# Patient Record
Sex: FEMALE | Race: WHITE | NOT HISPANIC OR LATINO | Employment: OTHER | URBAN - METROPOLITAN AREA
[De-identification: names, ages, dates, MRNs, and addresses within clinical notes are randomized per-mention and may not be internally consistent; named-entity substitution may affect disease eponyms.]

---

## 2019-06-05 NOTE — PROGRESS NOTES
Subjective:       Patient ID: Olamide Felipe is a 86 y.o. female.    Chief Complaint: Annual Exam and Establish Care    Patient is a 86 y.o.female who presents today for annual      Labs: due now  Vaccines: up to date  Mammogram: due now  Colonoscopy: negative; fitkit  Dexa: due now  Exercise: walks one mile per day      Review of Systems   Constitutional: Negative for appetite change, chills, diaphoresis, fatigue and fever.   HENT: Negative for congestion, dental problem, ear discharge, ear pain, hearing loss, postnasal drip, sinus pressure and sore throat.    Eyes: Negative for discharge, redness and itching.   Respiratory: Negative for cough, chest tightness, shortness of breath and wheezing.    Cardiovascular: Negative for chest pain, palpitations and leg swelling.   Gastrointestinal: Negative for abdominal pain, constipation, diarrhea, nausea and vomiting.   Endocrine: Negative for cold intolerance and heat intolerance.   Genitourinary: Negative for difficulty urinating, frequency, hematuria and urgency.   Musculoskeletal: Negative for arthralgias, back pain, gait problem, myalgias and neck pain.   Skin: Negative for color change and rash.   Neurological: Negative for dizziness, syncope and headaches.   Hematological: Negative for adenopathy.   Psychiatric/Behavioral: Negative for behavioral problems and sleep disturbance. The patient is not nervous/anxious.        Objective:      Physical Exam   Constitutional: She is oriented to person, place, and time. She appears well-developed and well-nourished. No distress.   HENT:   Head: Normocephalic and atraumatic.   Right Ear: External ear normal.   Left Ear: External ear normal.   Nose: Nose normal.   Mouth/Throat: Oropharynx is clear and moist. No oropharyngeal exudate.   Eyes: Pupils are equal, round, and reactive to light. Conjunctivae and EOM are normal. Right eye exhibits no discharge. Left eye exhibits no discharge. No scleral icterus.   Neck: Normal range of  motion. Neck supple. No JVD present. No thyromegaly present.   Cardiovascular: Normal rate, regular rhythm, normal heart sounds and intact distal pulses. Exam reveals no gallop and no friction rub.   No murmur heard.  Pulmonary/Chest: Effort normal and breath sounds normal. No stridor. No respiratory distress. She has no wheezes. She has no rales. She exhibits no tenderness.   Abdominal: Soft. Bowel sounds are normal. She exhibits no distension. There is no tenderness. There is no rebound.   Musculoskeletal: Normal range of motion. She exhibits no edema or tenderness.   Lymphadenopathy:     She has no cervical adenopathy.   Neurological: She is alert and oriented to person, place, and time. No cranial nerve deficit.   Skin: Skin is warm and dry. No rash noted. She is not diaphoretic. No erythema.   Psychiatric: She has a normal mood and affect. Her behavior is normal.   Nursing note and vitals reviewed.      Assessment and Plan:       1. Annual physical exam    - CBC auto differential; Future  - Comprehensive metabolic panel; Future  - Lipid panel; Future  - Vitamin D; Future  - Urinalysis; Future  - TSH; Future    2. Hypothyroidism, unspecified type    - CBC auto differential; Future  - Comprehensive metabolic panel; Future  - Lipid panel; Future  - Vitamin D; Future  - Urinalysis; Future  - TSH; Future    3. Hyperlipidemia, unspecified hyperlipidemia type    - CBC auto differential; Future  - Comprehensive metabolic panel; Future  - Lipid panel; Future  - Vitamin D; Future  - Urinalysis; Future  - TSH; Future    4. Vitamin D deficiency    - Vitamin D; Future    5. Visit for screening mammogram    - Mammo Digital Screening Bilat without CA; Future    6. Postmenopausal    - DXA Bone Density Spine And Hip; Future    7. Screen for colon cancer    - Fecal Immunochemical Test (iFOBT); Future          No follow-ups on file.

## 2019-06-11 ENCOUNTER — OFFICE VISIT (OUTPATIENT)
Dept: INTERNAL MEDICINE | Facility: CLINIC | Age: 84
End: 2019-06-11
Payer: MEDICARE

## 2019-06-11 VITALS
HEART RATE: 84 BPM | RESPIRATION RATE: 20 BRPM | WEIGHT: 127.19 LBS | SYSTOLIC BLOOD PRESSURE: 136 MMHG | TEMPERATURE: 98 F | DIASTOLIC BLOOD PRESSURE: 67 MMHG | HEIGHT: 65 IN | BODY MASS INDEX: 21.19 KG/M2

## 2019-06-11 DIAGNOSIS — E55.9 VITAMIN D DEFICIENCY: ICD-10-CM

## 2019-06-11 DIAGNOSIS — Z12.11 SCREEN FOR COLON CANCER: ICD-10-CM

## 2019-06-11 DIAGNOSIS — Z00.00 ANNUAL PHYSICAL EXAM: Primary | ICD-10-CM

## 2019-06-11 DIAGNOSIS — E78.5 HYPERLIPIDEMIA, UNSPECIFIED HYPERLIPIDEMIA TYPE: ICD-10-CM

## 2019-06-11 DIAGNOSIS — Z12.31 VISIT FOR SCREENING MAMMOGRAM: ICD-10-CM

## 2019-06-11 DIAGNOSIS — E03.9 HYPOTHYROIDISM, UNSPECIFIED TYPE: ICD-10-CM

## 2019-06-11 DIAGNOSIS — Z78.0 POSTMENOPAUSAL: ICD-10-CM

## 2019-06-11 PROCEDURE — 99387 INIT PM E/M NEW PAT 65+ YRS: CPT | Mod: S$GLB,,, | Performed by: INTERNAL MEDICINE

## 2019-06-11 PROCEDURE — 99999 PR PBB SHADOW E&M-NEW PATIENT-LVL III: CPT | Mod: PBBFAC,,, | Performed by: INTERNAL MEDICINE

## 2019-06-11 PROCEDURE — 99999 PR PBB SHADOW E&M-NEW PATIENT-LVL III: ICD-10-PCS | Mod: PBBFAC,,, | Performed by: INTERNAL MEDICINE

## 2019-06-11 PROCEDURE — 99387 PR PREVENTIVE VISIT,NEW,65 & OVER: ICD-10-PCS | Mod: S$GLB,,, | Performed by: INTERNAL MEDICINE

## 2019-06-11 RX ORDER — FLUOXETINE HYDROCHLORIDE 20 MG/1
20 CAPSULE ORAL DAILY
COMMUNITY
End: 2019-07-10 | Stop reason: SDUPTHER

## 2019-06-11 RX ORDER — GLUCOSAMINE/CHONDR SU A SOD 167-133 MG
500 CAPSULE ORAL 2 TIMES DAILY WITH MEALS
COMMUNITY
End: 2022-10-18

## 2019-06-11 RX ORDER — MULTIVIT WITH MINERALS/HERBS
1 TABLET ORAL DAILY
Status: ON HOLD | COMMUNITY
End: 2023-11-20 | Stop reason: SDUPTHER

## 2019-06-11 RX ORDER — OMEPRAZOLE 20 MG/1
20 CAPSULE, DELAYED RELEASE ORAL DAILY
COMMUNITY
End: 2019-07-10 | Stop reason: SDUPTHER

## 2019-06-11 RX ORDER — LEVOTHYROXINE SODIUM 25 UG/1
25 TABLET ORAL DAILY
COMMUNITY
End: 2019-07-10 | Stop reason: SDUPTHER

## 2019-06-11 RX ORDER — SIMVASTATIN 20 MG/1
20 TABLET, FILM COATED ORAL NIGHTLY
COMMUNITY
End: 2019-09-24 | Stop reason: SDUPTHER

## 2019-06-14 ENCOUNTER — LAB VISIT (OUTPATIENT)
Dept: LAB | Facility: HOSPITAL | Age: 84
End: 2019-06-14
Attending: INTERNAL MEDICINE
Payer: MEDICARE

## 2019-06-14 DIAGNOSIS — Z12.11 SCREEN FOR COLON CANCER: ICD-10-CM

## 2019-06-14 PROCEDURE — 82274 ASSAY TEST FOR BLOOD FECAL: CPT

## 2019-06-18 LAB — HEMOCCULT STL QL IA: NEGATIVE

## 2019-06-22 ENCOUNTER — OFFICE VISIT (OUTPATIENT)
Dept: URGENT CARE | Facility: CLINIC | Age: 84
End: 2019-06-22
Payer: MEDICARE

## 2019-06-22 VITALS
SYSTOLIC BLOOD PRESSURE: 155 MMHG | DIASTOLIC BLOOD PRESSURE: 72 MMHG | TEMPERATURE: 98 F | WEIGHT: 127 LBS | RESPIRATION RATE: 18 BRPM | BODY MASS INDEX: 21.16 KG/M2 | OXYGEN SATURATION: 97 % | HEIGHT: 65 IN | HEART RATE: 79 BPM

## 2019-06-22 DIAGNOSIS — R52 GENERALIZED BODY ACHES: Primary | ICD-10-CM

## 2019-06-22 DIAGNOSIS — R35.0 URINARY FREQUENCY: ICD-10-CM

## 2019-06-22 LAB
BILIRUB UR QL STRIP: NEGATIVE
GLUCOSE UR QL STRIP: NEGATIVE
KETONES UR QL STRIP: NEGATIVE
LEUKOCYTE ESTERASE UR QL STRIP: NEGATIVE
PH, POC UA: 7.5 (ref 5–8)
POC BLOOD, URINE: NEGATIVE
POC NITRATES, URINE: NEGATIVE
PROT UR QL STRIP: NEGATIVE
SP GR UR STRIP: 1.01 (ref 1–1.03)
UROBILINOGEN UR STRIP-ACNC: NORMAL (ref 0.1–1.1)

## 2019-06-22 PROCEDURE — 99214 PR OFFICE/OUTPT VISIT, EST, LEVL IV, 30-39 MIN: ICD-10-PCS | Mod: S$GLB,,, | Performed by: PHYSICIAN ASSISTANT

## 2019-06-22 PROCEDURE — 99214 OFFICE O/P EST MOD 30 MIN: CPT | Mod: S$GLB,,, | Performed by: PHYSICIAN ASSISTANT

## 2019-06-22 PROCEDURE — 3288F PR FALLS RISK ASSESSMENT DOCUMENTED: ICD-10-PCS | Mod: CPTII,S$GLB,, | Performed by: PHYSICIAN ASSISTANT

## 2019-06-22 PROCEDURE — 1100F PTFALLS ASSESS-DOCD GE2>/YR: CPT | Mod: CPTII,S$GLB,, | Performed by: PHYSICIAN ASSISTANT

## 2019-06-22 PROCEDURE — 1100F PR PT FALLS ASSESS DOC 2+ FALLS/FALL W/INJURY/YR: ICD-10-PCS | Mod: CPTII,S$GLB,, | Performed by: PHYSICIAN ASSISTANT

## 2019-06-22 PROCEDURE — 81003 POCT URINALYSIS, DIPSTICK, AUTOMATED, W/O SCOPE: ICD-10-PCS | Mod: QW,S$GLB,, | Performed by: PHYSICIAN ASSISTANT

## 2019-06-22 PROCEDURE — 81003 URINALYSIS AUTO W/O SCOPE: CPT | Mod: QW,S$GLB,, | Performed by: PHYSICIAN ASSISTANT

## 2019-06-22 PROCEDURE — 87086 URINE CULTURE/COLONY COUNT: CPT

## 2019-06-22 PROCEDURE — 3288F FALL RISK ASSESSMENT DOCD: CPT | Mod: CPTII,S$GLB,, | Performed by: PHYSICIAN ASSISTANT

## 2019-06-22 NOTE — PROGRESS NOTES
"Subjective:       Patient ID: Olamide Felipe is a 86 y.o. female.    Vitals:  height is 5' 5" (1.651 m) and weight is 57.6 kg (127 lb). Her temperature is 97.9 °F (36.6 °C). Her blood pressure is 155/72 (abnormal) and her pulse is 79. Her respiration is 18 and oxygen saturation is 97%.     Chief Complaint: Generalized Body Aches    Pt had stomach pain last night, pt now has general body aches. Stomach pain began in lower abdomen. She denies fever/chills, diarrhea/constipation, n/v, or blood in stool. The reports urinating 4-5 times last night which is unusual for her. Regular BM this morning. She denies headache, dizziness, cp/sob, congestion, sore throat. She does report back discomfort, especially when her back touches anything. She has not tried anything for these symptoms. Pt notes she has follow up with PCP on Monday to get blood work and mammogram.       Constitution: Negative for chills, fatigue, fever and generalized weakness.   HENT: Negative for congestion and sore throat.    Neck: Negative for painful lymph nodes.   Cardiovascular: Negative for chest pain and leg swelling.   Eyes: Negative for double vision and blurred vision.   Respiratory: Negative for cough and shortness of breath.    Gastrointestinal: Negative for nausea, vomiting, constipation and diarrhea.   Genitourinary: Positive for frequency. Negative for dysuria, urgency, hematuria and history of kidney stones.   Musculoskeletal: Positive for joint pain and back pain. Negative for joint swelling, muscle cramps and muscle ache.   Skin: Negative for color change, pale, rash and bruising.   Allergic/Immunologic: Negative for seasonal allergies.   Neurological: Negative for dizziness, history of vertigo, light-headedness, passing out and headaches.   Hematologic/Lymphatic: Negative for swollen lymph nodes.   Psychiatric/Behavioral: Negative for nervous/anxious, sleep disturbance and depression. The patient is not nervous/anxious.        Objective: "      Physical Exam   Constitutional: She is oriented to person, place, and time. She appears well-developed and well-nourished. She is active. She does not have a sickly appearance. She does not appear ill. No distress.   HENT:   Head: Normocephalic and atraumatic.   Right Ear: External ear normal.   Left Ear: External ear normal.   Nose: Nose normal.   Mouth/Throat: Mucous membranes are normal.   Eyes: Pupils are equal, round, and reactive to light. Conjunctivae, EOM and lids are normal.   Neck: Trachea normal and full passive range of motion without pain. Neck supple.   Cardiovascular: Normal rate, regular rhythm and normal heart sounds.   Pulmonary/Chest: Effort normal and breath sounds normal. No respiratory distress.   Abdominal: Soft. Normal appearance and bowel sounds are normal. She exhibits no distension, no abdominal bruit, no pulsatile midline mass and no mass. There is no tenderness. There is CVA tenderness. There is no rigidity, no guarding and no tenderness at McBurney's point.   Musculoskeletal: Normal range of motion. She exhibits no edema.        Thoracic back: She exhibits no bony tenderness and no swelling.   TTP of left paraspinal muscles.    Neurological: She is alert and oriented to person, place, and time. She has normal strength. No cranial nerve deficit or sensory deficit. Gait normal. GCS eye subscore is 4. GCS verbal subscore is 5. GCS motor subscore is 6.   Skin: Skin is warm, dry and intact. She is not diaphoretic. No pallor.   Psychiatric: She has a normal mood and affect. Her speech is normal and behavior is normal. Judgment and thought content normal. Cognition and memory are normal.   Nursing note and vitals reviewed.      Results for orders placed or performed in visit on 06/22/19   POCT Urinalysis, Dipstick, Automated, W/O Scope   Result Value Ref Range    POC Blood, Urine Negative Negative    POC Bilirubin, Urine Negative Negative    POC Urobilinogen, Urine Normal 0.1 - 1.1    POC  Ketones, Urine Negative Negative    POC Protein, Urine Negative Negative    POC Nitrates, Urine Negative Negative    POC Glucose, Urine Negative Negative    pH, UA 7.5 5 - 8    POC Specific Gravity, Urine 1.010 1.003 - 1.029    POC Leukocytes, Urine Negative Negative       Assessment:       1. Generalized body aches    2. Urinary frequency        Plan:         Generalized body aches  -     POCT Urinalysis, Dipstick, Automated, W/O Scope  -     Urine culture    Urinary frequency  -     POCT Urinalysis, Dipstick, Automated, W/O Scope  -     Urine culture    Pt with some left sided back pain, otherwise exam unremarkable. She does not seem distressed or ill-appearing. Given age and unknown renal fxn, do not recommend Naproxen for back pain. Pt instructed to take Tylenol for discomfort and to monitor for fever.     Pt instructed to contact Primary care provider Monday if signs and symptoms have not resolved or worsen. If symptoms worsen before Monday recommend pt follow up in ER for further workup.     RED FLAGS/WARNING SYMPTOMS DISCUSSED WITH PATIENT THAT WOULD WARRANT EMERGENT MEDICAL ATTENTION. PATIENT VERBALIZED UNDERSTANDING.

## 2019-06-24 ENCOUNTER — HOSPITAL ENCOUNTER (OUTPATIENT)
Dept: RADIOLOGY | Facility: HOSPITAL | Age: 84
Discharge: HOME | End: 2019-06-24
Attending: INTERNAL MEDICINE
Payer: MEDICARE

## 2019-06-24 DIAGNOSIS — Z12.31 VISIT FOR SCREENING MAMMOGRAM: ICD-10-CM

## 2019-06-24 LAB — BACTERIA UR CULT: NO GROWTH

## 2019-06-24 PROCEDURE — 77063 MAMMO DIGITAL SCREENING BILAT WITH TOMOSYNTHESIS_CAD: ICD-10-PCS | Mod: 26,,, | Performed by: RADIOLOGY

## 2019-06-24 PROCEDURE — 77063 BREAST TOMOSYNTHESIS BI: CPT | Mod: 26,,, | Performed by: RADIOLOGY

## 2019-06-24 PROCEDURE — 77067 SCR MAMMO BI INCL CAD: CPT | Mod: 26,,, | Performed by: RADIOLOGY

## 2019-06-24 PROCEDURE — 77067 MAMMO DIGITAL SCREENING BILAT WITH TOMOSYNTHESIS_CAD: ICD-10-PCS | Mod: 26,,, | Performed by: RADIOLOGY

## 2019-06-24 PROCEDURE — 77067 SCR MAMMO BI INCL CAD: CPT | Mod: TC,PO

## 2019-06-25 ENCOUNTER — TELEPHONE (OUTPATIENT)
Dept: INTERNAL MEDICINE | Facility: CLINIC | Age: 84
End: 2019-06-25

## 2019-06-25 DIAGNOSIS — E83.52 HYPERCALCEMIA: Primary | ICD-10-CM

## 2019-06-25 NOTE — TELEPHONE ENCOUNTER
Please inform patient of the following:    -Blood counts for some reason were not drawn; need to do CBC  -Electrolytes, kidney and liver function are normal  - calcium is high; recommend repeat calcium testing  -Thyroid function is normal  -Cholesterol panel is normal  - vit D is normal  - urine is clear

## 2019-07-01 ENCOUNTER — LAB VISIT (OUTPATIENT)
Dept: LAB | Facility: HOSPITAL | Age: 84
End: 2019-07-01
Attending: INTERNAL MEDICINE
Payer: MEDICARE

## 2019-07-01 ENCOUNTER — TELEPHONE (OUTPATIENT)
Dept: INTERNAL MEDICINE | Facility: CLINIC | Age: 84
End: 2019-07-01

## 2019-07-01 DIAGNOSIS — E83.52 HYPERCALCEMIA: ICD-10-CM

## 2019-07-01 DIAGNOSIS — E21.3 HYPERPARATHYROIDISM: Primary | ICD-10-CM

## 2019-07-01 LAB
ALBUMIN SERPL BCP-MCNC: 3.7 G/DL (ref 3.5–5.2)
ALP SERPL-CCNC: 61 U/L (ref 55–135)
ALT SERPL W/O P-5'-P-CCNC: 19 U/L (ref 10–44)
ANION GAP SERPL CALC-SCNC: 9 MMOL/L (ref 8–16)
AST SERPL-CCNC: 23 U/L (ref 10–40)
BASOPHILS # BLD AUTO: 0.04 K/UL (ref 0–0.2)
BASOPHILS NFR BLD: 0.8 % (ref 0–1.9)
BILIRUB SERPL-MCNC: 0.5 MG/DL (ref 0.1–1)
BUN SERPL-MCNC: 23 MG/DL (ref 8–23)
CA-I BLDV-SCNC: 1.27 MMOL/L (ref 1.06–1.42)
CALCIUM SERPL-MCNC: 9.5 MG/DL (ref 8.7–10.5)
CHLORIDE SERPL-SCNC: 107 MMOL/L (ref 95–110)
CO2 SERPL-SCNC: 28 MMOL/L (ref 23–29)
CREAT SERPL-MCNC: 0.7 MG/DL (ref 0.5–1.4)
DIFFERENTIAL METHOD: ABNORMAL
EOSINOPHIL # BLD AUTO: 0.2 K/UL (ref 0–0.5)
EOSINOPHIL NFR BLD: 3.9 % (ref 0–8)
ERYTHROCYTE [DISTWIDTH] IN BLOOD BY AUTOMATED COUNT: 12.1 % (ref 11.5–14.5)
EST. GFR  (AFRICAN AMERICAN): >60 ML/MIN/1.73 M^2
EST. GFR  (NON AFRICAN AMERICAN): >60 ML/MIN/1.73 M^2
GLUCOSE SERPL-MCNC: 88 MG/DL (ref 70–110)
HCT VFR BLD AUTO: 40 % (ref 37–48.5)
HGB BLD-MCNC: 13.2 G/DL (ref 12–16)
IMM GRANULOCYTES # BLD AUTO: 0.01 K/UL (ref 0–0.04)
IMM GRANULOCYTES NFR BLD AUTO: 0.2 % (ref 0–0.5)
LYMPHOCYTES # BLD AUTO: 1.6 K/UL (ref 1–4.8)
LYMPHOCYTES NFR BLD: 31.8 % (ref 18–48)
MCH RBC QN AUTO: 31.7 PG (ref 27–31)
MCHC RBC AUTO-ENTMCNC: 33 G/DL (ref 32–36)
MCV RBC AUTO: 96 FL (ref 82–98)
MONOCYTES # BLD AUTO: 0.5 K/UL (ref 0.3–1)
MONOCYTES NFR BLD: 9.6 % (ref 4–15)
NEUTROPHILS # BLD AUTO: 2.7 K/UL (ref 1.8–7.7)
NEUTROPHILS NFR BLD: 53.7 % (ref 38–73)
NRBC BLD-RTO: 0 /100 WBC
PLATELET # BLD AUTO: 246 K/UL (ref 150–350)
PMV BLD AUTO: 10.7 FL (ref 9.2–12.9)
POTASSIUM SERPL-SCNC: 4.8 MMOL/L (ref 3.5–5.1)
PROT SERPL-MCNC: 6.3 G/DL (ref 6–8.4)
PTH-INTACT SERPL-MCNC: 97 PG/ML (ref 9–77)
RBC # BLD AUTO: 4.16 M/UL (ref 4–5.4)
SODIUM SERPL-SCNC: 144 MMOL/L (ref 136–145)
WBC # BLD AUTO: 5.09 K/UL (ref 3.9–12.7)

## 2019-07-01 PROCEDURE — 85025 COMPLETE CBC W/AUTO DIFF WBC: CPT

## 2019-07-01 PROCEDURE — 36415 COLL VENOUS BLD VENIPUNCTURE: CPT | Mod: PO

## 2019-07-01 PROCEDURE — 83970 ASSAY OF PARATHORMONE: CPT

## 2019-07-01 PROCEDURE — 82330 ASSAY OF CALCIUM: CPT

## 2019-07-01 PROCEDURE — 80053 COMPREHEN METABOLIC PANEL: CPT

## 2019-07-01 NOTE — TELEPHONE ENCOUNTER
Notify pt that her parathyroid hormone is excreting too much calcium at times; recommend endocrine eval given this finding

## 2019-07-02 NOTE — TELEPHONE ENCOUNTER
Spoke to pt and informed of results and endo referral. Pt verbalized and is agreeable. Referral sent to referral team to schedule pt.

## 2019-07-10 ENCOUNTER — TELEPHONE (OUTPATIENT)
Dept: INTERNAL MEDICINE | Facility: CLINIC | Age: 84
End: 2019-07-10

## 2019-07-10 RX ORDER — OMEPRAZOLE 20 MG/1
20 CAPSULE, DELAYED RELEASE ORAL DAILY
Qty: 90 CAPSULE | Refills: 3 | Status: SHIPPED | OUTPATIENT
Start: 2019-07-10 | End: 2020-08-31 | Stop reason: SDUPTHER

## 2019-07-10 RX ORDER — LEVOTHYROXINE SODIUM 25 UG/1
25 TABLET ORAL DAILY
Qty: 90 TABLET | Refills: 3 | Status: SHIPPED | OUTPATIENT
Start: 2019-07-10 | End: 2020-10-02 | Stop reason: SDUPTHER

## 2019-07-10 RX ORDER — FLUOXETINE HYDROCHLORIDE 20 MG/1
20 CAPSULE ORAL DAILY
Qty: 90 CAPSULE | Refills: 3 | Status: SHIPPED | OUTPATIENT
Start: 2019-07-10 | End: 2020-10-02 | Stop reason: SDUPTHER

## 2019-07-10 NOTE — TELEPHONE ENCOUNTER
----- Message from Karime Wright sent at 7/10/2019 11:18 AM CDT -----  Contact: pt [phone]  Patient is calling for an RX refill or new RX.  Is this a refill or new RX: new    RX name and strength: omeprazole (PRILOSEC) 20 MG capsule  Directions (copy/paste from chart):  Take 20 mg by mouth once daily  Is this a 30 day or 90 day RX:  90  Local pharmacy or mail order pharmacy:  Local   Pharmacy name and phone # (copy/paste from chart):   [facility]   Comments:      Patient is calling for an RX refill or new RX.  Is this a refill or new RX:  New   RX name and strength: FLUoxetine 20 MG capsule  Directions (copy/paste from chart):  Take 20 mg by mouth once daily  Is this a 30 day or 90 day RX:  90  Local pharmacy or mail order pharmacy:  Local   Pharmacy name and phone # (copy/paste from chart):   [facility]   Comments:      Patient is calling for an RX refill or new RX.  Is this a refill or new RX:  New   RX name and strength: levothyroxine (SYNTHROID) 25 MCG tablet  Directions (copy/paste from chart):  Take 25 mcg by mouth once daily  Is this a 30 day or 90 day RX:  90  Local pharmacy or mail order pharmacy:  Local   Pharmacy name and phone # (copy/paste from chart):  [facility]    Comments:

## 2019-08-26 ENCOUNTER — OFFICE VISIT (OUTPATIENT)
Dept: INTERNAL MEDICINE | Facility: CLINIC | Age: 84
End: 2019-08-26
Payer: MEDICARE

## 2019-08-26 VITALS
DIASTOLIC BLOOD PRESSURE: 60 MMHG | HEIGHT: 65 IN | TEMPERATURE: 98 F | BODY MASS INDEX: 20.64 KG/M2 | SYSTOLIC BLOOD PRESSURE: 136 MMHG | WEIGHT: 123.88 LBS | RESPIRATION RATE: 16 BRPM | HEART RATE: 98 BPM

## 2019-08-26 DIAGNOSIS — H91.90 HEARING LOSS, UNSPECIFIED HEARING LOSS TYPE, UNSPECIFIED LATERALITY: ICD-10-CM

## 2019-08-26 DIAGNOSIS — S09.90XA TRAUMATIC INJURY OF HEAD, INITIAL ENCOUNTER: Primary | ICD-10-CM

## 2019-08-26 PROCEDURE — 3288F FALL RISK ASSESSMENT DOCD: CPT | Mod: HCNC,CPTII,S$GLB, | Performed by: INTERNAL MEDICINE

## 2019-08-26 PROCEDURE — 3288F PR FALLS RISK ASSESSMENT DOCUMENTED: ICD-10-PCS | Mod: HCNC,CPTII,S$GLB, | Performed by: INTERNAL MEDICINE

## 2019-08-26 PROCEDURE — 99999 PR PBB SHADOW E&M-EST. PATIENT-LVL IV: CPT | Mod: PBBFAC,HCNC,, | Performed by: INTERNAL MEDICINE

## 2019-08-26 PROCEDURE — 99214 PR OFFICE/OUTPT VISIT, EST, LEVL IV, 30-39 MIN: ICD-10-PCS | Mod: HCNC,S$GLB,, | Performed by: INTERNAL MEDICINE

## 2019-08-26 PROCEDURE — 99214 OFFICE O/P EST MOD 30 MIN: CPT | Mod: HCNC,S$GLB,, | Performed by: INTERNAL MEDICINE

## 2019-08-26 PROCEDURE — 1100F PTFALLS ASSESS-DOCD GE2>/YR: CPT | Mod: HCNC,CPTII,S$GLB, | Performed by: INTERNAL MEDICINE

## 2019-08-26 PROCEDURE — 1100F PR PT FALLS ASSESS DOC 2+ FALLS/FALL W/INJURY/YR: ICD-10-PCS | Mod: HCNC,CPTII,S$GLB, | Performed by: INTERNAL MEDICINE

## 2019-08-26 PROCEDURE — 99999 PR PBB SHADOW E&M-EST. PATIENT-LVL IV: ICD-10-PCS | Mod: PBBFAC,HCNC,, | Performed by: INTERNAL MEDICINE

## 2019-08-26 NOTE — PROGRESS NOTES
Subjective:       Patient ID: Olamide Felipe is a 86 y.o. female.    Chief Complaint: both ears stopped up    Patient is a 86 y.o.female who presents today for ear discomfort.      She fell one week ago. Her head hit the floor. She did not go to the ER. She put ice on her head. There is a bruise on the left side of her scalp.      She went to [employer] yesterday and she cannot hear what was going on. It feels like her ears are full of water.         Review of Systems   Constitutional: Negative for appetite change, chills, diaphoresis and fever.   HENT: Positive for hearing loss. Negative for congestion, ear discharge, ear pain, postnasal drip, tinnitus, trouble swallowing and voice change.    Eyes: Negative for discharge, redness and itching.   Respiratory: Negative for cough, chest tightness, shortness of breath and wheezing.    Cardiovascular: Negative for chest pain, palpitations and leg swelling.   Gastrointestinal: Negative for abdominal pain, constipation, diarrhea, nausea and vomiting.   Endocrine: Negative for cold intolerance and heat intolerance.   Genitourinary: Negative for difficulty urinating, flank pain, hematuria and urgency.   Musculoskeletal: Negative for arthralgias, gait problem, myalgias and neck stiffness.   Skin: Negative for color change and rash.   Neurological: Negative for dizziness, seizures, syncope and headaches.   Hematological: Negative for adenopathy.   Psychiatric/Behavioral: Negative for agitation, behavioral problems, confusion and sleep disturbance.       Objective:      Physical Exam   Constitutional: She is oriented to person, place, and time. She appears well-developed and well-nourished. No distress.   HENT:   Head: Normocephalic and atraumatic.       Right Ear: Tympanic membrane and external ear normal.   Left Ear: Tympanic membrane and external ear normal.   Nose: Nose normal.   Mouth/Throat: Oropharynx is clear and moist. No oropharyngeal exudate.   Eyes: Pupils are equal,  round, and reactive to light. Conjunctivae and EOM are normal. Right eye exhibits no discharge. Left eye exhibits no discharge. No scleral icterus.   Neck: Neck supple. No JVD present. No tracheal deviation present. No thyromegaly present.   Cardiovascular: Normal rate, normal heart sounds and intact distal pulses. Exam reveals no gallop and no friction rub.   No murmur heard.  Pulmonary/Chest: Effort normal and breath sounds normal. No stridor. No respiratory distress. She has no wheezes. She has no rales. She exhibits no tenderness.   Abdominal: Soft. Bowel sounds are normal. She exhibits no distension. There is no tenderness. There is no rebound.   Musculoskeletal: She exhibits no edema or tenderness.   Lymphadenopathy:     She has no cervical adenopathy.   Neurological: She is alert and oriented to person, place, and time.   Skin: Skin is warm and dry. No rash noted. She is not diaphoretic. No erythema.   Psychiatric: She has a normal mood and affect. Her behavior is normal.   Nursing note and vitals reviewed.      Assessment and Plan:       1. Traumatic injury of head, initial encounter  - CT Head Without Contrast; Future    2. Hearing loss, unspecified hearing loss type, unspecified laterality    - Ambulatory Referral to ENT    Notify clinic if symptoms change, worsen or do not improve          No follow-ups on file.

## 2019-08-27 ENCOUNTER — OFFICE VISIT (OUTPATIENT)
Dept: OTOLARYNGOLOGY | Facility: CLINIC | Age: 84
End: 2019-08-27
Payer: MEDICARE

## 2019-08-27 ENCOUNTER — CLINICAL SUPPORT (OUTPATIENT)
Dept: AUDIOLOGY | Facility: CLINIC | Age: 84
End: 2019-08-27
Payer: MEDICARE

## 2019-08-27 VITALS
HEART RATE: 79 BPM | WEIGHT: 123 LBS | DIASTOLIC BLOOD PRESSURE: 64 MMHG | SYSTOLIC BLOOD PRESSURE: 151 MMHG | BODY MASS INDEX: 20.47 KG/M2

## 2019-08-27 DIAGNOSIS — H93.8X3 PRESSURE SENSATION IN BOTH EARS: ICD-10-CM

## 2019-08-27 DIAGNOSIS — H90.3 ASYMMETRICAL SENSORINEURAL HEARING LOSS: Primary | ICD-10-CM

## 2019-08-27 DIAGNOSIS — H93.12 TINNITUS, LEFT: ICD-10-CM

## 2019-08-27 DIAGNOSIS — H90.3 SENSORINEURAL HEARING LOSS (SNHL) OF BOTH EARS: ICD-10-CM

## 2019-08-27 PROCEDURE — 1100F PR PT FALLS ASSESS DOC 2+ FALLS/FALL W/INJURY/YR: ICD-10-PCS | Mod: HCNC,CPTII,S$GLB, | Performed by: NURSE PRACTITIONER

## 2019-08-27 PROCEDURE — 3288F PR FALLS RISK ASSESSMENT DOCUMENTED: ICD-10-PCS | Mod: HCNC,CPTII,S$GLB, | Performed by: NURSE PRACTITIONER

## 2019-08-27 PROCEDURE — 99203 PR OFFICE/OUTPT VISIT, NEW, LEVL III, 30-44 MIN: ICD-10-PCS | Mod: HCNC,S$GLB,, | Performed by: NURSE PRACTITIONER

## 2019-08-27 PROCEDURE — 92557 PR COMPREHENSIVE HEARING TEST: ICD-10-PCS | Mod: HCNC,S$GLB,, | Performed by: AUDIOLOGIST

## 2019-08-27 PROCEDURE — 92557 COMPREHENSIVE HEARING TEST: CPT | Mod: HCNC,S$GLB,, | Performed by: AUDIOLOGIST

## 2019-08-27 PROCEDURE — 92567 PR TYMPA2METRY: ICD-10-PCS | Mod: HCNC,S$GLB,, | Performed by: AUDIOLOGIST

## 2019-08-27 PROCEDURE — 99999 PR PBB SHADOW E&M-EST. PATIENT-LVL III: ICD-10-PCS | Mod: PBBFAC,HCNC,, | Performed by: NURSE PRACTITIONER

## 2019-08-27 PROCEDURE — 92567 TYMPANOMETRY: CPT | Mod: HCNC,S$GLB,, | Performed by: AUDIOLOGIST

## 2019-08-27 PROCEDURE — 99999 PR PBB SHADOW E&M-EST. PATIENT-LVL III: CPT | Mod: PBBFAC,HCNC,, | Performed by: NURSE PRACTITIONER

## 2019-08-27 PROCEDURE — 1100F PTFALLS ASSESS-DOCD GE2>/YR: CPT | Mod: HCNC,CPTII,S$GLB, | Performed by: NURSE PRACTITIONER

## 2019-08-27 PROCEDURE — 99203 OFFICE O/P NEW LOW 30 MIN: CPT | Mod: HCNC,S$GLB,, | Performed by: NURSE PRACTITIONER

## 2019-08-27 PROCEDURE — 3288F FALL RISK ASSESSMENT DOCD: CPT | Mod: HCNC,CPTII,S$GLB, | Performed by: NURSE PRACTITIONER

## 2019-08-27 NOTE — PROGRESS NOTES
8/27/2019    AUDIOLOGICAL EVALUATION:    Olamide Felipe was seen for an audiological evaluation on 8/27/2019 for decreased hearing sensitivity bilaterally.  Mrs. Felipe noticed the hearing loss 3 days ago at [employer].  She reported a blocked sensation and feels like she is under water.  Mrs. Felipe also reported tinnitus in her left ear.    Pure tone threshold testing revealed an asymmetrical sensorineural hearing loss.  Speech reception thresholds were obtained at 30dBHL for the right ear and 45dBHL for the left ear.  Speech discrimination scores were obtained at 84% for the right ear and 72% for the left ear.    Tympanometry was within normal limits bilaterally indicating normal middle ear function.    Recommend:  1.  Otologic evaluation.  2.  Follow up audio to monitor hearing levels.  3.  Hearing aid evaluation.  4.  Annual evaluation.

## 2019-08-27 NOTE — PROGRESS NOTES
Subjective:      Olamide Felipe is a 86 y.o. female who was referred to me by Dr. Nicolasa Rojas in consultation for hearing loss.    Ms. Felipe reports decrease hearing with associated ear pressure in both ears for the past year. She denies tinnitus, dizziness, ear pain or ear drainage. She states she has to turn her television on loud to hear it.  There is not a family history of hearing loss at a young age.  There is not a prior history of ear surgery.  There is not a prior history of ear infections .  She denies a history of significant noise exposure.  She does not wear hearing aids currently.  She has not had a hearing test recently.       Past Medical History  She has a past medical history of Hyperlipidemia, Hypothyroidism, and Osteoarthritis, knee.    Past Surgical History  She has a past surgical history that includes dentures; left wrist surgery; and Hysterectomy.    Family History  Her family history is not on file.    Social History  She reports that she quit smoking about 30 years ago. She has never used smokeless tobacco.    Allergies  She has No Known Allergies.    Medications  She has a current medication list which includes the following prescription(s): b complex vitamins, calcium-vitamin d, fluoxetine, levothyroxine, mv-min/iron/folic/calcium/vitk, niacin, omeprazole, and simvastatin.    Review of Systems   Constitutional: Negative for chills, fever and unexpected weight change.   HENT: Positive for hearing loss. Negative for congestion, ear discharge, ear pain, facial swelling, postnasal drip, sinus pressure, sore throat, tinnitus and trouble swallowing.    Eyes: Negative for pain and visual disturbance.   Respiratory: Negative for apnea and shortness of breath.    Cardiovascular: Negative for chest pain and palpitations.   Gastrointestinal: Negative for abdominal pain and nausea.   Endocrine: Negative for cold intolerance and heat intolerance.   Musculoskeletal: Negative for joint  swelling and neck stiffness.   Skin: Negative for color change and rash.   Neurological: Negative for dizziness, facial asymmetry and headaches.   Hematological: Negative for adenopathy. Does not bruise/bleed easily.   Psychiatric/Behavioral: Negative for agitation. The patient is not nervous/anxious.           Objective:     BP (!) 151/64   Pulse 79   Wt 55.8 kg (123 lb)   BMI 20.47 kg/m²      Constitutional:   Vital signs are normal. She appears well-developed and well-nourished.     Head:  Normocephalic and atraumatic.     Ears:    Right Ear: No lacerations. No drainage, swelling or tenderness. No foreign bodies. No mastoid tenderness. Tympanic membrane is not injected, not scarred, not perforated, not erythematous, not retracted and not bulging. Tympanic membrane mobility is normal. No middle ear effusion. No hemotympanum. Decreased hearing is noted.   Left Ear: No lacerations. No drainage, swelling or tenderness. No foreign bodies. No mastoid tenderness. Tympanic membrane is not injected, not scarred, not perforated, not erythematous, not retracted and not bulging. Tympanic membrane mobility is normal.  No middle ear effusion. No hemotympanum. Decreased hearing is noted.     Nose:  Nose normal including turbinates, nasal mucosa, sinuses and nasal septum.     Neck:  Neck normal without thyromegaly masses, asymmetry, normal tracheal structure, crepitus, and tenderness and no adenopathy.     Cardiovascular:   Normal heart sounds and normal pulses.      Pulmonary/Chest:   Effort normal and breath sounds normal.     Psychiatric:   She has a normal mood and affect.       Procedure    None      Data Reviewed    WBC (K/uL)   Date Value   07/01/2019 5.09     Platelets (K/uL)   Date Value   07/01/2019 246      Creatinine (mg/dL)   Date Value   07/01/2019 0.7     TSH (uIU/mL)   Date Value   06/24/2019 1.992     Glucose (mg/dL)   Date Value   07/01/2019 88     No results found for: HGBA1C    I independently reviewed the  tracings of the complete audiometric evaluation performed today.  I reviewed the audiogram with the patient as well.  Pertinent findings include Bilateral moderate to severe SNHL (L>R), normal tympanogram in both ears, right SRT 30 with 84% discrimination at 70 db, Left SRT 45 with 72% discrimination at 75 db..       Assessment:     1. Asymmetrical hearing loss of left ear    2. Sensorineural hearing loss (SNHL) of both ears    3. Pressure sensation in both ears         Plan:     I had a long discussion with the patient regarding her condition and the further workup and management options.    Olamide was seen today for hearing loss and cerumen impaction.    Diagnoses and all orders for this visit:    Asymmetrical hearing loss of left ear  -     MRI IAC/Temporal Bones W W/O Contrast; Future        -     I will call her with the MRI results once received.   Sensorineural hearing loss (SNHL) of both ears    Pressure sensation in both ears    I recommended hearing amplification, but she declines at this time.     Follow up in about 1 month (around 9/27/2019).

## 2019-08-27 NOTE — LETTER
August 27, 2019      Nicolasa Rojas, DO  [address] [facility] [place] [address]           [facility] - Otorhinolaryngology  [address]  [place] [address]  Phone: [phone]  Fax: [phone]          Patient: Olamide Felipe   MR Number: [id number]   YOB: 1933   Date of Visit: 8/27/2019       Dear Dr. Nicolasa Rojas:    Thank you for referring Olamide Felipe to me for evaluation. Attached you will find relevant portions of my assessment and plan of care.    If you have questions, please do not hesitate to call me. I look forward to following Olamide Felipe along with you.    Sincerely,    Frederic Fox, NP    Enclosure  CC:  No Recipients    If you would like to receive this communication electronically, please contact [email or web address] or [phone] to request more information on [facility] access.    For providers and/or their staff who would like to refer a patient to [facility], please contact us through our one-stop-shop provider referral line, [facility], at [phone].    If you feel you have received this communication in error or would no longer like to receive these types of communications, please e-mail [email or web address]

## 2019-08-29 ENCOUNTER — HOSPITAL ENCOUNTER (OUTPATIENT)
Dept: RADIOLOGY | Facility: HOSPITAL | Age: 84
Discharge: HOME | End: 2019-08-29
Attending: INTERNAL MEDICINE
Payer: MEDICARE

## 2019-08-29 ENCOUNTER — TELEPHONE (OUTPATIENT)
Dept: INTERNAL MEDICINE | Facility: CLINIC | Age: 84
End: 2019-08-29

## 2019-08-29 DIAGNOSIS — S09.90XA TRAUMATIC INJURY OF HEAD, INITIAL ENCOUNTER: ICD-10-CM

## 2019-08-29 PROCEDURE — 70450 CT HEAD/BRAIN W/O DYE: CPT | Mod: TC,HCNC

## 2019-08-29 PROCEDURE — 70450 CT HEAD WITHOUT CONTRAST: ICD-10-PCS | Mod: 26,HCNC,, | Performed by: RADIOLOGY

## 2019-08-29 PROCEDURE — 70450 CT HEAD/BRAIN W/O DYE: CPT | Mod: 26,HCNC,, | Performed by: RADIOLOGY

## 2019-09-03 ENCOUNTER — HOSPITAL ENCOUNTER (OUTPATIENT)
Dept: RADIOLOGY | Facility: HOSPITAL | Age: 84
Discharge: HOME | End: 2019-09-03
Attending: NURSE PRACTITIONER
Payer: MEDICARE

## 2019-09-03 LAB
CREAT SERPL-MCNC: 0.8 MG/DL (ref 0.5–1.4)
SAMPLE: NORMAL

## 2019-09-03 PROCEDURE — 70553 MRI IAC/TEMPORAL BONES W W/O CONTRAST: ICD-10-PCS | Mod: 26,HCNC,, | Performed by: RADIOLOGY

## 2019-09-03 PROCEDURE — 25500020 PHARM REV CODE 255: Mod: HCNC | Performed by: NURSE PRACTITIONER

## 2019-09-03 PROCEDURE — 70553 MRI BRAIN STEM W/O & W/DYE: CPT | Mod: TC,HCNC

## 2019-09-03 PROCEDURE — 70553 MRI BRAIN STEM W/O & W/DYE: CPT | Mod: 26,HCNC,, | Performed by: RADIOLOGY

## 2019-09-03 PROCEDURE — A9585 GADOBUTROL INJECTION: HCPCS | Mod: HCNC | Performed by: NURSE PRACTITIONER

## 2019-09-03 RX ORDER — GADOBUTROL 604.72 MG/ML
6 INJECTION INTRAVENOUS
Status: COMPLETED | OUTPATIENT
Start: 2019-09-03 | End: 2019-09-03

## 2019-09-03 RX ADMIN — GADOBUTROL 6 ML: 604.72 INJECTION INTRAVENOUS at 03:09

## 2019-09-04 ENCOUNTER — TELEPHONE (OUTPATIENT)
Dept: OTOLARYNGOLOGY | Facility: CLINIC | Age: 84
End: 2019-09-04

## 2019-09-09 ENCOUNTER — HOSPITAL ENCOUNTER (EMERGENCY)
Facility: HOSPITAL | Age: 84
Discharge: HOME | End: 2019-09-09
Attending: EMERGENCY MEDICINE
Payer: MEDICARE

## 2019-09-09 ENCOUNTER — TELEPHONE (OUTPATIENT)
Dept: INTERNAL MEDICINE | Facility: CLINIC | Age: 84
End: 2019-09-09

## 2019-09-09 VITALS
OXYGEN SATURATION: 97 % | DIASTOLIC BLOOD PRESSURE: 65 MMHG | BODY MASS INDEX: 20.33 KG/M2 | HEART RATE: 64 BPM | HEIGHT: 65 IN | WEIGHT: 122 LBS | SYSTOLIC BLOOD PRESSURE: 145 MMHG | TEMPERATURE: 98 F | RESPIRATION RATE: 15 BRPM

## 2019-09-09 DIAGNOSIS — R07.9 CHEST PAIN: ICD-10-CM

## 2019-09-09 DIAGNOSIS — R53.83 FATIGUE, UNSPECIFIED TYPE: Primary | ICD-10-CM

## 2019-09-09 DIAGNOSIS — N30.00 ACUTE CYSTITIS WITHOUT HEMATURIA: ICD-10-CM

## 2019-09-09 LAB
ALBUMIN SERPL BCP-MCNC: 4 G/DL (ref 3.5–5.2)
ALP SERPL-CCNC: 69 U/L (ref 55–135)
ALT SERPL W/O P-5'-P-CCNC: 19 U/L (ref 10–44)
ANION GAP SERPL CALC-SCNC: 8 MMOL/L (ref 8–16)
AST SERPL-CCNC: 24 U/L (ref 10–40)
BASOPHILS # BLD AUTO: 0.03 K/UL (ref 0–0.2)
BASOPHILS NFR BLD: 0.6 % (ref 0–1.9)
BILIRUB SERPL-MCNC: 0.6 MG/DL (ref 0.1–1)
BILIRUB UR QL STRIP: NEGATIVE
BUN SERPL-MCNC: 17 MG/DL (ref 8–23)
CALCIUM SERPL-MCNC: 9.5 MG/DL (ref 8.7–10.5)
CHLORIDE SERPL-SCNC: 109 MMOL/L (ref 95–110)
CLARITY UR REFRACT.AUTO: ABNORMAL
CO2 SERPL-SCNC: 27 MMOL/L (ref 23–29)
COLOR UR AUTO: ABNORMAL
CREAT SERPL-MCNC: 0.8 MG/DL (ref 0.5–1.4)
DIFFERENTIAL METHOD: ABNORMAL
EOSINOPHIL # BLD AUTO: 0.2 K/UL (ref 0–0.5)
EOSINOPHIL NFR BLD: 4.8 % (ref 0–8)
ERYTHROCYTE [DISTWIDTH] IN BLOOD BY AUTOMATED COUNT: 12.5 % (ref 11.5–14.5)
EST. GFR  (AFRICAN AMERICAN): >60 ML/MIN/1.73 M^2
EST. GFR  (NON AFRICAN AMERICAN): >60 ML/MIN/1.73 M^2
GLUCOSE SERPL-MCNC: 83 MG/DL (ref 70–110)
GLUCOSE UR QL STRIP: NEGATIVE
HCT VFR BLD AUTO: 41.8 % (ref 37–48.5)
HGB BLD-MCNC: 13.6 G/DL (ref 12–16)
HGB UR QL STRIP: NEGATIVE
HYALINE CASTS UR QL AUTO: 1 /LPF
IMM GRANULOCYTES # BLD AUTO: 0.04 K/UL (ref 0–0.04)
IMM GRANULOCYTES NFR BLD AUTO: 0.8 % (ref 0–0.5)
KETONES UR QL STRIP: NEGATIVE
LACTATE SERPL-SCNC: 1.1 MMOL/L (ref 0.5–2.2)
LEUKOCYTE ESTERASE UR QL STRIP: ABNORMAL
LYMPHOCYTES # BLD AUTO: 1.3 K/UL (ref 1–4.8)
LYMPHOCYTES NFR BLD: 27.4 % (ref 18–48)
MCH RBC QN AUTO: 31.3 PG (ref 27–31)
MCHC RBC AUTO-ENTMCNC: 32.5 G/DL (ref 32–36)
MCV RBC AUTO: 96 FL (ref 82–98)
MICROSCOPIC COMMENT: ABNORMAL
MONOCYTES # BLD AUTO: 0.5 K/UL (ref 0.3–1)
MONOCYTES NFR BLD: 9.5 % (ref 4–15)
NEUTROPHILS # BLD AUTO: 2.7 K/UL (ref 1.8–7.7)
NEUTROPHILS NFR BLD: 56.9 % (ref 38–73)
NITRITE UR QL STRIP: NEGATIVE
NRBC BLD-RTO: 0 /100 WBC
PH UR STRIP: 7 [PH] (ref 5–8)
PLATELET # BLD AUTO: 233 K/UL (ref 150–350)
PMV BLD AUTO: 10.3 FL (ref 9.2–12.9)
POTASSIUM SERPL-SCNC: 4.3 MMOL/L (ref 3.5–5.1)
PROT SERPL-MCNC: 6.5 G/DL (ref 6–8.4)
PROT UR QL STRIP: NEGATIVE
RBC # BLD AUTO: 4.35 M/UL (ref 4–5.4)
RBC #/AREA URNS AUTO: 3 /HPF (ref 0–4)
SODIUM SERPL-SCNC: 144 MMOL/L (ref 136–145)
SP GR UR STRIP: 1.02 (ref 1–1.03)
SQUAMOUS #/AREA URNS AUTO: 0 /HPF
TROPONIN I SERPL DL<=0.01 NG/ML-MCNC: <0.006 NG/ML (ref 0–0.03)
TSH SERPL DL<=0.005 MIU/L-ACNC: 1.61 UIU/ML (ref 0.4–4)
URN SPEC COLLECT METH UR: ABNORMAL
WBC # BLD AUTO: 4.75 K/UL (ref 3.9–12.7)
WBC #/AREA URNS AUTO: 11 /HPF (ref 0–5)

## 2019-09-09 PROCEDURE — 93010 ELECTROCARDIOGRAM REPORT: CPT | Mod: HCNC,,, | Performed by: INTERNAL MEDICINE

## 2019-09-09 PROCEDURE — 93005 ELECTROCARDIOGRAM TRACING: CPT | Mod: HCNC

## 2019-09-09 PROCEDURE — 80053 COMPREHEN METABOLIC PANEL: CPT | Mod: HCNC

## 2019-09-09 PROCEDURE — 99284 EMERGENCY DEPT VISIT MOD MDM: CPT | Mod: HCNC,,, | Performed by: EMERGENCY MEDICINE

## 2019-09-09 PROCEDURE — 84484 ASSAY OF TROPONIN QUANT: CPT | Mod: HCNC

## 2019-09-09 PROCEDURE — 85025 COMPLETE CBC W/AUTO DIFF WBC: CPT | Mod: HCNC

## 2019-09-09 PROCEDURE — 84443 ASSAY THYROID STIM HORMONE: CPT | Mod: HCNC

## 2019-09-09 PROCEDURE — 36000 PLACE NEEDLE IN VEIN: CPT | Mod: HCNC

## 2019-09-09 PROCEDURE — 99284 PR EMERGENCY DEPT VISIT,LEVEL IV: ICD-10-PCS | Mod: HCNC,,, | Performed by: EMERGENCY MEDICINE

## 2019-09-09 PROCEDURE — 87086 URINE CULTURE/COLONY COUNT: CPT | Mod: HCNC

## 2019-09-09 PROCEDURE — 93010 EKG 12-LEAD: ICD-10-PCS | Mod: HCNC,,, | Performed by: INTERNAL MEDICINE

## 2019-09-09 PROCEDURE — 83605 ASSAY OF LACTIC ACID: CPT | Mod: HCNC

## 2019-09-09 PROCEDURE — 99285 EMERGENCY DEPT VISIT HI MDM: CPT | Mod: 25,HCNC

## 2019-09-09 PROCEDURE — 81001 URINALYSIS AUTO W/SCOPE: CPT | Mod: HCNC

## 2019-09-09 RX ORDER — NAPROXEN SODIUM 220 MG/1
81 TABLET, FILM COATED ORAL DAILY
Status: ON HOLD | COMMUNITY
End: 2023-11-18 | Stop reason: HOSPADM

## 2019-09-09 RX ORDER — CEPHALEXIN 500 MG/1
500 CAPSULE ORAL EVERY 12 HOURS
Qty: 10 CAPSULE | Refills: 0 | Status: SHIPPED | OUTPATIENT
Start: 2019-09-09 | End: 2019-09-14

## 2019-09-09 NOTE — ED PROVIDER NOTES
Encounter Date: 2019       History     Chief Complaint   Patient presents with    lethargic     had ct, mri, back hurts in morning, had fall about 1 felicity, still not feeling well,    Multiple complaints     Mrs. Olamide Felipe is a 86 F hx of hyperlipidemia, hypothyroidism here today for generalized fatigue and weakness for the past 4 days.  Patient states she had a full day of work while in tearing on Thursday, went home and felt very tired.  On Friday, she left work early secondary to feeling fatigued.  Daughter at bedside states that she stayed in her room resting all weekend and took several naps which is unlike her.  Of note, patient fell approximately 3 weeks ago hitting her head.  Was seen by her primary care doctor, Dr. Rojas who ordered a head CT which was negative.  During that visit she was complaining bilateral hearing loss, feels like there is fluid in her ears.  She denies dizziness, headaches, nausea or vomiting.  She has been ambulatory without assistance.  For the past 2 days she reports bilateral lower back pain that is present when she wakes up a resolves throughout the day.  She did also experience right-sided chest pain that began yesterday, lasted several seconds, sharp in nature with no associated shortness of breath.  Chest pain returned again today with no associated symptoms. She was referred by Dr. Lafluer for further evaluation.        Review of patient's allergies indicates:  No Known Allergies  Past Medical History:   Diagnosis Date    Hyperlipidemia     Hypothyroidism     Osteoarthritis, knee      Past Surgical History:   Procedure Laterality Date    dentures      HYSTERECTOMY      left wrist surgery       No family history on file.  Social History     Tobacco Use    Smoking status: Former Smoker     Last attempt to quit: 1989     Years since quittin.2    Smokeless tobacco: Never Used   Substance Use Topics    Alcohol use: Not on file    Drug use: Not on  file     Review of Systems   Constitutional: Positive for appetite change (Decreased p.o. intake). Negative for chills and fever.   HENT: Positive for hearing loss (Bilateral). Negative for congestion, ear discharge, ear pain, facial swelling, rhinorrhea, sinus pressure, sore throat and tinnitus.    Eyes: Negative for visual disturbance.   Respiratory: Positive for cough (For 2 months). Negative for chest tightness and shortness of breath.    Cardiovascular: Positive for chest pain. Negative for palpitations and leg swelling.   Gastrointestinal: Positive for abdominal pain (Mild right mid abdominal) and nausea. Negative for constipation, diarrhea and vomiting.   Endocrine: Negative for polydipsia and polyphagia.   Genitourinary: Negative for difficulty urinating, dysuria and flank pain.        Mild malodorous urine   Musculoskeletal: Positive for back pain (Lower back pain). Negative for neck pain and neck stiffness.   Skin: Negative for pallor.   Neurological: Negative for dizziness, facial asymmetry and speech difficulty.   Psychiatric/Behavioral: Negative for confusion.       Physical Exam     Initial Vitals [09/09/19 1059]   BP Pulse Resp Temp SpO2   (!) 148/88 83 18 97.9 °F (36.6 °C) 96 %      MAP       --         Physical Exam    Nursing note and vitals reviewed.  Constitutional: She appears well-developed and well-nourished. No distress.   HENT:   Head: Normocephalic and atraumatic.   Right Ear: External ear normal. Tympanic membrane is not injected. No middle ear effusion. Decreased hearing is noted.   Left Ear: External ear normal. No tenderness. Tympanic membrane is injected.  No middle ear effusion. Decreased hearing is noted.   Mouth/Throat: Oropharynx is clear and moist.   Eyes: Conjunctivae and EOM are normal. Pupils are equal, round, and reactive to light.   Neck: Neck supple. No JVD present.   Cardiovascular: Normal rate, regular rhythm, normal heart sounds and intact distal pulses.    Pulmonary/Chest: Breath sounds normal. She has no wheezes. She has no rhonchi. She has no rales.   Abdominal: Soft. Bowel sounds are normal. She exhibits no distension. There is tenderness (mild mid right abdominal tenderness). There is no rebound and no guarding.   Musculoskeletal: Normal range of motion. She exhibits no edema or tenderness.   No midline lower back tenderness or step offs   Neurological: She is alert and oriented to person, place, and time. She has normal strength. No cranial nerve deficit or sensory deficit.   Skin: Skin is warm. No rash noted.   (+) faint bruising to left forehead         ED Course   Procedures  Labs Reviewed   CBC W/ AUTO DIFFERENTIAL - Abnormal; Notable for the following components:       Result Value    Mean Corpuscular Hemoglobin 31.3 (*)     Immature Granulocytes 0.8 (*)     All other components within normal limits   URINALYSIS, REFLEX TO URINE CULTURE - Abnormal; Notable for the following components:    Appearance, UA Cloudy (*)     Leukocytes, UA Trace (*)     All other components within normal limits    Narrative:     Preferred Collection Type->Urine, Clean Catch   URINALYSIS MICROSCOPIC - Abnormal; Notable for the following components:    WBC, UA 11 (*)     All other components within normal limits    Narrative:     Preferred Collection Type->Urine, Clean Catch   CULTURE, URINE   COMPREHENSIVE METABOLIC PANEL   TROPONIN I   LACTIC ACID, PLASMA   TSH     EKG Readings: (Independently Interpreted)   EKG:  Sinus rhythm at 76 with a sinus arrhythmia, no ST elevations or depression       Imaging Results          X-Ray Chest PA And Lateral (Final result)  Result time 09/09/19 12:03:12    Final result by Vic Victoria MD (09/09/19 12:03:12)                 Impression:      1. No acute radiographic findings in the chest.      Electronically signed by: Vic Victoria MD  Date:    09/09/2019  Time:    12:03             Narrative:    EXAMINATION:  XR CHEST PA AND  LATERAL    CLINICAL HISTORY:  chest pain;    TECHNIQUE:  PA and lateral views of the chest were performed.    COMPARISON:  None.    FINDINGS:  Cardiac monitoring leads project over the bilateral hemithoraces.  Mediastinal structures are midline.  Hilar contours are unremarkable.  Cardiac silhouette is normal in size.  Lung volumes are normal and symmetric.  No consolidation.  No pneumothorax or pleural effusions.  No free air beneath the diaphragm.  No acute osseous abnormalities.  Degenerative changes of the spine noted.  Vascular calcifications noted within the upper abdomen.                                 Medical Decision Making:   History:   I obtained history from: someone other than patient.       <> Summary of History: Daughter at bedside  Old Medical Records: I decided to obtain old medical records.  Old Records Summarized: records from clinic visits.       <> Summary of Records: CT head and MRI IAC/temporal bone negative  Initial Assessment:   Emergent evaluation 86-year-old female presenting with fatigue and weakness. Symptoms have been persistent since Thursday, today with mild right-sided chest pain.  Differential Diagnosis:   ACS, electrolyte derangement, dehydration, acute kidney injury, thyroid disorder, pneumonia, UTI, age-related hearing loss  Independently Interpreted Test(s):   I have ordered and independently interpreted X-rays - see prior notes.  I have ordered and independently interpreted EKG Reading(s) - see prior notes  Clinical Tests:   Lab Tests: Ordered and Reviewed  Radiological Study: Ordered and Reviewed  Medical Tests: Reviewed and Ordered  ED Management:  - labs  - EKG  - chest x-ray  - cardiac monitor    Labs reviewed with no acute abnormality.  UA reviewed with trace leukocytes, 11 WBCs, no squamous cells- given history malodorous urine, frequent fatigue will treat for symptomatic UTI.  Chest x-ray with no acute process.  EKG with no ischemic changes.  Troponin negative, given  patient's symptoms low suspicion for ACS at this time.    I recommended follow-up with ENT for continued evaluation of hearing loss.  Discussed results with patient and daughter at bedside, all questions answered.  Patient stable for discharge to follow up with Dr. Avitia as needed                        Clinical Impression:       ICD-10-CM ICD-9-CM   1. Fatigue, unspecified type R53.83 780.79   2. Chest pain R07.9 786.50   3. Acute cystitis without hematuria N30.00 595.0         Disposition:   Disposition: Discharged  Condition: Stable                        Mary Beth Newman MD  09/09/19 3463

## 2019-09-09 NOTE — DISCHARGE INSTRUCTIONS
- start Keflex today.  You will take it 2 times a day with food.  Can add probiotics or yogurt to meals  - return to emergency room if symptoms get worse.  - follow-up with Dr. Bennett for further evaluation regarding hearing changes.  - it was very nice to me to meet you!    Our goal in the emergency department is to always give you outstanding care and exceptional service. You may receive a survey by mail or e-mail in the next week regarding your experience in our ED. We would greatly appreciate your completing and returning the survey. Your feedback provides us with a way to recognize our staff who give very good care and it helps us learn how to improve when your experience was below our aspiration of excellence.

## 2019-09-09 NOTE — TELEPHONE ENCOUNTER
Patient stated that she was having chest pain on yesterday and now this morning, she is feeling fatigue and having back pain. Patient stated that she has had an chest pain this morning, but very fatigue with back pain. I informed Dr. Rojas which was advise that the patient needs to go to the ER for evaluation. I informed the patient which she agreed.

## 2019-09-09 NOTE — ED TRIAGE NOTES
Patient reports to the ED today with multiple complaints. Patient states that back in August she had a fall and received MRI and CT. 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 Patient endorses intermittent lethargy, back pain, and reports loss of hearing. Patient also endorses intermittent chest pain onset yesterday.

## 2019-09-10 ENCOUNTER — CLINICAL SUPPORT (OUTPATIENT)
Dept: AUDIOLOGY | Facility: CLINIC | Age: 84
End: 2019-09-10
Payer: MEDICARE

## 2019-09-10 ENCOUNTER — OFFICE VISIT (OUTPATIENT)
Dept: OTOLARYNGOLOGY | Facility: CLINIC | Age: 84
End: 2019-09-10
Payer: MEDICARE

## 2019-09-10 ENCOUNTER — TELEPHONE (OUTPATIENT)
Dept: OTOLARYNGOLOGY | Facility: CLINIC | Age: 84
End: 2019-09-10

## 2019-09-10 VITALS
BODY MASS INDEX: 20.47 KG/M2 | SYSTOLIC BLOOD PRESSURE: 127 MMHG | DIASTOLIC BLOOD PRESSURE: 61 MMHG | HEART RATE: 60 BPM | WEIGHT: 123 LBS

## 2019-09-10 DIAGNOSIS — H90.3 ASYMMETRICAL SENSORINEURAL HEARING LOSS: Primary | ICD-10-CM

## 2019-09-10 DIAGNOSIS — H93.12 TINNITUS, LEFT: ICD-10-CM

## 2019-09-10 DIAGNOSIS — H90.3 SENSORINEURAL HEARING LOSS (SNHL) OF BOTH EARS: Primary | ICD-10-CM

## 2019-09-10 DIAGNOSIS — H91.90 HEARING LOSS, UNSPECIFIED HEARING LOSS TYPE, UNSPECIFIED LATERALITY: Primary | ICD-10-CM

## 2019-09-10 LAB
BACTERIA UR CULT: NORMAL
BACTERIA UR CULT: NORMAL

## 2019-09-10 PROCEDURE — 1100F PTFALLS ASSESS-DOCD GE2>/YR: CPT | Mod: HCNC,CPTII,S$GLB, | Performed by: OTOLARYNGOLOGY

## 2019-09-10 PROCEDURE — 92557 COMPREHENSIVE HEARING TEST: CPT | Mod: HCNC,S$GLB,, | Performed by: AUDIOLOGIST

## 2019-09-10 PROCEDURE — 1100F PR PT FALLS ASSESS DOC 2+ FALLS/FALL W/INJURY/YR: ICD-10-PCS | Mod: HCNC,CPTII,S$GLB, | Performed by: OTOLARYNGOLOGY

## 2019-09-10 PROCEDURE — 92557 PR COMPREHENSIVE HEARING TEST: ICD-10-PCS | Mod: HCNC,S$GLB,, | Performed by: AUDIOLOGIST

## 2019-09-10 PROCEDURE — 99213 OFFICE O/P EST LOW 20 MIN: CPT | Mod: HCNC,S$GLB,, | Performed by: OTOLARYNGOLOGY

## 2019-09-10 PROCEDURE — 92567 TYMPANOMETRY: CPT | Mod: HCNC,S$GLB,, | Performed by: AUDIOLOGIST

## 2019-09-10 PROCEDURE — 99999 PR PBB SHADOW E&M-EST. PATIENT-LVL III: CPT | Mod: PBBFAC,HCNC,, | Performed by: OTOLARYNGOLOGY

## 2019-09-10 PROCEDURE — 3288F FALL RISK ASSESSMENT DOCD: CPT | Mod: HCNC,CPTII,S$GLB, | Performed by: OTOLARYNGOLOGY

## 2019-09-10 PROCEDURE — 92567 PR TYMPA2METRY: ICD-10-PCS | Mod: HCNC,S$GLB,, | Performed by: AUDIOLOGIST

## 2019-09-10 PROCEDURE — 99999 PR PBB SHADOW E&M-EST. PATIENT-LVL III: ICD-10-PCS | Mod: PBBFAC,HCNC,, | Performed by: OTOLARYNGOLOGY

## 2019-09-10 PROCEDURE — 3288F PR FALLS RISK ASSESSMENT DOCUMENTED: ICD-10-PCS | Mod: HCNC,CPTII,S$GLB, | Performed by: OTOLARYNGOLOGY

## 2019-09-10 PROCEDURE — 99213 PR OFFICE/OUTPT VISIT, EST, LEVL III, 20-29 MIN: ICD-10-PCS | Mod: HCNC,S$GLB,, | Performed by: OTOLARYNGOLOGY

## 2019-09-10 NOTE — PROGRESS NOTES
9/10/2019    AUDIOLOGICAL EVALUATION:    Olamide Felipe was seen for an audiological evaluation on 9/10/2019 for decreased hearing bilaterally.  Mrs. Felipe reported tinnitus in her left ear.    Pure tone threshold testing revealed an asymmetrical left sensorineural hearing loss with restricted hearing of the right ear.  Speech reception thresholds were obtained at 30dBHL for the right ear and 50dBHL for the left ear.  Speech discrimination scores were obtained at 92% for the right ear and 80% for the left ear.    Tympanometry was within normal limits bilaterally indicating normal middle ear function.    Recommend:  1.  Otologic evaluation.  2.  Hearing aid evaluation.  3.  Annual evaluation.

## 2019-09-10 NOTE — PROGRESS NOTES
Subjective:       Patient ID: Olamide Felipe is a 86 y.o. female.    Chief Complaint: Hearing Loss and Ear Fullness    Hearing Loss:   Chronicity:  Chronic (Seen in ENT by NP recently and found to have hearing loss with asymmetry left in PTA.)  Onset:  1 to 4 weeks ago  Progression since onset:  Unchanged  Frequency:  Constantly  Severity:  Moderate  Hearing loss characteristics:  Moderate, muffled, trouble hearing TV, difficult on telephone, worse background noise and ear fullness/pressureNo dizziness, no ear pain, no fever, no headaches and no rhinorrhea.  Aggravated by:  Noise  Risk factors: Presbycusis.  Treatments tried:  NothingNo dizziness.    Review of Systems   Constitutional: Negative for activity change, appetite change and fever.   HENT: Positive for hearing loss. Negative for congestion, ear discharge, ear pain, nosebleeds, postnasal drip, rhinorrhea and sneezing.    Eyes: Negative for redness and visual disturbance.   Respiratory: Negative for apnea, cough, shortness of breath and wheezing.    Cardiovascular: Negative for chest pain and palpitations.   Gastrointestinal: Negative for diarrhea and vomiting.   Genitourinary: Positive for difficulty urinating. Negative for frequency.   Musculoskeletal: Negative for arthralgias, back pain, gait problem and neck pain.   Skin: Negative for color change and rash.   Neurological: Negative for dizziness, speech difficulty, weakness and headaches.   Hematological: Negative for adenopathy. Does not bruise/bleed easily.   Psychiatric/Behavioral: Negative for agitation and behavioral problems.       Objective:        Constitutional:   Vital signs are normal. She appears well-developed and well-nourished. She is active. Normal speech.      Head:  Normocephalic and atraumatic. Salivary glands normal.  Facial strength is normal.      Ears:  Hearing normal to normal and whispered voice; external ear normal without scars, lesions, or masses; ear canal, tympanic membrane,  and middle ear normal..     Nose:  Nose normal including turbinates, nasal mucosa, sinuses and nasal septum.     Mouth/Throat  Oropharynx clear and moist without lesions or asymmetry and normal uvula midline.     Neck:  Neck normal without thyromegaly masses, asymmetry, normal tracheal structure, crepitus, and tenderness, thyroid normal, trachea normal, phonation normal and full range of motion with neck supple.     Psychiatric:   She has a normal mood and affect. Her speech is normal and behavior is normal.     Neurological:   She has neurological normal, alert and oriented.     Skin:   No abrasions, lacerations, lesions, or rashes.       As a result of this patients history and examination findings, a comprehensive audiogram was ordered to determine the level of hearing/hearing loss.          No changes from recent audio performed in this clinic.   Normal CT of brain and MRI of brain (W/WO) both WNL, no evidence of CPA tumor, infection, or hemorrhage.    Assessment:       1. Sensorineural hearing loss (SNHL) of both ears        Plan:       1. Hearing conservation strongly recommended.  2. Trial of amplification bilaterally also recommended.  3. Re-check of hearing in 18-24 months or sooner if subjective change noted.  4. F/U with PCP as per schedule.   5. Complete treatment as ordered in ED yesterday for her UTI.

## 2019-09-24 ENCOUNTER — CLINICAL SUPPORT (OUTPATIENT)
Dept: AUDIOLOGY | Facility: CLINIC | Age: 84
End: 2019-09-24
Payer: MEDICARE

## 2019-09-24 DIAGNOSIS — H90.3 ASYMMETRICAL SENSORINEURAL HEARING LOSS: Primary | ICD-10-CM

## 2019-09-24 PROCEDURE — 99499 UNLISTED E&M SERVICE: CPT | Mod: HCNC,S$GLB,, | Performed by: AUDIOLOGIST

## 2019-09-24 PROCEDURE — 99499 NO LOS: ICD-10-PCS | Mod: HCNC,S$GLB,, | Performed by: AUDIOLOGIST

## 2019-09-24 RX ORDER — SIMVASTATIN 20 MG/1
20 TABLET, FILM COATED ORAL NIGHTLY
Qty: 90 TABLET | Refills: 3 | Status: SHIPPED | OUTPATIENT
Start: 2019-09-24 | End: 2020-11-24 | Stop reason: SDUPTHER

## 2019-09-24 NOTE — TELEPHONE ENCOUNTER
----- Message from Genoveva Christensen sent at 9/24/2019  9:13 AM CDT -----  Contact: self   Patient is calling for an RX refill or new RX.  Is this a refill or new RX:  refill  RX name and strength: simvastatin (ZOCOR) 20 MG tablet  Directions (copy/paste from chart):   Take 20 mg by mouth every evening  Is this a 30 day or 90 day RX:    Local pharmacy or mail order pharmacy:  local  Pharmacy name and phone # (copy/paste from chart):   [facility] [phone] (Phone)  [phone] (Fax)  Comments:

## 2019-09-24 NOTE — PROGRESS NOTES
Mrs. Felipe was seen for a hearing aid consultation. Recommended binaural amplification. Discussed pros and cons of various styles and technology levels. Patient was most interested in binaural rechargeable technology. Patient elected to proceed with two OrphazymeeSalesconx M50s in [facility]. Patient acknowledged understanding of the 30 day trial period, $250 restocking fee from the time of order, and the fact that we do not file insurance on behalf of the patient. Patient was advised to call or email with any questions. She requested we call her when the hearing aids come in to set up HAE. 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

## 2019-10-10 ENCOUNTER — CLINICAL SUPPORT (OUTPATIENT)
Dept: AUDIOLOGY | Facility: CLINIC | Age: 84
End: 2019-10-10
Payer: MEDICARE

## 2019-10-10 DIAGNOSIS — H90.3 ASYMMETRICAL SENSORINEURAL HEARING LOSS: Primary | ICD-10-CM

## 2019-10-10 NOTE — PROGRESS NOTES
Mrs. Felipe was seen today for a hearing aid delivery. Feedback was not an issue at today's appointment. The patient was counseled on acclimatization. Insertion/removal of hearing aids in to ears, charging, turning on/off hearing aid and cleaning (changing wax traps, changing domes, cleaning microphones), and water precautions were reviewed at today's appointment. Manual VC and program button were not activated at today's appointment. Mrs. Felipe will return on 10/17/2019 for a HAFU. She was encouraged to call or e-mail if she has any issues prior to this appointment.      Hearing Aid Information:  Right ear  : Phonak   Model:  Audeo M-50  Type:  EMILY  Color: Champagne  Battery: Rechargeable  Serial number: [id number]  Dome: Small Open  : 2S  Warranty expiration:  12/23/22  L and D expiration:  12/23/22     Left ear  : Phonak   Model:  Audeo M-50  Type:  EMILY  Color: Champagne  Battery: Rechargeable  Serial number: [id number]   Dome: Small Open  : 2S  Warranty expiration:  12/23/22  L and D expiration:  12/23/22

## 2019-10-17 ENCOUNTER — CLINICAL SUPPORT (OUTPATIENT)
Dept: AUDIOLOGY | Facility: CLINIC | Age: 84
End: 2019-10-17
Payer: MEDICARE

## 2019-10-17 DIAGNOSIS — H90.3 ASYMMETRICAL SENSORINEURAL HEARING LOSS: Primary | ICD-10-CM

## 2019-10-17 PROCEDURE — 99499 NO LOS: ICD-10-PCS | Mod: HCNC,S$GLB,, | Performed by: AUDIOLOGIST

## 2019-10-17 PROCEDURE — 99499 UNLISTED E&M SERVICE: CPT | Mod: HCNC,S$GLB,, | Performed by: AUDIOLOGIST

## 2019-10-17 NOTE — PROGRESS NOTES
Mrs. Felipe was seen for a HAFU on today's date. She is happy with the fit and sound quality of her hearing aids. She did not want any programming adjustments at today's appointment. We practiced changing filters and domes. We discussed acclimatization. Mrs. Felipe will return in 2 weeks for HAFU. She was encouraged to call/message if she has any issues/needs adjustments prior to her HAFU appointment.

## 2019-10-29 NOTE — PROGRESS NOTES
Subjective:       Patient ID: Olamide Felipe is a 86 y.o. female.    Chief Complaint: Follow-up    Patient is a 86 y.o.female who presents today for follow up  She was recently diangosed with a URI; treated with nasal spray, antihistamine and cough med.     1. She is having a bad itching sensation recently. If she takes a bath, it resolves.    2. Hand arthritic pain    3. Low back pain in AM      Labs: due now  Vaccines: up to date  Mammogram: June 2019  Colonoscopy: negative; fitkit  Dexa: 2019  Exercise: walks one mile per day     Review of Systems   Constitutional: Negative for appetite change, chills, diaphoresis and fever.   HENT: Negative for congestion, ear discharge, ear pain, postnasal drip, tinnitus, trouble swallowing and voice change.    Eyes: Negative for discharge, redness and itching.   Respiratory: Negative for cough, chest tightness, shortness of breath and wheezing.    Cardiovascular: Negative for chest pain, palpitations and leg swelling.   Gastrointestinal: Negative for abdominal pain, constipation, diarrhea, nausea and vomiting.   Endocrine: Negative for cold intolerance and heat intolerance.   Genitourinary: Negative for difficulty urinating, flank pain, hematuria and urgency.   Musculoskeletal: Negative for arthralgias, gait problem, myalgias and neck stiffness.   Skin: Negative for color change and rash.   Neurological: Negative for dizziness, seizures, syncope and headaches.   Hematological: Negative for adenopathy.   Psychiatric/Behavioral: Negative for agitation, behavioral problems, confusion and sleep disturbance.       Objective:      Physical Exam   Constitutional: She is oriented to person, place, and time. She appears well-developed and well-nourished. No distress.   HENT:   Head: Normocephalic and atraumatic.   Right Ear: External ear normal.   Left Ear: External ear normal.   Nose: Nose normal.   Mouth/Throat: Oropharynx is clear and moist. No oropharyngeal exudate.   Eyes: Pupils  are equal, round, and reactive to light. Conjunctivae and EOM are normal. Right eye exhibits no discharge. Left eye exhibits no discharge. No scleral icterus.   Neck: Neck supple. No JVD present. No tracheal deviation present. No thyromegaly present.   Cardiovascular: Normal rate, normal heart sounds and intact distal pulses. Exam reveals no gallop and no friction rub.   No murmur heard.  Pulmonary/Chest: Effort normal and breath sounds normal. No stridor. No respiratory distress. She has no wheezes. She has no rales. She exhibits no tenderness.   Abdominal: Soft. Bowel sounds are normal. She exhibits no distension. There is no tenderness. There is no rebound.   Musculoskeletal: She exhibits no edema or tenderness.   Lymphadenopathy:     She has no cervical adenopathy.   Neurological: She is alert and oriented to person, place, and time.   Skin: Skin is warm and dry. No rash noted. She is not diaphoretic. No erythema.   Psychiatric: She has a normal mood and affect. Her behavior is normal.   Nursing note and vitals reviewed.      Assessment and Plan:       1. Itching    - Comprehensive metabolic panel; Future  - Lipid panel; Future  - CBC auto differential; Future  - Calcium, ionized; Future  - PTH, intact; Future  - TSH; Future  - diclofenac sodium (VOLTAREN) 1 % Gel; Apply 2 g topically 4 (four) times daily.  Dispense: 100 g; Refill: 1  - (In Office Administered) Pneumococcal Conjugate Vaccine (13 Valent) (IM)    2. Hypercalcemia    - Comprehensive metabolic panel; Future  - Lipid panel; Future  - CBC auto differential; Future  - Calcium, ionized; Future  - PTH, intact; Future  - TSH; Future  - diclofenac sodium (VOLTAREN) 1 % Gel; Apply 2 g topically 4 (four) times daily.  Dispense: 100 g; Refill: 1  - (In Office Administered) Pneumococcal Conjugate Vaccine (13 Valent) (IM)    3. Hyperparathyroidism    - Comprehensive metabolic panel; Future  - Lipid panel; Future  - CBC auto differential; Future  - Calcium,  ionized; Future  - PTH, intact; Future  - TSH; Future  - diclofenac sodium (VOLTAREN) 1 % Gel; Apply 2 g topically 4 (four) times daily.  Dispense: 100 g; Refill: 1  - (In Office Administered) Pneumococcal Conjugate Vaccine (13 Valent) (IM)    4. Hypothyroidism, unspecified type    - Comprehensive metabolic panel; Future  - Lipid panel; Future  - CBC auto differential; Future  - Calcium, ionized; Future  - PTH, intact; Future  - TSH; Future  - diclofenac sodium (VOLTAREN) 1 % Gel; Apply 2 g topically 4 (four) times daily.  Dispense: 100 g; Refill: 1  - (In Office Administered) Pneumococcal Conjugate Vaccine (13 Valent) (IM)    5. Hyperlipidemia, unspecified hyperlipidemia type    - Comprehensive metabolic panel; Future  - Lipid panel; Future  - CBC auto differential; Future  - Calcium, ionized; Future  - PTH, intact; Future  - TSH; Future  - diclofenac sodium (VOLTAREN) 1 % Gel; Apply 2 g topically 4 (four) times daily.  Dispense: 100 g; Refill: 1  - (In Office Administered) Pneumococcal Conjugate Vaccine (13 Valent) (IM)    6. Vitamin D deficiency  - Comprehensive metabolic panel; Future  - Lipid panel; Future  - CBC auto differential; Future  - Calcium, ionized; Future  - PTH, intact; Future  - TSH; Future  - diclofenac sodium (VOLTAREN) 1 % Gel; Apply 2 g topically 4 (four) times daily.  Dispense: 100 g; Refill: 1  - (In Office Administered) Pneumococcal Conjugate Vaccine (13 Valent) (IM)    7. Arthritis  - diclofenac sodium (VOLTAREN) 1 % Gel; Apply 2 g topically 4 (four) times daily.  Dispense: 100 g; Refill: 1          No follow-ups on file.

## 2019-10-31 ENCOUNTER — CLINICAL SUPPORT (OUTPATIENT)
Dept: AUDIOLOGY | Facility: CLINIC | Age: 84
End: 2019-10-31
Payer: MEDICARE

## 2019-10-31 DIAGNOSIS — H90.3 ASYMMETRICAL SENSORINEURAL HEARING LOSS: Primary | ICD-10-CM

## 2019-10-31 PROCEDURE — 99499 UNLISTED E&M SERVICE: CPT | Mod: HCNC,S$GLB,, | Performed by: AUDIOLOGIST

## 2019-10-31 PROCEDURE — 99499 NO LOS: ICD-10-PCS | Mod: HCNC,S$GLB,, | Performed by: AUDIOLOGIST

## 2019-10-31 NOTE — PROGRESS NOTES
Patient seen for HAFU. Increased mid and high frequency gain for soft and moderate sounds. Decreased mid and high frequency MPOs slightly. Patient will contact us to set up HAFU. 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 She reported she may stop by if she has an issue but she understands that she may not be seen if there are no appointments available.

## 2019-11-07 ENCOUNTER — TELEPHONE (OUTPATIENT)
Dept: AUDIOLOGY | Facility: CLINIC | Age: 84
End: 2019-11-07

## 2019-11-07 NOTE — TELEPHONE ENCOUNTER
----- Message from Marci Dickerson sent at 11/7/2019  4:30 PM CST -----  Regarding: Hearing aid not working  Contact: [phone]  Patient called stating her hearing aids are not working. I told patient that she can bring them to the hearing aid center so I can look at them but she refused. She requested that you call her back. I told her your gone for the day and will call her tomorrow.

## 2019-11-08 ENCOUNTER — OFFICE VISIT (OUTPATIENT)
Dept: URGENT CARE | Facility: CLINIC | Age: 84
End: 2019-11-08
Payer: MEDICARE

## 2019-11-08 VITALS
RESPIRATION RATE: 10 BRPM | HEART RATE: 93 BPM | BODY MASS INDEX: 20.49 KG/M2 | TEMPERATURE: 98 F | WEIGHT: 123 LBS | HEIGHT: 65 IN | SYSTOLIC BLOOD PRESSURE: 166 MMHG | DIASTOLIC BLOOD PRESSURE: 78 MMHG | OXYGEN SATURATION: 99 %

## 2019-11-08 DIAGNOSIS — J06.9 UPPER RESPIRATORY TRACT INFECTION, UNSPECIFIED TYPE: Primary | ICD-10-CM

## 2019-11-08 PROCEDURE — 3288F FALL RISK ASSESSMENT DOCD: CPT | Mod: CPTII,S$GLB,, | Performed by: NURSE PRACTITIONER

## 2019-11-08 PROCEDURE — 3288F PR FALLS RISK ASSESSMENT DOCUMENTED: ICD-10-PCS | Mod: CPTII,S$GLB,, | Performed by: NURSE PRACTITIONER

## 2019-11-08 PROCEDURE — 1100F PTFALLS ASSESS-DOCD GE2>/YR: CPT | Mod: CPTII,S$GLB,, | Performed by: NURSE PRACTITIONER

## 2019-11-08 PROCEDURE — 1100F PR PT FALLS ASSESS DOC 2+ FALLS/FALL W/INJURY/YR: ICD-10-PCS | Mod: CPTII,S$GLB,, | Performed by: NURSE PRACTITIONER

## 2019-11-08 PROCEDURE — 99214 OFFICE O/P EST MOD 30 MIN: CPT | Mod: S$GLB,,, | Performed by: NURSE PRACTITIONER

## 2019-11-08 PROCEDURE — 99214 PR OFFICE/OUTPT VISIT, EST, LEVL IV, 30-39 MIN: ICD-10-PCS | Mod: S$GLB,,, | Performed by: NURSE PRACTITIONER

## 2019-11-08 RX ORDER — CETIRIZINE HYDROCHLORIDE 10 MG/1
10 TABLET ORAL DAILY
Refills: 0 | COMMUNITY
Start: 2019-11-08 | End: 2020-05-06

## 2019-11-08 RX ORDER — BENZONATATE 100 MG/1
200 CAPSULE ORAL 3 TIMES DAILY PRN
Qty: 40 CAPSULE | Refills: 0 | Status: SHIPPED | OUTPATIENT
Start: 2019-11-08 | End: 2020-05-06

## 2019-11-08 RX ORDER — AZELASTINE 1 MG/ML
1 SPRAY, METERED NASAL 2 TIMES DAILY
Qty: 30 ML | Refills: 0 | Status: SHIPPED | OUTPATIENT
Start: 2019-11-08 | End: 2021-01-25

## 2019-11-08 NOTE — PROGRESS NOTES
"Subjective:       Patient ID: Olamide Felipe is a 86 y.o. female.    Vitals:  height is 5' 5" (1.651 m) and weight is 55.8 kg (123 lb). Her oral temperature is 97.7 °F (36.5 °C). Her blood pressure is 166/78 (abnormal) and her pulse is 93. Her respiration is 10 and oxygen saturation is 99%.     Chief Complaint: URI and Sinus Problem    Pt c/o sinus pressure, cough, and head congestion, x 4 days   Has appt with PCP next week.   No fever or chills or body aches.     URI    This is a new problem. The current episode started in the past 7 days. The problem has been gradually worsening. There has been no fever. Associated symptoms include congestion (head congestion ), coughing and rhinorrhea. Pertinent negatives include no ear pain, nausea, rash, sinus pain, sore throat, vomiting or wheezing. She has tried decongestant for the symptoms. The treatment provided mild relief.   Sinus Problem   This is a new problem. The current episode started in the past 7 days. The problem has been gradually worsening since onset. There has been no fever. Her pain is at a severity of 2/10. The pain is mild. Associated symptoms include congestion (head congestion ), coughing and sinus pressure. Pertinent negatives include no chills, diaphoresis, ear pain, shortness of breath or sore throat. Past treatments include oral decongestants. The treatment provided mild relief.       Constitution: Negative for chills, sweating, fatigue and fever.   HENT: Positive for congestion (head congestion ) and sinus pressure. Negative for ear pain, sinus pain, sore throat and voice change.    Neck: Negative for painful lymph nodes.   Eyes: Negative for eye redness.   Respiratory: Positive for cough. Negative for chest tightness, sputum production, bloody sputum, COPD, shortness of breath, stridor, wheezing and asthma.    Gastrointestinal: Negative for nausea and vomiting.   Musculoskeletal: Negative for muscle ache.   Skin: Negative for rash. "   Allergic/Immunologic: Negative for seasonal allergies and asthma.   Hematologic/Lymphatic: Negative for swollen lymph nodes.       Objective:      Physical Exam   Constitutional: She is oriented to person, place, and time. She appears well-developed and well-nourished. She is cooperative.  Non-toxic appearance. She does not have a sickly appearance. She does not appear ill. No distress.   HENT:   Head: Normocephalic and atraumatic.   Right Ear: Hearing, tympanic membrane, external ear and ear canal normal.   Left Ear: Hearing, tympanic membrane, external ear and ear canal normal.   Nose: Mucosal edema and rhinorrhea present. No nasal deformity. No epistaxis. Right sinus exhibits no maxillary sinus tenderness and no frontal sinus tenderness. Left sinus exhibits no maxillary sinus tenderness and no frontal sinus tenderness.   Mouth/Throat: Uvula is midline, oropharynx is clear and moist and mucous membranes are normal. No trismus in the jaw. Normal dentition. No uvula swelling. No oropharyngeal exudate, posterior oropharyngeal edema or posterior oropharyngeal erythema.   Eyes: Conjunctivae and lids are normal. No scleral icterus.   Neck: Trachea normal, full passive range of motion without pain and phonation normal. Neck supple. No neck rigidity. No edema and no erythema present.   Cardiovascular: Normal rate, regular rhythm, normal heart sounds, intact distal pulses and normal pulses.   Pulmonary/Chest: Effort normal and breath sounds normal. No respiratory distress. She has no decreased breath sounds. She has no rhonchi.   Lungs clear  No coughing throughout exam   Abdominal: Normal appearance.   Musculoskeletal: Normal range of motion. She exhibits no edema or deformity.   Neurological: She is alert and oriented to person, place, and time. She exhibits normal muscle tone. Coordination normal.   Skin: Skin is warm, dry, intact, not diaphoretic and not pale.   Psychiatric: She has a normal mood and affect. Her  speech is normal and behavior is normal. Judgment and thought content normal. Cognition and memory are normal.   Nursing note and vitals reviewed.        Assessment:       1. Upper respiratory tract infection, unspecified type        Plan:         Upper respiratory tract infection, unspecified type  -     benzonatate (TESSALON PERLES) 100 MG capsule; Take 2 capsules (200 mg total) by mouth 3 (three) times daily as needed for Cough.  Dispense: 40 capsule; Refill: 0  -     azelastine (ASTELIN) 137 mcg (0.1 %) nasal spray; Instill 1 spray (137 mcg total) in each nostril 2 (two) times daily.  Dispense: 30 mL; Refill: 0  -     cetirizine (ZYRTEC) 10 MG tablet; Take 1 tablet (10 mg total) by mouth once daily.; Refill: 0            Patient Instructions   LUNGS CLEAR AT THIS TIME  NO SIGNS OF BACTERIAL INFECTION AT THIS TIME  FOLLOW UP WITH PCP AS SCHEDULED  START NASAL SPRAY AND ANTIHISTAMINE TO HELP WITH CONGESTION AND RUNNY NOSE    You must understand that you've received an Urgent Care treatment only and that you may be released before all your medical problems are known or treated. You, the patient, will arrange for follow up care as instructed.  If your condition worsens we recommend that you receive another evaluation at the emergency room immediately or contact your primary medical clinics after hours call service to discuss your concerns.  Please return here or go to the Emergency Department for any concerns or worsening of condition.        Viral Upper Respiratory Illness (Adult)  You have a viral upper respiratory illness (URI), which is another term for the common cold. This illness is contagious during the first few days. It is spread through the air by coughing and sneezing. It may also be spread by direct contact (touching the sick person and then touching your own eyes, nose, or mouth). Frequent handwashing will decrease risk of spread. Most viral illnesses go away within 7 to 10 days with rest and simple  home remedies. Sometimes the illness may last for several weeks. Antibiotics will not kill a virus, and they are generally not prescribed for this condition.    Home care  · If symptoms are severe, rest at home for the first 2 to 3 days. When you resume activity, don't let yourself get too tired.  · Avoid being exposed to cigarette smoke (yours or others).  · You may use acetaminophen or ibuprofen to control pain and fever, unless another medicine was prescribed. (Note: If you have chronic liver or kidney disease, have ever had a stomach ulcer or gastrointestinal bleeding, or are taking blood-thinning medicines, talk with your healthcare provider before using these medicines.) Aspirin should never be given to anyone under 18 years of age who is ill with a viral infection or fever. It may cause severe liver or brain damage.  · Your appetite may be poor, so a light diet is fine. Avoid dehydration by drinking 6 to 8 glasses of fluids per day (water, soft drinks, juices, tea, or soup). Extra fluids will help loosen secretions in the nose and lungs.  · Over-the-counter cold medicines will not shorten the length of time youre sick, but they may be helpful for the following symptoms: cough, sore throat, and nasal and sinus congestion. (Note: Do not use decongestants if you have high blood pressure.)  Follow-up care  Follow up with your healthcare provider, or as advised.  When to seek medical advice  Call your healthcare provider right away if any of these occur:  · Cough with lots of colored sputum (mucus)  · Severe headache; face, neck, or ear pain  · Difficulty swallowing due to throat pain  · Fever of 100.4°F (38°C)  Call 911, or get immediate medical care  Call emergency services right away if any of these occur:  · Chest pain, shortness of breath, wheezing, or difficulty breathing  · Coughing up blood  · Inability to swallow due to throat pain  Date Last Reviewed: 9/13/2015  © 4371-3073 [employer]  [address] [place] [address]. All rights reserved. This information is not intended as a substitute for professional medical care. Always follow your healthcare professional's instructions.

## 2019-11-08 NOTE — PATIENT INSTRUCTIONS
LUNGS CLEAR AT THIS TIME  NO SIGNS OF BACTERIAL INFECTION AT THIS TIME  FOLLOW UP WITH PCP AS SCHEDULED  START NASAL SPRAY AND ANTIHISTAMINE TO HELP WITH CONGESTION AND RUNNY NOSE    You must understand that you've received an Urgent Care treatment only and that you may be released before all your medical problems are known or treated. You, the patient, will arrange for follow up care as instructed.  If your condition worsens we recommend that you receive another evaluation at the emergency room immediately or contact your primary medical clinics after hours call service to discuss your concerns.  Please return here or go to the Emergency Department for any concerns or worsening of condition.        Viral Upper Respiratory Illness (Adult)  You have a viral upper respiratory illness (URI), which is another term for the common cold. This illness is contagious during the first few days. It is spread through the air by coughing and sneezing. It may also be spread by direct contact (touching the sick person and then touching your own eyes, nose, or mouth). Frequent handwashing will decrease risk of spread. Most viral illnesses go away within 7 to 10 days with rest and simple home remedies. Sometimes the illness may last for several weeks. Antibiotics will not kill a virus, and they are generally not prescribed for this condition.    Home care  · If symptoms are severe, rest at home for the first 2 to 3 days. When you resume activity, don't let yourself get too tired.  · Avoid being exposed to cigarette smoke (yours or others).  · You may use acetaminophen or ibuprofen to control pain and fever, unless another medicine was prescribed. (Note: If you have chronic liver or kidney disease, have ever had a stomach ulcer or gastrointestinal bleeding, or are taking blood-thinning medicines, talk with your healthcare provider before using these medicines.) Aspirin should never be given to anyone under 18 years of age who is ill  with a viral infection or fever. It may cause severe liver or brain damage.  · Your appetite may be poor, so a light diet is fine. Avoid dehydration by drinking 6 to 8 glasses of fluids per day (water, soft drinks, juices, tea, or soup). Extra fluids will help loosen secretions in the nose and lungs.  · Over-the-counter cold medicines will not shorten the length of time youre sick, but they may be helpful for the following symptoms: cough, sore throat, and nasal and sinus congestion. (Note: Do not use decongestants if you have high blood pressure.)  Follow-up care  Follow up with your healthcare provider, or as advised.  When to seek medical advice  Call your healthcare provider right away if any of these occur:  · Cough with lots of colored sputum (mucus)  · Severe headache; face, neck, or ear pain  · Difficulty swallowing due to throat pain  · Fever of 100.4°F (38°C)  Call 911, or get immediate medical care  Call emergency services right away if any of these occur:  · Chest pain, shortness of breath, wheezing, or difficulty breathing  · Coughing up blood  · Inability to swallow due to throat pain  Date Last Reviewed: 9/13/2015  © 8245-0705 [employer]. [address] [place] [address], [place] [address]. All rights reserved. This information is not intended as a substitute for professional medical care. Always follow your healthcare professional's instructions.

## 2019-11-12 ENCOUNTER — LAB VISIT (OUTPATIENT)
Dept: LAB | Facility: HOSPITAL | Age: 84
End: 2019-11-12
Attending: INTERNAL MEDICINE
Payer: MEDICARE

## 2019-11-12 ENCOUNTER — OFFICE VISIT (OUTPATIENT)
Dept: INTERNAL MEDICINE | Facility: CLINIC | Age: 84
End: 2019-11-12
Payer: MEDICARE

## 2019-11-12 VITALS
TEMPERATURE: 98 F | RESPIRATION RATE: 16 BRPM | SYSTOLIC BLOOD PRESSURE: 138 MMHG | HEIGHT: 65 IN | BODY MASS INDEX: 20.75 KG/M2 | WEIGHT: 124.56 LBS | HEART RATE: 72 BPM | DIASTOLIC BLOOD PRESSURE: 74 MMHG

## 2019-11-12 DIAGNOSIS — M19.90 ARTHRITIS: ICD-10-CM

## 2019-11-12 DIAGNOSIS — E21.3 HYPERPARATHYROIDISM: ICD-10-CM

## 2019-11-12 DIAGNOSIS — E03.9 HYPOTHYROIDISM, UNSPECIFIED TYPE: ICD-10-CM

## 2019-11-12 DIAGNOSIS — E55.9 VITAMIN D DEFICIENCY: ICD-10-CM

## 2019-11-12 DIAGNOSIS — E83.52 HYPERCALCEMIA: ICD-10-CM

## 2019-11-12 DIAGNOSIS — L29.9 ITCHING: ICD-10-CM

## 2019-11-12 DIAGNOSIS — L29.9 ITCHING: Primary | ICD-10-CM

## 2019-11-12 DIAGNOSIS — E78.5 HYPERLIPIDEMIA, UNSPECIFIED HYPERLIPIDEMIA TYPE: ICD-10-CM

## 2019-11-12 LAB
ALBUMIN SERPL BCP-MCNC: 4 G/DL (ref 3.5–5.2)
ALP SERPL-CCNC: 68 U/L (ref 55–135)
ALT SERPL W/O P-5'-P-CCNC: 20 U/L (ref 10–44)
ANION GAP SERPL CALC-SCNC: 5 MMOL/L (ref 8–16)
AST SERPL-CCNC: 26 U/L (ref 10–40)
BASOPHILS # BLD AUTO: 0.04 K/UL (ref 0–0.2)
BASOPHILS NFR BLD: 0.6 % (ref 0–1.9)
BILIRUB SERPL-MCNC: 0.6 MG/DL (ref 0.1–1)
BUN SERPL-MCNC: 18 MG/DL (ref 8–23)
CA-I BLDV-SCNC: 1.31 MMOL/L (ref 1.06–1.42)
CALCIUM SERPL-MCNC: 10.2 MG/DL (ref 8.7–10.5)
CHLORIDE SERPL-SCNC: 106 MMOL/L (ref 95–110)
CHOLEST SERPL-MCNC: 217 MG/DL (ref 120–199)
CHOLEST/HDLC SERPL: 2.7 {RATIO} (ref 2–5)
CO2 SERPL-SCNC: 28 MMOL/L (ref 23–29)
CREAT SERPL-MCNC: 0.9 MG/DL (ref 0.5–1.4)
DIFFERENTIAL METHOD: ABNORMAL
EOSINOPHIL # BLD AUTO: 0.3 K/UL (ref 0–0.5)
EOSINOPHIL NFR BLD: 4 % (ref 0–8)
ERYTHROCYTE [DISTWIDTH] IN BLOOD BY AUTOMATED COUNT: 12.6 % (ref 11.5–14.5)
EST. GFR  (AFRICAN AMERICAN): >60 ML/MIN/1.73 M^2
EST. GFR  (NON AFRICAN AMERICAN): 58.1 ML/MIN/1.73 M^2
GLUCOSE SERPL-MCNC: 77 MG/DL (ref 70–110)
HCT VFR BLD AUTO: 43.2 % (ref 37–48.5)
HDLC SERPL-MCNC: 81 MG/DL (ref 40–75)
HDLC SERPL: 37.3 % (ref 20–50)
HGB BLD-MCNC: 13.8 G/DL (ref 12–16)
IMM GRANULOCYTES # BLD AUTO: 0.01 K/UL (ref 0–0.04)
IMM GRANULOCYTES NFR BLD AUTO: 0.1 % (ref 0–0.5)
LDLC SERPL CALC-MCNC: 118.6 MG/DL (ref 63–159)
LYMPHOCYTES # BLD AUTO: 2 K/UL (ref 1–4.8)
LYMPHOCYTES NFR BLD: 29.2 % (ref 18–48)
MCH RBC QN AUTO: 31.4 PG (ref 27–31)
MCHC RBC AUTO-ENTMCNC: 31.9 G/DL (ref 32–36)
MCV RBC AUTO: 98 FL (ref 82–98)
MONOCYTES # BLD AUTO: 0.6 K/UL (ref 0.3–1)
MONOCYTES NFR BLD: 9.1 % (ref 4–15)
NEUTROPHILS # BLD AUTO: 3.9 K/UL (ref 1.8–7.7)
NEUTROPHILS NFR BLD: 57 % (ref 38–73)
NONHDLC SERPL-MCNC: 136 MG/DL
NRBC BLD-RTO: 0 /100 WBC
PLATELET # BLD AUTO: 272 K/UL (ref 150–350)
PMV BLD AUTO: 10.5 FL (ref 9.2–12.9)
POTASSIUM SERPL-SCNC: 4.9 MMOL/L (ref 3.5–5.1)
PROT SERPL-MCNC: 7 G/DL (ref 6–8.4)
PTH-INTACT SERPL-MCNC: 81 PG/ML (ref 9–77)
RBC # BLD AUTO: 4.39 M/UL (ref 4–5.4)
SODIUM SERPL-SCNC: 139 MMOL/L (ref 136–145)
TRIGL SERPL-MCNC: 87 MG/DL (ref 30–150)
TSH SERPL DL<=0.005 MIU/L-ACNC: 1.69 UIU/ML (ref 0.4–4)
WBC # BLD AUTO: 6.92 K/UL (ref 3.9–12.7)

## 2019-11-12 PROCEDURE — 80061 LIPID PANEL: CPT | Mod: HCNC

## 2019-11-12 PROCEDURE — G0009 ADMIN PNEUMOCOCCAL VACCINE: HCPCS | Mod: HCNC,S$GLB,, | Performed by: INTERNAL MEDICINE

## 2019-11-12 PROCEDURE — 90670 PNEUMOCOCCAL CONJUGATE VACCINE 13-VALENT LESS THAN 5YO & GREATER THAN: ICD-10-PCS | Mod: HCNC,S$GLB,, | Performed by: INTERNAL MEDICINE

## 2019-11-12 PROCEDURE — 99214 OFFICE O/P EST MOD 30 MIN: CPT | Mod: HCNC,25,S$GLB, | Performed by: INTERNAL MEDICINE

## 2019-11-12 PROCEDURE — 99499 UNLISTED E&M SERVICE: CPT | Mod: HCNC,S$GLB,, | Performed by: INTERNAL MEDICINE

## 2019-11-12 PROCEDURE — 82330 ASSAY OF CALCIUM: CPT | Mod: HCNC

## 2019-11-12 PROCEDURE — 84443 ASSAY THYROID STIM HORMONE: CPT | Mod: HCNC

## 2019-11-12 PROCEDURE — 85025 COMPLETE CBC W/AUTO DIFF WBC: CPT | Mod: HCNC

## 2019-11-12 PROCEDURE — 99499 RISK ADDL DX/OHS AUDIT: ICD-10-PCS | Mod: HCNC,S$GLB,, | Performed by: INTERNAL MEDICINE

## 2019-11-12 PROCEDURE — 83970 ASSAY OF PARATHORMONE: CPT | Mod: HCNC

## 2019-11-12 PROCEDURE — 1101F PT FALLS ASSESS-DOCD LE1/YR: CPT | Mod: HCNC,CPTII,S$GLB, | Performed by: INTERNAL MEDICINE

## 2019-11-12 PROCEDURE — 1101F PR PT FALLS ASSESS DOC 0-1 FALLS W/OUT INJ PAST YR: ICD-10-PCS | Mod: HCNC,CPTII,S$GLB, | Performed by: INTERNAL MEDICINE

## 2019-11-12 PROCEDURE — 99214 PR OFFICE/OUTPT VISIT, EST, LEVL IV, 30-39 MIN: ICD-10-PCS | Mod: HCNC,25,S$GLB, | Performed by: INTERNAL MEDICINE

## 2019-11-12 PROCEDURE — 99999 PR PBB SHADOW E&M-EST. PATIENT-LVL III: CPT | Mod: PBBFAC,HCNC,, | Performed by: INTERNAL MEDICINE

## 2019-11-12 PROCEDURE — 90670 PCV13 VACCINE IM: CPT | Mod: HCNC,S$GLB,, | Performed by: INTERNAL MEDICINE

## 2019-11-12 PROCEDURE — G0009 PNEUMOCOCCAL CONJUGATE VACCINE 13-VALENT LESS THAN 5YO & GREATER THAN: ICD-10-PCS | Mod: HCNC,S$GLB,, | Performed by: INTERNAL MEDICINE

## 2019-11-12 PROCEDURE — 36415 COLL VENOUS BLD VENIPUNCTURE: CPT | Mod: HCNC,PO

## 2019-11-12 PROCEDURE — 99999 PR PBB SHADOW E&M-EST. PATIENT-LVL III: ICD-10-PCS | Mod: PBBFAC,HCNC,, | Performed by: INTERNAL MEDICINE

## 2019-11-12 PROCEDURE — 80053 COMPREHEN METABOLIC PANEL: CPT | Mod: HCNC

## 2019-11-12 RX ORDER — DICLOFENAC SODIUM 10 MG/G
2 GEL TOPICAL 4 TIMES DAILY
Qty: 100 G | Refills: 1 | Status: SHIPPED | OUTPATIENT
Start: 2019-11-12 | End: 2020-05-06

## 2019-11-13 ENCOUNTER — TELEPHONE (OUTPATIENT)
Dept: INTERNAL MEDICINE | Facility: CLINIC | Age: 84
End: 2019-11-13

## 2019-11-13 NOTE — TELEPHONE ENCOUNTER
----- Message from Nicolasa Rojas DO sent at 11/12/2019  8:21 PM CST -----  Notify pt that labs are all stable. She can continue all current meds

## 2019-11-14 ENCOUNTER — DOCUMENTATION ONLY (OUTPATIENT)
Dept: AUDIOLOGY | Facility: CLINIC | Age: 84
End: 2019-11-14

## 2019-11-14 NOTE — PROGRESS NOTES
Patient in to review cleaning-reviewed changing filters. Patient had accidentally turned hearing aid off last week and that is why it wasn't working. She feels like she will need adjustments but she did not want to set up an appointment today.

## 2019-11-18 NOTE — TELEPHONE ENCOUNTER
"----- Message from Leticia Bradley sent at 11/18/2019 12:58 PM CST -----  Contact: DYLAN JOHANSEN [[id number]]  Name of Who is Calling: DYLAN JOHANSEN [[id number]]      What is the request in detail:  Patient called stating, "she returning your call and would like you to please call again.       THANKS!    Reply by [facility]: no      Call Back: DYLAN JOHANSEN / # [phone]                                      "

## 2019-12-19 ENCOUNTER — CLINICAL SUPPORT (OUTPATIENT)
Dept: AUDIOLOGY | Facility: CLINIC | Age: 84
End: 2019-12-19
Payer: MEDICARE

## 2019-12-19 DIAGNOSIS — H90.3 ASYMMETRICAL SENSORINEURAL HEARING LOSS: Primary | ICD-10-CM

## 2019-12-19 PROCEDURE — 99499 UNLISTED E&M SERVICE: CPT | Mod: HCNC,S$GLB,, | Performed by: AUDIOLOGIST

## 2019-12-19 PROCEDURE — 99499 NO LOS: ICD-10-PCS | Mod: HCNC,S$GLB,, | Performed by: AUDIOLOGIST

## 2019-12-19 NOTE — PROGRESS NOTES
Patient c/o feedback in the left hearing aid. Ran feedback AU and increased whistle block feature in automatic programs. Increased gain in right ear 2 dB for balance. Patient was encouraged to contact us if further adjustments are needed or if she has any issues.

## 2019-12-23 ENCOUNTER — HOSPITAL ENCOUNTER (EMERGENCY)
Facility: HOSPITAL | Age: 84
Discharge: HOME | End: 2019-12-23
Attending: EMERGENCY MEDICINE
Payer: MEDICARE

## 2019-12-23 VITALS
RESPIRATION RATE: 16 BRPM | OXYGEN SATURATION: 96 % | BODY MASS INDEX: 20.49 KG/M2 | SYSTOLIC BLOOD PRESSURE: 179 MMHG | DIASTOLIC BLOOD PRESSURE: 75 MMHG | HEART RATE: 84 BPM | HEIGHT: 65 IN | WEIGHT: 123 LBS | TEMPERATURE: 98 F

## 2019-12-23 DIAGNOSIS — M54.9 UPPER BACK PAIN ON LEFT SIDE: ICD-10-CM

## 2019-12-23 DIAGNOSIS — R42 DIZZINESS: Primary | ICD-10-CM

## 2019-12-23 LAB
ALBUMIN SERPL BCP-MCNC: 4 G/DL (ref 3.5–5.2)
ALP SERPL-CCNC: 72 U/L (ref 55–135)
ALT SERPL W/O P-5'-P-CCNC: 20 U/L (ref 10–44)
ANION GAP SERPL CALC-SCNC: 8 MMOL/L (ref 8–16)
AST SERPL-CCNC: 26 U/L (ref 10–40)
BASOPHILS # BLD AUTO: 0.04 K/UL (ref 0–0.2)
BASOPHILS NFR BLD: 0.6 % (ref 0–1.9)
BILIRUB SERPL-MCNC: 0.4 MG/DL (ref 0.1–1)
BILIRUB UR QL STRIP: NEGATIVE
BUN SERPL-MCNC: 19 MG/DL (ref 8–23)
CALCIUM SERPL-MCNC: 9.5 MG/DL (ref 8.7–10.5)
CHLORIDE SERPL-SCNC: 107 MMOL/L (ref 95–110)
CLARITY UR REFRACT.AUTO: CLEAR
CO2 SERPL-SCNC: 27 MMOL/L (ref 23–29)
COLOR UR AUTO: YELLOW
CREAT SERPL-MCNC: 0.7 MG/DL (ref 0.5–1.4)
DIFFERENTIAL METHOD: ABNORMAL
EOSINOPHIL # BLD AUTO: 0.1 K/UL (ref 0–0.5)
EOSINOPHIL NFR BLD: 1.9 % (ref 0–8)
ERYTHROCYTE [DISTWIDTH] IN BLOOD BY AUTOMATED COUNT: 12.3 % (ref 11.5–14.5)
EST. GFR  (AFRICAN AMERICAN): >60 ML/MIN/1.73 M^2
EST. GFR  (NON AFRICAN AMERICAN): >60 ML/MIN/1.73 M^2
GLUCOSE SERPL-MCNC: 102 MG/DL (ref 70–110)
GLUCOSE UR QL STRIP: NEGATIVE
HCT VFR BLD AUTO: 39.8 % (ref 37–48.5)
HGB BLD-MCNC: 12.8 G/DL (ref 12–16)
HGB UR QL STRIP: NEGATIVE
IMM GRANULOCYTES # BLD AUTO: 0.03 K/UL (ref 0–0.04)
IMM GRANULOCYTES NFR BLD AUTO: 0.5 % (ref 0–0.5)
KETONES UR QL STRIP: ABNORMAL
LEUKOCYTE ESTERASE UR QL STRIP: NEGATIVE
LYMPHOCYTES # BLD AUTO: 1.4 K/UL (ref 1–4.8)
LYMPHOCYTES NFR BLD: 21.6 % (ref 18–48)
MCH RBC QN AUTO: 31.5 PG (ref 27–31)
MCHC RBC AUTO-ENTMCNC: 32.2 G/DL (ref 32–36)
MCV RBC AUTO: 98 FL (ref 82–98)
MICROSCOPIC COMMENT: NORMAL
MONOCYTES # BLD AUTO: 0.5 K/UL (ref 0.3–1)
MONOCYTES NFR BLD: 7.6 % (ref 4–15)
NEUTROPHILS # BLD AUTO: 4.4 K/UL (ref 1.8–7.7)
NEUTROPHILS NFR BLD: 67.8 % (ref 38–73)
NITRITE UR QL STRIP: NEGATIVE
NRBC BLD-RTO: 0 /100 WBC
PH UR STRIP: 6 [PH] (ref 5–8)
PLATELET # BLD AUTO: 275 K/UL (ref 150–350)
PMV BLD AUTO: 9.7 FL (ref 9.2–12.9)
POTASSIUM SERPL-SCNC: 4 MMOL/L (ref 3.5–5.1)
PROT SERPL-MCNC: 6.8 G/DL (ref 6–8.4)
PROT UR QL STRIP: NEGATIVE
RBC # BLD AUTO: 4.06 M/UL (ref 4–5.4)
RBC #/AREA URNS AUTO: 3 /HPF (ref 0–4)
SODIUM SERPL-SCNC: 142 MMOL/L (ref 136–145)
SP GR UR STRIP: 1.02 (ref 1–1.03)
SQUAMOUS #/AREA URNS AUTO: 0 /HPF
URN SPEC COLLECT METH UR: ABNORMAL
WBC # BLD AUTO: 6.48 K/UL (ref 3.9–12.7)
WBC #/AREA URNS AUTO: 2 /HPF (ref 0–5)

## 2019-12-23 PROCEDURE — 93010 ELECTROCARDIOGRAM REPORT: CPT | Mod: HCNC,,, | Performed by: INTERNAL MEDICINE

## 2019-12-23 PROCEDURE — 99284 EMERGENCY DEPT VISIT MOD MDM: CPT | Mod: HCNC,,, | Performed by: EMERGENCY MEDICINE

## 2019-12-23 PROCEDURE — 81001 URINALYSIS AUTO W/SCOPE: CPT | Mod: HCNC

## 2019-12-23 PROCEDURE — 93005 ELECTROCARDIOGRAM TRACING: CPT | Mod: HCNC

## 2019-12-23 PROCEDURE — 93010 EKG 12-LEAD: ICD-10-PCS | Mod: HCNC,,, | Performed by: INTERNAL MEDICINE

## 2019-12-23 PROCEDURE — 99284 PR EMERGENCY DEPT VISIT,LEVEL IV: ICD-10-PCS | Mod: HCNC,,, | Performed by: EMERGENCY MEDICINE

## 2019-12-23 PROCEDURE — 80053 COMPREHEN METABOLIC PANEL: CPT | Mod: HCNC

## 2019-12-23 PROCEDURE — 99284 EMERGENCY DEPT VISIT MOD MDM: CPT | Mod: 25,HCNC

## 2019-12-23 PROCEDURE — 63600175 PHARM REV CODE 636 W HCPCS: Mod: HCNC | Performed by: EMERGENCY MEDICINE

## 2019-12-23 PROCEDURE — 25000003 PHARM REV CODE 250: Mod: HCNC | Performed by: EMERGENCY MEDICINE

## 2019-12-23 PROCEDURE — 85025 COMPLETE CBC W/AUTO DIFF WBC: CPT | Mod: HCNC

## 2019-12-23 RX ORDER — MECLIZINE HCL 12.5 MG 12.5 MG/1
12.5 TABLET ORAL
Status: COMPLETED | OUTPATIENT
Start: 2019-12-23 | End: 2019-12-23

## 2019-12-23 RX ORDER — MECLIZINE HYDROCHLORIDE 25 MG/1
12.5 TABLET ORAL 3 TIMES DAILY PRN
Qty: 6 TABLET | Refills: 0 | Status: SHIPPED | OUTPATIENT
Start: 2019-12-23 | End: 2020-05-06

## 2019-12-23 RX ORDER — MECLIZINE HCL 12.5 MG 12.5 MG/1
25 TABLET ORAL
Status: DISCONTINUED | OUTPATIENT
Start: 2019-12-23 | End: 2019-12-23

## 2019-12-23 RX ADMIN — SODIUM CHLORIDE 500 ML: 0.9 INJECTION, SOLUTION INTRAVENOUS at 10:12

## 2019-12-23 RX ADMIN — MECLIZINE 12.5 MG: 12.5 TABLET ORAL at 10:12

## 2019-12-23 NOTE — ED NOTES
Pt reports sudden dizziness, N/V and low back pain while sitting playing solitaire at home. She reports dizziness worse while sitting up.  Pt denies recent fever, chills, dysuria.

## 2019-12-23 NOTE — ED NOTES
Patient identifiers verified and correct for Olamide Felipe.    LOC: The patient is awake, alert and aware of environment with an appropriate affect, the patient is oriented x 3 and speaking appropriately.  APPEARANCE: Patient resting comfortably and in no acute distress, patient is clean and well groomed, patient's clothing is properly fastened.  SKIN: The skin is warm and dry, color consistent with ethnicity, patient has normal skin turgor and moist mucus membranes, skin intact, no breakdown or bruising noted.  MUSCULOSKELETAL: Patient moving all extremities spontaneously, no obvious swelling or deformities noted.  RESPIRATORY: Airway is open and patent, respirations are spontaneous, patient has a normal effort and rate, no accessory muscle use noted, bilateral breath sounds present.  CARDIAC: Patient has a normal rate and regular rhythm, no periphreal edema noted, capillary refill < 3 seconds.  ABDOMEN: Soft and non tender to palpation, no distention noted, normoactive bowel sounds present in all four quadrants.  NEUROLOGIC: PERRL, 3 mm bilaterally, eyes open spontaneously, behavior appropriate to situation, follows commands, facial expression symmetrical, bilateral hand grasp equal and even, purposeful motor response noted, normal sensation in all extremities when touched with a finger.

## 2019-12-23 NOTE — PROVIDER PROGRESS NOTES - EMERGENCY DEPT.
"Encounter Date: 12/23/2019    ED Physician Progress Notes        Physician Note:   Medical screening exam completed.  I have conducted a focused provider triage encounter, findings are as follows:    Brief history of present illness:  86-year-old female presenting with sudden onset of dizziness and unsteadiness after standing from using the computer, accompanied by nausea and vomiting. Denies headache, recent illness, chest pain, shortness of breath, numbness/tingling or weakness in extremities.    ---------------------------               12/23/19                      1608         ---------------------------   BP:        (!) 163/84       Patient Position:     Sitting        Pulse:         89           Resp:          16           Temp:   97.6 °F (36.4 °C)   TempSrc:      Oral          SpO2:          97%          Weight: 55.8 kg (123 lb)    Height:  5' 5" (1.651 m)   ---------------------------    Pertinent physical exam:  A&O x4, NCAT, no focal neuro deficits, bilateral finger-nose and SUZETTE intact, RRR, CTAB    Brief workup plan:  CBC, CMP, urinalysis.  Will defer imaging to attending physician.    Preliminary workup initiated; this workup will be continued and followed by the physician or advanced practice provider that is assigned to the patient when roomed.       "

## 2019-12-24 NOTE — DISCHARGE INSTRUCTIONS
You were seen in the emergency department for dizziness which may be due to an ear problem.  Please follow-up with your ENT doctor for further evaluation.  Take meclizine as prescribed if needed for dizziness.  For severe dizziness, confusion, nausea, or other concerning symptoms return to the emergency department.

## 2019-12-24 NOTE — ED PROVIDER NOTES
Encounter Date: 2019       History     Chief Complaint   Patient presents with    Dizziness     Relieved while lying flat, +N/V, back pain.     86-year-old female with past medical history of bilateral H OH, hyperlipidemia, presenting with sudden onset of dizziness and unsteady gait this afternoon, causing nausea and several episodes of vomiting. Patient denies back pain at this time, attributes it to musculoskeletal from the stretcher.        Review of patient's allergies indicates:  No Known Allergies  Past Medical History:   Diagnosis Date    Hyperlipidemia     Hypothyroidism     Osteoarthritis, knee      Past Surgical History:   Procedure Laterality Date    dentures      HYSTERECTOMY      left wrist surgery       No family history on file.  Social History     Tobacco Use    Smoking status: Former Smoker     Last attempt to quit: 1989     Years since quittin.5    Smokeless tobacco: Never Used   Substance Use Topics    Alcohol use: Yes     Frequency: 4 or more times a week     Drinks per session: 1 or 2     Binge frequency: Never    Drug use: Not on file     Review of Systems   Constitutional: Negative for chills, diaphoresis, fatigue and fever.   HENT: Negative for congestion, hearing loss, rhinorrhea, sinus pressure, sinus pain and sore throat.    Eyes: Negative for photophobia and visual disturbance.        Neg vision changes   Respiratory: Negative for cough and shortness of breath.    Cardiovascular: Negative for chest pain and leg swelling.   Gastrointestinal: Positive for nausea and vomiting. Negative for abdominal pain.        Neg changes in stool   Genitourinary:        Neg changes in urination   Musculoskeletal: Negative for arthralgias, back pain, joint swelling, neck pain and neck stiffness.   Skin: Negative for rash.   Allergic/Immunologic: Negative for immunocompromised state.   Neurological: Positive for dizziness. Negative for syncope, speech difficulty, weakness,  light-headedness and headaches.   Hematological: Does not bruise/bleed easily.       Physical Exam     Initial Vitals [12/23/19 1608]   BP Pulse Resp Temp SpO2   (!) 163/84 89 16 97.6 °F (36.4 °C) 97 %      MAP       --         Physical Exam    Nursing note and vitals reviewed.  Constitutional: She appears well-developed and well-nourished. She is not diaphoretic. No distress.   HENT:   Head: Normocephalic and atraumatic.   Nose: Nose normal.   Eyes: EOM are normal. Pupils are equal, round, and reactive to light. No scleral icterus.   Neck: Normal range of motion. Neck supple.   Cardiovascular: Normal rate and regular rhythm.   Pulmonary/Chest: Breath sounds normal. No respiratory distress. She has no wheezes. She has no rhonchi. She has no rales. She exhibits no tenderness.   Abdominal: Soft. Bowel sounds are normal. She exhibits no distension. There is no tenderness.   Musculoskeletal: Normal range of motion. She exhibits no edema or tenderness.   Neurological: She is alert and oriented to person, place, and time. She has normal strength. No cranial nerve deficit or sensory deficit.   Finger-nose intact, no nystagmus, rapid alternating movements intact, no truncal ataxia   Skin: Skin is warm and dry. Capillary refill takes less than 2 seconds. No rash noted.   Psychiatric: She has a normal mood and affect. Her behavior is normal. Thought content normal.         ED Course   Procedures  Labs Reviewed   CBC W/ AUTO DIFFERENTIAL - Abnormal; Notable for the following components:       Result Value    Mean Corpuscular Hemoglobin 31.5 (*)     All other components within normal limits   URINALYSIS, REFLEX TO URINE CULTURE - Abnormal; Notable for the following components:    Ketones, UA 1+ (*)     All other components within normal limits    Narrative:     Preferred Collection Type->Urine, Clean Catch   COMPREHENSIVE METABOLIC PANEL   URINALYSIS MICROSCOPIC    Narrative:     Preferred Collection Type->Urine, Clean Catch         ECG Results          EKG 12-lead (In process)  Result time 12/23/19 20:57:44    In process by Interface, Lab In [facility] (12/23/19 20:57:44)                 Narrative:    Test Reason : R42,    Vent. Rate : 086 BPM     Atrial Rate : 086 BPM     P-R Int : 158 ms          QRS Dur : 090 ms      QT Int : 374 ms       P-R-T Axes : 063 062 048 degrees     QTc Int : 447 ms    Normal sinus rhythm  Minimal voltage criteria for LVH, may be normal variant  Borderline Abnormal ECG  When compared with ECG of 09-SEP-2019 11:21,  No significant change was found    Referred By: [email or web address]   SELF           Confirmed By:                             Imaging Results          MRI Brain Without Contrast (Final result)  Result time 12/23/19 21:51:57    Final result by Bharath Montalvo MD (12/23/19 21:51:57)                 Impression:      No major vascular distribution infarct or acute hemorrhage.    Some mild gyriform susceptibility along the sulci of the left frontal convexity near the vertex suggesting sequela of remote hemorrhage.    Electronically signed by resident: Lizeth Byers  Date:    12/23/2019  Time:    21:28    Electronically signed by: Bharath Montalvo MD  Date:    12/23/2019  Time:    21:51             Narrative:    EXAMINATION:  MRI BRAIN WITHOUT CONTRAST    CLINICAL HISTORY:  Ataxia, stroke suspected as etiology;    TECHNIQUE:  Multiplanar multisequence MR imaging of the brain was performed without contrast.    COMPARISON:  CT head without contrast 12/23/2019    FINDINGS:  Intracranial compartment:    Generalized cerebral volume loss with compensatory enlargement of ventricles and sulci without evidence hydrocephalus.  No extra-axial blood or fluid collections    There is minimal scattered T2 FLAIR hyperintensity in a periventricular distribution, most compatible with microvascular ischemic changes.  No abnormal diffusion restriction to suggest acute major vascular distribution infarct.  Some mild gyriform  susceptibility along the sulci of the left frontal convexity near the vertex suggesting sequela of remote hemorrhage.    Normal vascular flow voids are preserved.    Skull/extracranial contents (limited evaluation): Bone marrow signal intensity is normal.                               CT Head Without Contrast (Final result)  Result time 12/23/19 18:45:57    Final result by Wilson Romo MD (12/23/19 18:45:57)                 Impression:      No acute intracranial abnormality identified.      Electronically signed by: Wilson Romo MD  Date:    12/23/2019  Time:    18:45             Narrative:    EXAMINATION:  CT HEAD WITHOUT CONTRAST    CLINICAL HISTORY:  Dizziness;    TECHNIQUE:  Low dose axial CT images obtained throughout the head without intravenous contrast. Sagittal and coronal reconstructions were performed.    COMPARISON:  MRI brain 09/03/2019 and head CT 08/29/2019    FINDINGS:  Intracranial compartment:    Brain is normally formed.  Mild generalized cerebral volume loss.  The ventricles are midline without distortion by mass effect or acute hydrocephalus.  No extra-axial blood or fluid collections.    Grossly stable mild degree of supratentorial white matter chronic small vessel ischemic change.  No definite new focal areas of abnormal parenchymal attenuation.  Skull base atherosclerotic vascular calcifications noted.  No parenchymal mass, hemorrhage, edema or major vascular distribution infarct.    Skull/extracranial contents (limited evaluation): No fracture. Mastoid air cells and paranasal sinuses are essentially clear.                                 Medical Decision Making:   History:   I obtained history from: someone other than patient.       <> Summary of History: Daughter states pt was vomiting  Old Medical Records: I decided to obtain old medical records.  Initial Assessment:   NAD, no FNDs but appears fatigued, no nystagmus, cannot elicit symptoms with range of head. Pt has no upper back pain  at this time, likely MSK from stretcher rather than ACS but will obtain EKG.   Differential Diagnosis:   Vertigo, CVA, orthostatic hypotension less likely, electrolyte abnormality, arrhythmia less likely  Clinical Tests:   Lab Tests: Ordered and Reviewed  Radiological Study: Ordered and Reviewed  ED Management:  Given pt's age, and no nystagmus/inability to elicit symptoms with Kaveh-Hallpike, will obtain MRI to r/o cerebellar infarct. Will provide anti-emetic.    MRI shows no cerebellar infarct.  Upon further discussion of results with daughter, she notes that patient had briefly complained of ringing in left ear despite removing hearing aid.  Symptoms may represent vertigo of otologic etiology, will give small dose of meclizine and advised patient to follow up with ENT.                                 Clinical Impression:       ICD-10-CM ICD-9-CM   1. Dizziness R42 780.4   2. Upper back pain on left side M54.9 724.5                             Trish Breen MD  12/27/19 2773

## 2020-01-17 ENCOUNTER — LAB VISIT (OUTPATIENT)
Dept: LAB | Facility: HOSPITAL | Age: 85
End: 2020-01-17
Attending: FAMILY MEDICINE
Payer: MEDICARE

## 2020-01-17 ENCOUNTER — OFFICE VISIT (OUTPATIENT)
Dept: PRIMARY CARE CLINIC | Facility: CLINIC | Age: 85
End: 2020-01-17
Payer: MEDICARE

## 2020-01-17 ENCOUNTER — TELEPHONE (OUTPATIENT)
Dept: INTERNAL MEDICINE | Facility: CLINIC | Age: 85
End: 2020-01-17

## 2020-01-17 VITALS
WEIGHT: 120.56 LBS | TEMPERATURE: 98 F | DIASTOLIC BLOOD PRESSURE: 60 MMHG | HEIGHT: 65 IN | BODY MASS INDEX: 20.09 KG/M2 | SYSTOLIC BLOOD PRESSURE: 150 MMHG | HEART RATE: 81 BPM | OXYGEN SATURATION: 97 %

## 2020-01-17 DIAGNOSIS — R53.83 FATIGUE, UNSPECIFIED TYPE: Primary | ICD-10-CM

## 2020-01-17 DIAGNOSIS — R63.4 RECENT WEIGHT LOSS: ICD-10-CM

## 2020-01-17 DIAGNOSIS — R53.83 FATIGUE, UNSPECIFIED TYPE: ICD-10-CM

## 2020-01-17 DIAGNOSIS — Z86.69 H/O SLEEP DISTURBANCE: ICD-10-CM

## 2020-01-17 LAB
BACTERIA #/AREA URNS AUTO: NORMAL /HPF
BASOPHILS # BLD AUTO: 0.05 K/UL (ref 0–0.2)
BASOPHILS NFR BLD: 0.8 % (ref 0–1.9)
BILIRUB UR QL STRIP: NEGATIVE
CLARITY UR REFRACT.AUTO: ABNORMAL
COLOR UR AUTO: YELLOW
DIFFERENTIAL METHOD: ABNORMAL
EOSINOPHIL # BLD AUTO: 0.3 K/UL (ref 0–0.5)
EOSINOPHIL NFR BLD: 4.1 % (ref 0–8)
ERYTHROCYTE [DISTWIDTH] IN BLOOD BY AUTOMATED COUNT: 12.5 % (ref 11.5–14.5)
GLUCOSE UR QL STRIP: NEGATIVE
HCT VFR BLD AUTO: 40.4 % (ref 37–48.5)
HGB BLD-MCNC: 12.9 G/DL (ref 12–16)
HGB UR QL STRIP: NEGATIVE
IMM GRANULOCYTES # BLD AUTO: 0.01 K/UL (ref 0–0.04)
IMM GRANULOCYTES NFR BLD AUTO: 0.2 % (ref 0–0.5)
KETONES UR QL STRIP: ABNORMAL
LEUKOCYTE ESTERASE UR QL STRIP: ABNORMAL
LYMPHOCYTES # BLD AUTO: 1.5 K/UL (ref 1–4.8)
LYMPHOCYTES NFR BLD: 25.4 % (ref 18–48)
MCH RBC QN AUTO: 31.2 PG (ref 27–31)
MCHC RBC AUTO-ENTMCNC: 31.9 G/DL (ref 32–36)
MCV RBC AUTO: 98 FL (ref 82–98)
MICROSCOPIC COMMENT: NORMAL
MONOCYTES # BLD AUTO: 0.5 K/UL (ref 0.3–1)
MONOCYTES NFR BLD: 8 % (ref 4–15)
NEUTROPHILS # BLD AUTO: 3.7 K/UL (ref 1.8–7.7)
NEUTROPHILS NFR BLD: 61.5 % (ref 38–73)
NITRITE UR QL STRIP: NEGATIVE
NRBC BLD-RTO: 0 /100 WBC
PH UR STRIP: 5 [PH] (ref 5–8)
PLATELET # BLD AUTO: 258 K/UL (ref 150–350)
PMV BLD AUTO: 10.9 FL (ref 9.2–12.9)
PROT UR QL STRIP: NEGATIVE
RBC # BLD AUTO: 4.13 M/UL (ref 4–5.4)
RBC #/AREA URNS AUTO: 1 /HPF (ref 0–4)
SP GR UR STRIP: 1.01 (ref 1–1.03)
SQUAMOUS #/AREA URNS AUTO: 0 /HPF
URN SPEC COLLECT METH UR: ABNORMAL
WBC # BLD AUTO: 6.03 K/UL (ref 3.9–12.7)
WBC #/AREA URNS AUTO: 2 /HPF (ref 0–5)

## 2020-01-17 PROCEDURE — 1126F PR PAIN SEVERITY QUANTIFIED, NO PAIN PRESENT: ICD-10-PCS | Mod: HCNC,S$GLB,, | Performed by: FAMILY MEDICINE

## 2020-01-17 PROCEDURE — 99214 PR OFFICE/OUTPT VISIT, EST, LEVL IV, 30-39 MIN: ICD-10-PCS | Mod: HCNC,S$GLB,, | Performed by: FAMILY MEDICINE

## 2020-01-17 PROCEDURE — 1159F MED LIST DOCD IN RCRD: CPT | Mod: HCNC,S$GLB,, | Performed by: FAMILY MEDICINE

## 2020-01-17 PROCEDURE — 1126F AMNT PAIN NOTED NONE PRSNT: CPT | Mod: HCNC,S$GLB,, | Performed by: FAMILY MEDICINE

## 2020-01-17 PROCEDURE — 1159F PR MEDICATION LIST DOCUMENTED IN MEDICAL RECORD: ICD-10-PCS | Mod: HCNC,S$GLB,, | Performed by: FAMILY MEDICINE

## 2020-01-17 PROCEDURE — 99214 OFFICE O/P EST MOD 30 MIN: CPT | Mod: HCNC,S$GLB,, | Performed by: FAMILY MEDICINE

## 2020-01-17 PROCEDURE — 81001 URINALYSIS AUTO W/SCOPE: CPT | Mod: HCNC

## 2020-01-17 PROCEDURE — 1101F PT FALLS ASSESS-DOCD LE1/YR: CPT | Mod: HCNC,CPTII,S$GLB, | Performed by: FAMILY MEDICINE

## 2020-01-17 PROCEDURE — 36415 COLL VENOUS BLD VENIPUNCTURE: CPT | Mod: HCNC,PN

## 2020-01-17 PROCEDURE — 84443 ASSAY THYROID STIM HORMONE: CPT | Mod: HCNC

## 2020-01-17 PROCEDURE — 99999 PR PBB SHADOW E&M-EST. PATIENT-LVL III: ICD-10-PCS | Mod: PBBFAC,HCNC,, | Performed by: FAMILY MEDICINE

## 2020-01-17 PROCEDURE — 85025 COMPLETE CBC W/AUTO DIFF WBC: CPT | Mod: HCNC

## 2020-01-17 PROCEDURE — 80053 COMPREHEN METABOLIC PANEL: CPT | Mod: HCNC

## 2020-01-17 PROCEDURE — 99999 PR PBB SHADOW E&M-EST. PATIENT-LVL III: CPT | Mod: PBBFAC,HCNC,, | Performed by: FAMILY MEDICINE

## 2020-01-17 PROCEDURE — 1101F PR PT FALLS ASSESS DOC 0-1 FALLS W/OUT INJ PAST YR: ICD-10-PCS | Mod: HCNC,CPTII,S$GLB, | Performed by: FAMILY MEDICINE

## 2020-01-17 NOTE — TELEPHONE ENCOUNTER
Received message that pt needed an apt for today.     Called pt/ pt's daughter spoke to both. Pt had a gi bug on Monday and has been fatigued and unable to sleep since then. Booked apt for today.
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
## 2020-01-18 LAB
ALBUMIN SERPL BCP-MCNC: 3.8 G/DL (ref 3.5–5.2)
ALP SERPL-CCNC: 68 U/L (ref 55–135)
ALT SERPL W/O P-5'-P-CCNC: 16 U/L (ref 10–44)
ANION GAP SERPL CALC-SCNC: 13 MMOL/L (ref 8–16)
AST SERPL-CCNC: 23 U/L (ref 10–40)
BILIRUB SERPL-MCNC: 0.4 MG/DL (ref 0.1–1)
BUN SERPL-MCNC: 18 MG/DL (ref 8–23)
CALCIUM SERPL-MCNC: 10 MG/DL (ref 8.7–10.5)
CHLORIDE SERPL-SCNC: 107 MMOL/L (ref 95–110)
CO2 SERPL-SCNC: 21 MMOL/L (ref 23–29)
CREAT SERPL-MCNC: 0.8 MG/DL (ref 0.5–1.4)
EST. GFR  (AFRICAN AMERICAN): >60 ML/MIN/1.73 M^2
EST. GFR  (NON AFRICAN AMERICAN): >60 ML/MIN/1.73 M^2
GLUCOSE SERPL-MCNC: 110 MG/DL (ref 70–110)
POTASSIUM SERPL-SCNC: 4.3 MMOL/L (ref 3.5–5.1)
PROT SERPL-MCNC: 6.6 G/DL (ref 6–8.4)
SODIUM SERPL-SCNC: 141 MMOL/L (ref 136–145)
TSH SERPL DL<=0.005 MIU/L-ACNC: 1.5 UIU/ML (ref 0.4–4)

## 2020-01-20 ENCOUNTER — TELEPHONE (OUTPATIENT)
Dept: PRIMARY CARE CLINIC | Facility: CLINIC | Age: 85
End: 2020-01-20

## 2020-01-20 NOTE — TELEPHONE ENCOUNTER
----- Message from Marina Vivas MD sent at 1/20/2020  7:43 AM CST -----  Please let patient know results were normal. If her weight continues to decline despite proper intake of food, she will have to decide on if she would like more invasive work up with CT scan and/or colonoscopy/endoscopy.     She may follow up with myself or her PCP.    Hello,    1.  No abnormalities of electrolytes.  Normal kidney and liver function testing.    2.  Thyroid function is normal    3.  No anemia.  Cell count overall unremarkable.    I recommend increasing your caloric intake with foods that you like and if your weight continues to decline, then further work up with imaging +/- scoping would be indicated.

## 2020-01-24 ENCOUNTER — TELEPHONE (OUTPATIENT)
Dept: OTOLARYNGOLOGY | Facility: CLINIC | Age: 85
End: 2020-01-24

## 2020-01-24 NOTE — TELEPHONE ENCOUNTER
----- Message from Preston Nolasco sent at 1/24/2020  7:01 AM CST -----  Contact: Pt my chart   Pt would like to be called back regarding scheduling an appt and questions about Vertigo and dizziness. Pt went to ER 3 weeks prior.     Pt can be reached at [phone].

## 2020-01-31 ENCOUNTER — OFFICE VISIT (OUTPATIENT)
Dept: OTOLARYNGOLOGY | Facility: CLINIC | Age: 85
End: 2020-01-31
Payer: MEDICARE

## 2020-01-31 VITALS — WEIGHT: 120.56 LBS | BODY MASS INDEX: 20.58 KG/M2 | HEIGHT: 64 IN

## 2020-01-31 DIAGNOSIS — R42 VERTIGO: Primary | ICD-10-CM

## 2020-01-31 DIAGNOSIS — H90.3 SENSORINEURAL HEARING LOSS, BILATERAL: ICD-10-CM

## 2020-01-31 PROCEDURE — 1159F PR MEDICATION LIST DOCUMENTED IN MEDICAL RECORD: ICD-10-PCS | Mod: HCNC,S$GLB,, | Performed by: OTOLARYNGOLOGY

## 2020-01-31 PROCEDURE — 1126F PR PAIN SEVERITY QUANTIFIED, NO PAIN PRESENT: ICD-10-PCS | Mod: HCNC,S$GLB,, | Performed by: OTOLARYNGOLOGY

## 2020-01-31 PROCEDURE — 99214 OFFICE O/P EST MOD 30 MIN: CPT | Mod: HCNC,S$GLB,, | Performed by: OTOLARYNGOLOGY

## 2020-01-31 PROCEDURE — 99999 PR PBB SHADOW E&M-EST. PATIENT-LVL II: ICD-10-PCS | Mod: PBBFAC,HCNC,, | Performed by: OTOLARYNGOLOGY

## 2020-01-31 PROCEDURE — 1101F PR PT FALLS ASSESS DOC 0-1 FALLS W/OUT INJ PAST YR: ICD-10-PCS | Mod: HCNC,CPTII,S$GLB, | Performed by: OTOLARYNGOLOGY

## 2020-01-31 PROCEDURE — 99214 PR OFFICE/OUTPT VISIT, EST, LEVL IV, 30-39 MIN: ICD-10-PCS | Mod: HCNC,S$GLB,, | Performed by: OTOLARYNGOLOGY

## 2020-01-31 PROCEDURE — 1101F PT FALLS ASSESS-DOCD LE1/YR: CPT | Mod: HCNC,CPTII,S$GLB, | Performed by: OTOLARYNGOLOGY

## 2020-01-31 PROCEDURE — 1126F AMNT PAIN NOTED NONE PRSNT: CPT | Mod: HCNC,S$GLB,, | Performed by: OTOLARYNGOLOGY

## 2020-01-31 PROCEDURE — 99999 PR PBB SHADOW E&M-EST. PATIENT-LVL II: CPT | Mod: PBBFAC,HCNC,, | Performed by: OTOLARYNGOLOGY

## 2020-01-31 PROCEDURE — 1159F MED LIST DOCD IN RCRD: CPT | Mod: HCNC,S$GLB,, | Performed by: OTOLARYNGOLOGY

## 2020-01-31 NOTE — PROGRESS NOTES
Subjective:       Patient ID: Olamide Felipe is a 86 y.o. female.    Chief Complaint: Dizziness    HPI     Olamide Felipe is a 86 y.o. female presents for evaluation of dizziness.  Dizziness occurred 1 month ago.  Described as severe room spinning vertigo.  It lasted less than an hour.  Patient is unsure if she was induced by positional changes.  She did not have any further episodes.  She did not vomit with the episode.  She denied any lasting balance difficulties. She denies any sensation some ear fullness, tinnitus, or change in hearing.  She does have a history of bilateral asymmetric sensorineural hearing loss.  Since that episode she reports she has been doing well with no other issues of dizziness.    Review of Systems   Constitutional: Negative for chills and fever.   HENT: Negative for sore throat and trouble swallowing.    Respiratory: Negative for apnea and chest tightness.    Cardiovascular: Negative for chest pain.       Objective:      Physical Exam   Constitutional: She appears well-developed and well-nourished.   HENT:   Head: Normocephalic and atraumatic.   Right Ear: Tympanic membrane, external ear and ear canal normal.   Left Ear: Tympanic membrane, external ear and ear canal normal.   Nose: Nose normal.   Mouth/Throat: Uvula is midline, oropharynx is clear and moist and mucous membranes are normal.   Neck: Normal range of motion. No thyroid mass present.     Romberg: neg  DH: neg    Data:    Audiogram tracings independently reviewed and discussed with patient shows bilateral SNHL AS > AD    MRI brain images  independently reviewed by me.  No evidence of IAC or inner ear anomaly, no evidence of acoustic neuroma.      Assessment:       1. Vertigo    2. Sensorineural hearing loss, bilateral    3. Asymmetrical left sensorineural hearing loss        Plan:         with history of vertigo.  She had 1 episode lasting approximately 30 min to an hour.    BPPV vs VN     Recommend observation at this time as  it has resolved

## 2020-02-07 ENCOUNTER — CLINICAL SUPPORT (OUTPATIENT)
Dept: AUDIOLOGY | Facility: CLINIC | Age: 85
End: 2020-02-07
Payer: MEDICARE

## 2020-02-07 DIAGNOSIS — H90.3 SENSORINEURAL HEARING LOSS, BILATERAL: Primary | ICD-10-CM

## 2020-02-07 PROCEDURE — 99499 NO LOS: ICD-10-PCS | Mod: HCNC,S$GLB,, | Performed by: AUDIOLOGIST-HEARING AID FITTER

## 2020-02-07 PROCEDURE — 99499 UNLISTED E&M SERVICE: CPT | Mod: HCNC,S$GLB,, | Performed by: AUDIOLOGIST-HEARING AID FITTER

## 2020-02-07 NOTE — PROGRESS NOTES
Olamide Felipe was seen in the clinic today for a hearing aid follow-up.    Ms. Felipe reported she wasn't hearing well with the hearing aids and was experiencing some feedback. The left dome was changed to a small vented. The fitting formula was changed to NAL-NL 2. MPO was increased. Overall gain on the right hearing aid was increased 3 dB for balance. She was pleased with how the hearing aids sounded.     Ms. Felipe will call the clinic for a follow-up appointment as needed.

## 2020-03-18 ENCOUNTER — PATIENT MESSAGE (OUTPATIENT)
Dept: INTERNAL MEDICINE | Facility: CLINIC | Age: 85
End: 2020-03-18

## 2020-03-19 NOTE — TELEPHONE ENCOUNTER
Would recommend miralax daily and increase water intake. If no improvement with that, we can evaluate her in clinic.

## 2020-05-06 ENCOUNTER — NURSE TRIAGE (OUTPATIENT)
Dept: ADMINISTRATIVE | Facility: CLINIC | Age: 85
End: 2020-05-06

## 2020-05-06 ENCOUNTER — HOSPITAL ENCOUNTER (EMERGENCY)
Facility: HOSPITAL | Age: 85
Discharge: HOME | End: 2020-05-06
Attending: EMERGENCY MEDICINE
Payer: MEDICARE

## 2020-05-06 VITALS
HEART RATE: 84 BPM | SYSTOLIC BLOOD PRESSURE: 152 MMHG | BODY MASS INDEX: 19.66 KG/M2 | RESPIRATION RATE: 14 BRPM | DIASTOLIC BLOOD PRESSURE: 68 MMHG | TEMPERATURE: 98 F | WEIGHT: 118 LBS | HEIGHT: 65 IN | OXYGEN SATURATION: 99 %

## 2020-05-06 DIAGNOSIS — R11.2 NAUSEA AND VOMITING, INTRACTABILITY OF VOMITING NOT SPECIFIED, UNSPECIFIED VOMITING TYPE: Primary | ICD-10-CM

## 2020-05-06 LAB
ALBUMIN SERPL BCP-MCNC: 3.9 G/DL (ref 3.5–5.2)
ALP SERPL-CCNC: 59 U/L (ref 55–135)
ALT SERPL W/O P-5'-P-CCNC: 18 U/L (ref 10–44)
ANION GAP SERPL CALC-SCNC: 10 MMOL/L (ref 8–16)
AST SERPL-CCNC: 25 U/L (ref 10–40)
BASOPHILS # BLD AUTO: 0.04 K/UL (ref 0–0.2)
BASOPHILS NFR BLD: 0.7 % (ref 0–1.9)
BILIRUB SERPL-MCNC: 0.5 MG/DL (ref 0.1–1)
BILIRUB UR QL STRIP: NEGATIVE
BUN SERPL-MCNC: 18 MG/DL (ref 8–23)
CALCIUM SERPL-MCNC: 9.6 MG/DL (ref 8.7–10.5)
CHLORIDE SERPL-SCNC: 106 MMOL/L (ref 95–110)
CLARITY UR: CLEAR
CO2 SERPL-SCNC: 28 MMOL/L (ref 23–29)
COLOR UR: YELLOW
CREAT SERPL-MCNC: 0.8 MG/DL (ref 0.5–1.4)
DIFFERENTIAL METHOD: ABNORMAL
EOSINOPHIL # BLD AUTO: 0.2 K/UL (ref 0–0.5)
EOSINOPHIL NFR BLD: 2.7 % (ref 0–8)
ERYTHROCYTE [DISTWIDTH] IN BLOOD BY AUTOMATED COUNT: 12.3 % (ref 11.5–14.5)
EST. GFR  (AFRICAN AMERICAN): >60 ML/MIN/1.73 M^2
EST. GFR  (NON AFRICAN AMERICAN): >60 ML/MIN/1.73 M^2
GLUCOSE SERPL-MCNC: 106 MG/DL (ref 70–110)
GLUCOSE UR QL STRIP: NEGATIVE
HCT VFR BLD AUTO: 38.9 % (ref 37–48.5)
HGB BLD-MCNC: 12.9 G/DL (ref 12–16)
HGB UR QL STRIP: NEGATIVE
IMM GRANULOCYTES # BLD AUTO: 0.03 K/UL (ref 0–0.04)
IMM GRANULOCYTES NFR BLD AUTO: 0.5 % (ref 0–0.5)
KETONES UR QL STRIP: ABNORMAL
LEUKOCYTE ESTERASE UR QL STRIP: NEGATIVE
LIPASE SERPL-CCNC: 74 U/L (ref 4–60)
LYMPHOCYTES # BLD AUTO: 1.1 K/UL (ref 1–4.8)
LYMPHOCYTES NFR BLD: 19 % (ref 18–48)
MCH RBC QN AUTO: 31.3 PG (ref 27–31)
MCHC RBC AUTO-ENTMCNC: 33.2 G/DL (ref 32–36)
MCV RBC AUTO: 94 FL (ref 82–98)
MONOCYTES # BLD AUTO: 0.4 K/UL (ref 0.3–1)
MONOCYTES NFR BLD: 7.2 % (ref 4–15)
NEUTROPHILS # BLD AUTO: 4.2 K/UL (ref 1.8–7.7)
NEUTROPHILS NFR BLD: 69.9 % (ref 38–73)
NITRITE UR QL STRIP: NEGATIVE
NRBC BLD-RTO: 0 /100 WBC
PH UR STRIP: 8 [PH] (ref 5–8)
PLATELET # BLD AUTO: 206 K/UL (ref 150–350)
PMV BLD AUTO: 9.8 FL (ref 9.2–12.9)
POTASSIUM SERPL-SCNC: 4.3 MMOL/L (ref 3.5–5.1)
PROT SERPL-MCNC: 6.4 G/DL (ref 6–8.4)
PROT UR QL STRIP: NEGATIVE
RBC # BLD AUTO: 4.12 M/UL (ref 4–5.4)
SODIUM SERPL-SCNC: 144 MMOL/L (ref 136–145)
SP GR UR STRIP: 1.02 (ref 1–1.03)
URN SPEC COLLECT METH UR: ABNORMAL
UROBILINOGEN UR STRIP-ACNC: NEGATIVE EU/DL
WBC # BLD AUTO: 5.99 K/UL (ref 3.9–12.7)

## 2020-05-06 PROCEDURE — 80053 COMPREHEN METABOLIC PANEL: CPT | Mod: HCNC

## 2020-05-06 PROCEDURE — 99284 EMERGENCY DEPT VISIT MOD MDM: CPT | Mod: 25,HCNC

## 2020-05-06 PROCEDURE — 85025 COMPLETE CBC W/AUTO DIFF WBC: CPT | Mod: HCNC

## 2020-05-06 PROCEDURE — 81003 URINALYSIS AUTO W/O SCOPE: CPT | Mod: HCNC

## 2020-05-06 PROCEDURE — 83690 ASSAY OF LIPASE: CPT | Mod: HCNC

## 2020-05-06 RX ORDER — ONDANSETRON 4 MG/1
4 TABLET, FILM COATED ORAL EVERY 6 HOURS
Qty: 12 TABLET | Refills: 0 | Status: SHIPPED | OUTPATIENT
Start: 2020-05-06 | End: 2021-01-25

## 2020-05-06 NOTE — DISCHARGE INSTRUCTIONS
Please follow-up with your primary care return to the emergency room as needed.  Avoid sugar free products and salty foods times 24 hr eat a bland diet such as crackers toast.

## 2020-05-06 NOTE — ED PROVIDER NOTES
Encounter Date: 2020       History     Chief Complaint   Patient presents with    Emesis     Dizziness with N/V since breakfast this morning. Presents awake, alert, oriented. Skin w/d. No distress.     87-year-old female presents to the emergency room, with vomiting.  Patient states this morning after eating cereal and milk approximately 1 hr started with vomiting and diarrhea patient denies abdominal, cp.  she denies nausea vomiting after being given Zofran by EMS.  Patient denies history of fever or come in contact with anyone with COVID.  Past medical history: Hyperlipidemia, Hypothyroidism, Osteoarthritis, knee, [place]             Review of patient's allergies indicates:  No Known Allergies  Past Medical History:   Diagnosis Date    Hyperlipidemia     Hypothyroidism     Osteoarthritis, knee      Past Surgical History:   Procedure Laterality Date    dentures      HYSTERECTOMY      left wrist surgery       No family history on file.  Social History     Tobacco Use    Smoking status: Former Smoker     Last attempt to quit: 1989     Years since quittin.9    Smokeless tobacco: Never Used   Substance Use Topics    Alcohol use: Yes     Frequency: 4 or more times a week     Drinks per session: 1 or 2     Binge frequency: Never    Drug use: Not on file     Review of Systems   Constitutional: Negative for fever.   HENT: Negative for sore throat.    Respiratory: Negative for shortness of breath.    Cardiovascular: Negative for chest pain.   Gastrointestinal: Negative for abdominal distention, abdominal pain, constipation, nausea and vomiting.   Genitourinary: Negative for dysuria.   Musculoskeletal: Negative for back pain.   Skin: Negative for rash.   Neurological: Negative for weakness.   Hematological: Does not bruise/bleed easily.       Physical Exam     Initial Vitals   BP Pulse Resp Temp SpO2   20 1330 20 1340 20 1340 20 1520 20 1340   (!) 178/72 78 (!) 22 99 °F  (37.2 °C) 99 %      MAP       --                Physical Exam    Nursing note and vitals reviewed.  Constitutional: She appears well-developed and well-nourished.   HENT:   Head: Normocephalic.   Neck: Normal range of motion.   Cardiovascular: Normal rate, regular rhythm and normal heart sounds.   Pulmonary/Chest: Breath sounds normal.   Abdominal: Soft. Bowel sounds are normal.   Neurological: She is alert and oriented to person, place, and time.   Skin: Skin is warm and dry. Capillary refill takes less than 2 seconds.   Psychiatric: She has a normal mood and affect. Thought content normal.         ED Course   Procedures  Labs Reviewed   CBC W/ AUTO DIFFERENTIAL - Abnormal; Notable for the following components:       Result Value    Mean Corpuscular Hemoglobin 31.3 (*)     All other components within normal limits   LIPASE - Abnormal; Notable for the following components:    Lipase 74 (*)     All other components within normal limits   URINALYSIS, REFLEX TO URINE CULTURE - Abnormal; Notable for the following components:    Ketones, UA 1+ (*)     All other components within normal limits    Narrative:     Preferred Collection Type->Urine, Clean Catch   COMPREHENSIVE METABOLIC PANEL          Imaging Results          X-Ray Abdomen AP 1 View (KUB) (Final result)  Result time 05/06/20 15:06:20    Final result by Juan Mcrae MD (05/06/20 15:06:20)                 Impression:      No acute radiographic abnormality.      Electronically signed by: Juan Mcrae  Date:    05/06/2020  Time:    15:06             Narrative:    EXAMINATION:  XR ABDOMEN AP 1 VIEW    CLINICAL HISTORY:  Encounter for full-term uncomplicated delivery??  87-year-old female??    TECHNIQUE:  AP View(s) of the abdomen was performed.    COMPARISON:  None    FINDINGS:  No evidence of bowel obstruction.    No radiographic mass, organomegaly or pathologic calcification.    Mild degenerative disc and endplate changes in the lumbar spine and lower  thoracic spine.  Mild marginal osteophytic spurring, most prominent at L4-5 on the left.                                 Medical Decision Making:   Initial Assessment:   Initial assessment patient is without complaint denies nausea or diarrhea since being given anti emetic in the EMS.    Differential Diagnosis:   Gastroenteritis, nausea vomiting diarrhea,   Clinical Tests:   Lab Tests: Ordered and Reviewed  Radiological Study: Ordered and Reviewed  Other:   I have discussed this case with another health care provider.                               Clinical Impression:       ICD-10-CM ICD-9-CM   1. Nausea and vomiting, intractability of vomiting not specified, unspecified vomiting type R11.2 787.01   2. NVD (normal vaginal delivery) O80 650             ED Disposition Condition    Discharge Stable        ED Prescriptions     Medication Sig Dispense Start Date End Date Auth. Provider    ondansetron (ZOFRAN) 4 MG tablet Take 1 tablet (4 mg total) by mouth every 6 (six) hours. 12 tablet 5/6/2020  MARII Evans        Follow-up Information     Follow up With Specialties Details Why Contact Info    Nicolasa Rojas,  Internal Medicine   [address] [facility] [address]  [phone]                                       MARII Evans  05/06/20 9058

## 2020-05-06 NOTE — ED NOTES
86y/o F to ED for N/V/D about 1 hr after eating milk, cheerios, and a banana. Occurring within the last hr. EMS report they did not see any coffee ground when patient vomited at home. Pt received nausea meds by EMS and is reporting nausea is significantly better. No abd pain noted at this time.

## 2020-05-06 NOTE — TELEPHONE ENCOUNTER
Spoke with patient's daughter Christy she states that her mother has been vomiting all morning.  States that she is feeling dizzy and she is now laying on the floor in the bathroom.  Reports that patient did not fall but she chose to lay on floor due to dizziness.  Christy states that patient is shivering and she is stating her mother is reporting she feels scared.  States that her heart feels like it is racing.  Advised Christy to hangup and call 911 for immediate medial attention.  Christy verbalized understanding.    Reason for Disposition   Heart beating < 50 beats per minute OR > 140 beats per minute   Sounds like a life-threatening emergency to the triager    Additional Information   Negative: Shock suspected (e.g., cold/pale/clammy skin, too weak to stand, low BP, rapid pulse)   Negative: Difficult to awaken or acting confused (e.g., disoriented, slurred speech)   Negative: Fainted, and still feels dizzy afterwards   Negative: Severe difficulty breathing (e.g., struggling for each breath, speaks in single words)   Negative: Overdose (accidental or intentional) of medications   Negative: New neurologic deficit that is present now: * Weakness of the face, arm, or leg on one side of the body * Numbness of the face, arm, or leg on one side of the body * Loss of speech or garbled speech    Protocols used: DIZZINESS-A-OH

## 2020-05-13 ENCOUNTER — HOSPITAL ENCOUNTER (EMERGENCY)
Facility: HOSPITAL | Age: 85
Discharge: HOME | End: 2020-05-13
Attending: EMERGENCY MEDICINE
Payer: MEDICARE

## 2020-05-13 VITALS
HEART RATE: 78 BPM | OXYGEN SATURATION: 100 % | SYSTOLIC BLOOD PRESSURE: 150 MMHG | RESPIRATION RATE: 20 BRPM | DIASTOLIC BLOOD PRESSURE: 68 MMHG | TEMPERATURE: 98 F

## 2020-05-13 DIAGNOSIS — H81.10 BENIGN PAROXYSMAL POSITIONAL VERTIGO, UNSPECIFIED LATERALITY: Primary | ICD-10-CM

## 2020-05-13 DIAGNOSIS — R42 DIZZINESS: ICD-10-CM

## 2020-05-13 LAB
ALBUMIN SERPL BCP-MCNC: 4.1 G/DL (ref 3.5–5.2)
ALP SERPL-CCNC: 65 U/L (ref 55–135)
ALT SERPL W/O P-5'-P-CCNC: 20 U/L (ref 10–44)
ANION GAP SERPL CALC-SCNC: 9 MMOL/L (ref 8–16)
AST SERPL-CCNC: 25 U/L (ref 10–40)
BILIRUB SERPL-MCNC: 0.7 MG/DL (ref 0.1–1)
BUN SERPL-MCNC: 16 MG/DL (ref 8–23)
CALCIUM SERPL-MCNC: 9.8 MG/DL (ref 8.7–10.5)
CHLORIDE SERPL-SCNC: 105 MMOL/L (ref 95–110)
CO2 SERPL-SCNC: 29 MMOL/L (ref 23–29)
CREAT SERPL-MCNC: 0.8 MG/DL (ref 0.5–1.4)
ERYTHROCYTE [DISTWIDTH] IN BLOOD BY AUTOMATED COUNT: 12.4 % (ref 11.5–14.5)
EST. GFR  (AFRICAN AMERICAN): >60 ML/MIN/1.73 M^2
EST. GFR  (NON AFRICAN AMERICAN): >60 ML/MIN/1.73 M^2
GLUCOSE SERPL-MCNC: 101 MG/DL (ref 70–110)
HCT VFR BLD AUTO: 42.5 % (ref 37–48.5)
HGB BLD-MCNC: 14.1 G/DL (ref 12–16)
MCH RBC QN AUTO: 31.3 PG (ref 27–31)
MCHC RBC AUTO-ENTMCNC: 33.2 G/DL (ref 32–36)
MCV RBC AUTO: 94 FL (ref 82–98)
PLATELET # BLD AUTO: 235 K/UL (ref 150–350)
PMV BLD AUTO: 9.8 FL (ref 9.2–12.9)
POTASSIUM SERPL-SCNC: 3.9 MMOL/L (ref 3.5–5.1)
PROT SERPL-MCNC: 6.9 G/DL (ref 6–8.4)
RBC # BLD AUTO: 4.5 M/UL (ref 4–5.4)
SODIUM SERPL-SCNC: 143 MMOL/L (ref 136–145)
WBC # BLD AUTO: 5.45 K/UL (ref 3.9–12.7)

## 2020-05-13 PROCEDURE — 85027 COMPLETE CBC AUTOMATED: CPT | Mod: HCNC

## 2020-05-13 PROCEDURE — 99284 EMERGENCY DEPT VISIT MOD MDM: CPT | Mod: 25,HCNC

## 2020-05-13 PROCEDURE — 93005 ELECTROCARDIOGRAM TRACING: CPT | Mod: HCNC

## 2020-05-13 PROCEDURE — 80053 COMPREHEN METABOLIC PANEL: CPT | Mod: HCNC

## 2020-05-13 PROCEDURE — 96360 HYDRATION IV INFUSION INIT: CPT | Mod: HCNC

## 2020-05-13 PROCEDURE — 25000003 PHARM REV CODE 250: Mod: HCNC | Performed by: EMERGENCY MEDICINE

## 2020-05-13 RX ORDER — MECLIZINE HYDROCHLORIDE 25 MG/1
25 TABLET ORAL
Status: COMPLETED | OUTPATIENT
Start: 2020-05-13 | End: 2020-05-13

## 2020-05-13 RX ORDER — SODIUM CHLORIDE 9 MG/ML
500 INJECTION, SOLUTION INTRAVENOUS
Status: COMPLETED | OUTPATIENT
Start: 2020-05-13 | End: 2020-05-13

## 2020-05-13 RX ORDER — MECLIZINE HCL 12.5 MG 12.5 MG/1
12.5 TABLET ORAL 3 TIMES DAILY PRN
Qty: 20 TABLET | Refills: 0 | Status: ON HOLD | OUTPATIENT
Start: 2020-05-13 | End: 2022-01-07

## 2020-05-13 RX ADMIN — MECLIZINE HYDROCHLORIDE 25 MG: 25 TABLET ORAL at 01:05

## 2020-05-13 RX ADMIN — SODIUM CHLORIDE 500 ML: 0.9 INJECTION, SOLUTION INTRAVENOUS at 12:05

## 2020-05-13 NOTE — ED PROVIDER NOTES
Encounter Date: 2020    SCRIBE #1 NOTE: I, Inga Mosquera, am scribing for, and in the presence of,  Dr. Pickering. I have scribed the entire note.       History     Chief Complaint   Patient presents with    Dizziness     pt began c/o nausea and dizziness when standing x1 week. was seen here for same complaint last week, but denies any improvement since discharge home.      Olamide Felipe is a 87 y.o. female who  has a past medical history of Hyperlipidemia, Hypothyroidism, and Osteoarthritis, knee.    The patient presents to the ED via EMS due to dizziness, associated with nausea and 1 episode of vomiting, that occurred while eating breakfast this morning.  She reports the symptoms worsened when she stood up and tried to walk, described as a spinning sensation.  She states sitting down and laying still significantly improves the dizziness.  She reports similar episodes in the past, once in December of last year, and another about 1 week ago.  She reports her symptoms resolved by the time she got to the emergency room. She was given medication to take at home for her symptoms.  She has also followed up with an ENT earlier this year.  She denies any associated headache, neck/back pain, visual disturbance, or associated facial/arm/leg weakness.  She was not given medication by EMS.  She states her dizziness is currently resolved, and she denies any other complaints at this time.    The history is provided by the patient.     Review of patient's allergies indicates:  No Known Allergies  Past Medical History:   Diagnosis Date    Hyperlipidemia     Hypothyroidism     Osteoarthritis, knee      Past Surgical History:   Procedure Laterality Date    dentures      HYSTERECTOMY      left wrist surgery       No family history on file.  Social History     Tobacco Use    Smoking status: Former Smoker     Last attempt to quit: 1989     Years since quittin.9    Smokeless tobacco: Never Used   Substance Use Topics     Alcohol use: Yes     Frequency: 4 or more times a week     Drinks per session: 1 or 2     Binge frequency: Never    Drug use: Not on file     Review of Systems   Constitutional: Negative for fever.   HENT: Negative for sore throat.    Eyes: Negative for visual disturbance.   Respiratory: Negative for shortness of breath.    Cardiovascular: Negative for chest pain.   Gastrointestinal: Positive for nausea and vomiting.   Genitourinary: Negative for dysuria.   Musculoskeletal: Negative for back pain and neck pain.   Skin: Negative for rash.   Neurological: Positive for dizziness. Negative for weakness and headaches.   Hematological: Does not bruise/bleed easily.       Physical Exam     Initial Vitals [05/13/20 1131]   BP Pulse Resp Temp SpO2   (!) 155/75 83 20 98.1 °F (36.7 °C) 97 %      MAP       --         Physical Exam    Nursing note and vitals reviewed.  Constitutional: She appears well-developed and well-nourished. She is not diaphoretic. No distress.   Well-appearing, pleasant, no acute distress.   HENT:   Head: Normocephalic and atraumatic.   Mouth/Throat: Oropharynx is clear and moist.   Eyes: EOM are normal. Pupils are equal, round, and reactive to light.   Neck: Normal range of motion. No tracheal deviation present.   Cardiovascular: Normal rate, regular rhythm, normal heart sounds and intact distal pulses.   Pulmonary/Chest: Breath sounds normal. No stridor. No respiratory distress. She has no wheezes.   Abdominal: Soft. Bowel sounds are normal. She exhibits no distension and no mass. There is no tenderness.   Musculoskeletal: Normal range of motion. She exhibits no edema.   Neurological: She is alert and oriented to person, place, and time. She has normal strength. No cranial nerve deficit or sensory deficit. She exhibits normal muscle tone. Gait normal. GCS eye subscore is 4. GCS verbal subscore is 5. GCS motor subscore is 6.   No focal weakness or neuro deficits.   Skin: Skin is warm and dry.  Capillary refill takes less than 2 seconds. No pallor.   Psychiatric: She has a normal mood and affect. Her behavior is normal. Thought content normal.         ED Course   Procedures  Labs Reviewed   CBC WITHOUT DIFFERENTIAL - Abnormal; Notable for the following components:       Result Value    Mean Corpuscular Hemoglobin 31.3 (*)     All other components within normal limits   COMPREHENSIVE METABOLIC PANEL     EKG Readings: (Independently Interpreted)   Normal sinus rhythm with sinus arrhythmia at a rate of 70 bpm. Non-specific ST changes in inferior and lateral leads. No ST elevation. No sign of ischemia. Normal intervals. Stable from prior 12/2019.     ECG Results          EKG 12-lead (Final result)  Result time 05/13/20 17:03:06    Final result by Interface, Lab In [facility] (05/13/20 17:03:06)                 Narrative:    Test Reason : R42,    Vent. Rate : 070 BPM     Atrial Rate : 070 BPM     P-R Int : 162 ms          QRS Dur : 084 ms      QT Int : 384 ms       P-R-T Axes : 061 062 042 degrees     QTc Int : 414 ms    Normal sinus rhythm with sinus arrhythmia  Nonspecific ST abnormality  Abnormal ECG  When compared with ECG of 23-DEC-2019 16:14,  No significant change was found  Confirmed by Kleber DAVIS, Portillo ([id number]) on 5/13/2020 5:03:03 PM    Referred By: [email or web address]   SELF           Confirmed By:Portillo Shahid MD                            Imaging Results    None          Medical Decision Making:   History:   Old Medical Records: I decided to obtain old medical records.  Old Records Summarized: other records.       <> Summary of Records: 12/23/2019: seen in ED due to dizziness. CT head/MRI negative.   1/31/2019: seen by ENT, asymptomatic. No treatment given at that time.  5/6/20: seen in ED due to dizziness, treated with Zofran.  Differential Diagnosis:   Differential Diagnosis includes, but is not limited to:  Peripheral vertigo (labyrinthitis, vestibular neuritis, BPPV, Meniere's disease),  cerebellar stroke/CVA, TIA, SAH, carotid artery dissection, intracranial mass, medication reaction/noncompliance, substance abuse, depression, electrolyte abnormality, anemia, hemorrhage, renal failure, hepatic failure, sepsis/infection, UTI, viral illness, arrhythmia, CHF, thyroid disease, dehydration, depression, chronic disease.    Independently Interpreted Test(s):   I have ordered and independently interpreted EKG Reading(s) - see prior notes  Clinical Tests:   Lab Tests: Reviewed and Ordered  Radiological Study: Reviewed  Medical Tests: Reviewed  ED Management:  Labs unremarkable.  On reassessment, patient ambulatory to the restroom without significant dizziness.  She denies any current complaints at this time and feels much better.    Patient's presentation most consistent with BPPV.  Will prescribe meclizine for symptomatic treatment.  Recommend follow-up with ENT if symptoms/episodes persist.  Return to ER for worsening symptoms, persistent dizziness, associated neurologic deficits, or any other concerns.  Patient stable for discharge.    After complete evaluation, including thorough history and physical exam, the patient's symptoms are most likely due to peripheral cause of vertigo (including, but not limited to BPPV, post-viral labyrinthitis, or Meniere's disease). There are no accompanying signs of focal weakness, sensory deficits, or cerebellar symptoms consistent with acute CVA, and no further neuroimaging is indicated or likely to be of benefit at this time. There is no associated headache to suggest SAH. There is no meningismus, fever, or evidence of infection to suggest meningitis/encephalitis. The patient was treated with supportive care and improved.      On re-evaluation, the patient's status has improved.  After complete ED evaluation, clinical impression is most consistent with vertigo.  PCP/ENT follow-up within 3-5 days was recommended.    After taking into careful account the patient's  history, physical exam findings, as well as empirical and objective data obtained throughout ED workup, I feel no emergent medical condition has been identified. No further evaluation or admission was felt to be required, and the patient is stable for discharge from the ED. The patient and any additional family present were updated with test results, overall clinical impression, and recommended further plan of care, including discharge instructions as provided and outpatient follow-up for continued evaluation and management as needed. All questions were answered. The patient expressed understanding and agreed with current plan for discharge and follow-up plan of care. Strict ED return precautions were provided, including return/worsening of current symptoms, new symptoms, or any other concerns.                     ED Course as of May 14 0635   Wed May 13, 2020   1406 87-year-old female presents to ER due to sudden onset of dizziness described as a spinning sensation that started this morning while she was eating breakfast.  She reports the symptoms resolve while laying flat or sitting, but return with standing or sitting up.  She reports similar episodes in the past, requiring ER visits.  She was evaluated by ENT in January of this year, but was asymptomatic at the time.  She was seen in the ER 1 week ago for similar symptoms that resolved after a dose of Zofran.  She denies any associated headache, vision changes, slurred speech, facial droop, focal weakness/numbness, chest pain, palpitations, shortness of breath, or any other symptoms.  She is asymptomatic while lying flat on the stretcher.  Vitals unremarkable, exam benign, no focal neuro deficits.  Will obtain labs, treat symptomatically, and reassess.    [SS]      ED Course User Index  [SS] Elroy Pickering MD                Clinical Impression:       ICD-10-CM ICD-9-CM   1. Benign paroxysmal positional vertigo, unspecified laterality H81.10 386.11   2.  Dizziness R42 780.4             ED Disposition Condition    Discharge Stable        ED Prescriptions     Medication Sig Dispense Start Date End Date Auth. Provider    meclizine (ANTIVERT) 12.5 mg tablet Take 1 tablet (12.5 mg total) by mouth 3 (three) times daily as needed for Dizziness. 20 tablet 5/13/2020 5/20/2020 Elroy Pickering MD        Follow-up Information     Follow up With Specialties Details Why Contact Info Additional Information    [facility] Otorhinol[facility]ology Otolaryngology Schedule an appointment as soon as possible for a visit   [address], [facility] [address]  [facility] [address] [phone] At this time [facility] will only use these entries Main Hospital, [facility], and Emergency Department due to COVID-19 precautions.     Nicolasa Rojas, DO Internal Medicine   [address] [facility] [address]  [phone]                         I, Dr. Elroy Pickering, personally performed the services described in this documentation. All medical record entries made by the scribe were at my direction and in my presence.  I have reviewed the chart and agree that the record reflects my personal performance and is accurate and complete.     Elroy Pickering MD.           Elroy Pickering MD  05/14/20 0651

## 2020-05-13 NOTE — ED NOTES
"Pt ambulatory to restroom. States "I dont feel dizzy right now." pt ambulatory with steady gait.   "

## 2020-05-13 NOTE — ED NOTES
Pt presents to the ED w/ c/ of dizziness on movement. Pt reports nausea and dizziness on movement. Pt reports that she has been having intermittent dizziness for 1 week. Denies vomiting but reports nausea with dizziness. Denies chest pain or SOB. Pt reports she was seen here recently for the same symptoms. Pt is awake, alert, oriented. No drifts noted in bilateral upper and lower extremities. Pt is connected to cardiac monitor, BP cuff, and pulse ox. Will continue to monitor.

## 2020-06-12 ENCOUNTER — OFFICE VISIT (OUTPATIENT)
Dept: INTERNAL MEDICINE | Facility: CLINIC | Age: 85
End: 2020-06-12
Payer: MEDICARE

## 2020-06-12 VITALS
WEIGHT: 120.38 LBS | OXYGEN SATURATION: 97 % | BODY MASS INDEX: 19.35 KG/M2 | SYSTOLIC BLOOD PRESSURE: 130 MMHG | HEIGHT: 66 IN | RESPIRATION RATE: 16 BRPM | DIASTOLIC BLOOD PRESSURE: 56 MMHG | TEMPERATURE: 97 F | HEART RATE: 72 BPM

## 2020-06-12 DIAGNOSIS — Z00.00 ENCOUNTER FOR PREVENTIVE HEALTH EXAMINATION: Primary | ICD-10-CM

## 2020-06-12 DIAGNOSIS — E21.3 HYPERPARATHYROIDISM: ICD-10-CM

## 2020-06-12 DIAGNOSIS — Z12.12 SCREENING FOR COLORECTAL CANCER: ICD-10-CM

## 2020-06-12 DIAGNOSIS — E03.9 HYPOTHYROIDISM, UNSPECIFIED TYPE: ICD-10-CM

## 2020-06-12 DIAGNOSIS — Z12.11 SCREENING FOR COLORECTAL CANCER: ICD-10-CM

## 2020-06-12 DIAGNOSIS — R42 VERTIGO: ICD-10-CM

## 2020-06-12 DIAGNOSIS — E78.5 HYPERLIPIDEMIA, UNSPECIFIED HYPERLIPIDEMIA TYPE: ICD-10-CM

## 2020-06-12 DIAGNOSIS — Z01.00 ROUTINE EYE EXAM: ICD-10-CM

## 2020-06-12 DIAGNOSIS — E55.9 VITAMIN D DEFICIENCY: ICD-10-CM

## 2020-06-12 PROCEDURE — 99499 UNLISTED E&M SERVICE: CPT | Mod: HCNC,S$GLB,, | Performed by: HOSPITALIST

## 2020-06-12 PROCEDURE — 99397 PER PM REEVAL EST PAT 65+ YR: CPT | Mod: HCNC,S$GLB,, | Performed by: HOSPITALIST

## 2020-06-12 PROCEDURE — 99397 PR PREVENTIVE VISIT,EST,65 & OVER: ICD-10-PCS | Mod: HCNC,S$GLB,, | Performed by: HOSPITALIST

## 2020-06-12 PROCEDURE — 99999 PR PBB SHADOW E&M-EST. PATIENT-LVL IV: ICD-10-PCS | Mod: PBBFAC,HCNC,, | Performed by: HOSPITALIST

## 2020-06-12 PROCEDURE — 99499 RISK ADDL DX/OHS AUDIT: ICD-10-PCS | Mod: HCNC,S$GLB,, | Performed by: HOSPITALIST

## 2020-06-12 PROCEDURE — 99999 PR PBB SHADOW E&M-EST. PATIENT-LVL IV: CPT | Mod: PBBFAC,HCNC,, | Performed by: HOSPITALIST

## 2020-06-12 NOTE — PROGRESS NOTES
Subjective:     @Patient ID: Olamide Felipe is a 87 y.o. female.    Chief Complaint: Annual Exam    HPI    87 y.o. female here for annual exam. Pt reports she has had episodes of vertigo. Had ER visit last month. Reports meclizine helps periodically.     Lipid disorders/ASCVD risk (ages >/= 45 or >/= 20 if increased risk ): ordered  Eye exam: DUE  Breast Cancer (40-50y discretion of pt, 50-74y every 1-2 years): Mammogram: pt declines   Colorectal Cancer (normal risk 50-75yr): Colonoscopy; FIT kit ordered   DEXA (F>64 yo, M >69yo, M&F 50-68 yo with risk factors (smoking, previous fx, wt <70kg; etoh abuse, chronic steroids, RA)): done 6/2019     Vaccines:   Influenza (yearly) n/a   Tetanus (every 10 yrs - 1st tdap) she reports done about 2 years    PPSV23(>64yo or <65 w/ lung dz, smoking, DM)    PCV13 (> 65 or <65 w/ immunocompromised) done 11/2019  Zoster (>59yo) 2nd dose DUE    Exercise:  Occasional walking  Diet: regular        Review of Systems   Constitutional: Negative for activity change and unexpected weight change.   HENT: Negative for hearing loss, rhinorrhea and trouble swallowing.    Eyes: Negative for discharge and visual disturbance.   Respiratory: Negative for chest tightness and wheezing.    Cardiovascular: Negative for chest pain and palpitations.   Gastrointestinal: Negative for blood in stool, constipation, diarrhea and vomiting.   Endocrine: Negative for polydipsia and polyuria.   Genitourinary: Negative for difficulty urinating, dysuria, hematuria and menstrual problem.   Musculoskeletal: Positive for arthralgias. Negative for joint swelling and neck pain.   Neurological: Negative for weakness and headaches.   Psychiatric/Behavioral: Negative for confusion and dysphoric mood.     Past medical history, surgical history, and family medical history reviewed and updated as appropriate.    Medications and allergies reviewed.     Objective:     Vitals:    06/12/20 0828 06/12/20 0910   BP: (!) 144/60 (!)  "130/56   BP Location: Right arm    Patient Position: Sitting    BP Method: Medium (Manual)    Pulse: 72    Resp: 16    Temp: 97.3 °F (36.3 °C)    TempSrc: Skin    SpO2: 97%    Weight: 54.6 kg (120 lb 5.9 oz)    Height: 5' 5.5" (1.664 m)      Body mass index is 19.73 kg/m².  Physical Exam   Constitutional: She is oriented to person, place, and time. She appears well-developed and well-nourished. No distress.   HENT:   Head: Normocephalic and atraumatic.   Right Ear: External ear normal.   Left Ear: External ear normal.   Mouth/Throat: Oropharynx is clear and moist. No oropharyngeal exudate.   B/l hearing aids   Eyes: Conjunctivae are normal. Right eye exhibits no discharge. Left eye exhibits no discharge.   Neck: Normal range of motion. Neck supple.   Cardiovascular: Normal rate and regular rhythm. Exam reveals no friction rub.   No murmur heard.  Pulmonary/Chest: Effort normal and breath sounds normal.   Abdominal: Soft. Bowel sounds are normal. She exhibits no distension. There is no tenderness. There is no guarding.   Musculoskeletal: Normal range of motion. She exhibits no edema.   Neurological: She is alert and oriented to person, place, and time.   Skin: Skin is warm and dry.   Psychiatric: She has a normal mood and affect. Her behavior is normal.   Vitals reviewed.      Lab Results   Component Value Date    WBC 5.45 05/13/2020    HGB 14.1 05/13/2020    HCT 42.5 05/13/2020     05/13/2020    CHOL 217 (H) 11/12/2019    TRIG 87 11/12/2019    HDL 81 (H) 11/12/2019    ALT 20 05/13/2020    AST 25 05/13/2020     05/13/2020    K 3.9 05/13/2020     05/13/2020    CREATININE 0.8 05/13/2020    BUN 16 05/13/2020    CO2 29 05/13/2020    TSH 1.505 01/17/2020       Assessment:     1. Encounter for preventive health examination    2. Hypothyroidism, unspecified type    3. Hyperlipidemia, unspecified hyperlipidemia type    4. Vitamin D deficiency    5. Hyperparathyroidism    6. Screening for colorectal cancer  "   7. Routine eye exam    8. Vertigo      Plan:   Olamide was seen today for annual exam.    Diagnoses and all orders for this visit:    Encounter for preventive health examination        - Labs ordered.     Hypothyroidism, unspecified type  -     Comprehensive metabolic panel; Future  -     CBC auto differential; Future  -     TSH; Future  -     Urinalysis; Future    Hyperlipidemia, unspecified hyperlipidemia type  -     Lipid Panel; Future    Vitamin D deficiency  -     Vitamin D; Future    Hyperparathyroidism  -     PTH, intact; Future    Screening for colorectal cancer  -     Fecal Immunochemical Test (iFOBT); Future    Routine eye exam  -     Ambulatory referral/consult to Optometry; Future    Vertigo         - Meclizine prn    Other orders  -     varicella-zoster [id number], PF, (SHINGRIX, PF,) 50 mcg/0.5 mL injection; Inject 0.5 mLs into the muscle once. for 1 dose      RTC  1 year and prn    Abiola Campbell MD  Internal Medicine    6/12/2020

## 2020-06-15 ENCOUNTER — LAB VISIT (OUTPATIENT)
Dept: LAB | Facility: HOSPITAL | Age: 85
End: 2020-06-15
Attending: HOSPITALIST
Payer: MEDICARE

## 2020-06-15 DIAGNOSIS — Z12.12 SCREENING FOR COLORECTAL CANCER: ICD-10-CM

## 2020-06-15 DIAGNOSIS — Z12.11 SCREENING FOR COLORECTAL CANCER: ICD-10-CM

## 2020-06-15 PROCEDURE — 82274 ASSAY TEST FOR BLOOD FECAL: CPT | Mod: HCNC

## 2020-06-25 ENCOUNTER — TELEPHONE (OUTPATIENT)
Dept: INTERNAL MEDICINE | Facility: CLINIC | Age: 85
End: 2020-06-25

## 2020-06-25 LAB — HEMOCCULT STL QL IA: NEGATIVE

## 2020-06-25 NOTE — TELEPHONE ENCOUNTER
----- Message from Abiola Campbell MD sent at 6/25/2020  4:24 PM CDT -----  Please notify pt that FIT kit is negative

## 2020-07-10 ENCOUNTER — OFFICE VISIT (OUTPATIENT)
Dept: OTOLARYNGOLOGY | Facility: CLINIC | Age: 85
End: 2020-07-10
Payer: MEDICARE

## 2020-07-10 ENCOUNTER — OFFICE VISIT (OUTPATIENT)
Dept: OPTOMETRY | Facility: CLINIC | Age: 85
End: 2020-07-10
Payer: MEDICARE

## 2020-07-10 ENCOUNTER — LAB VISIT (OUTPATIENT)
Dept: LAB | Facility: HOSPITAL | Age: 85
End: 2020-07-10
Attending: HOSPITALIST
Payer: MEDICARE

## 2020-07-10 VITALS
DIASTOLIC BLOOD PRESSURE: 74 MMHG | SYSTOLIC BLOOD PRESSURE: 175 MMHG | WEIGHT: 120 LBS | BODY MASS INDEX: 19.67 KG/M2 | HEART RATE: 89 BPM

## 2020-07-10 DIAGNOSIS — R42 VERTIGO: Primary | ICD-10-CM

## 2020-07-10 DIAGNOSIS — Z01.00 ROUTINE EYE EXAM: ICD-10-CM

## 2020-07-10 DIAGNOSIS — E03.9 HYPOTHYROIDISM, UNSPECIFIED TYPE: ICD-10-CM

## 2020-07-10 DIAGNOSIS — H52.203 MYOPIA WITH ASTIGMATISM AND PRESBYOPIA, BILATERAL: ICD-10-CM

## 2020-07-10 DIAGNOSIS — E55.9 VITAMIN D DEFICIENCY: ICD-10-CM

## 2020-07-10 DIAGNOSIS — Z96.1 PSEUDOPHAKIA: ICD-10-CM

## 2020-07-10 DIAGNOSIS — H52.4 MYOPIA WITH ASTIGMATISM AND PRESBYOPIA, BILATERAL: ICD-10-CM

## 2020-07-10 DIAGNOSIS — E78.5 HYPERLIPIDEMIA, UNSPECIFIED HYPERLIPIDEMIA TYPE: ICD-10-CM

## 2020-07-10 DIAGNOSIS — H10.523 ANGULAR BLEPHAROCONJUNCTIVITIS OF BOTH EYES: Primary | ICD-10-CM

## 2020-07-10 DIAGNOSIS — H52.13 MYOPIA WITH ASTIGMATISM AND PRESBYOPIA, BILATERAL: ICD-10-CM

## 2020-07-10 DIAGNOSIS — H90.3 SENSORINEURAL HEARING LOSS, BILATERAL: ICD-10-CM

## 2020-07-10 DIAGNOSIS — E21.3 HYPERPARATHYROIDISM: ICD-10-CM

## 2020-07-10 LAB
25(OH)D3+25(OH)D2 SERPL-MCNC: 31 NG/ML (ref 30–96)
ALBUMIN SERPL BCP-MCNC: 4.1 G/DL (ref 3.5–5.2)
ALP SERPL-CCNC: 59 U/L (ref 55–135)
ALT SERPL W/O P-5'-P-CCNC: 17 U/L (ref 10–44)
ANION GAP SERPL CALC-SCNC: 8 MMOL/L (ref 8–16)
AST SERPL-CCNC: 28 U/L (ref 10–40)
BASOPHILS # BLD AUTO: 0.03 K/UL (ref 0–0.2)
BASOPHILS NFR BLD: 0.5 % (ref 0–1.9)
BILIRUB SERPL-MCNC: 0.7 MG/DL (ref 0.1–1)
BUN SERPL-MCNC: 15 MG/DL (ref 8–23)
CALCIUM SERPL-MCNC: 9.7 MG/DL (ref 8.7–10.5)
CHLORIDE SERPL-SCNC: 108 MMOL/L (ref 95–110)
CHOLEST SERPL-MCNC: 195 MG/DL (ref 120–199)
CHOLEST/HDLC SERPL: 2.2 {RATIO} (ref 2–5)
CO2 SERPL-SCNC: 25 MMOL/L (ref 23–29)
CREAT SERPL-MCNC: 0.8 MG/DL (ref 0.5–1.4)
DIFFERENTIAL METHOD: ABNORMAL
EOSINOPHIL # BLD AUTO: 0.2 K/UL (ref 0–0.5)
EOSINOPHIL NFR BLD: 2.9 % (ref 0–8)
ERYTHROCYTE [DISTWIDTH] IN BLOOD BY AUTOMATED COUNT: 12.9 % (ref 11.5–14.5)
EST. GFR  (AFRICAN AMERICAN): >60 ML/MIN/1.73 M^2
EST. GFR  (NON AFRICAN AMERICAN): >60 ML/MIN/1.73 M^2
GLUCOSE SERPL-MCNC: 91 MG/DL (ref 70–110)
HCT VFR BLD AUTO: 40.5 % (ref 37–48.5)
HDLC SERPL-MCNC: 87 MG/DL (ref 40–75)
HDLC SERPL: 44.6 % (ref 20–50)
HGB BLD-MCNC: 13.1 G/DL (ref 12–16)
IMM GRANULOCYTES # BLD AUTO: 0.02 K/UL (ref 0–0.04)
IMM GRANULOCYTES NFR BLD AUTO: 0.3 % (ref 0–0.5)
LDLC SERPL CALC-MCNC: 92.2 MG/DL (ref 63–159)
LYMPHOCYTES # BLD AUTO: 1.3 K/UL (ref 1–4.8)
LYMPHOCYTES NFR BLD: 21.3 % (ref 18–48)
MCH RBC QN AUTO: 32.1 PG (ref 27–31)
MCHC RBC AUTO-ENTMCNC: 32.3 G/DL (ref 32–36)
MCV RBC AUTO: 99 FL (ref 82–98)
MONOCYTES # BLD AUTO: 0.5 K/UL (ref 0.3–1)
MONOCYTES NFR BLD: 8 % (ref 4–15)
NEUTROPHILS # BLD AUTO: 4.2 K/UL (ref 1.8–7.7)
NEUTROPHILS NFR BLD: 67 % (ref 38–73)
NONHDLC SERPL-MCNC: 108 MG/DL
NRBC BLD-RTO: 0 /100 WBC
PLATELET # BLD AUTO: 226 K/UL (ref 150–350)
PMV BLD AUTO: 11.2 FL (ref 9.2–12.9)
POTASSIUM SERPL-SCNC: 5 MMOL/L (ref 3.5–5.1)
PROT SERPL-MCNC: 6.7 G/DL (ref 6–8.4)
PTH-INTACT SERPL-MCNC: 90 PG/ML (ref 9–77)
RBC # BLD AUTO: 4.08 M/UL (ref 4–5.4)
SODIUM SERPL-SCNC: 141 MMOL/L (ref 136–145)
TRIGL SERPL-MCNC: 79 MG/DL (ref 30–150)
TSH SERPL DL<=0.005 MIU/L-ACNC: 1.61 UIU/ML (ref 0.4–4)
WBC # BLD AUTO: 6.25 K/UL (ref 3.9–12.7)

## 2020-07-10 PROCEDURE — 99999 PR PBB SHADOW E&M-EST. PATIENT-LVL IV: CPT | Mod: PBBFAC,HCNC,, | Performed by: OPTOMETRIST

## 2020-07-10 PROCEDURE — 80053 COMPREHEN METABOLIC PANEL: CPT | Mod: HCNC

## 2020-07-10 PROCEDURE — 99999 PR PBB SHADOW E&M-EST. PATIENT-LVL III: CPT | Mod: PBBFAC,HCNC,, | Performed by: OTOLARYNGOLOGY

## 2020-07-10 PROCEDURE — 92004 COMPRE OPH EXAM NEW PT 1/>: CPT | Mod: HCNC,S$GLB,, | Performed by: OPTOMETRIST

## 2020-07-10 PROCEDURE — 99213 OFFICE O/P EST LOW 20 MIN: CPT | Mod: HCNC,S$GLB,, | Performed by: OTOLARYNGOLOGY

## 2020-07-10 PROCEDURE — 1159F MED LIST DOCD IN RCRD: CPT | Mod: HCNC,S$GLB,, | Performed by: OTOLARYNGOLOGY

## 2020-07-10 PROCEDURE — 92015 DETERMINE REFRACTIVE STATE: CPT | Mod: HCNC,S$GLB,, | Performed by: OPTOMETRIST

## 2020-07-10 PROCEDURE — 84443 ASSAY THYROID STIM HORMONE: CPT | Mod: HCNC

## 2020-07-10 PROCEDURE — 99999 PR PBB SHADOW E&M-EST. PATIENT-LVL III: ICD-10-PCS | Mod: PBBFAC,HCNC,, | Performed by: OTOLARYNGOLOGY

## 2020-07-10 PROCEDURE — 99999 PR PBB SHADOW E&M-EST. PATIENT-LVL IV: ICD-10-PCS | Mod: PBBFAC,HCNC,, | Performed by: OPTOMETRIST

## 2020-07-10 PROCEDURE — 82306 VITAMIN D 25 HYDROXY: CPT | Mod: HCNC

## 2020-07-10 PROCEDURE — 83970 ASSAY OF PARATHORMONE: CPT | Mod: HCNC

## 2020-07-10 PROCEDURE — 92004 PR EYE EXAM, NEW PATIENT,COMPREHESV: ICD-10-PCS | Mod: HCNC,S$GLB,, | Performed by: OPTOMETRIST

## 2020-07-10 PROCEDURE — 1159F PR MEDICATION LIST DOCUMENTED IN MEDICAL RECORD: ICD-10-PCS | Mod: HCNC,S$GLB,, | Performed by: OTOLARYNGOLOGY

## 2020-07-10 PROCEDURE — 85025 COMPLETE CBC W/AUTO DIFF WBC: CPT | Mod: HCNC

## 2020-07-10 PROCEDURE — 1101F PT FALLS ASSESS-DOCD LE1/YR: CPT | Mod: HCNC,CPTII,S$GLB, | Performed by: OTOLARYNGOLOGY

## 2020-07-10 PROCEDURE — 92015 PR REFRACTION: ICD-10-PCS | Mod: HCNC,S$GLB,, | Performed by: OPTOMETRIST

## 2020-07-10 PROCEDURE — 1101F PR PT FALLS ASSESS DOC 0-1 FALLS W/OUT INJ PAST YR: ICD-10-PCS | Mod: HCNC,CPTII,S$GLB, | Performed by: OTOLARYNGOLOGY

## 2020-07-10 PROCEDURE — 99213 PR OFFICE/OUTPT VISIT, EST, LEVL III, 20-29 MIN: ICD-10-PCS | Mod: HCNC,S$GLB,, | Performed by: OTOLARYNGOLOGY

## 2020-07-10 PROCEDURE — 36415 COLL VENOUS BLD VENIPUNCTURE: CPT | Mod: HCNC,PO

## 2020-07-10 PROCEDURE — 80061 LIPID PANEL: CPT | Mod: HCNC

## 2020-07-10 RX ORDER — TOBRAMYCIN 3 MG/ML
1 SOLUTION/ DROPS OPHTHALMIC 4 TIMES DAILY
Qty: 5 ML | Refills: 0 | Status: SHIPPED | OUTPATIENT
Start: 2020-07-10 | End: 2020-07-23

## 2020-07-10 NOTE — PROGRESS NOTES
HPI     IDA:2019  Glasses? Yes   Contacts? no  H/o eye surgery, injections or laser: pt states that she doesn't remember   having any procedures done   H/o eye injury: no  Known eye conditions? no  Family h/o eye conditions? no  Eye gtts?no    (-) Flashes (-) Floaters (+) Mucous hard crust in the am   (-) Tearing (-) Itching (-) Burning   (-) Headaches (-) Eye Pain/discomfort (-) Irritation   (-) Redness (-) Double vision (-) Blurry vision    Diabetic? (-)  A1c?  (No results found for: HGBA1C)        Last edited by Britta Jones on 7/10/2020  8:10 AM. (History)            Assessment /Plan     For exam results, see Encounter Report.    Angular blepharoconjunctivitis of both eyes  -     tobramycin sulfate 0.3% (TOBREX) 0.3 % ophthalmic solution; Place 1 drop into both eyes 4 (four) times daily. for 7 days  Dispense: 5 mL; Refill: 0    Routine eye exam  -     Ambulatory referral/consult to Optometry    Pseudophakia    Myopia with astigmatism and presbyopia, bilateral    1. Rx Tobrex QId OU x 7 days. Rec lid scrubs BId OU.   3. Good result.   4. SRx released to patient. Patient educated on lens options. Normal ocular health. RTC 1 year for routine exam.     Pt is from [place]. Moved here to be closer to her daughter.

## 2020-07-10 NOTE — PATIENT INSTRUCTIONS
Self-Care    To treat the problem, keep your eyelids clean.      To use an eyelid scrub:  1. Wash your hands with soap and warm water.  2. Use a ready-made eyelid scrub. Or mix 3 drops of baby shampoo in 1/4 cup of warm water.  3. Dip a lint-free pad, cotton swab, or clean washcloth in the scrub.  4. Close one eye and gently scrub the base of the eyelid.  5. Rinse the lid in cool water and dry with a clean towel.  6. Repeat on your other eye.  Date Last Reviewed: 6/6/2015  © 6455-5104 [employer]. [address] [place] [address], [place] [address]. All rights reserved. This information is not intended as a substitute for professional medical care. Always follow your healthcare professional's instructions.

## 2020-07-10 NOTE — LETTER
July 10, 2020      Abiola Campbell MD  [address] [facility]  [place] [address]           [place] [facility]  [address] [facility].  [place] [address]  Phone: [phone]  Fax: [phone]          Patient: Olamide Felipe   MR Number: [id number]   YOB: 1933   Date of Visit: 7/10/2020       Dear Dr. Abiola Campbell:    Thank you for referring Olamide Felipe to me for evaluation. Attached you will find relevant portions of my assessment and plan of care.    If you have questions, please do not hesitate to call me. I look forward to following Olamide Felipe along with you.    Sincerely,    Aiden Felipe, OD    Enclosure  CC:  No Recipients    If you would like to receive this communication electronically, please contact [email or web address] or [phone] to request more information on [email or web address] Link access.    For providers and/or their staff who would like to refer a patient to [facility], please contact us through our one-stop-shop provider referral line, [facility], at [phone].    If you feel you have received this communication in error or would no longer like to receive these types of communications, please e-mail [email or web address]

## 2020-07-11 NOTE — PROGRESS NOTES
Subjective:       Patient ID: Olamide Felipe is a 87 y.o. female.    Chief Complaint: Hearing Loss and Dizziness    HPI    Olamide Felipe is a 87 y.o. female  With history of SNHL presents for vertigo.  She reports 5 episodes of room spinning vertigo lasting sometimes 2 hrs in the last month.  The episodes are unprovoked by body position. She denies aural fullness, fluctuating hearing loss or tinnitus.  She does have a history of asymmetric hearing loss however.  She reports no balance problems or symptoms between attacks    Review of Systems   Constitutional: Negative for chills and fever.   HENT: Negative for sore throat and trouble swallowing.    Respiratory: Negative for apnea and chest tightness.    Cardiovascular: Negative for chest pain.   Neurological: Positive for dizziness.         Objective:      Physical Exam  Constitutional:       Appearance: She is well-developed.   HENT:      Head: Normocephalic and atraumatic.      Right Ear: Tympanic membrane, ear canal and external ear normal.      Left Ear: Tympanic membrane, ear canal and external ear normal.      Nose: Nose normal.      Mouth/Throat:      Pharynx: Uvula midline.   Neck:      Musculoskeletal: Normal range of motion.      Thyroid: No thyroid mass.         Data:      Assessment:       1. Vertigo    2. Sensorineural hearing loss, bilateral        Plan:     possible meniere's disease   recommend low salt diet and caffeine cessation   may consider dyazide if unimproved at next visit  Continue hearing aid use  F/u in 3 months

## 2020-07-15 ENCOUNTER — PATIENT MESSAGE (OUTPATIENT)
Dept: INTERNAL MEDICINE | Facility: CLINIC | Age: 85
End: 2020-07-15

## 2020-07-17 ENCOUNTER — CLINICAL SUPPORT (OUTPATIENT)
Dept: AUDIOLOGY | Facility: CLINIC | Age: 85
End: 2020-07-17
Payer: MEDICARE

## 2020-07-17 DIAGNOSIS — H90.3 SENSORINEURAL HEARING LOSS, BILATERAL: Primary | ICD-10-CM

## 2020-07-17 PROCEDURE — 99499 UNLISTED E&M SERVICE: CPT | Mod: HCNC,S$GLB,, | Performed by: AUDIOLOGIST

## 2020-07-17 PROCEDURE — 99499 NO LOS: ICD-10-PCS | Mod: HCNC,S$GLB,, | Performed by: AUDIOLOGIST

## 2020-07-17 NOTE — PROGRESS NOTES
Mrs. Felipe was seen for a HAFU. We increased overall gain 3 dB, AU. Ran feedback test. Patient was satisfied with the volume and sound quality of the device at today's appointment. She was given a new carrying case. She was encouraged to call or message if she has any issues.

## 2020-08-31 RX ORDER — OMEPRAZOLE 20 MG/1
20 CAPSULE, DELAYED RELEASE ORAL DAILY
Qty: 90 CAPSULE | Refills: 3 | Status: SHIPPED | OUTPATIENT
Start: 2020-08-31 | End: 2021-11-15 | Stop reason: SDUPTHER

## 2020-09-30 ENCOUNTER — PATIENT MESSAGE (OUTPATIENT)
Dept: OTOLARYNGOLOGY | Facility: CLINIC | Age: 85
End: 2020-09-30

## 2020-10-02 RX ORDER — LEVOTHYROXINE SODIUM 25 UG/1
25 TABLET ORAL DAILY
Qty: 90 TABLET | Refills: 3 | Status: SHIPPED | OUTPATIENT
Start: 2020-10-02 | End: 2021-11-30

## 2020-10-02 RX ORDER — FLUOXETINE HYDROCHLORIDE 20 MG/1
20 CAPSULE ORAL DAILY
Qty: 90 CAPSULE | Refills: 3 | Status: SHIPPED | OUTPATIENT
Start: 2020-10-02 | End: 2021-11-15 | Stop reason: SDUPTHER

## 2020-11-24 RX ORDER — SIMVASTATIN 20 MG/1
20 TABLET, FILM COATED ORAL NIGHTLY
Qty: 90 TABLET | Refills: 3 | Status: SHIPPED | OUTPATIENT
Start: 2020-11-24 | End: 2021-06-21

## 2020-12-08 ENCOUNTER — PES CALL (OUTPATIENT)
Dept: ADMINISTRATIVE | Facility: CLINIC | Age: 85
End: 2020-12-08

## 2021-01-25 ENCOUNTER — OFFICE VISIT (OUTPATIENT)
Dept: INTERNAL MEDICINE | Facility: CLINIC | Age: 86
End: 2021-01-25
Payer: MEDICARE

## 2021-01-25 VITALS
HEART RATE: 79 BPM | SYSTOLIC BLOOD PRESSURE: 132 MMHG | HEIGHT: 65 IN | WEIGHT: 125.25 LBS | DIASTOLIC BLOOD PRESSURE: 62 MMHG | BODY MASS INDEX: 20.87 KG/M2 | OXYGEN SATURATION: 99 %

## 2021-01-25 DIAGNOSIS — Z00.00 ENCOUNTER FOR PREVENTIVE HEALTH EXAMINATION: Primary | ICD-10-CM

## 2021-01-25 DIAGNOSIS — E21.3 HYPERPARATHYROIDISM: ICD-10-CM

## 2021-01-25 DIAGNOSIS — I70.0 AORTIC ATHEROSCLEROSIS: ICD-10-CM

## 2021-01-25 DIAGNOSIS — F39 MOOD DISORDER: ICD-10-CM

## 2021-01-25 DIAGNOSIS — E03.9 HYPOTHYROIDISM, UNSPECIFIED TYPE: ICD-10-CM

## 2021-01-25 DIAGNOSIS — E78.5 HYPERLIPIDEMIA, UNSPECIFIED HYPERLIPIDEMIA TYPE: ICD-10-CM

## 2021-01-25 PROCEDURE — 99999 PR PBB SHADOW E&M-EST. PATIENT-LVL V: CPT | Mod: PBBFAC,HCNC,, | Performed by: NURSE PRACTITIONER

## 2021-01-25 PROCEDURE — G0439 PR MEDICARE ANNUAL WELLNESS SUBSEQUENT VISIT: ICD-10-PCS | Mod: HCNC,S$GLB,, | Performed by: NURSE PRACTITIONER

## 2021-01-25 PROCEDURE — 1100F PR PT FALLS ASSESS DOC 2+ FALLS/FALL W/INJURY/YR: ICD-10-PCS | Mod: HCNC,CPTII,S$GLB, | Performed by: NURSE PRACTITIONER

## 2021-01-25 PROCEDURE — 1100F PTFALLS ASSESS-DOCD GE2>/YR: CPT | Mod: HCNC,CPTII,S$GLB, | Performed by: NURSE PRACTITIONER

## 2021-01-25 PROCEDURE — 3288F PR FALLS RISK ASSESSMENT DOCUMENTED: ICD-10-PCS | Mod: HCNC,CPTII,S$GLB, | Performed by: NURSE PRACTITIONER

## 2021-01-25 PROCEDURE — 99499 RISK ADDL DX/OHS AUDIT: ICD-10-PCS | Mod: HCNC,S$GLB,, | Performed by: NURSE PRACTITIONER

## 2021-01-25 PROCEDURE — 99999 PR PBB SHADOW E&M-EST. PATIENT-LVL V: ICD-10-PCS | Mod: PBBFAC,HCNC,, | Performed by: NURSE PRACTITIONER

## 2021-01-25 PROCEDURE — 99499 UNLISTED E&M SERVICE: CPT | Mod: HCNC,S$GLB,, | Performed by: NURSE PRACTITIONER

## 2021-01-25 PROCEDURE — 1126F AMNT PAIN NOTED NONE PRSNT: CPT | Mod: HCNC,S$GLB,, | Performed by: NURSE PRACTITIONER

## 2021-01-25 PROCEDURE — G0439 PPPS, SUBSEQ VISIT: HCPCS | Mod: HCNC,S$GLB,, | Performed by: NURSE PRACTITIONER

## 2021-01-25 PROCEDURE — 1126F PR PAIN SEVERITY QUANTIFIED, NO PAIN PRESENT: ICD-10-PCS | Mod: HCNC,S$GLB,, | Performed by: NURSE PRACTITIONER

## 2021-01-25 PROCEDURE — 3288F FALL RISK ASSESSMENT DOCD: CPT | Mod: HCNC,CPTII,S$GLB, | Performed by: NURSE PRACTITIONER

## 2021-04-07 ENCOUNTER — PATIENT MESSAGE (OUTPATIENT)
Dept: INTERNAL MEDICINE | Facility: CLINIC | Age: 86
End: 2021-04-07

## 2021-04-08 ENCOUNTER — HOSPITAL ENCOUNTER (OUTPATIENT)
Dept: RADIOLOGY | Facility: HOSPITAL | Age: 86
Discharge: HOME | End: 2021-04-08
Attending: INTERNAL MEDICINE
Payer: MEDICARE

## 2021-04-08 ENCOUNTER — OFFICE VISIT (OUTPATIENT)
Dept: INTERNAL MEDICINE | Facility: CLINIC | Age: 86
End: 2021-04-08
Payer: MEDICARE

## 2021-04-08 VITALS
WEIGHT: 123.88 LBS | RESPIRATION RATE: 16 BRPM | DIASTOLIC BLOOD PRESSURE: 72 MMHG | BODY MASS INDEX: 21.15 KG/M2 | HEIGHT: 64 IN | OXYGEN SATURATION: 95 % | SYSTOLIC BLOOD PRESSURE: 124 MMHG | HEART RATE: 84 BPM | TEMPERATURE: 97 F

## 2021-04-08 DIAGNOSIS — R51.9 NONINTRACTABLE HEADACHE, UNSPECIFIED CHRONICITY PATTERN, UNSPECIFIED HEADACHE TYPE: ICD-10-CM

## 2021-04-08 DIAGNOSIS — M54.2 CERVICALGIA: ICD-10-CM

## 2021-04-08 PROCEDURE — 72050 X-RAY EXAM NECK SPINE 4/5VWS: CPT | Mod: TC,PO

## 2021-04-08 PROCEDURE — 1125F AMNT PAIN NOTED PAIN PRSNT: CPT | Mod: S$GLB,,, | Performed by: INTERNAL MEDICINE

## 2021-04-08 PROCEDURE — 70450 CT HEAD/BRAIN W/O DYE: CPT | Mod: 26,,, | Performed by: RADIOLOGY

## 2021-04-08 PROCEDURE — 72125 CT CERVICAL SPINE WITHOUT CONTRAST: ICD-10-PCS | Mod: 26,,, | Performed by: RADIOLOGY

## 2021-04-08 PROCEDURE — 72125 CT NECK SPINE W/O DYE: CPT | Mod: 26,,, | Performed by: RADIOLOGY

## 2021-04-08 PROCEDURE — 99999 PR PBB SHADOW E&M-EST. PATIENT-LVL IV: CPT | Mod: PBBFAC,,, | Performed by: INTERNAL MEDICINE

## 2021-04-08 PROCEDURE — 72125 CT NECK SPINE W/O DYE: CPT | Mod: TC

## 2021-04-08 PROCEDURE — 1125F PR PAIN SEVERITY QUANTIFIED, PAIN PRESENT: ICD-10-PCS | Mod: S$GLB,,, | Performed by: INTERNAL MEDICINE

## 2021-04-08 PROCEDURE — 1101F PT FALLS ASSESS-DOCD LE1/YR: CPT | Mod: CPTII,S$GLB,, | Performed by: INTERNAL MEDICINE

## 2021-04-08 PROCEDURE — 1101F PR PT FALLS ASSESS DOC 0-1 FALLS W/OUT INJ PAST YR: ICD-10-PCS | Mod: CPTII,S$GLB,, | Performed by: INTERNAL MEDICINE

## 2021-04-08 PROCEDURE — 70450 CT HEAD WITHOUT CONTRAST: ICD-10-PCS | Mod: 26,,, | Performed by: RADIOLOGY

## 2021-04-08 PROCEDURE — 70450 CT HEAD/BRAIN W/O DYE: CPT | Mod: TC

## 2021-04-08 PROCEDURE — 3288F FALL RISK ASSESSMENT DOCD: CPT | Mod: CPTII,S$GLB,, | Performed by: INTERNAL MEDICINE

## 2021-04-08 PROCEDURE — 99214 PR OFFICE/OUTPT VISIT, EST, LEVL IV, 30-39 MIN: ICD-10-PCS | Mod: S$GLB,,, | Performed by: INTERNAL MEDICINE

## 2021-04-08 PROCEDURE — 72050 X-RAY EXAM NECK SPINE 4/5VWS: CPT | Mod: 26,,, | Performed by: RADIOLOGY

## 2021-04-08 PROCEDURE — 1159F PR MEDICATION LIST DOCUMENTED IN MEDICAL RECORD: ICD-10-PCS | Mod: S$GLB,,, | Performed by: INTERNAL MEDICINE

## 2021-04-08 PROCEDURE — 72050 XR CERVICAL SPINE COMPLETE 5 VIEW: ICD-10-PCS | Mod: 26,,, | Performed by: RADIOLOGY

## 2021-04-08 PROCEDURE — 99214 OFFICE O/P EST MOD 30 MIN: CPT | Mod: S$GLB,,, | Performed by: INTERNAL MEDICINE

## 2021-04-08 PROCEDURE — 1159F MED LIST DOCD IN RCRD: CPT | Mod: S$GLB,,, | Performed by: INTERNAL MEDICINE

## 2021-04-08 PROCEDURE — 99999 PR PBB SHADOW E&M-EST. PATIENT-LVL IV: ICD-10-PCS | Mod: PBBFAC,,, | Performed by: INTERNAL MEDICINE

## 2021-04-08 PROCEDURE — 3288F PR FALLS RISK ASSESSMENT DOCUMENTED: ICD-10-PCS | Mod: CPTII,S$GLB,, | Performed by: INTERNAL MEDICINE

## 2021-04-12 ENCOUNTER — PATIENT MESSAGE (OUTPATIENT)
Dept: INTERNAL MEDICINE | Facility: CLINIC | Age: 86
End: 2021-04-12

## 2021-04-14 ENCOUNTER — PATIENT MESSAGE (OUTPATIENT)
Dept: INTERNAL MEDICINE | Facility: CLINIC | Age: 86
End: 2021-04-14

## 2021-04-14 DIAGNOSIS — M54.2 NECK PAIN: Primary | ICD-10-CM

## 2021-04-14 DIAGNOSIS — K21.9 GASTROESOPHAGEAL REFLUX DISEASE, UNSPECIFIED WHETHER ESOPHAGITIS PRESENT: ICD-10-CM

## 2021-04-15 ENCOUNTER — PATIENT MESSAGE (OUTPATIENT)
Dept: INTERNAL MEDICINE | Facility: CLINIC | Age: 86
End: 2021-04-15

## 2021-04-27 ENCOUNTER — PATIENT OUTREACH (OUTPATIENT)
Dept: ADMINISTRATIVE | Facility: OTHER | Age: 86
End: 2021-04-27

## 2021-04-28 ENCOUNTER — PATIENT MESSAGE (OUTPATIENT)
Dept: ORTHOPEDICS | Facility: CLINIC | Age: 86
End: 2021-04-28

## 2021-04-28 ENCOUNTER — OFFICE VISIT (OUTPATIENT)
Dept: ORTHOPEDICS | Facility: CLINIC | Age: 86
End: 2021-04-28
Payer: MEDICARE

## 2021-04-28 ENCOUNTER — HOSPITAL ENCOUNTER (OUTPATIENT)
Dept: RADIOLOGY | Facility: HOSPITAL | Age: 86
Discharge: HOME | End: 2021-04-28
Attending: ORTHOPAEDIC SURGERY
Payer: MEDICARE

## 2021-04-28 ENCOUNTER — CLINICAL SUPPORT (OUTPATIENT)
Dept: AUDIOLOGY | Facility: CLINIC | Age: 86
End: 2021-04-28
Payer: MEDICARE

## 2021-04-28 VITALS — BODY MASS INDEX: 20.92 KG/M2 | WEIGHT: 122.56 LBS | HEIGHT: 64 IN

## 2021-04-28 DIAGNOSIS — M54.2 NECK PAIN: ICD-10-CM

## 2021-04-28 DIAGNOSIS — H90.3 SENSORINEURAL HEARING LOSS, BILATERAL: Primary | ICD-10-CM

## 2021-04-28 PROCEDURE — 3288F PR FALLS RISK ASSESSMENT DOCUMENTED: ICD-10-PCS | Mod: CPTII,S$GLB,, | Performed by: ORTHOPAEDIC SURGERY

## 2021-04-28 PROCEDURE — 72040 X-RAY EXAM NECK SPINE 2-3 VW: CPT | Mod: TC

## 2021-04-28 PROCEDURE — 3288F FALL RISK ASSESSMENT DOCD: CPT | Mod: CPTII,S$GLB,, | Performed by: ORTHOPAEDIC SURGERY

## 2021-04-28 PROCEDURE — 72040 XR CERVICAL SPINE FLEXION  AND EXTENSION ONLY: ICD-10-PCS | Mod: 26,,, | Performed by: RADIOLOGY

## 2021-04-28 PROCEDURE — 99499 UNLISTED E&M SERVICE: CPT | Mod: S$GLB,,, | Performed by: AUDIOLOGIST

## 2021-04-28 PROCEDURE — 99999 PR PBB SHADOW E&M-EST. PATIENT-LVL III: CPT | Mod: PBBFAC,,, | Performed by: ORTHOPAEDIC SURGERY

## 2021-04-28 PROCEDURE — 1101F PR PT FALLS ASSESS DOC 0-1 FALLS W/OUT INJ PAST YR: ICD-10-PCS | Mod: CPTII,S$GLB,, | Performed by: ORTHOPAEDIC SURGERY

## 2021-04-28 PROCEDURE — 99204 OFFICE O/P NEW MOD 45 MIN: CPT | Mod: S$GLB,,, | Performed by: ORTHOPAEDIC SURGERY

## 2021-04-28 PROCEDURE — 1125F PR PAIN SEVERITY QUANTIFIED, PAIN PRESENT: ICD-10-PCS | Mod: S$GLB,,, | Performed by: ORTHOPAEDIC SURGERY

## 2021-04-28 PROCEDURE — 1159F MED LIST DOCD IN RCRD: CPT | Mod: S$GLB,,, | Performed by: ORTHOPAEDIC SURGERY

## 2021-04-28 PROCEDURE — 1125F AMNT PAIN NOTED PAIN PRSNT: CPT | Mod: S$GLB,,, | Performed by: ORTHOPAEDIC SURGERY

## 2021-04-28 PROCEDURE — 1159F PR MEDICATION LIST DOCUMENTED IN MEDICAL RECORD: ICD-10-PCS | Mod: S$GLB,,, | Performed by: ORTHOPAEDIC SURGERY

## 2021-04-28 PROCEDURE — 99204 PR OFFICE/OUTPT VISIT, NEW, LEVL IV, 45-59 MIN: ICD-10-PCS | Mod: S$GLB,,, | Performed by: ORTHOPAEDIC SURGERY

## 2021-04-28 PROCEDURE — 99999 PR PBB SHADOW E&M-EST. PATIENT-LVL III: ICD-10-PCS | Mod: PBBFAC,,, | Performed by: ORTHOPAEDIC SURGERY

## 2021-04-28 PROCEDURE — 99499 NO LOS: ICD-10-PCS | Mod: S$GLB,,, | Performed by: AUDIOLOGIST

## 2021-04-28 PROCEDURE — 1101F PT FALLS ASSESS-DOCD LE1/YR: CPT | Mod: CPTII,S$GLB,, | Performed by: ORTHOPAEDIC SURGERY

## 2021-04-28 PROCEDURE — 72040 X-RAY EXAM NECK SPINE 2-3 VW: CPT | Mod: 26,,, | Performed by: RADIOLOGY

## 2021-04-30 ENCOUNTER — PATIENT MESSAGE (OUTPATIENT)
Dept: ORTHOPEDICS | Facility: CLINIC | Age: 86
End: 2021-04-30

## 2021-04-30 ENCOUNTER — PATIENT MESSAGE (OUTPATIENT)
Dept: GASTROENTEROLOGY | Facility: CLINIC | Age: 86
End: 2021-04-30

## 2021-04-30 ENCOUNTER — OFFICE VISIT (OUTPATIENT)
Dept: GASTROENTEROLOGY | Facility: CLINIC | Age: 86
End: 2021-04-30
Payer: MEDICARE

## 2021-04-30 VITALS — HEIGHT: 64 IN | BODY MASS INDEX: 20.85 KG/M2 | WEIGHT: 122.13 LBS

## 2021-04-30 DIAGNOSIS — K21.9 GASTROESOPHAGEAL REFLUX DISEASE, UNSPECIFIED WHETHER ESOPHAGITIS PRESENT: ICD-10-CM

## 2021-04-30 DIAGNOSIS — R93.3 ABNORMAL CT SCAN, ESOPHAGUS: Primary | ICD-10-CM

## 2021-04-30 PROCEDURE — 1100F PTFALLS ASSESS-DOCD GE2>/YR: CPT | Mod: CPTII,S$GLB,, | Performed by: INTERNAL MEDICINE

## 2021-04-30 PROCEDURE — 1126F AMNT PAIN NOTED NONE PRSNT: CPT | Mod: S$GLB,,, | Performed by: INTERNAL MEDICINE

## 2021-04-30 PROCEDURE — 3288F PR FALLS RISK ASSESSMENT DOCUMENTED: ICD-10-PCS | Mod: CPTII,S$GLB,, | Performed by: INTERNAL MEDICINE

## 2021-04-30 PROCEDURE — 1100F PR PT FALLS ASSESS DOC 2+ FALLS/FALL W/INJURY/YR: ICD-10-PCS | Mod: CPTII,S$GLB,, | Performed by: INTERNAL MEDICINE

## 2021-04-30 PROCEDURE — 3288F FALL RISK ASSESSMENT DOCD: CPT | Mod: CPTII,S$GLB,, | Performed by: INTERNAL MEDICINE

## 2021-04-30 PROCEDURE — 99999 PR PBB SHADOW E&M-EST. PATIENT-LVL III: CPT | Mod: PBBFAC,,, | Performed by: INTERNAL MEDICINE

## 2021-04-30 PROCEDURE — 99999 PR PBB SHADOW E&M-EST. PATIENT-LVL III: ICD-10-PCS | Mod: PBBFAC,,, | Performed by: INTERNAL MEDICINE

## 2021-04-30 PROCEDURE — 99204 OFFICE O/P NEW MOD 45 MIN: CPT | Mod: S$GLB,,, | Performed by: INTERNAL MEDICINE

## 2021-04-30 PROCEDURE — 99204 PR OFFICE/OUTPT VISIT, NEW, LEVL IV, 45-59 MIN: ICD-10-PCS | Mod: S$GLB,,, | Performed by: INTERNAL MEDICINE

## 2021-04-30 PROCEDURE — 1126F PR PAIN SEVERITY QUANTIFIED, NO PAIN PRESENT: ICD-10-PCS | Mod: S$GLB,,, | Performed by: INTERNAL MEDICINE

## 2021-05-24 ENCOUNTER — CLINICAL SUPPORT (OUTPATIENT)
Dept: REHABILITATION | Facility: HOSPITAL | Age: 86
End: 2021-05-24
Payer: MEDICARE

## 2021-05-24 DIAGNOSIS — M54.2 NECK PAIN: ICD-10-CM

## 2021-05-24 DIAGNOSIS — M40.04 POSTURAL KYPHOSIS OF THORACIC REGION: ICD-10-CM

## 2021-05-24 PROCEDURE — 97161 PT EVAL LOW COMPLEX 20 MIN: CPT | Mod: PN

## 2021-06-03 ENCOUNTER — CLINICAL SUPPORT (OUTPATIENT)
Dept: REHABILITATION | Facility: HOSPITAL | Age: 86
End: 2021-06-03
Payer: MEDICARE

## 2021-06-03 DIAGNOSIS — M54.2 NECK PAIN: ICD-10-CM

## 2021-06-03 DIAGNOSIS — M40.04 POSTURAL KYPHOSIS OF THORACIC REGION: ICD-10-CM

## 2021-06-03 PROCEDURE — 97140 MANUAL THERAPY 1/> REGIONS: CPT | Mod: PN,CQ

## 2021-06-03 PROCEDURE — 97110 THERAPEUTIC EXERCISES: CPT | Mod: PN,CQ

## 2021-06-16 ENCOUNTER — CLINICAL SUPPORT (OUTPATIENT)
Dept: REHABILITATION | Facility: HOSPITAL | Age: 86
End: 2021-06-16
Payer: MEDICARE

## 2021-06-16 DIAGNOSIS — M54.2 NECK PAIN: ICD-10-CM

## 2021-06-16 DIAGNOSIS — M40.04 POSTURAL KYPHOSIS OF THORACIC REGION: ICD-10-CM

## 2021-06-16 PROCEDURE — 97140 MANUAL THERAPY 1/> REGIONS: CPT | Mod: PN,CQ

## 2021-06-16 PROCEDURE — 97110 THERAPEUTIC EXERCISES: CPT | Mod: PN,CQ

## 2021-07-07 ENCOUNTER — OFFICE VISIT (OUTPATIENT)
Dept: INTERNAL MEDICINE | Facility: CLINIC | Age: 86
End: 2021-07-07
Payer: MEDICARE

## 2021-07-07 ENCOUNTER — LAB VISIT (OUTPATIENT)
Dept: LAB | Facility: HOSPITAL | Age: 86
End: 2021-07-07
Attending: INTERNAL MEDICINE
Payer: MEDICARE

## 2021-07-07 VITALS
HEIGHT: 63 IN | WEIGHT: 124.31 LBS | TEMPERATURE: 97 F | HEART RATE: 67 BPM | SYSTOLIC BLOOD PRESSURE: 100 MMHG | BODY MASS INDEX: 22.03 KG/M2 | OXYGEN SATURATION: 98 % | DIASTOLIC BLOOD PRESSURE: 60 MMHG

## 2021-07-07 DIAGNOSIS — Z12.83 SCREENING EXAM FOR SKIN CANCER: ICD-10-CM

## 2021-07-07 DIAGNOSIS — E03.9 HYPOTHYROIDISM, UNSPECIFIED TYPE: ICD-10-CM

## 2021-07-07 DIAGNOSIS — Z12.11 SCREEN FOR COLON CANCER: ICD-10-CM

## 2021-07-07 DIAGNOSIS — Z00.00 ANNUAL PHYSICAL EXAM: Primary | ICD-10-CM

## 2021-07-07 DIAGNOSIS — I70.0 AORTIC ATHEROSCLEROSIS: ICD-10-CM

## 2021-07-07 DIAGNOSIS — Z00.00 ANNUAL PHYSICAL EXAM: ICD-10-CM

## 2021-07-07 DIAGNOSIS — Z01.00 ROUTINE EYE EXAM: ICD-10-CM

## 2021-07-07 DIAGNOSIS — E78.5 HYPERLIPIDEMIA, UNSPECIFIED HYPERLIPIDEMIA TYPE: ICD-10-CM

## 2021-07-07 DIAGNOSIS — Z12.31 VISIT FOR SCREENING MAMMOGRAM: ICD-10-CM

## 2021-07-07 DIAGNOSIS — E21.3 HYPERPARATHYROIDISM: ICD-10-CM

## 2021-07-07 LAB
25(OH)D3+25(OH)D2 SERPL-MCNC: 31 NG/ML (ref 30–96)
ALBUMIN SERPL BCP-MCNC: 4.1 G/DL (ref 3.5–5.2)
ALP SERPL-CCNC: 66 U/L (ref 55–135)
ALT SERPL W/O P-5'-P-CCNC: 19 U/L (ref 10–44)
ANION GAP SERPL CALC-SCNC: 9 MMOL/L (ref 8–16)
AST SERPL-CCNC: 26 U/L (ref 10–40)
BASOPHILS # BLD AUTO: 0.04 K/UL (ref 0–0.2)
BASOPHILS NFR BLD: 0.7 % (ref 0–1.9)
BILIRUB SERPL-MCNC: 0.9 MG/DL (ref 0.1–1)
BUN SERPL-MCNC: 18 MG/DL (ref 8–23)
CALCIUM SERPL-MCNC: 10 MG/DL (ref 8.7–10.5)
CHLORIDE SERPL-SCNC: 107 MMOL/L (ref 95–110)
CHOLEST SERPL-MCNC: 214 MG/DL (ref 120–199)
CHOLEST/HDLC SERPL: 2.6 {RATIO} (ref 2–5)
CO2 SERPL-SCNC: 25 MMOL/L (ref 23–29)
CREAT SERPL-MCNC: 0.8 MG/DL (ref 0.5–1.4)
DIFFERENTIAL METHOD: ABNORMAL
EOSINOPHIL # BLD AUTO: 0.1 K/UL (ref 0–0.5)
EOSINOPHIL NFR BLD: 2.3 % (ref 0–8)
ERYTHROCYTE [DISTWIDTH] IN BLOOD BY AUTOMATED COUNT: 12 % (ref 11.5–14.5)
EST. GFR  (AFRICAN AMERICAN): >60 ML/MIN/1.73 M^2
EST. GFR  (NON AFRICAN AMERICAN): >60 ML/MIN/1.73 M^2
GLUCOSE SERPL-MCNC: 93 MG/DL (ref 70–110)
HCT VFR BLD AUTO: 41.2 % (ref 37–48.5)
HDLC SERPL-MCNC: 81 MG/DL (ref 40–75)
HDLC SERPL: 37.9 % (ref 20–50)
HGB BLD-MCNC: 13.4 G/DL (ref 12–16)
IMM GRANULOCYTES # BLD AUTO: 0.01 K/UL (ref 0–0.04)
IMM GRANULOCYTES NFR BLD AUTO: 0.2 % (ref 0–0.5)
LDLC SERPL CALC-MCNC: 113.6 MG/DL (ref 63–159)
LYMPHOCYTES # BLD AUTO: 1.6 K/UL (ref 1–4.8)
LYMPHOCYTES NFR BLD: 26.6 % (ref 18–48)
MCH RBC QN AUTO: 32.3 PG (ref 27–31)
MCHC RBC AUTO-ENTMCNC: 32.5 G/DL (ref 32–36)
MCV RBC AUTO: 99 FL (ref 82–98)
MONOCYTES # BLD AUTO: 0.5 K/UL (ref 0.3–1)
MONOCYTES NFR BLD: 8.3 % (ref 4–15)
NEUTROPHILS # BLD AUTO: 3.7 K/UL (ref 1.8–7.7)
NEUTROPHILS NFR BLD: 61.9 % (ref 38–73)
NONHDLC SERPL-MCNC: 133 MG/DL
NRBC BLD-RTO: 0 /100 WBC
PLATELET # BLD AUTO: 281 K/UL (ref 150–450)
PMV BLD AUTO: 10.9 FL (ref 9.2–12.9)
POTASSIUM SERPL-SCNC: 5.1 MMOL/L (ref 3.5–5.1)
PROT SERPL-MCNC: 7 G/DL (ref 6–8.4)
PTH-INTACT SERPL-MCNC: 99 PG/ML (ref 9–77)
RBC # BLD AUTO: 4.15 M/UL (ref 4–5.4)
SODIUM SERPL-SCNC: 141 MMOL/L (ref 136–145)
TRIGL SERPL-MCNC: 97 MG/DL (ref 30–150)
TSH SERPL DL<=0.005 MIU/L-ACNC: 1.8 UIU/ML (ref 0.4–4)
WBC # BLD AUTO: 6.02 K/UL (ref 3.9–12.7)

## 2021-07-07 PROCEDURE — 83970 ASSAY OF PARATHORMONE: CPT | Performed by: INTERNAL MEDICINE

## 2021-07-07 PROCEDURE — 85025 COMPLETE CBC W/AUTO DIFF WBC: CPT | Performed by: INTERNAL MEDICINE

## 2021-07-07 PROCEDURE — 1101F PT FALLS ASSESS-DOCD LE1/YR: CPT | Mod: CPTII,S$GLB,, | Performed by: INTERNAL MEDICINE

## 2021-07-07 PROCEDURE — 3288F FALL RISK ASSESSMENT DOCD: CPT | Mod: CPTII,S$GLB,, | Performed by: INTERNAL MEDICINE

## 2021-07-07 PROCEDURE — 90732 PNEUMOCOCCAL POLYSACCHARIDE VACCINE 23-VALENT =>2YO SQ IM: ICD-10-PCS | Mod: S$GLB,,, | Performed by: INTERNAL MEDICINE

## 2021-07-07 PROCEDURE — 36415 COLL VENOUS BLD VENIPUNCTURE: CPT | Mod: PO | Performed by: INTERNAL MEDICINE

## 2021-07-07 PROCEDURE — 99999 PR PBB SHADOW E&M-EST. PATIENT-LVL V: CPT | Mod: PBBFAC,,, | Performed by: INTERNAL MEDICINE

## 2021-07-07 PROCEDURE — 99499 UNLISTED E&M SERVICE: CPT | Mod: HCNC,S$GLB,, | Performed by: INTERNAL MEDICINE

## 2021-07-07 PROCEDURE — G0009 ADMIN PNEUMOCOCCAL VACCINE: HCPCS | Mod: S$GLB,,, | Performed by: INTERNAL MEDICINE

## 2021-07-07 PROCEDURE — 99499 RISK ADDL DX/OHS AUDIT: ICD-10-PCS | Mod: HCNC,S$GLB,, | Performed by: INTERNAL MEDICINE

## 2021-07-07 PROCEDURE — 99397 PR PREVENTIVE VISIT,EST,65 & OVER: ICD-10-PCS | Mod: 25,S$GLB,, | Performed by: INTERNAL MEDICINE

## 2021-07-07 PROCEDURE — 99999 PR PBB SHADOW E&M-EST. PATIENT-LVL V: ICD-10-PCS | Mod: PBBFAC,,, | Performed by: INTERNAL MEDICINE

## 2021-07-07 PROCEDURE — 84443 ASSAY THYROID STIM HORMONE: CPT | Performed by: INTERNAL MEDICINE

## 2021-07-07 PROCEDURE — 1101F PR PT FALLS ASSESS DOC 0-1 FALLS W/OUT INJ PAST YR: ICD-10-PCS | Mod: CPTII,S$GLB,, | Performed by: INTERNAL MEDICINE

## 2021-07-07 PROCEDURE — 80061 LIPID PANEL: CPT | Performed by: INTERNAL MEDICINE

## 2021-07-07 PROCEDURE — 1126F AMNT PAIN NOTED NONE PRSNT: CPT | Mod: S$GLB,,, | Performed by: INTERNAL MEDICINE

## 2021-07-07 PROCEDURE — 99397 PER PM REEVAL EST PAT 65+ YR: CPT | Mod: 25,S$GLB,, | Performed by: INTERNAL MEDICINE

## 2021-07-07 PROCEDURE — 80053 COMPREHEN METABOLIC PANEL: CPT | Performed by: INTERNAL MEDICINE

## 2021-07-07 PROCEDURE — 90732 PPSV23 VACC 2 YRS+ SUBQ/IM: CPT | Mod: S$GLB,,, | Performed by: INTERNAL MEDICINE

## 2021-07-07 PROCEDURE — 3288F PR FALLS RISK ASSESSMENT DOCUMENTED: ICD-10-PCS | Mod: CPTII,S$GLB,, | Performed by: INTERNAL MEDICINE

## 2021-07-07 PROCEDURE — 82306 VITAMIN D 25 HYDROXY: CPT | Performed by: INTERNAL MEDICINE

## 2021-07-07 PROCEDURE — G0009 PNEUMOCOCCAL POLYSACCHARIDE VACCINE 23-VALENT =>2YO SQ IM: ICD-10-PCS | Mod: S$GLB,,, | Performed by: INTERNAL MEDICINE

## 2021-07-07 PROCEDURE — 1126F PR PAIN SEVERITY QUANTIFIED, NO PAIN PRESENT: ICD-10-PCS | Mod: S$GLB,,, | Performed by: INTERNAL MEDICINE

## 2021-07-13 ENCOUNTER — CLINICAL SUPPORT (OUTPATIENT)
Dept: REHABILITATION | Facility: HOSPITAL | Age: 86
End: 2021-07-13
Payer: MEDICARE

## 2021-07-13 DIAGNOSIS — M54.2 NECK PAIN: ICD-10-CM

## 2021-07-13 DIAGNOSIS — M40.04 POSTURAL KYPHOSIS OF THORACIC REGION: ICD-10-CM

## 2021-07-13 PROCEDURE — 97140 MANUAL THERAPY 1/> REGIONS: CPT | Mod: PN

## 2021-07-13 PROCEDURE — 97110 THERAPEUTIC EXERCISES: CPT | Mod: PN

## 2021-07-20 ENCOUNTER — CLINICAL SUPPORT (OUTPATIENT)
Dept: REHABILITATION | Facility: HOSPITAL | Age: 86
End: 2021-07-20
Payer: MEDICARE

## 2021-07-20 DIAGNOSIS — M54.2 NECK PAIN: ICD-10-CM

## 2021-07-20 DIAGNOSIS — M40.04 POSTURAL KYPHOSIS OF THORACIC REGION: ICD-10-CM

## 2021-07-20 PROCEDURE — 97110 THERAPEUTIC EXERCISES: CPT | Mod: PN,CQ

## 2021-07-22 LAB — NONINV COLON CA DNA+OCC BLD SCRN STL QL: NEGATIVE

## 2021-07-27 ENCOUNTER — CLINICAL SUPPORT (OUTPATIENT)
Dept: REHABILITATION | Facility: HOSPITAL | Age: 86
End: 2021-07-27
Payer: MEDICARE

## 2021-07-27 DIAGNOSIS — M40.04 POSTURAL KYPHOSIS OF THORACIC REGION: ICD-10-CM

## 2021-07-27 DIAGNOSIS — M54.2 NECK PAIN: ICD-10-CM

## 2021-07-27 PROCEDURE — 97110 THERAPEUTIC EXERCISES: CPT | Mod: PN

## 2021-08-24 PROBLEM — M54.2 NECK PAIN: Status: RESOLVED | Noted: 2021-05-24 | Resolved: 2021-08-24

## 2021-08-24 PROBLEM — M40.04 POSTURAL KYPHOSIS OF THORACIC REGION: Status: RESOLVED | Noted: 2021-05-24 | Resolved: 2021-08-24

## 2021-09-01 ENCOUNTER — PATIENT OUTREACH (OUTPATIENT)
Dept: ADMINISTRATIVE | Facility: OTHER | Age: 86
End: 2021-09-01

## 2021-11-15 RX ORDER — FLUOXETINE HYDROCHLORIDE 20 MG/1
20 CAPSULE ORAL DAILY
Qty: 90 CAPSULE | Refills: 3 | Status: CANCELLED | OUTPATIENT
Start: 2021-11-15

## 2021-11-15 RX ORDER — OMEPRAZOLE 20 MG/1
20 CAPSULE, DELAYED RELEASE ORAL DAILY
Qty: 90 CAPSULE | Refills: 3 | Status: CANCELLED | OUTPATIENT
Start: 2021-11-15

## 2021-11-16 ENCOUNTER — PATIENT OUTREACH (OUTPATIENT)
Dept: ADMINISTRATIVE | Facility: OTHER | Age: 86
End: 2021-11-16
Payer: MEDICARE

## 2021-11-16 RX ORDER — FLUOXETINE HYDROCHLORIDE 20 MG/1
20 CAPSULE ORAL DAILY
Qty: 90 CAPSULE | Refills: 0 | Status: SHIPPED | OUTPATIENT
Start: 2021-11-16 | End: 2021-11-30

## 2021-11-16 RX ORDER — OMEPRAZOLE 20 MG/1
20 CAPSULE, DELAYED RELEASE ORAL DAILY
Qty: 90 CAPSULE | Refills: 0 | Status: SHIPPED | OUTPATIENT
Start: 2021-11-16 | End: 2021-11-30

## 2021-11-23 ENCOUNTER — HOSPITAL ENCOUNTER (OUTPATIENT)
Dept: RADIOLOGY | Facility: HOSPITAL | Age: 86
Discharge: HOME | End: 2021-11-23
Attending: INTERNAL MEDICINE
Payer: MEDICARE

## 2021-11-23 DIAGNOSIS — Z12.31 VISIT FOR SCREENING MAMMOGRAM: ICD-10-CM

## 2021-11-23 PROCEDURE — 77063 MAMMO DIGITAL SCREENING BILAT WITH TOMO: ICD-10-PCS | Mod: 26,HCNC,, | Performed by: RADIOLOGY

## 2021-11-23 PROCEDURE — 77067 MAMMO DIGITAL SCREENING BILAT WITH TOMO: ICD-10-PCS | Mod: 26,HCNC,, | Performed by: RADIOLOGY

## 2021-11-23 PROCEDURE — 77067 SCR MAMMO BI INCL CAD: CPT | Mod: 26,HCNC,, | Performed by: RADIOLOGY

## 2021-11-23 PROCEDURE — 77067 SCR MAMMO BI INCL CAD: CPT | Mod: TC,HCNC,PO

## 2021-11-23 PROCEDURE — 77063 BREAST TOMOSYNTHESIS BI: CPT | Mod: 26,HCNC,, | Performed by: RADIOLOGY

## 2021-11-24 ENCOUNTER — PATIENT MESSAGE (OUTPATIENT)
Dept: RADIOLOGY | Facility: HOSPITAL | Age: 86
End: 2021-11-24
Payer: MEDICARE

## 2021-11-24 ENCOUNTER — TELEPHONE (OUTPATIENT)
Dept: RADIOLOGY | Facility: HOSPITAL | Age: 86
End: 2021-11-24
Payer: MEDICARE

## 2021-12-06 ENCOUNTER — OFFICE VISIT (OUTPATIENT)
Dept: INTERNAL MEDICINE | Facility: CLINIC | Age: 86
End: 2021-12-06
Payer: MEDICARE

## 2021-12-06 VITALS
HEIGHT: 65 IN | SYSTOLIC BLOOD PRESSURE: 139 MMHG | DIASTOLIC BLOOD PRESSURE: 60 MMHG | HEART RATE: 76 BPM | WEIGHT: 121.5 LBS | RESPIRATION RATE: 16 BRPM | TEMPERATURE: 99 F | BODY MASS INDEX: 20.24 KG/M2

## 2021-12-06 DIAGNOSIS — Z76.89 ENCOUNTER TO ESTABLISH CARE WITH NEW DOCTOR: Primary | ICD-10-CM

## 2021-12-06 DIAGNOSIS — Z23 NEED FOR SHINGLES VACCINE: ICD-10-CM

## 2021-12-06 PROCEDURE — 99213 OFFICE O/P EST LOW 20 MIN: CPT | Mod: HCNC,S$GLB,, | Performed by: HOSPITALIST

## 2021-12-06 PROCEDURE — 99999 PR PBB SHADOW E&M-EST. PATIENT-LVL IV: ICD-10-PCS | Mod: PBBFAC,HCNC,, | Performed by: HOSPITALIST

## 2021-12-06 PROCEDURE — 99999 PR PBB SHADOW E&M-EST. PATIENT-LVL IV: CPT | Mod: PBBFAC,HCNC,, | Performed by: HOSPITALIST

## 2021-12-06 PROCEDURE — 99213 PR OFFICE/OUTPT VISIT, EST, LEVL III, 20-29 MIN: ICD-10-PCS | Mod: HCNC,S$GLB,, | Performed by: HOSPITALIST

## 2021-12-06 RX ORDER — ZOSTER VACCINE RECOMBINANT, ADJUVANTED 50 MCG/0.5
0.5 KIT INTRAMUSCULAR ONCE
Qty: 1 EACH | Refills: 0 | Status: SHIPPED | OUTPATIENT
Start: 2021-12-06 | End: 2021-12-06

## 2021-12-08 ENCOUNTER — OFFICE VISIT (OUTPATIENT)
Dept: DERMATOLOGY | Facility: CLINIC | Age: 86
End: 2021-12-08
Payer: MEDICARE

## 2021-12-08 ENCOUNTER — HOSPITAL ENCOUNTER (OUTPATIENT)
Dept: RADIOLOGY | Facility: HOSPITAL | Age: 86
Discharge: HOME | End: 2021-12-08
Attending: INTERNAL MEDICINE
Payer: MEDICARE

## 2021-12-08 VITALS — BODY MASS INDEX: 20.14 KG/M2 | WEIGHT: 121 LBS

## 2021-12-08 DIAGNOSIS — L72.0 EPIDERMAL INCLUSION CYST: ICD-10-CM

## 2021-12-08 DIAGNOSIS — D18.01 CHERRY ANGIOMA: ICD-10-CM

## 2021-12-08 DIAGNOSIS — L82.1 SEBORRHEIC KERATOSES: ICD-10-CM

## 2021-12-08 DIAGNOSIS — R92.8 ABNORMAL FINDING ON BREAST IMAGING: ICD-10-CM

## 2021-12-08 DIAGNOSIS — Z12.83 SKIN EXAM, SCREENING FOR CANCER: ICD-10-CM

## 2021-12-08 DIAGNOSIS — L81.4 LENTIGINES: ICD-10-CM

## 2021-12-08 DIAGNOSIS — L57.0 ACTINIC KERATOSIS: Primary | ICD-10-CM

## 2021-12-08 PROCEDURE — 77065 MAMMO DIGITAL DIAGNOSTIC RIGHT WITH TOMO: ICD-10-PCS | Mod: 26,HCNC,RT, | Performed by: RADIOLOGY

## 2021-12-08 PROCEDURE — 99999 PR PBB SHADOW E&M-EST. PATIENT-LVL III: CPT | Mod: PBBFAC,HCNC,, | Performed by: DERMATOLOGY

## 2021-12-08 PROCEDURE — 76642 ULTRASOUND BREAST LIMITED: CPT | Mod: 26,HCNC,RT, | Performed by: RADIOLOGY

## 2021-12-08 PROCEDURE — 76642 US BREAST RIGHT LIMITED: ICD-10-PCS | Mod: 26,HCNC,RT, | Performed by: RADIOLOGY

## 2021-12-08 PROCEDURE — 76642 ULTRASOUND BREAST LIMITED: CPT | Mod: TC,HCNC,RT

## 2021-12-08 PROCEDURE — 77061 BREAST TOMOSYNTHESIS UNI: CPT | Mod: 26,HCNC,RT, | Performed by: RADIOLOGY

## 2021-12-08 PROCEDURE — 99202 OFFICE O/P NEW SF 15 MIN: CPT | Mod: 25,HCNC,S$GLB, | Performed by: DERMATOLOGY

## 2021-12-08 PROCEDURE — 99999 PR PBB SHADOW E&M-EST. PATIENT-LVL III: ICD-10-PCS | Mod: PBBFAC,HCNC,, | Performed by: DERMATOLOGY

## 2021-12-08 PROCEDURE — 77065 DX MAMMO INCL CAD UNI: CPT | Mod: 26,HCNC,RT, | Performed by: RADIOLOGY

## 2021-12-08 PROCEDURE — 17000 PR DESTRUCTION(LASER SURGERY,CRYOSURGERY,CHEMOSURGERY),PREMALIGNANT LESIONS,FIRST LESION: ICD-10-PCS | Mod: HCNC,S$GLB,, | Performed by: DERMATOLOGY

## 2021-12-08 PROCEDURE — 77061 BREAST TOMOSYNTHESIS UNI: CPT | Mod: TC,HCNC,RT

## 2021-12-08 PROCEDURE — 99202 PR OFFICE/OUTPT VISIT, NEW, LEVL II, 15-29 MIN: ICD-10-PCS | Mod: 25,HCNC,S$GLB, | Performed by: DERMATOLOGY

## 2021-12-08 PROCEDURE — 17000 DESTRUCT PREMALG LESION: CPT | Mod: HCNC,S$GLB,, | Performed by: DERMATOLOGY

## 2021-12-08 PROCEDURE — 77061 MAMMO DIGITAL DIAGNOSTIC RIGHT WITH TOMO: ICD-10-PCS | Mod: 26,HCNC,RT, | Performed by: RADIOLOGY

## 2021-12-09 ENCOUNTER — TELEPHONE (OUTPATIENT)
Dept: RADIOLOGY | Facility: HOSPITAL | Age: 86
End: 2021-12-09
Payer: MEDICARE

## 2021-12-14 ENCOUNTER — HOSPITAL ENCOUNTER (OUTPATIENT)
Dept: RADIOLOGY | Facility: HOSPITAL | Age: 86
Discharge: HOME | End: 2021-12-14
Attending: INTERNAL MEDICINE
Payer: MEDICARE

## 2021-12-14 DIAGNOSIS — R92.8 ABNORMAL FINDING ON BREAST IMAGING: Primary | ICD-10-CM

## 2021-12-14 PROCEDURE — 88360 PR  TUMOR IMMUNOHISTOCHEM/MANUAL: ICD-10-PCS | Mod: 26,HCNC,, | Performed by: PATHOLOGY

## 2021-12-14 PROCEDURE — 88360 TUMOR IMMUNOHISTOCHEM/MANUAL: CPT | Mod: 26,HCNC,, | Performed by: PATHOLOGY

## 2021-12-14 PROCEDURE — 19083 BX BREAST 1ST LESION US IMAG: CPT | Mod: HCNC,RT,, | Performed by: RADIOLOGY

## 2021-12-14 PROCEDURE — 27200937 US BREAST BIOPSY WITH IMAGING 1ST SITE RIGHT: Mod: HCNC

## 2021-12-14 PROCEDURE — 25000003 PHARM REV CODE 250: Mod: HCNC | Performed by: INTERNAL MEDICINE

## 2021-12-14 PROCEDURE — 19083 US BREAST BIOPSY WITH IMAGING 1ST SITE RIGHT: ICD-10-PCS | Mod: HCNC,RT,, | Performed by: RADIOLOGY

## 2021-12-14 PROCEDURE — 88305 TISSUE EXAM BY PATHOLOGIST: ICD-10-PCS | Mod: 26,HCNC,, | Performed by: PATHOLOGY

## 2021-12-14 PROCEDURE — 77065 DX MAMMO INCL CAD UNI: CPT | Mod: 26,HCNC,RT, | Performed by: RADIOLOGY

## 2021-12-14 PROCEDURE — 77065 MAMMO DIGITAL DIAGNOSTIC RIGHT: ICD-10-PCS | Mod: 26,HCNC,RT, | Performed by: RADIOLOGY

## 2021-12-14 PROCEDURE — 88305 TISSUE EXAM BY PATHOLOGIST: CPT | Mod: 26,HCNC,, | Performed by: PATHOLOGY

## 2021-12-14 PROCEDURE — 88360 TUMOR IMMUNOHISTOCHEM/MANUAL: CPT | Mod: HCNC | Performed by: PATHOLOGY

## 2021-12-14 PROCEDURE — 88305 TISSUE EXAM BY PATHOLOGIST: CPT | Mod: HCNC | Performed by: PATHOLOGY

## 2021-12-14 PROCEDURE — 77065 DX MAMMO INCL CAD UNI: CPT | Mod: TC,HCNC,RT

## 2021-12-14 PROCEDURE — 19083 BX BREAST 1ST LESION US IMAG: CPT | Mod: HCNC,RT

## 2021-12-14 RX ORDER — LIDOCAINE HYDROCHLORIDE AND EPINEPHRINE 20; 10 MG/ML; UG/ML
10 INJECTION, SOLUTION INFILTRATION; PERINEURAL ONCE
Status: COMPLETED | OUTPATIENT
Start: 2021-12-14 | End: 2021-12-14

## 2021-12-14 RX ORDER — LIDOCAINE HYDROCHLORIDE 10 MG/ML
1 INJECTION INFILTRATION; PERINEURAL ONCE
Status: COMPLETED | OUTPATIENT
Start: 2021-12-14 | End: 2021-12-14

## 2021-12-14 RX ADMIN — LIDOCAINE HYDROCHLORIDE 1 ML: 10 INJECTION, SOLUTION EPIDURAL; INFILTRATION; INTRACAUDAL; PERINEURAL at 09:12

## 2021-12-14 RX ADMIN — LIDOCAINE HYDROCHLORIDE AND EPINEPHRINE 10 ML: 20; 10 INJECTION, SOLUTION INFILTRATION; PERINEURAL at 09:12

## 2021-12-16 ENCOUNTER — DOCUMENTATION ONLY (OUTPATIENT)
Dept: SURGERY | Facility: CLINIC | Age: 86
End: 2021-12-16
Payer: MEDICARE

## 2021-12-16 LAB
COMMENT: NORMAL
FINAL PATHOLOGIC DIAGNOSIS: NORMAL
GROSS: NORMAL
Lab: NORMAL
SUPPLEMENTAL DIAGNOSIS: NORMAL

## 2021-12-17 ENCOUNTER — PATIENT MESSAGE (OUTPATIENT)
Dept: HEMATOLOGY/ONCOLOGY | Facility: CLINIC | Age: 86
End: 2021-12-17

## 2021-12-17 ENCOUNTER — OFFICE VISIT (OUTPATIENT)
Dept: HEMATOLOGY/ONCOLOGY | Facility: CLINIC | Age: 86
End: 2021-12-17
Payer: MEDICARE

## 2021-12-17 VITALS
RESPIRATION RATE: 18 BRPM | HEIGHT: 65 IN | HEART RATE: 77 BPM | OXYGEN SATURATION: 95 % | BODY MASS INDEX: 20.39 KG/M2 | WEIGHT: 122.38 LBS | DIASTOLIC BLOOD PRESSURE: 77 MMHG | SYSTOLIC BLOOD PRESSURE: 190 MMHG | TEMPERATURE: 99 F

## 2021-12-17 DIAGNOSIS — I15.9 SECONDARY HYPERTENSION: ICD-10-CM

## 2021-12-17 DIAGNOSIS — Z17.1 MALIGNANT NEOPLASM OF UPPER-OUTER QUADRANT OF RIGHT BREAST IN FEMALE, ESTROGEN RECEPTOR NEGATIVE: Primary | ICD-10-CM

## 2021-12-17 DIAGNOSIS — F39 MOOD DISORDER: ICD-10-CM

## 2021-12-17 DIAGNOSIS — C50.411 MALIGNANT NEOPLASM OF UPPER-OUTER QUADRANT OF RIGHT BREAST IN FEMALE, ESTROGEN RECEPTOR NEGATIVE: Primary | ICD-10-CM

## 2021-12-17 PROCEDURE — 99205 PR OFFICE/OUTPT VISIT, NEW, LEVL V, 60-74 MIN: ICD-10-PCS | Mod: HCNC,S$GLB,, | Performed by: INTERNAL MEDICINE

## 2021-12-17 PROCEDURE — 99999 PR PBB SHADOW E&M-EST. PATIENT-LVL III: CPT | Mod: PBBFAC,HCNC,, | Performed by: INTERNAL MEDICINE

## 2021-12-17 PROCEDURE — 99999 PR PBB SHADOW E&M-EST. PATIENT-LVL III: ICD-10-PCS | Mod: PBBFAC,HCNC,, | Performed by: INTERNAL MEDICINE

## 2021-12-17 PROCEDURE — 99499 UNLISTED E&M SERVICE: CPT | Mod: HCNC,S$GLB,, | Performed by: INTERNAL MEDICINE

## 2021-12-17 PROCEDURE — 99205 OFFICE O/P NEW HI 60 MIN: CPT | Mod: HCNC,S$GLB,, | Performed by: INTERNAL MEDICINE

## 2021-12-17 PROCEDURE — 99499 RISK ADDL DX/OHS AUDIT: ICD-10-PCS | Mod: HCNC,S$GLB,, | Performed by: INTERNAL MEDICINE

## 2021-12-19 DIAGNOSIS — I10 HYPERTENSION, UNSPECIFIED TYPE: Primary | ICD-10-CM

## 2021-12-20 DIAGNOSIS — C50.911 INVASIVE DUCTAL CARCINOMA OF BREAST, RIGHT: Primary | ICD-10-CM

## 2021-12-21 ENCOUNTER — LAB VISIT (OUTPATIENT)
Dept: LAB | Facility: HOSPITAL | Age: 86
End: 2021-12-21
Attending: SURGERY
Payer: MEDICARE

## 2021-12-21 ENCOUNTER — DOCUMENTATION ONLY (OUTPATIENT)
Dept: SURGERY | Facility: CLINIC | Age: 86
End: 2021-12-21
Payer: MEDICARE

## 2021-12-21 ENCOUNTER — OFFICE VISIT (OUTPATIENT)
Dept: SURGERY | Facility: CLINIC | Age: 86
End: 2021-12-21
Payer: MEDICARE

## 2021-12-21 VITALS
WEIGHT: 123 LBS | HEART RATE: 73 BPM | SYSTOLIC BLOOD PRESSURE: 154 MMHG | BODY MASS INDEX: 20.49 KG/M2 | HEIGHT: 65 IN | DIASTOLIC BLOOD PRESSURE: 67 MMHG

## 2021-12-21 DIAGNOSIS — C50.911 INVASIVE DUCTAL CARCINOMA OF BREAST, RIGHT: ICD-10-CM

## 2021-12-21 DIAGNOSIS — Z17.1 MALIGNANT NEOPLASM OF UPPER-OUTER QUADRANT OF RIGHT BREAST IN FEMALE, ESTROGEN RECEPTOR NEGATIVE: Primary | ICD-10-CM

## 2021-12-21 DIAGNOSIS — C50.411 MALIGNANT NEOPLASM OF UPPER-OUTER QUADRANT OF RIGHT BREAST IN FEMALE, ESTROGEN RECEPTOR NEGATIVE: Primary | ICD-10-CM

## 2021-12-21 LAB
ALBUMIN SERPL BCP-MCNC: 3.9 G/DL (ref 3.5–5.2)
ALP SERPL-CCNC: 69 U/L (ref 55–135)
ALT SERPL W/O P-5'-P-CCNC: 15 U/L (ref 10–44)
ANION GAP SERPL CALC-SCNC: 8 MMOL/L (ref 8–16)
AST SERPL-CCNC: 22 U/L (ref 10–40)
BASOPHILS # BLD AUTO: 0.06 K/UL (ref 0–0.2)
BASOPHILS NFR BLD: 0.8 % (ref 0–1.9)
BILIRUB SERPL-MCNC: 0.6 MG/DL (ref 0.1–1)
BUN SERPL-MCNC: 21 MG/DL (ref 8–23)
CALCIUM SERPL-MCNC: 10 MG/DL (ref 8.7–10.5)
CHLORIDE SERPL-SCNC: 108 MMOL/L (ref 95–110)
CO2 SERPL-SCNC: 28 MMOL/L (ref 23–29)
CREAT SERPL-MCNC: 0.7 MG/DL (ref 0.5–1.4)
DIFFERENTIAL METHOD: ABNORMAL
EOSINOPHIL # BLD AUTO: 0.3 K/UL (ref 0–0.5)
EOSINOPHIL NFR BLD: 3.3 % (ref 0–8)
ERYTHROCYTE [DISTWIDTH] IN BLOOD BY AUTOMATED COUNT: 12.1 % (ref 11.5–14.5)
EST. GFR  (AFRICAN AMERICAN): >60 ML/MIN/1.73 M^2
EST. GFR  (NON AFRICAN AMERICAN): >60 ML/MIN/1.73 M^2
GLUCOSE SERPL-MCNC: 78 MG/DL (ref 70–110)
HCT VFR BLD AUTO: 42.3 % (ref 37–48.5)
HGB BLD-MCNC: 13.6 G/DL (ref 12–16)
IMM GRANULOCYTES # BLD AUTO: 0.02 K/UL (ref 0–0.04)
IMM GRANULOCYTES NFR BLD AUTO: 0.3 % (ref 0–0.5)
LYMPHOCYTES # BLD AUTO: 1.6 K/UL (ref 1–4.8)
LYMPHOCYTES NFR BLD: 20 % (ref 18–48)
MCH RBC QN AUTO: 31.5 PG (ref 27–31)
MCHC RBC AUTO-ENTMCNC: 32.2 G/DL (ref 32–36)
MCV RBC AUTO: 98 FL (ref 82–98)
MONOCYTES # BLD AUTO: 0.7 K/UL (ref 0.3–1)
MONOCYTES NFR BLD: 9 % (ref 4–15)
NEUTROPHILS # BLD AUTO: 5.3 K/UL (ref 1.8–7.7)
NEUTROPHILS NFR BLD: 66.6 % (ref 38–73)
NRBC BLD-RTO: 0 /100 WBC
PLATELET # BLD AUTO: 286 K/UL (ref 150–450)
PMV BLD AUTO: 10.5 FL (ref 9.2–12.9)
POTASSIUM SERPL-SCNC: 4.4 MMOL/L (ref 3.5–5.1)
PROT SERPL-MCNC: 6.7 G/DL (ref 6–8.4)
RBC # BLD AUTO: 4.32 M/UL (ref 4–5.4)
SODIUM SERPL-SCNC: 144 MMOL/L (ref 136–145)
WBC # BLD AUTO: 7.89 K/UL (ref 3.9–12.7)

## 2021-12-21 PROCEDURE — 1101F PR PT FALLS ASSESS DOC 0-1 FALLS W/OUT INJ PAST YR: ICD-10-PCS | Mod: HCNC,CPTII,S$GLB, | Performed by: SURGERY

## 2021-12-21 PROCEDURE — 1126F AMNT PAIN NOTED NONE PRSNT: CPT | Mod: HCNC,CPTII,S$GLB, | Performed by: SURGERY

## 2021-12-21 PROCEDURE — 99999 PR PBB SHADOW E&M-EST. PATIENT-LVL V: ICD-10-PCS | Mod: PBBFAC,HCNC,, | Performed by: SURGERY

## 2021-12-21 PROCEDURE — 1126F PR PAIN SEVERITY QUANTIFIED, NO PAIN PRESENT: ICD-10-PCS | Mod: HCNC,CPTII,S$GLB, | Performed by: SURGERY

## 2021-12-21 PROCEDURE — 1160F PR REVIEW ALL MEDS BY PRESCRIBER/CLIN PHARMACIST DOCUMENTED: ICD-10-PCS | Mod: HCNC,CPTII,S$GLB, | Performed by: SURGERY

## 2021-12-21 PROCEDURE — 1159F MED LIST DOCD IN RCRD: CPT | Mod: HCNC,CPTII,S$GLB, | Performed by: SURGERY

## 2021-12-21 PROCEDURE — 99999 PR PBB SHADOW E&M-EST. PATIENT-LVL V: CPT | Mod: PBBFAC,HCNC,, | Performed by: SURGERY

## 2021-12-21 PROCEDURE — 3288F FALL RISK ASSESSMENT DOCD: CPT | Mod: HCNC,CPTII,S$GLB, | Performed by: SURGERY

## 2021-12-21 PROCEDURE — 85025 COMPLETE CBC W/AUTO DIFF WBC: CPT | Mod: HCNC | Performed by: SURGERY

## 2021-12-21 PROCEDURE — 99205 OFFICE O/P NEW HI 60 MIN: CPT | Mod: HCNC,S$GLB,, | Performed by: SURGERY

## 2021-12-21 PROCEDURE — 36415 COLL VENOUS BLD VENIPUNCTURE: CPT | Mod: HCNC | Performed by: SURGERY

## 2021-12-21 PROCEDURE — 80053 COMPREHEN METABOLIC PANEL: CPT | Mod: HCNC | Performed by: SURGERY

## 2021-12-21 PROCEDURE — 1159F PR MEDICATION LIST DOCUMENTED IN MEDICAL RECORD: ICD-10-PCS | Mod: HCNC,CPTII,S$GLB, | Performed by: SURGERY

## 2021-12-21 PROCEDURE — 3288F PR FALLS RISK ASSESSMENT DOCUMENTED: ICD-10-PCS | Mod: HCNC,CPTII,S$GLB, | Performed by: SURGERY

## 2021-12-21 PROCEDURE — 1160F RVW MEDS BY RX/DR IN RCRD: CPT | Mod: HCNC,CPTII,S$GLB, | Performed by: SURGERY

## 2021-12-21 PROCEDURE — 1101F PT FALLS ASSESS-DOCD LE1/YR: CPT | Mod: HCNC,CPTII,S$GLB, | Performed by: SURGERY

## 2021-12-21 PROCEDURE — 99205 PR OFFICE/OUTPT VISIT, NEW, LEVL V, 60-74 MIN: ICD-10-PCS | Mod: HCNC,S$GLB,, | Performed by: SURGERY

## 2021-12-21 NOTE — PROGRESS NOTES
Breast Surgery  [facility]  Department of Surgery      REFERRING PROVIDER: Nicolasa Rojas DO   [facility]  [place]  [place] [id number]    Chief Complaint: Breast Cancer (New Patient Right Breast Invasive Ductal Carcinoma 21. )      Subjective:      Patient ID: Olamide Felipe is a 88 y.o. female who presents with IDC right breast    Follow-up mammogram (21) showed focal asymmetry seen in the upper outer quadrant of the right breast in the posterior depth and ultrasound showed 3 mm x 3 mm x 2 mm irregularly shaped, non-parallel, hypoechoic mass with indistinct margins with shadowing seen in the right breast at 9 o'clock in the posterior depth, 6 cm from the nipple.. An ultrasound guided biopsy was performed on 21 with pathology revealing infiltrating ductal carcinoma of the right breast.     Patient does not routinely do self breast exams.  Patient has not noted a change on breast exam.  Patient denies nipple discharge. Patient denies to previous breast biopsy. Patient denies a personal history of breast cancer.    Findings at that time were the following:   Tumor size: 0.4 cm   Tumor rdgrdrrdarddrderd:rd rd3rd Estrogen Receptor: -   Progesterone Receptor: -   Her-2 lizz: -   Lymph node status: clinically negative       GYN History:  Age of menarche was 13. Age of menopause was 38. Patient admits to hormonal therapy. Patient is . Age of first live birth was 24. Patient did breast feed.    Past Medical History:   Diagnosis Date    Hyperlipidemia     Hypothyroidism     Malignant neoplasm of upper-outer quadrant of right breast in female, estrogen receptor negative 2022    Osteoarthritis, knee     Vertigo      Past Surgical History:   Procedure Laterality Date    dentures      FRACTURE SURGERY Left     fracture left wrist    HYSTERECTOMY      left wrist surgery       No current facility-administered medications on file prior to visit.     Current Outpatient Medications on File  Prior to Visit   Medication Sig Dispense Refill    aspirin 81 MG Chew Take 81 mg by mouth once daily.      b complex vitamins tablet Take 1 tablet by mouth once daily.      calcium-vitamin D 250-100 mg-unit per tablet Take 1 tablet by mouth 2 (two) times daily.      FLUoxetine 20 MG capsule Take 1 capsule (20 mg total) by mouth once daily. 90 capsule 0    levothyroxine (SYNTHROID) 25 MCG tablet Take 1 tablet (25 mcg total) by mouth before breakfast. 90 tablet 3    mv-min/iron/folic/calcium/vitK (WOMEN'S MULTIVITAMIN ORAL) Take by mouth once daily.      niacin 500 MG Tab Take 500 mg by mouth 2 (two) times daily with meals.      omeprazole (PRILOSEC) 20 MG capsule Take 1 capsule (20 mg total) by mouth once daily. 90 capsule 0    simvastatin (ZOCOR) 20 MG tablet Take 1 tablet (20 mg total) by mouth every evening. 90 tablet 0    [DISCONTINUED] meclizine (ANTIVERT) 12.5 mg tablet Take 1 tablet (12.5 mg total) by mouth 3 (three) times daily as needed for Dizziness. 20 tablet 0     Social History     Socioeconomic History    Marital status:    Tobacco Use    Smoking status: Former Smoker     Quit date: 1989     Years since quittin.5    Smokeless tobacco: Never Used   Substance and Sexual Activity    Alcohol use: Yes     Alcohol/week: 1.0 standard drink     Types: 1 Glasses of wine per week     Comment: 1\3 glass ofd wine with dinner nightly    Drug use: Never    Sexual activity: Not Currently     Family History   Problem Relation Age of Onset    No Known Problems Daughter     No Known Problems Son     No Known Problems Sister         Review of Systems   Constitutional: Negative.    HENT: Negative.    Eyes: Negative.    Respiratory: Negative.    Cardiovascular: Negative.    Gastrointestinal: Negative.    Endocrine: Negative.    Genitourinary: Negative.    Musculoskeletal: Negative.    All other systems reviewed and are negative.    Objective:   BP (!) 154/67 (BP Location: Left arm,  "Patient Position: Sitting, BP Method: Medium (Automatic))   Pulse 73   Ht 5' 5" (1.651 m)   Wt 55.8 kg (123 lb)   BMI 20.47 kg/m²     Physical Exam   Constitutional: She appears well-developed and well-nourished.   HENT:   Head: Normocephalic and atraumatic.   Eyes: Pupils are equal, round, and reactive to light. No scleral icterus.   Neck: No tracheal deviation present.   Cardiovascular: Normal rate and regular rhythm.    Pulmonary/Chest: Effort normal and breath sounds normal. No respiratory distress. Right breast exhibits no inverted nipple, no mass, no nipple discharge and no skin change. Left breast exhibits no inverted nipple, no mass, no nipple discharge and no skin change.       Abdominal: Soft. She exhibits no mass. There is no abdominal tenderness.   Musculoskeletal: Normal range of motion. No edema.   Lymphadenopathy:     She has no cervical adenopathy.   Neurological: She is alert.   Skin: Skin is warm. No rash noted. No erythema.     Psychiatric: She has a normal mood and affect.       Radiology review: Images personally reviewed by me in the clinic.   Mammogram & Ultrasound: 12/8/21  Result:   Mammo Digital Diagnostic Right with Shay  US Breast Right Limited     History:  Patient is 88 y.o. and is seen for inconclusive screening mammogram.     Films Compared:  Compared to: 11/23/2021 Mammo Digital Screening Bilat w/ Shay and 06/24/2019 Mammo Digital Screening Bilat w/ Shay     Findings:  This procedure was performed using tomosynthesis. Computer-aided detection was utilized in the interpretation of this examination.  The right breast is extremely dense, which lowers the sensitivity of mammography.      Mammo Digital Diagnostic Right with Shay  Right  Focal Asymmetry: There is a focal asymmetry seen in the upper outer quadrant of the right breast in the posterior depth.      US Breast Right Limited  Right  Mass: There is a 3 mm x 3 mm x 2 mm irregularly shaped, non-parallel, hypoechoic mass with " indistinct margins with shadowing seen in the right breast at 9 o'clock in the posterior depth, 6 cm from the nipple. The mass correlates with the asymmetry seen on the mammogram. Associated features include internal vascularity. There is no axillary adenopathy.      Impression:  Right  Mass: Right breast 3 mm x 3 mm x 2 mm mass at the posterior 9 o'clock position. Assessment: 4 - Suspicious finding. Biopsy is recommended.      BI-RADS Category:   Overall: 4 - Suspicious  Assessment:       1. Malignant neoplasm of upper-outer quadrant of right breast in female, estrogen receptor negative    2. Invasive ductal carcinoma of breast, right        Plan:     Options for management were discussed with the patient and her family. We reviewed the existing data noting the equivalency of breast conserving surgery with radiation therapy and mastectomy. We also reviewed the guidelines of the National Comprehensive Cancer Network for Stage 1 breast carcinoma. We discussed the need for lumpectomy margins to be negative for carcinoma, the necessity for postoperative radiation therapy after breast conservation in most cases, the possibility of a failed or false negative sentinel lymph node biopsy and the potential need for complete lymphadenectomy for a failed or positive sentinel lymph node biopsy were fully discussed. In the setting of mastectomy, delayed or immediate reconstruction options are available and were discussed.     In the setting of lumpectomy, radiation therapy would be recommended majority of the time.  The duration and treatment side effects were discussed with the patient.  This will coordinated with the radiation oncologist pending final pathology.    We also discussed the role of systemic therapy in the treatment of early stage breast cancer.  We discussed that this is based on tumor biology and michael status and will be determined based on final pathology.  We discussed that if the cancer is hormone positive,  endocrine therapy would be recommended in most cases and its use can reduce the risk of recurrence as well as improve survival. Side effects of treatment were briefly discussed. We also discussed the potential role for chemotherapy based on a number of factors such as tumor phenotype (ER+ vs. triple negative vs. Dic1fdi+) and this would be determined in coordination with the medical oncologist.    The patient, in consultation with her family, has elected to proceed with right partial mastectomy with reflector and SLNB. The operative risks of bleeding, infection, recurrence, scarring, and anesthetic complications and the possibility of requiring further surgery were all noted and informed consent obtained.    Genetics to be obtained    Surgery scheduled. Follow-up in clinic roughly 14 days after surgery.     Patient was educated on breast cancer, receptors, wire localization lumpectomy, mastectomy, sentinel lymph node mapping and biopsy, axillary lymph node dissection, reconstruction, breast prosthesis with post-mastectomy bra and radiation therapy. Patient was given patient information binder including [facility] breast cancer treatment brochure.  All her questions were answered.    Total time spent with the patient: 65 minutes.  45 minutes of face to face consultation and 20 minutes of chart review and coordination of care.

## 2021-12-22 ENCOUNTER — DOCUMENTATION ONLY (OUTPATIENT)
Dept: HEMATOLOGY/ONCOLOGY | Facility: CLINIC | Age: 86
End: 2021-12-22
Payer: MEDICARE

## 2021-12-22 ENCOUNTER — DOCUMENTATION ONLY (OUTPATIENT)
Dept: SURGERY | Facility: CLINIC | Age: 86
End: 2021-12-22
Payer: MEDICARE

## 2021-12-23 ENCOUNTER — OFFICE VISIT (OUTPATIENT)
Dept: OPTOMETRY | Facility: CLINIC | Age: 86
End: 2021-12-23
Payer: MEDICARE

## 2021-12-23 DIAGNOSIS — H10.523 ANGULAR BLEPHAROCONJUNCTIVITIS OF BOTH EYES: Primary | ICD-10-CM

## 2021-12-23 DIAGNOSIS — Z96.1 PSEUDOPHAKIA OF BOTH EYES: ICD-10-CM

## 2021-12-23 DIAGNOSIS — Z01.00 ROUTINE EYE EXAM: ICD-10-CM

## 2021-12-23 DIAGNOSIS — H52.203 MYOPIA WITH ASTIGMATISM AND PRESBYOPIA, BILATERAL: ICD-10-CM

## 2021-12-23 DIAGNOSIS — H52.13 MYOPIA WITH ASTIGMATISM AND PRESBYOPIA, BILATERAL: ICD-10-CM

## 2021-12-23 DIAGNOSIS — H52.4 MYOPIA WITH ASTIGMATISM AND PRESBYOPIA, BILATERAL: ICD-10-CM

## 2021-12-23 DIAGNOSIS — Z13.5 GLAUCOMA SCREENING: ICD-10-CM

## 2021-12-23 PROCEDURE — 99999 PR PBB SHADOW E&M-EST. PATIENT-LVL III: ICD-10-PCS | Mod: PBBFAC,HCNC,, | Performed by: OPTOMETRIST

## 2021-12-23 PROCEDURE — 1159F PR MEDICATION LIST DOCUMENTED IN MEDICAL RECORD: ICD-10-PCS | Mod: HCNC,CPTII,S$GLB, | Performed by: OPTOMETRIST

## 2021-12-23 PROCEDURE — 1126F PR PAIN SEVERITY QUANTIFIED, NO PAIN PRESENT: ICD-10-PCS | Mod: HCNC,CPTII,S$GLB, | Performed by: OPTOMETRIST

## 2021-12-23 PROCEDURE — 92015 DETERMINE REFRACTIVE STATE: CPT | Mod: HCNC,S$GLB,, | Performed by: OPTOMETRIST

## 2021-12-23 PROCEDURE — 1101F PT FALLS ASSESS-DOCD LE1/YR: CPT | Mod: HCNC,CPTII,S$GLB, | Performed by: OPTOMETRIST

## 2021-12-23 PROCEDURE — 1126F AMNT PAIN NOTED NONE PRSNT: CPT | Mod: HCNC,CPTII,S$GLB, | Performed by: OPTOMETRIST

## 2021-12-23 PROCEDURE — 92014 COMPRE OPH EXAM EST PT 1/>: CPT | Mod: HCNC,S$GLB,, | Performed by: OPTOMETRIST

## 2021-12-23 PROCEDURE — 92015 PR REFRACTION: ICD-10-PCS | Mod: HCNC,S$GLB,, | Performed by: OPTOMETRIST

## 2021-12-23 PROCEDURE — 1159F MED LIST DOCD IN RCRD: CPT | Mod: HCNC,CPTII,S$GLB, | Performed by: OPTOMETRIST

## 2021-12-23 PROCEDURE — 3288F FALL RISK ASSESSMENT DOCD: CPT | Mod: HCNC,CPTII,S$GLB, | Performed by: OPTOMETRIST

## 2021-12-23 PROCEDURE — 99999 PR PBB SHADOW E&M-EST. PATIENT-LVL III: CPT | Mod: PBBFAC,HCNC,, | Performed by: OPTOMETRIST

## 2021-12-23 PROCEDURE — 92014 PR EYE EXAM, EST PATIENT,COMPREHESV: ICD-10-PCS | Mod: HCNC,S$GLB,, | Performed by: OPTOMETRIST

## 2021-12-23 PROCEDURE — 1101F PR PT FALLS ASSESS DOC 0-1 FALLS W/OUT INJ PAST YR: ICD-10-PCS | Mod: HCNC,CPTII,S$GLB, | Performed by: OPTOMETRIST

## 2021-12-23 PROCEDURE — 3288F PR FALLS RISK ASSESSMENT DOCUMENTED: ICD-10-PCS | Mod: HCNC,CPTII,S$GLB, | Performed by: OPTOMETRIST

## 2021-12-27 ENCOUNTER — HOSPITAL ENCOUNTER (OUTPATIENT)
Dept: RADIOLOGY | Facility: OTHER | Age: 86
Discharge: HOME | End: 2021-12-27
Attending: HOSPITALIST
Payer: MEDICARE

## 2021-12-27 ENCOUNTER — ANESTHESIA EVENT (OUTPATIENT)
Dept: SURGERY | Facility: OTHER | Age: 86
End: 2021-12-27
Payer: MEDICARE

## 2021-12-27 ENCOUNTER — HOSPITAL ENCOUNTER (OUTPATIENT)
Dept: PREADMISSION TESTING | Facility: OTHER | Age: 86
Discharge: HOME | End: 2021-12-27
Attending: HOSPITALIST
Payer: MEDICARE

## 2021-12-27 VITALS
OXYGEN SATURATION: 95 % | TEMPERATURE: 98 F | RESPIRATION RATE: 18 BRPM | WEIGHT: 120 LBS | SYSTOLIC BLOOD PRESSURE: 137 MMHG | BODY MASS INDEX: 19.99 KG/M2 | DIASTOLIC BLOOD PRESSURE: 60 MMHG | HEIGHT: 65 IN

## 2021-12-27 DIAGNOSIS — Z01.818 PREOP EXAMINATION: Primary | ICD-10-CM

## 2021-12-27 DIAGNOSIS — C50.911 INVASIVE DUCTAL CARCINOMA OF BREAST, RIGHT: ICD-10-CM

## 2021-12-27 PROCEDURE — 93010 EKG 12-LEAD: ICD-10-PCS | Mod: ,,, | Performed by: INTERNAL MEDICINE

## 2021-12-27 PROCEDURE — 71046 XR CHEST PA AND LATERAL PRE-OP: ICD-10-PCS | Mod: 26,,, | Performed by: RADIOLOGY

## 2021-12-27 PROCEDURE — 71046 X-RAY EXAM CHEST 2 VIEWS: CPT | Mod: TC

## 2021-12-27 PROCEDURE — 93010 ELECTROCARDIOGRAM REPORT: CPT | Mod: ,,, | Performed by: INTERNAL MEDICINE

## 2021-12-27 PROCEDURE — 93005 ELECTROCARDIOGRAM TRACING: CPT

## 2021-12-27 PROCEDURE — 71046 X-RAY EXAM CHEST 2 VIEWS: CPT | Mod: 26,,, | Performed by: RADIOLOGY

## 2021-12-27 RX ORDER — SODIUM CHLORIDE, SODIUM LACTATE, POTASSIUM CHLORIDE, CALCIUM CHLORIDE 600; 310; 30; 20 MG/100ML; MG/100ML; MG/100ML; MG/100ML
INJECTION, SOLUTION INTRAVENOUS CONTINUOUS
Status: CANCELLED | OUTPATIENT
Start: 2021-12-27

## 2021-12-27 RX ORDER — LIDOCAINE HYDROCHLORIDE 10 MG/ML
1 INJECTION, SOLUTION EPIDURAL; INFILTRATION; INTRACAUDAL; PERINEURAL ONCE AS NEEDED
Status: CANCELLED | OUTPATIENT
Start: 2021-12-27 | End: 2033-05-25

## 2021-12-27 NOTE — ANESTHESIA PREPROCEDURE EVALUATION
12/27/2021  Olamide Felipe is a 88 y.o., female.    Anesthesia Evaluation    I have reviewed the Patient Summary Reports.    I have reviewed the Nursing Notes.    I have reviewed the Medications.     Review of Systems  Anesthesia Hx:  No problems with previous Anesthesia  Denies Family Hx of Anesthesia complications.   Denies Personal Hx of Anesthesia complications.   Social:  Non-Smoker    Hematology/Oncology:  Hematology Normal   Oncology Normal     EENT/Dental:EENT/Dental Normal   Cardiovascular:   Exercise tolerance: good Denies Hypertension.  Very active. Walks every day   Pulmonary:  Pulmonary Normal    Renal/:  Renal/ Normal     Musculoskeletal:  Musculoskeletal Normal    Neurological:  Neurology Normal vertigo   Endocrine:   Hypothyroidism    Dermatological:  Skin Normal    Psych:  Psychiatric Normal           Physical Exam  General:  Well nourished    Airway/Jaw/Neck:  Airway Findings: Mouth Opening: Normal Tongue: Normal  General Airway Assessment: Adult  Mallampati: I  TM Distance: Normal, at least 6 cm  Jaw/Neck Findings:  Neck ROM: Normal ROM      Dental:  Dental Findings: In tact, Edentulous             Anesthesia Plan  Type of Anesthesia, risks & benefits discussed:  Anesthesia Type:  general    Patient's Preference:   Plan Factors:          Intra-op Monitoring Plan: standard ASA monitors  Intra-op Monitoring Plan Comments:   Post Op Pain Control Plan: per primary service following discharge from PACU and multimodal analgesia  Post Op Pain Control Plan Comments:     Induction:   IV  Beta Blocker:         Informed Consent: Patient understands risks and agrees with Anesthesia plan.  Questions answered. Anesthesia consent signed with patient.  ASA Score: 2     Day of Surgery Review of History & Physical:    H&P update referred to the surgeon.         Ready For Surgery From Anesthesia  Perspective.

## 2021-12-29 ENCOUNTER — HOSPITAL ENCOUNTER (OUTPATIENT)
Dept: RADIOLOGY | Facility: HOSPITAL | Age: 86
Discharge: HOME | End: 2021-12-29
Attending: SURGERY
Payer: MEDICARE

## 2021-12-29 DIAGNOSIS — R92.8 ABNORMAL FINDING ON BREAST IMAGING: ICD-10-CM

## 2021-12-29 DIAGNOSIS — C50.911 INVASIVE DUCTAL CARCINOMA OF BREAST, RIGHT: ICD-10-CM

## 2021-12-29 DIAGNOSIS — Z01.818 PRE-OP TESTING: Primary | ICD-10-CM

## 2021-12-29 PROCEDURE — A4648 IMPLANTABLE TISSUE MARKER: HCPCS | Mod: HCNC

## 2021-12-29 PROCEDURE — 19285 US BREAST RADAR REFLECTOR LOCALIZATION W/GUIDANCE, 1ST LESION, RIGHT: ICD-10-PCS | Mod: HCNC,RT,, | Performed by: RADIOLOGY

## 2021-12-29 PROCEDURE — 77065 DX MAMMO INCL CAD UNI: CPT | Mod: TC,HCNC,RT

## 2021-12-29 PROCEDURE — 19281 PERQ DEVICE BREAST 1ST IMAG: CPT | Mod: HCNC,RT

## 2021-12-29 PROCEDURE — 77065 DX MAMMO INCL CAD UNI: CPT | Mod: 26,HCNC,RT, | Performed by: RADIOLOGY

## 2021-12-29 PROCEDURE — 25000003 PHARM REV CODE 250: Mod: HCNC | Performed by: SURGERY

## 2021-12-29 PROCEDURE — 77065 MAMMO DIGITAL DIAGNOSTIC RIGHT: ICD-10-PCS | Mod: 26,HCNC,RT, | Performed by: RADIOLOGY

## 2021-12-29 PROCEDURE — 19285 PERQ DEV BREAST 1ST US IMAG: CPT | Mod: HCNC,RT,, | Performed by: RADIOLOGY

## 2021-12-29 RX ORDER — LIDOCAINE HYDROCHLORIDE 20 MG/ML
10 INJECTION, SOLUTION INFILTRATION; PERINEURAL ONCE
Status: COMPLETED | OUTPATIENT
Start: 2021-12-29 | End: 2021-12-29

## 2021-12-29 RX ADMIN — LIDOCAINE HYDROCHLORIDE 10 ML: 20 INJECTION, SOLUTION INFILTRATION; PERINEURAL at 09:12

## 2022-01-03 ENCOUNTER — TELEPHONE (OUTPATIENT)
Dept: SURGERY | Facility: CLINIC | Age: 87
End: 2022-01-03
Payer: MEDICARE

## 2022-01-03 NOTE — TELEPHONE ENCOUNTER
Genetic Lay Navigation Note:    Called patient with the results of her genetic testing. Informed patient that results were negative for any notable mutation. Instructed patient that we would ensure she received a copy of her results via [facility] or mail, and to call us with any questions or concerns regarding the full report. Patient verbalized understanding to all information, no questions at this time.     Patient's ordering physician made aware of results and that patient was informed of them.

## 2022-01-04 ENCOUNTER — LAB VISIT (OUTPATIENT)
Dept: PRIMARY CARE CLINIC | Facility: CLINIC | Age: 87
End: 2022-01-04
Payer: MEDICARE

## 2022-01-04 DIAGNOSIS — Z01.818 PRE-OP TESTING: ICD-10-CM

## 2022-01-04 PROCEDURE — U0003 INFECTIOUS AGENT DETECTION BY NUCLEIC ACID (DNA OR RNA); SEVERE ACUTE RESPIRATORY SYNDROME CORONAVIRUS 2 (SARS-COV-2) (CORONAVIRUS DISEASE [COVID-19]), AMPLIFIED PROBE TECHNIQUE, MAKING USE OF HIGH THROUGHPUT TECHNOLOGIES AS DESCRIBED BY CMS-2020-01-R: HCPCS | Mod: HCNC | Performed by: SURGERY

## 2022-01-04 PROCEDURE — U0005 INFEC AGEN DETEC AMPLI PROBE: HCPCS | Performed by: SURGERY

## 2022-01-05 LAB
SARS-COV-2 RNA RESP QL NAA+PROBE: NOT DETECTED
SARS-COV-2- CYCLE NUMBER: NORMAL

## 2022-01-06 ENCOUNTER — TELEPHONE (OUTPATIENT)
Dept: SURGERY | Facility: CLINIC | Age: 87
End: 2022-01-06
Payer: MEDICARE

## 2022-01-06 NOTE — TELEPHONE ENCOUNTER
I spoke with the patient to inform her of her surgery arrival time and instructions. Patient voiced understanding.

## 2022-01-07 ENCOUNTER — ANESTHESIA (OUTPATIENT)
Dept: SURGERY | Facility: OTHER | Age: 87
End: 2022-01-07
Payer: MEDICARE

## 2022-01-07 ENCOUNTER — HOSPITAL ENCOUNTER (OUTPATIENT)
Facility: OTHER | Age: 87
Discharge: HOME | End: 2022-01-07
Attending: SURGERY | Admitting: SURGERY
Payer: MEDICARE

## 2022-01-07 ENCOUNTER — HOSPITAL ENCOUNTER (OUTPATIENT)
Dept: RADIOLOGY | Facility: OTHER | Age: 87
Discharge: HOME | End: 2022-01-07
Attending: SURGERY
Payer: MEDICARE

## 2022-01-07 VITALS
WEIGHT: 120 LBS | HEART RATE: 87 BPM | DIASTOLIC BLOOD PRESSURE: 53 MMHG | RESPIRATION RATE: 16 BRPM | HEIGHT: 65 IN | SYSTOLIC BLOOD PRESSURE: 133 MMHG | TEMPERATURE: 98 F | OXYGEN SATURATION: 95 % | BODY MASS INDEX: 19.99 KG/M2

## 2022-01-07 DIAGNOSIS — C50.911 INVASIVE DUCTAL CARCINOMA OF BREAST, RIGHT: ICD-10-CM

## 2022-01-07 DIAGNOSIS — C50.411 MALIGNANT NEOPLASM OF UPPER-OUTER QUADRANT OF RIGHT BREAST IN FEMALE, ESTROGEN RECEPTOR NEGATIVE: Primary | ICD-10-CM

## 2022-01-07 DIAGNOSIS — N63.0 BREAST MASS: ICD-10-CM

## 2022-01-07 DIAGNOSIS — Z17.1 MALIGNANT NEOPLASM OF UPPER-OUTER QUADRANT OF RIGHT BREAST IN FEMALE, ESTROGEN RECEPTOR NEGATIVE: ICD-10-CM

## 2022-01-07 DIAGNOSIS — C50.411 MALIGNANT NEOPLASM OF UPPER-OUTER QUADRANT OF RIGHT BREAST IN FEMALE, ESTROGEN RECEPTOR NEGATIVE: ICD-10-CM

## 2022-01-07 DIAGNOSIS — Z17.1 MALIGNANT NEOPLASM OF UPPER-OUTER QUADRANT OF RIGHT BREAST IN FEMALE, ESTROGEN RECEPTOR NEGATIVE: Primary | ICD-10-CM

## 2022-01-07 PROCEDURE — 63600175 PHARM REV CODE 636 W HCPCS: Mod: HCNC | Performed by: STUDENT IN AN ORGANIZED HEALTH CARE EDUCATION/TRAINING PROGRAM

## 2022-01-07 PROCEDURE — 88341 IMHCHEM/IMCYTCHM EA ADD ANTB: CPT | Mod: 26,HCNC,, | Performed by: PATHOLOGY

## 2022-01-07 PROCEDURE — 76098 MAMMO BREAST SPECIMEN: ICD-10-PCS | Mod: 26,HCNC,, | Performed by: RADIOLOGY

## 2022-01-07 PROCEDURE — 63600175 PHARM REV CODE 636 W HCPCS: Mod: HCNC | Performed by: SURGERY

## 2022-01-07 PROCEDURE — 19301 PR MASTECTOMY, PARTIAL: ICD-10-PCS | Mod: HCNC,RT,, | Performed by: SURGERY

## 2022-01-07 PROCEDURE — 88307 TISSUE EXAM BY PATHOLOGIST: CPT | Mod: 26,HCNC,, | Performed by: PATHOLOGY

## 2022-01-07 PROCEDURE — 71000033 HC RECOVERY, INTIAL HOUR: Mod: HCNC | Performed by: SURGERY

## 2022-01-07 PROCEDURE — 38792 RA TRACER ID OF SENTINL NODE: CPT | Mod: HCNC,RT,, | Performed by: SURGERY

## 2022-01-07 PROCEDURE — 71000016 HC POSTOP RECOV ADDL HR: Mod: HCNC | Performed by: SURGERY

## 2022-01-07 PROCEDURE — 71000015 HC POSTOP RECOV 1ST HR: Mod: HCNC | Performed by: SURGERY

## 2022-01-07 PROCEDURE — 19301 PARTIAL MASTECTOMY: CPT | Mod: HCNC,RT,, | Performed by: SURGERY

## 2022-01-07 PROCEDURE — 36000706: Mod: HCNC | Performed by: SURGERY

## 2022-01-07 PROCEDURE — 36000707: Mod: HCNC | Performed by: SURGERY

## 2022-01-07 PROCEDURE — 38525 BIOPSY/REMOVAL LYMPH NODES: CPT | Mod: 51,HCNC,RT, | Performed by: SURGERY

## 2022-01-07 PROCEDURE — 88307 PR  SURG PATH,LEVEL V: ICD-10-PCS | Mod: 26,HCNC,, | Performed by: PATHOLOGY

## 2022-01-07 PROCEDURE — 25000003 PHARM REV CODE 250: Mod: HCNC | Performed by: SURGERY

## 2022-01-07 PROCEDURE — 88307 TISSUE EXAM BY PATHOLOGIST: CPT | Mod: HCNC | Performed by: PATHOLOGY

## 2022-01-07 PROCEDURE — 88342 IMHCHEM/IMCYTCHM 1ST ANTB: CPT | Mod: 26,HCNC,, | Performed by: PATHOLOGY

## 2022-01-07 PROCEDURE — 88342 IMHCHEM/IMCYTCHM 1ST ANTB: CPT | Mod: HCNC | Performed by: PATHOLOGY

## 2022-01-07 PROCEDURE — 76098 X-RAY EXAM SURGICAL SPECIMEN: CPT | Mod: TC,HCNC

## 2022-01-07 PROCEDURE — 27201423 OPTIME MED/SURG SUP & DEVICES STERILE SUPPLY: Mod: HCNC | Performed by: SURGERY

## 2022-01-07 PROCEDURE — 76098 X-RAY EXAM SURGICAL SPECIMEN: CPT | Mod: 26,HCNC,, | Performed by: RADIOLOGY

## 2022-01-07 PROCEDURE — A9520 TC99 TILMANOCEPT DIAG 0.5MCI: HCPCS | Mod: HCNC

## 2022-01-07 PROCEDURE — 25000003 PHARM REV CODE 250: Mod: HCNC | Performed by: STUDENT IN AN ORGANIZED HEALTH CARE EDUCATION/TRAINING PROGRAM

## 2022-01-07 PROCEDURE — 88341 IMHCHEM/IMCYTCHM EA ADD ANTB: CPT | Mod: HCNC | Performed by: PATHOLOGY

## 2022-01-07 PROCEDURE — 37000009 HC ANESTHESIA EA ADD 15 MINS: Mod: HCNC | Performed by: SURGERY

## 2022-01-07 PROCEDURE — 88342 CHG IMMUNOCYTOCHEMISTRY: ICD-10-PCS | Mod: 26,HCNC,, | Performed by: PATHOLOGY

## 2022-01-07 PROCEDURE — 37000008 HC ANESTHESIA 1ST 15 MINUTES: Mod: HCNC | Performed by: SURGERY

## 2022-01-07 PROCEDURE — 38525 PR BIOPSY/REM LYMPH NODES, AXILLARY: ICD-10-PCS | Mod: 51,HCNC,RT, | Performed by: SURGERY

## 2022-01-07 PROCEDURE — 88341 PR IHC OR ICC EACH ADD'L SINGLE ANTIBODY  STAINPR: ICD-10-PCS | Mod: 26,HCNC,, | Performed by: PATHOLOGY

## 2022-01-07 PROCEDURE — 38792 PR IDENTIFY SENTINEL 2DE: ICD-10-PCS | Mod: HCNC,RT,, | Performed by: SURGERY

## 2022-01-07 RX ORDER — MEPERIDINE HYDROCHLORIDE 25 MG/ML
12.5 INJECTION INTRAMUSCULAR; INTRAVENOUS; SUBCUTANEOUS ONCE AS NEEDED
Status: DISCONTINUED | OUTPATIENT
Start: 2022-01-07 | End: 2022-01-07 | Stop reason: HOSPADM

## 2022-01-07 RX ORDER — CEFAZOLIN SODIUM 1 G/3ML
2 INJECTION, POWDER, FOR SOLUTION INTRAMUSCULAR; INTRAVENOUS
Status: COMPLETED | OUTPATIENT
Start: 2022-01-07 | End: 2022-01-07

## 2022-01-07 RX ORDER — FENTANYL CITRATE 50 UG/ML
INJECTION, SOLUTION INTRAMUSCULAR; INTRAVENOUS
Status: DISCONTINUED | OUTPATIENT
Start: 2022-01-07 | End: 2022-01-07

## 2022-01-07 RX ORDER — SODIUM CHLORIDE 9 MG/ML
INJECTION, SOLUTION INTRAVENOUS CONTINUOUS
Status: DISCONTINUED | OUTPATIENT
Start: 2022-01-07 | End: 2022-01-07 | Stop reason: HOSPADM

## 2022-01-07 RX ORDER — KETOROLAC TROMETHAMINE 30 MG/ML
INJECTION, SOLUTION INTRAMUSCULAR; INTRAVENOUS
Status: DISCONTINUED | OUTPATIENT
Start: 2022-01-07 | End: 2022-01-07

## 2022-01-07 RX ORDER — ONDANSETRON 2 MG/ML
INJECTION INTRAMUSCULAR; INTRAVENOUS
Status: DISCONTINUED | OUTPATIENT
Start: 2022-01-07 | End: 2022-01-07

## 2022-01-07 RX ORDER — LIDOCAINE HYDROCHLORIDE 20 MG/ML
INJECTION INTRAVENOUS
Status: DISCONTINUED | OUTPATIENT
Start: 2022-01-07 | End: 2022-01-07

## 2022-01-07 RX ORDER — ROCURONIUM BROMIDE 10 MG/ML
INJECTION, SOLUTION INTRAVENOUS
Status: DISCONTINUED | OUTPATIENT
Start: 2022-01-07 | End: 2022-01-07

## 2022-01-07 RX ORDER — SUCCINYLCHOLINE CHLORIDE 20 MG/ML
INJECTION INTRAMUSCULAR; INTRAVENOUS
Status: DISCONTINUED | OUTPATIENT
Start: 2022-01-07 | End: 2022-01-07

## 2022-01-07 RX ORDER — LIDOCAINE HYDROCHLORIDE 10 MG/ML
1 INJECTION, SOLUTION EPIDURAL; INFILTRATION; INTRACAUDAL; PERINEURAL ONCE AS NEEDED
Status: DISCONTINUED | OUTPATIENT
Start: 2022-01-07 | End: 2022-01-07 | Stop reason: HOSPADM

## 2022-01-07 RX ORDER — BUPIVACAINE HYDROCHLORIDE 2.5 MG/ML
INJECTION, SOLUTION EPIDURAL; INFILTRATION; INTRACAUDAL
Status: DISCONTINUED | OUTPATIENT
Start: 2022-01-07 | End: 2022-01-07 | Stop reason: HOSPADM

## 2022-01-07 RX ORDER — HYDROCODONE BITARTRATE AND ACETAMINOPHEN 5; 325 MG/1; MG/1
1 TABLET ORAL EVERY 6 HOURS PRN
Qty: 10 TABLET | Refills: 0 | Status: SHIPPED | OUTPATIENT
Start: 2022-01-07 | End: 2022-01-25

## 2022-01-07 RX ORDER — DEXAMETHASONE SODIUM PHOSPHATE 4 MG/ML
INJECTION, SOLUTION INTRA-ARTICULAR; INTRALESIONAL; INTRAMUSCULAR; INTRAVENOUS; SOFT TISSUE
Status: DISCONTINUED | OUTPATIENT
Start: 2022-01-07 | End: 2022-01-07

## 2022-01-07 RX ORDER — EPHEDRINE SULFATE 50 MG/ML
INJECTION, SOLUTION INTRAVENOUS
Status: DISCONTINUED | OUTPATIENT
Start: 2022-01-07 | End: 2022-01-07

## 2022-01-07 RX ORDER — PROPOFOL 10 MG/ML
VIAL (ML) INTRAVENOUS
Status: DISCONTINUED | OUTPATIENT
Start: 2022-01-07 | End: 2022-01-07

## 2022-01-07 RX ORDER — SODIUM CHLORIDE 0.9 % (FLUSH) 0.9 %
3 SYRINGE (ML) INJECTION
Status: DISCONTINUED | OUTPATIENT
Start: 2022-01-07 | End: 2022-01-07 | Stop reason: HOSPADM

## 2022-01-07 RX ORDER — SODIUM CHLORIDE, SODIUM LACTATE, POTASSIUM CHLORIDE, CALCIUM CHLORIDE 600; 310; 30; 20 MG/100ML; MG/100ML; MG/100ML; MG/100ML
INJECTION, SOLUTION INTRAVENOUS CONTINUOUS
Status: DISCONTINUED | OUTPATIENT
Start: 2022-01-07 | End: 2022-01-07 | Stop reason: HOSPADM

## 2022-01-07 RX ORDER — HYDROCODONE BITARTRATE AND ACETAMINOPHEN 5; 325 MG/1; MG/1
1 TABLET ORAL EVERY 6 HOURS PRN
Qty: 10 TABLET | Refills: 0 | Status: SHIPPED | OUTPATIENT
Start: 2022-01-07 | End: 2022-01-07 | Stop reason: SDUPTHER

## 2022-01-07 RX ORDER — HYDROMORPHONE HYDROCHLORIDE 2 MG/ML
0.4 INJECTION, SOLUTION INTRAMUSCULAR; INTRAVENOUS; SUBCUTANEOUS EVERY 5 MIN PRN
Status: DISCONTINUED | OUTPATIENT
Start: 2022-01-07 | End: 2022-01-07 | Stop reason: HOSPADM

## 2022-01-07 RX ORDER — DIPHENHYDRAMINE HYDROCHLORIDE 50 MG/ML
25 INJECTION INTRAMUSCULAR; INTRAVENOUS EVERY 6 HOURS PRN
Status: DISCONTINUED | OUTPATIENT
Start: 2022-01-07 | End: 2022-01-07 | Stop reason: HOSPADM

## 2022-01-07 RX ORDER — PROCHLORPERAZINE EDISYLATE 5 MG/ML
5 INJECTION INTRAMUSCULAR; INTRAVENOUS EVERY 30 MIN PRN
Status: DISCONTINUED | OUTPATIENT
Start: 2022-01-07 | End: 2022-01-07 | Stop reason: HOSPADM

## 2022-01-07 RX ORDER — OXYCODONE HYDROCHLORIDE 5 MG/1
5 TABLET ORAL
Status: DISCONTINUED | OUTPATIENT
Start: 2022-01-07 | End: 2022-01-07 | Stop reason: HOSPADM

## 2022-01-07 RX ADMIN — SUCCINYLCHOLINE CHLORIDE 100 MG: 20 INJECTION, SOLUTION INTRAMUSCULAR; INTRAVENOUS at 07:01

## 2022-01-07 RX ADMIN — GLYCOPYRROLATE 0.2 MG: 0.2 INJECTION, SOLUTION INTRAMUSCULAR; INTRAVITREAL at 07:01

## 2022-01-07 RX ADMIN — FENTANYL CITRATE 50 MCG: 50 INJECTION, SOLUTION INTRAMUSCULAR; INTRAVENOUS at 07:01

## 2022-01-07 RX ADMIN — PHENYLEPHRINE HYDROCHLORIDE 0.5 MCG/KG/MIN: 10 INJECTION INTRAVENOUS at 07:01

## 2022-01-07 RX ADMIN — KETOROLAC TROMETHAMINE 30 MG: 30 INJECTION, SOLUTION INTRAMUSCULAR; INTRAVENOUS at 08:01

## 2022-01-07 RX ADMIN — LIDOCAINE HYDROCHLORIDE 60 MG: 20 INJECTION, SOLUTION INTRAVENOUS at 07:01

## 2022-01-07 RX ADMIN — EPHEDRINE SULFATE 10 MG: 50 INJECTION INTRAVENOUS at 07:01

## 2022-01-07 RX ADMIN — ONDANSETRON HYDROCHLORIDE 4 MG: 2 INJECTION INTRAMUSCULAR; INTRAVENOUS at 07:01

## 2022-01-07 RX ADMIN — CEFAZOLIN 2 G: 330 INJECTION, POWDER, FOR SOLUTION INTRAMUSCULAR; INTRAVENOUS at 07:01

## 2022-01-07 RX ADMIN — PROPOFOL 50 MG: 10 INJECTION, EMULSION INTRAVENOUS at 07:01

## 2022-01-07 RX ADMIN — PROPOFOL 150 MG: 10 INJECTION, EMULSION INTRAVENOUS at 07:01

## 2022-01-07 RX ADMIN — ROCURONIUM BROMIDE 10 MG: 10 INJECTION, SOLUTION INTRAVENOUS at 07:01

## 2022-01-07 RX ADMIN — DEXAMETHASONE SODIUM PHOSPHATE 8 MG: 4 INJECTION, SOLUTION INTRAMUSCULAR; INTRAVENOUS at 07:01

## 2022-01-07 NOTE — H&P
The patient has been examined and the H&P has been reviewed:    I concur with the findings and no changes have occurred since H&P was written.    Surgery risks, benefits and alternative options discussed and understood by patient/family.        Subjective:      Patient ID: Olamide Felipe is a 88 y.o. female who presents with IDC right breast     Follow-up mammogram (21) showed focal asymmetry seen in the upper outer quadrant of the right breast in the posterior depth and ultrasound showed 3 mm x 3 mm x 2 mm irregularly shaped, non-parallel, hypoechoic mass with indistinct margins with shadowing seen in the right breast at 9 o'clock in the posterior depth, 6 cm from the nipple.. A ultrasound guided biopsy was performed on 21 with pathology revealing infiltrating ductal carcinoma of the right breast. Who presents to hospital today for elective right sided partial mastectomy.      Patient does not routinely do self breast exams.  Patient has not noted a change on breast exam.  Patient denies nipple discharge. Patient denies to previous breast biopsy. Patient denies a personal history of breast cancer.     Findings at that time were the following:   Tumor size: 0.4 cm   Tumor stgstrstastdstest:st st1st Estrogen Receptor: -   Progesterone Receptor: -   Her-2 lizz: -   Lymph node status: clinically negative         GYN History:  Age of menarche was 13. Age of menopause was 38. Patient admits to hormonal therapy. Patient is . Age of first live birth was 24. Patient did breast feed.          Past Medical History:   Diagnosis Date    Hyperlipidemia      Hypothyroidism      Osteoarthritis, knee      Vertigo              Past Surgical History:   Procedure Laterality Date    dentures        FRACTURE SURGERY Left       fracture left wrist    HYSTERECTOMY        left wrist surgery                 Current Outpatient Medications on File Prior to Visit   Medication Sig Dispense Refill    aspirin 81 MG Chew Take 81 mg by mouth  once daily.        b complex vitamins tablet Take 1 tablet by mouth once daily.        calcium-vitamin D 250-100 mg-unit per tablet Take 1 tablet by mouth 2 (two) times daily.        FLUoxetine 20 MG capsule Take 1 capsule (20 mg total) by mouth once daily. 90 capsule 0    levothyroxine (SYNTHROID) 25 MCG tablet Take 1 tablet (25 mcg total) by mouth before breakfast. 90 tablet 3    meclizine (ANTIVERT) 12.5 mg tablet Take 1 tablet (12.5 mg total) by mouth 3 (three) times daily as needed for Dizziness. 20 tablet 0    mv-min/iron/folic/calcium/vitK (WOMEN'S MULTIVITAMIN ORAL) Take by mouth once daily.        niacin 500 MG Tab Take 500 mg by mouth 2 (two) times daily with meals.        omeprazole (PRILOSEC) 20 MG capsule Take 1 capsule (20 mg total) by mouth once daily. 90 capsule 0    simvastatin (ZOCOR) 20 MG tablet Take 1 tablet (20 mg total) by mouth every evening. 90 tablet 0      No current facility-administered medications on file prior to visit.      Social History            Socioeconomic History    Marital status:    Tobacco Use    Smoking status: Former Smoker       Quit date: 1989       Years since quittin.5    Smokeless tobacco: Never Used   Substance and Sexual Activity    Alcohol use: Yes       Alcohol/week: 1.0 standard drink       Types: 1 Glasses of wine per week       Comment: 1\3 glass ofd wine with dinner nightly    Drug use: Never    Sexual activity: Not Currently            Family History   Problem Relation Age of Onset    No Known Problems Daughter      No Known Problems Son      No Known Problems Sister           Review of Systems   Constitutional: Negative.    HENT: Negative.    Eyes: Negative.    Respiratory: Negative.    Cardiovascular: Negative.    Gastrointestinal: Negative.    Endocrine: Negative.    Genitourinary: Negative.    Musculoskeletal: Negative.    All other systems reviewed and are negative.     Objective:   There were no vitals taken for this  visit.     Physical Exam   Constitutional: She appears well-developed.   HENT:   Head: Normocephalic and atraumatic.   Eyes: Pupils are equal, round, and reactive to light.   Cardiovascular: Normal rate.    Pulmonary/Chest: Effort normal and breath sounds normal.   Abdominal: Soft.   Musculoskeletal: Normal range of motion.   Neurological: She is alert.   Skin: Skin is warm.     Psychiatric: She has a normal mood and affect.         Radiology review: Images personally reviewed by me in the clinic.   Mammogram & Ultrasound: 12/8/21  Result:   Mammo Digital Diagnostic Right with Shay  US Breast Right Limited     History:  Patient is 88 y.o. and is seen for inconclusive screening mammogram.     Films Compared:  Compared to: 11/23/2021 Mammo Digital Screening Bilat w/ Shay and 06/24/2019 Mammo Digital Screening Bilat w/ Shay     Findings:  This procedure was performed using tomosynthesis. Computer-aided detection was utilized in the interpretation of this examination.  The right breast is extremely dense, which lowers the sensitivity of mammography.      Mammo Digital Diagnostic Right with Shay  Right  Focal Asymmetry: There is a focal asymmetry seen in the upper outer quadrant of the right breast in the posterior depth.      US Breast Right Limited  Right  Mass: There is a 3 mm x 3 mm x 2 mm irregularly shaped, non-parallel, hypoechoic mass with indistinct margins with shadowing seen in the right breast at 9 o'clock in the posterior depth, 6 cm from the nipple. The mass correlates with the asymmetry seen on the mammogram. Associated features include internal vascularity. There is no axillary adenopathy.      Impression:  Right  Mass: Right breast 3 mm x 3 mm x 2 mm mass at the posterior 9 o'clock position. Assessment: 4 - Suspicious finding. Biopsy is recommended.      BI-RADS Category:   Overall: 4 - Suspicious  Assessment:       No diagnosis found.    Plan:      Options for management were discussed with the patient  and her family. We reviewed the existing data noting the equivalency of breast conserving surgery with radiation therapy and mastectomy. We also reviewed the guidelines of the National Comprehensive Cancer Network for breast carcinoma. We discussed the need for lumpectomy margins to be negative for carcinoma, the necessity for postoperative radiation therapy after breast conservation in most cases, the possibility of a failed or false negative sentinel lymph node biopsy and the potential need for complete lymphadenectomy for a failed or positive sentinel lymph node biopsy were fully discussed. In the setting of mastectomy, delayed or immediate reconstruction options are available and were discussed.      In the setting of lumpectomy, radiation therapy would be recommended majority of the time.  The duration and treatment side effects were discussed with the patient.  This will coordinated with the radiation oncologist pending final pathology.     We also discussed the role of systemic therapy in the treatment of early stage breast cancer.  We discussed that this is based on tumor biology and michael status and will be determined based on final pathology.  We discussed that if the cancer is hormone positive, endocrine therapy would be recommended in most cases and its use can reduce the risk of recurrence as well as improve survival. Side effects of treatment were briefly discussed. We also discussed the potential role for chemotherapy based on a number of factors such as tumor phenotype (ER+ vs. triple negative vs. Axx2snp+) and this would be determined in coordination with the medical oncologist.     The patient, in consultation with her family, has elected to proceed with right partial mastectomy with reflector and SLNB. The operative risks of bleeding, infection, recurrence, scarring, and anesthetic complications and the possibility of requiring further surgery were all noted and informed consent  obtained.     Genetics     Surgery scheduled. Follow-up in clinic roughly 14 days after surgery.      Patient was educated on breast cancer, receptors, wire localization lumpectomy, mastectomy, sentinel lymph node mapping and biopsy, axillary lymph node dissection, reconstruction, breast prosthesis with post-mastectomy bra and radiation therapy. Patient was given patient information binder including [facility] breast cancer treatment brochure.  All her questions were answered.

## 2022-01-07 NOTE — TRANSFER OF CARE
"Anesthesia Transfer of Care Note    Patient: Olamide Felipe    Procedure(s) Performed: Procedure(s) (LRB):  MASTECTOMY, PARTIAL-Right with radiological marker (Right)  BIOPSY, LYMPH NODE, SENTINEL-Right (Right)  INJECTION, FOR SENTINEL NODE IDENTIFICATION-Right (Right)    Patient location: PACU    Anesthesia Type: general    Transport from OR: Transported from OR on room air with adequate spontaneous ventilation    Post pain: adequate analgesia    Post assessment: no apparent anesthetic complications and tolerated procedure well    Post vital signs: stable    Level of consciousness: awake, alert and agitated    Nausea/Vomiting: no nausea/vomiting    Complications: none    Transfer of care protocol was followed      Last vitals:   Visit Vitals  BP (!) 185/73 (BP Location: Right arm, Patient Position: Lying)   Pulse 72   Temp 37 °C (98.6 °F) (Oral)   Resp 18   Ht 5' 5" (1.651 m)   Wt 54.4 kg (120 lb)   SpO2 98%   Breastfeeding No   BMI 19.97 kg/m²     "

## 2022-01-07 NOTE — DISCHARGE INSTRUCTIONS
Anesthesia: After Your Surgery  Youve just had surgery. During surgery, you received medication called anesthesia to keep you comfortable and pain-free. After surgery, you may experience some pain or nausea. This is common. Here are some tips for feeling better and recovering after surgery.    Going home  Your doctor or nurse will show you how to take care of yourself when you go home. He or she will also answer your questions. Have an adult family member or friend drive you home. For the first 24 hours after your surgery:  · Do not drive or use heavy equipment.  · Do not make important decisions or sign legal documents.  · Avoid alcohol.  · Have someone stay with you, if needed. He or she can watch for problems and help keep you safe.  · Take your time getting up from a seated or lying position. You may experience dizziness for 24 hours  Be sure to keep all follow-up appointments with your doctor. And rest after your procedure for as long as your doctor tells you to.    Coping with pain  If you have pain after surgery, pain medication will help you feel better. Take it as directed, before pain becomes severe. Also, ask your doctor or pharmacist about other ways to control pain, such as with heat, ice, and relaxation. And follow any other instructions your surgeon or nurse gives you.    URINARY RETENTION  Should you experience a decrease in your urine output or are unable to urinate following surgery, this can be due to the medications given during surgery.  We recommend you going to the nearest Emergency Department.    Tips for taking pain medication  To get the best relief possible, remember these points:  · Pain medications can upset your stomach. Taking them with a little food may help.  · Most pain relievers taken by mouth need at least 20 to 30 minutes to take effect.  · Taking medication on a schedule can help you remember to take it. Try to time your medication so that you can take it before beginning an  activity, such as dressing, walking, or sitting down for dinner.  · Constipation is a common side effect of pain medications. Contact your doctor before taking any medications like laxatives or stool softeners to help relieve constipation. Also ask about any dietary restrictions, because drinking lots of fluids and eating foods like fruits and vegetables that are high in fiber can also help. Remember, dont take laxatives unless your surgeon has prescribed them.  · Mixing alcohol and pain medication can cause dizziness and slow your breathing. It can even be fatal. Dont drink alcohol while taking pain medication.  · Pain medication can slow your reflexes. Dont drive or operate machinery while taking pain medication.  If your health care provider tells you to take acetaminophen to help relieve your pain, ask him or her how much you are supposed to take each day. (Acetaminophen is the generic name for Tylenol and other brand-name pain relievers.) Acetaminophen or other pain relievers may interact with your prescription medicines or other over-the-counter (OTC) drugs. Some prescription medications contain acetaminophen along with other active ingredients. Using both prescription and OTC acetaminophen for pain can cause you to overdose. The FDA recommends that you read the labels on your OTC medications carefully. This will help you to clearly understand the list of active ingredients, dosing instructions, and any warnings. It may also help you avoid taking too much acetaminophen. If you have questions or don't understand the information, ask your pharmacist or health care provider to explain it to you before you take the OTC medication.    Managing nausea  Some people have an upset stomach after surgery. This is often due to anesthesia, pain, pain medications, or the stress of surgery. The following tips will help you manage nausea and get good nutrition as you recover. If you were on a special diet before surgery,  ask your doctor if you should follow it during recovery. These tips may help:  · Dont push yourself to eat. Your body will tell you when to eat and how much.  · Start off with clear liquids and soup. They are easier to digest.  · Progress to semi-solid foods (mashed potatoes, applesauce, and gelatin) as you feel ready.  · Slowly move to solid foods. Dont eat fatty, rich, or spicy foods at first.  · Dont force yourself to have three large meals a day. Instead, eat smaller amounts more often.  · Take pain medications with a small amount of solid food, such as crackers or toast to avoid nausea.      Call your surgeon if    · You feel too sleepy, dizzy, or groggy (medication may be too strong).  · You have side effects like nausea, vomiting, or skin changes (rash, itching, or hives).   © 7681-2382 [employer]. [address] [place] [address], [place] [address]. All rights reserved. This information is not intended as a substitute for professional medical care. Always follow your healthcare professional's instructions.

## 2022-01-07 NOTE — PLAN OF CARE
Olamide Felipe has met all discharge criteria from Phase II. Vital Signs are stable, ambulating  without difficulty. Discharge instructions given, patient verbalized understanding. Discharged from facility via wheelchair in stable condition.

## 2022-01-07 NOTE — OP NOTE
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 right breast reflector with ultrasound localization partial mastectomy (lumpectomy) with excision for clear margins   2. right axillary deep sentinel lymph node biopsy   3. injection of right breast with technetium-labeled radiocolloid for sentinel lymph node identification  4. Identification of sentinel lymph node right         PROCEDURE IN DETAIL:   The patient underwent informed consent.  The films were reviewed.    She was then brought to the Operating Room and placed in the supine position. local and general anesthesia was administered.    The right breast was injected in the subareolar region with the technetium-labeled radiocolloid.  The right breast, anterior chest, arm and axilla were then prepped and draped in a sterile fashion.     Using the gamma probe, activity was noted and localized in the right axilla. Local anesthetic with 1% lidocaine was injected into the skin where the incision will be placed. We made a small transverse inferior axillary incision over the area of activity. We then dissected down through the clavipectoral fascia using electrocautery, identifying a hot afferent lymphatic channel coming from the upper outer quadrant axillary tail of Barba breast tissue to a level 1 sentinel node, which was excised. It was dissected circumferentially with blunt dissection and the afferent and efferent lymphatics were tied together. The lymph node was labeled as specimen #1 for permanent sectioning, hot and not blue with an ex vivo count of 116. A total of 1 axillary sentinel lymph nodes were removed.  The probe was placed in the cavity and there was no significant residual background  radioactivity or blue dye noted in the axilla indicating adequate and appropriate sentinel lymph node biopsy. The cavity was then palpated and 0 further palpable nodes were noted. Any additional palpable nodes were sent to pathology for permanent section.       We then achieved hemostasis and irrigation was performed.  The incision was then closed with an interuppted 3-0 vicryl deep dermal followed by a running 4-0 vicryl subcuticular.    Next, we turned our attention to the right breast itself. Ultrasound was used to identify the breast mass at 9 o'clock.  The area if interest was identified with a clip within and then was marked for localization using ultrasound guidance.  An incision was made in the upper outer quadrant of the right breast over the anticipated tract of the lesion in a jayna-tumoral location.  The specimen was dissected circumferentially around the cancer.  We did dissect all the way down to, and including the underlying pectoralis fascia.  The ultrasound with reflector localization lumpectomy specimen was inked on the back table using green ink inferiorly, blue ink superiorly, orange ink laterally, yellow ink anteriorly, black ink posteriorly, and the red ink medially.  It was then fixed with acetic acid and submitted for specimen radiograph with the [facility], which confirmed the clip and area of interest within the specimen, and was interpreted in the operating room. Given the appearance and location of the lesion, additional margins were taken including lateral.       Within the lumpectomy cavity, hemostasis was achieved with cautery. The wound was irrigated until clear. There was no evidence of bleeding. It was closed in multiple layers with deep dermal and subcutaneous interrupted Vicryl sutures and a running 4-0 vicryl subcuticular skin closure.    Dermabond was applied. Sterile fluff gauze was placed and a post-procedure bra was placed. She tolerated the procedure well without complication and  was turned over to Anesthesia for transport to the recovery area in a satisfactory condition. All specimens were sent to Pathology for permanent sectioning.    ESTIMATED BLOOD LOSS: 5ml    COMPLICATIONS: none    DISPOSITION:  PACU--hemodynamically stable    ATTESTATION:  I was present and scrubbed for the entire procedure.

## 2022-01-07 NOTE — ANESTHESIA PROCEDURE NOTES
Intubation    Date/Time: 1/7/2022 7:06 AM  Performed by: Elton Sparks CRNA  Authorized by: Elroy Ann MD     Intubation:     Induction:  Intravenous    Intubated:  Postinduction    Mask Ventilation:  Easy with oral airway    Attempts:  1    Attempted By:  CRNA    Method of Intubation:  Video laryngoscopy    Blade:  Peters 3    Laryngeal View Grade: Grade I - full view of cords      Difficult Airway Encountered?: No      Complications:  None    Airway Device:  Oral endotracheal tube    Airway Device Size:  7.0    Style/Cuff Inflation:  Cuffed    Inflation Amount (mL):  6    Placement Verified By:  Capnometry    Complicating Factors:  None    Findings Post-Intubation:  BS equal bilateral and atraumatic/condition of teeth unchanged

## 2022-01-07 NOTE — BRIEF OP NOTE
[facility] - Surgery ([place])  Brief Operative Note    Surgery Date: 1/7/2022     Surgeon(s) and Role:     * Marci Mcintosh MD - Primary    Assisting Surgeon: None    Pre-op Diagnosis:  Malignant neoplasm of upper-outer quadrant of right breast in female, estrogen receptor negative [C50.411, Z17.1]    Post-op Diagnosis:  Post-Op Diagnosis Codes:     * Malignant neoplasm of upper-outer quadrant of right breast in female, estrogen receptor negative [C50.411, Z17.1]    Procedure(s) (LRB):  MASTECTOMY, PARTIAL-Right with radiological marker (Right)  BIOPSY, LYMPH NODE, DEEP SENTINEL-Right (Right)  INJECTION, FOR SENTINEL NODE IDENTIFICATION-Right (Right)    Anesthesia: General    Operative Findings: See op note. One sentinel node.  Specimen radiograph with clip and reflector within.    Estimated Blood Loss: minimal         Specimens:   Specimen (24h ago, onward)                 Start     Ordered    01/07/22 0758  Specimen to Pathology, Surgery Breast  Once        Comments: Pre-op Diagnosis: Malignant neoplasm of upper-outer quadrant of right breast in female, estrogen receptor negative [C50.411, Z17.1]    Procedure(s):  MASTECTOMY, PARTIAL-Right with radiological marker  BIOPSY, LYMPH NODE, SENTINEL-Right  INJECTION, FOR SENTINEL NODE IDENTIFICATION-Right  LYMPHADENECTOMY, AXILLARY-Right     Number of specimens: 3    Name of specimens: 1. Right lumpectomy, green inferior, blue superior, orange lateral, yellow anterior, black posterior, red medial   2. Right axillary sentinel node, hot 116  3. New lateral margin, ink marks new margin   References:    Click here for ordering Quick Tip   Question Answer Comment   Procedure Type: Breast    Specimen Class: Known or suspected malignancy    Release to patient Immediate        01/07/22 0814                      Discharge Note    OUTCOME: Patient tolerated treatment/procedure well without complication and is now ready for discharge.    DISPOSITION: Home or Self Care    FINAL  DIAGNOSIS:  Malignant neoplasm of upper-outer quadrant of right breast in female, estrogen receptor negative [C50.411, Z17.1]    FOLLOWUP: In clinic    DISCHARGE INSTRUCTIONS:    Discharge Procedure Orders   Diet Adult Regular     Notify your health care provider if you experience any of the following:     Notify your health care provider if you experience any of the following:  increased confusion or weakness     Notify your health care provider if you experience any of the following:  persistent dizziness, light-headedness, or visual disturbances     Notify your health care provider if you experience any of the following:  worsening rash     Notify your health care provider if you experience any of the following:  severe persistent headache     Notify your health care provider if you experience any of the following:  difficulty breathing or increased cough     Notify your health care provider if you experience any of the following:  redness, tenderness, or signs of infection (pain, swelling, redness, odor or green/yellow discharge around incision site)     Notify your health care provider if you experience any of the following:  severe uncontrolled pain     Notify your health care provider if you experience any of the following:  persistent nausea and vomiting or diarrhea     Notify your health care provider if you experience any of the following:  temperature >100.4     Activity as tolerated

## 2022-01-07 NOTE — PATIENT INSTRUCTIONS
POSTOPERATIVE INSTRUCTIONS FOLLOWING BIOPSY OR LUMPECTOMY    The following are post-operative instructions that will help you to recover from your surgery.  Please read over these instructions carefully and contact us if we can answer any of your questions or concerns.    Dressing/breast binder (surgi-bra)  A surgical bra may be placed around your chest after your surgery.  If you are given the bra, please wear it as close to 24 hours a day as possible until your post-operative clinic appointment.  If the elastic around the bra irritates your skin, you may wear a soft t-shirt underneath the bra.  You may go without wearing the bra long enough to shower, to launder and dry the bra.  If the bra is extremely uncomfortable, you may wear a supportive sports bra instead after 2 days.  You may shower the day after surgery.  Do not take a tub bath and do not soak the surgical site.    Activity   You should be able to return to your regular activities 2 days after your surgery.  However, do not engage in strenuous activities in which you use your upper body such as:  golf, tennis, aerobics, washing windows, raking the yard, mopping, vacuuming, heavy lifting (e.g children) until you are seen for your follow-up appointment in clinic.    Medication for pain  You may find that over the counter pain medications may be sufficient for your pain.  You will be given a prescription for pain medication for more severe pain.  You should not drive or operate machinery while taking these.  Please take narcotics with food.  Narcotics can cause, or worse, constipation.  You will need to increase your fluid intake, eat high fiber foods (such as fruits and bran) and make sure that you are up and walking. You may need to take an over the counter stool softener for constipation.    Please report the following:   Temperature greater than 101 degrees   Discharge or bad odor from the wound   Excessive bleeding, such as bloody dressing or  extreme bruising   Redness at incision and/or drain sites   Swelling or buildup of fluid around incision     Additional information  I will see you approximately 2 weeks following your surgery.  If this follow-up appointment has not been made, please call the office.    If you have any questions or problems, please call my office or my nurse.    MD Magy Fernandez, RN  [phone]    After hours and on weekends, you may call the main [facility] line at [phone] and ask to have the general surgery resident paged or have me paged.

## 2022-01-07 NOTE — ANESTHESIA POSTPROCEDURE EVALUATION
Anesthesia Post Evaluation    Patient: Olamide Felipe    Procedure(s) Performed: Procedure(s) (LRB):  MASTECTOMY, PARTIAL-Right with radiological marker (Right)  BIOPSY, LYMPH NODE, SENTINEL-Right (Right)  INJECTION, FOR SENTINEL NODE IDENTIFICATION-Right (Right)    Final Anesthesia Type: general      Patient location during evaluation: PACU  Patient participation: Yes- Able to Participate  Level of consciousness: awake and alert  Post-procedure vital signs: reviewed and stable  Pain management: adequate  Airway patency: patent    PONV status at discharge: No PONV  Anesthetic complications: no      Cardiovascular status: blood pressure returned to baseline and stable  Respiratory status: room air  Hydration status: euvolemic  Follow-up not needed.          Vitals Value Taken Time   /53 01/07/22 1027   Temp 36.8 °C (98.3 °F) 01/07/22 0927   Pulse 87 01/07/22 1027   Resp 16 01/07/22 1027   SpO2 95 % 01/07/22 1027         Event Time   Out of Recovery 09:23:49         Pain/Barbie Score: Barbie Score: 10 (1/7/2022 10:27 AM)

## 2022-01-13 LAB
COMMENT: NORMAL
FINAL PATHOLOGIC DIAGNOSIS: NORMAL
GROSS: NORMAL
Lab: NORMAL

## 2022-01-14 ENCOUNTER — OFFICE VISIT (OUTPATIENT)
Dept: HEMATOLOGY/ONCOLOGY | Facility: CLINIC | Age: 87
End: 2022-01-14
Payer: MEDICARE

## 2022-01-14 VITALS
HEART RATE: 74 BPM | OXYGEN SATURATION: 93 % | SYSTOLIC BLOOD PRESSURE: 161 MMHG | DIASTOLIC BLOOD PRESSURE: 71 MMHG | BODY MASS INDEX: 20.39 KG/M2 | HEIGHT: 65 IN | WEIGHT: 122.38 LBS | TEMPERATURE: 98 F | RESPIRATION RATE: 18 BRPM

## 2022-01-14 DIAGNOSIS — C50.411 MALIGNANT NEOPLASM OF UPPER-OUTER QUADRANT OF RIGHT BREAST IN FEMALE, ESTROGEN RECEPTOR NEGATIVE: Primary | ICD-10-CM

## 2022-01-14 DIAGNOSIS — Z17.1 MALIGNANT NEOPLASM OF UPPER-OUTER QUADRANT OF RIGHT BREAST IN FEMALE, ESTROGEN RECEPTOR NEGATIVE: Primary | ICD-10-CM

## 2022-01-14 DIAGNOSIS — I15.9 SECONDARY HYPERTENSION: ICD-10-CM

## 2022-01-14 PROCEDURE — 1159F PR MEDICATION LIST DOCUMENTED IN MEDICAL RECORD: ICD-10-PCS | Mod: HCNC,CPTII,S$GLB, | Performed by: INTERNAL MEDICINE

## 2022-01-14 PROCEDURE — 99215 PR OFFICE/OUTPT VISIT, EST, LEVL V, 40-54 MIN: ICD-10-PCS | Mod: HCNC,S$GLB,, | Performed by: INTERNAL MEDICINE

## 2022-01-14 PROCEDURE — 1159F MED LIST DOCD IN RCRD: CPT | Mod: HCNC,CPTII,S$GLB, | Performed by: INTERNAL MEDICINE

## 2022-01-14 PROCEDURE — 1101F PR PT FALLS ASSESS DOC 0-1 FALLS W/OUT INJ PAST YR: ICD-10-PCS | Mod: HCNC,CPTII,S$GLB, | Performed by: INTERNAL MEDICINE

## 2022-01-14 PROCEDURE — 99499 RISK ADDL DX/OHS AUDIT: ICD-10-PCS | Mod: HCNC,S$GLB,, | Performed by: INTERNAL MEDICINE

## 2022-01-14 PROCEDURE — 1101F PT FALLS ASSESS-DOCD LE1/YR: CPT | Mod: HCNC,CPTII,S$GLB, | Performed by: INTERNAL MEDICINE

## 2022-01-14 PROCEDURE — 1126F AMNT PAIN NOTED NONE PRSNT: CPT | Mod: HCNC,CPTII,S$GLB, | Performed by: INTERNAL MEDICINE

## 2022-01-14 PROCEDURE — 3288F FALL RISK ASSESSMENT DOCD: CPT | Mod: HCNC,CPTII,S$GLB, | Performed by: INTERNAL MEDICINE

## 2022-01-14 PROCEDURE — 3288F PR FALLS RISK ASSESSMENT DOCUMENTED: ICD-10-PCS | Mod: HCNC,CPTII,S$GLB, | Performed by: INTERNAL MEDICINE

## 2022-01-14 PROCEDURE — 1160F RVW MEDS BY RX/DR IN RCRD: CPT | Mod: HCNC,CPTII,S$GLB, | Performed by: INTERNAL MEDICINE

## 2022-01-14 PROCEDURE — 99215 OFFICE O/P EST HI 40 MIN: CPT | Mod: HCNC,S$GLB,, | Performed by: INTERNAL MEDICINE

## 2022-01-14 PROCEDURE — 99499 UNLISTED E&M SERVICE: CPT | Mod: HCNC,S$GLB,, | Performed by: INTERNAL MEDICINE

## 2022-01-14 PROCEDURE — 99999 PR PBB SHADOW E&M-EST. PATIENT-LVL IV: ICD-10-PCS | Mod: PBBFAC,HCNC,, | Performed by: INTERNAL MEDICINE

## 2022-01-14 PROCEDURE — 1126F PR PAIN SEVERITY QUANTIFIED, NO PAIN PRESENT: ICD-10-PCS | Mod: HCNC,CPTII,S$GLB, | Performed by: INTERNAL MEDICINE

## 2022-01-14 PROCEDURE — 1160F PR REVIEW ALL MEDS BY PRESCRIBER/CLIN PHARMACIST DOCUMENTED: ICD-10-PCS | Mod: HCNC,CPTII,S$GLB, | Performed by: INTERNAL MEDICINE

## 2022-01-14 PROCEDURE — 99999 PR PBB SHADOW E&M-EST. PATIENT-LVL IV: CPT | Mod: PBBFAC,HCNC,, | Performed by: INTERNAL MEDICINE

## 2022-01-14 NOTE — PROGRESS NOTES
PROGRESS NOTE    Subjective:       Patient ID: Olamide Felipe is a 88 y.o. female.  MRN: [id number]  : 2/3/1933    Chief Complaint: Malignant neoplasm of upper-outer quadrant of right breast       History of Present Illness:   Olamide Felipe is a 88 y.o. female who presents with  invasive ductal carcinoma of the right breast.      As previously documented, she has been having screening mammograms every 2 years.  This year, it was delayed by a couple of months and a focal right breast asymmetry was noted on the screening mammogram at the end of 2021. She was sent for a diagnostic mammogram and ultrasound that confirmed a right breast mass, 3 mm x 2 mm x 2 mm.  She underwent a right breast biopsy that shows invasive ductal carcinoma, grade 2, triple negative, Ki-67 60% .     She is in good health.  Has recently relocated from [place] to be close to family since . She lives independently in an assisted living facility.  Recent history of vertigo, used meclizine for some time and resolved spontaneously in   Active, takes 5000 steps daily at baseline.       Interim history:   Since her last visit, she has undergone a right lumpectomy and SN exam. The tumor was 0.4 cm, final margins are negative.     She has also completed genetic testing, Shaser, which is negative.   She is recovering well from surgery with minimal shoulder and breast discomfort, relieved with tylenol. She does report fatigue and has not been walking around as much that she attributes to the cold.     Her weight , appetite and PS are stable. She continues to live independently with family close by.     Oncology History:  21:  Mammogram:  Impression:  Right  Focal Asymmetry: Right breast focal asymmetry at the upper outer posterior position. Assessment: 0 - Incomplete. Diagnostic Mammogram and/or Ultrasound is recommended.      Left  There is no mammographic evidence  of malignancy in the left breast.     BI-RADS Category:   Overall: 0 - Incomplete: Needs Additional Imaging Evaluation     Recommendation:  Diagnostic mammogram with possible ultrasound (if indicated) is recommended.     12/8/21:  Mammo Digital Diagnostic Right with Shay  Right  Focal Asymmetry: There is a focal asymmetry seen in the upper outer quadrant of the right breast in the posterior depth.      US Breast Right Limited  Right  Mass: There is a 3 mm x 3 mm x 2 mm irregularly shaped, non-parallel, hypoechoic mass with indistinct margins with shadowing seen in the right breast at 9 o'clock in the posterior depth, 6 cm from the nipple. The mass correlates with the asymmetry seen on the mammogram. Associated features include internal vascularity. There is no axillary adenopathy.      Impression:  Right  Mass: Right breast 3 mm x 3 mm x 2 mm mass at the posterior 9 o'clock position. Assessment: 4 - Suspicious finding. Biopsy is recommended.      BI-RADS Category:   Overall: 4 - Suspicious        12/14/21     /Final Pathologi/c Diagnosis RIGHT BREAST, CORE NEEDLE BIOPSIES:   - Invasive ductal carcinoma   - Grove histologic grade 2 (moderately differentiated)   - Tumor measures at least 0.4 cm            Breast cancer biomarker interpretation:   - Estrogen receptors:  Negative (less than 1%)   - Progesterone receptors:  Negative (less than 1%)   - HER2:  Negative (0)   - Ki-67:  60%          1/7/22     Final Pathologic Diagnosis 1.  BREAST, RIGHT, LUMPECTOMY:   - Invasive ductal carcinoma breast, see Tumor Synoptic.   - Size of invasive carcinoma:  4 MM.   - Grove Histologic Score: Grade 2 of 3.                     Tubule Formation:  2                     Nuclear Pleomorphism:  2                     Mitotic Activity:  2   - No in-situ component identified.   - Margins:   ·tab Invasive carcinoma is 5 MM from nearest anterior margin (unbound by   skin).   ·tab Invasive carcinoma is 10 MM from nearest  posterior margin.   - Benign skin, negative for carcinoma.   - No lymphovascular invasion.   - Microcalcifications: Seen in association with atherosclerotic vessels.   - Pathologic staing: pT1a (sn)N0   2. AXILLARY SENTINEL LYMPH NODE, RIGHT, , EXCISION:   - One benign axillary sentinel lymph node, negative for metastatic carcinoma   (0/1).   - Immunohistochemical stains for CK WSK, Cam 5.2, and CK AE1/AE3 are   negative, supporting the diagnosis.   3. BREAST, PRESUMED RIGHT, NEW LATERAL MARGIN, EXCISION:   - Negative for atypia or carcinoma.   - Benign breast tissue with no significant histopathologic alterations.     PT1a(sn)N0      History:  Past Medical History:   Diagnosis Date    Hyperlipidemia     Hypothyroidism     Malignant neoplasm of upper-outer quadrant of right breast in female, estrogen receptor negative 2022    Osteoarthritis, knee     Vertigo       Past Surgical History:   Procedure Laterality Date    dentures      FRACTURE SURGERY Left     fracture left wrist    HYSTERECTOMY      INJECTION FOR SENTINEL NODE IDENTIFICATION Right 2022    Procedure: INJECTION, FOR SENTINEL NODE IDENTIFICATION-Right;  Surgeon: Marci Mcintosh MD;  Location: [facility];  Service: General;  Laterality: Right;    left wrist surgery      MASTECTOMY, PARTIAL Right 2022    Procedure: MASTECTOMY, PARTIAL-Right with radiological marker;  Surgeon: Marci Mcintosh MD;  [facility];  Service: General;  Laterality: Right;  REQUEST SAID 2 HOUR CASE    SENTINEL LYMPH NODE BIOPSY Right 2022    Procedure: BIOPSY, LYMPH NODE, SENTINEL-Right;  Surgeon: Marci Mcintosh MD;  Location: [facility];  Service: General;  Laterality: Right;     Family History   Problem Relation Age of Onset    No Known Problems Daughter     No Known Problems Son     No Known Problems Sister      Social History     Tobacco Use    Smoking status: Former Smoker     Quit date: 1989     Years since quittin.6    Smokeless  "tobacco: Never Used   Substance and Sexual Activity    Alcohol use: Yes     Alcohol/week: 1.0 standard drink     Types: 1 Glasses of wine per week     Comment: 1\3 glass ofd wine with dinner nightly    Drug use: Never    Sexual activity: Not Currently        ROS:   Review of Systems   Constitutional: Negative for fever, malaise/fatigue and weight loss.   HENT: Negative for congestion, hearing loss, nosebleeds and sore throat.    Eyes: Negative for double vision and photophobia.   Respiratory: Negative for cough, hemoptysis, sputum production, shortness of breath and wheezing.    Cardiovascular: Negative for chest pain, palpitations, orthopnea and leg swelling.   Gastrointestinal: Negative for abdominal pain, blood in stool, constipation, diarrhea, heartburn, nausea and vomiting.   Genitourinary: Negative for dysuria, hematuria and urgency.   Musculoskeletal: Positive for joint pain (right shoulder ). Negative for back pain and myalgias.   Skin: Negative for itching and rash.        Post op pain    Neurological: Negative for dizziness, tingling, seizures, weakness and headaches.   Endo/Heme/Allergies: Negative for polydipsia. Does not bruise/bleed easily.   Psychiatric/Behavioral: Negative for depression and memory loss. The patient is nervous/anxious. The patient does not have insomnia.         Objective:     Vitals:    01/14/22 1349   BP: (!) 161/71   Pulse: 74   Resp: 18   Temp: 97.5 °F (36.4 °C)   TempSrc: Oral   SpO2: (!) 93%   Weight: 55.5 kg (122 lb 5.7 oz)   Height: 5' 5" (1.651 m)   PainSc: 0-No pain     Wt Readings from Last 10 Encounters:   01/14/22 55.5 kg (122 lb 5.7 oz)   12/27/21 54.4 kg (120 lb)   12/27/21 54.4 kg (120 lb)   12/21/21 55.8 kg (123 lb)   12/17/21 55.5 kg (122 lb 5.7 oz)   12/08/21 54.9 kg (121 lb)   12/06/21 55.1 kg (121 lb 7.6 oz)   07/07/21 56.4 kg (124 lb 5.4 oz)   04/30/21 55.4 kg (122 lb 2.2 oz)   04/28/21 55.6 kg (122 lb 9.2 oz)       Physical Examination:   Physical " Exam  Vitals and nursing note reviewed.   Constitutional:       General: She is not in acute distress.     Appearance: She is not diaphoretic.   HENT:      Head: Normocephalic.      Mouth/Throat:      Mouth: Oropharynx is clear and moist.      Pharynx: No oropharyngeal exudate.   Eyes:      General: No scleral icterus.     Extraocular Movements: EOM normal.      Conjunctiva/sclera: Conjunctivae normal.   Neck:      Thyroid: No thyromegaly.   Cardiovascular:      Rate and Rhythm: Normal rate and regular rhythm.      Heart sounds: Normal heart sounds. No murmur heard.      Pulmonary:      Effort: Pulmonary effort is normal. No respiratory distress.      Breath sounds: No stridor. No wheezing or rales.   Chest:      Chest wall: No tenderness.   Abdominal:      General: Bowel sounds are normal. There is no distension.      Palpations: Abdomen is soft. There is no mass.      Tenderness: There is no abdominal tenderness. There is no rebound.   Musculoskeletal:         General: No tenderness, deformity or edema. Normal range of motion.      Cervical back: Neck supple.   Lymphadenopathy:      Cervical: No cervical adenopathy.   Skin:     General: Skin is warm and dry.      Findings: No erythema or rash.      Comments: Right breast post lumpectomy changes, incisions well healed.    Neurological:      Mental Status: She is alert and oriented to person, place, and time.      Cranial Nerves: No cranial nerve deficit.      Coordination: Coordination normal.      Gait: Gait is intact.   Psychiatric:         Mood and Affect: Affect normal.         Cognition and Memory: Memory normal.         Judgment: Judgment normal.          Diagnostic Tests:  Significant Imaging: I have reviewed and interpreted all pertinent imaging results/findings.  CT Chest Without Contrast No results found for this or any previous visit.    CT Chest With ContrastNo results found for this or any previous visit.    CT Abdomen/Pelvis Without Contrast No  results found for this or any previous visit.     CT Abdomen/Pelvis With Contrast No results found for this or any previous visit.    CT Abdomen/Pelvis With Without Contrast No results found for this or any previous visit.     PET Scan No results found for this or any previous visit.      Laboratory Data:  All pertinent labs have been reviewed.  Labs:   Lab Results   Component Value Date    WBC 7.89 12/21/2021    RBC 4.32 12/21/2021    HGB 13.6 12/21/2021    HCT 42.3 12/21/2021    MCV 98 12/21/2021     12/21/2021    GLU 78 12/21/2021     12/21/2021    K 4.4 12/21/2021    BUN 21 12/21/2021    CREATININE 0.7 12/21/2021    AST 22 12/21/2021    ALT 15 12/21/2021    BILITOT 0.6 12/21/2021       Assessment/Plan:   Malignant neoplasm of upper-outer quadrant of right breast in female, estrogen receptor negative  cT1aN0, TNBC, Grade 2, Ki 67 60%  Genetic negative     She is s/p lumpectomy and recovering well. Given T1a tumor, age and preference for quality of life and no additional risk factors including on genetics, chemotherapy will not be offered.     I will refer her to Dr. Lynn for consideration for adjuvant Radiation therapy.      I will continue surveillance and see her back in 3 months.     -     Ambulatory referral/consult to Radiation Oncology; Future; Expected date: 01/21/2022    Secondary hypertension  Anxiety related HTN noted. She has been checking her blood pressure at home and it does come down to close to normal. She will follow up with her PMD in case it remains persistently high.      ECOG SCORE    1 - Restricted in strenuous activity-ambulatory and able to carry out work of a light nature           Discussion:   Follow up in about 3 months (around 4/14/2022) for MD.    Plan was discussed with the patient at length, and she verbalized understanding. Olamide was given an opportunity to ask questions that were answered to her satisfaction, and she was advised to call in the interval if any  problems or questions arise.    Electronically signed by Ami Boykin MD

## 2022-01-25 ENCOUNTER — OFFICE VISIT (OUTPATIENT)
Dept: SURGERY | Facility: CLINIC | Age: 87
End: 2022-01-25
Payer: MEDICARE

## 2022-01-25 VITALS
HEART RATE: 99 BPM | SYSTOLIC BLOOD PRESSURE: 156 MMHG | DIASTOLIC BLOOD PRESSURE: 69 MMHG | HEIGHT: 65 IN | WEIGHT: 122 LBS | BODY MASS INDEX: 20.33 KG/M2

## 2022-01-25 DIAGNOSIS — Z12.31 SCREENING MAMMOGRAM, ENCOUNTER FOR: Primary | ICD-10-CM

## 2022-01-25 PROCEDURE — 99024 POSTOP FOLLOW-UP VISIT: CPT | Mod: HCNC,S$GLB,, | Performed by: SURGERY

## 2022-01-25 PROCEDURE — 99999 PR PBB SHADOW E&M-EST. PATIENT-LVL III: CPT | Mod: PBBFAC,HCNC,, | Performed by: SURGERY

## 2022-01-25 PROCEDURE — 1159F MED LIST DOCD IN RCRD: CPT | Mod: HCNC,CPTII,S$GLB, | Performed by: SURGERY

## 2022-01-25 PROCEDURE — 99999 PR PBB SHADOW E&M-EST. PATIENT-LVL III: ICD-10-PCS | Mod: PBBFAC,HCNC,, | Performed by: SURGERY

## 2022-01-25 PROCEDURE — 1160F PR REVIEW ALL MEDS BY PRESCRIBER/CLIN PHARMACIST DOCUMENTED: ICD-10-PCS | Mod: HCNC,CPTII,S$GLB, | Performed by: SURGERY

## 2022-01-25 PROCEDURE — 3288F PR FALLS RISK ASSESSMENT DOCUMENTED: ICD-10-PCS | Mod: HCNC,CPTII,S$GLB, | Performed by: SURGERY

## 2022-01-25 PROCEDURE — 1159F PR MEDICATION LIST DOCUMENTED IN MEDICAL RECORD: ICD-10-PCS | Mod: HCNC,CPTII,S$GLB, | Performed by: SURGERY

## 2022-01-25 PROCEDURE — 1101F PR PT FALLS ASSESS DOC 0-1 FALLS W/OUT INJ PAST YR: ICD-10-PCS | Mod: HCNC,CPTII,S$GLB, | Performed by: SURGERY

## 2022-01-25 PROCEDURE — 99024 PR POST-OP FOLLOW-UP VISIT: ICD-10-PCS | Mod: HCNC,S$GLB,, | Performed by: SURGERY

## 2022-01-25 PROCEDURE — 1160F RVW MEDS BY RX/DR IN RCRD: CPT | Mod: HCNC,CPTII,S$GLB, | Performed by: SURGERY

## 2022-01-25 PROCEDURE — 1125F AMNT PAIN NOTED PAIN PRSNT: CPT | Mod: HCNC,CPTII,S$GLB, | Performed by: SURGERY

## 2022-01-25 PROCEDURE — 1125F PR PAIN SEVERITY QUANTIFIED, PAIN PRESENT: ICD-10-PCS | Mod: HCNC,CPTII,S$GLB, | Performed by: SURGERY

## 2022-01-25 PROCEDURE — 1101F PT FALLS ASSESS-DOCD LE1/YR: CPT | Mod: HCNC,CPTII,S$GLB, | Performed by: SURGERY

## 2022-01-25 PROCEDURE — 3288F FALL RISK ASSESSMENT DOCD: CPT | Mod: HCNC,CPTII,S$GLB, | Performed by: SURGERY

## 2022-01-25 NOTE — PROGRESS NOTES
[facility]       Post-Op        REFERRING PHYSICIAN:  Abiola Campbell MD  MEDICAL ONCOLOGIST:    Dr. Boykin  RADIATION ONCOLOGIST:   Referred    DIAGNOSIS:    This is a 88 y.o. female with a stage pT1 N0 Mx grade 2 ER - UT - HER2 - IDC  of the right breast.    TREATMENT SUMMARY:  The patient is status post right partial mastectomy and sentinel node biopsy on 1/7/2022.  Final pathology showed triple negative IDC 4mm in size with all negative margins and negative LN.     She has seen medical oncology(Dr. Boykin) and they do not recommend chemotherapy at this time.     She has been referred to radiation oncology for possible adjuvant therapy     INTERVAL HISTORY:   Olamide Felipe comes in for a post-op check.  She denies fever, chills, chest pain or shortness of breath.  Her pain is well controlled. However, she does notice some slight sensitivity along her underarm incision. No other complaints.     MEDICATIONS:  Current Outpatient Medications   Medication Sig Dispense Refill    aspirin 81 MG Chew Take 81 mg by mouth once daily.      b complex vitamins tablet Take 1 tablet by mouth once daily.      calcium-vitamin D 250-100 mg-unit per tablet Take 1 tablet by mouth 2 (two) times daily.      FLUoxetine 20 MG capsule Take 1 capsule (20 mg total) by mouth once daily. 90 capsule 0    levothyroxine (SYNTHROID) 25 MCG tablet Take 1 tablet (25 mcg total) by mouth before breakfast. 90 tablet 3    mv-min/iron/folic/calcium/vitK (WOMEN'S MULTIVITAMIN ORAL) Take by mouth once daily.      niacin 500 MG Tab Take 500 mg by mouth 2 (two) times daily with meals.      omeprazole (PRILOSEC) 20 MG capsule Take 1 capsule (20 mg total) by mouth once daily. 90 capsule 0    simvastatin (ZOCOR) 20 MG tablet Take 1 tablet (20 mg total) by mouth every evening. 90 tablet 0    HYDROcodone-acetaminophen (NORCO) 5-325 mg per tablet Take 1 tablet by mouth every 6 (six) hours as needed for Pain. (Patient not taking:  Reported on 1/25/2022) 10 tablet 0     No current facility-administered medications for this visit.       ALLERGIES:   Review of patient's allergies indicates:  No Known Allergies    PHYSICAL EXAMINATION:   General:  This is a well appearing female with appropriate speech, affect and gait.     Breast:  Incision clean, dry, and intact    IMPRESSION:   The patient has had an uneventful postoperative course.    PLAN:   1. return in December for a follow up office visit and breast exam  2. bilateral mammogram in December 2022  3. The patient is advised in continued exam of the breast chest wall and to report to this office sooner should she note any areas of abnormality or concern.   4.  She has met with med onc who does not recommend chemotherapy at this time nad been instructed to meet with  rad onc for discussion of adjuvant treatment recommendations

## 2022-01-25 NOTE — PROGRESS NOTES
REFERRING PHYSICIAN:   Marci Mcintosh M.D., Ami Boykin M.D.    DIAGNOSIS:  pT1a N0 M0, Stage IA, invasive ductal carcinoma of the right breast    HISTORY OF PRESENT ILLNESS:   Ms. Felipe is an 88-year-old female who was recently diagnosed with right breast cancer after an abnormal mammogram in 2021 which showed a focal asymmetry in the upper outer quadrant.  A core needle biopsy on 2021 revealed grade 2, invasive ductal carcinoma, which was ER/ OH negative, and HER2 negative, with Ki-67 index of 60%.  She underwent lumpectomy and sentinel node biopsy on 2022. Pathology revealed the right breast with 4 mm, grade 2, invasive ductal carcinoma, with the closest margin at 5 mm anteriorly (unbound by skin).  All other margins are greater than 10 mm.   sentinel lymph node is negative for involvement.  She is here today for recommendations regarding further treatment.    At present, patient is healing from the surgery without any unexpected side effects.  She reports discomfort at the lumpectomy site without erythema or discharge. She also denies fever, night sweats, or weight loss. She lives alone, but has a lot of family support. Her daughter is Ms. Christy Garcia, who works in Professional staff services at [employer].    REVIEW OF SYSTEMS:  As above.  In addition, patient denies headaches, visual problems, dizziness, chest pain, shortness of breath, cough, nausea, vomiting, diarrhea, or any new bony pains. Patient also denies easy bruising, skin rashes, or numbness or tingling.    GYN HISTORY:   Menarche at age 13.  .  Menopause at age 38. She has history of hormone replacement therapy.    ECO    PAST MEDICAL HISTORY:  Past Medical History:   Diagnosis Date    Hyperlipidemia     Hypothyroidism     Malignant neoplasm of upper-outer quadrant of right breast in female, estrogen receptor negative 2022    Osteoarthritis, knee     Vertigo        PAST SURGICAL HISTORY:  Past  Surgical History:   Procedure Laterality Date    dentures      FRACTURE SURGERY Left     fracture left wrist    HYSTERECTOMY      INJECTION FOR SENTINEL NODE IDENTIFICATION Right 1/7/2022    Procedure: INJECTION, FOR SENTINEL NODE IDENTIFICATION-Right;  Surgeon: Marci Mcintosh MD;  Location: [facility];  Service: General;  Laterality: Right;    left wrist surgery      MASTECTOMY, PARTIAL Right 1/7/2022    Procedure: MASTECTOMY, PARTIAL-Right with radiological marker;  Surgeon: Marci Mcintosh MD;  Location: [facility] OR;  Service: General;  Laterality: Right;  REQUEST SAID 2 HOUR CASE    SENTINEL LYMPH NODE BIOPSY Right 1/7/2022    Procedure: BIOPSY, LYMPH NODE, SENTINEL-Right;  Surgeon: Marci Mcintosh MD;  Location: [facility];  Service: General;  Laterality: Right;       ALLERGIES:   Review of patient's allergies indicates:  No Known Allergies    MEDICATIONS:  Current Outpatient Medications   Medication Sig    aspirin 81 MG Chew Take 81 mg by mouth once daily.    b complex vitamins tablet Take 1 tablet by mouth once daily.    calcium-vitamin D 250-100 mg-unit per tablet Take 1 tablet by mouth 2 (two) times daily.    FLUoxetine 20 MG capsule Take 1 capsule (20 mg total) by mouth once daily.    HYDROcodone-acetaminophen (NORCO) 5-325 mg per tablet Take 1 tablet by mouth every 6 (six) hours as needed for Pain.    levothyroxine (SYNTHROID) 25 MCG tablet Take 1 tablet (25 mcg total) by mouth before breakfast.    mv-min/iron/folic/calcium/vitK (WOMEN'S MULTIVITAMIN ORAL) Take by mouth once daily.    niacin 500 MG Tab Take 500 mg by mouth 2 (two) times daily with meals.    omeprazole (PRILOSEC) 20 MG capsule Take 1 capsule (20 mg total) by mouth once daily.    simvastatin (ZOCOR) 20 MG tablet Take 1 tablet (20 mg total) by mouth every evening.     No current facility-administered medications for this visit.       SOCIAL HISTORY:  Social History     Socioeconomic History    Marital status:    Tobacco Use     "Smoking status: Former Smoker     Quit date: 1989     Years since quittin.6    Smokeless tobacco: Never Used   Substance and Sexual Activity    Alcohol use: Yes     Alcohol/week: 1.0 standard drink     Types: 1 Glasses of wine per week     Comment: 1\3 glass ofd wine with dinner nightly    Drug use: Never    Sexual activity: Not Currently       FAMILY HISTORY:  Family History   Problem Relation Age of Onset    No Known Problems Daughter     No Known Problems Son     No Known Problems Sister          PHYSICAL EXAMINATION:  Vitals:    22 0953   BP: (!) 166/72   BP Location: Left arm   Patient Position: Sitting   BP Method: Medium (Automatic)   Pulse: 79   Temp: 96.5 °F (35.8 °C)   TempSrc: Oral   Weight: 56.4 kg (124 lb 5.4 oz)   Height: 5' 3" (1.6 m)   Body mass index is 22.03 kg/m².  GENERAL: Patient is alert and oriented, in no acute distress.  HEENT:Extraocular muscles are intact.  Oropharynx is clear without lesions.  There is no cervical or supraclavicular lymphadenopathy palpated.  No thyromegaly noted.  HEART: Regular rate and rhythm.  LUNGS: Clear to auscultation bilaterally.  BREAST EXAM: The scar secondary to lumpectomy is noted in the upper outer quadrant of the right breast with associated seroma. No sighs of infection. There is also a scar in the right axilla secondary to sentinel node biopsy. No abnormal masses palpated in the right breast or right axilla. The left breast and left axilla are also without palpable masses.  ABDOMEN:Soft, nontender, nondistended, without hepatosplenomegaly.  Normoactive bowel sounds.  EXTREMITIES: No clubbing, cyanosis, or edema.  NEUROLOGICAL: Cranial nerve II through XII grossly intact.  Sensation is intact.  Strength is 5 out of 5 in the upper and lower extremities bilaterally.     ASSESSMENT:   This is an 88-year-old female with stage IA, pT1a N0 M0, grade 2,  invasive ductal carcinoma of the right breast who underwent lumpectomy and sentinel " node biopsy on January 7, 2022 with a 4 mm lesion, with the closest margin 5 mm anteriorly, ER/TN negative, Her2 negative, Ki-67 index 60%.    PLAN:   After review of the pathology and images of the radiological studies, Ms. Felipe is noted to have a 4 mm primary tumor which was removed with with widely negative margins.  Given her triple negative status, I had a long discussion with the patient and her daughter Christy regarding the options of observation versus partial breast irradiation, especially given the size of the lesion less than 1 cm and her age greater than 70.  The risks, benefits, and side effects of each were also discussed in detail.  All of their questions were answered.  She would like to proceed with partial breast irradiation. I plan to deliver approximately 30 Gy in 5 fractions.     The risks, benefits, and side effects of radiation were explained in detail to the patient and her daughter.  All questions were answered and informed consent was signed.  I plan to see the patient back for radiation planning CT in approximately 2 weeks.    Psychosocial Distress screening score of Distress Score: 2 noted and reviewed. No intervention indicated.    I spent approximately 60 minutes reviewing the available records and evaluating the patient, out of which over 50% of the time was spent face to face with the patient in counseling and coordinating this patient's care.

## 2022-01-27 ENCOUNTER — OFFICE VISIT (OUTPATIENT)
Dept: SURGERY | Facility: CLINIC | Age: 87
End: 2022-01-27
Payer: MEDICARE

## 2022-01-27 VITALS
TEMPERATURE: 97 F | WEIGHT: 124.31 LBS | DIASTOLIC BLOOD PRESSURE: 72 MMHG | BODY MASS INDEX: 22.03 KG/M2 | SYSTOLIC BLOOD PRESSURE: 166 MMHG | HEART RATE: 79 BPM | HEIGHT: 63 IN

## 2022-01-27 DIAGNOSIS — Z17.1 MALIGNANT NEOPLASM OF UPPER-OUTER QUADRANT OF RIGHT BREAST IN FEMALE, ESTROGEN RECEPTOR NEGATIVE: ICD-10-CM

## 2022-01-27 DIAGNOSIS — C50.411 MALIGNANT NEOPLASM OF UPPER-OUTER QUADRANT OF RIGHT BREAST IN FEMALE, ESTROGEN RECEPTOR NEGATIVE: ICD-10-CM

## 2022-01-27 PROCEDURE — 99499 UNLISTED E&M SERVICE: CPT | Mod: HCNC,S$GLB,, | Performed by: RADIOLOGY

## 2022-01-27 PROCEDURE — 1159F PR MEDICATION LIST DOCUMENTED IN MEDICAL RECORD: ICD-10-PCS | Mod: HCNC,CPTII,S$GLB, | Performed by: RADIOLOGY

## 2022-01-27 PROCEDURE — 99999 PR PBB SHADOW E&M-EST. PATIENT-LVL III: CPT | Mod: PBBFAC,HCNC,, | Performed by: RADIOLOGY

## 2022-01-27 PROCEDURE — 1159F MED LIST DOCD IN RCRD: CPT | Mod: HCNC,CPTII,S$GLB, | Performed by: RADIOLOGY

## 2022-01-27 PROCEDURE — 3288F PR FALLS RISK ASSESSMENT DOCUMENTED: ICD-10-PCS | Mod: HCNC,CPTII,S$GLB, | Performed by: RADIOLOGY

## 2022-01-27 PROCEDURE — 1101F PT FALLS ASSESS-DOCD LE1/YR: CPT | Mod: HCNC,CPTII,S$GLB, | Performed by: RADIOLOGY

## 2022-01-27 PROCEDURE — 3288F FALL RISK ASSESSMENT DOCD: CPT | Mod: HCNC,CPTII,S$GLB, | Performed by: RADIOLOGY

## 2022-01-27 PROCEDURE — 1160F RVW MEDS BY RX/DR IN RCRD: CPT | Mod: HCNC,CPTII,S$GLB, | Performed by: RADIOLOGY

## 2022-01-27 PROCEDURE — 99499 RISK ADDL DX/OHS AUDIT: ICD-10-PCS | Mod: HCNC,S$GLB,, | Performed by: RADIOLOGY

## 2022-01-27 PROCEDURE — 99999 PR PBB SHADOW E&M-EST. PATIENT-LVL III: ICD-10-PCS | Mod: PBBFAC,HCNC,, | Performed by: RADIOLOGY

## 2022-01-27 PROCEDURE — 99204 PR OFFICE/OUTPT VISIT, NEW, LEVL IV, 45-59 MIN: ICD-10-PCS | Mod: HCNC,S$GLB,, | Performed by: RADIOLOGY

## 2022-01-27 PROCEDURE — 1160F PR REVIEW ALL MEDS BY PRESCRIBER/CLIN PHARMACIST DOCUMENTED: ICD-10-PCS | Mod: HCNC,CPTII,S$GLB, | Performed by: RADIOLOGY

## 2022-01-27 PROCEDURE — 1126F PR PAIN SEVERITY QUANTIFIED, NO PAIN PRESENT: ICD-10-PCS | Mod: HCNC,CPTII,S$GLB, | Performed by: RADIOLOGY

## 2022-01-27 PROCEDURE — 1101F PR PT FALLS ASSESS DOC 0-1 FALLS W/OUT INJ PAST YR: ICD-10-PCS | Mod: HCNC,CPTII,S$GLB, | Performed by: RADIOLOGY

## 2022-01-27 PROCEDURE — 99204 OFFICE O/P NEW MOD 45 MIN: CPT | Mod: HCNC,S$GLB,, | Performed by: RADIOLOGY

## 2022-01-27 PROCEDURE — 1126F AMNT PAIN NOTED NONE PRSNT: CPT | Mod: HCNC,CPTII,S$GLB, | Performed by: RADIOLOGY

## 2022-01-27 NOTE — PATIENT INSTRUCTIONS
"Return for ct/sim in approximately 2 weeks    Patient Education       Radiation Therapy   The Basics   Written by the doctors and editors at [facility]   What is radiation therapy? -- Radiation therapy is a type of cancer treatment. It uses high doses of X-rays, called radiation, to kill cancer cells. There are 2 types of radiation therapy, depending on where the source of the radiation is.  · In "external-beam radiation therapy," the radiation comes from a machine that is outside the body. This article discusses external-beam radiation therapy.  · In "internal radiation therapy" (also called "brachytherapy"), the radiation comes from a source that is put inside the body.  Doctors can use radiation to treat many different types of cancers. Some people are treated only with radiation therapy. Other people have radiation therapy along with other cancer treatments.  What happens before I start radiation therapy? -- Before you start treatment, you will meet with a team of doctors and nurses to plan your radiation therapy. They will probably do an exam and tests. They will put a small dot of ink on your body to tate the area that will be treated. They might also make a mold or mask of a part of your body for you to wear during treatment. This is to help keep your body still while you are getting radiation therapy.  What happens during radiation therapy? -- People get radiation therapy in the hospital, but they can go home after each treatment. They do not usually need to stay in the hospital. The number of treatments a person needs depends on the type of cancer. Some people get radiation only one time. Other people get radiation 1 time a day, 5 days a week, for up to 7 weeks.  For each treatment, you will wear your mold or mask (if you have one) and sit or lie down in front of the machine. You will need to stay still. Your doctor will line up the machine with your dot of ink. That way, the X-rays will travel only through " the part of your body with the cancer. The X-rays will not travel through other parts of your body.  It doesn't hurt to get radiation. The machine sends radiation through the cancer and the area right around it for about 1 to 5 minutes.  Your doctor or nurse will give you instructions about what to wear and what you can or can't have on your body. For example, most people should not have bandages, jewelry, or other items on the skin in the area being treated.  What are the side effects of radiation? -- People can have different side effects from radiation. The side effects will depend on:  · The part of the body being treated - As the X-rays travel through the body, they can damage the skin and the organs or tissues next to the cancer.  · The dose of radiation  · The number of treatments and the treatment schedule  People can have side effects hours or days after treatment. Common ones include:  · Feeling tired, weak, or having no energy - These symptoms can get worse over the course of treatment.  · Skin changes - The skin in the area being treated can peel, blister, itch, or turn red.  Other side effects can happen hours or days after treatment. Depending on your treatment, your doctor will tell you the ones that you are likely to get.  People can also have side effects from radiation months or years after their treatment. These can include:  · Trouble getting pregnant  · Other types of cancers, although these are rare  How are the side effects treated? -- Your doctor can treat the side effects in different ways, depending on your symptoms. They will also suggest things that you can do on your own. For example, to ease your skin symptoms, you can:  · Use creams that your doctor or nurse recommends  · Use sun block and wear a hat or long sleeves to protect your skin  · Not put anything too hot or cold on your skin  · Try not to scratch your skin  What else should I do? -- When you have radiation therapy, it's  important to:  · Follow all of your doctor's instructions about treatment and visits  · Let your doctor or nurse know about any side effects or problems you have during treatment or if your health changes. You should also let them know if your dot of ink wears off.  · Take care of yourself by eating well, getting enough sleep, and exercising. To keep your energy up, try to eat foods and drinks with a lot of protein and calories.  All topics are updated as new evidence becomes available and our peer review process is complete.  This topic retrieved from [employer] on: Sep 21, 2021.  Topic 99329 Version 8.0  Release: 29.4.2 - C29.263  © 2021 UpToDate, Inc. and/or its affiliates. All rights reserved.  Consumer Information Use and Disclaimer   This information is not specific medical advice and does not replace information you receive from your health care provider. This is only a brief summary of general information. It does NOT include all information about conditions, illnesses, injuries, tests, procedures, treatments, therapies, discharge instructions or life-style choices that may apply to you. You must talk with your health care provider for complete information about your health and treatment options. This information should not be used to decide whether or not to accept your health care provider's advice, instructions or recommendations. Only your health care provider has the knowledge and training to provide advice that is right for you. The use of this information is governed by the [employer] End User License Agreement, available at https://www.[employer].VTM/en/solutions/[employer]/about/ivan.The use of [employer] content is governed by the [employer] Terms of Use. ©2021 UpToDate, Inc. All rights reserved.  Copyright   © 2021 UpToDate, Inc. and/or its affiliates. All rights reserved.  Patient Education       Radiation Therapy, External   Why is this procedure done?   This procedure is done to treat cancer and other  diseases. It uses a machine to give radiation from outside your body. The machine sends beams of high-energy x-rays straight to the tumor. This harms cancer cells and causes their death. It may also help signs, such as pain, that are caused by the tumor. Other treatments may be used with radiation, like chemo, stimulation of your immune system, or surgery. Radiation may be given to many parts of your body. Some of them are:  · Head and neck  · Chest  · Arms and legs  · Bones  · Belly  · Pelvic area  Most of the time, the doctor cannot give you the total amount of radiation needed to kill a tumor all at one time. Such a big dose can cause more damage to normal tissues near the tumor. It can also cause more side effects.  Instead, the total dose of external radiation is divided into smaller doses called fractions. Most of the time, people get a radiation treatment each day, Monday through Friday for 5 to 8 weeks. On the weekends, their body can rest and recover. The total amount of radiation and the number of treatments is based on:  · The size and location of the cancer  · The type of cancer  · The reason for the treatment  · Your general health  · Any other treatments you are getting  Your doctor may order some other radiation schedule. Your doctor may give you a smaller dose of radiation over a shorter time if it is used to ease your symptoms. In some cases, the doctor orders radiation as 2 or more treatments each day. Other times, you may have a few weeks off in the middle of your treatments. Talk with your doctor to learn about your plan for radiation therapy.     What will the results be?   · Shrink the tumor. Sometimes, this is done so less surgery is needed to remove a tumor.  · Lower cancer pain  · Stop the spread of cancer cells  · After surgery or other treatments, your doctor may order radiation to treat any cancer cells that are left behind.  What happens before the procedure?   Your doctor will ask you  about your health history and do an exam. Talk to the doctor about:  · All the drugs you are taking. Be sure to include all prescription and over-the-counter (OTC) drugs, and herbal supplements. Tell the doctor about any drug allergy. Bring a list of drugs you take with you.  · If you are pregnant or could be pregnant  · If you are breastfeeding  Before your procedure:  · You will meet with a doctor who decides the best place for the radiation to go. They will tate this place with a special ink and maybe a long-lasting ink called a tattoo. This is called the simulation phase. The radiation will go to that same place with each treatment.  Based on where your treatment is, a form or brace is made to help hold you still during your treatment.  What happens during the procedure?   · You will lie on a table or sit in a chair. A machine directs the radiation beam to the place marked with ink. This helps to make sure that the cancer cells are the target. The therapist may need to block other parts of your body with shields. They will use the brace or form you had made to hold you in place.  · The therapist runs the machine from a nearby room. You will be watched through a window or on a TV screen. You can talk to the therapist over an intercom. It is important to be still during the procedure. This is to make sure that the radiation reaches only the target area. Breathe like always. You do not need to hold your breath.  · The therapist runs the machine to give the radiation over the target area. If you feel on edge or have any concerns, tell the therapist right away.  · You may be at the treatment center for around 30 minutes. Once the therapist has the machine lined up with your treatment area, it only takes 1 to 5 minutes to give the radiation therapy. Some special types of radiation may take longer, but you will have fewer treatments.  · A radiation treatment does not hurt.  What happens after the procedure?   You can  go home right after the treatment and may go back to your normal activities.  What care is needed at home?   · You do not need to stay away from other people with this type of treatment.  · Take all your drugs as ordered by your doctor.  · Drink 6 to 8 glasses of liquids each day.  · Get lots of rest. Sleep when you are feeling tired. Avoid doing tiring activities.  Take good care of your skin at the treated area:  · Do not scratch, rub, scrub, or use soap when you wash the marks. Wash with water and pat them dry.  · Be gentle when washing skin the radiation touched.  · Do not use too much heat or cold on the area, such as with a heating pad or ice pack.  · Wear loose, soft cotton clothing.  · Avoid using tape or other adhesives on the skin.  · Ask your doctor what lotion you should use to keep radiated skin as healthy as possible.  · You will get sunburned more easily where the radiation went into your body. Avoid sun, sunlamps, and tanning beds. Use sunscreen and wear clothing and eyewear that protects you from the sun.  What follow-up care is needed?   · You will have a regular plan for radiation. This may be each day or many times per week. It will carry on for a certain number of weeks.  · Your doctor will tell you the schedule planned for you.  · Your condition needs close watching. Your doctor will see you throughout your treatments. Be sure to keep these visits. Side effects are simpler to care for if caught early. Talk about problems with your doctor. Your doctor may order blood tests to check the effects of radiation to your body.  What lifestyle changes are needed?   Eating a healthy diet is important during this time. This means:  · Eat whole grain foods and foods high in fiber.  · Choose many kinds of fruits and vegetables. Fresh or frozen is best.  · Cut back on solid fats like butter or margarine. Eat less fatty or processed foods.  · Eat more low fat or lean meats like chicken, fish, or turkey. Eat  less red meat.  · Limit beer, wine, and mixed drinks (alcohol).  · Avoid caffeine.  · If you need help, ask to see a dietitian.  What problems could happen?   · Side effects can depend on the dose of radiation, the number of treatments, and the part of the body getting the radiation.   · There can be general effects from radiation such as feeling weak and tired. These often go away after the whole therapy is done.   · There can be local effects to the area of your body where the radiation goes. These include:  ? Dry, irritated skin on the area where the radiation is going  ? Hair loss at the radiation site  ? Dry mouth if the throat area is targeted  ? Upset stomach, throwing up, or loose stools if the radiation is going to your belly  · The doctors try to protect organs on all sides of the cancer from radiation. Radiation can hurt healthy cells too. Sometimes, harm to other organs may happen.  · Reduced white blood cell count  When do I need to call the doctor?   · Signs of infection. These include a fever of 100.4°F (38°C) or higher, chills, very bad sore throat, ear or sinus pain, cough, more sputum or change in color of sputum, pain with passing urine, mouth sores, wound that will not heal, anal itching, pain, or the skin where the radiation goes looks infected.  · Loose bowel movement or not able to have a bowel movement  · Loss of appetite or sudden weight loss  · New or unusual swelling or lumps happen  · Lasting upset stomach and throwing up even when drugs are taken  · Cough, shortness of breath, or chest pain  Where can I learn more?   American Cancer Society  http://www.cancer.org/Treatment/TreatmentsandSideEffects/TreatmentTypes/Radiation/UnderstandingRadiationTherapyAGuideforPatientsandFamilies/understanding-radiation-therapy-external-radiation-therapy   National Cancer [place]  http://www.cancer.gov/cancertopics/factsheet/Therapy/radiation   Last Reviewed Date   2021-07-21  Consumer Information Use  and Disclaimer   This information is not specific medical advice and does not replace information you receive from your health care provider. This is only a brief summary of general information. It does NOT include all information about conditions, illnesses, injuries, tests, procedures, treatments, therapies, discharge instructions or life-style choices that may apply to you. You must talk with your health care provider for complete information about your health and treatment options. This information should not be used to decide whether or not to accept your health care providers advice, instructions or recommendations. Only your health care provider has the knowledge and training to provide advice that is right for you.  Copyright   Copyright © 2021 UpToDate, Inc. and its affiliates and/or licensors. All rights reserved.

## 2022-02-10 ENCOUNTER — HOSPITAL ENCOUNTER (OUTPATIENT)
Dept: RADIATION THERAPY | Facility: HOSPITAL | Age: 87
Discharge: HOME | End: 2022-02-10
Attending: RADIOLOGY
Payer: MEDICARE

## 2022-02-10 PROCEDURE — 77014 PR  CT GUIDANCE PLACEMENT RAD THERAPY FIELDS: ICD-10-PCS | Mod: 26,HCNC,, | Performed by: RADIOLOGY

## 2022-02-10 PROCEDURE — 77263 PR  RADIATION THERAPY PLAN COMPLEX: ICD-10-PCS | Mod: HCNC,,, | Performed by: RADIOLOGY

## 2022-02-10 PROCEDURE — 77014 PR  CT GUIDANCE PLACEMENT RAD THERAPY FIELDS: CPT | Mod: 26,HCNC,, | Performed by: RADIOLOGY

## 2022-02-10 PROCEDURE — 77334 RADIATION TREATMENT AID(S): CPT | Mod: TC,HCNC | Performed by: RADIOLOGY

## 2022-02-10 PROCEDURE — 77334 PR  RADN TREATMENT AID(S) COMPLX: ICD-10-PCS | Mod: 26,HCNC,, | Performed by: RADIOLOGY

## 2022-02-10 PROCEDURE — 77263 THER RADIOLOGY TX PLNG CPLX: CPT | Mod: HCNC,,, | Performed by: RADIOLOGY

## 2022-02-10 PROCEDURE — 77334 RADIATION TREATMENT AID(S): CPT | Mod: 26,HCNC,, | Performed by: RADIOLOGY

## 2022-02-10 PROCEDURE — 77014 HC CT GUIDANCE RADIATION THERAPY FLDS PLACEMENT: CPT | Mod: TC,HCNC | Performed by: RADIOLOGY

## 2022-02-16 PROCEDURE — 77301 RADIOTHERAPY DOSE PLAN IMRT: CPT | Mod: TC,HCNC | Performed by: RADIOLOGY

## 2022-02-16 PROCEDURE — 77301 PR  INTEN MOD RADIOTHER PLAN W/DOSE VOL HIST: ICD-10-PCS | Mod: 26,HCNC,, | Performed by: RADIOLOGY

## 2022-02-16 PROCEDURE — 77301 RADIOTHERAPY DOSE PLAN IMRT: CPT | Mod: 26,HCNC,, | Performed by: RADIOLOGY

## 2022-02-17 PROCEDURE — 77338 PR  MLC IMRT DESIGN & CONSTRUCTION PER IMRT PLAN: ICD-10-PCS | Mod: 26,HCNC,, | Performed by: RADIOLOGY

## 2022-02-17 PROCEDURE — 77338 DESIGN MLC DEVICE FOR IMRT: CPT | Mod: TC,HCNC | Performed by: RADIOLOGY

## 2022-02-17 PROCEDURE — 77300 RADIATION THERAPY DOSE PLAN: CPT | Mod: 26,HCNC,, | Performed by: RADIOLOGY

## 2022-02-17 PROCEDURE — 77300 PR RADIATION THERAPY,DOSIMETRY PLAN: ICD-10-PCS | Mod: 26,HCNC,, | Performed by: RADIOLOGY

## 2022-02-17 PROCEDURE — 77300 RADIATION THERAPY DOSE PLAN: CPT | Mod: TC,HCNC | Performed by: RADIOLOGY

## 2022-02-17 PROCEDURE — 77338 DESIGN MLC DEVICE FOR IMRT: CPT | Mod: 26,HCNC,, | Performed by: RADIOLOGY

## 2022-02-18 PROCEDURE — 77014 PR  CT GUIDANCE PLACEMENT RAD THERAPY FIELDS: CPT | Mod: 26,HCNC,, | Performed by: RADIOLOGY

## 2022-02-18 PROCEDURE — 77385 HC IMRT, SIMPLE: CPT | Mod: HCNC | Performed by: RADIOLOGY

## 2022-02-18 PROCEDURE — 77014 PR  CT GUIDANCE PLACEMENT RAD THERAPY FIELDS: ICD-10-PCS | Mod: 26,HCNC,, | Performed by: RADIOLOGY

## 2022-02-18 PROCEDURE — 77014 HC CT GUIDANCE RADIATION THERAPY FLDS PLACEMENT: CPT | Mod: TC,HCNC | Performed by: RADIOLOGY

## 2022-02-22 PROCEDURE — 77014 PR  CT GUIDANCE PLACEMENT RAD THERAPY FIELDS: CPT | Mod: 26,HCNC,, | Performed by: RADIOLOGY

## 2022-02-22 PROCEDURE — 77014 PR  CT GUIDANCE PLACEMENT RAD THERAPY FIELDS: ICD-10-PCS | Mod: 26,HCNC,, | Performed by: RADIOLOGY

## 2022-02-22 PROCEDURE — 77014 HC CT GUIDANCE RADIATION THERAPY FLDS PLACEMENT: CPT | Mod: TC,HCNC | Performed by: RADIOLOGY

## 2022-02-22 PROCEDURE — 77385 HC IMRT, SIMPLE: CPT | Mod: HCNC | Performed by: RADIOLOGY

## 2022-02-24 PROCEDURE — 77014 HC CT GUIDANCE RADIATION THERAPY FLDS PLACEMENT: CPT | Mod: TC,HCNC | Performed by: RADIOLOGY

## 2022-02-24 PROCEDURE — 77014 PR  CT GUIDANCE PLACEMENT RAD THERAPY FIELDS: ICD-10-PCS | Mod: 26,HCNC,, | Performed by: RADIOLOGY

## 2022-02-24 PROCEDURE — 77014 PR  CT GUIDANCE PLACEMENT RAD THERAPY FIELDS: CPT | Mod: 26,HCNC,, | Performed by: RADIOLOGY

## 2022-02-24 PROCEDURE — 77385 HC IMRT, SIMPLE: CPT | Mod: HCNC | Performed by: RADIOLOGY

## 2022-02-28 PROCEDURE — 77014 PR  CT GUIDANCE PLACEMENT RAD THERAPY FIELDS: CPT | Mod: 26,HCNC,, | Performed by: RADIOLOGY

## 2022-02-28 PROCEDURE — 77014 HC CT GUIDANCE RADIATION THERAPY FLDS PLACEMENT: CPT | Mod: TC,HCNC | Performed by: RADIOLOGY

## 2022-02-28 PROCEDURE — 77385 HC IMRT, SIMPLE: CPT | Mod: HCNC | Performed by: RADIOLOGY

## 2022-02-28 PROCEDURE — 77014 PR  CT GUIDANCE PLACEMENT RAD THERAPY FIELDS: ICD-10-PCS | Mod: 26,HCNC,, | Performed by: RADIOLOGY

## 2022-03-02 ENCOUNTER — HOSPITAL ENCOUNTER (OUTPATIENT)
Dept: RADIATION THERAPY | Facility: HOSPITAL | Age: 87
Discharge: HOME | End: 2022-03-02
Attending: RADIOLOGY
Payer: MEDICARE

## 2022-03-02 PROCEDURE — 77014 PR  CT GUIDANCE PLACEMENT RAD THERAPY FIELDS: ICD-10-PCS | Mod: 26,,, | Performed by: RADIOLOGY

## 2022-03-02 PROCEDURE — 77014 PR  CT GUIDANCE PLACEMENT RAD THERAPY FIELDS: CPT | Mod: 26,,, | Performed by: RADIOLOGY

## 2022-03-02 PROCEDURE — 77385 HC IMRT, SIMPLE: CPT | Performed by: RADIOLOGY

## 2022-03-02 PROCEDURE — 77336 RADIATION PHYSICS CONSULT: CPT | Performed by: RADIOLOGY

## 2022-03-02 PROCEDURE — 77014 HC CT GUIDANCE RADIATION THERAPY FLDS PLACEMENT: CPT | Mod: TC | Performed by: RADIOLOGY

## 2022-03-16 ENCOUNTER — CLINICAL SUPPORT (OUTPATIENT)
Dept: AUDIOLOGY | Facility: CLINIC | Age: 87
End: 2022-03-16
Payer: MEDICARE

## 2022-03-16 ENCOUNTER — TELEPHONE (OUTPATIENT)
Dept: RADIATION ONCOLOGY | Facility: CLINIC | Age: 87
End: 2022-03-16
Payer: MEDICARE

## 2022-03-16 DIAGNOSIS — H90.3 SENSORINEURAL HEARING LOSS, BILATERAL: Primary | ICD-10-CM

## 2022-03-16 PROCEDURE — 92557 PR COMPREHENSIVE HEARING TEST: ICD-10-PCS | Mod: S$GLB,,, | Performed by: AUDIOLOGIST

## 2022-03-16 PROCEDURE — 92557 COMPREHENSIVE HEARING TEST: CPT | Mod: S$GLB,,, | Performed by: AUDIOLOGIST

## 2022-03-16 NOTE — PROGRESS NOTES
Ms. Felipe was seen in the clinic today for a hearing evaluation. She reported a subjective decrease in hearing.     Otoscopy was WNL, bilaterally. Audiological testing revealed a mild sloping to severe sensorineural hearing loss in the right ear and a moderately severe to severe sensorineural hearing loss in the left ear. A speech reception threshold was obtained at 35 dBHL in the right ear and 50 dBHL in the left ear. Speech discrimination was 60% in the right ear and 56% in the left ear.    Recommendations:  1. Otologic evaluation  2. Annual hearing evaluation  3. Noise protection  4. Continued use of hearing aids

## 2022-03-16 NOTE — PROGRESS NOTES
Ms. Felipe was seen on today's date for a HAFU. Hearing aids were reprogrammed to today's audiogram. Ms. Felipe was satisfied with the volume and sound quality of her devices at today's appointment. She would like to send them off for an in warranty check due to excessive battery drain when a loaner device is available. I will call her in approximately 1-2 weeks to set this up. Ms. Felipe was encouraged to call or message if she has any issues or concerns.

## 2022-03-16 NOTE — TELEPHONE ENCOUNTER
Call to pt to see how she is doing since completing radiation to the right breast 3/2/22. Pt doing well. No issues reported. Pt has a f/u with Dr Lynn 4/12/22.

## 2022-03-29 RX ORDER — OMEPRAZOLE 20 MG/1
CAPSULE, DELAYED RELEASE ORAL
Qty: 90 CAPSULE | Refills: 2 | Status: SHIPPED | OUTPATIENT
Start: 2022-03-29 | End: 2023-01-11

## 2022-03-29 RX ORDER — FLUOXETINE HYDROCHLORIDE 20 MG/1
CAPSULE ORAL
Qty: 90 CAPSULE | Refills: 2 | Status: SHIPPED | OUTPATIENT
Start: 2022-03-29 | End: 2023-01-11

## 2022-03-29 NOTE — TELEPHONE ENCOUNTER
Refill Routing Note   Medication(s) are not appropriate for processing by [facility] for the following reason(s):      - Required indication for medication not on problem list (GERD)    [facility] action(s):  Defer  Approve Medication-related problems identified: No indication for a medication     Medication Therapy Plan: Fluoxetine approved. Not true ED visit. Omeprazole deferred  --->Care Gap information included in message below if applicable.   Medication reconciliation completed: No   Automatic Epic Generated Protocol Data:        Requested Prescriptions   Pending Prescriptions Disp Refills    omeprazole (PRILOSEC) 20 MG capsule [Pharmacy Med Name: OMEPRAZOLE 20 MG Capsule Delayed Release] 90 capsule 2     Sig: TAKE 1 CAPSULE EVERY DAY       Gastroenterology: Proton Pump Inhibitors Failed - 3/29/2022  3:14 PM        Failed - An appropriate indication is on the problem list        Failed - No ED/Hospital visits since last PCP visit     Last PCP Visit: 12/6/2021 Last Admission: 1/7/2022 Last ED Visit: 5/13/2020          Passed - Patient is at least 18 years old        Passed - Osteoporosis is not on problem list        Passed - Plavix is not on active medication list        Passed - Valid encounter within last 15 months     Recent Visits  Date Type Provider Dept   12/06/21 Office Visit Abiola Campbell MD [facility] Internal Medicine   07/07/21 Office Visit Nicolasa Rojas DO [facility] Internal Medicine   04/08/21 Office Visit Nicolasa Rojas DO [facility] Internal Medicine   Showing recent visits within past 720 days and meeting all other requirements  Future Appointments  No visits were found meeting these conditions.  Showing future appointments within next 150 days and meeting all other requirements      Future Appointments              In 1 week MD [facility]- [facility] Oncology, [facility]    In 2 weeks MD Kulwinder Sánchez Radiation Oncology 1st Fl, [facility]    In 8 months  [facility], [facility]    In 8 months Maric Mcintosh MD [facility], [facility]                Passed - Matches previous order       Previous Authorizing Provider: Abiola Campbell MD (omeprazole (PRILOSEC) 20 MG capsule)  Previous Authorizing Provider: Abiola Campbell MD (FLUoxetine 20 MG capsule)  Previous Pharmacy: [facility] [address]  Previous Pharmacy: [facility] [address]              FLUoxetine 20 MG capsule [Pharmacy Med Name: FLUOXETINE HCL 20 MG Capsule] 90 capsule 2     Sig: TAKE 1 CAPSULE EVERY DAY       Psychiatry:  Antidepressants - SSRI Failed - 3/29/2022  3:14 PM        Failed - No ED/Hospital visits since last PCP visit     Last PCP Visit: 12/6/2021 Last Admission: 1/7/2022 Last ED Visit: 5/13/2020          Passed - Patient is at least 18 years old        Passed - Valid encounter within last 15 months     Recent Visits  Date Type Provider Dept   12/06/21 Office Visit Abiola Campbell MD [facility] Internal Medicine   07/07/21 Office Visit Nicolasa Rojas DO [facility] Internal Medicine   04/08/21 Office Visit Nicolaas Rojas DO [facility] Internal Medicine   Showing recent visits within past 720 days and meeting all other requirements  Future Appointments  No visits were found meeting these conditions.  Showing future appointments within next 150 days and meeting all other requirements      Future Appointments              In 1 week Ami Boykin MD [facility]- Hematology Oncology, [place]    In 2 weeks MD Kulwinder Sánchez Radiation Oncology 1st Fl, [facility]    In 8 months [facility]    In 8 months Marci Mcintosh MD [facility], [facility]                Passed - Matches previous order       Previous Authorizing  Provider: Abiola Campbell MD (omeprazole (PRILOSEC) 20 MG capsule)  Previous Authorizing Provider: Abiola Campbell MD (FLUoxetine 20 MG capsule)  Previous Pharmacy: [facility] [address] [place]  Previous Pharmacy: [facility] [address]                  Appointments  past 12m or future 3m with PCP    Date Provider   Last Visit   12/6/2021 Abiola Campbell MD   Next Visit   Visit date not found Abiola Campbell MD   ED visits in past 90 days: 0        Note composed:3:21 PM 03/29/2022

## 2022-03-31 RX ORDER — SIMVASTATIN 20 MG/1
20 TABLET, FILM COATED ORAL NIGHTLY
Qty: 90 TABLET | Refills: 2 | Status: SHIPPED | OUTPATIENT
Start: 2022-03-31 | End: 2022-08-22 | Stop reason: SDUPTHER

## 2022-03-31 RX ORDER — SIMVASTATIN 20 MG/1
20 TABLET, FILM COATED ORAL NIGHTLY
Qty: 90 TABLET | Refills: 0 | OUTPATIENT
Start: 2022-03-31

## 2022-03-31 NOTE — TELEPHONE ENCOUNTER
This Rx Request does not qualify for assessment with the [facility]   Please review protocol details and the Care Due Message for extra clinical information    Reasons Rx Request may be deferred:  1. Labs/Vitals overdue  2. Labs/Vitals abnormal  3. Patient has been seen in the ED/Hospital since the last PCP visit  4. Medication is non-delegated  5. Medication associated diagnosis code is outside of scope  6. Provider is a non-participating provider  7. Visit criteria not met (Patient needs to be seen at least every 15 months by PCP)    Note composed:4:34 PM 03/31/2022

## 2022-03-31 NOTE — TELEPHONE ENCOUNTER
No new care gaps identified.  Powered by [employer]. Reference number: [id number].   3/31/2022 9:12:41 AM CDT

## 2022-03-31 NOTE — TELEPHONE ENCOUNTER
This Rx Request does not qualify for assessment with the [facility] [address]   Please review protocol details and the Care Due Message for extra clinical information    Reasons Rx Request may be deferred:  1. Labs/Vitals overdue  2. Labs/Vitals abnormal  3. Patient has been seen in the ED/Hospital since the last PCP visit  4. Medication is non-delegated  5. Medication associated diagnosis code is outside of scope  6. Provider is a non-participating provider  7. Visit criteria not met (Patient needs to be seen at least every 15 months by PCP)    Note composed:4:34 PM 03/31/2022

## 2022-03-31 NOTE — TELEPHONE ENCOUNTER
No new care gaps identified.  Powered by [employer]. Reference number: [id number].   3/31/2022 4:34:28 PM CDT

## 2022-04-07 NOTE — PROGRESS NOTES
"  REFERRING PHYSICIAN: Marci Mcintosh MD, Ami Boykin MD    DIAGNOSIS: pT1a N0 M0, Stage IA, invasive ductal carcinoma of the right breast    Radiation Treatment Summary  Course: C1 Breast 2022  Treatment Site Energy Dose/Fx (Gy) #Fx Total Dose (Gy) Start Date End Date Elapsed Days   Right PARTIAL BREAST 6X 6 5 / 5 30 2/18/2022 3/2/2022 12     INTERVAL HISTORY:   Ms. Felipe is an 89-year-old female who completed post operative radiation to the right breast as above, who returns for a follow up.     She was diagnosed with right breast cancer after an abnormal mammogram in November 2021 which showed a focal asymmetry in the upper outer quadrant. A core needle biopsy on December 14, 2021 revealed grade 2, invasive ductal carcinoma, which was ER/ VA negative, and HER2 negative, with Ki-67 index of 60%. She underwent lumpectomy and sentinel node biopsy on January 7, 2022. Pathology revealed the right breast with 4 mm, grade 2, invasive ductal carcinoma, with the closest margin at 5 mm anteriorly (unbound by skin). All other margins are greater than 10 mm. 1/1 sentinel lymph node is negative for involvement. To reduce her chance of local recurrence, she received partial breast irradiation as above. She returns for a routine follow up.     At present, she denies right breast pain, edema, erythema, or nipple discharge. She also denies fever, night sweats, or weight loss.     PHYSICAL EXAMINATION:  Vitals:    04/12/22 0840   BP: (!) 153/68   BP Location: Left arm   Patient Position: Sitting   BP Method: Medium (Automatic)   Pulse: 74   Resp: 17   Temp: 97.5 °F (36.4 °C)   SpO2: 96%   Weight: 56.1 kg (123 lb 11.2 oz)   Height: 5' 5" (1.651 m)   Body mass index is 20.58 kg/m².  GENERAL: Patient is alert and oriented, in no acute distress.  HEENT: Extraocular muscles are intact.  Oropharynx is clear without lesions.  There is no cervical or supraclavicular adenopathy palpated.    CHEST: Breath sounds clear bilaterally.  No " rales.  No rhonchi.  Unlabored respirations.  CARDIOVASCULAR: Normal S1, S2.  Normal rate.  Regular rhythm.  BREAST EXAM: The scar secondary to lumpectomy is noted in the upper outer quadrant of the right breast which is well healed. There is also a scar in the right axilla secondary to sentinel node biopsy. No abnormal masses palpated in the right breast or right axilla. The left breast and left axilla are also without palpable masses.  ABDOMEN: Bowel sounds normal.  No tenderness.  No abdominal distention.  No hepatomegaly.  No splenomegaly.  EXTREMITIES: No clubbing, cyanosis, edema  NEUROLOGIC: Cranial nerves II through XII are grossly intact.  Sensation is intact.  Strength is 5 out of 5 in the upper and lower extremities bilaterally.    ASSESSMENT:   This is an 89-year-old female with stage IA, pT1a N0 M0, grade 2, invasive ductal carcinoma of the right breast who underwent lumpectomy and sentinel node biopsy on January 7, 2022 followed by postopertaive radiation, with a 4 mm lesion, with the closest margin 5 mm anteriorly, ER/NM negative, Her2 negative, Ki-67 index 60%.    PLAN:   Ms. Kenney is recovering from the radiation without any unexpected side effects. She will repeat yearly mammogram in November 2022. She continue follow up with  Dr. Mcintosh as planned. I plan to see her back as needed, unless symptoms warrant otherwise.

## 2022-04-08 ENCOUNTER — OFFICE VISIT (OUTPATIENT)
Dept: HEMATOLOGY/ONCOLOGY | Facility: CLINIC | Age: 87
End: 2022-04-08
Payer: MEDICARE

## 2022-04-08 VITALS
OXYGEN SATURATION: 96 % | SYSTOLIC BLOOD PRESSURE: 166 MMHG | BODY MASS INDEX: 21.15 KG/M2 | DIASTOLIC BLOOD PRESSURE: 70 MMHG | RESPIRATION RATE: 18 BRPM | TEMPERATURE: 98 F | WEIGHT: 119.38 LBS | HEIGHT: 63 IN | HEART RATE: 90 BPM

## 2022-04-08 DIAGNOSIS — C50.411 MALIGNANT NEOPLASM OF UPPER-OUTER QUADRANT OF RIGHT BREAST IN FEMALE, ESTROGEN RECEPTOR NEGATIVE: Primary | ICD-10-CM

## 2022-04-08 DIAGNOSIS — Z17.1 MALIGNANT NEOPLASM OF UPPER-OUTER QUADRANT OF RIGHT BREAST IN FEMALE, ESTROGEN RECEPTOR NEGATIVE: Primary | ICD-10-CM

## 2022-04-08 PROCEDURE — 1160F RVW MEDS BY RX/DR IN RCRD: CPT | Mod: CPTII,S$GLB,, | Performed by: INTERNAL MEDICINE

## 2022-04-08 PROCEDURE — 1159F MED LIST DOCD IN RCRD: CPT | Mod: CPTII,S$GLB,, | Performed by: INTERNAL MEDICINE

## 2022-04-08 PROCEDURE — 1101F PR PT FALLS ASSESS DOC 0-1 FALLS W/OUT INJ PAST YR: ICD-10-PCS | Mod: CPTII,S$GLB,, | Performed by: INTERNAL MEDICINE

## 2022-04-08 PROCEDURE — 1159F PR MEDICATION LIST DOCUMENTED IN MEDICAL RECORD: ICD-10-PCS | Mod: CPTII,S$GLB,, | Performed by: INTERNAL MEDICINE

## 2022-04-08 PROCEDURE — 1126F AMNT PAIN NOTED NONE PRSNT: CPT | Mod: CPTII,S$GLB,, | Performed by: INTERNAL MEDICINE

## 2022-04-08 PROCEDURE — 1101F PT FALLS ASSESS-DOCD LE1/YR: CPT | Mod: CPTII,S$GLB,, | Performed by: INTERNAL MEDICINE

## 2022-04-08 PROCEDURE — 99214 OFFICE O/P EST MOD 30 MIN: CPT | Mod: S$GLB,,, | Performed by: INTERNAL MEDICINE

## 2022-04-08 PROCEDURE — 3288F PR FALLS RISK ASSESSMENT DOCUMENTED: ICD-10-PCS | Mod: CPTII,S$GLB,, | Performed by: INTERNAL MEDICINE

## 2022-04-08 PROCEDURE — 99999 PR PBB SHADOW E&M-EST. PATIENT-LVL III: ICD-10-PCS | Mod: PBBFAC,,, | Performed by: INTERNAL MEDICINE

## 2022-04-08 PROCEDURE — 1160F PR REVIEW ALL MEDS BY PRESCRIBER/CLIN PHARMACIST DOCUMENTED: ICD-10-PCS | Mod: CPTII,S$GLB,, | Performed by: INTERNAL MEDICINE

## 2022-04-08 PROCEDURE — 99999 PR PBB SHADOW E&M-EST. PATIENT-LVL III: CPT | Mod: PBBFAC,,, | Performed by: INTERNAL MEDICINE

## 2022-04-08 PROCEDURE — 3288F FALL RISK ASSESSMENT DOCD: CPT | Mod: CPTII,S$GLB,, | Performed by: INTERNAL MEDICINE

## 2022-04-08 PROCEDURE — 1126F PR PAIN SEVERITY QUANTIFIED, NO PAIN PRESENT: ICD-10-PCS | Mod: CPTII,S$GLB,, | Performed by: INTERNAL MEDICINE

## 2022-04-08 PROCEDURE — 99214 PR OFFICE/OUTPT VISIT, EST, LEVL IV, 30-39 MIN: ICD-10-PCS | Mod: S$GLB,,, | Performed by: INTERNAL MEDICINE

## 2022-04-08 NOTE — PROGRESS NOTES
PROGRESS NOTE    Subjective:       Patient ID: Olamide Felipe is a 89 y.o. female.  MRN: [id number]  : 2/3/1933    Chief Complaint: pT1a N0 M0, Stage IA, invasive ductal carcinoma of the right breast    History of Present Illness:   Olamide Felipe is a 89 y.o. female who presents with invasive ductal carcinoma of the right breast.       As previously documented, she has been having screening mammograms every 2 years.  This year a focal right breast asymmetry was noted on the screening mammogram at the end of 2021. She was sent for a diagnostic mammogram and ultrasound that confirmed a right breast mass, 3 mm x 2 mm x 2 mm.  She underwent a right breast biopsy that shows invasive ductal carcinoma, grade 2, triple negative, Ki-67 60% .     She underwent a right lumpectomy and SN exam. The tumor was 0.4 cm, final margins are negative.      She has also completed genetic testing, AlaMarka, which is negative.     Interim history:   She completed adjuvant RT, 30 cGY in 5 fractions, completed 3/2/22.      She had an acute viral illness over the weekend with diarrhea and vomiting, improved to baseline after 2 days.        Her weight , appetite and PS are stable. She continues to live independently with family close by.        Oncology History:  Oncology History   Malignant neoplasm of upper-outer quadrant of right breast in female, estrogen receptor negative   2022 Initial Diagnosis    Malignant neoplasm of upper-outer quadrant of right breast in female, estrogen receptor negative      Radiation Therapy    Treatment Summary  Course: C1 Breast   Treatment Site Energy Dose/Fx (Gy) #Fx Total Dose (Gy) Start Date End Date Elapsed Days   Right PARTIAL BREAST 6X 6 5 / 5 30 2022 3/2/2022 12            History:  Past Medical History:   Diagnosis Date    Hyperlipidemia     Hypothyroidism     Malignant neoplasm of upper-outer quadrant of right  breast in female, estrogen receptor negative 2022    Osteoarthritis, knee     Vertigo       Past Surgical History:   Procedure Laterality Date    dentures      FRACTURE SURGERY Left     fracture left wrist    HYSTERECTOMY      INJECTION FOR SENTINEL NODE IDENTIFICATION Right 2022    Procedure: INJECTION, FOR SENTINEL NODE IDENTIFICATION-Right;  Surgeon: Marci Mcintosh MD;  Location: [facility];  Service: General;  Laterality: Right;    left wrist surgery      MASTECTOMY, PARTIAL Right 2022    Procedure: MASTECTOMY, PARTIAL-Right with radiological marker;  Surgeon: Marci Mcintosh MD;  Location: [facility] OR;  Service: General;  Laterality: Right;  REQUEST SAID 2 HOUR CASE    SENTINEL LYMPH NODE BIOPSY Right 2022    Procedure: BIOPSY, LYMPH NODE, SENTINEL-Right;  Surgeon: Marci Mcintosh MD;  Location: [facility];  Service: General;  Laterality: Right;     Family History   Problem Relation Age of Onset    No Known Problems Daughter     No Known Problems Son     No Known Problems Sister      Social History     Tobacco Use    Smoking status: Former Smoker     Quit date: 1989     Years since quittin.8    Smokeless tobacco: Never Used   Substance and Sexual Activity    Alcohol use: Yes     Alcohol/week: 1.0 standard drink     Types: 1 Glasses of wine per week     Comment: 1\3 glass ofd wine with dinner nightly    Drug use: Never    Sexual activity: Not Currently        ROS:   Review of Systems   Constitutional: Negative for fever, malaise/fatigue and weight loss.   HENT: Negative for congestion, hearing loss, nosebleeds and sore throat.    Eyes: Negative for double vision and photophobia.   Respiratory: Negative for cough, hemoptysis, sputum production, shortness of breath and wheezing.    Cardiovascular: Negative for chest pain, palpitations, orthopnea and leg swelling.   Gastrointestinal: Negative for abdominal pain, blood in stool, constipation, diarrhea, heartburn, nausea and vomiting.  "  Genitourinary: Negative for dysuria, hematuria and urgency.   Musculoskeletal: Negative for back pain, joint pain and myalgias.   Skin: Negative for itching and rash.   Neurological: Negative for dizziness, tingling, seizures, weakness and headaches.   Endo/Heme/Allergies: Negative for polydipsia. Does not bruise/bleed easily.   Psychiatric/Behavioral: Negative for depression and memory loss. The patient is not nervous/anxious and does not have insomnia.         Objective:     Vitals:    04/08/22 1327   BP: (!) 166/70   Pulse: 90   Resp: 18   Temp: 98.2 °F (36.8 °C)   TempSrc: Oral   SpO2: 96%   Weight: 54.2 kg (119 lb 6.1 oz)   Height: 5' 3" (1.6 m)   PainSc: 0-No pain     Wt Readings from Last 10 Encounters:   04/08/22 54.2 kg (119 lb 6.1 oz)   01/27/22 56.4 kg (124 lb 5.4 oz)   01/25/22 55.3 kg (122 lb)   01/14/22 55.5 kg (122 lb 5.7 oz)   12/27/21 54.4 kg (120 lb)   12/27/21 54.4 kg (120 lb)   12/21/21 55.8 kg (123 lb)   12/17/21 55.5 kg (122 lb 5.7 oz)   12/08/21 54.9 kg (121 lb)   12/06/21 55.1 kg (121 lb 7.6 oz)       Physical Examination:   Physical Exam  Vitals and nursing note reviewed.   Constitutional:       General: She is not in acute distress.     Appearance: She is not diaphoretic.   HENT:      Head: Normocephalic.      Mouth/Throat:      Pharynx: No oropharyngeal exudate.   Eyes:      General: No scleral icterus.     Conjunctiva/sclera: Conjunctivae normal.   Neck:      Thyroid: No thyromegaly.   Cardiovascular:      Rate and Rhythm: Normal rate and regular rhythm.      Heart sounds: Normal heart sounds. No murmur heard.  Pulmonary:      Effort: Pulmonary effort is normal. No respiratory distress.      Breath sounds: No stridor. No wheezing or rales.   Chest:      Chest wall: No tenderness.   Abdominal:      General: Bowel sounds are normal. There is no distension.      Palpations: Abdomen is soft. There is no mass.      Tenderness: There is no abdominal tenderness. There is no rebound. "   Musculoskeletal:         General: No tenderness or deformity. Normal range of motion.      Cervical back: Neck supple.   Lymphadenopathy:      Cervical: No cervical adenopathy.   Skin:     General: Skin is warm and dry.      Findings: No erythema or rash.      Comments: + post lumpectomy changes. No other breast mass, axillary adenopathy palpated bilaterally    Neurological:      Mental Status: She is alert and oriented to person, place, and time.      Cranial Nerves: No cranial nerve deficit.      Coordination: Coordination normal.      Gait: Gait is intact.   Psychiatric:         Mood and Affect: Affect normal.         Cognition and Memory: Memory normal.         Judgment: Judgment normal.          Diagnostic Tests:  Significant Imaging: I have reviewed and interpreted all pertinent imaging results/findings.    Laboratory Data:  All pertinent labs have been reviewed.  Labs:   Lab Results   Component Value Date    WBC 7.89 12/21/2021    RBC 4.32 12/21/2021    HGB 13.6 12/21/2021    HCT 42.3 12/21/2021    MCV 98 12/21/2021     12/21/2021    GLU 78 12/21/2021     12/21/2021    K 4.4 12/21/2021    BUN 21 12/21/2021    CREATININE 0.7 12/21/2021    AST 22 12/21/2021    ALT 15 12/21/2021    BILITOT 0.6 12/21/2021       Assessment/Plan:   Malignant neoplasm of upper-outer quadrant of right breast in female, estrogen receptor negative    cT1aN0, TNBC, Grade 2, Ki 67 60%  Genetic negative      She is s/p lumpectomy and adjuvant RT.  Given T1a tumor, no additional risk factors, chemotherapy is not indicated.          I will continue surveillance and see her back in 3-4 months.      ECOG SCORE    1 - Restricted in strenuous activity-ambulatory and able to carry out work of a light nature           Discussion:   Follow up in about 4 months (around 8/8/2022) for MD.    Plan was discussed with the patient at length, and she verbalized understanding. Olamide was given an opportunity to ask questions that were  answered to her satisfaction, and she was advised to call in the interval if any problems or questions arise.    Electronically signed by Ami Boykin MD      Route Chart for Scheduling    Med Onc Chart Routing      Follow up with physician 4 months. 4 months md visit, no labs    Follow up with [facility]    Labs    Imaging    Pharmacy appointment    Other referrals

## 2022-04-12 ENCOUNTER — OFFICE VISIT (OUTPATIENT)
Dept: RADIATION ONCOLOGY | Facility: CLINIC | Age: 87
End: 2022-04-12
Payer: MEDICARE

## 2022-04-12 VITALS
OXYGEN SATURATION: 96 % | WEIGHT: 123.69 LBS | SYSTOLIC BLOOD PRESSURE: 153 MMHG | RESPIRATION RATE: 17 BRPM | TEMPERATURE: 98 F | HEART RATE: 74 BPM | BODY MASS INDEX: 20.61 KG/M2 | HEIGHT: 65 IN | DIASTOLIC BLOOD PRESSURE: 68 MMHG

## 2022-04-12 DIAGNOSIS — C50.411 MALIGNANT NEOPLASM OF UPPER-OUTER QUADRANT OF RIGHT BREAST IN FEMALE, ESTROGEN RECEPTOR NEGATIVE: Primary | ICD-10-CM

## 2022-04-12 DIAGNOSIS — Z17.1 MALIGNANT NEOPLASM OF UPPER-OUTER QUADRANT OF RIGHT BREAST IN FEMALE, ESTROGEN RECEPTOR NEGATIVE: Primary | ICD-10-CM

## 2022-04-12 PROCEDURE — 99213 OFFICE O/P EST LOW 20 MIN: CPT | Mod: S$GLB,,, | Performed by: RADIOLOGY

## 2022-04-12 PROCEDURE — 1126F PR PAIN SEVERITY QUANTIFIED, NO PAIN PRESENT: ICD-10-PCS | Mod: CPTII,S$GLB,, | Performed by: RADIOLOGY

## 2022-04-12 PROCEDURE — 1101F PR PT FALLS ASSESS DOC 0-1 FALLS W/OUT INJ PAST YR: ICD-10-PCS | Mod: CPTII,S$GLB,, | Performed by: RADIOLOGY

## 2022-04-12 PROCEDURE — 99999 PR PBB SHADOW E&M-EST. PATIENT-LVL IV: CPT | Mod: PBBFAC,,, | Performed by: RADIOLOGY

## 2022-04-12 PROCEDURE — 1159F PR MEDICATION LIST DOCUMENTED IN MEDICAL RECORD: ICD-10-PCS | Mod: CPTII,S$GLB,, | Performed by: RADIOLOGY

## 2022-04-12 PROCEDURE — 99999 PR PBB SHADOW E&M-EST. PATIENT-LVL IV: ICD-10-PCS | Mod: PBBFAC,,, | Performed by: RADIOLOGY

## 2022-04-12 PROCEDURE — 1160F PR REVIEW ALL MEDS BY PRESCRIBER/CLIN PHARMACIST DOCUMENTED: ICD-10-PCS | Mod: CPTII,S$GLB,, | Performed by: RADIOLOGY

## 2022-04-12 PROCEDURE — 1126F AMNT PAIN NOTED NONE PRSNT: CPT | Mod: CPTII,S$GLB,, | Performed by: RADIOLOGY

## 2022-04-12 PROCEDURE — 99213 PR OFFICE/OUTPT VISIT, EST, LEVL III, 20-29 MIN: ICD-10-PCS | Mod: S$GLB,,, | Performed by: RADIOLOGY

## 2022-04-12 PROCEDURE — 1160F RVW MEDS BY RX/DR IN RCRD: CPT | Mod: CPTII,S$GLB,, | Performed by: RADIOLOGY

## 2022-04-12 PROCEDURE — 1101F PT FALLS ASSESS-DOCD LE1/YR: CPT | Mod: CPTII,S$GLB,, | Performed by: RADIOLOGY

## 2022-04-12 PROCEDURE — 3288F PR FALLS RISK ASSESSMENT DOCUMENTED: ICD-10-PCS | Mod: CPTII,S$GLB,, | Performed by: RADIOLOGY

## 2022-04-12 PROCEDURE — 1159F MED LIST DOCD IN RCRD: CPT | Mod: CPTII,S$GLB,, | Performed by: RADIOLOGY

## 2022-04-12 PROCEDURE — 3288F FALL RISK ASSESSMENT DOCD: CPT | Mod: CPTII,S$GLB,, | Performed by: RADIOLOGY

## 2022-04-22 ENCOUNTER — PES CALL (OUTPATIENT)
Dept: ADMINISTRATIVE | Facility: CLINIC | Age: 87
End: 2022-04-22
Payer: MEDICARE

## 2022-05-19 ENCOUNTER — TELEPHONE (OUTPATIENT)
Dept: HEMATOLOGY/ONCOLOGY | Facility: CLINIC | Age: 87
End: 2022-05-19
Payer: MEDICARE

## 2022-05-19 NOTE — TELEPHONE ENCOUNTER
"----- Message from Merlyn Isabel sent at 5/19/2022 10:45 AM CDT -----  Regarding: Appts  Contact: Olamide  Consult/Advisory:       Name Of Caller: Olamide      Contact Preference?: [phone] (Home Phone)       Does patient feel the need to be seen today?No      What is the nature of the call?: Calling to schedule 4 month follow up appt and labs.       Additional Notes: Dr. Boykin pt.  "Thank you for all that you do for our patients'"      "

## 2022-07-11 ENCOUNTER — TELEPHONE (OUTPATIENT)
Dept: HEMATOLOGY/ONCOLOGY | Facility: CLINIC | Age: 87
End: 2022-07-11
Payer: MEDICARE

## 2022-07-11 NOTE — TELEPHONE ENCOUNTER
Called and spoke with patient informed her that Dr Boykin would be out of the clinic tomorrow due to an emergency, appt r/s to 7/20 at 2pm.  Patient agreeable to date and time

## 2022-07-15 ENCOUNTER — HOSPITAL ENCOUNTER (OUTPATIENT)
Dept: RADIOLOGY | Facility: HOSPITAL | Age: 87
Discharge: HOME | End: 2022-07-15
Attending: HOSPITALIST
Payer: MEDICARE

## 2022-07-15 ENCOUNTER — OFFICE VISIT (OUTPATIENT)
Dept: INTERNAL MEDICINE | Facility: CLINIC | Age: 87
End: 2022-07-15
Payer: MEDICARE

## 2022-07-15 VITALS
RESPIRATION RATE: 12 BRPM | BODY MASS INDEX: 20.47 KG/M2 | SYSTOLIC BLOOD PRESSURE: 158 MMHG | WEIGHT: 119.94 LBS | TEMPERATURE: 98 F | HEIGHT: 64 IN | HEART RATE: 88 BPM | DIASTOLIC BLOOD PRESSURE: 82 MMHG | OXYGEN SATURATION: 93 %

## 2022-07-15 DIAGNOSIS — I70.0 AORTIC ATHEROSCLEROSIS: ICD-10-CM
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
## 2022-07-15 DIAGNOSIS — M79.674 TOE PAIN, BILATERAL: ICD-10-CM

## 2022-07-15 DIAGNOSIS — E03.9 HYPOTHYROIDISM, UNSPECIFIED TYPE: ICD-10-CM
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
## 2022-07-15 DIAGNOSIS — R55 SYNCOPE, UNSPECIFIED SYNCOPE TYPE: ICD-10-CM

## 2022-07-15 DIAGNOSIS — E21.3 HYPERPARATHYROIDISM: ICD-10-CM

## 2022-07-15 DIAGNOSIS — Z17.1 MALIGNANT NEOPLASM OF UPPER-OUTER QUADRANT OF RIGHT BREAST IN FEMALE, ESTROGEN RECEPTOR NEGATIVE: ICD-10-CM

## 2022-07-15 DIAGNOSIS — I15.9 SECONDARY HYPERTENSION: ICD-10-CM

## 2022-07-15 DIAGNOSIS — C50.411 MALIGNANT NEOPLASM OF UPPER-OUTER QUADRANT OF RIGHT BREAST IN FEMALE, ESTROGEN RECEPTOR NEGATIVE: ICD-10-CM

## 2022-07-15 DIAGNOSIS — F39 MOOD DISORDER: ICD-10-CM

## 2022-07-15 DIAGNOSIS — Z12.83 SKIN CANCER SCREENING: ICD-10-CM

## 2022-07-15 DIAGNOSIS — M54.2 NECK PAIN: ICD-10-CM

## 2022-07-15 DIAGNOSIS — M79.675 TOE PAIN, BILATERAL: ICD-10-CM

## 2022-07-15 DIAGNOSIS — E78.5 HYPERLIPIDEMIA, UNSPECIFIED HYPERLIPIDEMIA TYPE: Primary | ICD-10-CM

## 2022-07-15 DIAGNOSIS — M54.6 ACUTE THORACIC BACK PAIN, UNSPECIFIED BACK PAIN LATERALITY: ICD-10-CM

## 2022-07-15 DIAGNOSIS — L98.9 SKIN LESION OF BACK: ICD-10-CM

## 2022-07-15 DIAGNOSIS — Z00.00 ENCOUNTER FOR PREVENTIVE HEALTH EXAMINATION: Primary | ICD-10-CM

## 2022-07-15 DIAGNOSIS — E78.5 HYPERLIPIDEMIA, UNSPECIFIED HYPERLIPIDEMIA TYPE: ICD-10-CM

## 2022-07-15 PROCEDURE — 72080 X-RAY EXAM THORACOLMB 2/> VW: CPT | Mod: TC,PO

## 2022-07-15 PROCEDURE — 1160F PR REVIEW ALL MEDS BY PRESCRIBER/CLIN PHARMACIST DOCUMENTED: ICD-10-PCS | Mod: CPTII,S$GLB,, | Performed by: HOSPITALIST

## 2022-07-15 PROCEDURE — 99499 RISK ADDL DX/OHS AUDIT: ICD-10-PCS | Mod: S$GLB,,, | Performed by: HOSPITALIST

## 2022-07-15 PROCEDURE — 93005 ELECTROCARDIOGRAM TRACING: CPT | Mod: S$GLB,,, | Performed by: HOSPITALIST

## 2022-07-15 PROCEDURE — 1126F PR PAIN SEVERITY QUANTIFIED, NO PAIN PRESENT: ICD-10-PCS | Mod: CPTII,S$GLB,, | Performed by: HOSPITALIST

## 2022-07-15 PROCEDURE — 73620 XR FOOT AP BILAT: ICD-10-PCS | Mod: 26,50,, | Performed by: INTERNAL MEDICINE

## 2022-07-15 PROCEDURE — 3288F PR FALLS RISK ASSESSMENT DOCUMENTED: ICD-10-PCS | Mod: CPTII,S$GLB,, | Performed by: HOSPITALIST

## 2022-07-15 PROCEDURE — 93010 ELECTROCARDIOGRAM REPORT: CPT | Mod: S$GLB,,, | Performed by: INTERNAL MEDICINE

## 2022-07-15 PROCEDURE — 99499 UNLISTED E&M SERVICE: CPT | Mod: S$GLB,,, | Performed by: HOSPITALIST

## 2022-07-15 PROCEDURE — 72050 XR CERVICAL SPINE COMPLETE 5 VIEW: ICD-10-PCS | Mod: 26,,, | Performed by: INTERNAL MEDICINE

## 2022-07-15 PROCEDURE — 72080 XR THORACOLUMBAR SPINE AP LATERAL: ICD-10-PCS | Mod: 26,,, | Performed by: INTERNAL MEDICINE

## 2022-07-15 PROCEDURE — 93010 EKG 12-LEAD: ICD-10-PCS | Mod: S$GLB,,, | Performed by: INTERNAL MEDICINE

## 2022-07-15 PROCEDURE — 72080 X-RAY EXAM THORACOLMB 2/> VW: CPT | Mod: 26,,, | Performed by: INTERNAL MEDICINE

## 2022-07-15 PROCEDURE — 93005 EKG 12-LEAD: ICD-10-PCS | Mod: S$GLB,,, | Performed by: HOSPITALIST

## 2022-07-15 PROCEDURE — 99999 PR PBB SHADOW E&M-EST. PATIENT-LVL V: CPT | Mod: PBBFAC,,, | Performed by: HOSPITALIST

## 2022-07-15 PROCEDURE — 73620 X-RAY EXAM OF FOOT: CPT | Mod: 26,50,, | Performed by: INTERNAL MEDICINE

## 2022-07-15 PROCEDURE — 1126F AMNT PAIN NOTED NONE PRSNT: CPT | Mod: CPTII,S$GLB,, | Performed by: HOSPITALIST

## 2022-07-15 PROCEDURE — 1159F MED LIST DOCD IN RCRD: CPT | Mod: CPTII,S$GLB,, | Performed by: HOSPITALIST

## 2022-07-15 PROCEDURE — 72050 X-RAY EXAM NECK SPINE 4/5VWS: CPT | Mod: 26,,, | Performed by: INTERNAL MEDICINE

## 2022-07-15 PROCEDURE — 1100F PTFALLS ASSESS-DOCD GE2>/YR: CPT | Mod: CPTII,S$GLB,, | Performed by: HOSPITALIST

## 2022-07-15 PROCEDURE — 99397 PER PM REEVAL EST PAT 65+ YR: CPT | Mod: S$GLB,,, | Performed by: HOSPITALIST

## 2022-07-15 PROCEDURE — 72050 X-RAY EXAM NECK SPINE 4/5VWS: CPT | Mod: TC,PO

## 2022-07-15 PROCEDURE — 1100F PR PT FALLS ASSESS DOC 2+ FALLS/FALL W/INJURY/YR: ICD-10-PCS | Mod: CPTII,S$GLB,, | Performed by: HOSPITALIST

## 2022-07-15 PROCEDURE — 73620 X-RAY EXAM OF FOOT: CPT | Mod: TC,50,PO

## 2022-07-15 PROCEDURE — 1159F PR MEDICATION LIST DOCUMENTED IN MEDICAL RECORD: ICD-10-PCS | Mod: CPTII,S$GLB,, | Performed by: HOSPITALIST

## 2022-07-15 PROCEDURE — 99397 PR PREVENTIVE VISIT,EST,65 & OVER: ICD-10-PCS | Mod: S$GLB,,, | Performed by: HOSPITALIST

## 2022-07-15 PROCEDURE — 3288F FALL RISK ASSESSMENT DOCD: CPT | Mod: CPTII,S$GLB,, | Performed by: HOSPITALIST

## 2022-07-15 PROCEDURE — 1160F RVW MEDS BY RX/DR IN RCRD: CPT | Mod: CPTII,S$GLB,, | Performed by: HOSPITALIST

## 2022-07-15 PROCEDURE — 99999 PR PBB SHADOW E&M-EST. PATIENT-LVL V: ICD-10-PCS | Mod: PBBFAC,,, | Performed by: HOSPITALIST

## 2022-07-15 NOTE — PROGRESS NOTES
Subjective:     @Patient ID: Olamide Felipe is a 89 y.o. female.    Chief Complaint: Annual Exam, Loss of Consciousness (Fell while sitting on the toilet.  Pt state had bruises on legs and possibly fractured 2 toes), and Dizziness    HPI  89 y.o. female here for annual exam.  Pt has hypothyroidism, aortic atherosclerosis, hypothyroidism, hyperparathyroidism, breast cancer (  invasive ductal carcinoma of the right breast s/p lumpectomy 1/7/22, s/p postopertaive radiation)       1. Syncope: pt reports happened about 8 days ago. Went to bathroom and woke up on the floor. Did not have dizziness or symptoms prior to the event. Had another fall episode but did feel dizzy. Reports she did not go to ER as happened in middle of night. Not sure if she hit her head  2. Memory: reports mild forgetfulness. States otherwise doing ok.   3. HTN: no prior hx of htn. However over past 6 months has been high. Dx by heme onc with secondary htn. Pt does have some anxiety. Not currently on medications at this time.         Lipid disorders/ASCVD risk (ages >/= 45 or >/= 20 if increased risk ): ordered  Eye exam: DUE  Breast Cancer (40-50y discretion of pt, 50-74y every 1-2 years): Mammogram:  monitored by heme onc   Colorectal Cancer (normal risk 50-75yr): Colonoscopy: cologuard negative 7/2021   DEXA (F>64 yo, M >69yo, M&F 50-70 yo with risk factors (smoking, previous fx, wt <70kg; etoh abuse, chronic steroids, RA)): done 6/2019     Vaccines:   Influenza (yearly) n/a   Tetanus (every 10 yrs - 1st tdap) she reports done about 2 years    PPSV23(>66yo or <65 w/ lung dz, smoking, DM)    PCV13 (> 65 or <65 w/ immunocompromised) done 11/2019  Zoster (>61yo) 2nd dose DUE      Exercise:  Occasional walking  Diet: regular    Review of Systems   Constitutional: Negative for chills and fever.   HENT: Negative for congestion and sore throat.    Eyes: Negative for pain and visual disturbance.   Respiratory: Negative for cough and shortness of  breath.    Cardiovascular: Negative for chest pain and leg swelling.   Gastrointestinal: Negative for abdominal pain, nausea and vomiting.   Endocrine: Negative for polydipsia and polyuria.   Genitourinary: Negative for difficulty urinating and dysuria.   Musculoskeletal: Negative for arthralgias and back pain.   Skin: Negative for rash and wound.   Neurological: Positive for syncope. Negative for dizziness, weakness and headaches.   Psychiatric/Behavioral: Negative for agitation and confusion.     Past medical history, surgical history, and family medical history reviewed and updated as appropriate.    Medications and allergies reviewed.     Objective:     There were no vitals filed for this visit.  There is no height or weight on file to calculate BMI.  Physical Exam  Vitals reviewed.   Constitutional:       General: She is not in acute distress.     Appearance: Normal appearance. She is well-developed.   HENT:      Head: Normocephalic and atraumatic.      Right Ear: Tympanic membrane normal.      Left Ear: Tympanic membrane normal.      Mouth/Throat:      Mouth: Mucous membranes are moist.      Pharynx: No oropharyngeal exudate.   Eyes:      General:         Right eye: No discharge.         Left eye: No discharge.      Conjunctiva/sclera: Conjunctivae normal.      Pupils: Pupils are equal, round, and reactive to light.   Cardiovascular:      Rate and Rhythm: Normal rate and regular rhythm.      Heart sounds:     No friction rub.   Pulmonary:      Effort: Pulmonary effort is normal.      Breath sounds: Normal breath sounds.   Abdominal:      General: Bowel sounds are normal. There is no distension.      Palpations: Abdomen is soft.      Tenderness: There is no abdominal tenderness. There is no guarding.   Musculoskeletal:         General: Normal range of motion.      Cervical back: Normal range of motion and neck supple.      Right lower leg: No edema.      Left lower leg: No edema.   Lymphadenopathy:       Cervical: No cervical adenopathy.   Skin:     General: Skin is warm and dry.      Comments: + skin lesion (peduncualated mass of upper back/neck area). Also irregular appearing ?mole of mid back area of brenton hwang         Neurological:      Mental Status: She is alert and oriented to person, place, and time.   Psychiatric:         Mood and Affect: Mood normal.         Behavior: Behavior normal.       + Right foot (2nd and 3rd toe) redness and swelling     Lab Results   Component Value Date    WBC 7.89 12/21/2021    HGB 13.6 12/21/2021    HCT 42.3 12/21/2021     12/21/2021    CHOL 214 (H) 07/07/2021    TRIG 97 07/07/2021    HDL 81 (H) 07/07/2021    ALT 15 12/21/2021    AST 22 12/21/2021     12/21/2021    K 4.4 12/21/2021     12/21/2021    CREATININE 0.7 12/21/2021    BUN 21 12/21/2021    CO2 28 12/21/2021    TSH 1.801 07/07/2021       Assessment:     1. Encounter for preventive health examination    2. Syncope, unspecified syncope type    3. Fall at home, initial encounter    4. Acute thoracic back pain, unspecified back pain laterality    5. Toe pain, bilateral    6. Neck pain    7. Secondary hypertension    8. Skin lesion of back    9. Skin cancer screening    10. Hyperlipidemia, unspecified hyperlipidemia type    11. Hypothyroidism, unspecified type    12. Mood disorder    13. Aortic atherosclerosis    14. Hyperparathyroidism    15. Malignant neoplasm of upper-outer quadrant of right breast in female, estrogen receptor negative      Plan:   Olamide was seen today for annual exam, loss of consciousness and dizziness.    Diagnoses and all orders for this visit:    Encounter for preventive health examination  - labs ordered     Syncope, unspecified syncope type  - Unclear etiology. Pt counseled if occurs again to go to ER. Labs, ekg, CT head and referral to cardiology placed.   -     Comprehensive Metabolic Panel; Future  -     CBC Auto Differential; Future  -     TSH; Future  -     Vitamin D;  Future  -     Lipid Panel; Future  -     Urinalysis; Future  -     IN OFFICE EKG 12-LEAD (to [place])  -     CT Head Without Contrast; Future  -     Ambulatory referral/consult to Cardiology; Future    Fall at home, initial encounter  -     X-Ray Cervical Spine Complete 5 view; Future  -     X-Ray Thoracolumbar Spine AP Lateral; Future  -     X-Ray Foot AP Bilateral; Future    Acute thoracic back pain, unspecified back pain laterality  -     X-Ray Thoracolumbar Spine AP Lateral; Future    Toe pain, bilateral  -     X-Ray Foot AP Bilateral; Future    Neck pain  -     Ambulatory referral/consult to Physical/Occupational Therapy; Future  -     X-Ray Cervical Spine Complete 5 view; Future    Secondary hypertension  -     Comprehensive Metabolic Panel; Future  -     CBC Auto Differential; Future  -     TSH; Future  -     Vitamin D; Future  -     Lipid Panel; Future  -     Urinalysis; Future  -     Ambulatory referral/consult to Cardiology; Future    Skin lesion of back  -     Ambulatory referral/consult to Dermatology; Future    Skin cancer screening  -     Ambulatory referral/consult to Dermatology; Future    Hyperlipidemia, unspecified hyperlipidemia type  -     Comprehensive Metabolic Panel; Future  -     CBC Auto Differential; Future  -     TSH; Future  -     Vitamin D; Future  -     Lipid Panel; Future  -     Urinalysis; Future    Hypothyroidism, unspecified type  -     Comprehensive Metabolic Panel; Future  -     CBC Auto Differential; Future  -     TSH; Future  -     Vitamin D; Future  -     Lipid Panel; Future  -     Urinalysis; Future    Mood disorder  - stable. Continue SSRI     Aortic atherosclerosis  -     Comprehensive Metabolic Panel; Future  -     CBC Auto Differential; Future  -     TSH; Future  -     Vitamin D; Future  -     Lipid Panel; Future  -     Urinalysis; Future    Hyperparathyroidism  -     Comprehensive Metabolic Panel; Future  -     CBC Auto Differential; Future  -     TSH; Future  -     Vitamin  D; Future  -     Lipid Panel; Future  -     Urinalysis; Future    Malignant neoplasm of upper-outer quadrant of right breast in female, estrogen receptor negative  -     Comprehensive Metabolic Panel; Future  -     CBC Auto Differential; Future  -     TSH; Future  -     Vitamin D; Future  -     Lipid Panel; Future  -     Urinalysis; Future    RTC 6 months and prn         Abiola Campbell MD  Internal Medicine    7/15/2022

## 2022-07-18 ENCOUNTER — LAB VISIT (OUTPATIENT)
Dept: LAB | Facility: HOSPITAL | Age: 87
End: 2022-07-18
Attending: HOSPITALIST
Payer: MEDICARE

## 2022-07-18 DIAGNOSIS — R55 SYNCOPE, UNSPECIFIED SYNCOPE TYPE: ICD-10-CM

## 2022-07-18 DIAGNOSIS — C50.411 MALIGNANT NEOPLASM OF UPPER-OUTER QUADRANT OF RIGHT BREAST IN FEMALE, ESTROGEN RECEPTOR NEGATIVE: ICD-10-CM

## 2022-07-18 DIAGNOSIS — I15.9 SECONDARY HYPERTENSION: ICD-10-CM

## 2022-07-18 DIAGNOSIS — E21.3 HYPERPARATHYROIDISM: ICD-10-CM

## 2022-07-18 DIAGNOSIS — Z17.1 MALIGNANT NEOPLASM OF UPPER-OUTER QUADRANT OF RIGHT BREAST IN FEMALE, ESTROGEN RECEPTOR NEGATIVE: ICD-10-CM

## 2022-07-18 DIAGNOSIS — I70.0 AORTIC ATHEROSCLEROSIS: ICD-10-CM

## 2022-07-18 DIAGNOSIS — E03.9 HYPOTHYROIDISM, UNSPECIFIED TYPE: ICD-10-CM

## 2022-07-18 DIAGNOSIS — E78.5 HYPERLIPIDEMIA, UNSPECIFIED HYPERLIPIDEMIA TYPE: ICD-10-CM

## 2022-07-18 LAB
25(OH)D3+25(OH)D2 SERPL-MCNC: 37 NG/ML (ref 30–96)
ALBUMIN SERPL BCP-MCNC: 4.1 G/DL (ref 3.5–5.2)
ALP SERPL-CCNC: 68 U/L (ref 55–135)
ALT SERPL W/O P-5'-P-CCNC: 14 U/L (ref 10–44)
ANION GAP SERPL CALC-SCNC: 9 MMOL/L (ref 8–16)
AST SERPL-CCNC: 21 U/L (ref 10–40)
BASOPHILS # BLD AUTO: 0.04 K/UL (ref 0–0.2)
BASOPHILS NFR BLD: 0.8 % (ref 0–1.9)
BILIRUB SERPL-MCNC: 0.8 MG/DL (ref 0.1–1)
BUN SERPL-MCNC: 18 MG/DL (ref 8–23)
CALCIUM SERPL-MCNC: 9.8 MG/DL (ref 8.7–10.5)
CHLORIDE SERPL-SCNC: 107 MMOL/L (ref 95–110)
CHOLEST SERPL-MCNC: 198 MG/DL (ref 120–199)
CHOLEST/HDLC SERPL: 3 {RATIO} (ref 2–5)
CO2 SERPL-SCNC: 25 MMOL/L (ref 23–29)
CREAT SERPL-MCNC: 0.9 MG/DL (ref 0.5–1.4)
DIFFERENTIAL METHOD: ABNORMAL
EOSINOPHIL # BLD AUTO: 0.2 K/UL (ref 0–0.5)
EOSINOPHIL NFR BLD: 4.5 % (ref 0–8)
ERYTHROCYTE [DISTWIDTH] IN BLOOD BY AUTOMATED COUNT: 12.2 % (ref 11.5–14.5)
EST. GFR  (AFRICAN AMERICAN): >60 ML/MIN/1.73 M^2
EST. GFR  (NON AFRICAN AMERICAN): 56.9 ML/MIN/1.73 M^2
GLUCOSE SERPL-MCNC: 92 MG/DL (ref 70–110)
HCT VFR BLD AUTO: 41.5 % (ref 37–48.5)
HDLC SERPL-MCNC: 67 MG/DL (ref 40–75)
HDLC SERPL: 33.8 % (ref 20–50)
HGB BLD-MCNC: 13.2 G/DL (ref 12–16)
IMM GRANULOCYTES # BLD AUTO: 0.01 K/UL (ref 0–0.04)
IMM GRANULOCYTES NFR BLD AUTO: 0.2 % (ref 0–0.5)
LDLC SERPL CALC-MCNC: 109.6 MG/DL (ref 63–159)
LYMPHOCYTES # BLD AUTO: 1.8 K/UL (ref 1–4.8)
LYMPHOCYTES NFR BLD: 35.3 % (ref 18–48)
MCH RBC QN AUTO: 32 PG (ref 27–31)
MCHC RBC AUTO-ENTMCNC: 31.8 G/DL (ref 32–36)
MCV RBC AUTO: 101 FL (ref 82–98)
MONOCYTES # BLD AUTO: 0.5 K/UL (ref 0.3–1)
MONOCYTES NFR BLD: 9.4 % (ref 4–15)
NEUTROPHILS # BLD AUTO: 2.5 K/UL (ref 1.8–7.7)
NEUTROPHILS NFR BLD: 49.8 % (ref 38–73)
NONHDLC SERPL-MCNC: 131 MG/DL
NRBC BLD-RTO: 0 /100 WBC
PLATELET # BLD AUTO: 287 K/UL (ref 150–450)
PMV BLD AUTO: 11.4 FL (ref 9.2–12.9)
POTASSIUM SERPL-SCNC: 4.3 MMOL/L (ref 3.5–5.1)
PROT SERPL-MCNC: 6.4 G/DL (ref 6–8.4)
PTH-INTACT SERPL-MCNC: 100.8 PG/ML (ref 9–77)
RBC # BLD AUTO: 4.12 M/UL (ref 4–5.4)
SODIUM SERPL-SCNC: 141 MMOL/L (ref 136–145)
TRIGL SERPL-MCNC: 107 MG/DL (ref 30–150)
TSH SERPL DL<=0.005 MIU/L-ACNC: 2.77 UIU/ML (ref 0.4–4)
WBC # BLD AUTO: 5.1 K/UL (ref 3.9–12.7)

## 2022-07-18 PROCEDURE — 82306 VITAMIN D 25 HYDROXY: CPT | Performed by: HOSPITALIST

## 2022-07-18 PROCEDURE — 36415 COLL VENOUS BLD VENIPUNCTURE: CPT | Mod: PO | Performed by: HOSPITALIST

## 2022-07-18 PROCEDURE — 85025 COMPLETE CBC W/AUTO DIFF WBC: CPT | Performed by: HOSPITALIST

## 2022-07-18 PROCEDURE — 83970 ASSAY OF PARATHORMONE: CPT | Performed by: HOSPITALIST

## 2022-07-18 PROCEDURE — 80053 COMPREHEN METABOLIC PANEL: CPT | Performed by: HOSPITALIST

## 2022-07-18 PROCEDURE — 80061 LIPID PANEL: CPT | Performed by: HOSPITALIST

## 2022-07-18 PROCEDURE — 84443 ASSAY THYROID STIM HORMONE: CPT | Performed by: HOSPITALIST

## 2022-07-19 ENCOUNTER — PATIENT MESSAGE (OUTPATIENT)
Dept: HEMATOLOGY/ONCOLOGY | Facility: CLINIC | Age: 87
End: 2022-07-19
Payer: MEDICARE

## 2022-07-19 ENCOUNTER — TELEPHONE (OUTPATIENT)
Dept: HEMATOLOGY/ONCOLOGY | Facility: CLINIC | Age: 87
End: 2022-07-19
Payer: MEDICARE

## 2022-07-19 ENCOUNTER — HOSPITAL ENCOUNTER (OUTPATIENT)
Dept: RADIOLOGY | Facility: HOSPITAL | Age: 87
Discharge: HOME | End: 2022-07-19
Attending: HOSPITALIST
Payer: MEDICARE

## 2022-07-19 DIAGNOSIS — R55 SYNCOPE, UNSPECIFIED SYNCOPE TYPE: ICD-10-CM

## 2022-07-19 PROCEDURE — 70450 CT HEAD/BRAIN W/O DYE: CPT | Mod: 26,,, | Performed by: RADIOLOGY

## 2022-07-19 PROCEDURE — 70450 CT HEAD WITHOUT CONTRAST: ICD-10-PCS | Mod: 26,,, | Performed by: RADIOLOGY

## 2022-07-19 PROCEDURE — 70450 CT HEAD/BRAIN W/O DYE: CPT | Mod: TC

## 2022-07-19 NOTE — TELEPHONE ENCOUNTER
Left voicemail asking patient to call back to discuss appointment tomorrow. Dr. Boykin will be out, she has been moved to the NP's schedule for 2pm. Left offcie number if she would like to reschedule

## 2022-07-20 ENCOUNTER — OFFICE VISIT (OUTPATIENT)
Dept: HEMATOLOGY/ONCOLOGY | Facility: CLINIC | Age: 87
End: 2022-07-20
Payer: MEDICARE

## 2022-07-20 VITALS
SYSTOLIC BLOOD PRESSURE: 141 MMHG | DIASTOLIC BLOOD PRESSURE: 63 MMHG | TEMPERATURE: 97 F | RESPIRATION RATE: 16 BRPM | OXYGEN SATURATION: 95 % | HEIGHT: 64 IN | BODY MASS INDEX: 20.18 KG/M2 | HEART RATE: 89 BPM | WEIGHT: 118.19 LBS

## 2022-07-20 DIAGNOSIS — Z17.1 MALIGNANT NEOPLASM OF UPPER-OUTER QUADRANT OF RIGHT BREAST IN FEMALE, ESTROGEN RECEPTOR NEGATIVE: Primary | ICD-10-CM

## 2022-07-20 DIAGNOSIS — C50.411 MALIGNANT NEOPLASM OF UPPER-OUTER QUADRANT OF RIGHT BREAST IN FEMALE, ESTROGEN RECEPTOR NEGATIVE: Primary | ICD-10-CM

## 2022-07-20 PROCEDURE — 99214 OFFICE O/P EST MOD 30 MIN: CPT | Mod: S$GLB,,, | Performed by: NURSE PRACTITIONER

## 2022-07-20 PROCEDURE — 1101F PT FALLS ASSESS-DOCD LE1/YR: CPT | Mod: CPTII,S$GLB,, | Performed by: NURSE PRACTITIONER

## 2022-07-20 PROCEDURE — 99999 PR PBB SHADOW E&M-EST. PATIENT-LVL III: ICD-10-PCS | Mod: PBBFAC,,, | Performed by: NURSE PRACTITIONER

## 2022-07-20 PROCEDURE — 1159F MED LIST DOCD IN RCRD: CPT | Mod: CPTII,S$GLB,, | Performed by: NURSE PRACTITIONER

## 2022-07-20 PROCEDURE — 3288F PR FALLS RISK ASSESSMENT DOCUMENTED: ICD-10-PCS | Mod: CPTII,S$GLB,, | Performed by: NURSE PRACTITIONER

## 2022-07-20 PROCEDURE — 3288F FALL RISK ASSESSMENT DOCD: CPT | Mod: CPTII,S$GLB,, | Performed by: NURSE PRACTITIONER

## 2022-07-20 PROCEDURE — 1101F PR PT FALLS ASSESS DOC 0-1 FALLS W/OUT INJ PAST YR: ICD-10-PCS | Mod: CPTII,S$GLB,, | Performed by: NURSE PRACTITIONER

## 2022-07-20 PROCEDURE — 99999 PR PBB SHADOW E&M-EST. PATIENT-LVL III: CPT | Mod: PBBFAC,,, | Performed by: NURSE PRACTITIONER

## 2022-07-20 PROCEDURE — 1126F PR PAIN SEVERITY QUANTIFIED, NO PAIN PRESENT: ICD-10-PCS | Mod: CPTII,S$GLB,, | Performed by: NURSE PRACTITIONER

## 2022-07-20 PROCEDURE — 1159F PR MEDICATION LIST DOCUMENTED IN MEDICAL RECORD: ICD-10-PCS | Mod: CPTII,S$GLB,, | Performed by: NURSE PRACTITIONER

## 2022-07-20 PROCEDURE — 99214 PR OFFICE/OUTPT VISIT, EST, LEVL IV, 30-39 MIN: ICD-10-PCS | Mod: S$GLB,,, | Performed by: NURSE PRACTITIONER

## 2022-07-20 PROCEDURE — 1160F PR REVIEW ALL MEDS BY PRESCRIBER/CLIN PHARMACIST DOCUMENTED: ICD-10-PCS | Mod: CPTII,S$GLB,, | Performed by: NURSE PRACTITIONER

## 2022-07-20 PROCEDURE — 1126F AMNT PAIN NOTED NONE PRSNT: CPT | Mod: CPTII,S$GLB,, | Performed by: NURSE PRACTITIONER

## 2022-07-20 PROCEDURE — 1160F RVW MEDS BY RX/DR IN RCRD: CPT | Mod: CPTII,S$GLB,, | Performed by: NURSE PRACTITIONER

## 2022-07-20 NOTE — PROGRESS NOTES
PROGRESS NOTE    Subjective:       Patient ID: Olamide Felipe is a 89 y.o. female.  MRN: [id number]  : 2/3/1933    Chief Complaint: pT1a N0 M0, Stage IA, invasive ductal carcinoma of the right breast    History of Present Illness:   Olamide Felipe is a 89 y.o. female who presents with invasive ductal carcinoma of the right breast.       Per Dr. Boykin's last note: As previously documented, she has been having screening mammograms every 2 years.  This year a focal right breast asymmetry was noted on the screening mammogram at the end of 2021. She was sent for a diagnostic mammogram and ultrasound that confirmed a right breast mass, 3 mm x 2 mm x 2 mm.  She underwent a right breast biopsy that shows invasive ductal carcinoma, grade 2, triple negative, Ki-67 60% .     She underwent a right lumpectomy and SN exam. The tumor was 0.4 cm, final margins are negative.      She has also completed genetic testing, [employer], which is negative.     Interim history:   She completed adjuvant RT, 30 cGY in 5 fractions, completed 3/2/22.      She has no new breast concerns today.  No right arm lymphedema.  Has an occasional twinge of pain in the right breast if she lays on it at night, but nothing persistent.  She did have a syncopal episode about two weeks ago while going to the bathroom.  Woke up on the floor.  No lasting neurological concerns. She saw her pcp and had a head CT, foot xrays, and spine xrays all which were negative.  Has some bruising on her right arm from the fall.  Has a follow up with cardiology for syncope scheduled.  Otherwise, no other concerns today.       Her weight , appetite and PS are stable. She continues to live independently with family close by.      Oncology History:  Oncology History   Malignant neoplasm of upper-outer quadrant of right breast in female, estrogen receptor negative   2022 Initial Diagnosis    Malignant  neoplasm of upper-outer quadrant of right breast in female, estrogen receptor negative      Radiation Therapy    Treatment Summary  Course: C1 Breast   Treatment Site Energy Dose/Fx (Gy) #Fx Total Dose (Gy) Start Date End Date Elapsed Days   Right PARTIAL BREAST 6X 6 5 / 5 30 2022 3/2/2022 12            History:  Past Medical History:   Diagnosis Date    Hyperlipidemia     Hypothyroidism     Malignant neoplasm of upper-outer quadrant of right breast in female, estrogen receptor negative 2022    Osteoarthritis, knee     Vertigo       Past Surgical History:   Procedure Laterality Date    dentures      FRACTURE SURGERY Left     fracture left wrist    HYSTERECTOMY      INJECTION FOR SENTINEL NODE IDENTIFICATION Right 2022    Procedure: INJECTION, FOR SENTINEL NODE IDENTIFICATION-Right;  Surgeon: Marci Mcintosh MD;  Location: [facility];  Service: General;  Laterality: Right;    left wrist surgery      MASTECTOMY, PARTIAL Right 2022    Procedure: MASTECTOMY, PARTIAL-Right with radiological marker;  Surgeon: Marci Mcintosh MD;  Location: [facility];  Service: General;  Laterality: Right;  REQUEST SAID 2 HOUR CASE    SENTINEL LYMPH NODE BIOPSY Right 2022    Procedure: BIOPSY, LYMPH NODE, SENTINEL-Right;  Surgeon: Marci Mcintosh MD;  Location: [facility];  Service: General;  Laterality: Right;     Family History   Problem Relation Age of Onset    No Known Problems Daughter     No Known Problems Son     No Known Problems Sister      Social History     Tobacco Use    Smoking status: Former Smoker     Quit date: 1989     Years since quittin.1    Smokeless tobacco: Never Used   Substance and Sexual Activity    Alcohol use: Yes     Alcohol/week: 1.0 standard drink     Types: 1 Glasses of wine per week     Comment: 1\3 glass ofd wine with dinner nightly    Drug use: Never    Sexual activity: Not Currently        ROS:   Review of Systems   Constitutional: Negative for fever,  "malaise/fatigue and weight loss.   HENT: Negative for nosebleeds.    Eyes: Negative for double vision.   Respiratory: Negative for cough, hemoptysis and shortness of breath.    Cardiovascular: Negative for chest pain, palpitations, orthopnea and leg swelling.   Gastrointestinal: Negative for abdominal pain, constipation, diarrhea, nausea and vomiting.   Genitourinary: Negative for hematuria.   Musculoskeletal: Positive for falls, joint pain and neck pain. Negative for back pain.   Skin: Negative for itching and rash.   Neurological: Negative for dizziness, tingling, seizures, weakness and headaches.   Endo/Heme/Allergies: Negative for polydipsia. Does not bruise/bleed easily.   Psychiatric/Behavioral: Negative for depression and memory loss. The patient is not nervous/anxious and does not have insomnia.         Objective:     Vitals:    07/20/22 1432   BP: (!) 141/63   Pulse: 89   Resp: 16   Temp: 97.3 °F (36.3 °C)   TempSrc: Oral   SpO2: 95%   Weight: 53.6 kg (118 lb 2.7 oz)   Height: 5' 4" (1.626 m)   PainSc: 0-No pain     Wt Readings from Last 10 Encounters:   07/20/22 53.6 kg (118 lb 2.7 oz)   07/15/22 54.4 kg (119 lb 14.9 oz)   04/12/22 56.1 kg (123 lb 11.2 oz)   04/08/22 54.2 kg (119 lb 6.1 oz)   01/27/22 56.4 kg (124 lb 5.4 oz)   01/25/22 55.3 kg (122 lb)   01/14/22 55.5 kg (122 lb 5.7 oz)   12/27/21 54.4 kg (120 lb)   12/27/21 54.4 kg (120 lb)   12/21/21 55.8 kg (123 lb)       Physical Examination:   Physical Exam  Vitals and nursing note reviewed.   Constitutional:       General: She is not in acute distress.     Appearance: She is not diaphoretic.   HENT:      Head: Normocephalic.      Mouth/Throat:      Pharynx: No oropharyngeal exudate.   Eyes:      General: No scleral icterus.     Conjunctiva/sclera: Conjunctivae normal.   Neck:      Thyroid: No thyromegaly.   Cardiovascular:      Rate and Rhythm: Normal rate and regular rhythm.      Heart sounds: Normal heart sounds. No murmur heard.  Pulmonary:      " Effort: Pulmonary effort is normal. No respiratory distress.      Breath sounds: No stridor. No wheezing or rales.   Chest:      Chest wall: No tenderness.      Comments: Right sided breast scar well healed, No breast masses noted bilaterally, no lymphadenopathy noted    Abdominal:      General: Bowel sounds are normal. There is no distension.      Palpations: Abdomen is soft. There is no mass.      Tenderness: There is no abdominal tenderness. There is no rebound.   Musculoskeletal:         General: No tenderness or deformity. Normal range of motion.      Cervical back: Neck supple.   Lymphadenopathy:      Cervical: No cervical adenopathy.   Skin:     General: Skin is warm and dry.      Findings: No erythema or rash.      Comments: + post lumpectomy changes. No other breast mass, axillary adenopathy palpated bilaterally    Neurological:      Mental Status: She is alert and oriented to person, place, and time.      Cranial Nerves: No cranial nerve deficit.      Coordination: Coordination normal.      Gait: Gait is intact.   Psychiatric:         Mood and Affect: Affect normal.         Cognition and Memory: Memory normal.         Judgment: Judgment normal.        Diagnostic Tests:  Significant Imaging: I have reviewed and interpreted all pertinent imaging results/findings.    Laboratory Data:  All pertinent labs have been reviewed.  Labs:   Lab Results   Component Value Date    WBC 5.10 07/18/2022    RBC 4.12 07/18/2022    HGB 13.2 07/18/2022    HCT 41.5 07/18/2022     (H) 07/18/2022     07/18/2022    GLU 92 07/18/2022     07/18/2022    K 4.3 07/18/2022    BUN 18 07/18/2022    CREATININE 0.9 07/18/2022    AST 21 07/18/2022    ALT 14 07/18/2022    BILITOT 0.8 07/18/2022       Assessment/Plan:   Malignant neoplasm of upper-outer quadrant of right breast in female, estrogen receptor negative    cT1aN0, TNBC, Grade 2, Ki 67 60%  Genetic negative      She is s/p lumpectomy and adjuvant RT.  Given T1a  tumor, no additional risk factors, chemotherapy is not indicated.         RTC in 4 months to see Dr. Boykin  Keep appointments in December with breast surgery and for mammogram      Discussion:     Plan was discussed with the patient at length, and she verbalized understanding. Olamide was given an opportunity to ask questions that were answered to her satisfaction, and she was advised to call in the interval if any problems or questions arise.    Electronically signed by Shannon Burgos NP      Route Chart for Scheduling    Med Onc Chart Routing      Follow up with physician 4 months. 4 months with DR. Boykin, no labs needed   Follow up with [facility]    Infusion scheduling note    Injection scheduling note    Labs    Imaging    Pharmacy appointment    Other referrals        Total time of this visit, including time spent face to face with patient and/or via video/audio, and also in preparing for today's visit for MDM and documentation. (Medical Decision Making, including consideration of possible diagnoses, management options, complex medical record review, review of diagnostic tests and information, consideration and discussion of significant complications based on comorbidities, and discussion with providers involved with the care of the patient) is 30 minutes. Greater than 50% was spent face to face with the patient counseling and coordinating care.         Shannon Burgos NP

## 2022-07-22 ENCOUNTER — OFFICE VISIT (OUTPATIENT)
Dept: CARDIOLOGY | Facility: CLINIC | Age: 87
End: 2022-07-22
Payer: MEDICARE

## 2022-07-22 ENCOUNTER — CLINICAL SUPPORT (OUTPATIENT)
Dept: REHABILITATION | Facility: HOSPITAL | Age: 87
End: 2022-07-22
Payer: MEDICARE

## 2022-07-22 VITALS
BODY MASS INDEX: 20.25 KG/M2 | HEIGHT: 64 IN | DIASTOLIC BLOOD PRESSURE: 67 MMHG | HEART RATE: 87 BPM | WEIGHT: 118.63 LBS | SYSTOLIC BLOOD PRESSURE: 124 MMHG

## 2022-07-22 DIAGNOSIS — I70.0 AORTIC ATHEROSCLEROSIS: ICD-10-CM

## 2022-07-22 DIAGNOSIS — I15.9 SECONDARY HYPERTENSION: ICD-10-CM

## 2022-07-22 DIAGNOSIS — R55 SYNCOPE, UNSPECIFIED SYNCOPE TYPE: Primary | ICD-10-CM

## 2022-07-22 DIAGNOSIS — R29.898 DECREASED STRENGTH OF UPPER EXTREMITY: ICD-10-CM

## 2022-07-22 DIAGNOSIS — M54.2 NECK PAIN: ICD-10-CM

## 2022-07-22 DIAGNOSIS — E78.00 PURE HYPERCHOLESTEROLEMIA: ICD-10-CM

## 2022-07-22 DIAGNOSIS — R09.89 RIGHT CAROTID BRUIT: ICD-10-CM

## 2022-07-22 DIAGNOSIS — R29.898 DECREASED RANGE OF MOTION OF NECK: ICD-10-CM

## 2022-07-22 DIAGNOSIS — R29.3 POSTURE ABNORMALITY: ICD-10-CM

## 2022-07-22 PROCEDURE — 99204 PR OFFICE/OUTPT VISIT, NEW, LEVL IV, 45-59 MIN: ICD-10-PCS | Mod: S$GLB,,, | Performed by: INTERNAL MEDICINE

## 2022-07-22 PROCEDURE — 99999 PR PBB SHADOW E&M-EST. PATIENT-LVL III: CPT | Mod: PBBFAC,,, | Performed by: INTERNAL MEDICINE

## 2022-07-22 PROCEDURE — 1159F MED LIST DOCD IN RCRD: CPT | Mod: CPTII,S$GLB,, | Performed by: INTERNAL MEDICINE

## 2022-07-22 PROCEDURE — 1160F PR REVIEW ALL MEDS BY PRESCRIBER/CLIN PHARMACIST DOCUMENTED: ICD-10-PCS | Mod: CPTII,S$GLB,, | Performed by: INTERNAL MEDICINE

## 2022-07-22 PROCEDURE — 97161 PT EVAL LOW COMPLEX 20 MIN: CPT | Mod: PN

## 2022-07-22 PROCEDURE — 99499 UNLISTED E&M SERVICE: CPT | Mod: HCNC,S$GLB,, | Performed by: INTERNAL MEDICINE

## 2022-07-22 PROCEDURE — 1126F AMNT PAIN NOTED NONE PRSNT: CPT | Mod: CPTII,S$GLB,, | Performed by: INTERNAL MEDICINE

## 2022-07-22 PROCEDURE — 1101F PR PT FALLS ASSESS DOC 0-1 FALLS W/OUT INJ PAST YR: ICD-10-PCS | Mod: CPTII,S$GLB,, | Performed by: INTERNAL MEDICINE

## 2022-07-22 PROCEDURE — 3288F PR FALLS RISK ASSESSMENT DOCUMENTED: ICD-10-PCS | Mod: CPTII,S$GLB,, | Performed by: INTERNAL MEDICINE

## 2022-07-22 PROCEDURE — 99999 PR PBB SHADOW E&M-EST. PATIENT-LVL III: ICD-10-PCS | Mod: PBBFAC,,, | Performed by: INTERNAL MEDICINE

## 2022-07-22 PROCEDURE — 99204 OFFICE O/P NEW MOD 45 MIN: CPT | Mod: S$GLB,,, | Performed by: INTERNAL MEDICINE

## 2022-07-22 PROCEDURE — 1126F PR PAIN SEVERITY QUANTIFIED, NO PAIN PRESENT: ICD-10-PCS | Mod: CPTII,S$GLB,, | Performed by: INTERNAL MEDICINE

## 2022-07-22 PROCEDURE — 99499 RISK ADDL DX/OHS AUDIT: ICD-10-PCS | Mod: HCNC,S$GLB,, | Performed by: INTERNAL MEDICINE

## 2022-07-22 PROCEDURE — 1159F PR MEDICATION LIST DOCUMENTED IN MEDICAL RECORD: ICD-10-PCS | Mod: CPTII,S$GLB,, | Performed by: INTERNAL MEDICINE

## 2022-07-22 PROCEDURE — 3288F FALL RISK ASSESSMENT DOCD: CPT | Mod: CPTII,S$GLB,, | Performed by: INTERNAL MEDICINE

## 2022-07-22 PROCEDURE — 1160F RVW MEDS BY RX/DR IN RCRD: CPT | Mod: CPTII,S$GLB,, | Performed by: INTERNAL MEDICINE

## 2022-07-22 PROCEDURE — 1101F PT FALLS ASSESS-DOCD LE1/YR: CPT | Mod: CPTII,S$GLB,, | Performed by: INTERNAL MEDICINE

## 2022-07-22 NOTE — PROGRESS NOTES
Subjective:   07/22/2022     Patient ID:  Olamide Felipe is a 89 y.o. female who presents for evaulation of Loss of Consciousness, Edema, and Hyperlipidemia      She comes in today for cardiac evaluation.  She has generally been healthy without prior cardiac events.  She had an episode of syncope 2 weeks ago which out of bed, stood passed out.  Not had chest pains or tightness, PND.  No recurrent episode, no palpitations    Also noted recently has been elevation of her blood pressures, she has generally had low blood pressures.      Past Medical History:   Diagnosis Date    Hyperlipidemia     Hypothyroidism     Malignant neoplasm of upper-outer quadrant of right breast in female, estrogen receptor negative 1/7/2022    Osteoarthritis, knee     Vertigo        Review of patient's allergies indicates:  No Known Allergies      Current Outpatient Medications:     aspirin 81 MG Chew, Take 81 mg by mouth once daily., Disp: , Rfl:     b complex vitamins tablet, Take 1 tablet by mouth once daily., Disp: , Rfl:     calcium-vitamin D 250-100 mg-unit per tablet, Take 1 tablet by mouth 2 (two) times daily., Disp: , Rfl:     FLUoxetine 20 MG capsule, TAKE 1 CAPSULE EVERY DAY, Disp: 90 capsule, Rfl: 2    levothyroxine (SYNTHROID) 25 MCG tablet, Take 1 tablet (25 mcg total) by mouth before breakfast., Disp: 90 tablet, Rfl: 3    mv-min/iron/folic/calcium/vitK (WOMEN'S MULTIVITAMIN ORAL), Take by mouth once daily., Disp: , Rfl:     niacin 500 MG Tab, Take 500 mg by mouth 2 (two) times daily with meals., Disp: , Rfl:     omeprazole (PRILOSEC) 20 MG capsule, TAKE 1 CAPSULE EVERY DAY, Disp: 90 capsule, Rfl: 2    simvastatin (ZOCOR) 20 MG tablet, Take 1 tablet (20 mg total) by mouth every evening., Disp: 90 tablet, Rfl: 2     Objective:   Review of Systems   Cardiovascular: Positive for syncope. Negative for chest pain, claudication, cyanosis, dyspnea on exertion, irregular heartbeat, leg swelling, near-syncope,  orthopnea, palpitations and paroxysmal nocturnal dyspnea.         Vitals:    07/22/22 1340   BP: 124/67   Pulse:      Wt Readings from Last 3 Encounters:   07/22/22 53.8 kg (118 lb 9.7 oz)   07/20/22 53.6 kg (118 lb 2.7 oz)   07/15/22 54.4 kg (119 lb 14.9 oz)     Temp Readings from Last 3 Encounters:   07/20/22 97.3 °F (36.3 °C) (Oral)   07/15/22 97.7 °F (36.5 °C) (Temporal)   04/12/22 97.5 °F (36.4 °C)     BP Readings from Last 3 Encounters:   07/22/22 124/67   07/20/22 (!) 141/63   07/15/22 (!) 158/82     Pulse Readings from Last 3 Encounters:   07/22/22 87   07/20/22 89   07/15/22 88             Physical Exam  Vitals reviewed.   Constitutional:       General: She is not in acute distress.     Appearance: She is well-developed.   HENT:      Head: Normocephalic and atraumatic.      Nose: Nose normal.   Eyes:      Conjunctiva/sclera: Conjunctivae normal.      Pupils: Pupils are equal, round, and reactive to light.   Neck:      Vascular: Carotid bruit present. No JVD.   Cardiovascular:      Rate and Rhythm: Normal rate and regular rhythm.      Pulses: Intact distal pulses.      Heart sounds: Murmur (Grade 1 systolic ejection murmur greatest left sternal border) heard.     No friction rub. No gallop.   Pulmonary:      Effort: Pulmonary effort is normal. No respiratory distress.      Breath sounds: Normal breath sounds. No wheezing or rales.   Chest:      Chest wall: No tenderness.   Abdominal:      General: Bowel sounds are normal. There is no distension.      Palpations: Abdomen is soft.      Tenderness: There is no abdominal tenderness.   Musculoskeletal:         General: No tenderness or deformity. Normal range of motion.      Cervical back: Normal range of motion and neck supple.   Skin:     General: Skin is warm and dry.      Findings: No erythema or rash.   Neurological:      Mental Status: She is alert and oriented to person, place, and time.      Cranial Nerves: No cranial nerve deficit.      Motor: No  abnormal muscle tone.      Coordination: Coordination normal.   Psychiatric:         Behavior: Behavior normal.         Thought Content: Thought content normal.         Judgment: Judgment normal.           Lab Results   Component Value Date    CHOL 198 07/18/2022    CHOL 214 (H) 07/07/2021    CHOL 195 07/10/2020     Lab Results   Component Value Date    HDL 67 07/18/2022    HDL 81 (H) 07/07/2021    HDL 87 (H) 07/10/2020     Lab Results   Component Value Date    LDLCALC 109.6 07/18/2022    LDLCALC 113.6 07/07/2021    LDLCALC 92.2 07/10/2020     Lab Results   Component Value Date    ALT 14 07/18/2022    AST 21 07/18/2022    AST 22 12/21/2021    AST 26 07/07/2021     Lab Results   Component Value Date    CREATININE 0.9 07/18/2022    BUN 18 07/18/2022     07/18/2022    K 4.3 07/18/2022    CO2 25 07/18/2022    CO2 28 12/21/2021    CO2 25 07/07/2021     Lab Results   Component Value Date    HGB 13.2 07/18/2022    HCT 41.5 07/18/2022    HCT 42.3 12/21/2021    HCT 41.2 07/07/2021               Previous EKG reviewed, rhythm with nonspecific ST abnormality          Assessment and Plan:     Syncope, unspecified syncope type  -     Ambulatory referral/consult to Cardiology  -     Cardiac Monitor - 3-15 Day Adult (Cupid Only); Future; Expected date: 07/23/2022  -     Echo; Future; Expected date: 07/23/2022  -     CV Ultrasound Bilateral Doppler Carotid; Future; Expected date: 07/23/2022    Secondary hypertension  Comments:  Blood pressure was initially elevated, but normalized check  Continue to follow  Orders:  -     Ambulatory referral/consult to Cardiology    Pure hypercholesterolemia  Comments:  Statin therapy, would not recommend therapy with either niacin or aspirin    Aortic atherosclerosis    Right carotid bruit  -     CV Ultrasound Bilateral Doppler Carotid; Future; Expected date: 07/23/2022         No follow-ups on file.          Future Appointments   Date Time Provider Department Center   7/25/2022 10:00 AM Victoria  LIDA Ward, PT [facility] [place]   8/1/2022  3:00 PM Margot Nugent, PT [facility] [place]   8/8/2022  8:00 AM GabrielaLIDA Ward, PT [facility] [place]   8/15/2022  9:00 AM Gabriela Sylvia, PT [facility] [place]   8/22/2022  8:00 AM Victoria HILTON Sylvia, PT [facility] [place]   10/21/2022  2:50 PM Laura Del Rosario MD [facility] DER[facility] [place]   12/6/2022  1:30 PM JOSE L ROB PAYANO2 NOM M[facility]MO [facility]   12/6/2022  2:45 PM Marci Mcintosh MD [facility] BRSTSUR [facility]   1/18/2023  2:00 PM Abiola Campbell MD [facility] [place]

## 2022-07-25 ENCOUNTER — CLINICAL SUPPORT (OUTPATIENT)
Dept: REHABILITATION | Facility: HOSPITAL | Age: 87
End: 2022-07-25
Payer: MEDICARE

## 2022-07-25 DIAGNOSIS — R29.898 DECREASED STRENGTH OF UPPER EXTREMITY: ICD-10-CM

## 2022-07-25 DIAGNOSIS — R29.3 POSTURE ABNORMALITY: Primary | ICD-10-CM

## 2022-07-25 DIAGNOSIS — R29.898 DECREASED RANGE OF MOTION OF NECK: ICD-10-CM

## 2022-07-25 PROCEDURE — 97110 THERAPEUTIC EXERCISES: CPT | Mod: PN,CQ

## 2022-07-25 NOTE — PLAN OF CARE
"[facility]  Physical Therapy Initial Evaluation    Name: Olamide Felipe  Clinic Number: [id number]    Therapy Diagnosis:   Encounter Diagnoses   Name Primary?    Neck pain     Posture abnormality     Decreased strength of upper extremity     Decreased range of motion of neck      Physician: Abiola Campbell MD    Physician Orders: PT Eval and Treat   Medical Diagnosis from Referral: M54.2 (ICD-10-CM) - Neck pain  Evaluation Date: 7/22/2022  Authorization Period Expiration: 12/31/2022  Plan of Care Expiration: 09/16/2022  Progress Note Due: 08/19/2022  Visit # / Visits authorized: 1/ 1   FOTO: 1/10    Precautions: Standard, fall risk, recent hx of cancer    Time In: 0900 am  Time Out: 0950 am  Total Appointment Time (timed & untimed codes): 50 minutes (1 LCE)- eval only      SUBJECTIVE     Date of onset: about a year ago    History of current condition - Olamide reports: about a year she bumped her head helping a 3 y.o. get on swing and has been having neck pain ever since. She had good success with therapy at the time of the incident to resolve the pain, but has had surgery since then and hasn't been able to much and her and neck has begun hurting subsequently. In the last month, she was going to the restroom at the middle of the night and found herself suddenly ground with no memory of falling. Since this fall she has undergone lots of imaging to ensure that she doesn't have any underlying neurological problems. She reports no notable change in pain with any particular movement, and also denies numbness, tingling, or radiating pain into her UE.    Falls: about a month ago, no obvious injuries.     Imaging, X-Ray: "The vertebral bodies demonstrate mild grade 1 anterolisthesis at C 4-5.  There is degenerative change with disc space narrowing and osteophytic spurring at C5-6 and C6-7.  There is no significant neural foraminal narrowing."    Prior Therapy: about 1 year ago for the " same problem  Social History:  Lives in senior apartment   Occupation: spends about 75% of her day sitting, recently stopped driving  Prior Level of Function: was able to walk 5 laps around her building, played golf regularly  Current Level of Function: now can only walk about 2 laps around her building    Pain:  Current 5/10, worst 7/10, best 0/10   Location: bilateral neck    Description: Sharp  Aggravating Factors: Night Time  Easing Factors: exercise, and topical cream    Patients goals: to get stronger and have less pain      Medical History:   Past Medical History:   Diagnosis Date    Hyperlipidemia     Hypothyroidism     Malignant neoplasm of upper-outer quadrant of right breast in female, estrogen receptor negative 1/7/2022    Osteoarthritis, knee     Vertigo        Surgical History:   Olamide Felipe  has a past surgical history that includes dentures; left wrist surgery; Hysterectomy; Fracture surgery (Left); Mastectomy, partial (Right, 1/7/2022); [place] lymph node biopsy (Right, 1/7/2022); and Injection for sentinel node identification (Right, 1/7/2022).    Medications:   Olamide has a current medication list which includes the following prescription(s): aspirin, b complex vitamins, calcium-vitamin d, fluoxetine, levothyroxine, mv-min/iron/folic/calcium/vitk, niacin, omeprazole, and simvastatin.    Allergies:   Review of patient's allergies indicates:  No Known Allergies     Objective     Posture: moderate to severe FHP, B rounded shoulders    CERVICAL AROM Pain/Dysfunction with Movement   Flexion 50/45 Increased R sided pain    Extension 30/90    Right side bending 20/40    Left side bending 20/40 Increased R sided pain    Right rotation 35/90    Left rotation 30/90 Increased R sided pain          Cervical Special Tests:  Vertebral Artery Test -   Alar Ligament (Lateral Flexion) -   Transverse Ligament  -   Compression -   Distraction +   Spurlings Max Compression -   DNF Endurance Test 5  "seconds         UE Myotomes Right  Left  Pain/Dysfunction with Movement   C5 Deltoid 4+/5 4+/5    C5 Infraspinatus 4/5 4/5    C6 Subscapularis 4/5 4/5     C6 ECRL 4/5 4/5     C6 Biceps 4/5 4/5     C7 Triceps 4/5 4/5     C7 FCU 4/5 4/5     C8 EPL 4/5 4/5    C8 OPL 4/5 4/5    T1 3rd-4th Interossei 4/5 4/5          Active Range of Motion:   Shoulder Left Right   Flexion 180 180   Abduction 175 175   Functional ER C8 C8   Functional IR L1 L1     Parascapular Strength   (L) UE (R) UE   Lower Trap 3-/5 3-/5   Middle Trap 3+/5 3+/5   Serratus anterior 3+/5 3+/5   Rhomboids 4-/5 4-/5        Scapular Control/Dyskinesis:    Normal / Subtle / Obvious  Comments    Left  obvious Decreased upward rotation of scap    Right  obvious Decreased upward rotation of scap     Joint Mobility:   Cervical: hypomobile throughout   Thoracic: hypomobile throughout, increased at CTJ      Upper Limb Neurotension Tests:  (NT) Median n. - anterior interosseous n. (C5-7)   (NT) Median n. - musculocutaneous & axillary n. (C5-7)   (NT) Ulnar n. (C8-T1)  (NT) Radial n. (C5-T1)         Limitation/Restriction for FOTO Neck Survey    Therapist reviewed FOTO scores for Olamide Felipe on 7/22/2022.   FOTO documents entered into [employer] - see Media section.    Limitation Score: 40%  Predicted Score: 35%         TREATMENT   Treatment Time In: 0920 am  Treatment Time Out: 0950 am  Total Treatment time (time-based codes) separate from Evaluation: 00 minutes-- eval only    Olamide received therapeutic exercises to develop strength, endurance, ROM, flexibility and posture for 00 minutes including:  - seated scap pinches; 20x w/ 5" hold  - seated chin tucks; 20x w/ 5" hold  - scap pinches w/ chin tucks at wall; 20x w/ 5" hold    Home Exercises and Patient Education Provided    Education provided:   - Home Exercise Program    Written Home Exercises Provided: yes.  Exercises were reviewed and Olamide was able to demonstrate them prior to the end of the session.  Olamide " demonstrated good  understanding of the education provided.     See EMR under Patient Instructions for exercises provided 7/22/2022.    Assessment   Olamide is a 89 y.o. female referred to outpatient Physical Therapy with a medical diagnosis of neck pain Pt presents with limited and/or painful cervical  ROM, poor DNF endurance, parascapular weakness, scapular dyskinesis, and  decreased ability to ambulate for prolonged periods, and functional limitations of decrease ability to exercise recreationally.. Olamide would benefit from skilled OP PT to address the above dysfunction and improve her functional independence.     Pt prognosis is Good.   Pt will benefit from skilled outpatient Physical Therapy to address the deficits stated above and in the chart below, provide pt/family education, and to maximize pt's level of independence.     Plan of care discussed with patient: Yes  Pt's spiritual, cultural and educational needs considered and patient is agreeable to the plan of care and goals as stated below:     Anticipated Barriers for therapy: recent cancer dx, age    Medical Necessity is demonstrated by the following  History  Co-morbidities and personal factors that may impact the plan of care Co-morbidities:   advanced age, history of cancer, transportation assistance required, HTN,  hyperthyroidism, and vertigo    Personal Factors:   age     high   Examination  Body Structures and Functions, activity limitations and participation restrictions that may impact the plan of care Body Regions:   head  neck  upper extremities  trunk    Body Systems:    gross symmetry  ROM  strength  gross coordinated movement  transfers  transitions  motor control  motor learning    Participation Restrictions:   Decreased ability to perform recreational exercise    Activity limitations:   Learning and applying knowledge  no deficits    General Tasks and Commands  no deficits    Communication  no deficits    Mobility  lifting and carrying  objects  driving (bike, car, motorcycle)    Self care  washing oneself (bathing, drying, washing hands)    Domestic Life  shopping  cooking  doing house work (cleaning house, washing dishes, laundry)    Interactions/Relationships  no deficits    Life Areas  no deficits    Community and Social Life  community life  recreation and leisure         high   Clinical Presentation stable and uncomplicated low   Decision Making/ Complexity Score: low     Goals:  Short Term Goals: 4 weeks  1.Report decreased neck pain  <   / =  4 /10  to increase tolerance for adl  2. Increase cervical ROM by 5-10 degrees in order to perform ADLs with decreased difficulty.  3. Increase strength in B shoulders/scapular stabilizers by 1/3 MMT grade to increase tolerance for ADL and work activities.  4. Pt to tolerate HEP to improve ROM and independence with ADL's     Long Term Goals: 8 weeks  1.Report decreased neck pain  <   / =  2  /10  to increase tolerance for sleep  2. Increase UE/neck flexibility in order to improve posture.    3.Increased strength in B shoulders/scapular stabilizers to >/= 4/5 MMT grade to increase tolerance for ADL and work activities.  4.  Pt will report at thoracic/neck level (35% impaired) on FOTO neck score for neck pain disability to demonstrate decrease in disability and improvement in neck pain.      Plan   Plan of care Certification: 7/22/2022 to 09/16/2022.    Outpatient Physical Therapy 1 times weekly for 10 weeks to include the following interventions: Aquatic Therapy, Electrical Stimulation NMES, Manual Therapy, Moist Heat/ Ice, Neuromuscular Re-ed, Patient Education, Therapeutic Activities and Therapeutic Exercise.     Victoria Ward PT, DPT

## 2022-07-25 NOTE — PROGRESS NOTES
"[facility]   Physical Therapy Treatment Note     Name: Olamide Felipe  Clinic Number: [id number]    Therapy Diagnosis:   Encounter Diagnoses   Name Primary?    Posture abnormality Yes    Decreased strength of upper extremity     Decreased range of motion of neck      Physician: No ref. provider found    Visit Date: 7/25/2022    Physician Orders: PT Eval and Treat   Medical Diagnosis from Referral: M54.2 (ICD-10-CM) - Neck pain  Evaluation Date: 7/22/2022  Authorization Period Expiration: 12/31/2022  Plan of Care Expiration: 09/16/2022  Progress Note Due: 08/19/2022  Visit # / Visits authorized: 2/ 1   FOTO: 210    PTA Visit #: 1/5     Time In: 10:05 AM  Time Out: 11:00 AM  Total Billable Time: 55 minutes (4 TE)    SUBJECTIVE     Pt reports: challenged with HEP, chin tuck and scap retracts made her dizzy.  Has requested to review exercises to make sure she is doing them correctly.    She was compliant with home exercise program.  Response to previous treatment: initial eval  Functional change: ongoing    Pain: 0/10 but 4/10 with any neck movement  Location: bilateral neck      Precautions: Standard, fall risk, recent hx of cancer    OBJECTIVE     Objective Measures updated at progress report unless specified.     Treatment     Olamide received the treatments listed below:      therapeutic exercises to develop strength, endurance, ROM and flexibility for 55 minutes including:  UT str: 2x30" B  Lev scap str: 2x30" B  Cervical AROM rotation/flexion/extension: 10x5" hold  Seated chin tucks:  15x5" hold *challenged with proper technique so attempted supine with better results  Seated scap retracts:  2x15  Supine serratus punches with 1# dowel: 2x10  Supine shoulder flexion: 1# dowel 2x10  Supine horizontal abduction: 2x10 YTB  SL ER with 1# weight: 2x10 B  scap pinches w/ chin tucks at wall; 20x w/ 5" hold    Patient Education and Home Exercises     Home Exercises Provided and Patient " Education Provided     Education provided:   - daily HEP compliance    Written Home Exercises Provided: Patient instructed to cont prior HEP. Exercises were reviewed and Olamide was able to demonstrate them prior to the end of the session.  Olamide demonstrated good  understanding of the education provided. See EMR under Patient Instructions for exercises provided during therapy sessions    ASSESSMENT     Olamide arrived to session with minimal complaints of bilateral neck pain with movement and was overall agreeable to treatment.  Session focused on improving cervical ROM with shoulder and periscapular strengthening.  She tolerated first session after initial evaluation without adverse response and the appropriate levels of fatigue achieved.  Max verbal and tactile cuing for proper technique with scap retracts and chin tucks with improvement noted.  Heavy UT over activation which pt was able to self correct following cuing.  HEP review provided for proper technique reinforcement, follow up next session.  Olamide will benefit from continued PT services to achieve goals set at initial evaluation.      Olamide Is progressing well towards her goals.   Pt prognosis is Good.     Pt will continue to benefit from skilled outpatient physical therapy to address the deficits listed in the problem list box on initial evaluation, provide pt/family education and to maximize pt's level of independence in the home and community environment.     Pt's spiritual, cultural and educational needs considered and pt agreeable to plan of care and goals.     Anticipated barriers to physical therapy: recent cancer dx, age    Goals:   Short Term Goals: 4 weeks  1.Report decreased neck pain  <   / =  4 /10  to increase tolerance for adl  2. Increase cervical ROM by 5-10 degrees in order to perform ADLs with decreased difficulty.  3. Increase strength in B shoulders/scapular stabilizers by 1/3 MMT grade to increase tolerance for ADL and work  activities.  4. Pt to tolerate HEP to improve ROM and independence with ADL's     Long Term Goals: 8 weeks  1.  Report decreased neck pain  <   / =  2  /10  to increase tolerance for sleep  2. Increase UE/neck flexibility in order to improve posture.    3. Increased strength in B shoulders/scapular stabilizers to >/= 4/5 MMT grade to increase tolerance for ADL and work activities.  4. Pt will report at thoracic/neck level (35% impaired) on FOTO neck score for neck pain disability to demonstrate decrease in disability and improvement in neck pain    PLAN     Plan to cont with progression of PT goals per POC.    Danuta Fang, PTA

## 2022-07-28 ENCOUNTER — CLINICAL SUPPORT (OUTPATIENT)
Dept: CARDIOLOGY | Facility: HOSPITAL | Age: 87
End: 2022-07-28
Attending: INTERNAL MEDICINE
Payer: MEDICARE

## 2022-07-28 DIAGNOSIS — R55 SYNCOPE, UNSPECIFIED SYNCOPE TYPE: ICD-10-CM

## 2022-07-28 PROCEDURE — 93248 CV CARDIAC MONITOR - 3-15 DAY ADULT (CUPID ONLY): ICD-10-PCS | Mod: ,,, | Performed by: INTERNAL MEDICINE

## 2022-07-28 PROCEDURE — 93248 EXT ECG>7D<15D REV&INTERPJ: CPT | Mod: ,,, | Performed by: INTERNAL MEDICINE

## 2022-08-01 ENCOUNTER — DOCUMENTATION ONLY (OUTPATIENT)
Dept: REHABILITATION | Facility: HOSPITAL | Age: 87
End: 2022-08-01
Payer: MEDICARE

## 2022-08-01 NOTE — PROGRESS NOTES
Pt presented and reported she recently got a heart monitor 7/28/22 due to her syncope episode. She just remembered the doctor told her no exercise while she has the monitor, which will be 14 days. PT and patient decided to cancel today's visit due to ongoing monitoring. Will contact cardiology to determine clearance for PT. Pt not seen for PT today.     NICKO OGDEN, PT

## 2022-08-08 ENCOUNTER — PATIENT MESSAGE (OUTPATIENT)
Dept: INTERNAL MEDICINE | Facility: CLINIC | Age: 87
End: 2022-08-08
Payer: MEDICARE

## 2022-08-08 NOTE — TELEPHONE ENCOUNTER
Hi Ms Quiñonez,    Your labs show:   - Electrolytes and liver function are normal. Your kidney function is mildly decreased. I recommend to stay hydrated and avoid NSAIDs (like ibuprofen, naproxen etc).   - CBC is normal/acceptable range; no signs of anemia  - TSH (thyroid) level is normal  - Vitamin D level is normal   - Cholesterol panel is normal        - Urine study is within normal range. It did show white blood cells (wbc) but if you are not having any urinary symptoms then this like a contaminant     Your parathyroid level is high but stable.         Dr Campbell

## 2022-08-11 ENCOUNTER — HOSPITAL ENCOUNTER (OUTPATIENT)
Dept: CARDIOLOGY | Facility: HOSPITAL | Age: 87
Discharge: HOME | End: 2022-08-11
Attending: INTERNAL MEDICINE
Payer: MEDICARE

## 2022-08-11 VITALS
HEIGHT: 64 IN | DIASTOLIC BLOOD PRESSURE: 70 MMHG | SYSTOLIC BLOOD PRESSURE: 160 MMHG | HEART RATE: 76 BPM | BODY MASS INDEX: 20.14 KG/M2 | WEIGHT: 118 LBS

## 2022-08-11 DIAGNOSIS — R09.89 RIGHT CAROTID BRUIT: ICD-10-CM

## 2022-08-11 DIAGNOSIS — R55 SYNCOPE, UNSPECIFIED SYNCOPE TYPE: ICD-10-CM

## 2022-08-11 LAB
ASCENDING AORTA: 2.53 CM
AV INDEX (PROSTH): 0.56
AV MEAN GRADIENT: 6 MMHG
AV PEAK GRADIENT: 11 MMHG
AV VALVE AREA: 1.19 CM2
AV VELOCITY RATIO: 0.53
BSA FOR ECHO PROCEDURE: 1.55 M2
CV ECHO LV RWT: 0.45 CM
DOP CALC AO PEAK VEL: 1.63 M/S
DOP CALC AO VTI: 43.49 CM
DOP CALC LVOT AREA: 2.1 CM2
DOP CALC LVOT DIAMETER: 1.64 CM
DOP CALC LVOT PEAK VEL: 0.86 M/S
DOP CALC LVOT STROKE VOLUME: 51.58 CM3
DOP CALC RVOT PEAK VEL: 0.75 M/S
DOP CALC RVOT VTI: 19.52 CM
DOP CALCLVOT PEAK VEL VTI: 24.43 CM
E WAVE DECELERATION TIME: 119.99 MSEC
E/A RATIO: 0.81
E/E' RATIO: 12.2 M/S
ECHO LV POSTERIOR WALL: 0.85 CM (ref 0.6–1.1)
EJECTION FRACTION: 55 %
FRACTIONAL SHORTENING: 36 % (ref 28–44)
INTERVENTRICULAR SEPTUM: 0.88 CM (ref 0.6–1.1)
IVRT: 95.15 MSEC
LA MAJOR: 4.55 CM
LA MINOR: 4.72 CM
LA WIDTH: 3.14 CM
LEFT ARM DIASTOLIC BLOOD PRESSURE: 70 MMHG
LEFT ARM SYSTOLIC BLOOD PRESSURE: 160 MMHG
LEFT ATRIUM SIZE: 3.2 CM
LEFT ATRIUM VOLUME INDEX MOD: 18.8 ML/M2
LEFT ATRIUM VOLUME INDEX: 25.4 ML/M2
LEFT ATRIUM VOLUME MOD: 29.37 CM3
LEFT ATRIUM VOLUME: 39.57 CM3
LEFT CBA DIAS: 16 CM/S
LEFT CBA SYS: 107 CM/S
LEFT CCA DIST DIAS: 15 CM/S
LEFT CCA DIST SYS: 86 CM/S
LEFT CCA MID DIAS: 14 CM/S
LEFT CCA MID SYS: 90 CM/S
LEFT CCA PROX DIAS: 12 CM/S
LEFT CCA PROX SYS: 100 CM/S
LEFT ECA DIAS: 8 CM/S
LEFT ECA SYS: 147 CM/S
LEFT ICA DIST DIAS: 32 CM/S
LEFT ICA DIST SYS: 113 CM/S
LEFT ICA MID DIAS: 25 CM/S
LEFT ICA MID SYS: 102 CM/S
LEFT ICA PROX DIAS: 22 CM/S
LEFT ICA PROX SYS: 85 CM/S
LEFT INTERNAL DIMENSION IN SYSTOLE: 2.4 CM (ref 2.1–4)
LEFT VENTRICLE DIASTOLIC VOLUME INDEX: 38.97 ML/M2
LEFT VENTRICLE DIASTOLIC VOLUME: 60.79 ML
LEFT VENTRICLE MASS INDEX: 61 G/M2
LEFT VENTRICLE SYSTOLIC VOLUME INDEX: 12.9 ML/M2
LEFT VENTRICLE SYSTOLIC VOLUME: 20.1 ML
LEFT VENTRICULAR INTERNAL DIMENSION IN DIASTOLE: 3.77 CM (ref 3.5–6)
LEFT VENTRICULAR MASS: 94.44 G
LEFT VERTEBRAL DIAS: 15 CM/S
LEFT VERTEBRAL SYS: 78 CM/S
LV LATERAL E/E' RATIO: 8.71 M/S
LV SEPTAL E/E' RATIO: 20.33 M/S
MV A" WAVE DURATION": 9.13 MSEC
MV PEAK A VEL: 0.75 M/S
MV PEAK E VEL: 0.61 M/S
MV STENOSIS PRESSURE HALF TIME: 34.8 MS
MV VALVE AREA P 1/2 METHOD: 6.32 CM2
OHS CV CAROTID RIGHT ICA EDV HIGHEST: 37
OHS CV CAROTID ULTRASOUND LEFT ICA/CCA RATIO: 1.31
OHS CV CAROTID ULTRASOUND RIGHT ICA/CCA RATIO: 2.75
OHS CV PV CAROTID LEFT HIGHEST CCA: 100
OHS CV PV CAROTID LEFT HIGHEST ICA: 113
OHS CV PV CAROTID RIGHT HIGHEST CCA: 80
OHS CV PV CAROTID RIGHT HIGHEST ICA: 187
OHS CV US CAROTID LEFT HIGHEST EDV: 32
PULM VEIN S/D RATIO: 0.98
PV MEAN GRADIENT: 1.36 MMHG
PV PEAK D VEL: 0.63 M/S
PV PEAK S VEL: 0.62 M/S
PV PEAK VELOCITY: 0.78 CM/S
RA MAJOR: 4.18 CM
RA PRESSURE: 3 MMHG
RA WIDTH: 2.59 CM
RIGHT ARM DIASTOLIC BLOOD PRESSURE: 70 MMHG
RIGHT ARM SYSTOLIC BLOOD PRESSURE: 160 MMHG
RIGHT CBA DIAS: 10 CM/S
RIGHT CBA SYS: 75 CM/S
RIGHT CCA DIST DIAS: 9 CM/S
RIGHT CCA DIST SYS: 68 CM/S
RIGHT CCA MID DIAS: 11 CM/S
RIGHT CCA MID SYS: 79 CM/S
RIGHT CCA PROX DIAS: 11 CM/S
RIGHT CCA PROX SYS: 80 CM/S
RIGHT ECA DIAS: 14 CM/S
RIGHT ECA SYS: 299 CM/S
RIGHT ICA DIST DIAS: 37 CM/S
RIGHT ICA DIST SYS: 187 CM/S
RIGHT ICA MID DIAS: 27 CM/S
RIGHT ICA MID SYS: 139 CM/S
RIGHT ICA PROX DIAS: 15 CM/S
RIGHT ICA PROX SYS: 92 CM/S
RIGHT VENTRICULAR END-DIASTOLIC DIMENSION: 2.83 CM
RIGHT VERTEBRAL DIAS: 12 CM/S
RIGHT VERTEBRAL SYS: 54 CM/S
SINUS: 2.47 CM
STJ: 2.04 CM
TDI LATERAL: 0.07 M/S
TDI SEPTAL: 0.03 M/S
TDI: 0.05 M/S
TRICUSPID ANNULAR PLANE SYSTOLIC EXCURSION: 2.01 CM

## 2022-08-11 PROCEDURE — 93356 MYOCRD STRAIN IMG SPCKL TRCK: CPT

## 2022-08-11 PROCEDURE — 93880 EXTRACRANIAL BILAT STUDY: CPT

## 2022-08-11 PROCEDURE — 93880 EXTRACRANIAL BILAT STUDY: CPT | Mod: 26,,, | Performed by: INTERNAL MEDICINE

## 2022-08-11 PROCEDURE — 93880 CV US DOPPLER CAROTID (CUPID ONLY): ICD-10-PCS | Mod: 26,,, | Performed by: INTERNAL MEDICINE

## 2022-08-11 PROCEDURE — 93306 TTE W/DOPPLER COMPLETE: CPT | Mod: 26,,, | Performed by: INTERNAL MEDICINE

## 2022-08-11 PROCEDURE — 93356 MYOCRD STRAIN IMG SPCKL TRCK: CPT | Mod: ,,, | Performed by: INTERNAL MEDICINE

## 2022-08-11 PROCEDURE — 93306 ECHO (CUPID ONLY): ICD-10-PCS | Mod: 26,,, | Performed by: INTERNAL MEDICINE

## 2022-08-11 PROCEDURE — 93356 ECHO (CUPID ONLY): ICD-10-PCS | Mod: ,,, | Performed by: INTERNAL MEDICINE

## 2022-08-15 ENCOUNTER — CLINICAL SUPPORT (OUTPATIENT)
Dept: REHABILITATION | Facility: HOSPITAL | Age: 87
End: 2022-08-15
Payer: MEDICARE

## 2022-08-15 DIAGNOSIS — R29.898 DECREASED RANGE OF MOTION OF NECK: ICD-10-CM

## 2022-08-15 DIAGNOSIS — R29.898 DECREASED STRENGTH OF UPPER EXTREMITY: ICD-10-CM

## 2022-08-15 DIAGNOSIS — R29.3 POSTURE ABNORMALITY: Primary | ICD-10-CM

## 2022-08-15 PROCEDURE — 97110 THERAPEUTIC EXERCISES: CPT | Mod: PN,CQ

## 2022-08-15 NOTE — PROGRESS NOTES
"[facility]   Physical Therapy Treatment Note     Name: Olamide Felipe  Clinic Number: [id number]    Therapy Diagnosis:   Encounter Diagnoses   Name Primary?    Posture abnormality Yes    Decreased strength of upper extremity     Decreased range of motion of neck      Physician: No ref. provider found    Visit Date: 8/15/2022    Physician Orders: PT Eval and Treat   Medical Diagnosis from Referral: M54.2 (ICD-10-CM) - Neck pain  Evaluation Date: 7/22/2022  Authorization Period Expiration: 12/31/2022  Plan of Care Expiration: 09/16/2022  Progress Note Due: 08/19/2022  Visit # / Visits authorized: 3/ 1   FOTO: 3/10    PTA Visit #: 2/5     Time In: 09:00 AM  Time Out: 09:45 AM  Total Billable Time: 35 minutes (3 TE)    SUBJECTIVE     Pt reports: hurting today with moving neck, pain gets better at night with neck flex/ext and B rotation    She was compliant with home exercise program.  Response to previous treatment: initial eval  Functional change: ongoing    Pain: 0/10 but 4/10 with any neck movement  Location: bilateral neck      Precautions: Standard, fall risk, recent hx of cancer    OBJECTIVE     Objective Measures updated at progress report unless specified.     Treatment     Olamide received the treatments listed below:      therapeutic exercises to develop strength, endurance, ROM and flexibility for 45 minutes including:  UT str: 2x30" B  Lev scap str: 2x30" B  Cervical AROM rotation/flexion/extension: 10x5" hold  Seated chin tucks:  15x5" hold *challenged with proper technique so attempted supine with better results  Seated scap retracts:  2x15  Supine serratus punches with 1# dowel: 2x10  Supine shoulder flexion: 1# dowel 2x10  Supine horizontal abduction: 2x10 YTB  SL ER with 1# weight: 2x10 B  scap pinches w/ chin tucks at wall; 20x w/ 5" hold  RTB shoulder row 2x10  RTB shoulder ext 2x10  Wall angles 2x10    Patient Education and Home Exercises     Home Exercises Provided " and Patient Education Provided     Education provided:   - daily HEP compliance    Written Home Exercises Provided: Patient instructed to cont prior HEP. Exercises were reviewed and Olamide was able to demonstrate them prior to the end of the session.  Olamide demonstrated good  understanding of the education provided. See EMR under Patient Instructions for exercises provided during therapy sessions    ASSESSMENT     Olamide arrived at clinic today with reports of pain with cervical ROM, patient held on PT the last two weeks due to having a heart monitor on. She presents with limited cervical and scapula mobility and required extensive cuing throughout session for decreasing B UT activation and correct technique. Patient presents with difficulty performing HEP with correct technique, reviewed technique with patient and discussed using visual feedback while performing HEP.  Olamide will benefit from continued PT services to achieve goals set at initial evaluation.      Olamide Is progressing well towards her goals.   Pt prognosis is Good.     Pt will continue to benefit from skilled outpatient physical therapy to address the deficits listed in the problem list box on initial evaluation, provide pt/family education and to maximize pt's level of independence in the home and community environment.     Pt's spiritual, cultural and educational needs considered and pt agreeable to plan of care and goals.     Anticipated barriers to physical therapy: recent cancer dx, age    Goals:   Short Term Goals: 4 weeks  1.Report decreased neck pain  <   / =  4 /10  to increase tolerance for adl  2. Increase cervical ROM by 5-10 degrees in order to perform ADLs with decreased difficulty.  3. Increase strength in B shoulders/scapular stabilizers by 1/3 MMT grade to increase tolerance for ADL and work activities.  4. Pt to tolerate HEP to improve ROM and independence with ADL's     Long Term Goals: 8 weeks  1.  Report decreased neck pain  <   / =   2  /10  to increase tolerance for sleep  2. Increase UE/neck flexibility in order to improve posture.    3. Increased strength in B shoulders/scapular stabilizers to >/= 4/5 MMT grade to increase tolerance for ADL and work activities.  4. Pt will report at thoracic/neck level (35% impaired) on FOTO neck score for neck pain disability to demonstrate decrease in disability and improvement in neck pain    PLAN     Plan to cont with progression of PT goals per POC.    MATT CLAY, PTA

## 2022-08-22 ENCOUNTER — TELEPHONE (OUTPATIENT)
Dept: CARDIOLOGY | Facility: CLINIC | Age: 87
End: 2022-08-22
Payer: MEDICARE

## 2022-08-22 ENCOUNTER — CLINICAL SUPPORT (OUTPATIENT)
Dept: REHABILITATION | Facility: HOSPITAL | Age: 87
End: 2022-08-22
Payer: MEDICARE

## 2022-08-22 DIAGNOSIS — R29.3 POSTURE ABNORMALITY: Primary | ICD-10-CM

## 2022-08-22 DIAGNOSIS — R29.898 DECREASED RANGE OF MOTION OF NECK: ICD-10-CM

## 2022-08-22 DIAGNOSIS — R29.898 DECREASED STRENGTH OF UPPER EXTREMITY: ICD-10-CM

## 2022-08-22 LAB
OHS CV HOLTER SINUS AVERAGE HR: 69
OHS CV HOLTER SINUS MAX HR: 129
OHS CV HOLTER SINUS MIN HR: 43

## 2022-08-22 PROCEDURE — 97110 THERAPEUTIC EXERCISES: CPT | Mod: PN

## 2022-08-22 RX ORDER — SIMVASTATIN 20 MG/1
20 TABLET, FILM COATED ORAL NIGHTLY
Qty: 90 TABLET | Refills: 2 | Status: SHIPPED | OUTPATIENT
Start: 2022-08-22 | End: 2023-06-19

## 2022-08-22 NOTE — TELEPHONE ENCOUNTER
----- Message from Nikki Pope sent at 8/22/2022 12:22 PM CDT -----  Contact: Pt Mobile [phone]  Requesting an RX refill or new RX.  Is this a refill or new RX: Refill  RX name and strength simvastatin (ZOCOR) 20 MG tablet  Is this a 30 day or 90 day RX: 90  Pharmacy name and phone #  [facility] ([facility]) - [facility] [address]  [address] [address]  Phone: [phone] Fax: [phone]  The doctors have asked that we provide their patients with the following 2 reminders -- prescription refills can take up to 72 hours, and a friendly reminder that in the future you can use your [facility] account to request refills:

## 2022-08-22 NOTE — TELEPHONE ENCOUNTER
Cardiac workup for syncope:   Echocardiography shows normal left ventricular function, no significant valvulopathy.      Carotid ultrasound shows no significant stenoses.      Two week monitor shows no significant dysrhythmia.    Discussed with patient.  Reassured.  No significant cardiac abnormalities appear to be present.    She will let me know if she has further episodes of syncope.

## 2022-08-22 NOTE — PROGRESS NOTES
"[facility]   Physical Therapy Treatment Note     Name: Olamide Felipe  Clinic Number: [id number]    Therapy Diagnosis:   Encounter Diagnoses   Name Primary?    Posture abnormality Yes    Decreased strength of upper extremity     Decreased range of motion of neck      Physician: No ref. provider found    Visit Date: 8/22/2022    Physician Orders: PT Eval and Treat   Medical Diagnosis from Referral: M54.2 (ICD-10-CM) - Neck pain  Evaluation Date: 7/22/2022  Authorization Period Expiration: 12/31/2022  Plan of Care Expiration: 09/16/2022  Progress Note Due: 08/19/2022  Visit # / Visits authorized: 4/ pending  FOTO: 4/10    PTA Visit #: 0/5     Time In: 0805 am  Time Out: 0905 am  Total Billable Time: 60 minutes (4 TE)    SUBJECTIVE     Pt reports: she's feeling the same today but notes a small "crack" in her neck when she looks down for one of her exercises  She was compliant with home exercise program.  Response to previous treatment: initial eval  Functional change: ongoing    Pain: 0/10 but 4/10 with any neck movement  Location: bilateral neck      Precautions: Standard, fall risk, recent hx of cancer    OBJECTIVE     Objective Measures updated at progress report unless specified.     Treatment     Olamide received the treatments listed below:      therapeutic exercises to develop strength, endurance, ROM and flexibility for 60 minutes including:  NuStep; Lv 3.5 for 6 min for increased CV endurance and UE/LE strengthe  Supine cervical retractions;  2 x 15 w/ 5" hold   Seated cervical retractions; 3 x 10 w/ 5" hold-- heavy VC/TC  Seated scap retracts: 3 x 10 w/ 5" hold  RTB shoulder row w/ scap retraction  4 x 10 w/ 5" hold- constant VC/TC  RTB shoulder ext w/ scap retraction 4 x 10 w/ 5"  hold- constant VC/TC      Patient Education and Home Exercises     Home Exercises Provided and Patient Education Provided     Education provided:   - daily HEP compliance    Written Home Exercises " Provided: Patient instructed to cont prior HEP. Exercises were reviewed and Olamide was able to demonstrate them prior to the end of the session.  Olamide demonstrated good  understanding of the education provided. See EMR under Patient Instructions for exercises provided during therapy sessions    ASSESSMENT     Olamide arrived with requests for updated HEP, however, she continues to have difficulty performing current HEP exercises of chin tucks and scap retractions without constant verbal and tactile cues from rep to rep for techniques. She often with perform 1-2 good repetitions and then begin performing the exact opposite motion. She was heavily educated on the need to master initial HEP before adding progressions of more challenging versions of the same. She demonstrates treatment based classification of neck pain with motor control deficit which results in mid-level hypermobility as seen on imaging. She will benefit improved cervical stabilizers and parascapular strength and will likely require continued assistance throughout. She was told that if she presents with improved independence in cervical retractions and scapular retractions then rows and straight arm pull- downs will be added to her HEP.    Olamide Is progressing well towards her goals.   Pt prognosis is Good.     Pt will continue to benefit from skilled outpatient physical therapy to address the deficits listed in the problem list box on initial evaluation, provide pt/family education and to maximize pt's level of independence in the home and community environment.     Pt's spiritual, cultural and educational needs considered and pt agreeable to plan of care and goals.     Anticipated barriers to physical therapy: recent cancer dx, age    Goals:   Short Term Goals: 4 weeks  1.Report decreased neck pain  <   / =  4 /10  to increase tolerance for adl  2. Increase cervical ROM by 5-10 degrees in order to perform ADLs with decreased difficulty.  3. Increase  strength in B shoulders/scapular stabilizers by 1/3 MMT grade to increase tolerance for ADL and work activities.  4. Pt to tolerate HEP to improve ROM and independence with ADL's     Long Term Goals: 8 weeks  1.  Report decreased neck pain  <   / =  2  /10  to increase tolerance for sleep  2. Increase UE/neck flexibility in order to improve posture.    3. Increased strength in B shoulders/scapular stabilizers to >/= 4/5 MMT grade to increase tolerance for ADL and work activities.  4. Pt will report at thoracic/neck level (35% impaired) on FOTO neck score for neck pain disability to demonstrate decrease in disability and improvement in neck pain    PLAN     Plan to cont with progression of PT goals per POC.    Victoria Ward, PT, DPT

## 2022-08-29 ENCOUNTER — CLINICAL SUPPORT (OUTPATIENT)
Dept: REHABILITATION | Facility: HOSPITAL | Age: 87
End: 2022-08-29
Payer: MEDICARE

## 2022-08-29 DIAGNOSIS — R29.898 DECREASED STRENGTH OF UPPER EXTREMITY: ICD-10-CM

## 2022-08-29 DIAGNOSIS — R29.898 DECREASED RANGE OF MOTION OF NECK: ICD-10-CM

## 2022-08-29 DIAGNOSIS — R29.3 POSTURE ABNORMALITY: Primary | ICD-10-CM

## 2022-08-29 PROCEDURE — 97110 THERAPEUTIC EXERCISES: CPT | Mod: PN,CQ

## 2022-08-29 NOTE — PROGRESS NOTES
"[facility]   Physical Therapy Treatment Note     Name: Olamide Felipe  Clinic Number: [id number]    Therapy Diagnosis:   Encounter Diagnoses   Name Primary?    Posture abnormality Yes    Decreased strength of upper extremity     Decreased range of motion of neck        Physician: No ref. provider found    Visit Date: 8/29/2022    Physician Orders: PT Eval and Treat   Medical Diagnosis from Referral: M54.2 (ICD-10-CM) - Neck pain  Evaluation Date: 7/22/2022  Authorization Period Expiration: 12/31/2022  Plan of Care Expiration: 09/16/2022  Progress Note Due: 08/19/2022  Visit # / Visits authorized: 5/ pending  FOTO: 5/10    PTA Visit #: 1/5     Time In: 0910 am  Time Out: 1000 am  Total Billable Time: 45 minutes (3 TE)    SUBJECTIVE     Pt reports: her ride was late and she tried to call to cancel tommy, doing her exercises at home  She was compliant with home exercise program.  Response to previous treatment: initial eval  Functional change: ongoing    Pain: 0/10 but 4/10 with any neck movement  Location: bilateral neck      Precautions: Standard, fall risk, recent hx of cancer    OBJECTIVE     Objective Measures updated at progress report unless specified.     Treatment     Olamide received the treatments listed below:      therapeutic exercises to develop strength, endurance, ROM and flexibility for 45 minutes including:  NuStep; Lv 3.5 for 6 min for increased CV endurance and UE/LE strengthe  Supine cervical retractions;  2 x 15 w/ 5" hold   Seated cervical retractions; 3 x 10 w/ 5" hold-- heavy VC/TC  Seated scap retracts: 3 x 10 w/ 5" hold  RTB shoulder row w/ scap retraction  4 x 10 w/ 5" hold- constant VC/TC  RTB shoulder ext w/ scap retraction 4 x 10 w/ 5"  hold- constant VC/TC      Patient Education and Home Exercises     Home Exercises Provided and Patient Education Provided     Education provided:   - daily HEP compliance    Written Home Exercises Provided: Patient instructed to " cont prior HEP. Exercises were reviewed and Olamide was able to demonstrate them prior to the end of the session.  Olamide demonstrated good  understanding of the education provided. See EMR under Patient Instructions for exercises provided during therapy sessions    ASSESSMENT     Olamide arrived to PT and demonstrated a hard time concentrating on treatment secondary to patient focus on being late to tommy. Patient continues to request progressed and updated HEP, however present with limited ability to perform current HEP and therex in clinic with correct technique. Reprinted patients current HEP and discussed with patient proper technique.  She continues to require extensive verbal and tactile cues to remain with correct technique post 1-2 reps. She will benefit improved cervical stabilizers and parascapular strength and will likely require continued assistance throughout.   Olamide Is progressing well towards her goals.   Pt prognosis is Good.     Pt will continue to benefit from skilled outpatient physical therapy to address the deficits listed in the problem list box on initial evaluation, provide pt/family education and to maximize pt's level of independence in the home and community environment.     Pt's spiritual, cultural and educational needs considered and pt agreeable to plan of care and goals.     Anticipated barriers to physical therapy: recent cancer dx, age    Goals:   Short Term Goals: 4 weeks  1.Report decreased neck pain  <   / =  4 /10  to increase tolerance for adl  2. Increase cervical ROM by 5-10 degrees in order to perform ADLs with decreased difficulty.  3. Increase strength in B shoulders/scapular stabilizers by 1/3 MMT grade to increase tolerance for ADL and work activities.  4. Pt to tolerate HEP to improve ROM and independence with ADL's     Long Term Goals: 8 weeks  1.  Report decreased neck pain  <   / =  2  /10  to increase tolerance for sleep  2. Increase UE/neck flexibility in order to  improve posture.    3. Increased strength in B shoulders/scapular stabilizers to >/= 4/5 MMT grade to increase tolerance for ADL and work activities.  4. Pt will report at thoracic/neck level (35% impaired) on FOTO neck score for neck pain disability to demonstrate decrease in disability and improvement in neck pain    PLAN     Plan to cont with progression of PT goals per POC.    MATT CLAY, PTA

## 2022-09-01 ENCOUNTER — CLINICAL SUPPORT (OUTPATIENT)
Dept: AUDIOLOGY | Facility: CLINIC | Age: 87
End: 2022-09-01
Payer: MEDICARE

## 2022-09-01 ENCOUNTER — DOCUMENTATION ONLY (OUTPATIENT)
Dept: AUDIOLOGY | Facility: CLINIC | Age: 87
End: 2022-09-01

## 2022-09-01 DIAGNOSIS — H90.3 SENSORINEURAL HEARING LOSS, BILATERAL: Primary | ICD-10-CM

## 2022-09-01 PROCEDURE — 99499 UNLISTED E&M SERVICE: CPT | Mod: S$GLB,,,

## 2022-09-01 PROCEDURE — 99499 NO LOS: ICD-10-PCS | Mod: S$GLB,,,

## 2022-09-01 NOTE — PROGRESS NOTES
Patient requested to send in her hearing aids to [facility] for an end of warranty check.  Mrs. Felipe also requested a set of loaner hearing aids.  Loaners were programmed for her and  signed a loaner agreement. I will reach out to Mrs. Felipe when her hearing aids are back from repair so she can .      Hearing Aid Information:  Right ear  : [employer]   Model:  Audeo M-50  Type:  EMILY  Color: Champagne  Battery: Rechargeable  Serial number: [id number]  Dome: Small Open  : 2S  Warranty expiration:  12/23/22  L and D expiration:  12/23/22     Left ear  : [employer]   Model:  Audeo M-50  Type:  EMILY  Color: Champagne  Battery: Rechargeable  Serial number: [id number]   Dome: Small Occluded   : 2S  Warranty expiration:  12/23/22  L and D expiration:  12/23/22

## 2022-09-01 NOTE — PROGRESS NOTES
9/1/2022    Hearing Aid Follow-up    Olamide Felipe was seen today for a hearing aid follow-up appointment. Her device information is as follows:    Hearing Aid Information:  : [employer]   Model:  Audeo M-50  Type:  EMILY  Color: Champagne  Battery: Rechargeable  RT SN [id number]  LT SN [id number]   Dome: Small Open (right) Small Occluded (left)  : 2S  Warranty expiration:  12/23/22  L and D expiration:  12/23/22    Mrs. Felipe was in clinic today to pickup a pair of loaner Phonak hearing aids while her devices have been sent in for repair. She reported feedback from the left loaner hearing aid. The feedback manager was run and the issue was resolved. Mrs. Felipe expressed satisfaction with the current device settings. She also mentioned that she will be going out of town on 10/01/2022. She would like to be seen for a hearing aid follow-up before she leaves town pending her repaired devices arrive in time. We will notify Mrs. Felipe when her devices are ready for pickup and schedule her accordingly.    Recommendations:  Daily and consistent use of binaural amplification  Hearing aid follow-up when devices arrive back in clinic  Hearing protection in noise

## 2022-09-07 ENCOUNTER — DOCUMENTATION ONLY (OUTPATIENT)
Dept: AUDIOLOGY | Facility: CLINIC | Age: 87
End: 2022-09-07
Payer: MEDICARE

## 2022-09-07 ENCOUNTER — CLINICAL SUPPORT (OUTPATIENT)
Dept: REHABILITATION | Facility: HOSPITAL | Age: 87
End: 2022-09-07
Payer: MEDICARE

## 2022-09-07 DIAGNOSIS — R29.3 POSTURE ABNORMALITY: Primary | ICD-10-CM

## 2022-09-07 DIAGNOSIS — R29.898 DECREASED STRENGTH OF UPPER EXTREMITY: ICD-10-CM

## 2022-09-07 DIAGNOSIS — R29.898 DECREASED RANGE OF MOTION OF NECK: ICD-10-CM

## 2022-09-07 PROCEDURE — 97110 THERAPEUTIC EXERCISES: CPT | Mod: PN,CQ

## 2022-09-07 NOTE — PROGRESS NOTES
"[facility]   Physical Therapy Treatment Note     Name: Olamide Felipe  Clinic Number: [id number]    Therapy Diagnosis:   Encounter Diagnoses   Name Primary?    Posture abnormality Yes    Decreased strength of upper extremity     Decreased range of motion of neck        Physician: Abiola Campbell MD    Visit Date: 9/7/2022    Physician Orders: PT Eval and Treat   Medical Diagnosis from Referral: M54.2 (ICD-10-CM) - Neck pain  Evaluation Date: 7/22/2022  Authorization Period Expiration: 12/31/2022  Plan of Care Expiration: 09/16/2022  Progress Note Due: 08/19/2022  Visit # / Visits authorized: 5/TBD+eval  FOTO: 6/10  NEXT    PTA Visit #: 2/5     Time In: 8:00 am  Time Out: 9:00 am  Total Billable Time: 30 minutes (2 TE)    SUBJECTIVE     Pt reports: she only has pain when she turns her head.  Brought print out of home exercise program and requested review of same to make sure she is doing them correctly.  She was compliant with home exercise program.  Response to previous treatment: initial eval  Functional change: ongoing    Pain: 0/10 but 4/10 with any neck movement  Location: bilateral neck      Precautions: Standard, fall risk, recent hx of cancer    OBJECTIVE     Objective Measures updated at progress report unless specified.     Treatment     Olamide received the treatments listed below:      therapeutic exercises to develop strength, endurance, ROM and flexibility for 30 minutes 1:1 remaining time supervised including:  NuStep; Lv 3.5 for 6 min for increased CV endurance and UE/LE strengthening  Supine cervical retractions;  2 x 15 w/ 5" hold   Supine chest press:  2# wand 2x10  Supine serratus push:  2# 2x10  Supine overhead flexion:  2# 2x10  Seated rotation:  right / left 1x10 with 3" hold   Seated cervical retractions; 3 x 10 w/ 5" hold-- heavy VC/TC  Seated scap retracts: 3 x 10 w/ 5" hold  RTB shoulder row w/ scap retraction  4 x 10 w/ 5" hold- constant VC/TC  RTB shoulder " "ext w/ scap retraction 4 x 10 w/ 5"  hold- constant VC/TC  Wall slides with towel:  1x10  Standing cervical retraction (back against wall): 1x10 with 5" hold     Patient Education and Home Exercises     Home Exercises Provided and Patient Education Provided     Education provided:   - daily HEP compliance    Written Home Exercises Provided: Patient instructed to cont prior HEP. Exercises were reviewed and Olamide was able to demonstrate them prior to the end of the session.  Olamide demonstrated good  understanding of the education provided. See EMR under Patient Instructions for exercises provided during therapy sessions    ASSESSMENT      Patient continues to require verbal and tactile cues to perform therex with correct technique and to decrease compensation of upper traps and shoulders with cervical and scapular retraction.  Extensive verbal and tactile cues to remain with correct technique post 1-2 reps. Demonstrated improved technique with cervical retraction against wall.  She will benefit improved cervical stabilizers and parascapular strength and will likely require continued assistance throughout. She was able to perform increased activity today as bolded  Olamide Is progressing well towards her goals. States "good" after treatment.  Pt prognosis is Good.     Pt will continue to benefit from skilled outpatient physical therapy to address the deficits listed in the problem list box on initial evaluation, provide pt/family education and to maximize pt's level of independence in the home and community environment.     Pt's spiritual, cultural and educational needs considered and pt agreeable to plan of care and goals.     Anticipated barriers to physical therapy: recent cancer dx, age    Goals:   Short Term Goals: 4 weeks  1.Report decreased neck pain  <   / =  4 /10  to increase tolerance for adl  2. Increase cervical ROM by 5-10 degrees in order to perform ADLs with decreased difficulty.  3. Increase strength in " B shoulders/scapular stabilizers by 1/3 MMT grade to increase tolerance for ADL and work activities.  4. Pt to tolerate HEP to improve ROM and independence with ADL's     Long Term Goals: 8 weeks  1.  Report decreased neck pain  <   / =  2  /10  to increase tolerance for sleep  2. Increase UE/neck flexibility in order to improve posture.    3. Increased strength in B shoulders/scapular stabilizers to >/= 4/5 MMT grade to increase tolerance for ADL and work activities.  4. Pt will report at thoracic/neck level (35% impaired) on FOTO neck score for neck pain disability to demonstrate decrease in disability and improvement in neck pain    PLAN     Plan to cont with progression of PT goals per POC.    Pricila Donovan, PTA

## 2022-09-07 NOTE — PROGRESS NOTES
Received in warranty repair from [employer].    Action Taken:  Replaced , electronics and housing      Hearing Aid Information:  Right ear  : [employer]   Model:  [employer] M-50  Type:  EMILY  Color: Champagne  Battery: Rechargeable  Serial number: [id number]  Dome: Small Open  : 2S  Warranty expiration:  12/23/22  L and D expiration:  12/23/22     Left ear  : [employer]   Model:  Diplopia-50  Type:  EMILY  Color: Champagne  Battery: Rechargeable  Serial number: [id number]   Dome: Small Occluded   : 2S  Warranty expiration:  12/23/22  L and D expiration:  12/23/22

## 2022-09-08 ENCOUNTER — TELEPHONE (OUTPATIENT)
Dept: AUDIOLOGY | Facility: CLINIC | Age: 87
End: 2022-09-08
Payer: MEDICARE

## 2022-09-16 ENCOUNTER — DOCUMENTATION ONLY (OUTPATIENT)
Dept: AUDIOLOGY | Facility: CLINIC | Age: 87
End: 2022-09-16
Payer: MEDICARE

## 2022-10-11 ENCOUNTER — TELEPHONE (OUTPATIENT)
Dept: INTERNAL MEDICINE | Facility: CLINIC | Age: 87
End: 2022-10-11
Payer: MEDICARE

## 2022-10-11 NOTE — TELEPHONE ENCOUNTER
----- Message from Genoveva Fermin sent at 10/10/2022  2:24 PM CDT -----  Contact: [phone] Patient  Caller is requesting an earlier appointment then we can schedule.  Caller is requesting a message be sent to the provider.  If this is for urgent care symptoms, did you offer other providers at this location, providers at other locations, or [facility]? (yes, no, n/a):  yes  If this is for the patients physical, did you offer to schedule next available and put on wait list, or to see NP or PA for their physical?  (yes, no, n/a):  N/A  When is the next available appointment with their provider:  12/12/2022  Reason for the appointment:  strained back/back pain  Patient preference of timeframe to be scheduled:  this week   Would the patient like a call back, or a response through their [facility] portal?:   call back  Comments:

## 2022-10-18 ENCOUNTER — OFFICE VISIT (OUTPATIENT)
Dept: INTERNAL MEDICINE | Facility: CLINIC | Age: 87
End: 2022-10-18
Payer: MEDICARE

## 2022-10-18 VITALS
RESPIRATION RATE: 18 BRPM | DIASTOLIC BLOOD PRESSURE: 60 MMHG | WEIGHT: 117.75 LBS | SYSTOLIC BLOOD PRESSURE: 160 MMHG | BODY MASS INDEX: 20.1 KG/M2 | HEIGHT: 64 IN | OXYGEN SATURATION: 98 % | TEMPERATURE: 97 F | HEART RATE: 78 BPM

## 2022-10-18 DIAGNOSIS — I15.9 SECONDARY HYPERTENSION: ICD-10-CM

## 2022-10-18 DIAGNOSIS — M54.6 ACUTE THORACIC BACK PAIN, UNSPECIFIED BACK PAIN LATERALITY: Primary | ICD-10-CM

## 2022-10-18 DIAGNOSIS — E21.3 HYPERPARATHYROIDISM: ICD-10-CM

## 2022-10-18 PROCEDURE — 1159F PR MEDICATION LIST DOCUMENTED IN MEDICAL RECORD: ICD-10-PCS | Mod: CPTII,S$GLB,, | Performed by: HOSPITALIST

## 2022-10-18 PROCEDURE — 99214 PR OFFICE/OUTPT VISIT, EST, LEVL IV, 30-39 MIN: ICD-10-PCS | Mod: S$GLB,,, | Performed by: HOSPITALIST

## 2022-10-18 PROCEDURE — 1101F PT FALLS ASSESS-DOCD LE1/YR: CPT | Mod: CPTII,S$GLB,, | Performed by: HOSPITALIST

## 2022-10-18 PROCEDURE — 1125F AMNT PAIN NOTED PAIN PRSNT: CPT | Mod: CPTII,S$GLB,, | Performed by: HOSPITALIST

## 2022-10-18 PROCEDURE — 1160F RVW MEDS BY RX/DR IN RCRD: CPT | Mod: CPTII,S$GLB,, | Performed by: HOSPITALIST

## 2022-10-18 PROCEDURE — 1160F PR REVIEW ALL MEDS BY PRESCRIBER/CLIN PHARMACIST DOCUMENTED: ICD-10-PCS | Mod: CPTII,S$GLB,, | Performed by: HOSPITALIST

## 2022-10-18 PROCEDURE — 99999 PR PBB SHADOW E&M-EST. PATIENT-LVL IV: CPT | Mod: PBBFAC,,, | Performed by: HOSPITALIST

## 2022-10-18 PROCEDURE — 99999 PR PBB SHADOW E&M-EST. PATIENT-LVL IV: ICD-10-PCS | Mod: PBBFAC,,, | Performed by: HOSPITALIST

## 2022-10-18 PROCEDURE — 1125F PR PAIN SEVERITY QUANTIFIED, PAIN PRESENT: ICD-10-PCS | Mod: CPTII,S$GLB,, | Performed by: HOSPITALIST

## 2022-10-18 PROCEDURE — 1101F PR PT FALLS ASSESS DOC 0-1 FALLS W/OUT INJ PAST YR: ICD-10-PCS | Mod: CPTII,S$GLB,, | Performed by: HOSPITALIST

## 2022-10-18 PROCEDURE — 3288F PR FALLS RISK ASSESSMENT DOCUMENTED: ICD-10-PCS | Mod: CPTII,S$GLB,, | Performed by: HOSPITALIST

## 2022-10-18 PROCEDURE — 3288F FALL RISK ASSESSMENT DOCD: CPT | Mod: CPTII,S$GLB,, | Performed by: HOSPITALIST

## 2022-10-18 PROCEDURE — 99214 OFFICE O/P EST MOD 30 MIN: CPT | Mod: S$GLB,,, | Performed by: HOSPITALIST

## 2022-10-18 PROCEDURE — 1159F MED LIST DOCD IN RCRD: CPT | Mod: CPTII,S$GLB,, | Performed by: HOSPITALIST

## 2022-10-18 RX ORDER — METHOCARBAMOL 500 MG/1
500 TABLET, FILM COATED ORAL 2 TIMES DAILY PRN
Qty: 40 TABLET | Refills: 0 | Status: SHIPPED | OUTPATIENT
Start: 2022-10-18 | End: 2023-10-19

## 2022-10-18 NOTE — PROGRESS NOTES
"  Subjective:     @Patient ID: Olamide Felipe is a 89 y.o. female.    Chief Complaint: Back Pain (Mid back)    HPI  89 yo F presents for urgent visit with c/o back pain x 1 month  Reports was going to PT and did an exercise where had to pull down with her arms. Reports pain in the mid back. Reports when turning in bed it hurts. Or with bending  No falls   Has been using a heating pad but no relief     Xray 7/2022 showed:  "Mild degenerative changes and scoliosis"    Review of Systems   Constitutional:  Negative for chills and fever.   HENT:  Negative for congestion and sore throat.    Eyes:  Negative for pain and visual disturbance.   Respiratory:  Negative for cough and shortness of breath.    Cardiovascular:  Negative for chest pain and leg swelling.   Gastrointestinal:  Negative for abdominal pain, nausea and vomiting.   Endocrine: Negative for polydipsia and polyuria.   Genitourinary:  Negative for difficulty urinating and dysuria.   Musculoskeletal:  Positive for back pain. Negative for arthralgias.   Skin:  Negative for rash and wound.   Neurological:  Negative for dizziness, weakness and headaches.   Psychiatric/Behavioral:  Negative for agitation and confusion.    Past medical history, surgical history, and family medical history reviewed and updated as appropriate.    Medications and allergies reviewed.     Objective:     There were no vitals filed for this visit.  There is no height or weight on file to calculate BMI.  Physical Exam  Constitutional:       Appearance: Normal appearance.   HENT:      Head: Normocephalic and atraumatic.   Eyes:      General:         Right eye: No discharge.         Left eye: No discharge.      Conjunctiva/sclera: Conjunctivae normal.   Cardiovascular:      Rate and Rhythm: Normal rate and regular rhythm.      Heart sounds: No murmur heard.  Pulmonary:      Effort: Pulmonary effort is normal.      Breath sounds: Normal breath sounds.   Musculoskeletal:      Cervical back: " Normal range of motion and neck supple.      Right lower leg: No edema.      Left lower leg: No edema.   Skin:     General: Skin is warm and dry.   Neurological:      Mental Status: She is alert and oriented to person, place, and time.   Psychiatric:         Mood and Affect: Mood normal.         Behavior: Behavior normal.     Lab Results   Component Value Date    WBC 5.10 07/18/2022    HGB 13.2 07/18/2022    HCT 41.5 07/18/2022     07/18/2022    CHOL 198 07/18/2022    TRIG 107 07/18/2022    HDL 67 07/18/2022    ALT 14 07/18/2022    AST 21 07/18/2022     07/18/2022    K 4.3 07/18/2022     07/18/2022    CREATININE 0.9 07/18/2022    BUN 18 07/18/2022    CO2 25 07/18/2022    TSH 2.765 07/18/2022       Assessment:     1. Acute thoracic back pain, unspecified back pain laterality    2. Secondary hypertension    3. Hyperparathyroidism      Plan:   Olamide was seen today for back pain.    Diagnoses and all orders for this visit:    Acute thoracic back pain, unspecified back pain laterality  - trial of robaxin prn   - counseled on sedation     Secondary hypertension  - dx by cardiology. Not currently on medication   - keep bp log. Rtc in 1 month for bp check     Hyperparathyroidism  - Unclear etiology. Has been elevated for few years. Was referred to endocrine years ago but never followed-up. Will refer back  -     Ambulatory referral/consult to Endocrinology; Future    Other orders  -     methocarbamoL (ROBAXIN) 500 MG Tab; Take 1 tablet (500 mg total) by mouth 2 (two) times daily as needed (muscle spasm).    Reviewed lab results with pt in clinic     Rtc 1 month     Abiola Campbell MD  Internal Medicine    10/18/2022

## 2022-11-01 ENCOUNTER — OFFICE VISIT (OUTPATIENT)
Dept: DERMATOLOGY | Facility: CLINIC | Age: 87
End: 2022-11-01
Payer: MEDICARE

## 2022-11-01 VITALS — BODY MASS INDEX: 20.08 KG/M2 | WEIGHT: 117 LBS

## 2022-11-01 DIAGNOSIS — L81.4 LENTIGINES: ICD-10-CM

## 2022-11-01 DIAGNOSIS — Z12.83 SKIN EXAM, SCREENING FOR CANCER: ICD-10-CM

## 2022-11-01 DIAGNOSIS — D18.01 CHERRY ANGIOMA: ICD-10-CM

## 2022-11-01 DIAGNOSIS — D22.9 NEVUS OF MULTIPLE SITES: ICD-10-CM

## 2022-11-01 DIAGNOSIS — L72.9 BENIGN CYST OF SKIN: Primary | ICD-10-CM

## 2022-11-01 DIAGNOSIS — L82.1 SEBORRHEIC KERATOSES: ICD-10-CM

## 2022-11-01 PROCEDURE — 1159F PR MEDICATION LIST DOCUMENTED IN MEDICAL RECORD: ICD-10-PCS | Mod: CPTII,S$GLB,, | Performed by: DERMATOLOGY

## 2022-11-01 PROCEDURE — 3288F PR FALLS RISK ASSESSMENT DOCUMENTED: ICD-10-PCS | Mod: CPTII,S$GLB,, | Performed by: DERMATOLOGY

## 2022-11-01 PROCEDURE — 1101F PR PT FALLS ASSESS DOC 0-1 FALLS W/OUT INJ PAST YR: ICD-10-PCS | Mod: CPTII,S$GLB,, | Performed by: DERMATOLOGY

## 2022-11-01 PROCEDURE — 1159F MED LIST DOCD IN RCRD: CPT | Mod: CPTII,S$GLB,, | Performed by: DERMATOLOGY

## 2022-11-01 PROCEDURE — 1126F AMNT PAIN NOTED NONE PRSNT: CPT | Mod: CPTII,S$GLB,, | Performed by: DERMATOLOGY

## 2022-11-01 PROCEDURE — 99213 OFFICE O/P EST LOW 20 MIN: CPT | Mod: S$GLB,,, | Performed by: DERMATOLOGY

## 2022-11-01 PROCEDURE — 1160F RVW MEDS BY RX/DR IN RCRD: CPT | Mod: CPTII,S$GLB,, | Performed by: DERMATOLOGY

## 2022-11-01 PROCEDURE — 99999 PR PBB SHADOW E&M-EST. PATIENT-LVL III: ICD-10-PCS | Mod: PBBFAC,,, | Performed by: DERMATOLOGY

## 2022-11-01 PROCEDURE — 1126F PR PAIN SEVERITY QUANTIFIED, NO PAIN PRESENT: ICD-10-PCS | Mod: CPTII,S$GLB,, | Performed by: DERMATOLOGY

## 2022-11-01 PROCEDURE — 99213 PR OFFICE/OUTPT VISIT, EST, LEVL III, 20-29 MIN: ICD-10-PCS | Mod: S$GLB,,, | Performed by: DERMATOLOGY

## 2022-11-01 PROCEDURE — 3288F FALL RISK ASSESSMENT DOCD: CPT | Mod: CPTII,S$GLB,, | Performed by: DERMATOLOGY

## 2022-11-01 PROCEDURE — 1160F PR REVIEW ALL MEDS BY PRESCRIBER/CLIN PHARMACIST DOCUMENTED: ICD-10-PCS | Mod: CPTII,S$GLB,, | Performed by: DERMATOLOGY

## 2022-11-01 PROCEDURE — 1101F PT FALLS ASSESS-DOCD LE1/YR: CPT | Mod: CPTII,S$GLB,, | Performed by: DERMATOLOGY

## 2022-11-01 PROCEDURE — 99999 PR PBB SHADOW E&M-EST. PATIENT-LVL III: CPT | Mod: PBBFAC,,, | Performed by: DERMATOLOGY

## 2022-11-01 NOTE — PROGRESS NOTES
Subjective:       Patient ID:  Olamide Felipe is a 89 y.o. female who presents for   Chief Complaint   Patient presents with    Skin Check     UBSE     Would like skin check no lesions of concern.       Review of Systems   Constitutional:  Negative for fever, chills, weight loss, weight gain, fatigue, night sweats and malaise.   Skin:  Negative for daily sunscreen use, activity-related sunscreen use and wears hat.   Hematologic/Lymphatic: Bruises/bleeds easily.      Objective:    Physical Exam   Constitutional: She appears well-developed and well-nourished. No distress.   Neurological: She is alert and oriented to person, place, and time. She is not disoriented.   Psychiatric: She has a normal mood and affect.   Skin:   Areas Examined (abnormalities noted in diagram):   Head / Face Inspection Performed  Neck Inspection Performed  Chest / Axilla Inspection Performed  Abdomen Inspection Performed  Back Inspection Performed  RUE Inspected  LUE Inspection Performed                 Diagram Legend     Erythematous scaling macule/papule c/w actinic keratosis       Vascular papule c/w angioma      Pigmented verrucoid papule/plaque c/w seborrheic keratosis      Yellow umbilicated papule c/w sebaceous hyperplasia      Irregularly shaped tan macule c/w lentigo     1-2 mm smooth white papules consistent with Milia      Movable subcutaneous cyst with punctum c/w epidermal inclusion cyst      Subcutaneous movable cyst c/w pilar cyst      Firm pink to brown papule c/w dermatofibroma      Pedunculated fleshy papule(s) c/w skin tag(s)      Evenly pigmented macule c/w junctional nevus     Mildly variegated pigmented, slightly irregular-bordered macule c/w mildly atypical nevus      Flesh colored to evenly pigmented papule c/w intradermal nevus       Pink pearly papule/plaque c/w basal cell carcinoma      Erythematous hyperkeratotic cursted plaque c/w SCC      Surgical scar with no sign of skin cancer recurrence      Open and  "closed comedones      Inflammatory papules and pustules      Verrucoid papule consistent consistent with wart     Erythematous eczematous patches and plaques     Dystrophic onycholytic nail with subungual debris c/w onychomycosis     Umbilicated papule    Erythematous-base heme-crusted tan verrucoid plaque consistent with inflamed seborrheic keratosis     Erythematous Silvery Scaling Plaque c/w Psoriasis     See annotation      Assessment / Plan:        Benign cyst of skin EIC back large milium right cheek  I and D both    Seborrheic keratoses  reassurance      Cherry angiomas  reassurance      Lentigines/Nevi  The "ABCD" rules to observe pigmented lesions were reviewed.       No lesions suspicious for malignancy noted.    Recommend daily sun protection/avoidance and use of at least SPF 30, broad spectrum sunscreen (OTC drug).              Follow up in about 1 year (around 11/1/2023).  "

## 2022-11-18 ENCOUNTER — OFFICE VISIT (OUTPATIENT)
Dept: INTERNAL MEDICINE | Facility: CLINIC | Age: 87
End: 2022-11-18
Payer: MEDICARE

## 2022-11-18 VITALS
BODY MASS INDEX: 19.62 KG/M2 | HEIGHT: 65 IN | WEIGHT: 117.75 LBS | SYSTOLIC BLOOD PRESSURE: 130 MMHG | TEMPERATURE: 96 F | DIASTOLIC BLOOD PRESSURE: 72 MMHG | HEART RATE: 83 BPM | RESPIRATION RATE: 18 BRPM | OXYGEN SATURATION: 97 %

## 2022-11-18 DIAGNOSIS — R41.3 MEMORY IMPAIRMENT: ICD-10-CM

## 2022-11-18 DIAGNOSIS — R42 DIZZINESS AND GIDDINESS: ICD-10-CM

## 2022-11-18 DIAGNOSIS — I10 HYPERTENSION, UNSPECIFIED TYPE: Primary | ICD-10-CM

## 2022-11-18 DIAGNOSIS — E21.3 HYPERPARATHYROIDISM: ICD-10-CM

## 2022-11-18 DIAGNOSIS — R29.6 RECURRENT FALLS: ICD-10-CM

## 2022-11-18 PROCEDURE — 1126F PR PAIN SEVERITY QUANTIFIED, NO PAIN PRESENT: ICD-10-PCS | Mod: CPTII,S$GLB,, | Performed by: HOSPITALIST

## 2022-11-18 PROCEDURE — 1160F RVW MEDS BY RX/DR IN RCRD: CPT | Mod: CPTII,S$GLB,, | Performed by: HOSPITALIST

## 2022-11-18 PROCEDURE — 99999 PR PBB SHADOW E&M-EST. PATIENT-LVL V: ICD-10-PCS | Mod: PBBFAC,,, | Performed by: HOSPITALIST

## 2022-11-18 PROCEDURE — 99215 OFFICE O/P EST HI 40 MIN: CPT | Mod: S$GLB,,, | Performed by: HOSPITALIST

## 2022-11-18 PROCEDURE — 1159F MED LIST DOCD IN RCRD: CPT | Mod: CPTII,S$GLB,, | Performed by: HOSPITALIST

## 2022-11-18 PROCEDURE — G0008 FLU VACCINE - QUADRIVALENT - ADJUVANTED: ICD-10-PCS | Mod: S$GLB,,, | Performed by: HOSPITALIST

## 2022-11-18 PROCEDURE — G0008 ADMIN INFLUENZA VIRUS VAC: HCPCS | Mod: S$GLB,,, | Performed by: HOSPITALIST

## 2022-11-18 PROCEDURE — 3288F FALL RISK ASSESSMENT DOCD: CPT | Mod: CPTII,S$GLB,, | Performed by: HOSPITALIST

## 2022-11-18 PROCEDURE — 1101F PR PT FALLS ASSESS DOC 0-1 FALLS W/OUT INJ PAST YR: ICD-10-PCS | Mod: CPTII,S$GLB,, | Performed by: HOSPITALIST

## 2022-11-18 PROCEDURE — 90694 FLU VACCINE - QUADRIVALENT - ADJUVANTED: ICD-10-PCS | Mod: S$GLB,,, | Performed by: HOSPITALIST

## 2022-11-18 PROCEDURE — 90694 VACC AIIV4 NO PRSRV 0.5ML IM: CPT | Mod: S$GLB,,, | Performed by: HOSPITALIST

## 2022-11-18 PROCEDURE — 1159F PR MEDICATION LIST DOCUMENTED IN MEDICAL RECORD: ICD-10-PCS | Mod: CPTII,S$GLB,, | Performed by: HOSPITALIST

## 2022-11-18 PROCEDURE — 99215 PR OFFICE/OUTPT VISIT, EST, LEVL V, 40-54 MIN: ICD-10-PCS | Mod: S$GLB,,, | Performed by: HOSPITALIST

## 2022-11-18 PROCEDURE — 1126F AMNT PAIN NOTED NONE PRSNT: CPT | Mod: CPTII,S$GLB,, | Performed by: HOSPITALIST

## 2022-11-18 PROCEDURE — 3288F PR FALLS RISK ASSESSMENT DOCUMENTED: ICD-10-PCS | Mod: CPTII,S$GLB,, | Performed by: HOSPITALIST

## 2022-11-18 PROCEDURE — 1101F PT FALLS ASSESS-DOCD LE1/YR: CPT | Mod: CPTII,S$GLB,, | Performed by: HOSPITALIST

## 2022-11-18 PROCEDURE — 1160F PR REVIEW ALL MEDS BY PRESCRIBER/CLIN PHARMACIST DOCUMENTED: ICD-10-PCS | Mod: CPTII,S$GLB,, | Performed by: HOSPITALIST

## 2022-11-18 PROCEDURE — 99999 PR PBB SHADOW E&M-EST. PATIENT-LVL V: CPT | Mod: PBBFAC,,, | Performed by: HOSPITALIST

## 2022-11-18 RX ORDER — LOSARTAN POTASSIUM 25 MG/1
25 TABLET ORAL DAILY PRN
Qty: 90 TABLET | Refills: 1 | Status: ON HOLD | OUTPATIENT
Start: 2022-11-18 | End: 2023-12-07 | Stop reason: HOSPADM

## 2022-11-18 NOTE — PROGRESS NOTES
Subjective:     @Patient ID: Olamide Felipe is a 89 y.o. female.    Chief Complaint: Blood Pressure Check    HPI    89 y.o. female with hypothyroidism, aortic atherosclerosis, hypothyroidism, hyperparathyroidism, breast cancer (  invasive ductal carcinoma of the right breast s/p lumpectomy 1/7/22, s/p postopertaive radiation presents for bp check:     HTN: no prior hx of htn. However over past year BP has been high. Dx by heme onc with secondary htn. She brought her BP log today.   Memory: reports mild forgetfulness. Reports issue with word finding.   Hyperparathyroidism: pt reports does not remember needing to schedule endocrine appt   Fall: reports had another fall at home 3 weeks ago. Reports she was reaching for something under her bed and slipped and fell. Reports she did hit her head again. Reports she has   5.  Dizziness: reports had episode of feeling dizzy/lightheaded few weeks ago. Unclear if related to fall. Pt reports did not feel like her normal self           Review of Systems   Constitutional:  Negative for chills and fever.   HENT:  Negative for congestion and sore throat.    Eyes:  Negative for pain and visual disturbance.   Respiratory:  Negative for cough and shortness of breath.    Cardiovascular:  Negative for chest pain and leg swelling.   Gastrointestinal:  Negative for abdominal pain, nausea and vomiting.   Endocrine: Negative for polydipsia and polyuria.   Genitourinary:  Negative for difficulty urinating and dysuria.   Musculoskeletal:  Negative for arthralgias and back pain.   Skin:  Negative for rash and wound.   Neurological:  Negative for dizziness, weakness and headaches.   Psychiatric/Behavioral:  Negative for agitation and confusion.    Past medical history, surgical history, and family medical history reviewed and updated as appropriate.    Medications and allergies reviewed.     Objective:     Vitals:    11/18/22 1032   BP: 130/72   BP Location: Right arm   Patient Position:  "Sitting   BP Method: Small (Manual)   Pulse: 83   Resp: 18   Temp: 96.3 °F (35.7 °C)   TempSrc: Temporal   SpO2: 97%   Weight: 53.4 kg (117 lb 11.6 oz)   Height: 5' 5" (1.651 m)     Body mass index is 19.59 kg/m².  Physical Exam  Constitutional:       Appearance: Normal appearance.   HENT:      Head: Normocephalic and atraumatic.   Eyes:      General:         Right eye: No discharge.         Left eye: No discharge.      Conjunctiva/sclera: Conjunctivae normal.   Cardiovascular:      Rate and Rhythm: Normal rate and regular rhythm.      Heart sounds: No murmur heard.  Pulmonary:      Effort: Pulmonary effort is normal.      Breath sounds: Normal breath sounds.   Musculoskeletal:         General: Normal range of motion.      Cervical back: Normal range of motion and neck supple.      Right lower leg: No edema.      Left lower leg: No edema.   Skin:     General: Skin is warm and dry.   Neurological:      Mental Status: She is alert and oriented to person, place, and time.   Psychiatric:         Mood and Affect: Mood normal.         Behavior: Behavior normal.       Lab Results   Component Value Date    WBC 5.10 07/18/2022    HGB 13.2 07/18/2022    HCT 41.5 07/18/2022     07/18/2022    CHOL 198 07/18/2022    TRIG 107 07/18/2022    HDL 67 07/18/2022    ALT 14 07/18/2022    AST 21 07/18/2022     07/18/2022    K 4.3 07/18/2022     07/18/2022    CREATININE 0.9 07/18/2022    BUN 18 07/18/2022    CO2 25 07/18/2022    TSH 2.765 07/18/2022       Assessment:     1. Hypertension, unspecified type    2. Hyperparathyroidism    3. Memory impairment    4. Dizziness and giddiness    5. Recurrent falls      Plan:   Olamide was seen today for blood pressure check.    Diagnoses and all orders for this visit:    Hypertension, unspecified type  - bp currently improved.   - however home readings are elevated. 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 I recommend neuropsychology evaluation. Pt in agreement. Will also check MRI brain   -     Ambulatory referral/consult to Adult Neuropsychology; Future  -     MRI Brain Without Contrast; Future    Dizziness and giddiness  -     MRI Brain Without Contrast; Future    Recurrent falls  - pt counseled on safety with transfers.   - will check MRI brain since she had recent fall     Other orders  -     losartan (COZAAR) 25 MG tablet; Take 1 tablet (25 mg total) by mouth daily as needed (For Systolic Blood pressure higher than 150).  -     Influenza - Quadrivalent (Adjuvanted)      Visit time 40 min. Includes pre-charting, face-to-face encounter, medical decision making and documentation.         Abiola Campbell MD  Internal Medicine    11/18/2022

## 2022-11-22 ENCOUNTER — TELEPHONE (OUTPATIENT)
Dept: HEMATOLOGY/ONCOLOGY | Facility: CLINIC | Age: 87
End: 2022-11-22
Payer: MEDICARE

## 2022-11-23 ENCOUNTER — OFFICE VISIT (OUTPATIENT)
Dept: HEMATOLOGY/ONCOLOGY | Facility: CLINIC | Age: 87
End: 2022-11-23
Payer: MEDICARE

## 2022-11-23 VITALS
BODY MASS INDEX: 19.76 KG/M2 | DIASTOLIC BLOOD PRESSURE: 68 MMHG | TEMPERATURE: 98 F | HEIGHT: 65 IN | RESPIRATION RATE: 18 BRPM | HEART RATE: 80 BPM | OXYGEN SATURATION: 98 % | SYSTOLIC BLOOD PRESSURE: 163 MMHG | WEIGHT: 118.63 LBS

## 2022-11-23 DIAGNOSIS — I15.9 SECONDARY HYPERTENSION: ICD-10-CM

## 2022-11-23 DIAGNOSIS — C50.411 MALIGNANT NEOPLASM OF UPPER-OUTER QUADRANT OF RIGHT BREAST IN FEMALE, ESTROGEN RECEPTOR NEGATIVE: Primary | ICD-10-CM

## 2022-11-23 DIAGNOSIS — Z17.1 MALIGNANT NEOPLASM OF UPPER-OUTER QUADRANT OF RIGHT BREAST IN FEMALE, ESTROGEN RECEPTOR NEGATIVE: Primary | ICD-10-CM

## 2022-11-23 DIAGNOSIS — R29.6 RECURRENT FALLS: ICD-10-CM

## 2022-11-23 PROCEDURE — 1160F RVW MEDS BY RX/DR IN RCRD: CPT | Mod: CPTII,S$GLB,, | Performed by: INTERNAL MEDICINE

## 2022-11-23 PROCEDURE — 1159F PR MEDICATION LIST DOCUMENTED IN MEDICAL RECORD: ICD-10-PCS | Mod: CPTII,S$GLB,, | Performed by: INTERNAL MEDICINE

## 2022-11-23 PROCEDURE — 3288F PR FALLS RISK ASSESSMENT DOCUMENTED: ICD-10-PCS | Mod: CPTII,S$GLB,, | Performed by: INTERNAL MEDICINE

## 2022-11-23 PROCEDURE — 99214 PR OFFICE/OUTPT VISIT, EST, LEVL IV, 30-39 MIN: ICD-10-PCS | Mod: S$GLB,,, | Performed by: INTERNAL MEDICINE

## 2022-11-23 PROCEDURE — 99999 PR PBB SHADOW E&M-EST. PATIENT-LVL III: CPT | Mod: PBBFAC,,, | Performed by: INTERNAL MEDICINE

## 2022-11-23 PROCEDURE — 1160F PR REVIEW ALL MEDS BY PRESCRIBER/CLIN PHARMACIST DOCUMENTED: ICD-10-PCS | Mod: CPTII,S$GLB,, | Performed by: INTERNAL MEDICINE

## 2022-11-23 PROCEDURE — 3288F FALL RISK ASSESSMENT DOCD: CPT | Mod: CPTII,S$GLB,, | Performed by: INTERNAL MEDICINE

## 2022-11-23 PROCEDURE — 1125F AMNT PAIN NOTED PAIN PRSNT: CPT | Mod: CPTII,S$GLB,, | Performed by: INTERNAL MEDICINE

## 2022-11-23 PROCEDURE — 1100F PTFALLS ASSESS-DOCD GE2>/YR: CPT | Mod: CPTII,S$GLB,, | Performed by: INTERNAL MEDICINE

## 2022-11-23 PROCEDURE — 1100F PR PT FALLS ASSESS DOC 2+ FALLS/FALL W/INJURY/YR: ICD-10-PCS | Mod: CPTII,S$GLB,, | Performed by: INTERNAL MEDICINE

## 2022-11-23 PROCEDURE — 99999 PR PBB SHADOW E&M-EST. PATIENT-LVL III: ICD-10-PCS | Mod: PBBFAC,,, | Performed by: INTERNAL MEDICINE

## 2022-11-23 PROCEDURE — 1159F MED LIST DOCD IN RCRD: CPT | Mod: CPTII,S$GLB,, | Performed by: INTERNAL MEDICINE

## 2022-11-23 PROCEDURE — 1125F PR PAIN SEVERITY QUANTIFIED, PAIN PRESENT: ICD-10-PCS | Mod: CPTII,S$GLB,, | Performed by: INTERNAL MEDICINE

## 2022-11-23 PROCEDURE — 99214 OFFICE O/P EST MOD 30 MIN: CPT | Mod: S$GLB,,, | Performed by: INTERNAL MEDICINE

## 2022-11-23 NOTE — PROGRESS NOTES
PROGRESS NOTE    Subjective:       Patient ID: Olamide Felipe is a 89 y.o. female.  MRN: [id number]  : 2/3/1933    Chief Complaint: pT1a N0 M0, Stage IA, invasive ductal carcinoma of the right breast    History of Present Illness:   Olamide Felipe is a 89 y.o. female who presents with invasive ductal carcinoma of the right breast.       As previously documented, she has been having screening mammograms every 2 years.  This year a focal right breast asymmetry was noted on the screening mammogram at the end of 2021. She was sent for a diagnostic mammogram and ultrasound that confirmed a right breast mass, 3 mm x 2 mm x 2 mm.  She underwent a right breast biopsy that shows invasive ductal carcinoma, grade 2, triple negative, Ki-67 60% .     She underwent a right lumpectomy and SN exam. The tumor was 0.4 cm, final margins are negative.      She has also completed genetic testing, [employer], which is negative.     Interim history:   She completed adjuvant RT, 30 cGY in 5 fractions, completed 3/2/22.       She has had 2 falls since her last visit with me. One was prior to July and the most recent one was 6 weeks ago. The first one was syncope and extensive work up was done and negative. The recent fall was an accident.     She has neck pain for cervical arthralgia, has done PT, saw her PMD recently, has been referred back to PT and a new antihypertensive was started.      Her weight , appetite and PS are stable. She continues to live independently with family close by.        Oncology History:  Oncology History   Malignant neoplasm of upper-outer quadrant of right breast in female, estrogen receptor negative   2022 Initial Diagnosis    Malignant neoplasm of upper-outer quadrant of right breast in female, estrogen receptor negative      Radiation Therapy    Treatment Summary  Course: C1 Breast   Treatment Site Energy Dose/Fx (Gy) #Fx Total Dose  (Gy) Start Date End Date Elapsed Days   Right PARTIAL BREAST 6X 6 5 / 5 30 2022 3/2/2022 12          History:  Past Medical History:   Diagnosis Date    Hyperlipidemia     Hypothyroidism     Malignant neoplasm of upper-outer quadrant of right breast in female, estrogen receptor negative 2022    Osteoarthritis, knee     Vertigo       Past Surgical History:   Procedure Laterality Date    dentures      FRACTURE SURGERY Left     fracture left wrist    HYSTERECTOMY      INJECTION FOR SENTINEL NODE IDENTIFICATION Right 2022    Procedure: INJECTION, FOR SENTINEL NODE IDENTIFICATION-Right;  Surgeon: Marci Mcintosh MD;  Location: [facility];  Service: General;  Laterality: Right;    left wrist surgery      MASTECTOMY, PARTIAL Right 2022    Procedure: MASTECTOMY, PARTIAL-Right with radiological marker;  Surgeon: Marci Mcintosh MD;  Location: [facility];  Service: General;  Laterality: Right;  REQUEST SAID 2 HOUR CASE    SENTINEL LYMPH NODE BIOPSY Right 2022    Procedure: BIOPSY, LYMPH NODE, SENTINEL-Right;  Surgeon: Marci Mcintosh MD;  Location: [facility];  Service: General;  Laterality: Right;     Family History   Problem Relation Age of Onset    No Known Problems Daughter     No Known Problems Son     No Known Problems Sister      Social History     Tobacco Use    Smoking status: Former     Types: Cigarettes     Quit date: 1989     Years since quittin.4    Smokeless tobacco: Never   Substance and Sexual Activity    Alcohol use: Yes     Alcohol/week: 1.0 standard drink     Types: 1 Glasses of wine per week     Comment: 1\3 glass ofd wine with dinner nightly    Drug use: Never    Sexual activity: Not Currently        ROS:   Review of Systems   Constitutional:  Negative for fever, malaise/fatigue and weight loss.   HENT:  Negative for congestion, hearing loss, nosebleeds and sore throat.    Eyes:  Negative for double vision and photophobia.   Respiratory:  Negative for cough, hemoptysis, sputum  "production, shortness of breath and wheezing.    Cardiovascular:  Negative for chest pain, palpitations, orthopnea and leg swelling.   Gastrointestinal:  Negative for abdominal pain, blood in stool, constipation, diarrhea, heartburn, nausea and vomiting.   Genitourinary:  Negative for dysuria, hematuria and urgency.   Musculoskeletal:  Positive for falls. Negative for back pain, joint pain and myalgias.   Skin:  Negative for itching and rash.   Neurological:  Positive for dizziness. Negative for tingling, seizures, weakness and headaches.   Endo/Heme/Allergies:  Negative for polydipsia. Bruises/bleeds easily.   Psychiatric/Behavioral:  Negative for depression and memory loss. The patient is not nervous/anxious and does not have insomnia.       Objective:     Vitals:    11/23/22 1011   BP: (!) 163/68   Pulse: 80   Resp: 18   Temp: 98 °F (36.7 °C)   TempSrc: Oral   SpO2: 98%   Weight: 53.8 kg (118 lb 9.7 oz)   Height: 5' 5" (1.651 m)   PainSc:   6   PainLoc: Neck       Wt Readings from Last 10 Encounters:   11/23/22 53.8 kg (118 lb 9.7 oz)   11/18/22 53.4 kg (117 lb 11.6 oz)   11/01/22 53.1 kg (117 lb)   10/18/22 53.4 kg (117 lb 11.6 oz)   08/11/22 53.5 kg (118 lb)   07/22/22 53.8 kg (118 lb 9.7 oz)   07/20/22 53.6 kg (118 lb 2.7 oz)   07/15/22 54.4 kg (119 lb 14.9 oz)   04/12/22 56.1 kg (123 lb 11.2 oz)   04/08/22 54.2 kg (119 lb 6.1 oz)       Physical Examination:   Physical Exam  Vitals and nursing note reviewed.   Constitutional:       General: She is not in acute distress.     Appearance: She is not diaphoretic.   HENT:      Head: Normocephalic.      Mouth/Throat:      Pharynx: No oropharyngeal exudate.   Eyes:      General: No scleral icterus.     Conjunctiva/sclera: Conjunctivae normal.   Neck:      Thyroid: No thyromegaly.   Cardiovascular:      Rate and Rhythm: Normal rate and regular rhythm.      Heart sounds: Normal heart sounds. No murmur heard.  Pulmonary:      Effort: Pulmonary effort is normal. No " respiratory distress.      Breath sounds: No stridor. No wheezing or rales.   Chest:      Chest wall: No tenderness.   Abdominal:      General: Bowel sounds are normal. There is no distension.      Palpations: Abdomen is soft. There is no mass.      Tenderness: There is no abdominal tenderness. There is no rebound.   Musculoskeletal:         General: No tenderness or deformity. Normal range of motion.      Cervical back: Neck supple.   Lymphadenopathy:      Cervical: No cervical adenopathy.   Skin:     General: Skin is warm and dry.      Findings: No erythema or rash.      Comments: + post lumpectomy changes. No other breast mass, axillary adenopathy palpated bilaterally    Neurological:      Mental Status: She is alert and oriented to person, place, and time.      Cranial Nerves: No cranial nerve deficit.      Coordination: Coordination normal.      Gait: Gait is intact.   Psychiatric:         Mood and Affect: Affect normal.         Cognition and Memory: Memory normal.         Judgment: Judgment normal.        Diagnostic Tests:  Significant Imaging: I have reviewed and interpreted all pertinent imaging results/findings.    Laboratory Data:  All pertinent labs have been reviewed.  Labs:   Lab Results   Component Value Date    WBC 5.10 07/18/2022    RBC 4.12 07/18/2022    HGB 13.2 07/18/2022    HCT 41.5 07/18/2022     (H) 07/18/2022     07/18/2022    GLU 92 07/18/2022     07/18/2022    K 4.3 07/18/2022    BUN 18 07/18/2022    CREATININE 0.9 07/18/2022    AST 21 07/18/2022    ALT 14 07/18/2022    BILITOT 0.8 07/18/2022       Assessment/Plan:   Malignant neoplasm of upper-outer quadrant of right breast in female, estrogen receptor negative    cT1aN0, TNBC, Grade 2, Ki 67 60%  Genetic negative      She is s/p lumpectomy and adjuvant RT.  Given T1a tumor, no additional risk factors, chemotherapy is not indicated.       I will continue surveillance and see her back in 3-4 months. Mammogram is due in  December prior to Dr. Mccall's visit.       Syncope/Fall   Follow up with PMD. Brain MRI is scheduled for next month.     Essential HTN  Continue antihypertensives and PMD follow up      Neck arthritis   X ray done in July. Continue PMD follow up and PT.     ECOG SCORE    1 - Restricted in strenuous activity-ambulatory and able to carry out work of a light nature           Discussion:   No follow-ups on file.    Plan was discussed with the patient at length, and she verbalized understanding. Olamide was given an opportunity to ask questions that were answered to her satisfaction, and she was advised to call in the interval if any problems or questions arise.    Electronically signed by Ami Boykin MD      Route Chart for Scheduling    Med Onc Chart Routing      Follow up with physician 4 months.   Follow up with [facility]    Infusion scheduling note    Injection scheduling note    Labs    Imaging    Pharmacy appointment    Other referrals

## 2022-12-05 ENCOUNTER — HOSPITAL ENCOUNTER (OUTPATIENT)
Dept: RADIOLOGY | Facility: HOSPITAL | Age: 87
Discharge: HOME | End: 2022-12-05
Attending: HOSPITALIST
Payer: MEDICARE

## 2022-12-05 DIAGNOSIS — R41.3 MEMORY IMPAIRMENT: ICD-10-CM

## 2022-12-05 DIAGNOSIS — R42 DIZZINESS AND GIDDINESS: ICD-10-CM

## 2022-12-05 PROCEDURE — 70551 MRI BRAIN STEM W/O DYE: CPT | Mod: TC

## 2022-12-05 PROCEDURE — 70551 MRI BRAIN WITHOUT CONTRAST: ICD-10-PCS | Mod: 26,,, | Performed by: RADIOLOGY

## 2022-12-05 PROCEDURE — 70551 MRI BRAIN STEM W/O DYE: CPT | Mod: 26,,, | Performed by: RADIOLOGY

## 2022-12-15 ENCOUNTER — OFFICE VISIT (OUTPATIENT)
Dept: SURGERY | Facility: CLINIC | Age: 87
End: 2022-12-15
Payer: MEDICARE

## 2022-12-15 ENCOUNTER — HOSPITAL ENCOUNTER (OUTPATIENT)
Dept: RADIOLOGY | Facility: HOSPITAL | Age: 87
Discharge: HOME | End: 2022-12-15
Attending: SURGERY
Payer: MEDICARE

## 2022-12-15 VITALS
HEIGHT: 65 IN | HEART RATE: 87 BPM | BODY MASS INDEX: 19.33 KG/M2 | WEIGHT: 116 LBS | SYSTOLIC BLOOD PRESSURE: 132 MMHG | DIASTOLIC BLOOD PRESSURE: 62 MMHG

## 2022-12-15 DIAGNOSIS — Z12.31 ENCOUNTER FOR SCREENING MAMMOGRAM FOR MALIGNANT NEOPLASM OF BREAST: ICD-10-CM

## 2022-12-15 DIAGNOSIS — Z12.39 ENCOUNTER FOR SCREENING BREAST EXAMINATION: ICD-10-CM

## 2022-12-15 DIAGNOSIS — Z12.31 SCREENING MAMMOGRAM, ENCOUNTER FOR: ICD-10-CM

## 2022-12-15 DIAGNOSIS — Z85.3 PERSONAL HISTORY OF BREAST CANCER: Primary | ICD-10-CM

## 2022-12-15 PROCEDURE — 99213 OFFICE O/P EST LOW 20 MIN: CPT | Mod: S$GLB,,, | Performed by: NURSE PRACTITIONER

## 2022-12-15 PROCEDURE — 77063 MAMMO DIGITAL SCREENING BILAT WITH TOMO: ICD-10-PCS | Mod: 26,,, | Performed by: RADIOLOGY

## 2022-12-15 PROCEDURE — 77063 BREAST TOMOSYNTHESIS BI: CPT | Mod: 26,,, | Performed by: RADIOLOGY

## 2022-12-15 PROCEDURE — 77067 SCR MAMMO BI INCL CAD: CPT | Mod: 26,,, | Performed by: RADIOLOGY

## 2022-12-15 PROCEDURE — 1100F PR PT FALLS ASSESS DOC 2+ FALLS/FALL W/INJURY/YR: ICD-10-PCS | Mod: CPTII,S$GLB,, | Performed by: NURSE PRACTITIONER

## 2022-12-15 PROCEDURE — 99999 PR PBB SHADOW E&M-EST. PATIENT-LVL III: CPT | Mod: PBBFAC,,, | Performed by: NURSE PRACTITIONER

## 2022-12-15 PROCEDURE — 1126F AMNT PAIN NOTED NONE PRSNT: CPT | Mod: CPTII,S$GLB,, | Performed by: NURSE PRACTITIONER

## 2022-12-15 PROCEDURE — 3288F PR FALLS RISK ASSESSMENT DOCUMENTED: ICD-10-PCS | Mod: CPTII,S$GLB,, | Performed by: NURSE PRACTITIONER

## 2022-12-15 PROCEDURE — 3288F FALL RISK ASSESSMENT DOCD: CPT | Mod: CPTII,S$GLB,, | Performed by: NURSE PRACTITIONER

## 2022-12-15 PROCEDURE — 1126F PR PAIN SEVERITY QUANTIFIED, NO PAIN PRESENT: ICD-10-PCS | Mod: CPTII,S$GLB,, | Performed by: NURSE PRACTITIONER

## 2022-12-15 PROCEDURE — 77063 BREAST TOMOSYNTHESIS BI: CPT | Mod: TC

## 2022-12-15 PROCEDURE — 77067 MAMMO DIGITAL SCREENING BILAT WITH TOMO: ICD-10-PCS | Mod: 26,,, | Performed by: RADIOLOGY

## 2022-12-15 PROCEDURE — 99999 PR PBB SHADOW E&M-EST. PATIENT-LVL III: ICD-10-PCS | Mod: PBBFAC,,, | Performed by: NURSE PRACTITIONER

## 2022-12-15 PROCEDURE — 1100F PTFALLS ASSESS-DOCD GE2>/YR: CPT | Mod: CPTII,S$GLB,, | Performed by: NURSE PRACTITIONER

## 2022-12-15 PROCEDURE — 99213 PR OFFICE/OUTPT VISIT, EST, LEVL III, 20-29 MIN: ICD-10-PCS | Mod: S$GLB,,, | Performed by: NURSE PRACTITIONER

## 2022-12-15 PROCEDURE — 77067 SCR MAMMO BI INCL CAD: CPT | Mod: TC

## 2022-12-15 NOTE — PROGRESS NOTES
[facility]  Department of Surgery    REFERRING PROVIDER: No referring provider defined for this encounter. Abiola Campbell MD  CHIEF COMPLAINT:   Chief Complaint   Patient presents with    Annual Exam     Annual w/mmg       DIAGNOSIS:   This is a 89 y.o. female with a history of stage pT1 N0 Mx grade 2 ER - MI - HER2 - IDC  of the right breast.    TREATMENT:   1. Right mastectomy with sentinel node biopsy on 1/7/2022. JOE Mcintosh M.D. Surgical Oncology. Final path showed triple negative IDC 4mm in size with all negative margins and negative LN.  2. Chemotherapy not recommended. SANFORD Boykin M.D. Medical Oncology   3. Radiation therapy completed on 3/2/2022. SIVAKUMAR Lynn M.D. Radiation Oncology     She has also completed genetic testing, [employer], which is negative.     HISTORY OF PRESENT ILLNESS:   Olamide Felipe is a 89 y.o. female comes in for oncological follow up.  She denies change in her breast self-exam specifically denying new masses, skin or nipple changes, or nipple discharge. Past medical and surgical history is updated with new changes. There have been no changes to family history. The patient denies constitutional symptoms of night sweats, chills, weight loss, new headaches, visual changes, new back or bony pain, chest pain, or shortness of breath.        Review of Systems: See HPI/Interval History for other systems reviewed.     IMAGING:   Screening mammogram today; final read pending      MEDICATIONS/ALLERGIES:     Current Outpatient Medications   Medication Sig    aspirin 81 MG Chew Take 81 mg by mouth once daily.    b complex vitamins tablet Take 1 tablet by mouth once daily.    calcium-vitamin D 250-100 mg-unit per tablet Take 1 tablet by mouth 2 (two) times daily.    FLUoxetine 20 MG capsule TAKE 1 CAPSULE EVERY DAY    levothyroxine (SYNTHROID) 25 MCG tablet Take 1 tablet (25 mcg total) by mouth before breakfast.    losartan (COZAAR) 25 MG tablet Take 1 tablet (25 mg total) by mouth daily as  "needed (For Systolic Blood pressure higher than 150).    methocarbamoL (ROBAXIN) 500 MG Tab Take 1 tablet (500 mg total) by mouth 2 (two) times daily as needed (muscle spasm).    mv-min/iron/folic/calcium/vitK (WOMEN'S MULTIVITAMIN ORAL) Take by mouth once daily.    omeprazole (PRILOSEC) 20 MG capsule TAKE 1 CAPSULE EVERY DAY    simvastatin (ZOCOR) 20 MG tablet Take 1 tablet (20 mg total) by mouth every evening.     No current facility-administered medications for this visit.      Review of patient's allergies indicates:  No Known Allergies    PHYSICAL EXAM:   /62 (BP Location: Left arm, Patient Position: Sitting, BP Method: Medium (Automatic))   Pulse 87   Ht 5' 5" (1.651 m)   Wt 52.6 kg (116 lb)   BMI 19.30 kg/m²     Physical Exam   Vitals reviewed.  Constitutional: She is oriented to person, place, and time.   Eyes: Right eye exhibits no discharge. Left eye exhibits no discharge.   Pulmonary/Chest: Effort normal. No respiratory distress. She has no wheezes. Right breast exhibits no inverted nipple, no mass, no nipple discharge, no skin change and no tenderness. Left breast exhibits no inverted nipple, no mass, no nipple discharge, no skin change and no tenderness.   Abdominal: Normal appearance.   Musculoskeletal: Normal range of motion.   Neurological: She is alert and oriented to person, place, and time.   Skin: Skin is warm and dry. No erythema. No pallor.     Exam done in the upright and supine position.     ASSESSMENT:   This is a 89 y.o. female without evidence of recurrence by exam, history or imaging.       PLAN:   1. Continue to follow up with Dr. Boykin and Hem Onc team    2. Continue monthly self breast exams and call the clinic with any changes or problems.  3. Will call with mammogram results once they become available. Mammogram in 1 year .  4. Return to clinic in 1 year .    The patient is in agreement with the plan. Questions were encouraged and answered to patient's satisfaction. Olamide" will call our office with any questions or concerns.

## 2022-12-21 ENCOUNTER — PES CALL (OUTPATIENT)
Dept: ADMINISTRATIVE | Facility: CLINIC | Age: 87
End: 2022-12-21
Payer: MEDICARE

## 2023-01-18 ENCOUNTER — OFFICE VISIT (OUTPATIENT)
Dept: INTERNAL MEDICINE | Facility: CLINIC | Age: 88
End: 2023-01-18
Payer: MEDICARE

## 2023-01-18 VITALS
WEIGHT: 116.38 LBS | HEIGHT: 65 IN | SYSTOLIC BLOOD PRESSURE: 136 MMHG | DIASTOLIC BLOOD PRESSURE: 62 MMHG | RESPIRATION RATE: 20 BRPM | HEART RATE: 87 BPM | TEMPERATURE: 98 F | OXYGEN SATURATION: 95 % | BODY MASS INDEX: 19.39 KG/M2

## 2023-01-18 DIAGNOSIS — Z78.0 POSTMENOPAUSAL: ICD-10-CM

## 2023-01-18 DIAGNOSIS — I10 HYPERTENSION, UNSPECIFIED TYPE: ICD-10-CM

## 2023-01-18 DIAGNOSIS — E03.9 HYPOTHYROIDISM, UNSPECIFIED TYPE: ICD-10-CM

## 2023-01-18 DIAGNOSIS — G31.84 MCI (MILD COGNITIVE IMPAIRMENT) WITH MEMORY LOSS: Primary | ICD-10-CM

## 2023-01-18 DIAGNOSIS — E21.3 HYPERPARATHYROIDISM: ICD-10-CM

## 2023-01-18 DIAGNOSIS — E78.00 PURE HYPERCHOLESTEROLEMIA: ICD-10-CM

## 2023-01-18 PROCEDURE — 1126F PR PAIN SEVERITY QUANTIFIED, NO PAIN PRESENT: ICD-10-PCS | Mod: HCNC,CPTII,S$GLB, | Performed by: HOSPITALIST

## 2023-01-18 PROCEDURE — 99499 RISK ADDL DX/OHS AUDIT: ICD-10-PCS | Mod: HCNC,S$GLB,, | Performed by: HOSPITALIST

## 2023-01-18 PROCEDURE — 1159F PR MEDICATION LIST DOCUMENTED IN MEDICAL RECORD: ICD-10-PCS | Mod: HCNC,CPTII,S$GLB, | Performed by: HOSPITALIST

## 2023-01-18 PROCEDURE — 3288F FALL RISK ASSESSMENT DOCD: CPT | Mod: HCNC,CPTII,S$GLB, | Performed by: HOSPITALIST

## 2023-01-18 PROCEDURE — 99999 PR PBB SHADOW E&M-EST. PATIENT-LVL V: CPT | Mod: PBBFAC,HCNC,, | Performed by: HOSPITALIST

## 2023-01-18 PROCEDURE — 99214 OFFICE O/P EST MOD 30 MIN: CPT | Mod: HCNC,S$GLB,, | Performed by: HOSPITALIST

## 2023-01-18 PROCEDURE — 3288F PR FALLS RISK ASSESSMENT DOCUMENTED: ICD-10-PCS | Mod: HCNC,CPTII,S$GLB, | Performed by: HOSPITALIST

## 2023-01-18 PROCEDURE — 1126F AMNT PAIN NOTED NONE PRSNT: CPT | Mod: HCNC,CPTII,S$GLB, | Performed by: HOSPITALIST

## 2023-01-18 PROCEDURE — 1160F PR REVIEW ALL MEDS BY PRESCRIBER/CLIN PHARMACIST DOCUMENTED: ICD-10-PCS | Mod: HCNC,CPTII,S$GLB, | Performed by: HOSPITALIST

## 2023-01-18 PROCEDURE — 1159F MED LIST DOCD IN RCRD: CPT | Mod: HCNC,CPTII,S$GLB, | Performed by: HOSPITALIST

## 2023-01-18 PROCEDURE — 1101F PT FALLS ASSESS-DOCD LE1/YR: CPT | Mod: HCNC,CPTII,S$GLB, | Performed by: HOSPITALIST

## 2023-01-18 PROCEDURE — 1160F RVW MEDS BY RX/DR IN RCRD: CPT | Mod: HCNC,CPTII,S$GLB, | Performed by: HOSPITALIST

## 2023-01-18 PROCEDURE — 99214 PR OFFICE/OUTPT VISIT, EST, LEVL IV, 30-39 MIN: ICD-10-PCS | Mod: HCNC,S$GLB,, | Performed by: HOSPITALIST

## 2023-01-18 PROCEDURE — 1101F PR PT FALLS ASSESS DOC 0-1 FALLS W/OUT INJ PAST YR: ICD-10-PCS | Mod: HCNC,CPTII,S$GLB, | Performed by: HOSPITALIST

## 2023-01-18 PROCEDURE — 99499 UNLISTED E&M SERVICE: CPT | Mod: HCNC,S$GLB,, | Performed by: HOSPITALIST

## 2023-01-18 PROCEDURE — 99999 PR PBB SHADOW E&M-EST. PATIENT-LVL V: ICD-10-PCS | Mod: PBBFAC,HCNC,, | Performed by: HOSPITALIST

## 2023-02-07 DIAGNOSIS — Z00.00 ENCOUNTER FOR MEDICARE ANNUAL WELLNESS EXAM: ICD-10-CM

## 2023-02-09 ENCOUNTER — APPOINTMENT (OUTPATIENT)
Dept: RADIOLOGY | Facility: CLINIC | Age: 88
End: 2023-02-09
Attending: HOSPITALIST
Payer: MEDICARE

## 2023-02-09 DIAGNOSIS — Z78.0 POSTMENOPAUSAL: ICD-10-CM

## 2023-02-09 DIAGNOSIS — Z00.00 ENCOUNTER FOR MEDICARE ANNUAL WELLNESS EXAM: ICD-10-CM

## 2023-02-09 PROCEDURE — 77080 DXA BONE DENSITY AXIAL: CPT | Mod: TC,HCNC,PO

## 2023-02-09 PROCEDURE — 77080 DXA BONE DENSITY AXIAL: CPT | Mod: 26,HCNC,, | Performed by: INTERNAL MEDICINE

## 2023-02-09 PROCEDURE — 77080 DEXA BONE DENSITY SPINE HIP: ICD-10-PCS | Mod: 26,HCNC,, | Performed by: INTERNAL MEDICINE

## 2023-02-13 ENCOUNTER — TELEPHONE (OUTPATIENT)
Dept: INTERNAL MEDICINE | Facility: CLINIC | Age: 88
End: 2023-02-13
Payer: MEDICARE

## 2023-02-13 ENCOUNTER — OFFICE VISIT (OUTPATIENT)
Dept: URGENT CARE | Facility: CLINIC | Age: 88
End: 2023-02-13
Payer: MEDICARE

## 2023-02-13 VITALS
DIASTOLIC BLOOD PRESSURE: 72 MMHG | TEMPERATURE: 98 F | BODY MASS INDEX: 18.66 KG/M2 | WEIGHT: 112 LBS | OXYGEN SATURATION: 95 % | HEIGHT: 65 IN | RESPIRATION RATE: 20 BRPM | HEART RATE: 83 BPM | SYSTOLIC BLOOD PRESSURE: 157 MMHG

## 2023-02-13 DIAGNOSIS — R30.0 DYSURIA: Primary | ICD-10-CM

## 2023-02-13 DIAGNOSIS — R39.9 UTI SYMPTOMS: Primary | ICD-10-CM

## 2023-02-13 LAB
BILIRUB UR QL STRIP: NEGATIVE
GLUCOSE UR QL STRIP: NEGATIVE
KETONES UR QL STRIP: NEGATIVE
LEUKOCYTE ESTERASE UR QL STRIP: NEGATIVE
PH, POC UA: 5 (ref 5–8)
POC BLOOD, URINE: NEGATIVE
POC NITRATES, URINE: NEGATIVE
PROT UR QL STRIP: NEGATIVE
SP GR UR STRIP: 1.02 (ref 1–1.03)
UROBILINOGEN UR STRIP-ACNC: NORMAL (ref 0.1–1.1)

## 2023-02-13 PROCEDURE — 99212 OFFICE O/P EST SF 10 MIN: CPT | Mod: S$GLB,,, | Performed by: FAMILY MEDICINE

## 2023-02-13 PROCEDURE — 1126F AMNT PAIN NOTED NONE PRSNT: CPT | Mod: CPTII,S$GLB,, | Performed by: FAMILY MEDICINE

## 2023-02-13 PROCEDURE — 1126F PR PAIN SEVERITY QUANTIFIED, NO PAIN PRESENT: ICD-10-PCS | Mod: CPTII,S$GLB,, | Performed by: FAMILY MEDICINE

## 2023-02-13 PROCEDURE — 81003 URINALYSIS AUTO W/O SCOPE: CPT | Mod: QW,S$GLB,, | Performed by: FAMILY MEDICINE

## 2023-02-13 PROCEDURE — 1159F PR MEDICATION LIST DOCUMENTED IN MEDICAL RECORD: ICD-10-PCS | Mod: CPTII,S$GLB,, | Performed by: FAMILY MEDICINE

## 2023-02-13 PROCEDURE — 81003 POCT URINALYSIS, DIPSTICK, AUTOMATED, W/O SCOPE: ICD-10-PCS | Mod: QW,S$GLB,, | Performed by: FAMILY MEDICINE

## 2023-02-13 PROCEDURE — 1159F MED LIST DOCD IN RCRD: CPT | Mod: CPTII,S$GLB,, | Performed by: FAMILY MEDICINE

## 2023-02-13 PROCEDURE — 99212 PR OFFICE/OUTPT VISIT, EST, LEVL II, 10-19 MIN: ICD-10-PCS | Mod: S$GLB,,, | Performed by: FAMILY MEDICINE

## 2023-02-13 NOTE — TELEPHONE ENCOUNTER
----- Message from Mao Ayala MA sent at 2/13/2023 10:23 AM CST -----  Regarding: Pt Called  Name of Who is Calling: DYLAN JOHANSEN         What is the request in detail: Pt is requesting a call back regarding her having frequent urination and weakness.       Can the clinic reply by [facility]: No        What [facility] Back [facility] [phone]

## 2023-02-14 NOTE — PROGRESS NOTES
"Subjective:       Patient ID: Olamide Felipe is a 89 y.o. female.    Vitals:  height is 5' 5" (1.651 m) and weight is 50.8 kg (112 lb). Her oral temperature is 98.2 °F (36.8 °C). Her blood pressure is 157/72 (abnormal) and her pulse is 83. Her respiration is 20 and oxygen saturation is 95%.     Chief Complaint: Urinary Tract Infection    This is a 89 y.o. female who presents today with a chief complaint of UTI symptoms that started on Saturday.      Urinary Tract Infection   This is a new problem. The current episode started in the past 7 days. The problem occurs every urination. The problem has been unchanged. The quality of the pain is described as aching. The pain is at a severity of 0/10. The patient is experiencing no pain. There has been no fever. She is Not sexually active. There is No history of pyelonephritis. Associated symptoms include frequency. She has tried acetaminophen and increased fluids for the symptoms. The treatment provided no relief. There is no history of kidney stones or recurrent UTIs.     Genitourinary:  Positive for frequency.     Objective:      Physical Exam   Constitutional: She is oriented to person, place, and time.   HENT:   Head: Normocephalic and atraumatic.   Ears:   Right Ear: Tympanic membrane, external ear and ear canal normal.   Left Ear: Tympanic membrane, external ear and ear canal normal.   Nose: No rhinorrhea or congestion.   Mouth/Throat: Mucous membranes are dry. Oropharynx is clear.   Eyes: Conjunctivae are normal. Pupils are equal, round, and reactive to light. Extraocular movement intact   Neck: Neck supple.   Cardiovascular: Normal rate, regular rhythm, normal heart sounds and normal pulses.   Pulmonary/Chest: Effort normal and breath sounds normal. She has no rhonchi.   Abdominal: Normal appearance and bowel sounds are normal. Soft. flat abdomen There is no left CVA tenderness and no right CVA tenderness.   Musculoskeletal: Normal range of motion.         " General: Normal range of motion.   Neurological: no focal deficit. She is alert, oriented to person, place, and time and at baseline.   Skin: Skin is warm and dry. Capillary refill takes less than 2 seconds.   Psychiatric: Her behavior is normal. Mood, judgment and thought content normal.   Vitals reviewed.      Assessment:Plan:     1. UTI symptoms  - POCT Urinalysis, Dipstick, Automated, W/O Scope   Normal results pt discharged  Will f/u prn

## 2023-02-15 ENCOUNTER — TELEPHONE (OUTPATIENT)
Dept: NEUROLOGY | Facility: CLINIC | Age: 88
End: 2023-02-15
Payer: MEDICARE

## 2023-02-20 ENCOUNTER — LAB VISIT (OUTPATIENT)
Dept: LAB | Facility: HOSPITAL | Age: 88
End: 2023-02-20
Attending: HOSPITALIST
Payer: MEDICARE

## 2023-02-20 ENCOUNTER — OFFICE VISIT (OUTPATIENT)
Dept: NEUROLOGY | Facility: CLINIC | Age: 88
End: 2023-02-20
Payer: MEDICARE

## 2023-02-20 VITALS
SYSTOLIC BLOOD PRESSURE: 162 MMHG | WEIGHT: 112 LBS | HEART RATE: 74 BPM | DIASTOLIC BLOOD PRESSURE: 72 MMHG | BODY MASS INDEX: 18.66 KG/M2 | HEIGHT: 65 IN

## 2023-02-20 DIAGNOSIS — G31.84 MCI (MILD COGNITIVE IMPAIRMENT) WITH MEMORY LOSS: Primary | ICD-10-CM

## 2023-02-20 DIAGNOSIS — E53.8 LOW FOLATE: ICD-10-CM

## 2023-02-20 DIAGNOSIS — G31.84 MCI (MILD COGNITIVE IMPAIRMENT) WITH MEMORY LOSS: ICD-10-CM

## 2023-02-20 DIAGNOSIS — R79.89 LOW VITAMIN B12 LEVEL: ICD-10-CM

## 2023-02-20 PROCEDURE — 1160F PR REVIEW ALL MEDS BY PRESCRIBER/CLIN PHARMACIST DOCUMENTED: ICD-10-PCS | Mod: HCNC,CPTII,S$GLB,

## 2023-02-20 PROCEDURE — 99999 PR PBB SHADOW E&M-EST. PATIENT-LVL III: ICD-10-PCS | Mod: PBBFAC,HCNC,,

## 2023-02-20 PROCEDURE — 36415 COLL VENOUS BLD VENIPUNCTURE: CPT | Mod: HCNC

## 2023-02-20 PROCEDURE — 1160F RVW MEDS BY RX/DR IN RCRD: CPT | Mod: HCNC,CPTII,S$GLB,

## 2023-02-20 PROCEDURE — 99205 OFFICE O/P NEW HI 60 MIN: CPT | Mod: HCNC,S$GLB,,

## 2023-02-20 PROCEDURE — 1159F PR MEDICATION LIST DOCUMENTED IN MEDICAL RECORD: ICD-10-PCS | Mod: HCNC,CPTII,S$GLB,

## 2023-02-20 PROCEDURE — 1159F MED LIST DOCD IN RCRD: CPT | Mod: HCNC,CPTII,S$GLB,

## 2023-02-20 PROCEDURE — 99999 PR PBB SHADOW E&M-EST. PATIENT-LVL III: CPT | Mod: PBBFAC,HCNC,,

## 2023-02-20 PROCEDURE — 82746 ASSAY OF FOLIC ACID SERUM: CPT | Mod: HCNC

## 2023-02-20 PROCEDURE — 82607 VITAMIN B-12: CPT | Mod: HCNC

## 2023-02-20 PROCEDURE — 1126F PR PAIN SEVERITY QUANTIFIED, NO PAIN PRESENT: ICD-10-PCS | Mod: HCNC,CPTII,S$GLB,

## 2023-02-20 PROCEDURE — 3288F PR FALLS RISK ASSESSMENT DOCUMENTED: ICD-10-PCS | Mod: HCNC,CPTII,S$GLB,

## 2023-02-20 PROCEDURE — 1100F PR PT FALLS ASSESS DOC 2+ FALLS/FALL W/INJURY/YR: ICD-10-PCS | Mod: HCNC,CPTII,S$GLB,

## 2023-02-20 PROCEDURE — 86592 SYPHILIS TEST NON-TREP QUAL: CPT | Mod: HCNC

## 2023-02-20 PROCEDURE — 3288F FALL RISK ASSESSMENT DOCD: CPT | Mod: HCNC,CPTII,S$GLB,

## 2023-02-20 PROCEDURE — 1126F AMNT PAIN NOTED NONE PRSNT: CPT | Mod: HCNC,CPTII,S$GLB,

## 2023-02-20 PROCEDURE — 99205 PR OFFICE/OUTPT VISIT, NEW, LEVL V, 60-74 MIN: ICD-10-PCS | Mod: HCNC,S$GLB,,

## 2023-02-20 PROCEDURE — 1100F PTFALLS ASSESS-DOCD GE2>/YR: CPT | Mod: HCNC,CPTII,S$GLB,

## 2023-02-20 RX ORDER — ACETAMINOPHEN 325 MG/1
325 TABLET ORAL EVERY 6 HOURS PRN
COMMUNITY

## 2023-02-20 RX ORDER — DONEPEZIL HYDROCHLORIDE 5 MG/1
5 TABLET, FILM COATED ORAL NIGHTLY
Qty: 30 TABLET | Refills: 11 | Status: SHIPPED | OUTPATIENT
Start: 2023-02-20 | End: 2023-10-19

## 2023-02-20 NOTE — PROGRESS NOTES
[facility] NEUROLOGY  [facility], [place]    Date: 2/20/23  Patient Name: Olamide Felipe   MRN: [id number]   PCP: Abiola Campbell  Referring Provider: Abiola Campbell MD    Chief Complaint: Mild cognitive impairment  Subjective:   Patient seen in consultation at the request of Abiola Campbell MD for the evaluation of memory/MCI. A copy of this note will be sent to the referring physician.        HPI:   Ms. Olamide Felipe is a 89 y.o. female presenting to clinic for a memory evaluation and is accompanied by her daughter. Patient has noted changes in her memory as of late in that she doesn't always remember things that she should, such as names of people. Her daughter notes moments of confusion, where she does not appear to know what she should be doing. Changes were noted at least a year ago but have worsened over that time. Endorses stressor in January 2022 as patient was diagnosed with breast cancer, undergoing a lumpectomy for management. Patient currently lives in an independent living center after moving here in 2019. There have been some limitations in socialization during the pandemic. Patient does note that they will be holding bingo activities. Patient can cook and perform all adl's independently without issue. Patient typically manages all of her finances and medications. Recently patient had forgotten the pin number to her debit card, which prompted attention from the family as this was atypical for her. No longer drives since October 2019. Denies any navigational difficulty as a passenger or at her residence. Denies any hallucinations, paranoia, or delusions. Denies any REM disturbances. Appetite intact, though patient does eat small portions. No significant family history of memory disorders. Her older brother (6 years older) may have had some memory changes after 90 years of age.      PAST MEDICAL HISTORY:  Past Medical History:   Diagnosis Date    Hyperlipidemia      Hypothyroidism     Malignant neoplasm of upper-outer quadrant of right breast in female, estrogen receptor negative 1/7/2022    Osteoarthritis, knee     Vertigo        PAST SURGICAL HISTORY:  Past Surgical History:   Procedure Laterality Date    dentures      FRACTURE SURGERY Left     fracture left wrist    HYSTERECTOMY      INJECTION FOR SENTINEL NODE IDENTIFICATION Right 1/7/2022    Procedure: INJECTION, FOR SENTINEL NODE IDENTIFICATION-Right;  Surgeon: Marci Mcintosh MD;  Location: [facility];  Service: General;  Laterality: Right;    left wrist surgery      MASTECTOMY, PARTIAL Right 1/7/2022    Procedure: MASTECTOMY, PARTIAL-Right with radiological marker;  Surgeon: Marci Mcintosh MD;  Location: [facility];  Service: General;  Laterality: Right;  REQUEST SAID 2 HOUR CASE    SENTINEL LYMPH NODE BIOPSY Right 1/7/2022    Procedure: BIOPSY, LYMPH NODE, SENTINEL-Right;  Surgeon: Marci Mcintosh MD;  Location: [facility];  Service: General;  Laterality: Right;       CURRENT MEDS:  Current Outpatient Medications   Medication Sig Dispense Refill    acetaminophen (TYLENOL) 325 MG tablet Take 325 mg by mouth every 6 (six) hours as needed for Pain.      b complex vitamins tablet Take 1 tablet by mouth once daily.      calcium-vitamin D 250-100 mg-unit per tablet Take 1 tablet by mouth 2 (two) times daily.      FLUoxetine 20 MG capsule TAKE 1 CAPSULE EVERY DAY 90 capsule 3    levothyroxine (SYNTHROID) 25 MCG tablet Take 1 tablet (25 mcg total) by mouth before breakfast. 90 tablet 3    losartan (COZAAR) 25 MG tablet Take 1 tablet (25 mg total) by mouth daily as needed (For Systolic Blood pressure higher than 150). 90 tablet 1    methocarbamoL (ROBAXIN) 500 MG Tab Take 1 tablet (500 mg total) by mouth 2 (two) times daily as needed (muscle spasm). 40 tablet 0    mv-min/iron/folic/calcium/vitK (WOMEN'S MULTIVITAMIN ORAL) Take by mouth once daily.      simvastatin (ZOCOR) 20 MG tablet Take 1 tablet (20 mg total) by mouth every evening. 90  "tablet 2    aspirin 81 MG Chew Take 81 mg by mouth once daily.      donepeziL (ARICEPT) 5 MG tablet Take 1 tablet (5 mg total) by mouth every evening. 30 tablet 11    omeprazole (PRILOSEC) 20 MG capsule TAKE 1 CAPSULE EVERY DAY (Patient not taking: Reported on 2023) 90 capsule 3     No current facility-administered medications for this visit.       ALLERGIES:  Review of patient's allergies indicates:  No Known Allergies    FAMILY HISTORY:  Family History   Problem Relation Age of Onset    No Known Problems Daughter     No Known Problems Son     No Known Problems Sister        SOCIAL HISTORY:  Social History     Tobacco Use    Smoking status: Former     Types: Cigarettes     Quit date: 1989     Years since quittin.7     Passive exposure: Never    Smokeless tobacco: Never   Substance Use Topics    Alcohol use: Yes     Alcohol/week: 1.0 standard drink     Types: 1 Glasses of wine per week     Comment: 1\3 glass ofd wine with dinner nightly    Drug use: Never       Review of Systems:  Gen: no fever, no chills, no generalized feeling of weakness   HEENT: no double vision, no blurred vision, no eye pain, no eye exudates. no nasal congestion,   no traumatic injury of head, no neck pain, no neck stiffness. no photophobia or phonophobia at this time. ?    Heart: no chest pain, no SOB    Lungs: no SOB, no cough    MSK: no weakness of legs, intact ROM    ABD: no abd pain, no N/V/D/C, no difficulty with defecation.    Extremities: No leg pain, no edema.       Objective:     Vitals:    23 1425   BP: (!) 162/72   BP Location: Left arm   Patient Position: Sitting   BP Method: Small (Automatic)   Pulse: 74   Weight: 50.8 kg (111 lb 15.9 oz)   Height: 5' 5" (1.651 m)       General: Female in NAD, alert and awake, Aox3, well groomed. ?    ? ?    HEENT: Head is NC/AT EOMI, pupil size: 4 mm B/L, no nystagmus noted; hearing grossly intact b/l. Mucous membrane moist, uvula midline, no pharyngeal erythema, exudates or " "discharges.      Neck: Supple. no nuchal rigidity.      Cardiovascular: well perfused, no cyanosis        Respiratory: Symmetric chest rise noted       Musculoskeletal: Muscle tone noted to be adequate for patient age, muscle mass is WNL. No spontaneous movements or fasciculations noted during this examination.       Extremities: No pedal edema or calf tenderness. No cogwheel rigidity noted on B/L UE extremities.       Neurological Examination.    Mental status: AA&O x3; Affect/mood is euthymic/congruent; no aphasia noted during examination. Patient answers simple questions appropriately & follows simple commands; no dysarthria or expressive aphasia; no christy-neglect or extinction. Vocabulary/word finding: excellent.       Cranial Nerves: II-XII grossly intact. visual fields intact to confrontation, EOMI, no nystagmus, PERRLA,?facial muscles symmetric and no facial droop noted, patient hearing is grossly intact b/l, ?facial sensation to sharp and light touch grossly intact B/L. Patient smiles, frowns, closes eyes ?forcefully uneventfully. Uvula midline and no difficulty with pronunciation. No SCM/trapezius/muscle of mastication weakness noted B/L. Patient has adequate control of tongue and may protrude it and move it adequately.       Muscle Function: Tone WNL and Muscle bulk WNL. Symmetric use of bilateral upper and lower extremities against gravity.     Gait: adequate casual gait with stride length and arm swing WNL.     MOCA: 18/30  Visuospatial/Executive 3/5, Naming 2/3, Attention 6/6/, Language 1/3, Abstraction 1/2, Delayed Recall 0/5, Orientation 5/6    Other:  12/5/22 MRI Brain without contrast  "Impression:  No acute intracranial abnormality.  Age-related cerebral atrophy with mild microangiopathic change"    Assessment:   Olamide Felipe is a 89 y.o. female presenting foe memory evaluation. MOCA is consistent with mild cognitive impairment. Will initiate lab work up for reversible causes of memory " changes. Patient retains significant independence and function regarding her daily activities. Will initiate memory protective regimen at this time with Donepezil 5 mg nightly. Discussed side effect profile of decreased appetite, orthostatic hypotension or bradycardia, which may present as dizziness. Pending tolerance, will plan to increase in the future. Patient may provide an update through chart message in a few weeks regarding tolerance. Will additionally plan to initiate memantine in the future in goal to optimize memory protective medication regimen.     Blood pressure elevated today. Recommend monitoring regularly and following up with PCP if it remains elevated.    Plan:     Problem List Items Addressed This Visit    None  Visit Diagnoses       MCI (mild cognitive impairment) with memory loss    -  Primary    Relevant Medications    donepeziL (ARICEPT) 5 MG tablet    Other Relevant Orders    Vitamin B12 (Completed)    Folate (Completed)    RPR    Low folate        Relevant Orders    Folate (Completed)    Low vitamin B12 level        Relevant Orders    Vitamin B12 (Completed)            - start Donepezil 5 mg nightly  - labs as above  - encourage cognitively stimulating activities including regular socialization, reading, puzzles  - encourage regular physical activity for good vascular and brain health  - encouraged tight control blood pressure, glucose, cholesterol   - encourage heart healthy diet    Follow up in 6 weeks or sooner as needed    I spent a total of 60 minutes on the day of the visit. This includes face to face time and non-face to face time preparing to see the patient (eg, review of tests), obtaining and/or reviewing separately obtained history, documenting clinical information in the electronic or other health record, independently interpreting results and communicating results to the patient/family/caregiver, or care coordinator. Thomas Maria DO was available in clinic throughout this  encounter.     Margot Gonzalez PA-C

## 2023-02-21 LAB
FOLATE SERPL-MCNC: 17.5 NG/ML (ref 4–24)
RPR SER QL: NORMAL
VIT B12 SERPL-MCNC: 498 PG/ML (ref 210–950)

## 2023-02-25 ENCOUNTER — OFFICE VISIT (OUTPATIENT)
Dept: URGENT CARE | Facility: CLINIC | Age: 88
End: 2023-02-25
Payer: MEDICARE

## 2023-02-25 VITALS
OXYGEN SATURATION: 95 % | TEMPERATURE: 98 F | SYSTOLIC BLOOD PRESSURE: 167 MMHG | WEIGHT: 111 LBS | RESPIRATION RATE: 18 BRPM | BODY MASS INDEX: 18.49 KG/M2 | DIASTOLIC BLOOD PRESSURE: 56 MMHG | HEART RATE: 68 BPM | HEIGHT: 65 IN

## 2023-02-25 DIAGNOSIS — R42 DIZZINESS: ICD-10-CM

## 2023-02-25 DIAGNOSIS — J30.1 SEASONAL ALLERGIC RHINITIS DUE TO POLLEN: ICD-10-CM

## 2023-02-25 DIAGNOSIS — R05.9 COUGH, UNSPECIFIED TYPE: Primary | ICD-10-CM

## 2023-02-25 LAB
CTP QC/QA: YES
SARS-COV-2 AG RESP QL IA.RAPID: NEGATIVE

## 2023-02-25 PROCEDURE — 87811 SARS CORONAVIRUS 2 ANTIGEN POCT, MANUAL READ: ICD-10-PCS | Mod: QW,S$GLB,, | Performed by: FAMILY MEDICINE

## 2023-02-25 PROCEDURE — 87811 SARS-COV-2 COVID19 W/OPTIC: CPT | Mod: QW,S$GLB,, | Performed by: FAMILY MEDICINE

## 2023-02-25 PROCEDURE — 99213 OFFICE O/P EST LOW 20 MIN: CPT | Mod: S$GLB,,, | Performed by: FAMILY MEDICINE

## 2023-02-25 PROCEDURE — 1125F AMNT PAIN NOTED PAIN PRSNT: CPT | Mod: CPTII,S$GLB,, | Performed by: FAMILY MEDICINE

## 2023-02-25 PROCEDURE — 1159F PR MEDICATION LIST DOCUMENTED IN MEDICAL RECORD: ICD-10-PCS | Mod: CPTII,S$GLB,, | Performed by: FAMILY MEDICINE

## 2023-02-25 PROCEDURE — 1159F MED LIST DOCD IN RCRD: CPT | Mod: CPTII,S$GLB,, | Performed by: FAMILY MEDICINE

## 2023-02-25 PROCEDURE — 1125F PR PAIN SEVERITY QUANTIFIED, PAIN PRESENT: ICD-10-PCS | Mod: CPTII,S$GLB,, | Performed by: FAMILY MEDICINE

## 2023-02-25 PROCEDURE — 99213 PR OFFICE/OUTPT VISIT, EST, LEVL III, 20-29 MIN: ICD-10-PCS | Mod: S$GLB,,, | Performed by: FAMILY MEDICINE

## 2023-02-25 RX ORDER — MECLIZINE HYDROCHLORIDE 25 MG/1
25 TABLET ORAL 3 TIMES DAILY PRN
Qty: 20 TABLET | Refills: 0 | Status: SHIPPED | OUTPATIENT
Start: 2023-02-25 | End: 2023-10-31

## 2023-02-25 RX ORDER — LORATADINE 10 MG/1
10 TABLET ORAL DAILY
Qty: 30 TABLET | Refills: 2 | Status: SHIPPED | OUTPATIENT
Start: 2023-02-25 | End: 2023-06-08 | Stop reason: SDUPTHER

## 2023-02-25 NOTE — PROGRESS NOTES
"Subjective:       Patient ID: Olamide Felipe is a 89 y.o. female.    Vitals:  height is 5' 5" (1.651 m) and weight is 50.3 kg (111 lb). Her oral temperature is 97.6 °F (36.4 °C). Her blood pressure is 167/56 (abnormal) and her pulse is 68. Her respiration is 18 and oxygen saturation is 95%.     Chief Complaint: Dizziness    89 yr old female came in with complaints of dizziness. Her symptoms started Thursday.now with less dizziness, but having some cough  Deies n/v, or headache, denies irregular heart beat or blurred vision or weakness    No fever    Dizziness:   Chronicity:  New  Onset:  In the past 7 days  Progression since onset:  Gradually worsening  Frequency:  Constantly  Pain Scale:  5/10   Associated symptoms: weakness.no hearing loss, no ear congestion, no ear pain, no fever, no headaches, no tinnitus, no nausea, no vomiting, no diaphoresis, no aural fullness, no visual disturbances, no light-headedness, no syncope, no palpitations, no panic, no facial weakness, no slurred speech, no numbness in extremities and no chest pain.  Aggravated by:  Nothing  Treatments tried:  Nothing    Constitution: Negative for sweating and fever.   HENT:  Negative for ear pain, tinnitus and hearing loss.    Cardiovascular:  Negative for chest pain, palpitations and passing out.   Gastrointestinal:  Negative for nausea and vomiting.   Neurological:  Positive for dizziness. Negative for light-headedness and headaches.     Objective:      Physical Exam   Constitutional: She is oriented to person, place, and time. She appears well-developed. She is cooperative.  Non-toxic appearance. She does not appear ill. No distress. normal  HENT:   Head: Normocephalic and atraumatic.   Ears:   Right Ear: Hearing, external ear and ear canal normal.   Left Ear: Hearing, external ear and ear canal normal.      Comments: TMS fluid, no erythema    Nose: Nose normal. No mucosal edema or nasal deformity. No epistaxis. Right sinus exhibits no " maxillary sinus tenderness and no frontal sinus tenderness. Left sinus exhibits no maxillary sinus tenderness and no frontal sinus tenderness.   Mouth/Throat: Uvula is midline, oropharynx is clear and moist and mucous membranes are normal. Mucous membranes are moist. No trismus in the jaw. Normal dentition. No uvula swelling. No oropharyngeal exudate or posterior oropharyngeal erythema. Oropharynx is clear.   Eyes: Conjunctivae and lids are normal. Pupils are equal, round, and reactive to light. Extraocular movement intact   Neck: Trachea normal and phonation normal. Neck supple.   Cardiovascular: Normal rate, regular rhythm, normal heart sounds and normal pulses.   Pulmonary/Chest: Effort normal and breath sounds normal.   Abdominal: Normal appearance and bowel sounds are normal. Soft.   Musculoskeletal: Normal range of motion.         General: Normal range of motion.   Neurological: no focal deficit. She is alert, oriented to person, place, and time and at baseline. She exhibits normal muscle tone.   Skin: Skin is warm, dry, intact and not diaphoretic.   Psychiatric: Her speech is normal and behavior is normal. Judgment and thought content normal.   Nursing note and vitals reviewed.      Assessment:       1. Cough, unspecified type          Plan:         Cough, unspecified type  -     SARS Coronavirus 2 Antigen, POCT Manual Read            Results for orders placed or performed in visit on 02/25/23   SARS Coronavirus 2 Antigen, POCT Manual Read   Result Value Ref Range    SARS Coronavirus 2 Antigen Negative Negative     Acceptable Yes

## 2023-02-27 ENCOUNTER — TELEPHONE (OUTPATIENT)
Dept: AUDIOLOGY | Facility: CLINIC | Age: 88
End: 2023-02-27
Payer: MEDICARE

## 2023-02-27 ENCOUNTER — OFFICE VISIT (OUTPATIENT)
Dept: ENDOCRINOLOGY | Facility: CLINIC | Age: 88
End: 2023-02-27
Payer: MEDICARE

## 2023-02-27 VITALS
OXYGEN SATURATION: 99 % | HEIGHT: 65 IN | HEART RATE: 76 BPM | WEIGHT: 115.44 LBS | DIASTOLIC BLOOD PRESSURE: 62 MMHG | BODY MASS INDEX: 19.23 KG/M2 | SYSTOLIC BLOOD PRESSURE: 134 MMHG

## 2023-02-27 DIAGNOSIS — R29.6 RECURRENT FALLS: ICD-10-CM

## 2023-02-27 DIAGNOSIS — H90.3 SENSORINEURAL HEARING LOSS, BILATERAL: Primary | ICD-10-CM

## 2023-02-27 DIAGNOSIS — E21.3 HYPERPARATHYROIDISM: Primary | ICD-10-CM

## 2023-02-27 DIAGNOSIS — E03.9 HYPOTHYROIDISM, UNSPECIFIED TYPE: ICD-10-CM

## 2023-02-27 PROCEDURE — 1101F PR PT FALLS ASSESS DOC 0-1 FALLS W/OUT INJ PAST YR: ICD-10-PCS | Mod: HCNC,CPTII,GC,S$GLB | Performed by: STUDENT IN AN ORGANIZED HEALTH CARE EDUCATION/TRAINING PROGRAM

## 2023-02-27 PROCEDURE — 99204 PR OFFICE/OUTPT VISIT, NEW, LEVL IV, 45-59 MIN: ICD-10-PCS | Mod: HCNC,GC,S$GLB, | Performed by: STUDENT IN AN ORGANIZED HEALTH CARE EDUCATION/TRAINING PROGRAM

## 2023-02-27 PROCEDURE — 1159F PR MEDICATION LIST DOCUMENTED IN MEDICAL RECORD: ICD-10-PCS | Mod: HCNC,CPTII,GC,S$GLB | Performed by: STUDENT IN AN ORGANIZED HEALTH CARE EDUCATION/TRAINING PROGRAM

## 2023-02-27 PROCEDURE — 1126F PR PAIN SEVERITY QUANTIFIED, NO PAIN PRESENT: ICD-10-PCS | Mod: HCNC,CPTII,GC,S$GLB | Performed by: STUDENT IN AN ORGANIZED HEALTH CARE EDUCATION/TRAINING PROGRAM

## 2023-02-27 PROCEDURE — 1101F PT FALLS ASSESS-DOCD LE1/YR: CPT | Mod: HCNC,CPTII,GC,S$GLB | Performed by: STUDENT IN AN ORGANIZED HEALTH CARE EDUCATION/TRAINING PROGRAM

## 2023-02-27 PROCEDURE — 1159F MED LIST DOCD IN RCRD: CPT | Mod: HCNC,CPTII,GC,S$GLB | Performed by: STUDENT IN AN ORGANIZED HEALTH CARE EDUCATION/TRAINING PROGRAM

## 2023-02-27 PROCEDURE — 99204 OFFICE O/P NEW MOD 45 MIN: CPT | Mod: HCNC,GC,S$GLB, | Performed by: STUDENT IN AN ORGANIZED HEALTH CARE EDUCATION/TRAINING PROGRAM

## 2023-02-27 PROCEDURE — 1160F RVW MEDS BY RX/DR IN RCRD: CPT | Mod: HCNC,CPTII,GC,S$GLB | Performed by: STUDENT IN AN ORGANIZED HEALTH CARE EDUCATION/TRAINING PROGRAM

## 2023-02-27 PROCEDURE — 1160F PR REVIEW ALL MEDS BY PRESCRIBER/CLIN PHARMACIST DOCUMENTED: ICD-10-PCS | Mod: HCNC,CPTII,GC,S$GLB | Performed by: STUDENT IN AN ORGANIZED HEALTH CARE EDUCATION/TRAINING PROGRAM

## 2023-02-27 PROCEDURE — 1126F AMNT PAIN NOTED NONE PRSNT: CPT | Mod: HCNC,CPTII,GC,S$GLB | Performed by: STUDENT IN AN ORGANIZED HEALTH CARE EDUCATION/TRAINING PROGRAM

## 2023-02-27 PROCEDURE — 3288F PR FALLS RISK ASSESSMENT DOCUMENTED: ICD-10-PCS | Mod: HCNC,CPTII,GC,S$GLB | Performed by: STUDENT IN AN ORGANIZED HEALTH CARE EDUCATION/TRAINING PROGRAM

## 2023-02-27 PROCEDURE — 3288F FALL RISK ASSESSMENT DOCD: CPT | Mod: HCNC,CPTII,GC,S$GLB | Performed by: STUDENT IN AN ORGANIZED HEALTH CARE EDUCATION/TRAINING PROGRAM

## 2023-02-27 PROCEDURE — 99999 PR PBB SHADOW E&M-EST. PATIENT-LVL IV: CPT | Mod: PBBFAC,HCNC,GC, | Performed by: STUDENT IN AN ORGANIZED HEALTH CARE EDUCATION/TRAINING PROGRAM

## 2023-02-27 PROCEDURE — 99999 PR PBB SHADOW E&M-EST. PATIENT-LVL IV: ICD-10-PCS | Mod: PBBFAC,HCNC,GC, | Performed by: STUDENT IN AN ORGANIZED HEALTH CARE EDUCATION/TRAINING PROGRAM

## 2023-02-27 NOTE — PROGRESS NOTES
Subjective:      Chief Complaint: Hyperparathyroidism     HPI: Olamide Felipe is a 89 y.o. female who is here for an initial evaluation for Hyperparathyroidism     Regarding Hyperparathyroidism:  - Daily intake of calcium and vitamin D: yes (does not remember the doses)  - Taking lithium or hydrochlorothiazide: no   - Patient denies neuro symptoms, depression, mental fog, anxiety, polyuria, polydipsia, nausea/vomiting, constipation, history of pancreatitis, GERD with frequent Tums, muscle weakness, bone pain, fatigue  - Patient denies fractures/osteoporosis, greater than 2 inches of height loss, kidney stones         Latest Reference Range & Units 07/01/19 07:20 09/09/19 11:30 11/12/19 10:29 05/13/20 12:02 07/10/20 09:23 07/07/21 11:15 12/21/21 11:22 07/18/22 09:12   eGFR if non African American >60 mL/min/1.73 m^2 >60.0 >60.0 58.1 ! >60 >60.0 >60.0 >60.0 56.9 !   Calcium 8.7 - 10.5 mg/dL 9.5 9.5 10.2 9.8 9.7 10.0 10.0 9.8   Albumin 3.5 - 5.2 g/dL 3.7 4.0 4.0 4.1 4.1 4.1 3.9 4.1   Vit D, 25-Hydroxy 30 - 96 ng/mL     31 31  37   PTH 9.0 - 77.0 pg/mL 97.0 (H)  81.0 (H)  90.0 (H) 99.0 (H)  100.8 (H)       DXA Scan 2.9.23    FINDINGS:  Lumbar spine (L1-L4): BMD is 1.084 g/cm2, T-score is 0.3, and Z-score is 3.2.  Total hip: BMD is 0.795 g/cm2, T-score is -1.2, and Z-score is 1.1.  Femoral neck: BMD is 0.740 g/cm2, T-score is -1.0, and Z-score is 1.5.      FRAX:  7.2% risk of a major osteoporotic fracture in the next 10 years.  2.1% risk of hip fracture in the next 10 years.    Impression:  *Low bone mass (Osteopenia); FRAX calculations do not support treatment as osteoporosis.  *Compared with previous DXA, BMD at the lumbar spine has increased by 4.3%, and BMD at the total hip has declined by -4%.    Reviewed past medical, family, social history and updated as appropriate.    Review of Systems as above    Objective:     Vitals:    02/27/23 0910   BP: 134/62   Pulse: 76     BP Readings from Last 5 Encounters:    02/27/23 134/62   02/25/23 (!) 167/56   02/20/23 (!) 162/72   02/13/23 (!) 157/72   01/18/23 136/62       Physical Exam  HENT:      Head: Normocephalic and atraumatic.      Right Ear: External ear normal.      Left Ear: External ear normal.      Nose: Nose normal.   Eyes:      Extraocular Movements: Extraocular movements intact.   Cardiovascular:      Rate and Rhythm: Normal rate.   Pulmonary:      Effort: Pulmonary effort is normal.   Musculoskeletal:         General: Normal range of motion.   Neurological:      General: No focal deficit present.      Mental Status: She is alert.   Psychiatric:         Mood and Affect: Mood normal.       Wt Readings from Last 10 Encounters:   02/27/23 0910 52.3 kg (115 lb 6.6 oz)   02/25/23 1459 50.3 kg (111 lb)   02/20/23 1425 50.8 kg (111 lb 15.9 oz)   02/13/23 1810 50.8 kg (112 lb)   01/18/23 1349 52.8 kg (116 lb 6.5 oz)   12/15/22 1432 52.6 kg (116 lb)   11/23/22 1011 53.8 kg (118 lb 9.7 oz)   11/18/22 1032 53.4 kg (117 lb 11.6 oz)   11/01/22 1038 53.1 kg (117 lb)   10/18/22 1048 53.4 kg (117 lb 11.6 oz)       No results found for: HGBA1C  Lab Results   Component Value Date    CHOL 198 07/18/2022    HDL 67 07/18/2022    LDLCALC 109.6 07/18/2022    TRIG 107 07/18/2022    CHOLHDL 33.8 07/18/2022     Lab Results   Component Value Date     07/18/2022    K 4.3 07/18/2022     07/18/2022    CO2 25 07/18/2022    GLU 92 07/18/2022    BUN 18 07/18/2022    CREATININE 0.9 07/18/2022    CALCIUM 9.8 07/18/2022    PROT 6.4 07/18/2022    ALBUMIN 4.1 07/18/2022    BILITOT 0.8 07/18/2022    ALKPHOS 68 07/18/2022    AST 21 07/18/2022    ALT 14 07/18/2022    ANIONGAP 9 07/18/2022    ESTGFRAFRICA >60.0 07/18/2022    EGFRNONAA 56.9 (A) 07/18/2022    TSH 2.765 07/18/2022      No results found for: MICALBCREAT    Assessment/Plan:     Hyperparathyroidism  - Elevated PTH since 2019 in the setting of normocalcemia.   - Will continue to monitor calcium levels as the patient does not meet  criteria for treatment currently  - Reviewed bone mineral density scan showing osteopenia, has no history of vertebral fractures  - However, if the patient develops hypercalemia or has osteoporosis, will recommend initiating prolia in the future  - Fall precautions discussed  - Advised to continue Vitamin D supplementation   - Advised to continue daily walk    Hypothyroid  - Clinically and biochemically euthyroid  - Continue current dose of 25 mcg daily levothyroxine on empty stomach  - Avoid exogenous hyperthyroidism as this can accelerate bone loss and increase risk of CV complications.    Recurrent falls  - no recent falls or fractures reported        Dr. Wyatt Vee  [facility], [address]th [address]  [address] [place], [place], [address]    Office: [phone]  Fax: [phone]    The above history labs imaging impression and plan were discussed with attending physician who is in agreement and also took part in this patient's care.  I personally reviewed all of the patients available medications, labs, imaging, vitals, allergies, medical history.

## 2023-02-27 NOTE — ASSESSMENT & PLAN NOTE
- Elevated PTH since 2019 in the setting of normocalcemia.   - Will continue to monitor calcium levels as the patient does not meet criteria for treatment currently  - Reviewed bone mineral density scan showing osteopenia, has no history of vertebral fractures  - However, if the patient develops hypercalemia or has osteoporosis, will recommend initiating prolia in the future  - Fall precautions discussed  - Advised to continue Vitamin D supplementation   - Advised to continue daily walk

## 2023-02-27 NOTE — ASSESSMENT & PLAN NOTE
- Clinically and biochemically euthyroid  - Continue current dose of 25 mcg daily levothyroxine on empty stomach  - Avoid exogenous hyperthyroidism as this can accelerate bone loss and increase risk of CV complications.

## 2023-03-03 ENCOUNTER — CLINICAL SUPPORT (OUTPATIENT)
Dept: AUDIOLOGY | Facility: CLINIC | Age: 88
End: 2023-03-03
Payer: MEDICARE

## 2023-03-03 DIAGNOSIS — H90.3 SENSORINEURAL HEARING LOSS, BILATERAL: Primary | ICD-10-CM

## 2023-03-03 DIAGNOSIS — R42 DIZZINESS: ICD-10-CM

## 2023-03-03 PROCEDURE — 99499 UNLISTED E&M SERVICE: CPT | Mod: HCNC,S$GLB,,

## 2023-03-03 PROCEDURE — 99999 PR PBB SHADOW E&M-EST. PATIENT-LVL I: CPT | Mod: PBBFAC,HCNC,,

## 2023-03-03 PROCEDURE — 99499 NO LOS: ICD-10-PCS | Mod: HCNC,S$GLB,,

## 2023-03-03 PROCEDURE — 99999 PR PBB SHADOW E&M-EST. PATIENT-LVL I: ICD-10-PCS | Mod: PBBFAC,HCNC,,

## 2023-03-03 NOTE — Clinical Note
Good afternoon Dr. Campbell,   Here are the results from Ms. Felipe's hearing evaluation. Please let me know if you need any further information or have any questions. Thanks!  Luis Hanna

## 2023-03-03 NOTE — PROGRESS NOTES
Olamide Felipe, a 89 y.o. female, was seen today in the clinic for an audiologic evaluation.  The patient has a history of a bilateral sensorineural hearing loss (SNHL) that is worse in the left ear.  Ms. Felipe reported she has not been hearing well.  She also reported she started to experience a shaky dizziness about a month ago.  Her daughter reported she went to Urgent Care approximately one week ago and was diagnosed with fluid in both ears.  There were no reports of otalgia, tinnitus, or aural fullness.    Tympanometry revealed Type A in the right ear and Type A in the left ear.  Audiogram results revealed a moderate sloping to severe SNHL in the right ear and severe SNHL in the left ear.  Speech reception thresholds were noted at 45 dB in the right ear and 75 dB in the left ear.  Speech discrimination scores were 56% in the right ear and 36% in the left ear.  There is an asymmetry between ears.  Ms. Felipe's hearing has decreased in both ears since her last hearing evaluation on 3/16/2022.    Recommendations:  Otologic evaluation  Hearing protection when in noise  Annual audiogram or sooner if change in hearing is perceived  Annual hearing aid follow-up or sooner if needed

## 2023-03-03 NOTE — PROGRESS NOTES
HEARING AID FOLLOW-UP  Olamide Felipe, a 89 y.o. female, was seen today for a hearing aid cleaning and check.  The patient reported that she has not been hearing well.  Her hearing aids were cleaned and checked.  Listening check confirmed the hearing aids were working well.  Small power domes were placed on Ms. Morales hearing aids.  The hearing aids were connected to the software via [employer].  The firmware was updated for both hearing aids.  The new audiogram from today was put into the software and the end piece in the software was changed to a small power dome for each aid.  The feedback test was run.  Ms. Morales stated she was hearing better but it was too loud.  The gain was brought down to target in both ears.  Ms. Felipe reported she was hearing a lot better and was happy with the sound quality.     Recommendations:  Continue use of binaural amplification   Hearing protection when in noise   Annual audiogram or sooner if change in hearing is perceived  Annual hearing aid follow-up or sooner if needed    Hearing Aid Details  : Powerspan   Model:  [employer] M-50  Type:  EMILY  Color: Champagne  Battery: Rechargeable  RT SN [id number]  LT SN [id number]   Dome: Small power domes  : 2S  Warranty expiration:  12/23/22  L and D expiration:  12/23/22

## 2023-03-05 NOTE — PROGRESS NOTES
I have reviewed and concur with Dr. Vee's history, physical, assessment, and plan.  I have personally interviewed and examined the patient.    Mild PTH elevation in setting of normocalcemia and no other treatment indications. Does not meet treatment criteria for osteoporosis but would continue to monitor BMD and if further decline start treatment with Prolia which would be best option in setting of possible hyperparathyroidism    Catie Arnold MD

## 2023-03-06 ENCOUNTER — OFFICE VISIT (OUTPATIENT)
Dept: INTERNAL MEDICINE | Facility: CLINIC | Age: 88
End: 2023-03-06
Payer: MEDICARE

## 2023-03-06 VITALS
HEART RATE: 67 BPM | DIASTOLIC BLOOD PRESSURE: 58 MMHG | SYSTOLIC BLOOD PRESSURE: 106 MMHG | WEIGHT: 114.19 LBS | OXYGEN SATURATION: 97 % | BODY MASS INDEX: 19 KG/M2

## 2023-03-06 DIAGNOSIS — E21.3 HYPERPARATHYROIDISM: ICD-10-CM

## 2023-03-06 DIAGNOSIS — Z00.00 ENCOUNTER FOR MEDICARE ANNUAL WELLNESS EXAM: ICD-10-CM

## 2023-03-06 DIAGNOSIS — R26.9 ABNORMALITY OF GAIT AND MOBILITY: ICD-10-CM

## 2023-03-06 DIAGNOSIS — Z85.3 HISTORY OF BREAST CANCER: ICD-10-CM

## 2023-03-06 DIAGNOSIS — Z00.00 ENCOUNTER FOR PREVENTIVE HEALTH EXAMINATION: Primary | ICD-10-CM

## 2023-03-06 DIAGNOSIS — I70.0 AORTIC ATHEROSCLEROSIS: ICD-10-CM

## 2023-03-06 DIAGNOSIS — F39 MOOD DISORDER: ICD-10-CM

## 2023-03-06 DIAGNOSIS — G31.84 MCI (MILD COGNITIVE IMPAIRMENT): ICD-10-CM

## 2023-03-06 DIAGNOSIS — E03.9 HYPOTHYROIDISM, UNSPECIFIED TYPE: ICD-10-CM

## 2023-03-06 DIAGNOSIS — E78.5 HYPERLIPIDEMIA, UNSPECIFIED HYPERLIPIDEMIA TYPE: ICD-10-CM

## 2023-03-06 DIAGNOSIS — I15.9 SECONDARY HYPERTENSION: ICD-10-CM

## 2023-03-06 PROBLEM — R29.898 DECREASED RANGE OF MOTION OF NECK: Status: RESOLVED | Noted: 2022-07-22 | Resolved: 2023-03-06

## 2023-03-06 PROBLEM — R29.898 DECREASED STRENGTH OF UPPER EXTREMITY: Status: RESOLVED | Noted: 2022-07-22 | Resolved: 2023-03-06

## 2023-03-06 PROBLEM — R39.9 UTI SYMPTOMS: Status: RESOLVED | Noted: 2023-02-13 | Resolved: 2023-03-06

## 2023-03-06 PROBLEM — R29.3 POSTURE ABNORMALITY: Status: RESOLVED | Noted: 2022-07-22 | Resolved: 2023-03-06

## 2023-03-06 PROCEDURE — 99999 PR PBB SHADOW E&M-EST. PATIENT-LVL IV: ICD-10-PCS | Mod: PBBFAC,HCNC,, | Performed by: NURSE PRACTITIONER

## 2023-03-06 PROCEDURE — 3288F PR FALLS RISK ASSESSMENT DOCUMENTED: ICD-10-PCS | Mod: HCNC,CPTII,S$GLB, | Performed by: NURSE PRACTITIONER

## 2023-03-06 PROCEDURE — 1159F PR MEDICATION LIST DOCUMENTED IN MEDICAL RECORD: ICD-10-PCS | Mod: HCNC,CPTII,S$GLB, | Performed by: NURSE PRACTITIONER

## 2023-03-06 PROCEDURE — 1101F PT FALLS ASSESS-DOCD LE1/YR: CPT | Mod: HCNC,CPTII,S$GLB, | Performed by: NURSE PRACTITIONER

## 2023-03-06 PROCEDURE — 1126F PR PAIN SEVERITY QUANTIFIED, NO PAIN PRESENT: ICD-10-PCS | Mod: HCNC,CPTII,S$GLB, | Performed by: NURSE PRACTITIONER

## 2023-03-06 PROCEDURE — 3288F FALL RISK ASSESSMENT DOCD: CPT | Mod: HCNC,CPTII,S$GLB, | Performed by: NURSE PRACTITIONER

## 2023-03-06 PROCEDURE — 1101F PR PT FALLS ASSESS DOC 0-1 FALLS W/OUT INJ PAST YR: ICD-10-PCS | Mod: HCNC,CPTII,S$GLB, | Performed by: NURSE PRACTITIONER

## 2023-03-06 PROCEDURE — 1126F AMNT PAIN NOTED NONE PRSNT: CPT | Mod: HCNC,CPTII,S$GLB, | Performed by: NURSE PRACTITIONER

## 2023-03-06 PROCEDURE — 1170F FXNL STATUS ASSESSED: CPT | Mod: HCNC,CPTII,S$GLB, | Performed by: NURSE PRACTITIONER

## 2023-03-06 PROCEDURE — G0439 PPPS, SUBSEQ VISIT: HCPCS | Mod: HCNC,S$GLB,, | Performed by: NURSE PRACTITIONER

## 2023-03-06 PROCEDURE — 1160F PR REVIEW ALL MEDS BY PRESCRIBER/CLIN PHARMACIST DOCUMENTED: ICD-10-PCS | Mod: HCNC,CPTII,S$GLB, | Performed by: NURSE PRACTITIONER

## 2023-03-06 PROCEDURE — G0439 PR MEDICARE ANNUAL WELLNESS SUBSEQUENT VISIT: ICD-10-PCS | Mod: HCNC,S$GLB,, | Performed by: NURSE PRACTITIONER

## 2023-03-06 PROCEDURE — 1159F MED LIST DOCD IN RCRD: CPT | Mod: HCNC,CPTII,S$GLB, | Performed by: NURSE PRACTITIONER

## 2023-03-06 PROCEDURE — 99999 PR PBB SHADOW E&M-EST. PATIENT-LVL IV: CPT | Mod: PBBFAC,HCNC,, | Performed by: NURSE PRACTITIONER

## 2023-03-06 PROCEDURE — 1170F PR FUNCTIONAL STATUS ASSESSED: ICD-10-PCS | Mod: HCNC,CPTII,S$GLB, | Performed by: NURSE PRACTITIONER

## 2023-03-06 PROCEDURE — 1160F RVW MEDS BY RX/DR IN RCRD: CPT | Mod: HCNC,CPTII,S$GLB, | Performed by: NURSE PRACTITIONER

## 2023-03-06 NOTE — PROGRESS NOTES
Olamide Felipe presented for a  Medicare AWV and comprehensive Health Risk Assessment today. The following components were reviewed and updated:    Medical history  Family History  Social history  Allergies and Current Medications  Health Risk Assessment  Health Maintenance  Care Team         ** See Completed Assessments for Annual Wellness Visit within the encounter summary.**         The following assessments were completed:  Living Situation  CAGE  Depression Screening  Timed Get Up and Go  Whisper Test  Cognitive Function Screening - MCI  Nutrition Screening  ADL Screening  PAQ Screening  OPIOID Screening: Patient does not have a prescription for narcotics. Patient does not use substance         Vitals:    03/06/23 1448 03/06/23 1503   BP:  (!) 106/58   BP Location:  Left arm   Pulse:  67   SpO2:  97%   Weight: 51.8 kg (114 lb 3.2 oz)      Body mass index is 19 kg/m².  Physical Exam  Vitals and nursing note reviewed.   Constitutional:       Appearance: She is well-developed.   HENT:      Head: Normocephalic.   Cardiovascular:      Rate and Rhythm: Normal rate and regular rhythm.   Pulmonary:      Effort: Pulmonary effort is normal.      Breath sounds: Normal breath sounds.   Abdominal:      General: Bowel sounds are normal.      Palpations: Abdomen is soft.   Musculoskeletal:         General: Normal range of motion.   Skin:     General: Skin is warm and dry.   Neurological:      Mental Status: She is alert and oriented to person, place, and time.      Motor: No abnormal muscle tone.             Diagnoses and health risks identified today and associated recommendations/orders:    1. Encounter for preventive health examination  Here for Health Risk Assessment/Annual Wellness Visit.  Health maintenance reviewed and updated. Follow up in one year.   Discussed Covid booster.    2. Secondary hypertension  Chronic, stable on current PRN medications. Followed by PCP.     3. Aortic atherosclerosis  Chronic, stable on  current medications. Noted Thoracic/Lumbar Spine 7/15/22. Followed by PCP.     4. Hyperlipidemia, unspecified hyperlipidemia type  Chronic, stable on current medication. Followed by PCP.     5. Mood disorder  Chronic, stable on current medication. PHQ-2 score 0. Followed by PCP.     6. MCI (mild cognitive impairment)  New onset. Donepezil initiated per Neurology. Followed by PCP, Neurology    7. History of breast cancer  Followed by Oncology, PCP.    8. Hyperparathyroidism  Chronic, stable. Followed by PCP, Endocrinology.    9. Hypothyroidism, unspecified type  Chronic, stable on current medication. Followed by PCP.     10. Abnormality of gait and mobility  Chronic, reports no recent falls, no assistive device for ambulation. Followed by PCP.    11. Encounter for Medicare annual wellness exam  - Ambulatory Referral/Consult to Enhanced Annual Wellness Visit (eAWV)      Provided Olamide with a 5-10 year written screening schedule and personal prevention plan. Recommendations were developed using the USPSTF age appropriate recommendations. Education, counseling, and referrals were provided as needed. After Visit Summary printed and given to patient which includes a list of additional screenings\tests needed.    Follow up in 4 months (on 7/18/2023).with PCP    Zhanna Crespo NP

## 2023-03-06 NOTE — PATIENT INSTRUCTIONS
Counseling and Referral of Other Preventative  (Italic type indicates deductible and co-insurance are waived)    Patient Name: Olamide Felipe  Today's Date: 3/6/2023    Health Maintenance       Date Due Completion Date    Shingles Vaccine (2 of 2) 09/13/2019 7/19/2019    COVID-19 Vaccine (4 - Booster for Moderna series) 01/24/2022 11/29/2021    DEXA Scan 02/09/2025 2/9/2023    TETANUS VACCINE 03/01/2026 3/1/2016    Lipid Panel 07/18/2027 7/18/2022        No orders of the defined types were placed in this encounter.    The following information is provided to all patients.  This information is to help you find resources for any of the problems found today that may be affecting your health:                Living healthy guide: www.[facility].[place].[facility]      Understanding Diabetes: www.diabetes.org      Eating healthy: www.cdc.gov/healthyweight      [facility] home safety checklist: www.cdc.gov/steadi/patient.html      Agency on Aging: www.goea.[place].[facility]      Alcoholics anonymous (AA): www.aa.org      Physical Activity: www.indu.nih.gov/vb9gimg      Tobacco use: www.quitwithusla.org     Counseling and Referral of Other Preventative  (Italic type indicates deductible and co-insurance are waived)    Patient Name: Olamide Felipe  Today's Date: 3/6/2023    Health Maintenance       Date Due Completion Date    Shingles Vaccine (2 of 2) 09/13/2019 7/19/2019    COVID-19 Vaccine (4 - Booster for Moderna series) 01/24/2022 11/29/2021    DEXA Scan 02/09/2025 2/9/2023    TETANUS VACCINE 03/01/2026 3/1/2016    Lipid Panel 07/18/2027 7/18/2022        No orders of the defined types were placed in this encounter.    The following information is provided to all patients.  This information is to help you find resources for any of the problems found today that may be affecting your health:                Living healthy guide: www.[facility].[place].[facility]      Understanding Diabetes: www.diabetes.org      Eating healthy: www.cdc.gov/healthyweight       CDC home safety checklist: www.cdc.gov/steadi/patient.html      Agency on Aging: www.goea.[place].[facility]      Alcoholics anonymous (AA): www.aa.org      Physical Activity: www.indu.nih.gov/iy2hmgy      Tobacco use: www.quitwithusla.org

## 2023-03-21 ENCOUNTER — PATIENT MESSAGE (OUTPATIENT)
Dept: INTERNAL MEDICINE | Facility: CLINIC | Age: 88
End: 2023-03-21
Payer: MEDICARE

## 2023-03-21 DIAGNOSIS — R35.0 FREQUENT URINATION: Primary | ICD-10-CM

## 2023-03-21 DIAGNOSIS — K92.1 BLOOD IN STOOL: ICD-10-CM

## 2023-03-22 ENCOUNTER — PATIENT MESSAGE (OUTPATIENT)
Dept: INTERNAL MEDICINE | Facility: CLINIC | Age: 88
End: 2023-03-22
Payer: MEDICARE

## 2023-03-22 NOTE — TELEPHONE ENCOUNTER
I spoke to the patient and informed her that we will need a urine sample.  She will call her daughter to bring a specimen cup home. The patient stated that she noticed blood   After having a bowel movement. She stated that she did not strain. Stool brown in color per patient.  Please advise if stool specimen is needed.

## 2023-03-23 ENCOUNTER — LAB VISIT (OUTPATIENT)
Dept: LAB | Facility: HOSPITAL | Age: 88
End: 2023-03-23
Attending: HOSPITALIST
Payer: MEDICARE

## 2023-03-23 ENCOUNTER — PATIENT MESSAGE (OUTPATIENT)
Dept: INTERNAL MEDICINE | Facility: CLINIC | Age: 88
End: 2023-03-23
Payer: MEDICARE

## 2023-03-23 DIAGNOSIS — K92.1 BLOOD IN STOOL: ICD-10-CM

## 2023-03-23 PROCEDURE — 82272 OCCULT BLD FECES 1-3 TESTS: CPT | Mod: HCNC | Performed by: HOSPITALIST

## 2023-03-24 LAB — OB PNL STL: NEGATIVE

## 2023-03-31 NOTE — PROGRESS NOTES
Subjective:     @Patient ID: Olamide Felipe is a 89 y.o. female.    Chief Complaint: Follow-up      89 y.o. female with hypothyroidism, aortic atherosclerosis, hypothyroidism, hyperparathyroidism, breast cancer (  invasive ductal carcinoma of the right breast s/p lumpectomy 1/7/22, s/p postopertaive radiation presents for       HTN: no prior hx of htn. However over past year BP has been high. Dx by heme onc with secondary htn. She brought her BP log today. now on losartan 25 mg qday   Memory: reports mild forgetfulness. Reports issue with word finding.   Hyperparathyroidism: pt reports does not remember needing to schedule endocrine appt . Has upcoming appt    Follow-up  Pertinent negatives include no abdominal pain, arthralgias, chest pain, chills, congestion, coughing, fever, headaches, nausea, rash, sore throat, vomiting or weakness.     Review of Systems   Constitutional:  Negative for chills and fever.   HENT:  Negative for congestion and sore throat.    Eyes:  Negative for pain and visual disturbance.   Respiratory:  Negative for cough and shortness of breath.    Cardiovascular:  Negative for chest pain and leg swelling.   Gastrointestinal:  Negative for abdominal pain, nausea and vomiting.   Endocrine: Negative for polydipsia and polyuria.   Genitourinary:  Negative for difficulty urinating and dysuria.   Musculoskeletal:  Negative for arthralgias and back pain.   Skin:  Negative for rash and wound.   Neurological:  Negative for dizziness, weakness and headaches.   Psychiatric/Behavioral:  Negative for agitation.    Past medical history, surgical history, and family medical history reviewed and updated as appropriate.    Medications and allergies reviewed.     Objective:     Vitals:    01/18/23 1349   BP: 136/62   BP Location: Left arm   Patient Position: Sitting   BP Method: Medium (Manual)   Pulse: 87   Resp: 20   Temp: 97.7 °F (36.5 °C)   TempSrc: Temporal   SpO2: 95%   Weight: 52.8 kg (116 lb 6.5 oz)  "  Height: 5' 5" (1.651 m)     Body mass index is 19.37 kg/m².  Physical Exam  Constitutional:       Appearance: Normal appearance.   HENT:      Head: Normocephalic and atraumatic.   Eyes:      General:         Right eye: No discharge.         Left eye: No discharge.      Conjunctiva/sclera: Conjunctivae normal.   Pulmonary:      Effort: Pulmonary effort is normal.   Musculoskeletal:         General: Normal range of motion.      Cervical back: Normal range of motion and neck supple.   Skin:     General: Skin is warm and dry.   Neurological:      Mental Status: She is alert and oriented to person, place, and time.   Psychiatric:         Mood and Affect: Mood normal.         Behavior: Behavior normal.       Lab Results   Component Value Date    WBC 5.10 07/18/2022    HGB 13.2 07/18/2022    HCT 41.5 07/18/2022     07/18/2022    CHOL 198 07/18/2022    TRIG 107 07/18/2022    HDL 67 07/18/2022    ALT 14 07/18/2022    AST 21 07/18/2022     07/18/2022    K 4.3 07/18/2022     07/18/2022    CREATININE 0.9 07/18/2022    BUN 18 07/18/2022    CO2 25 07/18/2022    TSH 2.765 07/18/2022       Assessment:     1. MCI (mild cognitive impairment) with memory loss    2. Postmenopausal    3. Hyperparathyroidism    4. Hypothyroidism, unspecified type    5. Pure hypercholesterolemia    6. Hypertension, unspecified type      Plan:   Olamide was seen today for follow-up.    Diagnoses and all orders for this visit:    MCI (mild cognitive impairment) with memory loss  -     Ambulatory referral/consult to Neurology; Future    Postmenopausal  -     DXA Bone Density Spine And Hip; Future    Hyperparathyroidism  -     Vitamin D; Future  -     PTH, intact; Future    Hypothyroidism, unspecified type  -     Comprehensive Metabolic Panel; Future  -     CBC Auto Differential; Future  -     TSH; Future    Pure hypercholesterolemia  -     Lipid Panel; Future    Hypertension, unspecified type  -     Comprehensive Metabolic Panel; Future  - "     CBC Auto Differential; Future  -     TSH; Future  -     Vitamin D; Future  -     Lipid Panel; Future  -     Urinalysis; Future  -     PTH, intact; Future             Abiola Campbell MD  Internal Medicine

## 2023-04-06 ENCOUNTER — TELEPHONE (OUTPATIENT)
Dept: NEUROLOGY | Facility: CLINIC | Age: 88
End: 2023-04-06
Payer: MEDICARE

## 2023-04-10 ENCOUNTER — OFFICE VISIT (OUTPATIENT)
Dept: NEUROLOGY | Facility: CLINIC | Age: 88
End: 2023-04-10
Payer: MEDICARE

## 2023-04-10 VITALS
HEART RATE: 77 BPM | SYSTOLIC BLOOD PRESSURE: 132 MMHG | HEIGHT: 65 IN | BODY MASS INDEX: 19.03 KG/M2 | DIASTOLIC BLOOD PRESSURE: 63 MMHG | WEIGHT: 114.19 LBS

## 2023-04-10 DIAGNOSIS — G31.84 MCI (MILD COGNITIVE IMPAIRMENT) WITH MEMORY LOSS: Primary | ICD-10-CM

## 2023-04-10 PROCEDURE — 99213 PR OFFICE/OUTPT VISIT, EST, LEVL III, 20-29 MIN: ICD-10-PCS | Mod: HCNC,S$GLB,,

## 2023-04-10 PROCEDURE — 1101F PR PT FALLS ASSESS DOC 0-1 FALLS W/OUT INJ PAST YR: ICD-10-PCS | Mod: HCNC,CPTII,S$GLB,

## 2023-04-10 PROCEDURE — 1101F PT FALLS ASSESS-DOCD LE1/YR: CPT | Mod: HCNC,CPTII,S$GLB,

## 2023-04-10 PROCEDURE — 1159F MED LIST DOCD IN RCRD: CPT | Mod: HCNC,CPTII,S$GLB,

## 2023-04-10 PROCEDURE — 1126F PR PAIN SEVERITY QUANTIFIED, NO PAIN PRESENT: ICD-10-PCS | Mod: HCNC,CPTII,S$GLB,

## 2023-04-10 PROCEDURE — 1126F AMNT PAIN NOTED NONE PRSNT: CPT | Mod: HCNC,CPTII,S$GLB,

## 2023-04-10 PROCEDURE — 99213 OFFICE O/P EST LOW 20 MIN: CPT | Mod: HCNC,S$GLB,,

## 2023-04-10 PROCEDURE — 3288F PR FALLS RISK ASSESSMENT DOCUMENTED: ICD-10-PCS | Mod: HCNC,CPTII,S$GLB,

## 2023-04-10 PROCEDURE — 99999 PR PBB SHADOW E&M-EST. PATIENT-LVL III: ICD-10-PCS | Mod: PBBFAC,HCNC,,

## 2023-04-10 PROCEDURE — 99999 PR PBB SHADOW E&M-EST. PATIENT-LVL III: CPT | Mod: PBBFAC,HCNC,,

## 2023-04-10 PROCEDURE — 3288F FALL RISK ASSESSMENT DOCD: CPT | Mod: HCNC,CPTII,S$GLB,

## 2023-04-10 PROCEDURE — 1159F PR MEDICATION LIST DOCUMENTED IN MEDICAL RECORD: ICD-10-PCS | Mod: HCNC,CPTII,S$GLB,

## 2023-04-10 RX ORDER — MEMANTINE HYDROCHLORIDE 5 MG/1
5 TABLET ORAL 2 TIMES DAILY
Qty: 60 TABLET | Refills: 11 | Status: SHIPPED | OUTPATIENT
Start: 2023-04-10 | End: 2024-07-08

## 2023-04-10 NOTE — PROGRESS NOTES
[facility] NEUROLOGY  [facility], [place]    Date: 4/10/23  Patient Name: Olamide Felipe   MRN: [id number]   PCP: Abiola Campbell  Referring Provider: No ref. provider found    Chief Complaint: Follow up  Subjective:        HPI:     4/14/23: Patient presents for follow up mild cognitive impairment. She has begun donepezil 5 mg nightly. Daughter notes that patient stopped all of her medications a couple of weeks ago due to concern for side effects and dizziness. She has since restarted her medications. She notes no changes or improvement when stopping the donepezil and therefore had restarted the medication.    Patient notes continued difficulty with her memory at times.     ===============================================  2/20/23: Ms. Olamide Felipe is a 89 y.o. female presenting to clinic for a memory evaluation and is accompanied by her daughter. Patient has noted changes in her memory as of late in that she doesn't always remember things that she should, such as names of people. Her daughter notes moments of confusion, where she does not appear to know what she should be doing. Changes were noted at least a year ago but have worsened over that time. Endorses stressor in January 2022 as patient was diagnosed with breast cancer, undergoing a lumpectomy for management. Patient currently lives in an independent living center after moving here in 2019. There have been some limitations in socialization during the pandemic. Patient does note that they will be holding bingo activities. Patient can cook and perform all adl's independently without issue. Patient typically manages all of her finances and medications. Recently patient had forgotten the pin number to her debit card, which prompted attention from the family as this was atypical for her. No longer drives since October 2019. Denies any navigational difficulty as a passenger or at her residence. Denies any hallucinations, paranoia,  or delusions. Denies any REM disturbances. Appetite intact, though patient does eat small portions. No significant family history of memory disorders. Her older brother (6 years older) may have had some memory changes after 90 years of age.      PAST MEDICAL HISTORY:  Past Medical History:   Diagnosis Date    Hyperlipidemia     Hypothyroidism     Malignant neoplasm of upper-outer quadrant of right breast in female, estrogen receptor negative 1/7/2022    Osteoarthritis, knee     Vertigo        PAST SURGICAL HISTORY:  Past Surgical History:   Procedure Laterality Date    dentures      FRACTURE SURGERY Left     fracture left wrist    HYSTERECTOMY      INJECTION FOR SENTINEL NODE IDENTIFICATION Right 1/7/2022    Procedure: INJECTION, FOR SENTINEL NODE IDENTIFICATION-Right;  Surgeon: Marci Mcintosh MD;  Location: [facility];  Service: General;  Laterality: Right;    left wrist surgery      MASTECTOMY, PARTIAL Right 1/7/2022    Procedure: MASTECTOMY, PARTIAL-Right with radiological marker;  Surgeon: Marci Mcintosh MD;  Location: [facility];  Service: General;  Laterality: Right;  REQUEST SAID 2 HOUR CASE    SENTINEL LYMPH NODE BIOPSY Right 1/7/2022    Procedure: BIOPSY, LYMPH NODE, SENTINEL-Right;  Surgeon: Maric Mcintosh MD;  Location: [facility];  Service: General;  Laterality: Right;       CURRENT MEDS:  Current Outpatient Medications   Medication Sig Dispense Refill    aspirin 81 MG Chew Take 81 mg by mouth once daily.      b complex vitamins tablet Take 1 tablet by mouth once daily.      calcium-vitamin D 250-100 mg-unit per tablet Take 1 tablet by mouth 2 (two) times daily.      donepeziL (ARICEPT) 5 MG tablet Take 1 tablet (5 mg total) by mouth every evening. 30 tablet 11    FLUoxetine 20 MG capsule TAKE 1 CAPSULE EVERY DAY 90 capsule 3    levothyroxine (SYNTHROID) 25 MCG tablet Take 1 tablet (25 mcg total) by mouth before breakfast. 90 tablet 3    loratadine (CLARITIN) 10 mg tablet Take 1 tablet (10 mg total) by mouth once  daily. 30 tablet 2    losartan (COZAAR) 25 MG tablet Take 1 tablet (25 mg total) by mouth daily as needed (For Systolic Blood pressure higher than 150). 90 tablet 1    meclizine (ANTIVERT) 25 mg tablet Take 1 tablet (25 mg total) by mouth 3 (three) times daily as needed for Dizziness. 20 tablet 0    methocarbamoL (ROBAXIN) 500 MG Tab Take 1 tablet (500 mg total) by mouth 2 (two) times daily as needed (muscle spasm). 40 tablet 0    mv-min/iron/folic/calcium/vitK (WOMEN'S MULTIVITAMIN ORAL) Take by mouth once daily.      omeprazole (PRILOSEC) 20 MG capsule TAKE 1 CAPSULE EVERY DAY 90 capsule 3    simvastatin (ZOCOR) 20 MG tablet Take 1 tablet (20 mg total) by mouth every evening. 90 tablet 2    acetaminophen (TYLENOL) 325 MG tablet Take 325 mg by mouth every 6 (six) hours as needed for Pain.      memantine (NAMENDA) 5 MG Tab Take 1 tablet (5 mg total) by mouth 2 (two) times daily. Begin by taking one tablet daily for seven days, then increase to one tablet two times daily. 60 tablet 11     No current facility-administered medications for this visit.       ALLERGIES:  Review of patient's allergies indicates:  No Known Allergies    FAMILY HISTORY:  Family History   Problem Relation Age of Onset    No Known Problems Daughter     No Known Problems Son     No Known Problems Sister        SOCIAL HISTORY:  Social History     Tobacco Use    Smoking status: Former     Types: Cigarettes     Quit date: 1989     Years since quittin.8     Passive exposure: Never    Smokeless tobacco: Never   Substance Use Topics    Alcohol use: Yes     Alcohol/week: 2.0 standard drinks     Types: 2 Glasses of wine per week     Comment: 1/3 glass of wine - 2 times weekly    Drug use: Never       Review of Systems:  Gen: no fever, no chills, no generalized feeling of weakness   HEENT: no double vision, no blurred vision, no eye pain, no eye exudates. no nasal congestion,   no traumatic injury of head, no neck pain, no neck stiffness. no  "photophobia or phonophobia at this time. ?    Heart: no chest pain, no SOB    Lungs: no SOB, no cough    MSK: no weakness of legs, intact ROM    ABD: no abd pain, no N/V/D/C, no difficulty with defecation.    Extremities: No leg pain, no edema.       Objective:     Vitals:    04/10/23 0920   BP: 132/63   BP Location: Left arm   Patient Position: Sitting   Pulse: 77   Weight: 51.8 kg (114 lb 3.2 oz)   Height: 5' 5" (1.651 m)       General: Female in NAD, alert and awake, Aox3, well groomed. ?    ? ?    HEENT: Head is NC/AT EOMI, pupil size: 4 mm B/L, no nystagmus noted; hearing grossly intact b/l. Mucous membrane moist, uvula midline, no pharyngeal erythema, exudates or discharges.      Neck: Supple. no nuchal rigidity.      Cardiovascular: well perfused, no cyanosis        Respiratory: Symmetric chest rise noted       Musculoskeletal: Muscle tone noted to be adequate for patient age, muscle mass is WNL. No spontaneous movements or fasciculations noted during this examination.       Extremities: No pedal edema or calf tenderness. No cogwheel rigidity noted on B/L UE extremities.       Neurological Examination.    Mental status: AA&O x3; Affect/mood is euthymic/congruent; no aphasia noted during examination. Patient answers simple questions appropriately & follows simple commands; no dysarthria or expressive aphasia; no christy-neglect or extinction. Vocabulary/word finding: excellent.       Cranial Nerves: II-XII grossly intact. visual fields intact to confrontation, EOMI, no nystagmus, PERRLA,?facial muscles symmetric and no facial droop noted, patient hearing is grossly intact b/l, ?facial sensation to sharp and light touch grossly intact B/L. Patient smiles, frowns, closes eyes ?forcefully uneventfully. Uvula midline and no difficulty with pronunciation. No SCM/trapezius/muscle of mastication weakness noted B/L. Patient has adequate control of tongue and may protrude it and move it adequately.       Muscle Function: " "Tone WNL and Muscle bulk WNL. Symmetric use of bilateral upper and lower extremities against gravity.     Gait: adequate casual gait with stride length and arm swing WNL.     2/20/23 MOCA: 18/30  Visuospatial/Executive 3/5, Naming 2/3, Attention 6/6/, Language 1/3, Abstraction 1/2, Delayed Recall 0/5, Orientation 5/6    Other:  12/5/22 MRI Brain without contrast  "Impression:  No acute intracranial abnormality.  Age-related cerebral atrophy with mild microangiopathic change"    Assessment:   Olamide Felipe is a 89 y.o. female presenting for follow up. Continue donepezil 5 mg nightly. May consider trial removal of just this medication only to observe for any changes to determine if side effects are indeed from the donepezil. Will initiate memantine for further memory protection with slow titration as tolerated.     Blood pressure elevated today. Recommend monitoring regularly and following up with PCP if it remains elevated.    Plan:     Problem List Items Addressed This Visit    None  Visit Diagnoses       MCI (mild cognitive impairment) with memory loss    -  Primary    Relevant Medications    memantine (NAMENDA) 5 MG Tab            - Start memantine 5 mg daily, then increase to memantine 5 mg two times daily  - Continue Donepezil 5 mg nightly  - encourage cognitively stimulating activities including regular socialization, reading, puzzles  - encourage regular physical activity for good vascular and brain health  - encouraged tight control blood pressure, glucose, cholesterol   - encourage heart healthy diet    Follow up in 3 months or as needed    I spent a total of 25 minutes on the day of the visit. This includes face to face time and non-face to face time preparing to see the patient (eg, review of tests), obtaining and/or reviewing separately obtained history, documenting clinical information in the electronic or other health record, independently interpreting results and communicating results to the " patient/family/caregiver, or care coordinator. Thomas Maria DO was available in clinic throughout this encounter.     Margot Gonzalez PA-C

## 2023-04-12 ENCOUNTER — HOSPITAL ENCOUNTER (EMERGENCY)
Facility: HOSPITAL | Age: 88
Discharge: HOME | End: 2023-04-12
Attending: EMERGENCY MEDICINE
Payer: MEDICARE

## 2023-04-12 VITALS
TEMPERATURE: 99 F | DIASTOLIC BLOOD PRESSURE: 63 MMHG | HEIGHT: 65 IN | RESPIRATION RATE: 18 BRPM | HEART RATE: 71 BPM | SYSTOLIC BLOOD PRESSURE: 141 MMHG | WEIGHT: 114 LBS | BODY MASS INDEX: 18.99 KG/M2 | OXYGEN SATURATION: 98 %

## 2023-04-12 DIAGNOSIS — S42.031A CLOSED DISPLACED FRACTURE OF ACROMIAL END OF RIGHT CLAVICLE, INITIAL ENCOUNTER: Primary | ICD-10-CM

## 2023-04-12 DIAGNOSIS — M25.511 RIGHT SHOULDER PAIN: ICD-10-CM
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
## 2023-04-12 PROCEDURE — 99283 EMERGENCY DEPT VISIT LOW MDM: CPT | Mod: HCNC

## 2023-04-12 RX ORDER — CETIRIZINE HYDROCHLORIDE 5 MG/1
TABLET ORAL
Status: DISCONTINUED
Start: 2023-04-12 | End: 2023-04-12 | Stop reason: HOSPADM

## 2023-04-12 NOTE — ED NOTES
Patient arrived to ED after a mechanical fall while carrying groceries. Patient reports that the groceries were heavier in the one arm than the left and fell to the ground landing on R side. Patient denies syncope, LOC, dizziness. Denies head hit. No obvious deformities.   Patient complaints of pain to R shoulder with pain radiating down to elbow. CMS intact. Pain with movement. + pulses. Took tylenol for pain PTA. Patient takes 81 mg ASA daily.    Patient ambulated without difficulty.     APPEARANCE: Alert, oriented and in no acute distress.  CARDIAC: Normal rate and rhythm, no murmur heard.   PERIPHERAL VASCULAR: peripheral pulses present. Normal cap refill. No edema. Warm to touch.    RESPIRATORY:Normal rate and effort, breath sounds clear bilaterally throughout chest. Respirations are equal and unlabored no obvious signs of distress.  GASTRO: soft, bowel sounds normal, no tenderness, no abdominal distention.  MUSC: Limited ROM to R shoulder. Pain with movement. Pain radiating down to elbow.   SKIN: scattered contusions: patient reports new - to bilateral arms. Skin is warm and dry, normal skin turgor, mucous membranes moist.  NEURO: 5/5 strength major flexors/extensors bilaterally. Sensory intact to light touch bilaterally. Phil coma scale: eyes open spontaneously-4, oriented & converses-5, obeys commands-6. No neurological abnormalities.   MENTAL STATUS: awake, alert and aware of environment.  EYE: PERRL, both eyes: pupils brisk and reactive to light. Normal size.  ENT: EARS: no obvious drainage. NOSE: no active bleeding.

## 2023-04-13 NOTE — ED PROVIDER NOTES
Encounter Date: 4/12/2023       History     Chief Complaint   Patient presents with    Fall     Pt presents to ED today reports fall onto knees then right side of body while walking home with grocery bags  Reports bags on right side were heavier than left  Denies head trauma      89-year-old female presents to ED with daughter with concern of right-sided shoulder pain after fall that occurred just prior to arrival.  Patient reports she was carrying heavy grocery bags which caused her to stumble, falling onto bilateral knees and ultimately onto right shoulder.  No head injury.  No LOC. Denies use of blood thinners.  She reports pain only to right shoulder, described as sharp worse with movement but alleviated with rest with severity 7/10.  No numbness or focal weakness.  No lightheadedness dizziness, visual changes, nausea, vomiting, chest pain, cough, shortness of breath.  No other acute complaints at this time.    The history is provided by the patient and a relative.   Review of patient's allergies indicates:  No Known Allergies  Past Medical History:   Diagnosis Date    Hyperlipidemia     Hypothyroidism     Malignant neoplasm of upper-outer quadrant of right breast in female, estrogen receptor negative 1/7/2022    Osteoarthritis, knee     Vertigo      Past Surgical History:   Procedure Laterality Date    dentures      FRACTURE SURGERY Left     fracture left wrist    HYSTERECTOMY      INJECTION FOR SENTINEL NODE IDENTIFICATION Right 1/7/2022    Procedure: INJECTION, FOR SENTINEL NODE IDENTIFICATION-Right;  Surgeon: Marci Mcintosh MD;  Location: [facility] OR;  Service: General;  Laterality: Right;    left wrist surgery      MASTECTOMY, PARTIAL Right 1/7/2022    Procedure: MASTECTOMY, PARTIAL-Right with radiological marker;  Surgeon: Marci Mcintosh MD;  Location: [facility] OR;  Service: General;  Laterality: Right;  REQUEST SAID 2 HOUR CASE    SENTINEL LYMPH NODE BIOPSY Right 1/7/2022    Procedure: BIOPSY, LYMPH NODE,  SENTINEL-Right;  Surgeon: Marci Mcintosh MD;  Location: [facility];  Service: General;  Laterality: Right;     Family History   Problem Relation Age of Onset    No Known Problems Daughter     No Known Problems Son     No Known Problems Sister      Social History     Tobacco Use    Smoking status: Former     Types: Cigarettes     Quit date: 1989     Years since quittin.8     Passive exposure: Never    Smokeless tobacco: Never   Substance Use Topics    Alcohol use: Yes     Alcohol/week: 2.0 standard drinks     Types: 2 Glasses of wine per week     Comment: 1/3 glass of wine - 2 times weekly    Drug use: Never     Review of Systems   Eyes:  Negative for visual disturbance.   Cardiovascular:  Negative for chest pain.   Gastrointestinal:  Negative for abdominal pain, nausea and vomiting.   Musculoskeletal:  Positive for arthralgias.   Skin:  Negative for color change and wound.   Neurological:  Negative for dizziness, weakness, light-headedness, numbness and headaches.     Physical Exam     Initial Vitals [23 1813]   BP Pulse Resp Temp SpO2   (!) 141/63 71 18 98.5 °F (36.9 °C) 98 %      MAP       --         Physical Exam    Nursing note and vitals reviewed.  Constitutional: She appears well-developed and well-nourished. She is active. She does not have a sickly appearance. She does not appear ill. No distress.   HENT:   Head: Normocephalic and atraumatic.   Neck:   No cervical midline or paraspinal tenderness with full ROM.   Normal range of motion.  Pulmonary/Chest: Effort normal.   Denying chest wall tenderness   Abdominal: There is no abdominal tenderness.   Musculoskeletal:      Cervical back: Normal range of motion.      Comments: Right shoulder tenderness over superior aspect near AC joint.  Mild localized swelling but no other obvious physical or palpable deformities.  Crepitus noted with right arm movement.  Sensation intact throughout right upper extremity with sensations intact.  Radial pulse  intact.     Neurological: She is alert. GCS eye subscore is 4. GCS verbal subscore is 5. GCS motor subscore is 6.   Skin: Skin is warm and dry.   Psychiatric: She has a normal mood and affect. Her speech is normal and behavior is normal.       ED Course   Procedures  Labs Reviewed - No data to display       Imaging Results              X-Ray Shoulder Trauma Right (Final result)  Result time 04/12/23 19:38:09      Final result by Seth Mcclendon MD (04/12/23 19:38:09)                   Impression:      No evidence of a fracture or dislocation of the right shoulder.    Comminuted fracture of the right distal clavicle.      Electronically signed by: Seth Mcclendon MD  Date:    04/12/2023  Time:    19:38               Narrative:    EXAMINATION:  XR SHOULDER TRAUMA 3 VIEW RIGHT    CLINICAL HISTORY:  Pain in right shoulder    TECHNIQUE:  Three or four views of the right shoulder were performed.    COMPARISON:  X-ray of the right humerus dated 04/12/2023.    FINDINGS:  There is a partially visualized comminuted fracture of the right distal clavicle.  No additional fracture is identified.    The glenohumeral articulation is maintained.  There is arthropathy of the acromioclavicular joint.  The coracoclavicular interval is unremarkable.    There are calcifications of the aortic knob.  The remainder of the visualized portions of the right hemithorax unremarkable.                                       X-Ray Elbow Complete Right (Final result)  Result time 04/12/23 19:40:19      Final result by Seth Mcclendon MD (04/12/23 19:40:19)                   Impression:      No acute process.      Electronically signed by: Seth Mcclendon MD  Date:    04/12/2023  Time:    19:40               Narrative:    EXAMINATION:  XR ELBOW COMPLETE 3 VIEW RIGHT    CLINICAL HISTORY:  Unspecified fall, initial encounter    TECHNIQUE:  AP, lateral, and oblique views of the right elbow were performed.    COMPARISON:  None    FINDINGS:  The bone mineralization is  within normal limits.  There is no cortical step-off.  There is no evidence of periostitis.    The joint spaces are maintained.  The soft tissues are unremarkable.  No displaced fat pad is identified.    There is no evidence of a fracture or dislocation.                                       X-Ray Humerus 2 View Right (Final result)  Result time 04/12/23 19:24:43      Final result by Hakeem Steven MD (04/12/23 19:24:43)                   Impression:      No acute fracture or abnormality in the right humerus or arm.    Multipart fracture of the lateral head of the right clavicle.      Electronically signed by: Hakeem Steven  Date:    04/12/2023  Time:    19:24               Narrative:    EXAMINATION:  XR HUMERUS 2 VIEW RIGHT    CLINICAL HISTORY:  Unspecified fall, initial encounter    TECHNIQUE:  PA and lateral view of the right humerus    COMPARISON:  None    FINDINGS:  Humerus appears intact on the right.  There appears to be a comminuted fracture of the lateral and of the right clavicle.  Adjacent chest wall appears intact.  Soft tissues are unremarkable.                                       Medications - No data to display  Medical Decision Making:   Initial Assessment:   Patient presents with concern of right-sided shoulder pain after mechanical fall that occurred just prior to arrival.  No head injury or LOC.  No numbness or focal weakness.  Afebrile with vitals grossly unremarkable.  Reproducible tenderness to superior right shoulder near AC joint.  Neurovascularly intact.  Differential Diagnosis:   Strain, sprain, contusing, dislocation, fracture  ED Management:  X-ray right shoulder, x-ray right humerus, x-ray right elbow    X-ray right shoulder per my interpretation concerning for comminuted and displaced distal clavicle fracture.  No other fractures or dislocations noted.  X-ray right humerus per my interpretation showing no fracture of humerus or dislocation of right shoulder.  X-ray right elbow  per my interpretation showing no obvious dislocation or fracture.    Will plan to continue with conservative care.  Sling and swathe applied in ED. ambulatory referral sent to Orthopedics.  Encouraged Tylenol as needed, protecting right shoulder at all times, close ortho follow-up, high fall precautions.  ED return precautions were discussed at length with patient and daughter.  They state their understanding and agree with plan.                        Clinical Impression:   Final diagnoses:  [M25.511] Right shoulder pain  [W19.XXXA] Fall  [S42.031A] Closed displaced fracture of acromial end of right clavicle, initial encounter (Primary)        ED Disposition Condition    Discharge Stable          ED Prescriptions    None       Follow-up Information       Follow up With Specialties Details Why Contact Info    Abiola Campbell MD Internal Medicine   [address] [facility] [place] [address]  [phone]               Manuel Us PA-C  04/12/23 7141

## 2023-04-13 NOTE — DISCHARGE INSTRUCTIONS

## 2023-04-17 ENCOUNTER — OFFICE VISIT (OUTPATIENT)
Dept: INTERNAL MEDICINE | Facility: CLINIC | Age: 88
End: 2023-04-17
Payer: MEDICARE

## 2023-04-17 ENCOUNTER — OFFICE VISIT (OUTPATIENT)
Dept: ORTHOPEDICS | Facility: CLINIC | Age: 88
End: 2023-04-17
Payer: MEDICARE

## 2023-04-17 VITALS — BODY MASS INDEX: 20.38 KG/M2 | HEIGHT: 63 IN | WEIGHT: 115 LBS

## 2023-04-17 VITALS
OXYGEN SATURATION: 95 % | SYSTOLIC BLOOD PRESSURE: 130 MMHG | HEIGHT: 63 IN | DIASTOLIC BLOOD PRESSURE: 70 MMHG | TEMPERATURE: 97 F | HEART RATE: 88 BPM | WEIGHT: 115.31 LBS | BODY MASS INDEX: 20.43 KG/M2

## 2023-04-17 DIAGNOSIS — N32.81 OVERACTIVE BLADDER: ICD-10-CM

## 2023-04-17 DIAGNOSIS — S42.034D CLOSED NONDISPLACED FRACTURE OF ACROMIAL END OF RIGHT CLAVICLE WITH ROUTINE HEALING: ICD-10-CM

## 2023-04-17 DIAGNOSIS — I15.9 SECONDARY HYPERTENSION: ICD-10-CM

## 2023-04-17 DIAGNOSIS — S42.001K CLOSED NONDISPLACED FRACTURE OF RIGHT CLAVICLE WITH NONUNION, UNSPECIFIED PART OF CLAVICLE, SUBSEQUENT ENCOUNTER: Primary | ICD-10-CM

## 2023-04-17 DIAGNOSIS — S42.031A CLOSED DISPLACED FRACTURE OF ACROMIAL END OF RIGHT CLAVICLE, INITIAL ENCOUNTER: ICD-10-CM

## 2023-04-17 DIAGNOSIS — S09.90XD INJURY OF HEAD, SUBSEQUENT ENCOUNTER: ICD-10-CM
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
## 2023-04-17 PROCEDURE — 99999 PR PBB SHADOW E&M-EST. PATIENT-LVL III: ICD-10-PCS | Mod: PBBFAC,HCNC,, | Performed by: ORTHOPAEDIC SURGERY

## 2023-04-17 PROCEDURE — 1125F PR PAIN SEVERITY QUANTIFIED, PAIN PRESENT: ICD-10-PCS | Mod: HCNC,CPTII,S$GLB, | Performed by: HOSPITALIST

## 2023-04-17 PROCEDURE — 1159F MED LIST DOCD IN RCRD: CPT | Mod: HCNC,CPTII,S$GLB, | Performed by: HOSPITALIST

## 2023-04-17 PROCEDURE — 3288F PR FALLS RISK ASSESSMENT DOCUMENTED: ICD-10-PCS | Mod: HCNC,CPTII,S$GLB, | Performed by: ORTHOPAEDIC SURGERY

## 2023-04-17 PROCEDURE — 3288F PR FALLS RISK ASSESSMENT DOCUMENTED: ICD-10-PCS | Mod: HCNC,CPTII,S$GLB, | Performed by: HOSPITALIST

## 2023-04-17 PROCEDURE — 1125F PR PAIN SEVERITY QUANTIFIED, PAIN PRESENT: ICD-10-PCS | Mod: HCNC,CPTII,S$GLB, | Performed by: ORTHOPAEDIC SURGERY

## 2023-04-17 PROCEDURE — 1125F AMNT PAIN NOTED PAIN PRSNT: CPT | Mod: HCNC,CPTII,S$GLB, | Performed by: ORTHOPAEDIC SURGERY

## 2023-04-17 PROCEDURE — 1159F MED LIST DOCD IN RCRD: CPT | Mod: HCNC,CPTII,S$GLB, | Performed by: ORTHOPAEDIC SURGERY

## 2023-04-17 PROCEDURE — 99999 PR PBB SHADOW E&M-EST. PATIENT-LVL V: ICD-10-PCS | Mod: PBBFAC,HCNC,, | Performed by: HOSPITALIST

## 2023-04-17 PROCEDURE — 1101F PT FALLS ASSESS-DOCD LE1/YR: CPT | Mod: HCNC,CPTII,S$GLB, | Performed by: ORTHOPAEDIC SURGERY

## 2023-04-17 PROCEDURE — 1125F AMNT PAIN NOTED PAIN PRSNT: CPT | Mod: HCNC,CPTII,S$GLB, | Performed by: HOSPITALIST

## 2023-04-17 PROCEDURE — 1160F RVW MEDS BY RX/DR IN RCRD: CPT | Mod: HCNC,CPTII,S$GLB, | Performed by: HOSPITALIST

## 2023-04-17 PROCEDURE — 99999 PR PBB SHADOW E&M-EST. PATIENT-LVL V: CPT | Mod: PBBFAC,HCNC,, | Performed by: HOSPITALIST

## 2023-04-17 PROCEDURE — 99203 OFFICE O/P NEW LOW 30 MIN: CPT | Mod: HCNC,S$GLB,, | Performed by: ORTHOPAEDIC SURGERY

## 2023-04-17 PROCEDURE — 1159F PR MEDICATION LIST DOCUMENTED IN MEDICAL RECORD: ICD-10-PCS | Mod: HCNC,CPTII,S$GLB, | Performed by: HOSPITALIST

## 2023-04-17 PROCEDURE — 3288F FALL RISK ASSESSMENT DOCD: CPT | Mod: HCNC,CPTII,S$GLB, | Performed by: ORTHOPAEDIC SURGERY

## 2023-04-17 PROCEDURE — 1159F PR MEDICATION LIST DOCUMENTED IN MEDICAL RECORD: ICD-10-PCS | Mod: HCNC,CPTII,S$GLB, | Performed by: ORTHOPAEDIC SURGERY

## 2023-04-17 PROCEDURE — 1160F PR REVIEW ALL MEDS BY PRESCRIBER/CLIN PHARMACIST DOCUMENTED: ICD-10-PCS | Mod: HCNC,CPTII,S$GLB, | Performed by: HOSPITALIST

## 2023-04-17 PROCEDURE — 3288F FALL RISK ASSESSMENT DOCD: CPT | Mod: HCNC,CPTII,S$GLB, | Performed by: HOSPITALIST

## 2023-04-17 PROCEDURE — 99214 PR OFFICE/OUTPT VISIT, EST, LEVL IV, 30-39 MIN: ICD-10-PCS | Mod: HCNC,S$GLB,, | Performed by: HOSPITALIST

## 2023-04-17 PROCEDURE — 1100F PR PT FALLS ASSESS DOC 2+ FALLS/FALL W/INJURY/YR: ICD-10-PCS | Mod: HCNC,CPTII,S$GLB, | Performed by: HOSPITALIST

## 2023-04-17 PROCEDURE — 1101F PR PT FALLS ASSESS DOC 0-1 FALLS W/OUT INJ PAST YR: ICD-10-PCS | Mod: HCNC,CPTII,S$GLB, | Performed by: ORTHOPAEDIC SURGERY

## 2023-04-17 PROCEDURE — 99214 OFFICE O/P EST MOD 30 MIN: CPT | Mod: HCNC,S$GLB,, | Performed by: HOSPITALIST

## 2023-04-17 PROCEDURE — 99203 PR OFFICE/OUTPT VISIT, NEW, LEVL III, 30-44 MIN: ICD-10-PCS | Mod: HCNC,S$GLB,, | Performed by: ORTHOPAEDIC SURGERY

## 2023-04-17 PROCEDURE — 1100F PTFALLS ASSESS-DOCD GE2>/YR: CPT | Mod: HCNC,CPTII,S$GLB, | Performed by: HOSPITALIST

## 2023-04-17 PROCEDURE — 99999 PR PBB SHADOW E&M-EST. PATIENT-LVL III: CPT | Mod: PBBFAC,HCNC,, | Performed by: ORTHOPAEDIC SURGERY

## 2023-04-17 NOTE — PROGRESS NOTES
Subjective:     @Patient ID: Olamide Felipe is a 89 y.o. female.    Chief Complaint: Follow-up    HPI    89 yo F presents for ER f/u. Went to ER on 4/12 for mechanical fall outdoors. Reports had too many groceries and lost her balance. Did not think she hit her head however family notices today that she has bruise of R temple/forehead.   Dx with clavicle fracture. Placed in sling and referred to ortho. Pt has appt with ortho this afternoon.   Pt is accompanied by her daughter today.     Pt also endorses overactive bladder but lately has worsened and she had a urinary accident. Urine was negative for infection few weeks ago.   Recently started on aricept and namenda      Review of Systems   Constitutional:  Negative for chills and fever.   HENT:  Negative for congestion and sore throat.    Eyes:  Negative for pain and visual disturbance.   Respiratory:  Negative for cough and shortness of breath.    Cardiovascular:  Negative for chest pain and leg swelling.   Gastrointestinal:  Negative for abdominal pain, nausea and vomiting.   Genitourinary:  Positive for frequency.   Musculoskeletal:  Positive for arthralgias. Negative for back pain.   Skin:  Positive for color change.   Neurological:  Negative for weakness and headaches.   Psychiatric/Behavioral:  Negative for agitation.    Past medical history, surgical history, and family medical history reviewed and updated as appropriate.    Medications and allergies reviewed.     Objective:     There were no vitals filed for this visit.  There is no height or weight on file to calculate BMI.  Physical Exam  Constitutional:       Appearance: Normal appearance.   HENT:      Head: Normocephalic and atraumatic.   Eyes:      General:         Right eye: No discharge.         Left eye: No discharge.      Conjunctiva/sclera: Conjunctivae normal.   Cardiovascular:      Rate and Rhythm: Normal rate and regular rhythm.      Heart sounds: No murmur heard.  Pulmonary:      Effort:  Pulmonary effort is normal.      Breath sounds: Normal breath sounds.   Musculoskeletal:      Cervical back: Normal range of motion and neck supple.      Right lower leg: No edema.      Left lower leg: No edema.      Comments: R arm in sling   Skin:     General: Skin is warm and dry.      Findings: Bruising (R temple) present.   Neurological:      Mental Status: She is alert and oriented to person, place, and time.   Psychiatric:         Mood and Affect: Mood normal.         Behavior: Behavior normal.       Lab Results   Component Value Date    WBC 5.10 07/18/2022    HGB 13.2 07/18/2022    HCT 41.5 07/18/2022     07/18/2022    CHOL 198 07/18/2022    TRIG 107 07/18/2022    HDL 67 07/18/2022    ALT 14 07/18/2022    AST 21 07/18/2022     07/18/2022    K 4.3 07/18/2022     07/18/2022    CREATININE 0.9 07/18/2022    BUN 18 07/18/2022    CO2 25 07/18/2022    TSH 2.765 07/18/2022       Assessment:     1. 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 Secondary hypertension    3. Overactive bladder    4. Fall, subsequent encounter    5. Injury of head, subsequent encounter      Plan:   Olamide was seen today for follow-up.    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 Continue tylenol prn and f/u with ortho  -     Ambulatory referral/consult to Home Health; Future    Secondary hypertension  - Stable. Continue home meds     Overactive bladder  - counseled likely due to aging and medications  - counseled to avoid bladder irritants    Fall, subsequent encounter  - Will check CT head to r/o acute intracranial abnormality   -     CT Head Without Contrast; Future  -     Ambulatory referral/consult to Home Health; Future    Injury of head, subsequent encounter  - see fall   -     CT Head Without Contrast; Future              Abiola Campbell MD  Internal Medicine    4/17/2023

## 2023-04-17 NOTE — PROGRESS NOTES
Subjective:      Patient ID: Olamide Felipe is a 89 y.o. female.    Chief Complaint: Consult of the Right Shoulder      HPI  Olamide Felipe is a  89 y.o. female presenting today for right shoulder injury.  There was a history of trauma.  Onset of symptoms began 5 days ago when she sustained a fall and was seen in the emergency room noted to have a lateral clavicle fracture of the right shoulder placed in a sling and swath here today in follow-up.      Review of patient's allergies indicates:  No Known Allergies      Current Outpatient Medications   Medication Sig Dispense Refill    acetaminophen (TYLENOL) 325 MG tablet Take 325 mg by mouth every 6 (six) hours as needed for Pain.      aspirin 81 MG Chew Take 81 mg by mouth once daily.      b complex vitamins tablet Take 1 tablet by mouth once daily.      calcium-vitamin D 250-100 mg-unit per tablet Take 1 tablet by mouth 2 (two) times daily.      donepeziL (ARICEPT) 5 MG tablet Take 1 tablet (5 mg total) by mouth every evening. 30 tablet 11    FLUoxetine 20 MG capsule TAKE 1 CAPSULE EVERY DAY 90 capsule 3    levothyroxine (SYNTHROID) 25 MCG tablet Take 1 tablet (25 mcg total) by mouth before breakfast. 90 tablet 3    loratadine (CLARITIN) 10 mg tablet Take 1 tablet (10 mg total) by mouth once daily. 30 tablet 2    losartan (COZAAR) 25 MG tablet Take 1 tablet (25 mg total) by mouth daily as needed (For Systolic Blood pressure higher than 150). 90 tablet 1    meclizine (ANTIVERT) 25 mg tablet Take 1 tablet (25 mg total) by mouth 3 (three) times daily as needed for Dizziness. 20 tablet 0    memantine (NAMENDA) 5 MG Tab Take 1 tablet (5 mg total) by mouth 2 (two) times daily. Begin by taking one tablet daily for seven days, then increase to one tablet two times daily. 60 tablet 11    methocarbamoL (ROBAXIN) 500 MG Tab Take 1 tablet (500 mg total) by mouth 2 (two) times daily as needed (muscle spasm). 40 tablet 0    mv-min/iron/folic/calcium/vitK (WOMEN'S  "MULTIVITAMIN ORAL) Take by mouth once daily.      omeprazole (PRILOSEC) 20 MG capsule TAKE 1 CAPSULE EVERY DAY 90 capsule 3    simvastatin (ZOCOR) 20 MG tablet Take 1 tablet (20 mg total) by mouth every evening. 90 tablet 2     No current facility-administered medications for this visit.       Past Medical History:   Diagnosis Date    Hyperlipidemia     Hypothyroidism     Malignant neoplasm of upper-outer quadrant of right breast in female, estrogen receptor negative 1/7/2022    Osteoarthritis, knee     Vertigo        Past Surgical History:   Procedure Laterality Date    dentures      FRACTURE SURGERY Left     fracture left wrist    HYSTERECTOMY      INJECTION FOR SENTINEL NODE IDENTIFICATION Right 1/7/2022    Procedure: INJECTION, FOR SENTINEL NODE IDENTIFICATION-Right;  Surgeon: Marci Mcintosh MD;  Location: [facility] OR;  Service: General;  Laterality: Right;    left wrist surgery      MASTECTOMY, PARTIAL Right 1/7/2022    Procedure: MASTECTOMY, PARTIAL-Right with radiological marker;  Surgeon: Marci Mcintosh MD;  Location: [facility];  Service: General;  Laterality: Right;  REQUEST SAID 2 HOUR CASE    SENTINEL LYMPH NODE BIOPSY Right 1/7/2022    Procedure: BIOPSY, LYMPH NODE, SENTINEL-Right;  Surgeon: Marci Mcintosh MD;  Location: [facility];  Service: General;  Laterality: Right;       Review of Systems:  ROS    OBJECTIVE:     PHYSICAL EXAM:  Height: 5' 3" (160 cm) Weight: 52.2 kg (115 lb)  Vitals:    04/17/23 1442   Weight: 52.2 kg (115 lb)   Height: 5' 3" (1.6 m)   PainSc:   4     Well developed, well nourished female in no acute distress  Alert and oriented x 3  HEENT- Normal exam  Lungs- Clear to auscultation  Heart- Regular rate and rhythm  Abdomen- Soft nontender  Extremity exam- examination right shoulder there is tenderness over the distal clavicle mild crepitation range of motion slightly decreased in the shoulder neurologic exam intact right arm    RADIOGRAPHS:  AP x-ray right shoulder demonstrates comminuted " distal clavicle fracture but overall alignment is acceptable  Comments: I have personally reviewed the imaging and I agree with the above radiologist's report.    ASSESSMENT/PLAN:     IMPRESSION:  Right distal clavicle fracture comminuted    PLAN:  I explained the nature of the injury to the patient and daughter  I recommended use of the sling for the next several weeks although she can have it off for bathing and showering   Advil or Tylenol for pain   Limited motion no elevation with the right arm   Follow-up 2-3 weeks       - We talked at length about the anatomy and pathophysiology of   Encounter Diagnoses   Name Primary?    Closed displaced fracture of acromial end of right clavicle, initial encounter     Closed nondisplaced fracture of acromial end of right clavicle with routine healing            Disclaimer: This note has been generated using voice-recognition software. There may be typographical errors that have been missed during proof-reading.

## 2023-04-18 ENCOUNTER — PATIENT MESSAGE (OUTPATIENT)
Dept: INTERNAL MEDICINE | Facility: CLINIC | Age: 88
End: 2023-04-18
Payer: MEDICARE

## 2023-04-20 ENCOUNTER — TELEPHONE (OUTPATIENT)
Dept: ORTHOPEDICS | Facility: CLINIC | Age: 88
End: 2023-04-20
Payer: MEDICARE

## 2023-04-20 PROCEDURE — G0180 PR HOME HEALTH MD CERTIFICATION: ICD-10-PCS | Mod: ,,, | Performed by: HOSPITALIST

## 2023-04-20 PROCEDURE — G0180 MD CERTIFICATION HHA PATIENT: HCPCS | Mod: ,,, | Performed by: HOSPITALIST

## 2023-04-21 ENCOUNTER — HOSPITAL ENCOUNTER (OUTPATIENT)
Dept: RADIOLOGY | Facility: HOSPITAL | Age: 88
Discharge: HOME | End: 2023-04-21
Attending: HOSPITALIST
Payer: MEDICARE
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
## 2023-04-21 DIAGNOSIS — S09.90XD INJURY OF HEAD, SUBSEQUENT ENCOUNTER: ICD-10-CM

## 2023-04-21 PROCEDURE — 70450 CT HEAD WITHOUT CONTRAST: ICD-10-PCS | Mod: 26,HCNC,, | Performed by: RADIOLOGY

## 2023-04-21 PROCEDURE — 70450 CT HEAD/BRAIN W/O DYE: CPT | Mod: TC,HCNC

## 2023-04-21 PROCEDURE — 70450 CT HEAD/BRAIN W/O DYE: CPT | Mod: 26,HCNC,, | Performed by: RADIOLOGY

## 2023-04-23 PROBLEM — I67.9 CEREBRAL VASCULAR DISEASE: Status: ACTIVE | Noted: 2023-04-23

## 2023-04-25 ENCOUNTER — TELEPHONE (OUTPATIENT)
Dept: ORTHOPEDICS | Facility: CLINIC | Age: 88
End: 2023-04-25
Payer: MEDICARE

## 2023-04-25 NOTE — TELEPHONE ENCOUNTER
----- Message from Jovani Stoner sent at 4/25/2023  3:25 PM CDT -----  .Type:  Needs Medical Advice    Who Called: Luzma with occupational therapy from [facility]    Would the patient rather a call back or a response via [facility]? Call back  Best Call Back Number:   Additional Information:     Luzma is calling to go over pt restrictions

## 2023-04-25 NOTE — TELEPHONE ENCOUNTER
Spoke with Luzma the OT from [facility], in regards to Mrs. Quiñonez. Luzma stated patient will start OT on May 17 th, and would like to know protocol for OT, what needs, need to be addressed for the patient, and restrictions. Mrs. Quiñonez also would like to know, if she should sleep in sling, and if sling will remain intact until her May 17 th appointment with [facility]. I informed Luzma, that a message will be sent over to Dr. Cox for recommendations, and he will respond at his earliest convenience, as he is in surgery today. All questions answered and verbalization was expressed.

## 2023-04-27 ENCOUNTER — TELEPHONE (OUTPATIENT)
Dept: ORTHOPEDICS | Facility: CLINIC | Age: 88
End: 2023-04-27
Payer: MEDICARE

## 2023-04-27 NOTE — TELEPHONE ENCOUNTER
----- Message from Joselo Cox Jr., MD sent at 4/27/2023  1:22 PM CDT -----  Regarding: RE: Luzma Calling [facility] ocupational therapy [phone]  Contact: Luzma Calling [facility] ocupational therapy [phone]  Sling off in bed or chair  No heavy lifting  ----- Message -----  From: Cindy Mancini MA  Sent: 4/27/2023   1:01 PM CDT  To: Joselo Cox Jr., MD  Subject: FW: Luzma Calling [facility] o#    Please Advise on patients restrictions and if she is to wear sling at night      ----- Message -----  From: Pastora Green  Sent: 4/27/2023  12:44 PM CDT  To: Kenny VELEZ Staff  Subject: Luzma Calling [facility] ocupa#    Who Called: Luzma Calling [facility] ocupational therapy [phone]    Calling to get clarification on patients restrictions and also regards to sling if she needs to wear it at night also. Please advice

## 2023-05-04 RX ORDER — LEVOTHYROXINE SODIUM 25 UG/1
TABLET ORAL
Refills: 0 | OUTPATIENT
Start: 2023-05-04

## 2023-05-04 NOTE — TELEPHONE ENCOUNTER
Care Due:                  Date            Visit Type   Department     Provider  --------------------------------------------------------------------------------                                EP -                              PRIMARY      [facility] INTERNAL  Last Visit: 04-      CARE ([facility])   MEDICINE       Abiola  Shannan                              EP -                              PRIMARY      [facility] INTERNAL  Next Visit: 07-      CARE ([facility])   [facility]                                                            Last  Test          Frequency    Reason                     Performed    Due Date  --------------------------------------------------------------------------------    CMP.........  12 months..  losartan.................  07- 07-    TSH.........  12 months..  levothyroxine............  07- 07-    [facility] Embedded Care Due Messages. Reference number: [id number].   5/04/2023 1:11:54 PM CDT

## 2023-05-05 NOTE — TELEPHONE ENCOUNTER
Refill Decision Note   Olamide Felipe  is requesting a refill authorization.  Brief Assessment and Rationale for Refill:  Quick Discontinue     Medication Therapy Plan:    Pharmacy is requesting new scripts for the following medications without required information, (sig/ frequency/qty/etc)      Medication Reconciliation Completed: No     Comments: Pharmacies have been requesting medications for patients without required information, (sig, frequency, qty, etc.). In addition, requests are sent for medication(s) pt. are currently not taking, and medications patients have never taken.    We have spoken to the pharmacies about these request types and advised their teams previously that we are unable to assess these New Script requests and require all details for these requests. This is a known issue and has been reported.     Note composed:8:18 PM 05/04/2023

## 2023-05-16 DIAGNOSIS — S42.031A CLOSED DISPLACED FRACTURE OF ACROMIAL END OF RIGHT CLAVICLE, INITIAL ENCOUNTER: Primary | ICD-10-CM

## 2023-05-16 NOTE — PROGRESS NOTES
Subjective:      Patient ID: Olamide Felipe is a 89 y.o. female.    Chief Complaint: Follow-up of the Right Shoulder      HPI: Olamide Felipe is here in follow-up of right lateral clavicle fracture. She is now about 5 weeks out from injury. She was last seen by dr. Cox and recommended to use a sling. Today, she reports compliance with the sling. Minimal pain. Only has pain with sleeping and touch. She is getting HH PT.     Past Medical History:   Diagnosis Date    Hyperlipidemia     Hypothyroidism     Malignant neoplasm of upper-outer quadrant of right breast in female, estrogen receptor negative 1/7/2022    Osteoarthritis, knee     Vertigo        Current Outpatient Medications:     acetaminophen (TYLENOL) 325 MG tablet, Take 325 mg by mouth every 6 (six) hours as needed for Pain., Disp: , Rfl:     aspirin 81 MG Chew, Take 81 mg by mouth once daily., Disp: , Rfl:     b complex vitamins tablet, Take 1 tablet by mouth once daily., Disp: , Rfl:     calcium-vitamin D 250-100 mg-unit per tablet, Take 1 tablet by mouth 2 (two) times daily., Disp: , Rfl:     donepeziL (ARICEPT) 5 MG tablet, Take 1 tablet (5 mg total) by mouth every evening., Disp: 30 tablet, Rfl: 11    FLUoxetine 20 MG capsule, TAKE 1 CAPSULE EVERY DAY, Disp: 90 capsule, Rfl: 3    levothyroxine (SYNTHROID) 25 MCG tablet, Take 1 tablet (25 mcg total) by mouth before breakfast., Disp: 90 tablet, Rfl: 3    loratadine (CLARITIN) 10 mg tablet, Take 1 tablet (10 mg total) by mouth once daily., Disp: 30 tablet, Rfl: 2    losartan (COZAAR) 25 MG tablet, Take 1 tablet (25 mg total) by mouth daily as needed (For Systolic Blood pressure higher than 150)., Disp: 90 tablet, Rfl: 1    meclizine (ANTIVERT) 25 mg tablet, Take 1 tablet (25 mg total) by mouth 3 (three) times daily as needed for Dizziness., Disp: 20 tablet, Rfl: 0    memantine (NAMENDA) 5 MG Tab, Take 1 tablet (5 mg total) by mouth 2 (two) times daily. Begin by taking one tablet daily for seven  "days, then increase to one tablet two times daily., Disp: 60 tablet, Rfl: 11    methocarbamoL (ROBAXIN) 500 MG Tab, Take 1 tablet (500 mg total) by mouth 2 (two) times daily as needed (muscle spasm)., Disp: 40 tablet, Rfl: 0    mv-min/iron/folic/calcium/vitK (WOMEN'S MULTIVITAMIN ORAL), Take by mouth once daily., Disp: , Rfl:     omeprazole (PRILOSEC) 20 MG capsule, TAKE 1 CAPSULE EVERY DAY, Disp: 90 capsule, Rfl: 3    simvastatin (ZOCOR) 20 MG tablet, Take 1 tablet (20 mg total) by mouth every evening., Disp: 90 tablet, Rfl: 2  Review of patient's allergies indicates:  No Known Allergies    Ht 5' 3" (1.6 m)   Wt 52.2 kg (115 lb)   BMI 20.37 kg/m²     ROS      Objective:    Ortho Exam       RIGHT SHOULDER:  Skin: No rashes or lesions on exposed areas.  Atrophy: general, age related.  Tenderness to palpation: AC Joint and Acromion.  PROM (deg): abduction- 90, flexion- 160, rotation- unrestricted, painful rotation- absent.  Deformity noted    GEN: Well developed, well nourished elderly female. AAOX3. No acute distress.   Normocephalic, atraumatic.   NELSON  Breathing unlabored.  Mood and affect appropriate.     Assessment:     Imaging:  I have personally reviewed and interpreted the radiographs. Xray of the right clavicle shows redemonstration of comminuted lateral clavicle fracture.  Elevation of the clavicle noted          1. Closed nondisplaced fracture of acromial end of right clavicle with routine healing          Plan:          Patient is progressing well status post clavicle fracture  She may wean out of the sling for light activities while at home but should continue the sling for leaving the house  She may continue physical therapy with home health for passive ROM of the shoulder for now.  In the next 2 weeks or so she may increase to active range of motion if tolerable  OTC analgesia if needed    Follow up in about 3 weeks (around 6/7/2023) for Repeat x-rays.            "

## 2023-05-17 ENCOUNTER — OFFICE VISIT (OUTPATIENT)
Dept: ORTHOPEDICS | Facility: CLINIC | Age: 88
End: 2023-05-17
Payer: MEDICARE

## 2023-05-17 ENCOUNTER — HOSPITAL ENCOUNTER (OUTPATIENT)
Dept: RADIOLOGY | Facility: HOSPITAL | Age: 88
Discharge: HOME | End: 2023-05-17
Attending: ORTHOPAEDIC SURGERY
Payer: MEDICARE

## 2023-05-17 VITALS — WEIGHT: 115 LBS | BODY MASS INDEX: 20.38 KG/M2 | HEIGHT: 63 IN

## 2023-05-17 DIAGNOSIS — S42.034D CLOSED NONDISPLACED FRACTURE OF ACROMIAL END OF RIGHT CLAVICLE WITH ROUTINE HEALING: Primary | ICD-10-CM

## 2023-05-17 DIAGNOSIS — S42.031A CLOSED DISPLACED FRACTURE OF ACROMIAL END OF RIGHT CLAVICLE, INITIAL ENCOUNTER: ICD-10-CM

## 2023-05-17 PROCEDURE — 1159F PR MEDICATION LIST DOCUMENTED IN MEDICAL RECORD: ICD-10-PCS | Mod: HCNC,CPTII,, | Performed by: ORTHOPAEDIC SURGERY

## 2023-05-17 PROCEDURE — 73030 XR SHOULDER COMPLETE 2 OR MORE VIEWS RIGHT: ICD-10-PCS | Mod: 26,HCNC,RT, | Performed by: RADIOLOGY

## 2023-05-17 PROCEDURE — 99213 OFFICE O/P EST LOW 20 MIN: CPT | Mod: HCNC,,, | Performed by: ORTHOPAEDIC SURGERY

## 2023-05-17 PROCEDURE — 99213 PR OFFICE/OUTPT VISIT, EST, LEVL III, 20-29 MIN: ICD-10-PCS | Mod: HCNC,,, | Performed by: ORTHOPAEDIC SURGERY

## 2023-05-17 PROCEDURE — 99999 PR PBB SHADOW E&M-EST. PATIENT-LVL III: ICD-10-PCS | Mod: PBBFAC,HCNC,, | Performed by: ORTHOPAEDIC SURGERY

## 2023-05-17 PROCEDURE — 1101F PR PT FALLS ASSESS DOC 0-1 FALLS W/OUT INJ PAST YR: ICD-10-PCS | Mod: HCNC,CPTII,, | Performed by: ORTHOPAEDIC SURGERY

## 2023-05-17 PROCEDURE — 1101F PT FALLS ASSESS-DOCD LE1/YR: CPT | Mod: HCNC,CPTII,, | Performed by: ORTHOPAEDIC SURGERY

## 2023-05-17 PROCEDURE — 3288F FALL RISK ASSESSMENT DOCD: CPT | Mod: HCNC,CPTII,, | Performed by: ORTHOPAEDIC SURGERY

## 2023-05-17 PROCEDURE — 3288F PR FALLS RISK ASSESSMENT DOCUMENTED: ICD-10-PCS | Mod: HCNC,CPTII,, | Performed by: ORTHOPAEDIC SURGERY

## 2023-05-17 PROCEDURE — 1125F PR PAIN SEVERITY QUANTIFIED, PAIN PRESENT: ICD-10-PCS | Mod: HCNC,CPTII,, | Performed by: ORTHOPAEDIC SURGERY

## 2023-05-17 PROCEDURE — 73030 X-RAY EXAM OF SHOULDER: CPT | Mod: TC,HCNC,PN,RT

## 2023-05-17 PROCEDURE — 1159F MED LIST DOCD IN RCRD: CPT | Mod: HCNC,CPTII,, | Performed by: ORTHOPAEDIC SURGERY

## 2023-05-17 PROCEDURE — 1125F AMNT PAIN NOTED PAIN PRSNT: CPT | Mod: HCNC,CPTII,, | Performed by: ORTHOPAEDIC SURGERY

## 2023-05-17 PROCEDURE — 73030 X-RAY EXAM OF SHOULDER: CPT | Mod: 26,HCNC,RT, | Performed by: RADIOLOGY

## 2023-05-17 PROCEDURE — 99999 PR PBB SHADOW E&M-EST. PATIENT-LVL III: CPT | Mod: PBBFAC,HCNC,, | Performed by: ORTHOPAEDIC SURGERY

## 2023-05-17 NOTE — PATIENT INSTRUCTIONS
You should wear the sling when leaving the house  You can have the sling off while you are at home and for light activity but no reaching above your head  Continue physical therapy with home health - passive ROM of the shoulder is permitted.  Can start active ROM/ strengthening exercises in 2 weeks if tolerable.

## 2023-05-18 ENCOUNTER — PATIENT MESSAGE (OUTPATIENT)
Dept: ORTHOPEDICS | Facility: CLINIC | Age: 88
End: 2023-05-18
Payer: MEDICARE

## 2023-05-19 ENCOUNTER — TELEPHONE (OUTPATIENT)
Dept: ORTHOPEDICS | Facility: CLINIC | Age: 88
End: 2023-05-19
Payer: MEDICARE

## 2023-05-19 NOTE — TELEPHONE ENCOUNTER
----- Message from Rosmery Reyes sent at 5/19/2023  2:32 PM CDT -----  Contact: bernarda  Type:  Needs Medical Advice    Who Called: [facility]   Symptoms (please be specific):  they are requesting to get a return call to clarify some orders      Would the patient rather a call back or a response via [facility]? call  Best Call Back Number: [phone]  Additional Information:

## 2023-05-19 NOTE — TELEPHONE ENCOUNTER
Attempted to contact [facility] in regards to Mrs. Quiñonez, but was unable to leave a voicemail.

## 2023-05-20 ENCOUNTER — EXTERNAL HOME HEALTH (OUTPATIENT)
Dept: HOME HEALTH SERVICES | Facility: HOSPITAL | Age: 88
End: 2023-05-20
Payer: MEDICARE

## 2023-06-01 ENCOUNTER — DOCUMENT SCAN (OUTPATIENT)
Dept: HOME HEALTH SERVICES | Facility: HOSPITAL | Age: 88
End: 2023-06-01
Payer: MEDICARE

## 2023-06-08 RX ORDER — LEVOTHYROXINE SODIUM 25 UG/1
25 TABLET ORAL
Qty: 90 TABLET | Refills: 0 | Status: SHIPPED | OUTPATIENT
Start: 2023-12-11 | End: 2024-03-12 | Stop reason: SDUPTHER

## 2023-06-08 RX ORDER — LORATADINE 10 MG/1
10 TABLET ORAL DAILY
Qty: 90 TABLET | Refills: 0 | Status: SHIPPED | OUTPATIENT
Start: 2023-12-11 | End: 2024-03-12 | Stop reason: SDUPTHER

## 2023-06-08 NOTE — TELEPHONE ENCOUNTER
----- Message from Bernice Caicedo sent at 6/8/2023  2:05 PM CDT -----  Contact: pt [phone]  Requesting an RX refill or new RX.  Is this a refill or new RX: refill  RX name and strength (copy/paste from chart):  levothyroxine (SYNTHROID) 25 MCG tablet  Is this a 30 day or 90 day RX: 90  Pharmacy name and phone # (copy/paste from chart):      [facility] [address]  [address] [id number]  Phone: [phone] Fax: [phone]      Requesting an RX refill or new RX.  Is this a refill or new RX: refill  RX name and strength (copy/paste from chart):  loratadine (CLARITIN) 10 mg tablet  Is this a 30 day or 90 day RX: 90  Pharmacy name and phone # (copy/paste from chart):  [facility] - [facility] [address]  [address] [id number]  Phone: [phone] Fax: [phone]    The doctors have asked that we provide their patients with the following 2 reminders -- prescription refills can take up to 72 hours, and a friendly reminder that in the future you can use your [facility] account to request refills: yes

## 2023-06-08 NOTE — TELEPHONE ENCOUNTER
No care due was identified.  [facility] Embedded Care Due Messages. Reference number: [id number].   6/08/2023 4:51:38 PM CDT

## 2023-06-09 DIAGNOSIS — S42.034D CLOSED NONDISPLACED FRACTURE OF ACROMIAL END OF RIGHT CLAVICLE WITH ROUTINE HEALING: Primary | ICD-10-CM

## 2023-06-12 ENCOUNTER — OFFICE VISIT (OUTPATIENT)
Dept: ORTHOPEDICS | Facility: CLINIC | Age: 88
End: 2023-06-12
Payer: MEDICARE

## 2023-06-12 ENCOUNTER — HOSPITAL ENCOUNTER (OUTPATIENT)
Dept: RADIOLOGY | Facility: HOSPITAL | Age: 88
Discharge: HOME | End: 2023-06-12
Attending: PHYSICIAN ASSISTANT
Payer: MEDICARE

## 2023-06-12 VITALS — BODY MASS INDEX: 20.39 KG/M2 | HEIGHT: 63 IN | WEIGHT: 115.06 LBS

## 2023-06-12 DIAGNOSIS — S42.034D CLOSED NONDISPLACED FRACTURE OF ACROMIAL END OF RIGHT CLAVICLE WITH ROUTINE HEALING: Primary | ICD-10-CM

## 2023-06-12 DIAGNOSIS — S42.034D CLOSED NONDISPLACED FRACTURE OF ACROMIAL END OF RIGHT CLAVICLE WITH ROUTINE HEALING: ICD-10-CM

## 2023-06-12 PROCEDURE — 1126F AMNT PAIN NOTED NONE PRSNT: CPT | Mod: HCNC,CPTII,S$GLB, | Performed by: PHYSICIAN ASSISTANT

## 2023-06-12 PROCEDURE — 1160F RVW MEDS BY RX/DR IN RCRD: CPT | Mod: HCNC,CPTII,S$GLB, | Performed by: PHYSICIAN ASSISTANT

## 2023-06-12 PROCEDURE — 1160F PR REVIEW ALL MEDS BY PRESCRIBER/CLIN PHARMACIST DOCUMENTED: ICD-10-PCS | Mod: HCNC,CPTII,S$GLB, | Performed by: PHYSICIAN ASSISTANT

## 2023-06-12 PROCEDURE — 99999 PR PBB SHADOW E&M-EST. PATIENT-LVL III: ICD-10-PCS | Mod: PBBFAC,HCNC,, | Performed by: PHYSICIAN ASSISTANT

## 2023-06-12 PROCEDURE — 1159F MED LIST DOCD IN RCRD: CPT | Mod: HCNC,CPTII,S$GLB, | Performed by: PHYSICIAN ASSISTANT

## 2023-06-12 PROCEDURE — 3288F PR FALLS RISK ASSESSMENT DOCUMENTED: ICD-10-PCS | Mod: HCNC,CPTII,S$GLB, | Performed by: PHYSICIAN ASSISTANT

## 2023-06-12 PROCEDURE — 73030 X-RAY EXAM OF SHOULDER: CPT | Mod: TC,HCNC,PN,RT

## 2023-06-12 PROCEDURE — 1100F PTFALLS ASSESS-DOCD GE2>/YR: CPT | Mod: HCNC,CPTII,S$GLB, | Performed by: PHYSICIAN ASSISTANT

## 2023-06-12 PROCEDURE — 1126F PR PAIN SEVERITY QUANTIFIED, NO PAIN PRESENT: ICD-10-PCS | Mod: HCNC,CPTII,S$GLB, | Performed by: PHYSICIAN ASSISTANT

## 2023-06-12 PROCEDURE — 1100F PR PT FALLS ASSESS DOC 2+ FALLS/FALL W/INJURY/YR: ICD-10-PCS | Mod: HCNC,CPTII,S$GLB, | Performed by: PHYSICIAN ASSISTANT

## 2023-06-12 PROCEDURE — 1159F PR MEDICATION LIST DOCUMENTED IN MEDICAL RECORD: ICD-10-PCS | Mod: HCNC,CPTII,S$GLB, | Performed by: PHYSICIAN ASSISTANT

## 2023-06-12 PROCEDURE — 99213 PR OFFICE/OUTPT VISIT, EST, LEVL III, 20-29 MIN: ICD-10-PCS | Mod: HCNC,S$GLB,, | Performed by: PHYSICIAN ASSISTANT

## 2023-06-12 PROCEDURE — 73030 X-RAY EXAM OF SHOULDER: CPT | Mod: 26,HCNC,RT, | Performed by: RADIOLOGY

## 2023-06-12 PROCEDURE — 73030 XR SHOULDER TRAUMA 3 VIEW RIGHT: ICD-10-PCS | Mod: 26,HCNC,RT, | Performed by: RADIOLOGY

## 2023-06-12 PROCEDURE — 99999 PR PBB SHADOW E&M-EST. PATIENT-LVL III: CPT | Mod: PBBFAC,HCNC,, | Performed by: PHYSICIAN ASSISTANT

## 2023-06-12 PROCEDURE — 99213 OFFICE O/P EST LOW 20 MIN: CPT | Mod: HCNC,S$GLB,, | Performed by: PHYSICIAN ASSISTANT

## 2023-06-12 PROCEDURE — 3288F FALL RISK ASSESSMENT DOCD: CPT | Mod: HCNC,CPTII,S$GLB, | Performed by: PHYSICIAN ASSISTANT

## 2023-06-12 NOTE — PROGRESS NOTES
Subjective:      Patient ID: Olamide Felipe is a 89 y.o. female.    Chief Complaint: Clavicle Injury (rt follow up )      HPI: Olamide Felipe is here in follow-up of right lateral clavicle fracture.     She is now about 2 months s/p injury.  The patient has been utilizing her sling during out of the house activities.  She continues to deny pain overlying fracture site and has been working on gentle shoulder range of motion.  She notes only mild tenderness of the lateral shoulder at this point.    Past Medical History:   Diagnosis Date    Hyperlipidemia     Hypothyroidism     Malignant neoplasm of upper-outer quadrant of right breast in female, estrogen receptor negative 1/7/2022    Osteoarthritis, knee     Vertigo        Current Outpatient Medications:     acetaminophen (TYLENOL) 325 MG tablet, Take 325 mg by mouth every 6 (six) hours as needed for Pain., Disp: , Rfl:     aspirin 81 MG Chew, Take 81 mg by mouth once daily., Disp: , Rfl:     b complex vitamins tablet, Take 1 tablet by mouth once daily., Disp: , Rfl:     calcium-vitamin D 250-100 mg-unit per tablet, Take 1 tablet by mouth 2 (two) times daily., Disp: , Rfl:     donepeziL (ARICEPT) 5 MG tablet, Take 1 tablet (5 mg total) by mouth every evening., Disp: 30 tablet, Rfl: 11    FLUoxetine 20 MG capsule, TAKE 1 CAPSULE EVERY DAY, Disp: 90 capsule, Rfl: 3    levothyroxine (SYNTHROID) 25 MCG tablet, Take 1 tablet (25 mcg total) by mouth before breakfast., Disp: 90 tablet, Rfl: 3    loratadine (CLARITIN) 10 mg tablet, Take 1 tablet (10 mg total) by mouth once daily., Disp: 90 tablet, Rfl: 3    losartan (COZAAR) 25 MG tablet, Take 1 tablet (25 mg total) by mouth daily as needed (For Systolic Blood pressure higher than 150)., Disp: 90 tablet, Rfl: 1    memantine (NAMENDA) 5 MG Tab, Take 1 tablet (5 mg total) by mouth 2 (two) times daily. Begin by taking one tablet daily for seven days, then increase to one tablet two times daily., Disp: 60 tablet, Rfl:  "11    mv-min/iron/folic/calcium/vitK (WOMEN'S MULTIVITAMIN ORAL), Take by mouth once daily., Disp: , Rfl:     omeprazole (PRILOSEC) 20 MG capsule, TAKE 1 CAPSULE EVERY DAY, Disp: 90 capsule, Rfl: 3    simvastatin (ZOCOR) 20 MG tablet, Take 1 tablet (20 mg total) by mouth every evening., Disp: 90 tablet, Rfl: 2    meclizine (ANTIVERT) 25 mg tablet, Take 1 tablet (25 mg total) by mouth 3 (three) times daily as needed for Dizziness. (Patient not taking: Reported on 6/12/2023), Disp: 20 tablet, Rfl: 0    methocarbamoL (ROBAXIN) 500 MG Tab, Take 1 tablet (500 mg total) by mouth 2 (two) times daily as needed (muscle spasm). (Patient not taking: Reported on 6/12/2023), Disp: 40 tablet, Rfl: 0  Review of patient's allergies indicates:  No Known Allergies    Ht 5' 3" (1.6 m)   Wt 52.2 kg (115 lb 1.3 oz)   BMI 20.39 kg/m²     Review of Systems   Constitutional:  Negative for chills and fever.   Respiratory:  Negative for shortness of breath.    Cardiovascular:  Negative for chest pain and leg swelling.   Gastrointestinal:  Negative for nausea and vomiting.   Genitourinary:  Negative for dysuria.   Musculoskeletal:  Negative for falls and joint pain.   Skin:  Negative for rash.   Neurological:  Negative for dizziness and sensory change.        Objective:    Ortho Exam       RIGHT SHOULDER:  Skin: No rashes or lesions on exposed areas.  Atrophy: general, age related.  Tenderness to palpation:  Negative overlying fracture site to the lateral clavicle  PROM (deg): abduction- 90, flexion- 160, rotation- unrestricted, painful rotation- absent.  Deformity noted, mild    GEN: Well developed, well nourished elderly female. AAOX3. No acute distress.   Normocephalic, atraumatic.   NELSON  Breathing unlabored.  Mood and affect appropriate.     Imaging:  I have personally reviewed and interpreted the radiographs. Xray of the right clavicle shows redemonstration of comminuted lateral clavicle fracture.  Elevation of the clavicle " noted    Assessment:         1. Closed nondisplaced fracture of acromial end of right clavicle with routine healing        Plan:       The fracture continues to be visible on today's radiographs.  However, the patient's pain has greatly subsided and her shoulder range-of-motion is excellent today.  She may wean out of the sling  She may continue physical therapy with home health for AROM + PROM  OTC analgesia if needed    Follow Up: 6wks with repeat XR R clavicle

## 2023-07-03 ENCOUNTER — PATIENT MESSAGE (OUTPATIENT)
Dept: ORTHOPEDICS | Facility: CLINIC | Age: 88
End: 2023-07-03
Payer: MEDICARE

## 2023-07-11 ENCOUNTER — LAB VISIT (OUTPATIENT)
Dept: LAB | Facility: HOSPITAL | Age: 88
End: 2023-07-11
Attending: HOSPITALIST
Payer: MEDICARE

## 2023-07-11 DIAGNOSIS — E21.3 HYPERPARATHYROIDISM: ICD-10-CM

## 2023-07-11 DIAGNOSIS — I10 HYPERTENSION, UNSPECIFIED TYPE: ICD-10-CM

## 2023-07-11 DIAGNOSIS — E78.00 PURE HYPERCHOLESTEROLEMIA: ICD-10-CM

## 2023-07-11 DIAGNOSIS — E03.9 HYPOTHYROIDISM, UNSPECIFIED TYPE: ICD-10-CM

## 2023-07-11 LAB
25(OH)D3+25(OH)D2 SERPL-MCNC: 35 NG/ML (ref 30–96)
ALBUMIN SERPL BCP-MCNC: 3.7 G/DL (ref 3.5–5.2)
ALP SERPL-CCNC: 65 U/L (ref 55–135)
ALT SERPL W/O P-5'-P-CCNC: 14 U/L (ref 10–44)
ANION GAP SERPL CALC-SCNC: 4 MMOL/L (ref 8–16)
AST SERPL-CCNC: 23 U/L (ref 10–40)
BASOPHILS # BLD AUTO: 0.03 K/UL (ref 0–0.2)
BASOPHILS NFR BLD: 0.6 % (ref 0–1.9)
BILIRUB SERPL-MCNC: 0.5 MG/DL (ref 0.1–1)
BUN SERPL-MCNC: 16 MG/DL (ref 8–23)
CALCIUM SERPL-MCNC: 10 MG/DL (ref 8.7–10.5)
CHLORIDE SERPL-SCNC: 109 MMOL/L (ref 95–110)
CHOLEST SERPL-MCNC: 206 MG/DL (ref 120–199)
CHOLEST/HDLC SERPL: 3.1 {RATIO} (ref 2–5)
CO2 SERPL-SCNC: 28 MMOL/L (ref 23–29)
CREAT SERPL-MCNC: 0.9 MG/DL (ref 0.5–1.4)
DIFFERENTIAL METHOD: NORMAL
EOSINOPHIL # BLD AUTO: 0.3 K/UL (ref 0–0.5)
EOSINOPHIL NFR BLD: 4.9 % (ref 0–8)
ERYTHROCYTE [DISTWIDTH] IN BLOOD BY AUTOMATED COUNT: 12.2 % (ref 11.5–14.5)
EST. GFR  (NO RACE VARIABLE): >60 ML/MIN/1.73 M^2
GLUCOSE SERPL-MCNC: 87 MG/DL (ref 70–110)
HCT VFR BLD AUTO: 39.1 % (ref 37–48.5)
HDLC SERPL-MCNC: 66 MG/DL (ref 40–75)
HDLC SERPL: 32 % (ref 20–50)
HGB BLD-MCNC: 12.6 G/DL (ref 12–16)
IMM GRANULOCYTES # BLD AUTO: 0 K/UL (ref 0–0.04)
IMM GRANULOCYTES NFR BLD AUTO: 0 % (ref 0–0.5)
LDLC SERPL CALC-MCNC: 118 MG/DL (ref 63–159)
LYMPHOCYTES # BLD AUTO: 1.5 K/UL (ref 1–4.8)
LYMPHOCYTES NFR BLD: 29.8 % (ref 18–48)
MCH RBC QN AUTO: 30.7 PG (ref 27–31)
MCHC RBC AUTO-ENTMCNC: 32.2 G/DL (ref 32–36)
MCV RBC AUTO: 95 FL (ref 82–98)
MONOCYTES # BLD AUTO: 0.5 K/UL (ref 0.3–1)
MONOCYTES NFR BLD: 9.1 % (ref 4–15)
NEUTROPHILS # BLD AUTO: 2.9 K/UL (ref 1.8–7.7)
NEUTROPHILS NFR BLD: 55.6 % (ref 38–73)
NONHDLC SERPL-MCNC: 140 MG/DL
NRBC BLD-RTO: 0 /100 WBC
PLATELET # BLD AUTO: 237 K/UL (ref 150–450)
PMV BLD AUTO: 10.5 FL (ref 9.2–12.9)
POTASSIUM SERPL-SCNC: 5 MMOL/L (ref 3.5–5.1)
PROT SERPL-MCNC: 6.1 G/DL (ref 6–8.4)
PTH-INTACT SERPL-MCNC: 54.5 PG/ML (ref 9–77)
RBC # BLD AUTO: 4.1 M/UL (ref 4–5.4)
SODIUM SERPL-SCNC: 141 MMOL/L (ref 136–145)
TRIGL SERPL-MCNC: 110 MG/DL (ref 30–150)
TSH SERPL DL<=0.005 MIU/L-ACNC: 2.71 UIU/ML (ref 0.4–4)
WBC # BLD AUTO: 5.14 K/UL (ref 3.9–12.7)

## 2023-07-11 PROCEDURE — 83970 ASSAY OF PARATHORMONE: CPT | Mod: HCNC | Performed by: HOSPITALIST

## 2023-07-11 PROCEDURE — 85025 COMPLETE CBC W/AUTO DIFF WBC: CPT | Mod: HCNC | Performed by: HOSPITALIST

## 2023-07-11 PROCEDURE — 84443 ASSAY THYROID STIM HORMONE: CPT | Mod: HCNC | Performed by: HOSPITALIST

## 2023-07-11 PROCEDURE — 82306 VITAMIN D 25 HYDROXY: CPT | Mod: HCNC | Performed by: HOSPITALIST

## 2023-07-11 PROCEDURE — 80061 LIPID PANEL: CPT | Mod: HCNC | Performed by: HOSPITALIST

## 2023-07-11 PROCEDURE — 36415 COLL VENOUS BLD VENIPUNCTURE: CPT | Mod: HCNC,PO | Performed by: HOSPITALIST

## 2023-07-11 PROCEDURE — 80053 COMPREHEN METABOLIC PANEL: CPT | Mod: HCNC | Performed by: HOSPITALIST

## 2023-07-18 ENCOUNTER — OFFICE VISIT (OUTPATIENT)
Dept: INTERNAL MEDICINE | Facility: CLINIC | Age: 88
End: 2023-07-18
Payer: MEDICARE

## 2023-07-18 VITALS
OXYGEN SATURATION: 98 % | HEIGHT: 63 IN | WEIGHT: 116.38 LBS | BODY MASS INDEX: 20.62 KG/M2 | SYSTOLIC BLOOD PRESSURE: 142 MMHG | HEART RATE: 65 BPM | RESPIRATION RATE: 19 BRPM | TEMPERATURE: 98 F | DIASTOLIC BLOOD PRESSURE: 70 MMHG

## 2023-07-18 DIAGNOSIS — I15.9 SECONDARY HYPERTENSION: ICD-10-CM

## 2023-07-18 DIAGNOSIS — Z00.00 ENCOUNTER FOR PREVENTIVE HEALTH EXAMINATION: Primary | ICD-10-CM

## 2023-07-18 DIAGNOSIS — E21.3 HYPERPARATHYROIDISM: ICD-10-CM

## 2023-07-18 DIAGNOSIS — Z74.09 IMPAIRED FUNCTIONAL MOBILITY, BALANCE, GAIT, AND ENDURANCE: ICD-10-CM

## 2023-07-18 DIAGNOSIS — C50.411 MALIGNANT NEOPLASM OF UPPER-OUTER QUADRANT OF RIGHT BREAST IN FEMALE, ESTROGEN RECEPTOR NEGATIVE: ICD-10-CM

## 2023-07-18 DIAGNOSIS — Z17.1 MALIGNANT NEOPLASM OF UPPER-OUTER QUADRANT OF RIGHT BREAST IN FEMALE, ESTROGEN RECEPTOR NEGATIVE: ICD-10-CM

## 2023-07-18 DIAGNOSIS — E78.5 HYPERLIPIDEMIA, UNSPECIFIED HYPERLIPIDEMIA TYPE: ICD-10-CM

## 2023-07-18 DIAGNOSIS — G31.84 MCI (MILD COGNITIVE IMPAIRMENT): ICD-10-CM

## 2023-07-18 DIAGNOSIS — E03.9 HYPOTHYROIDISM, UNSPECIFIED TYPE: ICD-10-CM

## 2023-07-18 DIAGNOSIS — R42 VERTIGO: ICD-10-CM

## 2023-07-18 DIAGNOSIS — F39 MOOD DISORDER: ICD-10-CM

## 2023-07-18 DIAGNOSIS — I70.0 AORTIC ATHEROSCLEROSIS: ICD-10-CM

## 2023-07-18 PROCEDURE — 3288F PR FALLS RISK ASSESSMENT DOCUMENTED: ICD-10-PCS | Mod: HCNC,CPTII,S$GLB, | Performed by: HOSPITALIST

## 2023-07-18 PROCEDURE — 1100F PTFALLS ASSESS-DOCD GE2>/YR: CPT | Mod: HCNC,CPTII,S$GLB, | Performed by: HOSPITALIST

## 2023-07-18 PROCEDURE — 1160F RVW MEDS BY RX/DR IN RCRD: CPT | Mod: HCNC,CPTII,S$GLB, | Performed by: HOSPITALIST

## 2023-07-18 PROCEDURE — 1126F AMNT PAIN NOTED NONE PRSNT: CPT | Mod: HCNC,CPTII,S$GLB, | Performed by: HOSPITALIST

## 2023-07-18 PROCEDURE — 1126F PR PAIN SEVERITY QUANTIFIED, NO PAIN PRESENT: ICD-10-PCS | Mod: HCNC,CPTII,S$GLB, | Performed by: HOSPITALIST

## 2023-07-18 PROCEDURE — 3288F FALL RISK ASSESSMENT DOCD: CPT | Mod: HCNC,CPTII,S$GLB, | Performed by: HOSPITALIST

## 2023-07-18 PROCEDURE — 99397 PER PM REEVAL EST PAT 65+ YR: CPT | Mod: HCNC,S$GLB,, | Performed by: HOSPITALIST

## 2023-07-18 PROCEDURE — 1160F PR REVIEW ALL MEDS BY PRESCRIBER/CLIN PHARMACIST DOCUMENTED: ICD-10-PCS | Mod: HCNC,CPTII,S$GLB, | Performed by: HOSPITALIST

## 2023-07-18 PROCEDURE — 99999 PR PBB SHADOW E&M-EST. PATIENT-LVL V: ICD-10-PCS | Mod: PBBFAC,HCNC,, | Performed by: HOSPITALIST

## 2023-07-18 PROCEDURE — 99999 PR PBB SHADOW E&M-EST. PATIENT-LVL V: CPT | Mod: PBBFAC,HCNC,, | Performed by: HOSPITALIST

## 2023-07-18 PROCEDURE — 1100F PR PT FALLS ASSESS DOC 2+ FALLS/FALL W/INJURY/YR: ICD-10-PCS | Mod: HCNC,CPTII,S$GLB, | Performed by: HOSPITALIST

## 2023-07-18 PROCEDURE — 99397 PR PREVENTIVE VISIT,EST,65 & OVER: ICD-10-PCS | Mod: HCNC,S$GLB,, | Performed by: HOSPITALIST

## 2023-07-18 PROCEDURE — 1159F PR MEDICATION LIST DOCUMENTED IN MEDICAL RECORD: ICD-10-PCS | Mod: HCNC,CPTII,S$GLB, | Performed by: HOSPITALIST

## 2023-07-18 PROCEDURE — 1159F MED LIST DOCD IN RCRD: CPT | Mod: HCNC,CPTII,S$GLB, | Performed by: HOSPITALIST

## 2023-07-18 NOTE — PROGRESS NOTES
Subjective:     @Patient ID: Olamide Felipe is a 89 y.o. female.    Chief Complaint: Annual Exam    HPI    89 y.o. 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 Accompanied today by her daughter      HTN:  losartan 25 mg qday if SBP >150  HLD: simvastatin 20 mg qday  MCI: seeing neuro. Due for follow-up. On memantine 5 mg, aricept 5 mg  Hyperparathyroidism: has f/u with endocrinology.  Mood: on fluoxetine 20 mg qday  Hypothyroidism: synthroid 25 mcg   Hx of breast ca: follows with breast center. Mmg due in December 8. S/p R clavicular fracture: completed PT. Has f/u with ortho  9. Pt reports feeling off balance at times with ambulation. Does have hx of vertigo.       Lipid disorders/ASCVD risk (ages >/= 45 or >/= 20 if increased risk ): ordered  Eye exam: DUE  Breast Cancer (40-50y discretion of pt, 50-74y every 1-2 years): Mammogram:  monitored by [facility] onc   Colorectal Cancer (normal risk 50-75yr): Colonoscopy: cologuard negative 7/2021   DEXA (F>66 yo, M >69yo, M&F 50-68 yo with risk factors (smoking, previous fx, wt <70kg; etoh abuse, chronic steroids, RA)): done 6/2019     Vaccines:   Influenza (yearly) n/a   Tetanus (every 10 yrs - 1st tdap) she reports done about 2 years    PPSV23(>64yo or <65 w/ lung dz, smoking, DM)    PCV13 (> 65 or <65 w/ immunocompromised) done 11/2019  Zoster (>59yo) 2nd dose DUE      Exercise:  Occasional walking  Diet: regular       Review of Systems   Constitutional:  Negative for chills and fever.   HENT:  Negative for congestion.    Respiratory:  Negative for cough and shortness of breath.    Cardiovascular:  Negative for chest pain.   Genitourinary:  Negative for difficulty urinating.   Psychiatric/Behavioral:  Negative for agitation.    Past medical history, surgical history, and family medical  history reviewed and updated as appropriate.    Medications and allergies reviewed.     Objective:     There were no vitals filed for this visit.  There is no height or weight on file to calculate BMI.  Physical Exam  Vitals reviewed.   Constitutional:       General: She is not in acute distress.     Appearance: She is well-developed.   HENT:      Head: Normocephalic and atraumatic.      Right Ear: Tympanic membrane normal.      Left Ear: Tympanic membrane normal.      Mouth/Throat:      Mouth: Mucous membranes are moist.      Pharynx: No oropharyngeal exudate.   Eyes:      General:         Right eye: No discharge.         Left eye: No discharge.      Conjunctiva/sclera: Conjunctivae normal.   Cardiovascular:      Rate and Rhythm: Normal rate and regular rhythm.      Heart sounds: No murmur heard.    No friction rub.   Pulmonary:      Effort: Pulmonary effort is normal.      Breath sounds: Normal breath sounds.   Abdominal:      General: Bowel sounds are normal. There is no distension.      Palpations: Abdomen is soft.      Tenderness: There is no abdominal tenderness. There is no guarding.   Musculoskeletal:      Cervical back: Normal range of motion and neck supple.      Right lower leg: No edema.      Left lower leg: No edema.   Lymphadenopathy:      Cervical: No cervical adenopathy.   Skin:     General: Skin is warm and dry.   Neurological:      Mental Status: She is alert and oriented to person, place, and time.   Psychiatric:         Mood and Affect: Mood normal.         Behavior: Behavior normal.       Lab Results   Component Value Date    WBC 5.14 07/11/2023    HGB 12.6 07/11/2023    HCT 39.1 07/11/2023     07/11/2023    CHOL 206 (H) 07/11/2023    TRIG 110 07/11/2023    HDL 66 07/11/2023    ALT 14 07/11/2023    AST 23 07/11/2023     07/11/2023    K 5.0 07/11/2023     07/11/2023    CREATININE 0.9 07/11/2023    BUN 16 07/11/2023    CO2 28 07/11/2023    TSH 2.705 07/11/2023       Assessment:      1. Encounter for preventive health examination    2. Secondary hypertension    3. Hyperlipidemia, unspecified hyperlipidemia type    4. Hypothyroidism, unspecified type    5. Mood disorder    6. Aortic atherosclerosis    7. Hyperparathyroidism    8. MCI (mild cognitive impairment)    9. Impaired functional mobility, balance, gait, and endurance    10. Vertigo    11. Malignant neoplasm of upper-outer quadrant of right breast in female, estrogen receptor negative      Plan:   Olamide was seen today for annual exam.    Diagnoses and all orders for this visit:    Encounter for preventive health examination  - labs reviewed in clinic     Secondary hypertension  - Stable. Continue home meds     -     Comprehensive Metabolic Panel; Future  -     CBC Auto Differential; Future  -     Lipid Panel; Future  -     TSH; Future       Hyperlipidemia, unspecified hyperlipidemia type  - Stable. Continue home meds     -     Comprehensive Metabolic Panel; Future  -     CBC Auto Differential; Future  -     Lipid Panel; Future  -     TSH; Future       Hypothyroidism, unspecified type  - Stable. Continue home meds     -     Comprehensive Metabolic Panel; Future  -     CBC Auto Differential; Future  -     Lipid Panel; Future  -     TSH; Future      Mood disorder  - Stable. Continue home meds     Aortic atherosclerosis  - Stable. Continue home meds     Hyperparathyroidism  - stable. Continue to monitor     MCI (mild cognitive impairment)  - Stable. Continue home meds. Will f/u with neuro    -     CANE FOR HOME USE    Impaired functional mobility, balance, gait, and endurance  -     CANE FOR HOME USE    Vertigo  - recurrent issue.   -     CANE FOR HOME USE    Malignant neoplasm of upper-outer quadrant of right breast in female, estrogen receptor negative  - stable. F/u with heme onc       Rtc 6 months     Abiola Campbell MD  Internal Medicine    7/18/2023

## 2023-07-26 ENCOUNTER — PATIENT MESSAGE (OUTPATIENT)
Dept: NEUROLOGY | Facility: CLINIC | Age: 88
End: 2023-07-26
Payer: MEDICARE

## 2023-08-04 DIAGNOSIS — S42.034D CLOSED NONDISPLACED FRACTURE OF ACROMIAL END OF RIGHT CLAVICLE WITH ROUTINE HEALING: Primary | ICD-10-CM

## 2023-08-04 NOTE — PROGRESS NOTES
Subjective:      Patient ID: Olamide Felipe is a 89 y.o. female.    Chief Complaint: Clavicle Injury (right )      HPI: Olamide Felipe is here in follow-up of right lateral clavicle fracture.     She is now about 3 months s/p injury.  Patient is no longer using her sling.  She has full range of motion without difficulty.  She notes only mild intermittent tenderness to shoulder musculature at night.  Otherwise, she has no complaints.      Past Medical History:   Diagnosis Date    Hyperlipidemia     Hypothyroidism     Malignant neoplasm of upper-outer quadrant of right breast in female, estrogen receptor negative 1/7/2022    Osteoarthritis, knee     Vertigo        Current Outpatient Medications:     acetaminophen (TYLENOL) 325 MG tablet, Take 325 mg by mouth every 6 (six) hours as needed for Pain., Disp: , Rfl:     aspirin 81 MG Chew, Take 81 mg by mouth once daily., Disp: , Rfl:     b complex vitamins tablet, Take 1 tablet by mouth once daily., Disp: , Rfl:     calcium-vitamin D 250-100 mg-unit per tablet, Take 1 tablet by mouth 2 (two) times daily., Disp: , Rfl:     donepeziL (ARICEPT) 5 MG tablet, Take 1 tablet (5 mg total) by mouth every evening., Disp: 30 tablet, Rfl: 11    FLUoxetine 20 MG capsule, TAKE 1 CAPSULE EVERY DAY, Disp: 90 capsule, Rfl: 3    levothyroxine (SYNTHROID) 25 MCG tablet, Take 1 tablet (25 mcg total) by mouth before breakfast., Disp: 90 tablet, Rfl: 3    loratadine (CLARITIN) 10 mg tablet, Take 1 tablet (10 mg total) by mouth once daily., Disp: 90 tablet, Rfl: 3    losartan (COZAAR) 25 MG tablet, Take 1 tablet (25 mg total) by mouth daily as needed (For Systolic Blood pressure higher than 150)., Disp: 90 tablet, Rfl: 1    memantine (NAMENDA) 5 MG Tab, Take 1 tablet (5 mg total) by mouth 2 (two) times daily. Begin by taking one tablet daily for seven days, then increase to one tablet two times daily., Disp: 60 tablet, Rfl: 11    omeprazole (PRILOSEC) 20 MG capsule, TAKE 1 CAPSULE EVERY  "DAY, Disp: 90 capsule, Rfl: 3    simvastatin (ZOCOR) 20 MG tablet, TAKE 1 TABLET (20 MG TOTAL) BY MOUTH EVERY EVENING., Disp: 90 tablet, Rfl: 2    meclizine (ANTIVERT) 25 mg tablet, Take 1 tablet (25 mg total) by mouth 3 (three) times daily as needed for Dizziness. (Patient not taking: Reported on 8/7/2023), Disp: 20 tablet, Rfl: 0    methocarbamoL (ROBAXIN) 500 MG Tab, Take 1 tablet (500 mg total) by mouth 2 (two) times daily as needed (muscle spasm). (Patient not taking: Reported on 8/7/2023), Disp: 40 tablet, Rfl: 0    mv-min/iron/folic/calcium/vitK (WOMEN'S MULTIVITAMIN ORAL), Take by mouth once daily., Disp: , Rfl:   Review of patient's allergies indicates:  No Known Allergies    Ht 5' 3" (1.6 m)   Wt 50.8 kg (112 lb)   BMI 19.84 kg/m²     Review of Systems   Constitutional:  Negative for chills and fever.   Respiratory:  Negative for shortness of breath.    Cardiovascular:  Negative for chest pain and leg swelling.   Gastrointestinal:  Negative for nausea and vomiting.   Genitourinary:  Negative for dysuria.   Musculoskeletal:  Negative for falls and joint pain.   Skin:  Negative for rash.   Neurological:  Negative for dizziness and sensory change.          Objective:    Ortho Exam       RIGHT SHOULDER:  Skin: No rashes or lesions on exposed areas.  Atrophy: general, age related.  Tenderness to palpation:  Negative overlying fracture site to the lateral clavicle  Full active range of motion to shoulder without pain or difficulty  Deformity noted, mild    GEN: Well developed, well nourished elderly female. AAOX3. No acute distress.   Normocephalic, atraumatic.   NELSON  Breathing unlabored.  Mood and affect appropriate.     Imaging:  I have personally reviewed and interpreted the radiographs. Xray of the right clavicle shows redemonstration of comminuted lateral clavicle fracture with interval bone callus formation    Assessment:         1. Closed nondisplaced fracture of acromial end of right clavicle with " routine healing          Plan:   The fracture exhibits continued healing today on radiographs.  It is essentially healed at this point   Activity, as tolerated  Range of motion as tolerated   Strengthening okay    Follow Up: PRN    Patient was encouraged to call with any questions or concerns that she may have

## 2023-08-07 ENCOUNTER — HOSPITAL ENCOUNTER (OUTPATIENT)
Dept: RADIOLOGY | Facility: HOSPITAL | Age: 88
Discharge: HOME | End: 2023-08-07
Attending: PHYSICIAN ASSISTANT
Payer: MEDICARE

## 2023-08-07 ENCOUNTER — OFFICE VISIT (OUTPATIENT)
Dept: ORTHOPEDICS | Facility: CLINIC | Age: 88
End: 2023-08-07
Payer: MEDICARE

## 2023-08-07 VITALS — WEIGHT: 112 LBS | HEIGHT: 63 IN | BODY MASS INDEX: 19.84 KG/M2

## 2023-08-07 DIAGNOSIS — S42.034D CLOSED NONDISPLACED FRACTURE OF ACROMIAL END OF RIGHT CLAVICLE WITH ROUTINE HEALING: Primary | ICD-10-CM

## 2023-08-07 DIAGNOSIS — S42.034D CLOSED NONDISPLACED FRACTURE OF ACROMIAL END OF RIGHT CLAVICLE WITH ROUTINE HEALING: ICD-10-CM

## 2023-08-07 PROCEDURE — 99999 PR PBB SHADOW E&M-EST. PATIENT-LVL III: ICD-10-PCS | Mod: PBBFAC,HCNC,, | Performed by: PHYSICIAN ASSISTANT

## 2023-08-07 PROCEDURE — 3288F FALL RISK ASSESSMENT DOCD: CPT | Mod: HCNC,CPTII,S$GLB, | Performed by: PHYSICIAN ASSISTANT

## 2023-08-07 PROCEDURE — 73000 XR CLAVICLE RIGHT: ICD-10-PCS | Mod: 26,HCNC,RT, | Performed by: RADIOLOGY

## 2023-08-07 PROCEDURE — 1160F RVW MEDS BY RX/DR IN RCRD: CPT | Mod: HCNC,CPTII,S$GLB, | Performed by: PHYSICIAN ASSISTANT

## 2023-08-07 PROCEDURE — 99213 PR OFFICE/OUTPT VISIT, EST, LEVL III, 20-29 MIN: ICD-10-PCS | Mod: HCNC,S$GLB,, | Performed by: PHYSICIAN ASSISTANT

## 2023-08-07 PROCEDURE — 1126F PR PAIN SEVERITY QUANTIFIED, NO PAIN PRESENT: ICD-10-PCS | Mod: HCNC,CPTII,S$GLB, | Performed by: PHYSICIAN ASSISTANT

## 2023-08-07 PROCEDURE — 3288F PR FALLS RISK ASSESSMENT DOCUMENTED: ICD-10-PCS | Mod: HCNC,CPTII,S$GLB, | Performed by: PHYSICIAN ASSISTANT

## 2023-08-07 PROCEDURE — 73000 X-RAY EXAM OF COLLAR BONE: CPT | Mod: TC,HCNC,PN,RT

## 2023-08-07 PROCEDURE — 99999 PR PBB SHADOW E&M-EST. PATIENT-LVL III: CPT | Mod: PBBFAC,HCNC,, | Performed by: PHYSICIAN ASSISTANT

## 2023-08-07 PROCEDURE — 1160F PR REVIEW ALL MEDS BY PRESCRIBER/CLIN PHARMACIST DOCUMENTED: ICD-10-PCS | Mod: HCNC,CPTII,S$GLB, | Performed by: PHYSICIAN ASSISTANT

## 2023-08-07 PROCEDURE — 73000 X-RAY EXAM OF COLLAR BONE: CPT | Mod: 26,HCNC,RT, | Performed by: RADIOLOGY

## 2023-08-07 PROCEDURE — 1100F PR PT FALLS ASSESS DOC 2+ FALLS/FALL W/INJURY/YR: ICD-10-PCS | Mod: HCNC,CPTII,S$GLB, | Performed by: PHYSICIAN ASSISTANT

## 2023-08-07 PROCEDURE — 1159F PR MEDICATION LIST DOCUMENTED IN MEDICAL RECORD: ICD-10-PCS | Mod: HCNC,CPTII,S$GLB, | Performed by: PHYSICIAN ASSISTANT

## 2023-08-07 PROCEDURE — 1159F MED LIST DOCD IN RCRD: CPT | Mod: HCNC,CPTII,S$GLB, | Performed by: PHYSICIAN ASSISTANT

## 2023-08-07 PROCEDURE — 1100F PTFALLS ASSESS-DOCD GE2>/YR: CPT | Mod: HCNC,CPTII,S$GLB, | Performed by: PHYSICIAN ASSISTANT

## 2023-08-07 PROCEDURE — 99213 OFFICE O/P EST LOW 20 MIN: CPT | Mod: HCNC,S$GLB,, | Performed by: PHYSICIAN ASSISTANT

## 2023-08-07 PROCEDURE — 1126F AMNT PAIN NOTED NONE PRSNT: CPT | Mod: HCNC,CPTII,S$GLB, | Performed by: PHYSICIAN ASSISTANT

## 2023-08-13 NOTE — TELEPHONE ENCOUNTER
No new care gaps identified.  Powered by [employer] Reference number: [id number].   3/28/2022 2:00:53 PM CDT   Subjective fever since yesterday morning. Cough and congestion since yesterday. Drainage from nose green and thick. Pulling at L ear. Drinking and breastfeeding. Denies vomiting, diarrhea. UOP normal

## 2023-09-04 ENCOUNTER — OFFICE VISIT (OUTPATIENT)
Dept: URGENT CARE | Facility: CLINIC | Age: 88
End: 2023-09-04
Payer: MEDICARE

## 2023-09-04 VITALS
DIASTOLIC BLOOD PRESSURE: 70 MMHG | OXYGEN SATURATION: 98 % | HEIGHT: 63 IN | TEMPERATURE: 98 F | BODY MASS INDEX: 19.84 KG/M2 | WEIGHT: 112 LBS | RESPIRATION RATE: 16 BRPM | HEART RATE: 68 BPM | SYSTOLIC BLOOD PRESSURE: 130 MMHG

## 2023-09-04 DIAGNOSIS — S51.802A AVULSION OF SKIN OF LEFT FOREARM, INITIAL ENCOUNTER: Primary | ICD-10-CM

## 2023-09-04 PROCEDURE — 99214 OFFICE O/P EST MOD 30 MIN: CPT | Mod: S$GLB,,, | Performed by: NURSE PRACTITIONER

## 2023-09-04 PROCEDURE — 99214 PR OFFICE/OUTPT VISIT, EST, LEVL IV, 30-39 MIN: ICD-10-PCS | Mod: S$GLB,,, | Performed by: NURSE PRACTITIONER

## 2023-09-04 RX ORDER — MUPIROCIN 20 MG/G
OINTMENT TOPICAL 2 TIMES DAILY
Qty: 30 G | Refills: 0 | Status: SHIPPED | OUTPATIENT
Start: 2023-09-04 | End: 2023-09-20

## 2023-09-04 NOTE — PROGRESS NOTES
Subjective:      Patient ID: Olamide Felipe is a 89 y.o. female.    Chief Complaint: No chief complaint on file.    HPI  ROS  Objective:     Physical Exam   Assessment:      No diagnosis found.  Plan:                 No follow-ups on file.

## 2023-09-04 NOTE — PROGRESS NOTES
"Subjective:      Patient ID: Olamide Felipe is a 89 y.o. female.    Vitals:  height is 5' 3" (1.6 m) and weight is 50.8 kg (112 lb). Her temperature is 98.4 °F (36.9 °C). Her blood pressure is 130/70 and her pulse is 68. Her respiration is 16 and oxygen saturation is 98%.     Chief Complaint: Laceration and skin flap    This is a 89 y.o. female who presents today with a chief complaint of  cut her left forearm after she slid when she was trying to close the door, pt reports this happened on Wednesday 2 days ago, denies head trauma or injury, denies hitting her left forearm or arm/elbow on the floor, pt reports she got the lef forearm skin came off due to the door, she has been keeping it clean and covered, TD 7 years ago UTD  she is on baby ASA, , denies fever, body aches or chills, denies cough, wheezing or shortness of breath, denies nausea, vomiting, diarrhea or abdominal pain, denies chest pain or dizziness positional lightheadedness, denies sore throat or trouble swallowing, denies loss of taste or smell, or any other symptoms        Laceration   The incident occurred 2 days ago. Pain location: left aarm lac. The pain is at a severity of 0/10. The patient is experiencing no pain. It is unknown if a foreign body is present. Her tetanus status is UTD.       Skin:  Positive for laceration. Negative for erythema.      Objective:     Physical Exam   Constitutional: She is oriented to person, place, and time. She appears well-developed.   HENT:   Head: Normocephalic and atraumatic. Head is without abrasion, without contusion and without laceration.   Ears:   Right Ear: External ear normal.   Left Ear: External ear normal.   Nose: Nose normal.   Mouth/Throat: Oropharynx is clear and moist and mucous membranes are normal.   Eyes: Conjunctivae, EOM and lids are normal. Pupils are equal, round, and reactive to light.   Neck: Trachea normal and phonation normal. Neck supple.   Cardiovascular: Normal rate, regular " rhythm and normal heart sounds.   Pulmonary/Chest: Effort normal and breath sounds normal. No stridor. No respiratory distress.   Musculoskeletal: Normal range of motion.         General: Normal range of motion.      Left forearm: Lacerations: skin avulsion to left forearm, skin intact above skin avulsion, bruising noted, no erythema, swelling or tenderness noted, no active bleeding noted.   Neurological: She is alert and oriented to person, place, and time.   Skin: Skin is warm, dry, intact and no rash. Capillary refill takes less than 2 seconds. No abrasion, No burn, No bruising, No erythema and No ecchymosis   Psychiatric: Her speech is normal and behavior is normal. Judgment and thought content normal.   Nursing note and vitals reviewed.          Patient in no acute distress.  Vitals reassuring.  Discussed results/diagnosis/plan in depth with patient in clinic. Strict precautions given to patient to monitor for worsening signs and symptoms. Advised to follow up with primary.All questions answered. Strict ER precautions given. If your symptoms worsens or fail to improve you should go to the Emergency Room. Discharge and follow-up instructions given verbally/printed. Discharge and follow-up instructions discussed with the patient who expressed understanding and willingness to comply with my recommendations.Patient voiced understanding and in agreement with current treatment plan.     Please be advised this text was dictated with [employer] software and may contain errors due to translation.    Assessment:     1. Avulsion of skin of left forearm, initial encounter        Plan:       Avulsion of skin of left forearm, initial encounter    Other orders  -     mupirocin (BACTROBAN) 2 % ointment; Apply topically 2 (two) times daily. for 5 days  Dispense: 30 g; Refill: 0          Medical Decision Making:   Urgent Care Management:  Patient in no acute distress.  Vitals reassuring.  On exam, patient is nontoxic appearing and  afebrile.  Lungs CTA.    Physical examination of left forearm with skin avulsion noted.  No active bleeding noted.  No erythema or tenderness noted.  Bruising noted around skin avulsion site.  Tetanus up-to-date.  Wound cleaned in clinic.  Bactroban prescribed.  Detailed education provided about wound care.declined x ray. Medication prescribed and over-the-counter medication discussed with patient at length.  Proper hydration advised.  I reiterated the importance of further evaluation if no improvement symptoms and follow-up with primary. Patient voiced understanding and in agreement with current treatment plan.             Patient Instructions   PLEASE READ YOUR DISCHARGE INSTRUCTIONS ENTIRELY AS IT CONTAINS IMPORTANT INFORMATION.    Use the antibiotic ointment to the wound.       Please return or see your primary care doctor for signs of worsening infection (fever, worsening swelling/redness/pain, inability to move your extremity).    Please arrange follow up with your primary medical clinic as soon as possible. You must understand that you've received an Urgent Care treatment only and that you may be released before all of your medical problems are known or treated. You, the patient, will arrange for follow up as instructed. If your symptoms worsen or fail to improve you should go to the Emergency Room.  WE CANNOT RULE OUT ALL POSSIBLE CAUSES OF YOUR SYMPTOMS IN THE URGENT CARE SETTING PLEASE GO TO THE ER IF YOU FEELS YOUR CONDITION IS WORSENING OR YOU WOULD LIKE EMERGENT EVALUATION.

## 2023-10-18 NOTE — PROGRESS NOTES
NEUROPSYCHOLOGY   [facility]  Intake    Referring Provider: Abiola Campbell MD   Medical Necessity: Ms. Olamide Felipe is an 89 y.o. female followed in [facility] for cognitive evaluation, supportive therapy, treatment planning, and treatment management in the setting of memory loss  Billing: See billing table at the end of this note  Consent: The patient expressed an understanding of the purpose of the evaluation and consented to all procedures.  Providers: Nighat Arreguin PsyD (Neuropsychology); Miroslava Raines DNP (Neurology); Tasha Emerson MD (Palliative Medicine)       ASSESSMENT:   Ms. Olamide Felipe is an 89 y.o. female with a history of hypothyroidism, aortic atherosclerosis, hyperparathyroidism, HLD, HTN, breast cancer here for cognitive evaluation. Daughter, Christy, reports gradual cognitive changes since about 2019, much worse over the last month. Seen in ED about a month ago for skin laceration, also put on meclizine at that time due to feeling dizzy. Fall risk. History of erratic sleep, long hx of non-REM parasomnias, no RBD. Parasomnias exacerbated by melatonin. Appetite is down, she has lost weight. She is not oriented and is a poor historian (possibly some confabulation?). No VH, but does have anxious fixation on false belief that   by suicide instead of a stroke. She still lives alone but requires increasing support for IADLs. There is some concern for medication mismanagement and vulnerability to scams. MRI brain notable only for mild ischemic changes and age-related atrophy. No parkinsonism on exam with Dr Raines today.     Cognitive Testing:   - Cortical profile most consistent with mild to moderate Alzheimer's disease   - Impairments in confrontation naming, semantic fluency, executive functioning, memory encoding/recall/consolidation  - Simple attention, processing speed are preserved     She meets criteria for mild dementia, most like etiology is  Alzheimer's disease. Erratic sleep is likely an exacerbating factor. More recent worsening possibly related to meclizine causing AMS, if she is still taking it? Dtr will check on this.     Daughter, Christy, has been connected with our caregiver support team. I would like her to have more oversight of medications, if this is available in her facility. Dtr should continue to oversee finances, limit available spending money.       1. 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 Sleep disturbance        3. Memory impairment  Ambulatory referral/consult to Adult Neuropsychology            PLAN:     RTC 6  Caregiver following with Nirmala in [facility] for caregiver support.        Thank you for allowing me to participate in Ms. Felipe's care.  If you have any questions, please contact me.    Nighat Arreguin PsyD  Licensed Clinical Neuropsychologist  [facility] - Department of Neurology          HISTORY:     HPI: Ms. Olamide Felipe is an 89 y.o. female with a history of hypothyroidism, aortic atherosclerosis, hyperparathyroidism, HLD, HTN, breast cancer referred for evaluation of cognitive changes. Pt is accompanied today by her daughter, Christy.     Hx of breast cancer dx 2022, lumpectomy     Generally independent, needs some help with cooking, doesn't drive   Here for memory loss, confusion, agitation, mood symptoms, paranoia, personality change  Poor appetite (longstanding ED?)  Sleep is erratic, some circadian reversal   Fell earlier this year, broke her clavicle.     Told dtr she had a vivid dream that she was waiting for her  () in her [facility] at 5 am outside apartment complex, but it was not a dream. Pt stopped taking meds because side effects said 'death' and blames dtr for the refill, pt stated, 'that's why I'm going to die.' Pt stated her  committed suicide before his 80th birthday  "because that when his life insurance policy ends, but he  of a stroke.   She has no schedule, eats and sleeps when she wants. Sometimes sleeps at 1Am and waking up at 1pm.   Past 2 weeks to a month, memory has been worse. Refuses to ask for help. She sees herself as completely independent.     Seen in ED for skin laceration in September. No infection apparent today.    Saw Margot Gonzalez in April for memory, but she has since left. [place] was 18/30 in April  Trying to get in to see Dr Maria, but they are booked up    22 MRI Brain without contrast  "Impression:  No acute intracranial abnormality.  Age-related cerebral atrophy with mild microangiopathic change"    Does a lot of walking    Pt is generally without complaints    She is a twin, her sister doesn't have benitez memory issues that she is aware of.     Resides: alone, independent living since 2020  Level of supervision: usually alone  Sitter/HHA: No. Had HH temporarily after she broke hers shoulder earlier this year.  Caregiver: Christy Garcia  POA: Yes - Christy   Medications for cognition: Namenda. Is also prescribed Aricept, but stopped taking it due to fear of side effects     Current Cognitive Symptoms:  Symptoms: Pt denies problems. Dtr notes she seems more forgetful. Couldn't recall her door code this morning, forgot her glasses and needed to be remembered. STM loss, but also some more remote information, including with her late  who passed almost 12 years ago. Dtr also notes word finding, will often say "thing" when she can't recall a word.  Stopped taking some of her medications a few months ago.   Orientation: 20 - I don't know. Cannot name president, knows he is a [employer] party. Knows her age and .   Course: Much worse in the last month. Stopped taking Aricept around this time. Has been checked for UTI's twice, both negative. Possibly some subtle things for a few years prior, maybe back to 2019.     Current Neurobehavioral " Symptoms:  Depression: Yes - worries that her   by his own hand, when he actually  of a stroke. Is tearful discussing this with us. On fluoxetine for a long time for night terrors.   Anxiety: No  Paranoia/Delusions: Yes - possibly, fixation on  dying by suicide?   Hallucinations: No  Substance Use: Yes - drinks wine occasionally      Current Physical Symptoms: See Dr. Raines's note for physical and neuro exam. On meclizine since recent urgent care visit because she felt dizzy and fuzzy. Now maybe taking OTC?   Swallowing: No trouble swallowing.   Incontinence/Constipation: She does wear panty liner, but no pads. Denies constipation.   Ambulation: has a cane but ambulates independently today   Falls: Yes - as above   Sleep: Erratic lately. This has been the case for years since she has been living independently. History of screaming in the middle of the night, called them night terrors. Has had these since she was pretty young. Improved with fluoxetine, hasn't had any in recent years. On one occasion, got confused and went outside in her [facility] waiting for her  .   Appetite change: Doesn't eat much. Doesn't think her appetite has changed. Has been losing weight.   Movement Sx: Has not noticed a tremor. Has history of vertigo, this improved with eliminating caffeine.   Repeat UTI/Delirium: no UTI, but possibly delirium due to erratic sleep?  Hearing/vision loss: Yes - hearing aids, glasses      ADL:  Bathing/Grooming: Independent  Feeding: Independent  Dressing: Independent  Toileting: Independent    IADL:  Finances: Independent, but bills are all on autopay except rent and Internet. She does this without assistance, no issues. Managing money in her pocketbook has become more challenging over the last year or so. Couldn't recall her debit card PIN  Medications: Requires assistance/oversight - gets them from two places, one doesn't automatically refill. Uses a pillbox. Not missing  doses. Did refuse to take Aricept due to side effect of death.  Driving: Does not participate in this activity   Household: Requires assistance/oversight Doesn't cook much. Buys boxed frozen dinners. Has left the water running in her bathroom twice, overflowed into the hallway. More trouble with phone and remote control.         10/19/2023    11:11 AM   IADL   Ability to Use Telephone Operates telephone on own initiative, looks up and dials numbers   Shopping Takes care of all shopping needs independently   Food Preparation Heats and serves prepared meals or prepares meals but does not maintain adequate diet   Housekeeping Maintains house alone with occasional assistance (heavy work)   Laundry Does personal laundry completely   Mode of Transportation Travel limited to taxi or automobile with assistance of another   Responsibility for Own Medications Is responsible for taking medication in correct dosages at correct time   Ability to Handle Finances Manages financial matters independently (budgets, writes checks, pays rent and bills, goes to bank). Collects and keeps track of income        Safety Concerns:  Hygiene: No  Nutrition: longstanding limited appetite, has lost weight   Falls: Yes - as above. Has some dizziness.   Wandering: Not typically, but on two occasions got up at night after taking melatonin very confused.   Financial scams: Has not happened, but they are worried about it. She does donate to good causes, so she gets a lot of solicitations.   Medication management: potentially   Driving: No  Cooking: No  Medical decision making: No  Physical aggression:No    Neurologic History:  TBI: No  Seizures: No  Stroke: No  Recent Hospitalizations/surgeries: Yes - as above     Social History:  Family Status: , two children  Daily Activities: stays busy at independent living facility   Educational Level: 1 yr college  Occupational Status and History: /[employer]    PMH:   Ms. Joyis Cameron Felipe  has a  past medical history of Hyperlipidemia, Hypothyroidism, Malignant neoplasm of upper-outer quadrant of right breast in female, estrogen receptor negative (1/7/2022), Osteoarthritis, knee, and Vertigo.      Current Outpatient Medications:     acetaminophen (TYLENOL) 325 MG tablet, Take 325 mg by mouth every 6 (six) hours as needed for Pain., Disp: , Rfl:     aspirin 81 MG Chew, Take 81 mg by mouth once daily., Disp: , Rfl:     b complex vitamins tablet, Take 1 tablet by mouth once daily., Disp: , Rfl:     calcium-vitamin D 250-100 mg-unit per tablet, Take 1 tablet by mouth 2 (two) times daily., Disp: , Rfl:     FLUoxetine 20 MG capsule, TAKE 1 CAPSULE EVERY DAY, Disp: 90 capsule, Rfl: 3    levothyroxine (SYNTHROID) 25 MCG tablet, Take 1 tablet (25 mcg total) by mouth before breakfast., Disp: 90 tablet, Rfl: 3    loratadine (CLARITIN) 10 mg tablet, Take 1 tablet (10 mg total) by mouth once daily., Disp: 90 tablet, Rfl: 3    losartan (COZAAR) 25 MG tablet, Take 1 tablet (25 mg total) by mouth daily as needed (For Systolic Blood pressure higher than 150)., Disp: 90 tablet, Rfl: 1    meclizine (ANTIVERT) 25 mg tablet, Take 1 tablet (25 mg total) by mouth 3 (three) times daily as needed for Dizziness., Disp: 20 tablet, Rfl: 0    memantine (NAMENDA) 5 MG Tab, Take 1 tablet (5 mg total) by mouth 2 (two) times daily. Begin by taking one tablet daily for seven days, then increase to one tablet two times daily., Disp: 60 tablet, Rfl: 11    mv-min/iron/folic/calcium/vitK (WOMEN'S MULTIVITAMIN ORAL), Take by mouth once daily., Disp: , Rfl:     omeprazole (PRILOSEC) 20 MG capsule, TAKE 1 CAPSULE EVERY DAY, Disp: 90 capsule, Rfl: 3    simvastatin (ZOCOR) 20 MG tablet, TAKE 1 TABLET (20 MG TOTAL) BY MOUTH EVERY EVENING., Disp: 90 tablet, Rfl: 2    Results for orders placed or performed during the hospital encounter of 04/21/23   CT Head Without Contrast    Narrative    EXAMINATION:  CT HEAD WITHOUT CONTRAST    CLINICAL  HISTORY:  fall, hit head; Unspecified fall, subsequent encounter    TECHNIQUE:  Low dose axial images were obtained through the head.  Coronal and sagittal reformations were also performed. Contrast was not administered.    COMPARISON:  MRI of the brain dated 12/05/2022.    CT scan of the head dated 07/19/2022.    FINDINGS:  The subcutaneous tissues are unremarkable.  The bony calvarium is intact.  There is a mucosal changes within the right maxillary sinus.  The mastoid air cells are clear.  There are postoperative changes in the globes.    The craniocervical junction is intact.  There is partial empty appearance of the sella.  There is no evidence of intracranial hemorrhage.  The ventricles and sulci are prominent, consistent cerebral loss.  There are hypodensities within the periventricular and subcortical white matter.  There are old lacunar type infarctions within the bilateral subinsular cortex.  The gray-white differentiation is maintained.  There is no dense vessel sign.  There is no evidence of mass effect.      Impression    1.  No acute intracranial process.    2.  Changes of chronic small vessel ischemic disease and cerebral volume loss.      Electronically signed by: Seht Mcclendon MD  Date:    04/21/2023  Time:    16:50   Results for orders placed or performed during the hospital encounter of 12/05/22   MRI Brain Without Contrast    Narrative    EXAMINATION:  MRI BRAIN WITHOUT CONTRAST    CLINICAL HISTORY:  Dizziness, non-specific; Dizziness and giddiness    TECHNIQUE:  Multiplanar multisequence MR imaging of the brain was performed without contrast.    COMPARISON:  MRI brain without contrast dated 12/23/2019    FINDINGS:  No abnormal focus of diffusion restriction.  Redemonstration of gyriform susceptibility along the sulci of the left frontal convexity, similar to 2019.  No extra-axial collection or midline shift.  Age related cerebral atrophy.  No hydrocephalus.  Scattered mild T2 FLAIR white matter  "hyperintense foci as can be seen with sequela of chronic microangiopathic change.  Major T2 intracranial vascular flow voids appear maintained.  Heterogeneous T2 opacity at the right maxillary sinus suggesting mucous retention cyst.  No infiltrative T1 marrow process.      Impression    No acute intracranial abnormality.    Age-related cerebral atrophy with mild microangiopathic change.    Area of gyriform susceptibility along the sulci of the left frontal convexity, most in keeping with cirrhosis and similar to 2019.      Electronically signed by: Alexis Kowalski  Date:    12/05/2022  Time:    14:52       Pertinent Labs:  Lab Results   Component Value Date    [id number] 498 02/20/2023     No results found for: "VITAMINB1"  No components found for: "VITAMIND"  Lab Results   Component Value Date    RPR Non-reactive 02/20/2023     Lab Results   Component Value Date    FOLATE 17.5 02/20/2023     Lab Results   Component Value Date    TSH 2.705 07/11/2023     Lab Results   Component Value Date    PTH 54.5 07/11/2023    CALCIUM 10.0 07/11/2023    CAION 1.31 11/12/2019      No results found for: "LABA1C", "HGBA1C"  No results found for: "HIV1X2", "GGL28FFRS"      OBJECTIVE:     MENTAL STATUS AND BEHAVIORAL OBSERVATIONS:  Appearance:  Casually dressed and Well groomed  Behavior:   alert, calm, cooperative, rapport easily established, and Appropriate interpersonal skills. Good interpretation of nonverbal cues.   Orientation:   Disoriented to date  Sensory:   Hearing and vision appeared adequate for testing purposes.  Gait:   Pt ambulates independently.   Psychomotor:  Within Normal Limits  Speech:  Fluent and spontaneous. Normal volume, rate, pitch, tone, and prosody.  Language:  Receptive and expressive language appear intact. Comprehends conversational speech., No evidence of word-finding difficulties in conversational speech.  Mood:   Euthymic, anxious at times during testing   Affect:   mood-congruent  Thought " Process: scattered and tangential  Thought Content: Denied current SI/HI. and No evidence of psychotic symptoms.  Memory:  limited historian  Attn/Concentration:  Grossly intact  Judgment/Insight: Limited  Validity:   Performance on standalone and/or embedded performance validity measures suggested poor test engagement. Behaviorally, the pt appeared to put forth good effort, and cognitive test scores are generally commensurate with their overall functional level. As a result, scores are considered a valid reflection of the pt's functioning.  Test Taking Bx:         Per psychometrist observations: Mrs. Felipe arrived to clinic with her daughter. She wore bifocals and had bilateral hearing aids. She required directions to be repeated and clarified. She was tangential and required redirection. She was self-critical as she often expressed she felt stupid and needed reassurance.    PROCEDURES/TESTS ADMINISTERED:  In addition to performing a review of pertinent medical records, reviewing limits to confidentiality, conducting a clinical interview, and explaining procedures, the following measures were administered and scored using normative data and administration instructions from Arturo et al, 2019:   Mini Mental Status Exam (MMSE); Modified Mini Mental Status Exam (3MS); Animal Fluency; Letter F Fluency; [place] Verbal Learning Test - Short Form (CVLT-SF); Clock Drawing; Trail Making Test; Consortium to Establish a Registry for Alzheimer's Disease (CERAD) - Constructional Praxis; [place] Naming Test - 15 Item; Wechsler Adult Intelligence Scale - Third Edition, Digit Span subtest; and Geriatric Depression Scale - 15 item. CERAD Praxis Recall was scored using norms from Wayne et al, 2011. Pt was also administered the Kvng Luigi IADL scale and caregiver filled out the Neuropsychiatric Inventory Questionnaire (NPI-Q).       NEUROPSYCHOLOGICAL ASSESSMENT RESULTS:  The following should not be interpreted in  isolation from the neuropsychological evaluation report.  Scores on stand-alone and/or embedded performance validity measures were bnl.      Raw Score Score Type Standardized Score Percentile/CP Descriptor   ACS RDS 6 - - - -   CVLT-II Short FC 7        COGNITIVE SCREENING Raw Score Score Type Standardized Score Percentile/CP Descriptor   MMSE 19 - - - Impaired   Orientation - Place 3/5 - - - -   Orientation - Date 1/5 - - - -   3MS  66 z-score -6.16 <1 Exceptionally Low    LANGUAGE FUNCTIONING Raw Score Score Type Standardized Score Percentile/CP Descriptor   BNT-15 10 zscore -2.57 1 Exceptionally Low    Letter F  8 zscore -1.23 11 Low Average   Animal Naming 9 Tscore 29 2 Below Average   VISUOSPATIAL FUNCTIONING Raw Score Score Type Standardized Score Percentile/CP Descriptor   Clock Total 5 - - <2.3 Below Average   CERAD Constructional Praxis 7 Tscore -2 3 Below Average   LEARNING & MEMORY Raw Score Score Type Standardized Score Percentile/CP Descriptor   CVLT-II Short         Trials 1-4  7 zscore -4.7 - Exceptionally Low    Trial 1 0 zscore -3.3 <1 Exceptionally Low    Trial 4 2 zscore -5.5 <1 Exceptionally Low    SDFR 0 zscore -5.5 <1 Exceptionally Low    LDFR 0 zscore -4.4 <1 Exceptionally Low    LDCR 0 zscore -4.5 <1 Exceptionally Low    Recognition Hits 4 zscore -3.7 <1 Exceptionally Low    False Positives 3 zscore 1.8 96 Above Average   Discriminability 1 zscore - <1 Exceptionally Low    Forced Choice 7 - - - -   ATTENTION/WORKING MEMORY Raw Score Score Type Standardized Score Percentile/CP Descriptor   WAIS-III Digit Span 9 zscore -1.62 5 Below Average         DS Forward 7 zscore -1.15 13 Low Average         DS Backward 2 zscore -3.73 0 Exceptionally Low    MENTAL PROCESSING SPEED Raw Score Score Type Standardized Score Percentile/CP Descriptor   TMT A  65 zscore 0 60 Average   TMT A errors 0 - - - -   EXECUTIVE FUNCTIONING Raw Score Score Type Standardized Score Percentile/CP Descriptor   TMT B 162 zscore  0 61 Average   TMT B errors 4 - - - -   MOOD & PERSONALITY Raw Score Score Type Standardized Score Percentile/CP Descriptor   OMER-7 2 - - - WNL             10/23/2023    10:44 AM 10/19/2023    11:12 AM   NPIQ RFS   WHO IS FILLING OUT FORM? Caregiver Caregiver   Does this patient have false beliefs, such as thinking that others are stealing from him/her or planning to harm him/her in some way? Yes No   Delusions Severity 2    Delusion Distress 4    Does this patient have hallucinations such as false visions or voices? Torres she/he seem to hear or see things that are not present? No No   Is the patient resistive to help from others at times, or hard to handle? Yes Yes   Agitation Agression Severity 3 1   Agitation/Agression Distress 6 3   Does the patient seem sad or say that he/she is depressed? Yes No   Depression/Dysphoria Severity 2    Depression/Dysphoria Distress 4    Does the patient become upset when  from you? Does he/she have any other signs of nervousness such as shortness of breath, sighing, being unable tor elax, or feeling excessively tense? No No   Does the patient appear to feel good or act excessively happy? Yes Yes   Elation/Euphoria Severity 2 2   Elation/Euphoria Distress 3 1   Does this patient seem less interested in his/her usual activities or in the activities and plans of others? Yes No   Apathy/Indifference Severity 2    Apathy/Indifference Distress 2    Does this patient seem to act cumpolsively, for example, talking to strangers as if she/he knows them, or saying things that may hurt people's feelings? No No   Is the patient impatient and cranky? Does he/she have difficulty coping with delays or waiting for planned activities? No No   Does the patient engage in repetitive activities such as pacing around the house, handling buttons, wrapping string, or doing other things repeatedly? No No   Does this patient awaken you during the night, rise too early in the morning, or take  excessive naps during the day? No No   Has the patient lost or gained weight, or had a change in the type of food he/she likes? No No   NPI Total Severity Score 11 3   NPI Total Distress Score 19 4             Billing/Services Summary          Neurobehavioral Status Exam Base Code (10922)  Total Units: 1 Add-On (65243)  Total Units: 0   Face-to-face Total Time: 45 min.    Report Writing Total Time:  min         Professional Neuropsychological Testing Evaluation Services Base Code (78727)   Total Units: 1 Add-on (46153)  Total Units: 2   Referral review/initial test selection 15 min.    Intra-session Clinical Decision-Making 0 min.         Tech consult/test review/modification 0 min.         Patient behavior management 0 min.    Face-to-face Interpretive Feedback 60 min.    Record Review/Integration/Report Generation 120 min.     Total Time: 195 min.         Test Administration by Psychologist Base Code (14767)   Total Units: 00 Add-on (73826)  Total Units: 0   Testing 0 min.    Scoring 0 min.     Total Time: 0 min.         Test Administration by Technician  Technician Name: TELMA Almeida Base Code (46344)   Total Units: 1 Add-on (45324)  Total Units: 1   Face-to-Face Testin min.    Scoring 25 min.     Total Time: 66 min.    DOS is the date of the evaluation unless specified

## 2023-10-18 NOTE — PROGRESS NOTES
Name: Olamide Felipe  MRN: [id number]   CSN: [id number]      Date: 10-19-23      Referring physician:  Abiola Campbell MD   [facility]  [place]  [place] [address]    Subjective:      Chief Complaint: memory loss     History of Present Illness (HPI):    Olamide Felipe is a 89 y.o. right-handed female with breast cancer who presents today for an initial evaluation of memory loss and is accompanied by daughter.     Interviewed together:     She is not aware of any trouble with memory. She does not notice repeating herself or losing things.   Daughter has noted changes. This AM she did not have her glasses on and could not remember key code to get back into her building.   Dtr says the past month it is more notable.   She lived with [facility] for time in 2019 when she moved here. She was ndependent with paying bills on line and managing her meds. Now she says she never paid bills online.     Less than 3 mos ago, she stopped one of her meds. She read that one of the side effects could be death so she wanted to stop it.     She has had word finding problems for years.       almost 12 years ago. There are things about his death she does not remember.     Lives alone in senior living apartment, independent living.   Independent with ADLs.   Independent with finances and appointments.   Does not drive.     Completed 1 year of college.     Fell earlier this year, broke R clavicle. She says she fell while carrying too many groceries.     Saw neuro about a year ago. Gave donepezil and memantine.   Obvious changes (for the worse) when she stopped donepezil. She stopped it on her own when she saw something about death on a pamphlet about donepezil.     She offers that recently, she has wondered if her  inentionally killed himself.  She is tearful when discussing this.     Dtr offers that she does not have a normal sleep schedule. She was on more of a regular schdeule when she lived with them. She moved into  senior apt 8-2020 in the middle of covid.     She had serious vertigo in 2019. Took her off caffeine and has not had epidose since.     Not aware of hallucinations.     She manage her own meds and takes herself. Dtr does not think she forgets to order her refills. Some meds come from [facility] and are automatically sent. The other meds come from [facility] pharmacy. She has to call for these refills.      She is taking fluoxetine 20 mg. Dtr says she has been on this forever. She takes for night terrors. She used to scream in the middle of the night. Ms Felipe says she had done this since she was young. Dtr never heard her do this when she lived with her in 2019.     She has hearing aids, dentures and glasses.     Most days get up at noon.     Dtr says she has been losing weight. She eats whatever she wants, small portions.   Eats healthy choice dinners. Does not cook but does buy premade melas.     She has left water runing twice since moved into [facility]. Water was running out front door.     She does not think she has trouble using phones or remotes. Dtr says she does. Recently she is struggling to get on weekly zoom calls where she talks to her twin sister and another sister.     Ms. Felipe pays her internet and rent. Not missing payments. Everything else is automatic.     She has had more challenges with checking out at a store as could not recall her pin number.     Recently was outside in her pajamas. She says she was sleep walking. She had mistakenly bought 10 mg melatonin instead of 5 mg and thinks this led to her sleep walking.     They are worried about her getting scammed. Dtr does not regularly monitor her accounts but has checked on occasion. No unusual activity on her accounts.     She is  with 2 children.     Does not smoke.   Drinks very rarely.       Review of Systems   Constitutional:  Positive for unexpected weight change. Negative for appetite change.   HENT:  Negative for trouble swallowing.     Gastrointestinal:  Positive for constipation (occasionally, not signifcant).   Genitourinary:         Wears pad   Neurological:  Negative for dizziness and tremors.   Psychiatric/Behavioral:  Positive for dysphoric mood and sleep disturbance.        Past Medical History: The patient  has a past medical history of Hyperlipidemia, Hypothyroidism, Malignant neoplasm of upper-outer quadrant of right breast in female, estrogen receptor negative (1/7/2022), Osteoarthritis, knee, and Vertigo.    Social History: The patient  reports that she quit smoking about 34 years ago. Her smoking use included cigarettes. She has never been exposed to tobacco smoke. She has never used smokeless tobacco. She reports current alcohol use of about 2.0 standard drinks of alcohol per week. She reports that she does not use drugs.    Family History: Their family history includes No Known Problems in her daughter, sister, and son.    Allergies: Patient has no known allergies.     Meds:   Current Outpatient Medications on File Prior to Visit   Medication Sig Dispense Refill    acetaminophen (TYLENOL) 325 MG tablet Take 325 mg by mouth every 6 (six) hours as needed for Pain.      aspirin 81 MG Chew Take 81 mg by mouth once daily.      b complex vitamins tablet Take 1 tablet by mouth once daily.      calcium-vitamin D 250-100 mg-unit per tablet Take 1 tablet by mouth 2 (two) times daily.      FLUoxetine 20 MG capsule TAKE 1 CAPSULE EVERY DAY 90 capsule 3    levothyroxine (SYNTHROID) 25 MCG tablet Take 1 tablet (25 mcg total) by mouth before breakfast. 90 tablet 3    loratadine (CLARITIN) 10 mg tablet Take 1 tablet (10 mg total) by mouth once daily. 90 tablet 3    losartan (COZAAR) 25 MG tablet Take 1 tablet (25 mg total) by mouth daily as needed (For Systolic Blood pressure higher than 150). 90 tablet 1    meclizine (ANTIVERT) 25 mg tablet Take 1 tablet (25 mg total) by mouth 3 (three) times daily as needed for Dizziness. 20 tablet 0     "memantine (NAMENDA) 5 MG Tab Take 1 tablet (5 mg total) by mouth 2 (two) times daily. Begin by taking one tablet daily for seven days, then increase to one tablet two times daily. 60 tablet 11    mv-min/iron/folic/calcium/vitK (WOMEN'S MULTIVITAMIN ORAL) Take by mouth once daily.      omeprazole (PRILOSEC) 20 MG capsule TAKE 1 CAPSULE EVERY DAY 90 capsule 3    simvastatin (ZOCOR) 20 MG tablet TAKE 1 TABLET (20 MG TOTAL) BY MOUTH EVERY EVENING. 90 tablet 2     No current facility-administered medications on file prior to visit.       Objective:     Physical Exam:    Vitals:    10/19/23 0826   BP: (!) 168/63   BP Location: Left arm   Patient Position: Sitting   BP Method: Medium (Automatic)   Pulse: 60   Weight: 50.8 kg (111 lb 15.9 oz)   Height: 5' 3" (1.6 m)     Body mass index is 19.84 kg/m².    Constitutional  Thin, appears stated age   Cardiovascular  no LE edema bilaterally     .  Neurological    * Mental status    - Orientation Oriented to: person   Not oriented to: president (other than to say he is Republican), year     - Memory      Provides some history, will look to dtr to provide as well     - Attention/concentration  Attends to interview without distraction     - Language  repetition but spontaneous     - Fund of knowledge  Impaired        ..  * Cranial nerves       - CN II  PERRL, visual fields full to confrontation     - CN III, IV, VI  Extraocular movements full, normal pursuits and saccades     - CN V  Sensation V1 - V3 intact     - CN VII  Face strong and symmetric bilaterally     - CN VIII  Hearing intact bilaterally     - CN IX, X  Palate raises midline and symmetric     - CN XI  SCM and trapezius 5/5 bilaterally     - CN XII  Tongue midline   * Motor  Muscle strength 4/5 throughout   * Coordination  No dysmetria with finger-to-nose    * Gait  See below.     ..  * Specialized movement exam  No hypophonic speech.    No facial masking.   No cogwheel rigidity.     No bradykinesia.   No tremor with " rest, posture, kinesis, or intention.    No other dystonia, chorea, athetosis, myoclonus, or tics.   No motor impersistence.   Normal-based gait.   No shortened stride length.   No abnormal arm swing.                  Laboratory Results:  No visits with results within 3 Month(s) from this visit.   Latest known visit with results is:   Lab Visit on 07/11/2023   Component Date Value Ref Range Status    Sodium 07/11/2023 141  136 - 145 mmol/L Final    Potassium 07/11/2023 5.0  3.5 - 5.1 mmol/L Final    Chloride 07/11/2023 109  95 - 110 mmol/L Final    CO2 07/11/2023 28  23 - 29 mmol/L Final    Glucose 07/11/2023 87  70 - 110 mg/dL Final    BUN 07/11/2023 16  8 - 23 mg/dL Final    Creatinine 07/11/2023 0.9  0.5 - 1.4 mg/dL Final    Calcium 07/11/2023 10.0  8.7 - 10.5 mg/dL Final    Total Protein 07/11/2023 6.1  6.0 - 8.4 g/dL Final    Albumin 07/11/2023 3.7  3.5 - 5.2 g/dL Final    Total Bilirubin 07/11/2023 0.5  0.1 - 1.0 mg/dL Final    Alkaline Phosphatase 07/11/2023 65  55 - 135 U/L Final    AST 07/11/2023 23  10 - 40 U/L Final    ALT 07/11/2023 14  10 - 44 U/L Final    eGFR 07/11/2023 >60.0  >60 mL/min/1.73 m^2 Final    Anion Gap 07/11/2023 4 (L)  8 - 16 mmol/L Final    WBC 07/11/2023 5.14  3.90 - 12.70 K/uL Final    RBC 07/11/2023 4.10  4.00 - 5.40 M/uL Final    Hemoglobin 07/11/2023 12.6  12.0 - 16.0 g/dL Final    Hematocrit 07/11/2023 39.1  37.0 - 48.5 % Final    MCV 07/11/2023 95  82 - 98 fL Final    MCH 07/11/2023 30.7  27.0 - 31.0 pg Final    MCHC 07/11/2023 32.2  32.0 - 36.0 g/dL Final    RDW 07/11/2023 12.2  11.5 - 14.5 % Final    Platelets 07/11/2023 237  150 - 450 K/uL Final    MPV 07/11/2023 10.5  9.2 - 12.9 fL Final    Immature Granulocytes 07/11/2023 0.0  0.0 - 0.5 % Final    Gran # (ANC) 07/11/2023 2.9  1.8 - 7.7 K/uL Final    Immature Grans (Abs) 07/11/2023 0.00  0.00 - 0.04 K/uL Final    Lymph # 07/11/2023 1.5  1.0 - 4.8 K/uL Final    Mono # 07/11/2023 0.5  0.3 - 1.0 K/uL Final    Eos # 07/11/2023  0.3  0.0 - 0.5 K/uL Final    Baso # 07/11/2023 0.03  0.00 - 0.20 K/uL Final    nRBC 07/11/2023 0  0 /100 WBC Final    Gran % 07/11/2023 55.6  38.0 - 73.0 % Final    Lymph % 07/11/2023 29.8  18.0 - 48.0 % Final    Mono % 07/11/2023 9.1  4.0 - 15.0 % Final    Eosinophil % 07/11/2023 4.9  0.0 - 8.0 % Final    Basophil % 07/11/2023 0.6  0.0 - 1.9 % Final    Differential Method 07/11/2023 Automated   Final    TSH 07/11/2023 2.705  0.400 - 4.000 uIU/mL Final    Vit D, 25-Hydroxy 07/11/2023 35  30 - 96 ng/mL Final    Cholesterol 07/11/2023 206 (H)  120 - 199 mg/dL Final    Triglycerides 07/11/2023 110  30 - 150 mg/dL Final    HDL 07/11/2023 66  40 - 75 mg/dL Final    LDL Cholesterol 07/11/2023 118.0  63.0 - 159.0 mg/dL Final    HDL/Cholesterol Ratio 07/11/2023 32.0  20.0 - 50.0 % Final    Total Cholesterol/HDL Ratio 07/11/2023 3.1  2.0 - 5.0 Final    Non-HDL Cholesterol 07/11/2023 140  mg/dL Final    PTH, Intact 07/11/2023 54.5  9.0 - 77.0 pg/mL Final           Imaging:             Results for orders placed or performed during the hospital encounter of 04/21/23   CT Head Without Contrast    Narrative    EXAMINATION:  CT HEAD WITHOUT CONTRAST    CLINICAL HISTORY:  fall, hit head; Unspecified fall, subsequent encounter    TECHNIQUE:  Low dose axial images were obtained through the head.  Coronal and sagittal reformations were also performed. Contrast was not administered.    COMPARISON:  MRI of the brain dated 12/05/2022.    CT scan of the head dated 07/19/2022.    FINDINGS:  The subcutaneous tissues are unremarkable.  The bony calvarium is intact.  There is a mucosal changes within the right maxillary sinus.  The mastoid air cells are clear.  There are postoperative changes in the globes.    The craniocervical junction is intact.  There is partial empty appearance of the sella.  There is no evidence of intracranial hemorrhage.  The ventricles and sulci are prominent, consistent cerebral loss.  There are hypodensities within  the periventricular and subcortical white matter.  There are old lacunar type infarctions within the bilateral subinsular cortex.  The gray-white differentiation is maintained.  There is no dense vessel sign.  There is no evidence of mass effect.      Impression    1.  No acute intracranial process.    2.  Changes of chronic small vessel ischemic disease and cerebral volume loss.      Electronically signed by: Seth Mcclendon MD  Date:    04/21/2023  Time:    16:50   Results for orders placed or performed during the hospital encounter of 12/05/22   MRI Brain Without Contrast    Narrative    EXAMINATION:  MRI BRAIN WITHOUT CONTRAST    CLINICAL HISTORY:  Dizziness, non-specific; Dizziness and giddiness    TECHNIQUE:  Multiplanar multisequence MR imaging of the brain was performed without contrast.    COMPARISON:  MRI brain without contrast dated 12/23/2019    FINDINGS:  No abnormal focus of diffusion restriction.  Redemonstration of gyriform susceptibility along the sulci of the left frontal convexity, similar to 2019.  No extra-axial collection or midline shift.  Age related cerebral atrophy.  No hydrocephalus.  Scattered mild T2 FLAIR white matter hyperintense foci as can be seen with sequela of chronic microangiopathic change.  Major T2 intracranial vascular flow voids appear maintained.  Heterogeneous T2 opacity at the right maxillary sinus suggesting mucous retention cyst.  No infiltrative T1 marrow process.      Impression    No acute intracranial abnormality.    Age-related cerebral atrophy with mild microangiopathic change.    Area of gyriform susceptibility along the sulci of the left frontal convexity, most in keeping with cirrhosis and similar to 2019.      Electronically signed by: Alexis Kowalski  Date:    12/05/2022  Time:    14:52           Assessment and Plan     Mild late onset Alzheimer's dementia without behavioral disturbance, psychotic disturbance, mood disturbance, or anxiety    Dream enactment  behavior    Sleep disturbance    Memory impairment  -     Ambulatory referral/consult to Adult Neuropsychology        Medical Decision Making:    MRI and labs obtained in 2022 when had neuro work-up.       No evidence of pdism.   She does have longstanding hx of hollering in her sleep that has apparently been managed completely with fluoxetine, which she has taken for many years.     Unsure if she is taking meclizine or possibly dramamine. She says she is not dizzy. Dtr is unsure if this is included in what she is taking. Asked that dtr check as this could be disruptive to cognition.   I do recommend more oversight of her meds to assure she is getting all refills, eduin since some come from mail order and some from retail (that she must call for delivery). I also wonder if she is perhaps missing or doubling doses given the more recent decline in cognition. Recommend daughter check on this, at least for the short term to assure accuracy.   Cognitive testing will also aid in giving additional recommendations.     Follow up 6 mos.       ..Total time: 56 minutes spent on the encounter, which includes face to face time and non-face to face time preparing to see the patient (eg, review of tests), Obtaining and/or reviewing separately obtained history, Documenting clinical information in the electronic or other health record, Independently interpreting results (not separately reported) and communicating results to the patient/family/caregiver, or Care coordination (not separately reported).       Miroslava Raines, VIN, NP-C  Division of Movement and Memory Disorders  [facility]  [phone]

## 2023-10-19 ENCOUNTER — OUTPATIENT CASE MANAGEMENT (OUTPATIENT)
Dept: NEUROLOGY | Facility: CLINIC | Age: 88
End: 2023-10-19
Payer: MEDICARE

## 2023-10-19 ENCOUNTER — OFFICE VISIT (OUTPATIENT)
Dept: NEUROLOGY | Facility: CLINIC | Age: 88
End: 2023-10-19
Payer: MEDICARE

## 2023-10-19 VITALS
HEIGHT: 63 IN | BODY MASS INDEX: 19.84 KG/M2 | HEART RATE: 60 BPM | SYSTOLIC BLOOD PRESSURE: 168 MMHG | DIASTOLIC BLOOD PRESSURE: 63 MMHG | WEIGHT: 112 LBS

## 2023-10-19 DIAGNOSIS — R41.3 MEMORY IMPAIRMENT: ICD-10-CM

## 2023-10-19 DIAGNOSIS — F02.A0 MILD LATE ONSET ALZHEIMER'S DEMENTIA WITHOUT BEHAVIORAL DISTURBANCE, PSYCHOTIC DISTURBANCE, MOOD DISTURBANCE, OR ANXIETY: Primary | ICD-10-CM

## 2023-10-19 DIAGNOSIS — G47.9 SLEEP DISTURBANCE: ICD-10-CM

## 2023-10-19 DIAGNOSIS — G30.1 MILD LATE ONSET ALZHEIMER'S DEMENTIA WITHOUT BEHAVIORAL DISTURBANCE, PSYCHOTIC DISTURBANCE, MOOD DISTURBANCE, OR ANXIETY: Primary | ICD-10-CM

## 2023-10-19 DIAGNOSIS — G47.52 DREAM ENACTMENT BEHAVIOR: ICD-10-CM

## 2023-10-19 PROCEDURE — 99215 PR OFFICE/OUTPT VISIT, EST, LEVL V, 40-54 MIN: ICD-10-PCS | Mod: HCNC,S$GLB,, | Performed by: NURSE PRACTITIONER

## 2023-10-19 PROCEDURE — 99499 UNLISTED E&M SERVICE: CPT | Mod: HCNC,S$GLB,, | Performed by: PSYCHIATRY & NEUROLOGY

## 2023-10-19 PROCEDURE — 96138 PSYCL/NRPSYC TECH 1ST: CPT | Mod: HCNC,S$GLB,, | Performed by: PSYCHIATRY & NEUROLOGY

## 2023-10-19 PROCEDURE — 96139 PR PSYCH/NEUROPSYCH TEST ADMIN/SCORING, BY TECH, 2+ TESTS, EA ADDTL 30 MIN: ICD-10-PCS | Mod: HCNC,S$GLB,, | Performed by: PSYCHIATRY & NEUROLOGY

## 2023-10-19 PROCEDURE — 96133 NRPSYC TST EVAL PHYS/QHP EA: CPT | Mod: HCNC,S$GLB,, | Performed by: PSYCHIATRY & NEUROLOGY

## 2023-10-19 PROCEDURE — 96116 NUBHVL XM PHYS/QHP 1ST HR: CPT | Mod: HCNC,S$GLB,, | Performed by: PSYCHIATRY & NEUROLOGY

## 2023-10-19 PROCEDURE — 96116 PR NEUROBEHAVIORAL STATUS EXAM BY PSYCH/PHYS: ICD-10-PCS | Mod: HCNC,S$GLB,, | Performed by: PSYCHIATRY & NEUROLOGY

## 2023-10-19 PROCEDURE — 99499 NO LOS: ICD-10-PCS | Mod: HCNC,S$GLB,, | Performed by: PSYCHIATRY & NEUROLOGY

## 2023-10-19 PROCEDURE — 99999 PR PBB SHADOW E&M-EST. PATIENT-LVL III: ICD-10-PCS | Mod: PBBFAC,HCNC,,

## 2023-10-19 PROCEDURE — 99999 PR PBB SHADOW E&M-EST. PATIENT-LVL III: CPT | Mod: PBBFAC,HCNC,,

## 2023-10-19 PROCEDURE — 99215 OFFICE O/P EST HI 40 MIN: CPT | Mod: HCNC,S$GLB,, | Performed by: NURSE PRACTITIONER

## 2023-10-19 PROCEDURE — 96139 PSYCL/NRPSYC TST TECH EA: CPT | Mod: HCNC,S$GLB,, | Performed by: PSYCHIATRY & NEUROLOGY

## 2023-10-19 PROCEDURE — 96132 PR NEUROPSYCHOLOGIC TEST EVAL SVCS, 1ST HR: ICD-10-PCS | Mod: HCNC,S$GLB,, | Performed by: PSYCHIATRY & NEUROLOGY

## 2023-10-19 PROCEDURE — 99214 OFFICE O/P EST MOD 30 MIN: CPT | Mod: HCNC,S$GLB,, | Performed by: STUDENT IN AN ORGANIZED HEALTH CARE EDUCATION/TRAINING PROGRAM

## 2023-10-19 PROCEDURE — 96133 PR NEUROPSYCHOLOGIC TEST EVAL SVCS, EA ADDTL HR: ICD-10-PCS | Mod: HCNC,S$GLB,, | Performed by: PSYCHIATRY & NEUROLOGY

## 2023-10-19 PROCEDURE — 99214 PR OFFICE/OUTPT VISIT, EST, LEVL IV, 30-39 MIN: ICD-10-PCS | Mod: HCNC,S$GLB,, | Performed by: STUDENT IN AN ORGANIZED HEALTH CARE EDUCATION/TRAINING PROGRAM

## 2023-10-19 PROCEDURE — 96132 NRPSYC TST EVAL PHYS/QHP 1ST: CPT | Mod: HCNC,S$GLB,, | Performed by: PSYCHIATRY & NEUROLOGY

## 2023-10-19 PROCEDURE — 96138 PR PSYCH/NEUROPSYCH TEST ADMIN/SCORING, BY TECH, 2+ TESTS, 1ST 30 MIN: ICD-10-PCS | Mod: HCNC,S$GLB,, | Performed by: PSYCHIATRY & NEUROLOGY

## 2023-10-19 NOTE — PROGRESS NOTES
Subject was consented today (10/19/23) for the following study:     Study title: [employer]  IRB #: [id number]  IRB approval date: 12/12/2022  Sponsor: [employer]  Note: This study received a waiver of written consent by the IRB, therefore consent is not uploaded to Epic. However, the Participant Information Sheet should be attached to the encounter for every enrolled participant starting 10/2/2023.    Screening of inclusion/exclusion criteria evaluation has been reviewed by Rubia Sims LCSW. At this time, the subject meets all inclusion and does not meet any one of the exclusion criteria.       INFORMED CONSENT PROCESS/ INVOLVEMENT IN CARE/ PROXY   Present for discussion: Christy Garcia  Does subject have capacity to consent per evaluation: No  Is LAR Consenting for Subject: Yes      LAR Determined by: Next of Kin   If applicable, next of kin: Adult Child      Subject does not have capacity - No POA  LAR will sign forms and we will attempt to acquire POA as part of the study      CONSENT:  Verbal Consent: yes  Written Consent: no     Prior to the Informed Consent (IC) being signed, or any protocol required testing, procedure, or intervention being performed, the following was done or discussed:    Purpose of the Study, Qualifications to Participate: YES/NO: Yes  Study Design, Schedule and Procedures: YES/NO: Yes  Risks, Benefits, Alternative Treatments, Compensation and Costs: YES/NO: Yes  Confidentiality and HIPAA Authorization for Release of Medical Records for the research trial/subject's right/study related injury: YES/NO: Yes  Study related contact information: YES/NO: Yes  Voluntary Participation and Withdrawal from the research trial at any time: YES/NO: Yes  Optional samples/procedures (if applicable): Not applicable.  Patient has been offered the opportunity to ask questions regarding the study and all questions were answered satisfactorily: YES/NO: Yes  Patient and/or LAR verbalizes understanding of the  study/procedures and agrees to participate: YES/NO: Yes  CRC and PI contact information given to LAR and/or patient: Yes - Provided in Participant Information Sheet..   Signed copy given to patient and/or LAR: No - does not apply. Participant Information sheet sent via e-mail  Copy in patient's chart and original uploaded to Epic: No - documented in [employer]        Person Obtaining Consent: Rubia Sims LCSW  Witness: Jessica Lynn  Subject Study ID: [id number]  Assigned Care Team Navigator: Nirmala Vasquez

## 2023-10-19 NOTE — PROGRESS NOTES
Palliative and Geriatric Medicine Evaluation    Patient Name: Olamide Felipe     Date: 10/19/2023      Consult Requested By:  Dr. Abiola Campbell    Primary Care Physician:  Abiola Campbell MD    Reason for Consult: Advance care planning and symptom management in the setting of cognitive impairment       Assessment/Plan     Plan/Recommendations:  Diagnoses and all orders for this visit:    Memory impairment  -     Ambulatory referral/consult to Adult Neuropsychology        Multi-Complexity:  -Frailty based on Clinical Frailty scale yes  -Weight loss: yes, BMI: Body mass index is 19.84 kg/m².  -Comorbidities listed:  Hypothyroidism, HLD, HTN  Breast cancer s/p Lumpectomy       Mentation: Cognition and Mood   -Cognitive impairment since 2019   -On Namenda, stopped Aricept for fear of SE  - Has anxiety and fixed beliefs that   by suicide  -Lives alone, daughter Christy is primary caregiver  -Has trouble sleeping , some confusion         Mobility  Olamide Felipe is  independent with ADL's and is not independent with IADL's  -Recent fall w/ clavicle fracture because she was carrying too many bags   -walks with a cane   -needs help w/ IADLs - Medications, cooking and driving  -lives in independent living facility   -Likes walking around the complex      Medications  -Medication reconciliation performed   -High risk medications identified Off Meclinzine now  -On Fluoxetine   -Recommend to avoid benzo's or antihistamines (such as Benadryl).        Palliative Care Encounter / Advance Care Planning      Advance Care Planning     Advance Directives:   Living Will: Yes        Copy on chart: No    [place]: No    Medical Power of : No      Decision Making:  Patient answered questions  Goals of Care: The patient endorses that what is most important right now is to focus on spending time at home and remaining as independent as possible    Accordingly, we have decided that the best plan to meet the  patient's goals includes continuing with treatment    They shared that he has ACP documents at home and they will upload them or bring them in to the next appointment            min time was spent on advance care planning, goals of care discussion, emotional support, formulating and communicating prognosis and goals of care, exploring burden/benefit of various approaches of treatment.        Subjective:     Informant: patient and daughter     Chief Complaint: No chief complaint on file.      History of Present Illness:  Olamide Felipe is a 89 y.o. female presenting with cognitive impairment .      Referred to Geriatric and Palliative Care for evaluation and management of advance care planning, and additional support..           Palliative Exam     Review of Symptoms      Symptom Assessment (ESAS 0-10 Scale)  Pain:  0  Dyspnea:  0  Anxiety:  0  Nausea:  0  Depression:  0  Anorexia:  0  Fatigue:  0  Insomnia:  0  Restlessness:  0  Agitation:  0         Psychosocial/Cultural:   See Palliative Psychosocial Note: Yes  Lives in independent living facility.  and has 2 adult sons  **Primary  to Follow**  Palliative Care  Consult: Yes      Geriatric Evaluation     4Ms for Medical Decision-Making in Older Adults  Last Completed EAWV: 3/6/2023    MOBILITY:  Get Up and Go: No data recorded  Activites of Daily Living  Ambulation: Independent  Dressing: Independent  Transfers: Independent  Feeding: Independent  Cleaning home/Chores: Assistance Required  Telephone use: Independent  Shopping: Assistance Required (does not drive)  Paying bills: Assistance Required  Taking meds: Assistance Required  If required, who assists the patient with ADLs?: daughter      Whisper Test: N/A- Hearing Impairment (bilateral HA)  Disability Status  Are you deaf or do you have serious difficulty hearing?: Y (bilateral HA)  Are you blind or do you have serious difficulty seeing, even when wearing glasses?:  N  Because of a physical, mental, or emotional condition, do you have serious difficulty concentrating, remembering, or making decisions?: Y  Do you have serious difficulty walking or climbing stairs?: N  Do you have difficulty dressing or bathing?: Y  Because of a physical, mental, or emotional condition, do you have difficulty doing errands alone such as visiting a doctor's office or shopping?: Y      Nutrition Screening  Has food intake declined over the past three months due to loss of appetite, digestive problems, chewing or swallowing difficulties?: 2  Involuntary weight loss during the last 3 months?: 3  Mobility?: 2  Neuropsychological problems?: 1  Body Mass Index (BMI)? : 1       MENTATION:   Depression Patient Health Questionnaire:  Over the last two weeks how often have you been bothered by little interest or pleasure in doing things: 0  Over the last two weeks how often have you been bothered by feeling down, depressed or hopeless: 0  PHQ-2 Total Score: 0    Has Dementia Dx:     Clock Drawing Test: No data recorded  Mini-Cog 3 Minute Recall: No data recorded  Cognitive Function Screening No data recorded      MEDICATIONS:  High Risk Medications:  Total Active Medications: 2  FLUoxetine - 20 MG  methocarbamoL Tab - 500 MG        Falls:   Fear of falling, balance/trouble walking? yes    Previous falls? yes    Mobility Device: straight cane    Vision:   Recent vision change? no   Baseline corrective lenses? no    Hearing:   Hearing loss? no   Hearing aids? no          Past Medical History:   Diagnosis Date    Hyperlipidemia     Hypothyroidism     Malignant neoplasm of upper-outer quadrant of right breast in female, estrogen receptor negative 1/7/2022    Osteoarthritis, knee     Vertigo      Past Surgical History:   Procedure Laterality Date    dentures      FRACTURE SURGERY Left     fracture left wrist    HYSTERECTOMY      INJECTION FOR SENTINEL NODE IDENTIFICATION Right 1/7/2022    Procedure: INJECTION,  FOR SENTINEL NODE IDENTIFICATION-Right;  Surgeon: Marci Mcintosh MD;  Location: [facility];  Service: General;  Laterality: Right;    left wrist surgery      MASTECTOMY, PARTIAL Right 1/7/2022    Procedure: MASTECTOMY, PARTIAL-Right with radiological marker;  Surgeon: Marci Mcintosh MD;  Location: [facility];  Service: General;  Laterality: Right;  REQUEST SAID 2 HOUR CASE    SENTINEL LYMPH NODE BIOPSY Right 1/7/2022    Procedure: BIOPSY, LYMPH NODE, SENTINEL-Right;  Surgeon: Marci Mcintosh MD;  Location: [facility];  Service: General;  Laterality: Right;     Family History   Problem Relation Age of Onset    No Known Problems Daughter     No Known Problems Son     No Known Problems Sister      Review of patient's allergies indicates:  No Known Allergies      Medications:    Current Outpatient Medications:     acetaminophen (TYLENOL) 325 MG tablet, Take 325 mg by mouth every 6 (six) hours as needed for Pain., Disp: , Rfl:     aspirin 81 MG Chew, Take 81 mg by mouth once daily., Disp: , Rfl:     b complex vitamins tablet, Take 1 tablet by mouth once daily., Disp: , Rfl:     calcium-vitamin D 250-100 mg-unit per tablet, Take 1 tablet by mouth 2 (two) times daily., Disp: , Rfl:     FLUoxetine 20 MG capsule, TAKE 1 CAPSULE EVERY DAY, Disp: 90 capsule, Rfl: 3    levothyroxine (SYNTHROID) 25 MCG tablet, Take 1 tablet (25 mcg total) by mouth before breakfast., Disp: 90 tablet, Rfl: 3    loratadine (CLARITIN) 10 mg tablet, Take 1 tablet (10 mg total) by mouth once daily., Disp: 90 tablet, Rfl: 3    losartan (COZAAR) 25 MG tablet, Take 1 tablet (25 mg total) by mouth daily as needed (For Systolic Blood pressure higher than 150)., Disp: 90 tablet, Rfl: 1    meclizine (ANTIVERT) 25 mg tablet, Take 1 tablet (25 mg total) by mouth 3 (three) times daily as needed for Dizziness., Disp: 20 tablet, Rfl: 0    memantine (NAMENDA) 5 MG Tab, Take 1 tablet (5 mg total) by mouth 2 (two) times daily. Begin by taking one tablet daily for seven days,  then increase to one tablet two times daily., Disp: 60 tablet, Rfl: 11    methocarbamoL (ROBAXIN) 500 MG Tab, Take 1 tablet (500 mg total) by mouth 2 (two) times daily as needed (muscle spasm)., Disp: 40 tablet, Rfl: 0    mv-min/iron/folic/calcium/vitK (WOMEN'S MULTIVITAMIN ORAL), Take by mouth once daily., Disp: , Rfl:     omeprazole (PRILOSEC) 20 MG capsule, TAKE 1 CAPSULE EVERY DAY, Disp: 90 capsule, Rfl: 3    simvastatin (ZOCOR) 20 MG tablet, TAKE 1 TABLET (20 MG TOTAL) BY MOUTH EVERY EVENING., Disp: 90 tablet, Rfl: 2    donepeziL (ARICEPT) 5 MG tablet, Take 1 tablet (5 mg total) by mouth every evening. (Patient not taking: Reported on 10/19/2023), Disp: 30 tablet, Rfl: 11     database queried on 10/19/2023  by Tasha Emerson . The results reviewed and considered with the clinical data in the decision whether or not to prescribe a controlled substance.   01/07/2022 01/07/2022   1  Hydrocodone-Acetamin 5-325 Mg 10.00  3  Oc Phm  [id number]   Och (1157)           Objective:   Physical Exam:  Vitals: Pulse: 60 (10/19/23 0826)  BP: (!) 168/63 (10/19/23 0826)  Physical Exam  Constitutional:       General: She is not in acute distress.     Comments: Frail lady sitting in a chair    Pulmonary:      Effort: Respiratory distress present.   Neurological:      Mental Status: She is alert. Mental status is at baseline. She is disoriented.   Psychiatric:         Mood and Affect: Mood normal.           Labs  CBC:   WBC   Date Value Ref Range Status   07/11/2023 5.14 3.90 - 12.70 K/uL Final     Hemoglobin   Date Value Ref Range Status   07/11/2023 12.6 12.0 - 16.0 g/dL Final     Hematocrit   Date Value Ref Range Status   07/11/2023 39.1 37.0 - 48.5 % Final     MCV   Date Value Ref Range Status   07/11/2023 95 82 - 98 fL Final     Platelets   Date Value Ref Range Status   07/11/2023 237 150 - 450 K/uL Final     LFT:   Lab Results   Component Value Date    AST 23 07/11/2023    ALKPHOS 65 07/11/2023    BILITOT 0.5  07/11/2023     Albumin:   Albumin   Date Value Ref Range Status   07/11/2023 3.7 3.5 - 5.2 g/dL Final     Protein:   Total Protein   Date Value Ref Range Status   07/11/2023 6.1 6.0 - 8.4 g/dL Final         I spent a total of 48 minutes on the day of the visit. This includes face to face time in discussion of goals of care, symptom assessment, coordination of care and emotional support.  This also includes non-face to face time preparing to see the patient (eg, review of tests/imaging), obtaining and/or reviewing separately obtained history, documenting clinical information in the electronic or other health record, independently interpreting results and communicating results to the patient/family/caregiver, or care coordinator.       This note was partially created using [employer] Voice Recognition software. Typographical and content errors may occur with this process. While efforts are made to detect and correct such errors, in some cases errors will persist. For this reason, wording in this document should be considered in the proper context and not strictly verbatim.      Signature: Tasha Emerson MD

## 2023-10-23 ENCOUNTER — OUTPATIENT CASE MANAGEMENT (OUTPATIENT)
Dept: NEUROLOGY | Facility: CLINIC | Age: 88
End: 2023-10-23
Payer: MEDICARE

## 2023-10-23 NOTE — PROGRESS NOTES
Care Ecosystem Follow-up Surveys:  CTDASHAWN Vasquez completed Baseline with caregiver Christy Garcia (Daughter) on (date: 10/23/2023).   Surveys documented in RedCap and Epic Flowsheets.      ClinCard sent/loaded: no    Scheduled to start the care plan with cg on Friday 10/27/23 at 9 AM.

## 2023-10-24 PROBLEM — G30.1 MILD LATE ONSET ALZHEIMER'S DEMENTIA WITHOUT BEHAVIORAL DISTURBANCE, PSYCHOTIC DISTURBANCE, MOOD DISTURBANCE, OR ANXIETY: Status: ACTIVE | Noted: 2023-03-06

## 2023-10-24 PROBLEM — F02.A0 MILD LATE ONSET ALZHEIMER'S DEMENTIA WITHOUT BEHAVIORAL DISTURBANCE, PSYCHOTIC DISTURBANCE, MOOD DISTURBANCE, OR ANXIETY: Status: ACTIVE | Noted: 2023-03-06

## 2023-10-24 PROBLEM — F02.A4 MILD LATE ONSET ALZHEIMER'S DEMENTIA WITH ANXIETY: Status: ACTIVE | Noted: 2023-03-06

## 2023-10-26 ENCOUNTER — OFFICE VISIT (OUTPATIENT)
Dept: NEUROLOGY | Facility: CLINIC | Age: 88
End: 2023-10-26
Payer: MEDICARE

## 2023-10-26 DIAGNOSIS — F02.A0 MILD LATE ONSET ALZHEIMER'S DEMENTIA WITHOUT BEHAVIORAL DISTURBANCE, PSYCHOTIC DISTURBANCE, MOOD DISTURBANCE, OR ANXIETY: Primary | ICD-10-CM

## 2023-10-26 DIAGNOSIS — G30.1 MILD LATE ONSET ALZHEIMER'S DEMENTIA WITHOUT BEHAVIORAL DISTURBANCE, PSYCHOTIC DISTURBANCE, MOOD DISTURBANCE, OR ANXIETY: Primary | ICD-10-CM

## 2023-10-26 PROCEDURE — 99499 UNLISTED E&M SERVICE: CPT | Mod: HCNC,95,, | Performed by: PSYCHIATRY & NEUROLOGY

## 2023-10-26 PROCEDURE — 99499 NO LOS: ICD-10-PCS | Mod: HCNC,95,, | Performed by: PSYCHIATRY & NEUROLOGY

## 2023-10-26 NOTE — PATIENT INSTRUCTIONS
What is delirium?  Delirium is a state of confusion that comes on very suddenly and lasts hours to days. When your loved one becomes delirious, it means she/he cannot think very clearly, can't pay attention and is not really aware of their environment. Sometimes people will call it other things such as a change in mental status, encephalopathy, or ICU psychosis.  There are abnormal changes in the person's level of consciousness and thinking. The person may be sleepy, or may appear to be withdrawn and depressed (hypoactive delirium) or agitated (hyperactive delirium), or alternate between these states. The changes may be subtle initially.  The person often has difficulty maintaining focus. He/she may change the subject frequently in a conversation, have difficulty retaining new information, mention strange ideas, or be disoriented (in place or in time). Some patients have visual hallucinations.  Younger people tend to recover more quickly, while those who are older or already have a chronic or serious medical condition will take longer to recover so that the delirium can continue off and on for a period of weeks or months even after the acute medical illness has settled.    What can cause it?  Many things can cause delirium including medications, dehydration, infections and lack of sleep. People can develop delirium following a surgical procedure. Often, a combination of problems causes delirium.    Potential Causes Examples   Infectious Sepsis, encephalitis, meningitis, syphillis, central nervous system abscess   Withdrawal Alcohol, barbituates, sedative-hypnotics   Acute metabolic Acidosis, electrolyte disturbance, hepatic/renal failure, other metabolic disturances (glucose, magnesium, calcium)   Trauma Head injury, severe burns, broken bones   CNS disease Hemorrhage, stroke, vasculitis, seizure, tumor   Hypoxia Acute hypoxia, chronic lung disease, hypotension   Deficiencies  Vitamin B12, hypovitaminosis, niacin,  thiamine   Enviromental Hypo/hyperthermia, endocrinopathies, diabetes, adrenal dysfunction, thyroid dysfunction   Acute vascular Hypertensive emergency, subarachnoid hemorrhage, sagittal vein thrombosis    Toxins/drugs Medication changes, street drugs, alcohols, pesticides, industrial poisons, carbon monoxide, cyanide, solvents, etc   Heavy metals Lead, mercury        It is very common for older adults to develop delirium while hospitalized. Many things can make delirium worse while hospitalized, including physical restraints, bed rest, bladder catheters and certain medications.  The important point is that health care providers should look for the causes and treat them; and they should be careful not to do things that might make delirium worse.    What are the risk factors?  Advanced age  Underlying brain diseases such as dementia, stroke, or Parkinson disease, particularly when there are current problems with memory  Use of multiple medications (particularly psychiatric drugs and sedatives), or multiple medical problems  Sudden withdrawal of a regular medication or cessation of regular alcohol use  Frailty, malnutrition, immobility  Advanced cancer  Under-treated pain (although excessive use of opioid pain medication for pain control can also impair brain function)  Immobilization, including physical restraints  Use of bladder catheters  Limb fractures  Interventions, including diagnostic tests  Poor eyesight or hearing  Sleep deprivation  Organ failure (eg, chronic lung disease; heart, kidney, or liver failure)    My loved one has dementia; could that be causing this confusion?  Delirium and dementia can co-exist but they are not the same. Dementia comes on gradually and is a permanent condition.  Delirium can develop suddenly and usually goes away in days to weeks if treated properly.  It is important to note though, that people who have dementia are at an increased risk of developing delirium.    What can I do  to help?  If you are a family member or friend, try to make it clear to doctors and other health care providers what your loved one's usual mental status is (This is often called a patient's baseline) and how that has changed.   Communicate clearly & concisely with your loved one; don't say too much or bring up complicated issues; you can repeat verbal reminders about what day it is, or the time and location, if it seems to make your loved one feel more secure. However, be careful not to overdo this as it can cause frustration or agitation in some people.  Your loved one may not recognize you. If this is the case, do not take it personally, this is a common occurrence and an accepted part of the condition. Introduce yourself each time if necessary.  Your loved one may say and do things that are completely out of character. Remind yourself that this is due to the delirium and that this will recover when the delirium improves or resolves.  If you try to help your loved one with some basic needs (getting to the chair or bathroom, helping them eat or dress), try to keep instructions simple (this is called a one-step command); if your loved ones can't do what needs to be done, do not argue or try to reason; simply try later.  If they are in the hospital, it can help to bring in familiar objects from your loved one's home.  You can use TV or radio for relaxation and to help maintain contact with outside world but be careful because in some cases TV and radio (external stimulation) may cause anxiety and agitation and can disrupt sleep - make sure it is not left on at night.  Remind yourself that hallucinations and delusions are related to the delirium; do not directly dispute hallucinations and delusions expressed by your loved ones; instead provide reassurance.  Related to the previous point: If your loved one is fixated or stuck on a topic or issue that is causing them anxiety or agitation, sometimes changing the  subject or the environment (getting them out of the bed or room) works better than trying to resolve the issue.  Discuss with other family and friends who are visitors about the above, and remind them one of the most important strategies in the care of someone who has delirium is to help them feel secure and safe.    Can I prevent it from happening again?  Once you've had an episode of delirium, you are at increased risk for more episodes. The following strategies are helpful to prevent delirium whenever possible:  Ensure the person gets adequate, restful sleep at night, but do not allow them to stay in bed and sleep all day. Contact their medical team if sleep is a problem.   Ensure they eat healthy foods and drink enough water. Staying hydrated is especially important to avoid electrolyte imbalances and urinary tract infections. They need to be getting enough calories and protein to stabilize their blood sugar.   Avoid alcohol and other substances  Make sure medications are being taken regularly at the correct times without missed or incorrect doses. Avoid suddenly discontinuing any medications, particularly without guidance from medical professionals.   Help the person keep to a routine as much as possible. Erratic schedules can prolong confusion. Try to make their environment predictable and familiar.   Try to ensure the person gets out of bed at least once per day. Ideally, help them get outside into the sunshine, which will help support their normal circadian rhythm.   For those who are cognitively intact, provide cognitive stimulation, such as large-print magazines, or crossword or word search puzzles.  If your loved one wears glasses, ask him/her to put them on. If they have still have poor vision, consider the following:   Make sure they are wearing their glasses whenever they are awake.  Provide a magnifier and large-print menus, books, and magazines.  Put red tape on call bell, water pitcher, TV remote  control, telephone, and personal items to make them easier to locate.  If the patient is hard of hearing, consider the following:   Use a portable amplifying device (Pocket Talker).  Limit background noise and face the patient, but don't shout.  If the person uses a hearing aid, make sure they are wearing it and that it's working properly.  If the patient uses a catheter or has urinary incontinence, check in frequently for signs of a urinary tract infection. Ensure that incontinence pads and liners are changed often, and the person is bathing/cleaning regularly. People often do not spontaneously report pain or discomfort from UTI, so you may need to ask directly or get an at-home test.   Ensure pain is adequately treated        Adapted from: https://www.uptodate.com/contents/delirium-beyond-the-basics#H3  https://americandeliriumsociety.org/about-delirium/patientfamily  https://journals.lww.com/nursing/FullText/2007/60095/How_to_prevent_delirium__A_practical_protocol.17.aspx

## 2023-10-26 NOTE — PROGRESS NOTES
NEUROPSYCHOLOGICAL EVALUATION FEEDBACK    Olamide Felipe attended a feedback session today accompanied by her daughter.  Christy took the dramamine out of the house, doesn't appear that she was taking any more meclizine. Discussed that I would like to target her sleep, could benefit from medication dispenser. We discussed the results of the neuropsychological evaluation (31 minutes).  Handouts provided in AVS. All of her questions were answered.    1. Mild late onset Alzheimer's dementia without behavioral disturbance, psychotic disturbance, mood disturbance, or anxiety            PLAN:   RTC 6 mo  Following with Nirmala in [facility] [place] PsyD  Licensed Clinical Neuropsychologist  [facility] - Department of Neurology        Established Patient - Audio Only Telehealth Visit     The patient location is: [place]   The chief complaint leading to consultation is: feedback  Visit type: Virtual visit with audio only (telephone)  Total time spent with patient: 31 min       The reason for the audio only service rather than synchronous audio and video virtual visit was related to technical difficulties or patient preference/necessity.     Each patient to whom I provide medical services by telemedicine is:  (1) informed of the relationship between the physician and patient and the respective role of any other health care provider with respect to management of the patient; and (2) notified that they may decline to receive medical services by telemedicine and may withdraw from such care at any time. Patient verbally consented to receive this service via voice-only telephone call.         This service was not originating from a related E/M service provided within the previous 7 days nor will  to an E/M service or procedure within the next 24 hours or my soonest available appointment.  Prevailing standard of care was able to be met in this audio-only visit.

## 2023-10-27 NOTE — PROGRESS NOTES
[facility] Dementia Care Management Plan - BASELINE     Assigned Care Team Navigator: Nirmala Vasquez  Referring Provider: Dr. Nighat Arreguin PsyD  Primary Care: Abiola Campbell MD     Protocol: The [facility] Effectiveness Study  Identifier: [id number]  IRB#: 2022.247  PI: Dr. Baldo Maynard, PhD  CO-I: Dr. Nighat Arreguin PsyD  Version Date: 2022  Pt Study ID: [id number]  Visit Month: Care Plan Call    Timeline:   Consent: Completed(10/20/2023)  Baseline questionnaires: Completed(10/23/2023)  6-month questionnaires: Planned(2024)  12-month questionnaires: Planned(10/18/2024)    Patient Demographic Information     Name: Olamide Felipe  Preferred Name: Miss Quiñonez   MRN: [id number]  : 2/3/1933  Age: 89 y.o.  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 Approximately 30 years ago pt slowed down and retired. Cg is in HR at [employer] role is to run the professional staff services for [employer] and cg's specialty is benefits. Manages a team of 7 people.     Communication Preferences     Language: English   Please contact primary caregiver by E-mail for scheduling purposes.   Please send information and forms via E-mail    Caregiver(s) and Social History     Family Status: brother lives in [place] - cg  for 44 years, they have two grown sons that have families with children.   Current Living Situation: pt lives on her own in [facility] in [place] - has no assistance as of right now but manages her own daily activities    Support System: active in their Muslim through the life group,   Patient Hobbies/Activities: loves to read, watches golf on TV, has done miscellaneous activities at her home she is active, every other Friday she meets with her family (sister, nephews)          (Examples: music, TV shows/movies, exercise/physical activity, social, outings, games, arts/crafts,         beauty/grooming, spiritual practices)  Patient Stressors (e.g., Pain): pt is triggered by getting help from people, bone, when pt cant do something she wants to do (walked to grocery, fell broke her collar bone) is delusional and accuses people of not caring about her and or not calling / spending time with her.  Current Programs/Services: No - when pt first moved to [facility] she went to the [facility] but no recently. 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 Pt's  has deleted her number out of his phone and he refuses to help. Their family had very little interaction with her during their lives and they are not really interested in helping because there has never been a relationship between them. Cg mentioned that the pt's  was not an easy person to be around and he was not kind to the kids when they were young.     Caregiver Name  Role Relationship Main Contact #   Christy Garcia Primary Daughter [phone]                Medical History     Providers   (Relevant to dementia care) Kevin Campbell MD PCP    Types of help wanted   (at baseline) Information about dementia and what to expect in the future, Ideas for coping with the stress of caregiving, Caregiving strategies for helping Test do as much as he/she still can, Ideas for managing behavior symptoms, Recreational or purposeful activity ideas, Advice about safety risks like falls, wandering, or poor judgment, Advice about medications, Information about caregiving support services like in-home care, adult day care, or care facilities, Information about programs that might help pay for caregiving services, Information about medical, legal, and/or financial planning, and Help with back-up or crisis planning   Concerns and Goals   (from caregiver and PWD) Cg wants pt  to be able to live independently as much as she can for as long as she can because the pt has financial resources but they are not abundant. They do not have the resources to take the pt in to their home. The pt has a long term care plan that will help but she needs to get to a point where she qualifies for it.   Assisted living eventually -    Type of Dementia and Stage  (all that apply) Dementia Type: Alzheimer's Disease  Stage: Mild  [place] 18/30    Date: 04/10/23           No data to display                   Medication Review (at baseline)   Medication reviewed with caregiver Christy Garcia .   Information is based off patient chart and patient and/or caregiver self-report as of (10/27/2023)    Current Outpatient Medications   Medication Instructions    acetaminophen (TYLENOL) 325 mg, Oral, Every 6 hours PRN YES    aspirin 81 mg, Oral, Daily YES    b complex vitamins tablet 1 tablet, Oral, Daily YES    calcium-vitamin D 250-100 mg-unit per tablet 1 tablet, Oral, 2 times daily YES     FLUoxetine 20 MG capsule TAKE 1 CAPSULE EVERY DAY YES     levothyroxine (SYNTHROID) 25 mcg, Oral, Before breakfast YES     loratadine (CLARITIN) 10 mg, Oral, Daily YES     losartan (COZAAR) 25 mg, Oral, Daily PRN only takes when top number is over 150     meclizine (ANTIVERT) 25 mg, Oral, 3 times daily PRN NO - stopped taking this in about April 2023    memantine (NAMENDA) 5 mg, Oral, 2 times daily, Begin by taking one tablet daily for seven days, then increase to one tablet two times daily. YES     mv-min/iron/folic/calcium/vitK (WOMEN'S MULTIVITAMIN ORAL) Oral, Daily YES     omeprazole (PRILOSEC) 20 MG capsule TAKE 1 CAPSULE EVERY DAYEYS     simvastatin (ZOCOR) 20 mg, Oral, Nightly YES          Over the counter medications: Benadryl as needed when struggling to fall asleep   Vitamins, supplements: NA  Caffeine, no alcohol, no tobacco, no marijuana products, no       THE FOLLOWING CONCERNS WERE IDENTIFIED:  Medication management: Yes -    Access/cost: No  Side effects: No   Recent changes: Yes -   meclizine (ANTIVERT) 25 mg, Oral, 3 times daily PRN NO - stopped taking this in about April 2023     Polypharmacy (>9 routine prescription medications?): Yes -   Behaviors/Sleep: No  Other symptoms (falls/incontinence/diarrhea/swelling/itching/weight loss): No      PLAN:  CTN to send medication list to PharmD this week. Review will be addended by PharmD.   CTN will route pharmacist recommendations to Abiola Campbell MD   CTN will share and discuss pharmacist recommendations with caregiver during next scheduled call.         Advanced Care Planning      Living Will: Yes      Copy on chart: No - cg intent is to bring and upload it soon  [place]: No:      Medical Power of : Yes      Copy on chart: No   Financial Power of : Yes      Copy on chart: No   Agent's Name:        Goals of Care      Patient Values: Quality of Life , [place], and Engage in Activities    Future Care Plans:   - Long term care goal: Assisted Living Facility/Nursing Home - home for as long as possible by the time they  get to assisted living they will likely be in memory care unit  - Resources available to pay for care: Income/Private Pay, Insurance Coverage, and Public Benefits + some life insurance money from her husbands passing.       Current Concerns - BASELINE     Primary Concern(s)  (Immediate needs) Medication management   Sleep habits; stays up all hours of the night and then sleeps until noon so her morning medication is not actually being taken in the morning.   Loves candy - is 114 lbs so cg is not concerned about the candy but everyone else is.    Cognitive Concerns  (Include decision making capacity) Put cut up her debit card by accident because she got a new credit card and didn't realize that they were different.   Cg is concerned that the pt might give away her money or information Scam calls / does not read e-mail anymore not concerned about that     Primary Behavior Concerns  (Symptoms, triggers, strategies) Is triggered by her family stepping in to help      Functional  (Include PWD daily routine and sensory - vision/hearing - information) No          10/23/2023    10:41 AM   ANUEL   Have you ever completed a QDRS before today? No   Memory and Recall Mild to moderate memory loss, more noticeable for recent events, interferes with performing everyday activities   Orientation Slight difficulty keeping track of time, may forget day or date more frequently than in the past   Decision Making and Problem Solving Abilities Moderate difficulty with handling problems and making decisions, defers many decisions to others, social judgment and behavior may be slightly impaired, loss of insight   Activities Outside the Home Slight impairment in these activities compared to previous performance, slight change in driving skills, still able to handle emergency situations   Function at Home and Hobby Activities Chores at home, hobbies and personal interests are well maintained compared to past performance   Toileting and Personal Hygeine Fully capable of self-care (dressing, grooming, washing, bathing, toileting)   Behavior and Personality Changes Moderate behavior or personality changes, affects interactions with others, may be avoided by friends, neighbors, or distant relatives   Language and Communication Abilities Moderate word finding difficulty in speech, cannot name objects, marked reduction in word production, reduced comprehension, conversation, writing and/or reading   Mood Occasional sadness, depression, anxiety, nervousness or loss of interest/motivation   Attention and Concentration Normal attention, concentration and interaction with his/her environment and surroundings   Is the person you are caring for (the patient) able to answer a question about his/her satisfaction with the primary care services provided to him/her? No           10/23/2023    10:44 AM  10/19/2023    11:12 AM   NPIQ RFS   WHO IS FILLING OUT FORM? Caregiver Caregiver   Does this patient have false beliefs, such as thinking that others are stealing from him/her or planning to harm him/her in some way? Yes No   Delusions Severity 2    Delusion Distress 4    Does this patient have hallucinations such as false visions or voices? Torres she/he seem to hear or see things that are not present? No No   Is the patient resistive to help from others at times, or hard to handle? Yes Yes   Agitation Agression Severity 3 1   Agitation/Agression Distress 6 3   Does the patient seem sad or say that he/she is depressed? Yes No   Depression/Dysphoria Severity 2    Depression/Dysphoria Distress 4    Does the patient become upset when  from you? Does he/she have any other signs of nervousness such as shortness of breath, sighing, being unable tor elax, or feeling excessively tense? No No   Does the patient appear to feel good or act excessively happy? Yes Yes   Elation/Euphoria Severity 2 2   Elation/Euphoria Distress 3 1   Does this patient seem less interested in his/her usual activities or in the activities and plans of others? Yes No   Apathy/Indifference Severity 2    Apathy/Indifference Distress 2    Does this patient seem to act cumpolsively, for example, talking to strangers as if she/he knows them, or saying things that may hurt people's feelings? No No   Is the patient impatient and cranky? Does he/she have difficulty coping with delays or waiting for planned activities? No No   Does the patient engage in repetitive activities such as pacing around the house, handling buttons, wrapping string, or doing other things repeatedly? No No   Does this patient awaken you during the night, rise too early in the morning, or take excessive naps during the day? No No   Has the patient lost or gained weight, or had a change in the type of food he/she likes? No No   NPI Total Severity Score 11 3   NPI Total Distress  Score 19 4         Monthly Questions     Falls in the past month: 0  UTIs in the past month: 0  Hospital encounters in the past month (reported by caregiver): 0      CTN Plan & Recommendations     CTN provided the following resources/recommendations for: Caregiver      Next steps: Email cg directions to getting pill packs, and send link to tele calm. Send medication list to [facility] pharmacist for her review.

## 2023-10-30 NOTE — PROGRESS NOTES
Protocol: The [facility] Effectiveness Study  Identifier: [id number]  IRB#: 2022.247  PI: Dr. Baldo Maynard, PhD  CO-I: Dr. Nighat Arreguin PsyD  Version Date: 12/05/2022  Pt Study ID: [id number]  Visit Month: Care Plan Call      Visit Note: Received an in coming email from , Email below;     I spent some time with Mom this afternoon and it was very difficult but I found out she has not been taking the mementine either. I told her to put it in her pill organizer for the rest of the week. Who knows if she will or not.

## 2023-10-31 RX ORDER — DIPHENHYDRAMINE HCL 25 MG
25 CAPSULE ORAL NIGHTLY PRN
Status: ON HOLD | COMMUNITY
End: 2023-12-07 | Stop reason: HOSPADM

## 2023-10-31 NOTE — PROGRESS NOTES
[facility] Medication Review - PharmD    [facility] patient. Medication review completed by Care Team Navigator (CTN) and Hanane Bautista PharmD to identify dementia specific medication concerns, as well as opportunities to reduce polypharmacy, high risk medications, and fall risk as needed.     Current Outpatient Medications on File Prior to Visit   Medication Sig    acetaminophen (TYLENOL) 325 MG tablet Take 325 mg by mouth every 6 (six) hours as needed for Pain.    aspirin 81 MG Chew Take 81 mg by mouth once daily.    b complex vitamins tablet Take 1 tablet by mouth once daily.    calcium-vitamin D 250-100 mg-unit per tablet Take 1 tablet by mouth 2 (two) times daily.    diphenhydrAMINE (BENADRYL) 25 mg capsule Take 25 mg by mouth nightly as needed for Insomnia.    FLUoxetine 20 MG capsule TAKE 1 CAPSULE EVERY DAY    levothyroxine (SYNTHROID) 25 MCG tablet Take 1 tablet (25 mcg total) by mouth before breakfast.    loratadine (CLARITIN) 10 mg tablet Take 1 tablet (10 mg total) by mouth once daily.    losartan (COZAAR) 25 MG tablet Take 1 tablet (25 mg total) by mouth daily as needed (For Systolic Blood pressure higher than 150).    mv-min/iron/folic/calcium/vitK (WOMEN'S MULTIVITAMIN ORAL) Take by mouth once daily.    omeprazole (PRILOSEC) 20 MG capsule TAKE 1 CAPSULE EVERY DAY    simvastatin (ZOCOR) 20 MG tablet TAKE 1 TABLET (20 MG TOTAL) BY MOUTH EVERY EVENING.    memantine (NAMENDA) 5 MG Tab Take 1 tablet (5 mg total) by mouth 2 (two) times daily. Begin by taking one tablet daily for seven days, then increase to one tablet two times daily.        PharmD Recommendations:    Looks like memantine hasn't been filled since April, check with CG to see if that one needs to be refilled.  Discontinue usage of benadryl for sleep. This medication can worsen cognition. Try melatonin instead.   Updated Epic med list to reflect CTN notes and fill history.    Time spent delivering care (in minutes):  15    Plan:  Pharmacist to route recommendations to CTN.

## 2023-11-13 ENCOUNTER — OUTPATIENT CASE MANAGEMENT (OUTPATIENT)
Dept: NEUROLOGY | Facility: CLINIC | Age: 88
End: 2023-11-13
Payer: MEDICARE

## 2023-11-13 ENCOUNTER — OFFICE VISIT (OUTPATIENT)
Dept: URGENT CARE | Facility: CLINIC | Age: 88
End: 2023-11-13
Payer: MEDICARE

## 2023-11-13 VITALS
RESPIRATION RATE: 20 BRPM | SYSTOLIC BLOOD PRESSURE: 130 MMHG | DIASTOLIC BLOOD PRESSURE: 78 MMHG | HEART RATE: 83 BPM | WEIGHT: 110.25 LBS | BODY MASS INDEX: 19.54 KG/M2 | HEIGHT: 63 IN | TEMPERATURE: 98 F | OXYGEN SATURATION: 95 %

## 2023-11-13 DIAGNOSIS — R53.83 FATIGUE, UNSPECIFIED TYPE: ICD-10-CM

## 2023-11-13 DIAGNOSIS — R10.30 LOWER ABDOMINAL PAIN: Primary | ICD-10-CM

## 2023-11-13 LAB
BILIRUB UR QL STRIP: NEGATIVE
CTP QC/QA: YES
CTP QC/QA: YES
GLUCOSE UR QL STRIP: NEGATIVE
KETONES UR QL STRIP: NEGATIVE
LEUKOCYTE ESTERASE UR QL STRIP: NEGATIVE
PH, POC UA: 5
POC BLOOD, URINE: NEGATIVE
POC MOLECULAR INFLUENZA A AGN: NEGATIVE
POC MOLECULAR INFLUENZA B AGN: NEGATIVE
POC NITRATES, URINE: NEGATIVE
PROT UR QL STRIP: POSITIVE
SARS-COV-2 AG RESP QL IA.RAPID: NEGATIVE
SP GR UR STRIP: 1.02 (ref 1–1.03)
UROBILINOGEN UR STRIP-ACNC: 1 (ref 0.1–1.1)

## 2023-11-13 PROCEDURE — 87811 SARS CORONAVIRUS 2 ANTIGEN POCT, MANUAL READ: ICD-10-PCS | Mod: QW,S$GLB,, | Performed by: NURSE PRACTITIONER

## 2023-11-13 PROCEDURE — 87502 INFLUENZA DNA AMP PROBE: CPT | Mod: QW,S$GLB,, | Performed by: NURSE PRACTITIONER

## 2023-11-13 PROCEDURE — 81003 URINALYSIS AUTO W/O SCOPE: CPT | Mod: QW,S$GLB,, | Performed by: NURSE PRACTITIONER

## 2023-11-13 PROCEDURE — 93010 EKG 12-LEAD: ICD-10-PCS | Mod: S$GLB,,, | Performed by: INTERNAL MEDICINE

## 2023-11-13 PROCEDURE — 99214 OFFICE O/P EST MOD 30 MIN: CPT | Mod: S$GLB,,, | Performed by: NURSE PRACTITIONER

## 2023-11-13 PROCEDURE — 81003 POCT URINALYSIS, DIPSTICK, AUTOMATED, W/O SCOPE: ICD-10-PCS | Mod: QW,S$GLB,, | Performed by: NURSE PRACTITIONER

## 2023-11-13 PROCEDURE — 93010 ELECTROCARDIOGRAM REPORT: CPT | Mod: S$GLB,,, | Performed by: INTERNAL MEDICINE

## 2023-11-13 PROCEDURE — 99214 PR OFFICE/OUTPT VISIT, EST, LEVL IV, 30-39 MIN: ICD-10-PCS | Mod: S$GLB,,, | Performed by: NURSE PRACTITIONER

## 2023-11-13 PROCEDURE — 87811 SARS-COV-2 COVID19 W/OPTIC: CPT | Mod: QW,S$GLB,, | Performed by: NURSE PRACTITIONER

## 2023-11-13 PROCEDURE — 87502 POCT INFLUENZA A/B MOLECULAR: ICD-10-PCS | Mod: QW,S$GLB,, | Performed by: NURSE PRACTITIONER

## 2023-11-13 PROCEDURE — 93005 ELECTROCARDIOGRAM TRACING: CPT | Mod: S$GLB,,, | Performed by: NURSE PRACTITIONER

## 2023-11-13 PROCEDURE — 93005 EKG 12-LEAD: ICD-10-PCS | Mod: S$GLB,,, | Performed by: NURSE PRACTITIONER

## 2023-11-13 NOTE — PROGRESS NOTES
"Subjective:      Patient ID: Olamide Felipe is a 89 y.o. female.    Vitals:  height is 5' 3" (1.6 m) and weight is 50 kg (110 lb 3.7 oz). Her oral temperature is 98.3 °F (36.8 °C). Her blood pressure is 130/78 and her pulse is 83. Her respiration is 20 and oxygen saturation is 95%.     Chief Complaint: Fatigue    This is a 89 y.o. female who presents today with a chief complaint of fatigue started 5 days ago, right lower abdominal pain. Sx started more than one week ago, Treatment include nothing at home. Denies urinary frequency, urgency, dysuria hematuria, denies back pain or flank pain, patient reports right lower abdominal pain started more than 1 week ago and only hurts when she turns in a certain way in sleep, denies any trauma fall or injury, denies fever, body aches or chills, denies cough, wheezing or shortness of breath, denies nausea, vomiting, diarrhea or abdominal pain, denies chest pain or dizziness positional lightheadedness, denies sore throat or trouble swallowing, denies loss of taste or smell, or any other symptoms        Fatigue  This is a new problem. The current episode started in the past 7 days. The problem occurs constantly. The problem has been gradually worsening. Associated symptoms include fatigue. Nothing aggravates the symptoms. She has tried nothing for the symptoms. The treatment provided no relief.       Constitution: Positive for fatigue.      Past Medical History:   Diagnosis Date    Hyperlipidemia     Hypothyroidism     Malignant neoplasm of upper-outer quadrant of right breast in female, estrogen receptor negative 1/7/2022    Osteoarthritis, knee     Vertigo        Objective:     Physical Exam   Constitutional: She is oriented to person, place, and time. She appears well-developed.      Comments:Patient sitting comfortably on the exam table, non toxic appearance  and answering questions appropriately, no acute distress         HENT:   Head: Normocephalic and atraumatic. "   Ears:   Right Ear: External ear normal.   Left Ear: External ear normal.   Nose: Nose normal.   Mouth/Throat: Mucous membranes are normal.   Eyes: Conjunctivae and lids are normal.   Neck: Trachea normal. Neck supple.   Cardiovascular: Normal rate, regular rhythm and normal heart sounds.      Comments: EKG NSR vent rate 76, compared with previous EKG, no acute changes noted   Pulmonary/Chest: Effort normal and breath sounds normal. No respiratory distress.   Abdominal: Normal appearance and bowel sounds are normal. She exhibits no distension and no mass. Soft. There is no abdominal tenderness. There is no rebound, no guarding, no tenderness at McBurney's point, negative Cuba's sign, no left CVA tenderness, negative Rovsing's sign, negative psoas sign, no right CVA tenderness and negative obturator sign.      Comments: Abdomen soft, mild ttp to point of pain, no guarding, rigidity or rebound tenderness noted     Musculoskeletal: Normal range of motion.         General: Normal range of motion.   Neurological: She is alert and oriented to person, place, and time. She has normal strength.   Skin: Skin is warm, dry, intact, not diaphoretic and not pale.   Psychiatric: Her speech is normal and behavior is normal. Judgment and thought content normal.   Nursing note and vitals reviewed.    Results for orders placed or performed in visit on 11/13/23   POCT Urinalysis, Dipstick, Automated, W/O Scope   Result Value Ref Range    POC Blood, Urine Negative Negative    POC Bilirubin, Urine Negative Negative    POC Urobilinogen, Urine 1.0 0.1 - 1.1    POC Ketones, Urine Negative Negative    POC Protein, Urine Positive (A) Negative    POC Nitrates, Urine Negative Negative    POC Glucose, Urine Negative Negative    pH, UA 5.0     POC Specific Gravity, Urine 1.025 1.003 - 1.029    POC Leukocytes, Urine Negative Negative   POCT Influenza A/B MOLECULAR   Result Value Ref Range    POC Molecular Influenza A Ag Negative Negative, Not  Reported    POC Molecular Influenza B Ag Negative Negative, Not Reported     Acceptable Yes    SARS Coronavirus 2 Antigen, POCT Manual Read   Result Value Ref Range    SARS Coronavirus 2 Antigen Negative Negative     Acceptable Yes      EKG: normal EKG, normal sinus rhythm, unchanged from previous tracings.      Patient in no acute distress.  Vitals reassuring.  Discussed results/diagnosis/plan in depth with patient in clinic. Strict precautions given to patient to monitor for worsening signs and symptoms. Advised to follow up with primary.All questions answered. Strict ER precautions given. If your symptoms worsens or fail to improve you should go to the Emergency Room. Discharge and follow-up instructions given verbally/printed. Discharge and follow-up instructions discussed with the patient who expressed understanding and willingness to comply with my recommendations.Patient voiced understanding and in agreement with current treatment plan.     Please be advised this text was dictated with twenty5media software and may contain errors due to translation.    Assessment:     1. Lower abdominal pain    2. Fatigue, unspecified type        Plan:       Lower abdominal pain  -     POCT Urinalysis, Dipstick, Automated, W/O Scope    Fatigue, unspecified type  -     POCT Influenza A/B MOLECULAR  -     SARS Coronavirus 2 Antigen, POCT Manual Read  -     EKG 12-lead          Medical Decision Making:   Clinical Tests:   Lab Tests: Reviewed  Urgent Care Management:  Patient in no acute distress.  Vitals reassuring.  On exam, patient is nontoxic appearing and afebrile.  Lungs CTA.  Patient with complaint of right lower abdominal pain only with movement at night during sleep.  Denies any urinary problems.  Urinalysis as above.  I discussed with patient and daughter in detail x-ray, deferred at this time but patient/daughter.  Patient also reported fatigue, negative COVID-19 and flu test results discussed  with patient/daughter in detail.  EKG results Discussed with patient and daughter in detail.  Advised patient that she will need further evaluation if no improvement in symptoms.  over-the-counter medication discussed with patient at length.  Proper hydration advised.  I reiterated the importance of further evaluation if no improvement symptoms and follow-up with primary.           Patient Instructions   If your condition worsens or fails to improve we recommend that you receive another evaluation at the ER immediately or contact your PCP to discuss your concerns or return here. You must understand that you've received an urgent care treatment only and that you may be released before all your medical problems are known or treated. You the patient will arrange for followup care as instructed.    If you were prescribed antibiotics, please take them to completion.  If you were prescribed a narcotic medication, do not drive or operate heavy equipment or machinery while taking these medications.  Please follow up with your primary care doctor or specialist as needed.  If you  smoke, please stop smoking.

## 2023-11-13 NOTE — PROGRESS NOTES
Protocol: [employer] Effectiveness Study  Identifier: [id number]  IRB#: 2022.247  PI: Dr. Baldo Maynard, PhD  CO-I: Dr. Nighat Arreguin PsyD  Version Date: 12/05/2022  Pt Study ID: [id number]  Visit Month: 1   Care Plan documented on: 11/14/23 Please refer to note for more information.     Timeline:     Consent: Completed(10/20/23)  Baseline questionnaires: Completed(10/23/23)  6-month questionnaires: Planned(04/18/24)  12-month questionnaires: Planned(10/18/24)    Visit Note:     (date: 11/13/23 ) Reached out to cg, Christy Garcia in a first attempt to hold our month 1 care ecosystem program/study call and to review medication recommendations from the care eco pharmacist.. Was not able to reach the cg and left a detailed voicemail message requesting a call back at their earliest convenience. I will plan to call back on (date: 11/20/23/) if I do not hear back before then.      Spoke with caregiver Christy Garcia (Daughter) for month 1 visit. Completed the care eco care plan by reviewing pharmacy recommendations form the care eco pharmacist. Cg is not sure if the pt is taking Memantine - we reviewed that the cg found this medication unused in the back of the pt's medicine cabinet a few weeks ago. Cg plans to get pt's medication in pill packs as soon as possible.     PharmD Recommendations:     Looks like memantine hasn't been filled since April, check with CG to see if that one needs to be refilled.  Discontinue usage of benadryl for sleep. This medication can worsen cognition. Try melatonin instead.   Updated Epic med list to reflect CTN notes and fill history.      Cg mentioned that the pt will be traveling next week on her own, the cg plans to walk the pt to the gate and the pt's son will pick her up once she arrives at the gate that she lands at. [employer] is aware that the pt has memory and thinking problems and will look after her while she is on the plane. I suggested helping the pt pack so  she is organized and feels prepared.     Cg mentioned that the pt has gotten more and more forgetful recently, for example, the pt has worn a fitbit for four years to track her steps, however the other day she did not remember what the fitbit was used for. Cg reports that the pt also forgot her birthday and her age at the urgent care yesterday. Cg reports that they asked how old she was and the pt said 70 years old. Cg reports frequent word finding.  Cg reports that the pt says hurtful things to the cg and other family members now more than ever. Cg states that the pt does not remember being invited to things - I provided some dementia education in regard to communicating. Cg heard and understand that we never argue or fight back we have to change our behavior as the pt will  not be able to change their behavior anymore.     Cg reports that the pt has been feeling more tired than usual.     Cg reports that the pt is extremely fidgety and restless.      Cg gave me her work phone number: [phone] and said that this is a good way to reach her if I can not get her on the cell. Cg stated that she is taking off a few days next week for Thanksgiving and is looking forward to spending some time with her children and grandchildren and getting her home ready for [place] time.     Falls in the past month: 0  UTIs in the past month: 0   Hospital encounters in the past month (reported by caregiver): 0      Next monthly visit scheduled for: 12/14/2023 . Caregiver knows how to reach CTN, and is encouraged to do so, as needed before our next scheduled call.     Action items for CTN: Cg is wondering if over the counter medications like vitamins and supplements can be included in the pill packs, I am not sure and will reach out to the pharmacy to ask. After finding out I will e-mail the cg the answer to her question. I also plan to reach out to the pt's PCP to introduce myself and the care eco program.     ClinCard sent/loaded:  no

## 2023-11-16 ENCOUNTER — HOSPITAL ENCOUNTER (INPATIENT)
Facility: HOSPITAL | Age: 88
LOS: 4 days | Discharge: NURSING, REHAB OR LONG-TERM CARE | DRG: 357 | End: 2023-11-20
Attending: EMERGENCY MEDICINE | Admitting: STUDENT IN AN ORGANIZED HEALTH CARE EDUCATION/TRAINING PROGRAM
Payer: MEDICARE

## 2023-11-16 DIAGNOSIS — K92.2 LOWER GI BLEED: ICD-10-CM

## 2023-11-16 DIAGNOSIS — K62.5 BRBPR (BRIGHT RED BLOOD PER RECTUM): ICD-10-CM

## 2023-11-16 PROBLEM — Z71.89 GOALS OF CARE, COUNSELING/DISCUSSION: Status: ACTIVE | Noted: 2023-11-16

## 2023-11-16 LAB
ABO + RH BLD: NORMAL
ALBUMIN SERPL BCP-MCNC: 3.2 G/DL (ref 3.5–5.2)
ALP SERPL-CCNC: 63 U/L (ref 55–135)
ALT SERPL W/O P-5'-P-CCNC: 17 U/L (ref 10–44)
ANION GAP SERPL CALC-SCNC: 4 MMOL/L (ref 8–16)
AST SERPL-CCNC: 19 U/L (ref 10–40)
BASOPHILS # BLD AUTO: 0.06 K/UL (ref 0–0.2)
BASOPHILS NFR BLD: 0.5 % (ref 0–1.9)
BILIRUB SERPL-MCNC: 0.6 MG/DL (ref 0.1–1)
BLD GP AB SCN CELLS X3 SERPL QL: NORMAL
BLD PROD TYP BPU: NORMAL
BLOOD UNIT EXPIRATION DATE: NORMAL
BLOOD UNIT TYPE CODE: 5100
BLOOD UNIT TYPE: NORMAL
BUN SERPL-MCNC: 31 MG/DL (ref 8–23)
CALCIUM SERPL-MCNC: 8.8 MG/DL (ref 8.7–10.5)
CHLORIDE SERPL-SCNC: 112 MMOL/L (ref 95–110)
CO2 SERPL-SCNC: 27 MMOL/L (ref 23–29)
CODING SYSTEM: NORMAL
CREAT SERPL-MCNC: 1.3 MG/DL (ref 0.5–1.4)
CROSSMATCH INTERPRETATION: NORMAL
DIFFERENTIAL METHOD: ABNORMAL
DISPENSE STATUS: NORMAL
EOSINOPHIL # BLD AUTO: 0.1 K/UL (ref 0–0.5)
EOSINOPHIL NFR BLD: 0.7 % (ref 0–8)
ERYTHROCYTE [DISTWIDTH] IN BLOOD BY AUTOMATED COUNT: 12.3 % (ref 11.5–14.5)
EST. GFR  (NO RACE VARIABLE): 39 ML/MIN/1.73 M^2
GLUCOSE SERPL-MCNC: 107 MG/DL (ref 70–110)
HCT VFR BLD AUTO: 18.8 % (ref 37–48.5)
HCT VFR BLD AUTO: 22.8 % (ref 37–48.5)
HCT VFR BLD AUTO: 30.1 % (ref 37–48.5)
HGB BLD-MCNC: 10.1 G/DL (ref 12–16)
HGB BLD-MCNC: 6.3 G/DL (ref 12–16)
HGB BLD-MCNC: 7.8 G/DL (ref 12–16)
IMM GRANULOCYTES # BLD AUTO: 0.05 K/UL (ref 0–0.04)
IMM GRANULOCYTES NFR BLD AUTO: 0.4 % (ref 0–0.5)
LYMPHOCYTES # BLD AUTO: 1.4 K/UL (ref 1–4.8)
LYMPHOCYTES NFR BLD: 11.3 % (ref 18–48)
MAGNESIUM SERPL-MCNC: 1.6 MG/DL (ref 1.6–2.6)
MCH RBC QN AUTO: 31.9 PG (ref 27–31)
MCHC RBC AUTO-ENTMCNC: 33.6 G/DL (ref 32–36)
MCV RBC AUTO: 95 FL (ref 82–98)
MONOCYTES # BLD AUTO: 1 K/UL (ref 0.3–1)
MONOCYTES NFR BLD: 8.3 % (ref 4–15)
NEUTROPHILS # BLD AUTO: 9.8 K/UL (ref 1.8–7.7)
NEUTROPHILS NFR BLD: 78.8 % (ref 38–73)
NRBC BLD-RTO: 0 /100 WBC
PLATELET # BLD AUTO: 217 K/UL (ref 150–450)
PMV BLD AUTO: 10.4 FL (ref 9.2–12.9)
POTASSIUM SERPL-SCNC: 5.3 MMOL/L (ref 3.5–5.1)
PROT SERPL-MCNC: 5.7 G/DL (ref 6–8.4)
RBC # BLD AUTO: 3.17 M/UL (ref 4–5.4)
SODIUM SERPL-SCNC: 143 MMOL/L (ref 136–145)
SPECIMEN OUTDATE: NORMAL
TRANS ERYTHROCYTES VOL PATIENT: NORMAL ML
WBC # BLD AUTO: 12.43 K/UL (ref 3.9–12.7)

## 2023-11-16 PROCEDURE — 85018 HEMOGLOBIN: CPT | Mod: 91,HCNC | Performed by: STUDENT IN AN ORGANIZED HEALTH CARE EDUCATION/TRAINING PROGRAM

## 2023-11-16 PROCEDURE — 25000003 PHARM REV CODE 250: Mod: HCNC | Performed by: RADIOLOGY

## 2023-11-16 PROCEDURE — 99285 EMERGENCY DEPT VISIT HI MDM: CPT | Mod: 25,HCNC

## 2023-11-16 PROCEDURE — 86920 COMPATIBILITY TEST SPIN: CPT | Mod: HCNC | Performed by: EMERGENCY MEDICINE

## 2023-11-16 PROCEDURE — 51702 INSERT TEMP BLADDER CATH: CPT | Mod: HCNC

## 2023-11-16 PROCEDURE — 83735 ASSAY OF MAGNESIUM: CPT | Mod: HCNC | Performed by: EMERGENCY MEDICINE

## 2023-11-16 PROCEDURE — A4216 STERILE WATER/SALINE, 10 ML: HCPCS | Mod: HCNC

## 2023-11-16 PROCEDURE — P9021 RED BLOOD CELLS UNIT: HCPCS | Mod: HCNC | Performed by: INTERNAL MEDICINE

## 2023-11-16 PROCEDURE — 25500020 PHARM REV CODE 255: Mod: HCNC | Performed by: EMERGENCY MEDICINE

## 2023-11-16 PROCEDURE — 63600175 PHARM REV CODE 636 W HCPCS: Mod: HCNC | Performed by: RADIOLOGY

## 2023-11-16 PROCEDURE — 63600175 PHARM REV CODE 636 W HCPCS: Mod: HCNC | Performed by: EMERGENCY MEDICINE

## 2023-11-16 PROCEDURE — 96374 THER/PROPH/DIAG INJ IV PUSH: CPT | Mod: HCNC

## 2023-11-16 PROCEDURE — 25000003 PHARM REV CODE 250: Mod: HCNC

## 2023-11-16 PROCEDURE — 51798 US URINE CAPACITY MEASURE: CPT | Mod: HCNC

## 2023-11-16 PROCEDURE — 25000003 PHARM REV CODE 250: Mod: HCNC | Performed by: STUDENT IN AN ORGANIZED HEALTH CARE EDUCATION/TRAINING PROGRAM

## 2023-11-16 PROCEDURE — 63600175 PHARM REV CODE 636 W HCPCS: Mod: HCNC | Performed by: STUDENT IN AN ORGANIZED HEALTH CARE EDUCATION/TRAINING PROGRAM

## 2023-11-16 PROCEDURE — 85014 HEMATOCRIT: CPT | Mod: 91,HCNC | Performed by: STUDENT IN AN ORGANIZED HEALTH CARE EDUCATION/TRAINING PROGRAM

## 2023-11-16 PROCEDURE — 21400001 HC TELEMETRY ROOM: Mod: HCNC

## 2023-11-16 PROCEDURE — 86920 COMPATIBILITY TEST SPIN: CPT | Mod: HCNC | Performed by: INTERNAL MEDICINE

## 2023-11-16 PROCEDURE — 93010 ELECTROCARDIOGRAM REPORT: CPT | Mod: HCNC,,, | Performed by: INTERNAL MEDICINE

## 2023-11-16 PROCEDURE — 85025 COMPLETE CBC W/AUTO DIFF WBC: CPT | Mod: HCNC | Performed by: EMERGENCY MEDICINE

## 2023-11-16 PROCEDURE — 85014 HEMATOCRIT: CPT | Mod: HCNC | Performed by: STUDENT IN AN ORGANIZED HEALTH CARE EDUCATION/TRAINING PROGRAM

## 2023-11-16 PROCEDURE — 36410 VNPNXR 3YR/> PHY/QHP DX/THER: CPT | Mod: HCNC

## 2023-11-16 PROCEDURE — 80053 COMPREHEN METABOLIC PANEL: CPT | Mod: HCNC | Performed by: EMERGENCY MEDICINE

## 2023-11-16 PROCEDURE — 25500020 PHARM REV CODE 255: Mod: HCNC | Performed by: RADIOLOGY

## 2023-11-16 PROCEDURE — 86850 RBC ANTIBODY SCREEN: CPT | Mod: HCNC | Performed by: EMERGENCY MEDICINE

## 2023-11-16 PROCEDURE — 93005 ELECTROCARDIOGRAM TRACING: CPT | Mod: HCNC

## 2023-11-16 PROCEDURE — 93010 EKG 12-LEAD: ICD-10-PCS | Mod: HCNC,,, | Performed by: INTERNAL MEDICINE

## 2023-11-16 PROCEDURE — C1751 CATH, INF, PER/CENT/MIDLINE: HCPCS | Mod: HCNC

## 2023-11-16 PROCEDURE — 36415 COLL VENOUS BLD VENIPUNCTURE: CPT | Mod: HCNC | Performed by: STUDENT IN AN ORGANIZED HEALTH CARE EDUCATION/TRAINING PROGRAM

## 2023-11-16 PROCEDURE — 85018 HEMOGLOBIN: CPT | Mod: HCNC | Performed by: STUDENT IN AN ORGANIZED HEALTH CARE EDUCATION/TRAINING PROGRAM

## 2023-11-16 PROCEDURE — 36430 TRANSFUSION BLD/BLD COMPNT: CPT | Mod: HCNC

## 2023-11-16 RX ORDER — SODIUM CHLORIDE 0.9 % (FLUSH) 0.9 %
10 SYRINGE (ML) INJECTION
Status: DISCONTINUED | OUTPATIENT
Start: 2023-11-16 | End: 2023-11-20 | Stop reason: HOSPADM

## 2023-11-16 RX ORDER — SODIUM CHLORIDE 9 MG/ML
INJECTION, SOLUTION INTRAVENOUS
Status: COMPLETED | OUTPATIENT
Start: 2023-11-16 | End: 2023-11-16

## 2023-11-16 RX ORDER — KETOROLAC TROMETHAMINE 30 MG/ML
15 INJECTION, SOLUTION INTRAMUSCULAR; INTRAVENOUS
Status: COMPLETED | OUTPATIENT
Start: 2023-11-16 | End: 2023-11-16

## 2023-11-16 RX ORDER — SODIUM CHLORIDE 9 MG/ML
INJECTION, SOLUTION INTRAVENOUS CONTINUOUS
Status: DISCONTINUED | OUTPATIENT
Start: 2023-11-16 | End: 2023-11-20 | Stop reason: HOSPADM

## 2023-11-16 RX ORDER — FENTANYL CITRATE 50 UG/ML
INJECTION, SOLUTION INTRAMUSCULAR; INTRAVENOUS
Status: COMPLETED | OUTPATIENT
Start: 2023-11-16 | End: 2023-11-16

## 2023-11-16 RX ORDER — HEPARIN SODIUM 200 [USP'U]/100ML
INJECTION, SOLUTION INTRAVENOUS
Status: COMPLETED | OUTPATIENT
Start: 2023-11-16 | End: 2023-11-16

## 2023-11-16 RX ORDER — INFLUENZA A VIRUS A/VICTORIA/4897/2022 IVR-238 (H1N1) ANTIGEN (FORMALDEHYDE INACTIVATED), INFLUENZA A VIRUS A/DARWIN/6/2021 IVR-227 (H3N2) ANTIGEN (FORMALDEHYDE INACTIVATED), INFLUENZA B VIRUS B/AUSTRIA/1359417/2021 BVR-26 ANTIGEN (FORMALDEHYDE INACTIVATED), INFLUENZA B VIRUS B/PHUKET/3073/2013 BVR-1B ANTIGEN (FORMALDEHYDE INACTIVATED) 15; 15; 15; 15 UG/.5ML; UG/.5ML; UG/.5ML; UG/.5ML
INJECTION, SUSPENSION INTRAMUSCULAR
Status: ON HOLD | COMMUNITY
Start: 2023-09-21 | End: 2023-12-07 | Stop reason: HOSPADM

## 2023-11-16 RX ORDER — LEVOTHYROXINE SODIUM 25 UG/1
25 TABLET ORAL
Status: DISCONTINUED | OUTPATIENT
Start: 2023-11-17 | End: 2023-11-20 | Stop reason: HOSPADM

## 2023-11-16 RX ORDER — MEMANTINE HYDROCHLORIDE 5 MG/1
5 TABLET ORAL 2 TIMES DAILY
Status: DISCONTINUED | OUTPATIENT
Start: 2023-11-17 | End: 2023-11-20 | Stop reason: HOSPADM

## 2023-11-16 RX ORDER — MUPIROCIN 20 MG/G
OINTMENT TOPICAL 2 TIMES DAILY
Status: DISCONTINUED | OUTPATIENT
Start: 2023-11-16 | End: 2023-11-20 | Stop reason: HOSPADM

## 2023-11-16 RX ORDER — MIDAZOLAM HYDROCHLORIDE 1 MG/ML
INJECTION INTRAMUSCULAR; INTRAVENOUS
Status: COMPLETED | OUTPATIENT
Start: 2023-11-16 | End: 2023-11-16

## 2023-11-16 RX ORDER — DIPHENHYDRAMINE HCL 25 MG
25 CAPSULE ORAL ONCE
Status: DISCONTINUED | OUTPATIENT
Start: 2023-11-17 | End: 2023-11-17

## 2023-11-16 RX ORDER — SODIUM CHLORIDE 0.9 % (FLUSH) 0.9 %
10 SYRINGE (ML) INJECTION EVERY 6 HOURS
Status: DISCONTINUED | OUTPATIENT
Start: 2023-11-16 | End: 2023-11-20 | Stop reason: HOSPADM

## 2023-11-16 RX ORDER — METRONIDAZOLE 500 MG/1
500 TABLET ORAL EVERY 8 HOURS
Status: DISCONTINUED | OUTPATIENT
Start: 2023-11-16 | End: 2023-11-17

## 2023-11-16 RX ORDER — LIDOCAINE HYDROCHLORIDE 10 MG/ML
INJECTION INFILTRATION; PERINEURAL
Status: COMPLETED | OUTPATIENT
Start: 2023-11-16 | End: 2023-11-16

## 2023-11-16 RX ORDER — HYDROCODONE BITARTRATE AND ACETAMINOPHEN 500; 5 MG/1; MG/1
TABLET ORAL
Status: DISCONTINUED | OUTPATIENT
Start: 2023-11-16 | End: 2023-11-20 | Stop reason: HOSPADM

## 2023-11-16 RX ORDER — FLUOXETINE HYDROCHLORIDE 20 MG/1
20 CAPSULE ORAL DAILY
Status: DISCONTINUED | OUTPATIENT
Start: 2023-11-17 | End: 2023-11-20 | Stop reason: HOSPADM

## 2023-11-16 RX ORDER — METRONIDAZOLE 500 MG/100ML
500 INJECTION, SOLUTION INTRAVENOUS
Status: DISCONTINUED | OUTPATIENT
Start: 2023-11-16 | End: 2023-11-16

## 2023-11-16 RX ORDER — ATORVASTATIN CALCIUM 10 MG/1
10 TABLET, FILM COATED ORAL NIGHTLY
Status: DISCONTINUED | OUTPATIENT
Start: 2023-11-16 | End: 2023-11-20 | Stop reason: HOSPADM

## 2023-11-16 RX ORDER — POLYETHYLENE GLYCOL 3350 17 G/17G
17 POWDER, FOR SOLUTION ORAL DAILY
Status: DISCONTINUED | OUTPATIENT
Start: 2023-11-16 | End: 2023-11-20 | Stop reason: HOSPADM

## 2023-11-16 RX ADMIN — CEFTRIAXONE SODIUM 1 G: 1 INJECTION, POWDER, FOR SOLUTION INTRAMUSCULAR; INTRAVENOUS at 06:11

## 2023-11-16 RX ADMIN — IOHEXOL 100 ML: 350 INJECTION, SOLUTION INTRAVENOUS at 08:11

## 2023-11-16 RX ADMIN — METRONIDAZOLE 500 MG: 500 TABLET ORAL at 04:11

## 2023-11-16 RX ADMIN — SODIUM CHLORIDE 500 ML: 0.9 INJECTION, SOLUTION INTRAVENOUS at 10:11

## 2023-11-16 RX ADMIN — HEPARIN SODIUM 1000 UNITS/HR: 200 INJECTION, SOLUTION INTRAVENOUS at 10:11

## 2023-11-16 RX ADMIN — MUPIROCIN: 20 OINTMENT TOPICAL at 08:11

## 2023-11-16 RX ADMIN — ATORVASTATIN CALCIUM 10 MG: 10 TABLET, FILM COATED ORAL at 08:11

## 2023-11-16 RX ADMIN — IOHEXOL 40 ML: 350 INJECTION, SOLUTION INTRAVENOUS at 12:11

## 2023-11-16 RX ADMIN — SODIUM CHLORIDE: 9 INJECTION, SOLUTION INTRAVENOUS at 06:11

## 2023-11-16 RX ADMIN — KETOROLAC TROMETHAMINE 15 MG: 30 INJECTION, SOLUTION INTRAMUSCULAR; INTRAVENOUS at 06:11

## 2023-11-16 RX ADMIN — FENTANYL CITRATE 25 MCG: 50 INJECTION, SOLUTION INTRAMUSCULAR; INTRAVENOUS at 10:11

## 2023-11-16 RX ADMIN — METRONIDAZOLE 500 MG: 500 TABLET ORAL at 09:11

## 2023-11-16 RX ADMIN — MIDAZOLAM HYDROCHLORIDE 0.5 MG: 1 INJECTION, SOLUTION INTRAMUSCULAR; INTRAVENOUS at 10:11

## 2023-11-16 RX ADMIN — Medication 10 ML: at 11:11

## 2023-11-16 RX ADMIN — POLYETHYLENE GLYCOL (3350) 17 G: 17 POWDER, FOR SOLUTION ORAL at 04:11

## 2023-11-16 RX ADMIN — LIDOCAINE HYDROCHLORIDE 3 ML: 10 INJECTION, SOLUTION INFILTRATION; PERINEURAL at 10:11

## 2023-11-16 NOTE — H&P
[facility] Medicine  History & Physical    Patient Name: Olamide Felipe  MRN: [id number]  Patient Class: IP- Inpatient  Admission Date: 11/16/2023  Attending Physician: Anita Dunlap MD   Primary Care Provider: Abiola Campbell MD         Patient information was obtained from patient, past medical records, and ER records.     Subjective:     Principal Problem:Lower GI bleed    Chief Complaint:   Chief Complaint   Patient presents with    Rectal Bleeding     Arrives via ems from assisted living facility ([facility])  for onset of bright red blood from rectum. Denies n/v. She reports mild (L) UQ abdominal pain.         HPI: 89-year-old female with PMH of mild dementia, breast cancer, hypertension, hypothyroidism, recurrent falls, hyperlipidemia who presented via EMS from [facility] living [facility] for bright red blood per rectum.  Patient had a large bloody bowel movement this morning following which she called her daughter who called EMS.  EMS noted drops of blood from the rectum after the bowel movement.  Patient denies any lightheadedness or shortness of breath or chest pain.    While in the ER, vital signs stable.  Hemoglobin dropped about 2 g.  Type and screen was sent, no blood transfusion given.  CTA abdomen pelvis showed findings concerning for acute bleed within proximal aspect of ascending colon.  Large amount of retained stool with significant diverticulosis seen.  ER provider discussed case with IR attending -underwent angiogram and embolization.    Daughter ChristyEJ at bedside.  Assists with history.    Past Medical History:   Diagnosis Date    Hyperlipidemia     Hypothyroidism     Malignant neoplasm of upper-outer quadrant of right breast in female, estrogen receptor negative 1/7/2022    Osteoarthritis, knee     Vertigo        Past Surgical History:   Procedure Laterality Date    dentures      FRACTURE SURGERY Left     fracture left wrist    HYSTERECTOMY       INJECTION FOR SENTINEL NODE IDENTIFICATION Right 1/7/2022    Procedure: INJECTION, FOR SENTINEL NODE IDENTIFICATION-Right;  Surgeon: Marci Mcintosh MD;  Location: [facility];  Service: General;  Laterality: Right;    left wrist surgery      MASTECTOMY, PARTIAL Right 1/7/2022    Procedure: MASTECTOMY, PARTIAL-Right with radiological marker;  Surgeon: Marci Mcintosh MD;  Location: [facility] OR;  Service: General;  Laterality: Right;  REQUEST SAID 2 HOUR CASE    SENTINEL LYMPH NODE BIOPSY Right 1/7/2022    Procedure: BIOPSY, LYMPH NODE, SENTINEL-Right;  Surgeon: Marci Mcintosh MD;  Location: [facility];  Service: General;  Laterality: Right;       Review of patient's allergies indicates:  No Known Allergies    No current facility-administered medications on file prior to encounter.     Current Outpatient Medications on File Prior to Encounter   Medication Sig    FLUoxetine 20 MG capsule TAKE 1 CAPSULE EVERY DAY (Patient taking differently: Take 20 mg by mouth once daily.)    levothyroxine (SYNTHROID) 25 MCG tablet Take 1 tablet (25 mcg total) by mouth before breakfast.    losartan (COZAAR) 25 MG tablet Take 1 tablet (25 mg total) by mouth daily as needed (For Systolic Blood pressure higher than 150).    omeprazole (PRILOSEC) 20 MG capsule TAKE 1 CAPSULE EVERY DAY (Patient taking differently: Take 20 mg by mouth once daily.)    simvastatin (ZOCOR) 20 MG tablet TAKE 1 TABLET (20 MG TOTAL) BY MOUTH EVERY EVENING.    acetaminophen (TYLENOL) 325 MG tablet Take 325 mg by mouth every 6 (six) hours as needed for Pain.    aspirin 81 MG Chew Take 81 mg by mouth once daily.    b complex vitamins tablet Take 1 tablet by mouth once daily.    calcium-vitamin D 250-100 mg-unit per tablet Take 1 tablet by mouth 2 (two) times daily.    diphenhydrAMINE (BENADRYL) 25 mg capsule Take 25 mg by mouth nightly as needed for Insomnia.    FLUAD QUAD 2023-24,65Y UP,,PF, 60 mcg (15 mcg x 4)/0.5 mL Syrg     loratadine (CLARITIN) 10 mg tablet Take 1 tablet  (10 mg total) by mouth once daily.    memantine (NAMENDA) 5 MG Tab Take 1 tablet (5 mg total) by mouth 2 (two) times daily. Begin by taking one tablet daily for seven days, then increase to one tablet two times daily.    mv-min/iron/folic/calcium/vitK (WOMEN'S MULTIVITAMIN ORAL) Take by mouth once daily.     Family History       Problem Relation (Age of Onset)    No Known Problems Daughter, Son, Sister          Tobacco Use    Smoking status: Former     Current packs/day: 0.00     Types: Cigarettes     Quit date: 1989     Years since quittin.4     Passive exposure: Never    Smokeless tobacco: Never   Substance and Sexual Activity    Alcohol use: Yes     Alcohol/week: 2.0 standard drinks of alcohol     Types: 2 Glasses of wine per week     Comment: 1/3 glass of wine - 2 times weekly    Drug use: Never    Sexual activity: Not Currently     Review of Systems   Constitutional:  Negative for appetite change and fatigue.   Respiratory:  Negative for cough and shortness of breath.    Cardiovascular:  Negative for chest pain and leg swelling.   Gastrointestinal:  Positive for blood in stool. Negative for abdominal pain, diarrhea, nausea and vomiting.   Neurological:  Negative for light-headedness and headaches.   Psychiatric/Behavioral:  Negative for confusion.      Objective:     Vital Signs (Most Recent):  Temp: 98.2 °F (36.8 °C) (23 1145)  Pulse: 81 (23 1345)  Resp: 12 (23 1345)  BP: (!) 114/56 (23 1345)  SpO2: 100 % (23 1345) Vital Signs (24h Range):  Temp:  [98.2 °F (36.8 °C)-98.7 °F (37.1 °C)] 98.2 °F (36.8 °C)  Pulse:  [76-98] 81  Resp:  [12-22] 12  SpO2:  [97 %-100 %] 100 %  BP: (107-146)/(43-74) 114/56        There is no height or weight on file to calculate BMI.     Physical Exam  Vitals and nursing note reviewed.   Constitutional:       General: She is not in acute distress.     Comments: Older flail female   HENT:      Mouth/Throat:      Mouth: Mucous membranes are dry.    Eyes:      Comments: Conjunctival pallor present   Cardiovascular:      Rate and Rhythm: Normal rate and regular rhythm.      Heart sounds: Normal heart sounds. No murmur heard.  Pulmonary:      Effort: Pulmonary effort is normal. No respiratory distress.      Breath sounds: Normal breath sounds. No wheezing.   Abdominal:      Palpations: Abdomen is soft.      Tenderness: There is abdominal tenderness (Right mid and lower quadrant tenderness).   Musculoskeletal:      Right lower leg: No edema.      Left lower leg: No edema.   Skin:     General: Skin is warm and dry.      Findings: No bruising.   Neurological:      General: No focal deficit present.      Mental Status: She is alert and oriented to person, place, and time.   Psychiatric:         Mood and Affect: Mood normal.                Significant Labs: All pertinent labs within the past 24 hours have been reviewed.    Significant Imaging: I have reviewed all pertinent imaging results/findings within the past 24 hours.  Assessment/Plan:     * Lower GI bleed  Presented with bright red blood per rectum -suspected diverticular bleed  Vital signs stable   Hemoglobin dropped 2 g    CTA abdomen pelvis showed active bleed and ascending colon, significant stool burden, diverticulosis  S/p angiogram and coil embolization by IR on 11/16    Ceftriaxone/Flagyl for now given abdominal tenderness in setting diverticular bleed  Stable GI pathway.  Maintain good PIV access   Type and screen   H/HQ 8, transfuse for hemoglobin less than 7 or hemodynamic instability or active bleeding    GI consult  Liquid diet for now        Goals of care, counseling/discussion  Advance Care Planning    Code Status  I engaged the the healthcare power of   in a voluntary conversation about the patient's preferences for care at the very end of life.  Daughter Christy would like to discuss with the patient about code status 1st.  For now, will remain a full code.  I spent a total of 8 minutes  engaging the patient in this advance care planning discussion.            Mild late onset Alzheimer's dementia without behavioral disturbance, psychotic disturbance, mood disturbance, or anxiety  Patient with dementia with likely etiology of alzheimer's dementia. Dementia is mild. The patient does not have signs of behavioral disturbance. Home dementia medications andare Held or Continued: continued.. Continue non-pharmacologic interventions to prevent delirium (No VS between 11PM-5AM, activity during day, opening blinds, providing glasses/hearing aids, and up in chair during daytime). Will avoid narcotics and benzos unless absolutely necessary. PRN anti-psychotics are not prescribed to avoid self harm behaviors.    History of breast cancer        Mood disorder  Continue SSRI      Hypothyroid  Continue Synthroid      Aortic atherosclerosis  Hold aspirin in setting of GI bleed   Continue statin        VTE Risk Mitigation (From admission, onward)           Ordered     IP VTE HIGH RISK PATIENT  Once         11/16/23 1256     Place sequential compression device  Until discontinued         11/16/23 1256                  Critical care time spent on the evaluation and treatment of severe organ dysfunction, review of pertinent labs and imaging studies, discussions with consulting providers and discussions with patient/family: 35 minutes.     In the ICU for close monitoring given concern for active bleeding. If no further bleeding, can step down.       Anita Dunlap MD  Department of Hospital Medicine  [facility] - Intensive Care    N/A  Family history is reviewed and has not changed   Pertinent information:

## 2023-11-16 NOTE — PROCEDURES
Radiology Post-Procedure Note    Pre Op Diagnosis: GI bleed  Post Op Diagnosis: Same    Procedure: Angiogram and embolization    Procedure performed by: Al Wiggins MD    Written Informed Consent Obtained: Yes  Specimen Removed: NO  Estimated Blood Loss: Minimal    Findings:   R CFA access.  5F sheath placed.  Selective catheterization of the SMA, ileocolic, and tertiary branches.  CBCT performed, demonstrating hyperemia corresponding to the site of recent bleed.  Selective coil embolization performed, resulting in reduced flow to the vascular territory.  No active bleeding seen.  Catheters and sheath removed.  Hemostasis achieved with Vascade closure device.  No complications.    Patient tolerated procedure well.    Al Wiggins MD  Diagnostic and Interventional Radiologist  Department of Radiology  Pager: [phone]   
[phone]

## 2023-11-16 NOTE — SUBJECTIVE & OBJECTIVE
Past Medical History:   Diagnosis Date    Hyperlipidemia     Hypothyroidism     Malignant neoplasm of upper-outer quadrant of right breast in female, estrogen receptor negative 1/7/2022    Osteoarthritis, knee     Vertigo        Past Surgical History:   Procedure Laterality Date    dentures      FRACTURE SURGERY Left     fracture left wrist    HYSTERECTOMY      INJECTION FOR SENTINEL NODE IDENTIFICATION Right 1/7/2022    Procedure: INJECTION, FOR SENTINEL NODE IDENTIFICATION-Right;  Surgeon: Marci Mcintosh MD;  Location: [facility];  Service: General;  Laterality: Right;    left wrist surgery      MASTECTOMY, PARTIAL Right 1/7/2022    Procedure: MASTECTOMY, PARTIAL-Right with radiological marker;  Surgeon: Marci Mcintosh MD;  Location: [facility];  Service: General;  Laterality: Right;  REQUEST SAID 2 HOUR CASE    SENTINEL LYMPH NODE BIOPSY Right 1/7/2022    Procedure: BIOPSY, LYMPH NODE, SENTINEL-Right;  Surgeon: Marci Mcintosh MD;  Location: [facility];  Service: General;  Laterality: Right;       Review of patient's allergies indicates:  No Known Allergies    No current facility-administered medications on file prior to encounter.     Current Outpatient Medications on File Prior to Encounter   Medication Sig    FLUoxetine 20 MG capsule TAKE 1 CAPSULE EVERY DAY (Patient taking differently: Take 20 mg by mouth once daily.)    levothyroxine (SYNTHROID) 25 MCG tablet Take 1 tablet (25 mcg total) by mouth before breakfast.    losartan (COZAAR) 25 MG tablet Take 1 tablet (25 mg total) by mouth daily as needed (For Systolic Blood pressure higher than 150).    omeprazole (PRILOSEC) 20 MG capsule TAKE 1 CAPSULE EVERY DAY (Patient taking differently: Take 20 mg by mouth once daily.)    simvastatin (ZOCOR) 20 MG tablet TAKE 1 TABLET (20 MG TOTAL) BY MOUTH EVERY EVENING.    acetaminophen (TYLENOL) 325 MG tablet Take 325 mg by mouth every 6 (six) hours as needed for Pain.    aspirin 81 MG Chew Take 81 mg by mouth once daily.    b  complex vitamins tablet Take 1 tablet by mouth once daily.    calcium-vitamin D 250-100 mg-unit per tablet Take 1 tablet by mouth 2 (two) times daily.    diphenhydrAMINE (BENADRYL) 25 mg capsule Take 25 mg by mouth nightly as needed for Insomnia.    FLUAD QUAD -,65Y UP,,PF, 60 mcg (15 mcg x 4)/0.5 mL Syrg     loratadine (CLARITIN) 10 mg tablet Take 1 tablet (10 mg total) by mouth once daily.    memantine (NAMENDA) 5 MG Tab Take 1 tablet (5 mg total) by mouth 2 (two) times daily. Begin by taking one tablet daily for seven days, then increase to one tablet two times daily.    mv-min/iron/folic/calcium/vitK (WOMEN'S MULTIVITAMIN ORAL) Take by mouth once daily.     Family History       Problem Relation (Age of Onset)    No Known Problems Daughter, Son, Sister          Tobacco Use    Smoking status: Former     Current packs/day: 0.00     Types: Cigarettes     Quit date: 1989     Years since quittin.4     Passive exposure: Never    Smokeless tobacco: Never   Substance and Sexual Activity    Alcohol use: Yes     Alcohol/week: 2.0 standard drinks of alcohol     Types: 2 Glasses of wine per week     Comment: 1/3 glass of wine - 2 times weekly    Drug use: Never    Sexual activity: Not Currently     Review of Systems   Constitutional:  Negative for appetite change and fatigue.   Respiratory:  Negative for cough and shortness of breath.    Cardiovascular:  Negative for chest pain and leg swelling.   Gastrointestinal:  Positive for blood in stool. Negative for abdominal pain, diarrhea, nausea and vomiting.   Neurological:  Negative for light-headedness and headaches.   Psychiatric/Behavioral:  Negative for confusion.      Objective:     Vital Signs (Most Recent):  Temp: 98.2 °F (36.8 °C) (23 1145)  Pulse: 81 (23 1345)  Resp: 12 (23 134)  BP: (!) 114/56 (23 134)  SpO2: 100 % (23) Vital Signs (24h Range):  Temp:  [98.2 °F (36.8 °C)-98.7 °F (37.1 °C)] 98.2 °F (36.8 °C)  Pulse:   [76-98] 81  Resp:  [12-22] 12  SpO2:  [97 %-100 %] 100 %  BP: (107-146)/(43-74) 114/56        There is no height or weight on file to calculate BMI.     Physical Exam  Vitals and nursing note reviewed.   Constitutional:       General: She is not in acute distress.     Comments: Older flail female   HENT:      Mouth/Throat:      Mouth: Mucous membranes are dry.   Eyes:      Comments: Conjunctival pallor present   Cardiovascular:      Rate and Rhythm: Normal rate and regular rhythm.      Heart sounds: Normal heart sounds. No murmur heard.  Pulmonary:      Effort: Pulmonary effort is normal. No respiratory distress.      Breath sounds: Normal breath sounds. No wheezing.   Abdominal:      Palpations: Abdomen is soft.      Tenderness: There is abdominal tenderness (Right mid and lower quadrant tenderness).   Musculoskeletal:      Right lower leg: No edema.      Left lower leg: No edema.   Skin:     General: Skin is warm and dry.      Findings: No bruising.   Neurological:      General: No focal deficit present.      Mental Status: She is alert and oriented to person, place, and time.   Psychiatric:         Mood and Affect: Mood normal.                Significant Labs: All pertinent labs within the past 24 hours have been reviewed.    Significant Imaging: I have reviewed all pertinent imaging results/findings within the past 24 hours.

## 2023-11-16 NOTE — HPI
89-year-old female with PMH of mild dementia, breast cancer, hypertension, hypothyroidism, recurrent falls, hyperlipidemia who presented via EMS from independent [facility] facility for bright red blood per rectum.  Patient had a large bloody bowel movement this morning following which she called her daughter who called EMS.  EMS noted drops of blood from the rectum after the bowel movement.  Patient denies any lightheadedness or shortness of breath or chest pain.    While in the ER, vital signs stable.  Hemoglobin dropped about 2 g.  Type and screen was sent, no blood transfusion given.  CTA abdomen pelvis showed findings concerning for acute bleed within proximal aspect of ascending colon.  Large amount of retained stool with significant diverticulosis seen.  ER provider discussed case with IR attending -underwent angiogram and embolization.    Daughter EJ Harrison at bedside.  Assists with history.

## 2023-11-16 NOTE — ASSESSMENT & PLAN NOTE
Presented with bright red blood per rectum -suspected diverticular bleed  Vital signs stable   Hemoglobin dropped 2 g    CTA abdomen pelvis showed active bleed and ascending colon, significant stool burden, diverticulosis  S/p angiogram and coil embolization by IR on 11/16    Ceftriaxone/Flagyl for now given abdominal tenderness in setting diverticular bleed  Stable GI pathway.  Maintain good PIV access   Type and screen   H/HQ 8, transfuse for hemoglobin less than 7 or hemodynamic instability or active bleeding    GI consult  Liquid diet for now

## 2023-11-16 NOTE — PLAN OF CARE
The sw met with the pt and Christy Garcia(dtr)[phone] who was at bedside to complete the assessment. The pt lives alone in [place].  The pt has been a resident at [facility] For the past 4 years. The pt has a very supportive family. The pt's independent with her ADL's and has a s cane but doesn't use it b/c she doesn't want to be labeled as being disabled. Christy transports the pt to all her dr appt's and errands. She will transport the pt home at d/c. The pt still leads a very active lifestyle(cooking,cleaning and walks to [employer] to purchase groceries). The pt's followed by [facility].The sw completed the white board in the pt's room with her name and contact info. The sw encouraged them to call if they have n ay further questions or concerns. The sw will continue to follow the pt throughout her transitions of care and will assist with any d/c needs. The pt was planning to fly to her son's house for 2 weeks on Saturday for the holidays.     [place] - Intensive Care  Initial Discharge Assessment       Primary Care Provider: Abiola Campbell MD    Admission Diagnosis: Lower GI bleed [K92.2]  BRBPR (bright red blood per rectum) [K62.5]    Admission Date: 11/16/2023  Expected Discharge Date:     Transition of Care Barriers: (P) None    Payor: HU[employer] MANAGED MEDICARE / Plan: HU[employer]NA TOTAL CARE ADVANTAGE / Product Type: Medicare Advantage /     Extended Emergency Contact Information  Primary Emergency Contact: Christy Garcia  Mobile Phone: [phone]  Relation: Daughter    Discharge Plan A: (P) Home with family  Discharge Plan B: (P) Home Health      [facility]  [address]  [facility] [place] [address]  Phone: [phone] Fax: [phone]    [facility] - [place], [place] - [address]  [address] [facility] [address]  Phone: [phone] Fax: [phone]    [facility]  [address]  [facility]  [address]  Phone: [phone] Fax: [phone]      Initial Assessment (most recent)       Adult Discharge Assessment - 11/16/23 1318          Discharge Assessment    Assessment Type Discharge Planning Assessment (P)      Confirmed/corrected address, phone number and insurance Yes (P)      Confirmed Demographics Correct on Facesheet (P)      Source of Information patient;family (P)      If unable to respond/provide information was family/caregiver contacted? Yes (P)      Contact Name/Number Christy Garcia(dtr)[phone] (P)      When was your last doctors appointment? 11/13/23 (P)      Communicated TAMMIE with patient/caregiver Date not available/Unable to determine (P)      Reason For Admission Lower GI bleed,BRBPR (P)      People in Home alone (P)      Facility Arrived From: [facility] (P)      Do you expect to return to your current living situation? Yes (P)      Do you have help at home or someone to help you manage your care at home? Yes (P)      Who are your caregiver(s) and their phone number(s)? Christy Garcia(dtr)[phone] (P)      Prior to hospitilization cognitive status: Alert/Oriented (P)      Current cognitive status: Alert/Oriented (P)      Home Accessibility wheelchair accessible (P)      Home Layout Able to live on 1st floor (P)      Equipment Currently Used at Home cane, straight (P)    pt has the dme listed but doesn't use it b/c she doesn't want to look disabled    Readmission within 30 days? No (P)      Patient currently being followed by outpatient case management? No (P)      Do you currently have service(s) that help you manage your care at home? No (P)      Do you take prescription medications? Yes (P)      Do you have prescription coverage? Yes (P)      Coverage [employer] Medicare (P)      Do you have any problems affording any of your prescribed medications? No (P)      Is the patient taking medications as prescribed? yes (P)      Who is going to help you get home at discharge? Christy  Jose(dtr)[phone] (P)      How do you get to doctors appointments? family or friend will provide (P)      Are you on dialysis? No (P)      Do you take coumadin? No (P)      DME Needed Upon Discharge  other (see comments) (P)    TBD    Discharge Plan discussed with: Patient;Adult children (P)      Transition of Care Barriers None (P)      Discharge Plan A Home with family (P)      Discharge Plan B Home Health (P)         Physical Activity    On average, how many days per week do you engage in moderate to strenuous exercise (like a brisk walk)? 2 days (P)      On average, how many minutes do you engage in exercise at this level? 60 min (P)         Financial Resource Strain    How hard is it for you to pay for the very basics like food, housing, medical care, and heating? Not hard at all (P)         Housing Stability    In the last 12 months, was there a time when you were not able to pay the mortgage or rent on time? No (P)      In the last 12 months, how many places have you lived? 1 (P)      In the last 12 months, was there a time when you did not have a steady place to sleep or slept in a shelter (including now)? No (P)         Transportation Needs    In the past 12 months, has lack of transportation kept you from medical appointments or from getting medications? No (P)      In the past 12 months, has lack of transportation kept you from meetings, work, or from getting things needed for daily living? No (P)         Food Insecurity    Within the past 12 months, you worried that your food would run out before you got the money to buy more. Never true (P)         Social Connections    In a typical week, how many times do you talk on the phone with family, friends, or neighbors? More than three times a week (P)      How often do you get together with friends or relatives? More than three times a week (P)

## 2023-11-16 NOTE — H&P
Inpatient Radiology Pre-procedure Note    History of Present Illness:  Olamide Felipe is a 89 y.o. female with active GI bleed who presents for angiogram and possible embolization.    Admission H&P reviewed.  Past Medical History:   Diagnosis Date    Hyperlipidemia     Hypothyroidism     Malignant neoplasm of upper-outer quadrant of right breast in female, estrogen receptor negative 1/7/2022    Osteoarthritis, knee     Vertigo      Past Surgical History:   Procedure Laterality Date    dentures      FRACTURE SURGERY Left     fracture left wrist    HYSTERECTOMY      INJECTION FOR SENTINEL NODE IDENTIFICATION Right 1/7/2022    Procedure: INJECTION, FOR SENTINEL NODE IDENTIFICATION-Right;  Surgeon: Marci Mcintosh MD;  Location: [facility];  Service: General;  Laterality: Right;    left wrist surgery      MASTECTOMY, PARTIAL Right 1/7/2022    Procedure: MASTECTOMY, PARTIAL-Right with radiological marker;  Surgeon: Marci Mcintosh MD;  Location: [facility];  Service: General;  Laterality: Right;  REQUEST SAID 2 HOUR CASE    SENTINEL LYMPH NODE BIOPSY Right 1/7/2022    Procedure: BIOPSY, LYMPH NODE, SENTINEL-Right;  Surgeon: Marci Mcintosh MD;  Location: [facility];  Service: General;  Laterality: Right;       Review of Systems:   As documented in primary team H&P    Home Meds:   Prior to Admission medications    Medication Sig Start Date End Date Taking? Authorizing Provider   acetaminophen (TYLENOL) 325 MG tablet Take 325 mg by mouth every 6 (six) hours as needed for Pain.    Provider, Historical   aspirin 81 MG Chew Take 81 mg by mouth once daily.    Provider, Historical   b complex vitamins tablet Take 1 tablet by mouth once daily.    Provider, Historical   calcium-vitamin D 250-100 mg-unit per tablet Take 1 tablet by mouth 2 (two) times daily.    Provider, Historical   diphenhydrAMINE (BENADRYL) 25 mg capsule Take 25 mg by mouth nightly as needed for Insomnia.    Provider, Historical   FLUoxetine 20 MG capsule TAKE 1  "CAPSULE EVERY DAY 1/11/23   Abiola Campbell MD   levothyroxine (SYNTHROID) 25 MCG tablet Take 1 tablet (25 mcg total) by mouth before breakfast. 6/8/23   bAiola Campbell MD   loratadine (CLARITIN) 10 mg tablet Take 1 tablet (10 mg total) by mouth once daily. 6/8/23   Abiola Campbell MD   losartan (COZAAR) 25 MG tablet Take 1 tablet (25 mg total) by mouth daily as needed (For Systolic Blood pressure higher than 150). 11/18/22 11/18/23  Abiola Campbell MD   memantine (NAMENDA) 5 MG Tab Take 1 tablet (5 mg total) by mouth 2 (two) times daily. Begin by taking one tablet daily for seven days, then increase to one tablet two times daily. 4/10/23 4/9/24  Margot Gonzalez PA-C   mv-min/iron/folic/calcium/vitK (WOMEN'S MULTIVITAMIN ORAL) Take by mouth once daily.    Provider, Historical   omeprazole (PRILOSEC) 20 MG capsule TAKE 1 CAPSULE EVERY DAY 1/11/23   Abiola Campbell MD   simvastatin (ZOCOR) 20 MG tablet TAKE 1 TABLET (20 MG TOTAL) BY MOUTH EVERY EVENING. 6/19/23   Soo Elias NP     Scheduled Meds:   Continuous Infusions:   PRN Meds:  Anticoagulants/Antiplatelets: aspirin    Allergies: Review of patient's allergies indicates:  No Known Allergies  Sedation Hx: have not been any systemic reactions    Labs:  No results for input(s): "INR", "PT", "PTT" in the last 168 hours.    Recent Labs   Lab 11/16/23  0640   WBC 12.43   HGB 10.1*   HCT 30.1*   MCV 95         Recent Labs   Lab 11/16/23  0640         K 5.3*   *   CO2 27   BUN 31*   CREATININE 1.3   CALCIUM 8.8   MG 1.6   ALT 17   AST 19   ALBUMIN 3.2*   BILITOT 0.6         Vitals:  Temp: 98.7 °F (37.1 °C) (11/16/23 0622)  Pulse: 90 (11/16/23 0959)  Resp: 20 (11/16/23 0622)  BP: (!) 107/53 (11/16/23 1000)  SpO2: 97 % (11/16/23 0959)     Physical Exam:  ASA: 3  Mallampati: 2    General: no acute distress  Mental Status: alert and oriented to person, place and time  HEENT: normocephalic, atraumatic  Chest: unlabored " breathing  Heart: regular heart rate  Abdomen: nondistended  Extremity: moves all extremities    Plan: Proceed with angiogram and possible embolization.  Sedation Plan: Moderate.    Al Wiggins MD  Diagnostic and Interventional Radiologist  Department of Radiology  Pager: [phone]

## 2023-11-16 NOTE — ED NOTES
Pt placed on pure wick and new brief. No rectal bleeding noted while changing pt. Call light within reach. Family at bedside and updated on plan of care. Pt resting comfortably

## 2023-11-16 NOTE — ED NOTES
Pt changed into gown. Dried blood noted to BLE and on buttocks. Pt cleaned with wipes and bed sheets changed.

## 2023-11-16 NOTE — H&P
Pulm/CC Fellow Consult Note    Attending Physician: Anita Dunlap MD    Date of Admit: 11/16/2023  Hospital day: 0    Reason for Consult: Lower GI bleed    History of Present Illness:  Olamide Felipe is a 89 y.o.  female with a PMHx of breast cancer s/p lumpectomy, HTN, hypothyroidism, HLD who presented from senior living facility after having a large volume episode of BRBPR.  Her story differs from accounts told previously.      She denies any abdominal pain at the time.  The bleeding occurred last night as she was going to bed.  The patient reports being alarmed by the liquid bright red diarrhea and went outside her apartment for help and saw a young woman who she asked for help and then called the ambulance.  She denies any CP or dizziness, no n/v or SOB.  She did have L sided axillary pain which has since resolved.    The patient was sent to the ED and CTA abd pelvis showed concerns for acute bleed within the ascending colon and diverticulosis.  IR consulted for angiogram and embolization and is now s/p coil embolization.  Patient admitted to the ICU for monitoring post GIB.        Past Medical History:  Past Medical History:   Diagnosis Date    Hyperlipidemia     Hypothyroidism     Malignant neoplasm of upper-outer quadrant of right breast in female, estrogen receptor negative 1/7/2022    Osteoarthritis, knee     Vertigo        Past Surgical History:  Past Surgical History:   Procedure Laterality Date    dentures      FRACTURE SURGERY Left     fracture left wrist    HYSTERECTOMY      INJECTION FOR SENTINEL NODE IDENTIFICATION Right 1/7/2022    Procedure: INJECTION, FOR SENTINEL NODE IDENTIFICATION-Right;  Surgeon: Marci Mcintosh MD;  Location: [facility];  Service: General;  Laterality: Right;    left wrist surgery      MASTECTOMY, PARTIAL Right 1/7/2022    Procedure: MASTECTOMY, PARTIAL-Right with radiological marker;  Surgeon: Marci Mcintosh MD;  Location: [facility];  Service: General;  Laterality: Right;   "REQUEST SAID 2 HOUR CASE    SENTINEL LYMPH NODE BIOPSY Right 2022    Procedure: BIOPSY, LYMPH NODE, SENTINEL-Right;  Surgeon: Marci Mcintosh MD;  Location: [facility];  Service: General;  Laterality: Right;       Allergies:  Review of patient's allergies indicates:  No Known Allergies    Family History:  Family History   Problem Relation Age of Onset    No Known Problems Daughter     No Known Problems Son     No Known Problems Sister        Social History:  Social History     Tobacco Use    Smoking status: Former     Current packs/day: 0.00     Types: Cigarettes     Quit date: 1989     Years since quittin.4     Passive exposure: Never    Smokeless tobacco: Never   Substance Use Topics    Alcohol use: Yes     Alcohol/week: 2.0 standard drinks of alcohol     Types: 2 Glasses of wine per week     Comment: 1/3 glass of wine - 2 times weekly    Drug use: Never       Review of Systems:  Review of Systems   Constitutional:  Negative for fever and malaise/fatigue.   Respiratory:  Negative for shortness of breath.    Cardiovascular:  Negative for chest pain, palpitations and leg swelling.   Gastrointestinal:  Positive for blood in stool. Negative for abdominal pain and vomiting.   Neurological:  Negative for dizziness.        Objective:   Last 24 Hour Vital Signs:  BP  Min: 107/80  Max: 146/72  Temp  Av.5 °F (36.9 °C)  Min: 98.2 °F (36.8 °C)  Max: 98.7 °F (37.1 °C)  Pulse  Av.1  Min: 73  Max: 98  Resp  Av.3  Min: 12  Max: 25  SpO2  Av.2 %  Min: 97 %  Max: 100 %  Height  Av' 4" (162.6 cm)  Min: 5' 4" (162.6 cm)  Max: 5' 4" (162.6 cm)  Weight  Av.5 kg (113 lb 8.6 oz)  Min: 51.5 kg (113 lb 8.6 oz)  Max: 51.5 kg (113 lb 8.6 oz)  Body mass index is 19.49 kg/m².  I & O (Last 24H):  Intake/Output Summary (Last 24 hours) at 2023 1630  Last data filed at 2023 1127  Gross per 24 hour   Intake 500 ml   Output --   Net 500 ml       Physical Exam:  GEN: non-toxic appearing  HEENT: MMM, " no scleral icterus, EOMI  NECK: Supple, midline trachea, no raised JVP or a-waves notable  CV: RRR, no MRG, pulses equal and symmetric 2+ at radial  PULM: CTAB  ABDOMEN: Soft, non-tender, non-distended, no rebound or guarding, purewick in place  SKIN: Warm, dry, intact, no rashes  MSK: No deformity, no clubbing, cyanosis, or lower extremity edema  NEURO: awake and following commands, at neurologic baseline  LINES: Intact, no extravasation or induration, no erythema to PIV or support devices       Assessment & Plan:       NEURO:  Alzheimer's disease  - Diagnosed 10/2023, previously though to have mild cognitive impairment.  - 18/30 on MOCA 2/2023  - Delirium precautions  - SSRI for mood disorder     CV:  Hypertension  - On losartan at home  - Holding for hypotension    HLD  - On simvastatin outpatient, atorvastatin inpatient     PULM:  - Satting 99 on RA       GI/FEN  Lower GI bleed s/p coil embolization  - BRBPR with initial Hb 10, baseline 12  - CTA with concern for diverticular bleed  - Underwent IR embolization 11/16  - Post procedure H/H 7.8  - Transfuse for Hb<7  - Liquid diet  - Hemodynamically stable     Fluids: Net even / net negative strategy  Electrolytes: replete PRN  Nutrition: FLD     RENAL:   CAM  - Creatinene 1.3, baseline 0.9 7/2023  - In setting of GIB, likely prerenal  -  Received IVF bolus, on liquid diet, CTM     HEME/ONC:  Breast cancer s/p lumpectomy 2021  - Triple negative, undergoing surveillance  - MMG due, last Birads2  -- Transfusion threshold <7g/dl per TRICC     ENDOCRINE:  Hyperparathyroidism  Osteopenia  - Vitamin D supplemenation    Hypothyroidism  - Synthroid 25    -- Hypoglycemic precautions     INFECTIOUS DISEASE:  Diverticular bleed  - Receiving CTX and metronidazole for diverticulitis due to abd pain  - Can likely deescalate as patient not complaining of abdominal pain, is afebrile    MSK/RHEUM:  -- PT/OT for early mobility      PSYCHOSOCIAL:  --    Feeding: FLD  Analgesia:  NA  Sedation: NA  DVT prophylaxis: held in GIB  Anticoagulants       None           Head of Bed: 30 degrees to prevent VAP  Ulcer PPX: NA  Glucose: Hypoglycemic precautions  SBT/SAT: NA  Bowels: PEG  Indwelling Lines: PIV  Deescalation Abx: ctx, flagyl    Code: Code Status Discussion Note   Richar Goetz MD  [facility]    Pt seen and examined with Pulmonary/Critical Care team and this note reviewed and validated with the following additional comments: Pt has been observed in the ICU for 8 hrs since her coiling. So far, she has not had any rebleeding.  Hemodynamics stable.  Repeating H/H.    Medical Decision Making (MDM) was complex.  The number and complexity of problems addressed was high.  The amount and complexity of data reviewed was high.  The risk of complications and/or morbidity/mortality was high.  The tests ordered were H/H.  I communicated with the following providers HM  Time spent in the care of this patient was 25 minutes    Curt Espinoza MD  Phone [phone]

## 2023-11-16 NOTE — PROGRESS NOTES
Brief update note    Hemoglobin downtrending, likely reflects previous bleed.  Had 1 episode of dark bowel movement.  Patient is otherwise asymptomatic.  Vital signs stable.  Per discussion with ICU attending, due to need for ICU beds, will place transfer orders to the floor as patient is stable at present.    Anita Dunlap MD   Internal Medicine Hospitalist

## 2023-11-16 NOTE — PHARMACY MED REC
"    [facility]           Pharmacy  Admission Medication History     The home medication history was taken by Estephania Berumen.      Medication history obtained from Medications listed below were obtained from: [employer] software- [employer]    Based on information gathered for medication list, you may go to "Admission" then "Reconcile Home Medications" tabs to review and/or act upon those items.     The home medication list has been updated by the Pharmacy department.   Please read ALL comments highlighted in yellow.   Please address this information as you see fit.    Feel free to contact us if you have any questions or require assistance.        No current facility-administered medications on file prior to encounter.     Current Outpatient Medications on File Prior to Encounter   Medication Sig Dispense Refill    FLUoxetine 20 MG capsule TAKE 1 CAPSULE EVERY DAY (Patient taking differently: Take 20 mg by mouth once daily.) 90 capsule 3    levothyroxine (SYNTHROID) 25 MCG tablet Take 1 tablet (25 mcg total) by mouth before breakfast. 90 tablet 3    losartan (COZAAR) 25 MG tablet Take 1 tablet (25 mg total) by mouth daily as needed (For Systolic Blood pressure higher than 150). 90 tablet 1    omeprazole (PRILOSEC) 20 MG capsule TAKE 1 CAPSULE EVERY DAY (Patient taking differently: Take 20 mg by mouth once daily.) 90 capsule 3    simvastatin (ZOCOR) 20 MG tablet TAKE 1 TABLET (20 MG TOTAL) BY MOUTH EVERY EVENING. 90 tablet 2    acetaminophen (TYLENOL) 325 MG tablet Take 325 mg by mouth every 6 (six) hours as needed for Pain.      aspirin 81 MG Chew Take 81 mg by mouth once daily.      b complex vitamins tablet Take 1 tablet by mouth once daily.      calcium-vitamin D 250-100 mg-unit per tablet Take 1 tablet by mouth 2 (two) times daily.      diphenhydrAMINE (BENADRYL) 25 mg capsule Take 25 mg by mouth nightly as needed for Insomnia.      FLUAD QUAD 2023-24,65Y UP,,PF, 60 mcg (15 mcg x 4)/0.5 mL Syrg   "     loratadine (CLARITIN) 10 mg tablet Take 1 tablet (10 mg total) by mouth once daily. 90 tablet 3    memantine (NAMENDA) 5 MG Tab Take 1 tablet (5 mg total) by mouth 2 (two) times daily. Begin by taking one tablet daily for seven days, then increase to one tablet two times daily. 60 tablet 11    mv-min/iron/folic/calcium/vitK (WOMEN'S MULTIVITAMIN ORAL) Take by mouth once daily.         Please address this information as you see fit.  Feel free to contact us if you have any questions or require assistance.    Estephania Berumen  [phone]              .

## 2023-11-16 NOTE — ASSESSMENT & PLAN NOTE
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 Dementia is mild. The patient does not have signs of behavioral disturbance. Home dementia medications andare Held or Continued: continued.. Continue non-pharmacologic interventions to prevent delirium (No VS between 11PM-5AM, activity during day, opening blinds, providing glasses/hearing aids, and up in chair during daytime). Will avoid narcotics and benzos unless absolutely necessary. PRN anti-psychotics are not prescribed to avoid self harm behaviors.

## 2023-11-16 NOTE — CONSULTS
See H&P and Procedure Note from same date.    Al Wiggins MD  Diagnostic and Interventional Radiologist  Department of Radiology  Pager: [phone]

## 2023-11-16 NOTE — ED PROVIDER NOTES
Encounter Date: 11/16/2023       History     Chief Complaint   Patient presents with    Rectal Bleeding     Arrives via ems from assisted living facility ([facility])  for onset of bright red blood from rectum. Denies n/v. She reports mild (L) UQ abdominal pain.      89-year-old female brought to the emergency department by EMS for bright red blood per rectum.  Patient states she was in her usual state of health and feeling well this morning.  She had done some laundry for a trip she is taking tomorrow.  She both urge to defecate.  She would a bowel movement that was grossly bloody and then again had a large gush of bloody stool afterward.  Is not bleeding on arrival.  Does note some left-sided abdominal cramping pain today.  Denies any nausea, vomiting, headache, lightheadedness, dizziness, chest pain, shortness of breath.  No other symptoms reported at this time.      Review of patient's allergies indicates:  No Known Allergies  Past Medical History:   Diagnosis Date    Hyperlipidemia     Hypothyroidism     Malignant neoplasm of upper-outer quadrant of right breast in female, estrogen receptor negative 1/7/2022    Osteoarthritis, knee     Vertigo      Past Surgical History:   Procedure Laterality Date    dentures      FRACTURE SURGERY Left     fracture left wrist    HYSTERECTOMY      INJECTION FOR SENTINEL NODE IDENTIFICATION Right 1/7/2022    Procedure: INJECTION, FOR SENTINEL NODE IDENTIFICATION-Right;  Surgeon: Marci Mcintosh MD;  Location: [facility];  Service: General;  Laterality: Right;    left wrist surgery      MASTECTOMY, PARTIAL Right 1/7/2022    Procedure: MASTECTOMY, PARTIAL-Right with radiological marker;  Surgeon: Marci Mcintosh MD;  Location: [facility] OR;  Service: General;  Laterality: Right;  REQUEST SAID 2 HOUR CASE    SENTINEL LYMPH NODE BIOPSY Right 1/7/2022    Procedure: BIOPSY, LYMPH NODE, SENTINEL-Right;  Surgeon: Marci Mcintosh MD;  Location: [facility] OR;  Service: General;  Laterality:  Right;     Family History   Problem Relation Age of Onset    No Known Problems Daughter     No Known Problems Son     No Known Problems Sister      Social History     Tobacco Use    Smoking status: Former     Current packs/day: 0.00     Types: Cigarettes     Quit date: 1989     Years since quittin.4     Passive exposure: Never    Smokeless tobacco: Never   Substance Use Topics    Alcohol use: Yes     Alcohol/week: 2.0 standard drinks of alcohol     Types: 2 Glasses of wine per week     Comment: 1/3 glass of wine - 2 times weekly    Drug use: Never     Review of Systems   Constitutional:  Negative for chills and fever.   HENT:  Negative for congestion.    Respiratory:  Negative for cough and shortness of breath.    Cardiovascular:  Negative for chest pain.   Gastrointestinal:  Positive for abdominal pain.   Musculoskeletal:  Negative for back pain.   Neurological:  Negative for light-headedness and headaches.       Physical Exam     Initial Vitals   BP Pulse Resp Temp SpO2   23 0623 23 0622 23 0622 23 0622 23 0622   (!) 146/72 76 20 98.7 °F (37.1 °C) 99 %      MAP       --                Physical Exam    Nursing note and vitals reviewed.  Constitutional: She appears well-developed and well-nourished. No distress.   HENT:   Head: Normocephalic and atraumatic.   Eyes: Conjunctivae and EOM are normal. Pupils are equal, round, and reactive to light.   Neck: Neck supple. No tracheal deviation present.   Normal range of motion.  Cardiovascular:  Normal rate and intact distal pulses.           Pulmonary/Chest: No respiratory distress.   Abdominal: Abdomen is soft. She exhibits no distension. There is abdominal tenderness (Left-sided). There is no rebound and no guarding.   Genitourinary:    Genitourinary Comments: Grossly bloody stool noted on exam     Musculoskeletal:         General: No tenderness. Normal range of motion.      Cervical back: Normal range of motion and neck supple.      Neurological: She is alert and oriented to person, place, and time. She has normal strength. No cranial nerve deficit. GCS score is 15. GCS eye subscore is 4. GCS verbal subscore is 5. GCS motor subscore is 6.   Skin: Skin is warm and dry.         ED Course   Critical Care    Date/Time: 11/16/2023 9:02 AM    Performed by: Sanjiv Diaz MD  Authorized by: Sanjiv Diaz MD  Direct patient critical care time: 15 minutes  Additional history critical care time: 15 minutes  Ordering / reviewing critical care time: 15 minutes  Documentation critical care time: 15 minutes  Consulting other physicians critical care time: 15 minutes  Consult with family critical care time: 10 minutes  Total critical care time (exclusive of procedural time) : 85 minutes  Critical care time was exclusive of separately billable procedures and treating other patients.  Critical care was necessary to treat or prevent imminent or life-threatening deterioration of the following conditions: circulatory failure.  Critical care was time spent personally by me on the following activities: development of treatment plan with patient or surrogate, discussions with consultants, interpretation of cardiac output measurements, examination of patient, evaluation of patient's response to treatment, ordering and performing treatments and interventions, obtaining history from patient or surrogate, ordering and review of laboratory studies, ordering and review of radiographic studies, pulse oximetry, re-evaluation of patient's condition and review of old charts.        Labs Reviewed   CBC W/ AUTO DIFFERENTIAL - Abnormal; Notable for the following components:       Result Value    RBC 3.17 (*)     Hemoglobin 10.1 (*)     Hematocrit 30.1 (*)     MCH 31.9 (*)     Gran # (ANC) 9.8 (*)     Immature Grans (Abs) 0.05 (*)     Gran % 78.8 (*)     Lymph % 11.3 (*)     All other components within normal limits   COMPREHENSIVE METABOLIC PANEL - Abnormal;  Notable for the following components:    Potassium 5.3 (*)     Chloride 112 (*)     BUN 31 (*)     Total Protein 5.7 (*)     Albumin 3.2 (*)     eGFR 39 (*)     Anion Gap 4 (*)     All other components within normal limits   MAGNESIUM   TYPE & SCREEN   PREPARE RBC SOFT     EKG Readings: (Independently Interpreted)   Initial Reading: No STEMI. Previous EKG: Compared with most recent EKG Previous EKG Date: 11/13/2023 (Minimal change). Rhythm: Normal Sinus Rhythm. Heart Rate: 80. Ectopy: No Ectopy. ST Segments: Normal ST Segments. Axis: Normal.   EKG independently interpreted by me pending Cardiology review     ECG Results              EKG 12-lead (In process)  Result time 11/16/23 07:07:18      In process by Interface, Lab In [facility] (11/16/23 07:07:18)                   Narrative:    Test Reason : K62.5,    Vent. Rate : 080 BPM     Atrial Rate : 080 BPM     P-R Int : 150 ms          QRS Dur : 072 ms      QT Int : 392 ms       P-R-T Axes : 079 077 072 degrees     QTc Int : 452 ms    Normal sinus rhythm with sinus arrhythmia  Normal ECG  When compared with ECG of 13-NOV-2023 17:12,  T wave amplitude has decreased in Anterior leads    Referred By: [email or web address]   SELF           Confirmed By:                                       X-Rays:   Independently Interpreted Readings:   Other Readings:  CT abdomen and pelvis independently interpreted by me pending radiology review:  No acute obstruction or perforation appreciated    Imaging Results              CTA Acute GI [place], Abdomen and Pelvis (Final result)  Result time 11/16/23 08:47:30      Final result by Kristen Melara MD (11/16/23 08:47:30)                   Impression:      Examination concerning for acute bleed within the proximal aspect of the ascending, colon possibly a small diverticular bleed.    Large amount of retained stool in the sigmoid, tortuous sigmoid colon with significant diverticulosis.    Cholelithiasis.    Small hiatal  hernia.    Hysterectomy.    COMMUNICATION  This critical result was discovered/received at 8:35 AM. The critical information above was relayed directly by me by EPIC chat to Dr Kincaid on 11/16/23 at 8:43 AM.      Electronically signed by: Kristen Melara MD  Date:    11/16/2023  Time:    08:47               Narrative:    EXAMINATION:  CTA acute GI bleed, abdomen and pelvis without and with contrast    CLINICAL HISTORY:  Acute lower GI.    TECHNIQUE:  3.75 mm axial images of the abdomen and pelvis obtained before and after the administration of contrast.  Delayed 3 minutes axial images also acquired.  Coronal and sagittal images reformatted.  The patient received 100 cc of Omni 350 IV contrast.    COMPARISON:  None    FINDINGS:  The lung bases are clear.    No pericardial effusion.    Small hiatal hernia.    The liver is normal in size, no liver lesions.  The gallbladder is present, stones are seen layering within.  No inflammatory changes of the gallbladder.  The biliary ducts do not appear dilated.    The stomach is nondistended.    The pancreas, spleen and bilateral adrenal glands appear normal.    Bilateral kidneys hypoattenuating lesions suggestive of cysts.  No hydronephrosis, no forming stones.  The visualized portion of the bilateral ureters appear normal.  Parapelvic cysts bilaterally.    The abdominal aorta tapers normally, there is circumferential calcified atherosclerotic plaque.  The celiac trunk, SMA, bilateral renal artery and LOUIS are patent.  No para-aortic lymphadenopathy.    Moderate stool retention, no inflammatory changes of the bowel seen.  Numerous diverticula of the sigmoid colon.  The sigmoid is filled with stool and very tortuous.  High-density stool already present limiting evaluation for acute contrast extravasation.  There is an abnormal area of enhancement and pooling of intraluminal contrast on the delayed images near the medial side of the proximal ascending colon just distal to  the terminal ileum level, concerning for acute bleeding. (3:94)    No mesenteric inflammatory change.  No ascites, no free air.    The abdominal wall is intact.  There is a subtle fat and fluid containing right inguinal hernia.    The uterus is removed.  The ovaries are not identified, they may be small or may have been removed.    The osseous structures demonstrate no osseous lesions.                                      Medications   ketorolac injection 15 mg (15 mg Intravenous Given 11/16/23 0656)   iohexoL (OMNIPAQUE 350) injection 100 mL (100 mLs Intravenous Given 11/16/23 0818)     Medical Decision Making  89-year-old female presents emergency department complaining of bright red blood per rectum      Differential: Diverticular bleed, arterial bleed, blood loss anemia, enteritis, diverticulitis,        CT concerning for active diverticular bleed.  I have reached out to interventional radiology.  We discussed the case and I have placed a consult for Interventional Radiology management.  Informed patient and family of plan to admit for Interventional Radiology management and further evaluation.  Discussed with [facility] hospitalist who will see and admit the patient for further evaluation and management.    Problems Addressed:  Lower GI bleed: acute illness or injury that poses a threat to life or bodily functions    Amount and/or Complexity of Data Reviewed  External Data Reviewed: ECG and notes.     Details: Reviewed most recent EKG for comparison       Reviewed most recent PCP note documenting baseline medications and past medical history  Labs: ordered.     Details: CBC concerning for leukocytosis and relative anemia compared to baseline hemoglobin and hematocrit; CMP with mildly elevated creatinine, normal LFTs, grossly normal electrolytes; type and screen is A positive  Radiology: ordered and independent interpretation performed. Decision-making details documented in ED Course.  ECG/medicine tests: ordered  and independent interpretation performed. Decision-making details documented in ED Course.    Risk  Prescription drug management.  Decision regarding hospitalization.    Critical Care  Total time providing critical care: 85 minutes                               Clinical Impression:   Final diagnoses:  [K62.5] BRBPR (bright red blood per rectum)  [K92.2] Lower GI bleed        ED Disposition Condition    Admit                 Sanjiv Diaz MD  11/16/23 0904

## 2023-11-16 NOTE — SEDATION DOCUMENTATION
IR angiogram completed, placed 1 coil, VS stable, room air, denies pain, vascade closure device deployed at 1127, manual compression held for 5 minutes, covered R groin puncture site with gauze and tegaderm, CDI, transported pt to ICU via stretcher, pedal pulses remain +2, bedside report given to NELL Lozano in ICU, IR care complete

## 2023-11-16 NOTE — CONSULTS
[facility]     CC: Rectal bleeding     HPI 89 y.o. female who presents with acute onset of rectal bleeding. Painless with large amount of red blood in the toilet bowl.    Interviewed post embolization in the ICU. Patient reports feeling better and is in no acute pain.       Past Medical History  HTN  HLD  OA  Vertigo        Physical Examination  BP (!) 107/53   Pulse 90   Temp 98.7 °F (37.1 °C) (Oral)   Resp 20   SpO2 97%   General appearace: well appearing, no acute distress, alert   Abdomen: soft, non-tender, non-distended abdomen with no rebound or guarding    Labs:  Hgb: 10.1    Imaging:  CTA with active contrast extravasation into the ascending colon.       External medical records reviewed including external documents     Assessment:  89 y.o. female who presents with acute onset of rectal bleeding likely from diverticular bleeding. CTA with evidence of active bleeding now s/p embolization with IR.       Plan:  -No plan for endoscopic evaluation   -Check CBC daily while inpatient   -Monitor hemoglobin and transfuse as indicated     Conrado Kelley MD  [facility] Fellow, PGY-V     Jamshid Trujillo MD   [facility]

## 2023-11-16 NOTE — ASSESSMENT & PLAN NOTE
Advance Care Planning     Code Status  I engaged the the healthcare power of   in a voluntary conversation about the patient's preferences for care at the very end of life.  Daughter Christy would like to discuss with the patient about code status 1st.  For now, will remain a full code.  I spent a total of 8 minutes engaging the patient in this advance care planning discussion.

## 2023-11-16 NOTE — ED NOTES
Pt reports to ED via EMS with c/o left sided abdominal pain and one episode of rectal bleeding this morning. Pt denies dizziness, SOB, chest pain, nausea, vomiting. Pt in NAD/VSS.

## 2023-11-17 ENCOUNTER — TELEPHONE (OUTPATIENT)
Dept: INTERNAL MEDICINE | Facility: CLINIC | Age: 88
End: 2023-11-17
Payer: MEDICARE

## 2023-11-17 PROBLEM — R41.0 DELIRIUM: Status: ACTIVE | Noted: 2023-11-17

## 2023-11-17 LAB
ANION GAP SERPL CALC-SCNC: 8 MMOL/L (ref 8–16)
BASOPHILS # BLD AUTO: 0.03 K/UL (ref 0–0.2)
BASOPHILS NFR BLD: 0.2 % (ref 0–1.9)
BUN SERPL-MCNC: 40 MG/DL (ref 8–23)
CALCIUM SERPL-MCNC: 7.9 MG/DL (ref 8.7–10.5)
CHLORIDE SERPL-SCNC: 112 MMOL/L (ref 95–110)
CO2 SERPL-SCNC: 21 MMOL/L (ref 23–29)
CREAT SERPL-MCNC: 1.1 MG/DL (ref 0.5–1.4)
DIFFERENTIAL METHOD: ABNORMAL
EOSINOPHIL # BLD AUTO: 0.1 K/UL (ref 0–0.5)
EOSINOPHIL NFR BLD: 0.5 % (ref 0–8)
ERYTHROCYTE [DISTWIDTH] IN BLOOD BY AUTOMATED COUNT: 12.4 % (ref 11.5–14.5)
EST. GFR  (NO RACE VARIABLE): 48 ML/MIN/1.73 M^2
GLUCOSE SERPL-MCNC: 93 MG/DL (ref 70–110)
HCT VFR BLD AUTO: 22.2 % (ref 37–48.5)
HCT VFR BLD AUTO: 25.6 % (ref 37–48.5)
HGB BLD-MCNC: 7.6 G/DL (ref 12–16)
HGB BLD-MCNC: 8.8 G/DL (ref 12–16)
IMM GRANULOCYTES # BLD AUTO: 0.06 K/UL (ref 0–0.04)
IMM GRANULOCYTES NFR BLD AUTO: 0.5 % (ref 0–0.5)
LYMPHOCYTES # BLD AUTO: 1.2 K/UL (ref 1–4.8)
LYMPHOCYTES NFR BLD: 9.2 % (ref 18–48)
MCH RBC QN AUTO: 31.8 PG (ref 27–31)
MCHC RBC AUTO-ENTMCNC: 34.2 G/DL (ref 32–36)
MCV RBC AUTO: 93 FL (ref 82–98)
MONOCYTES # BLD AUTO: 1 K/UL (ref 0.3–1)
MONOCYTES NFR BLD: 7.7 % (ref 4–15)
NEUTROPHILS # BLD AUTO: 10.8 K/UL (ref 1.8–7.7)
NEUTROPHILS NFR BLD: 81.9 % (ref 38–73)
NRBC BLD-RTO: 0 /100 WBC
PLATELET # BLD AUTO: 157 K/UL (ref 150–450)
PMV BLD AUTO: 10.5 FL (ref 9.2–12.9)
POTASSIUM SERPL-SCNC: 4.8 MMOL/L (ref 3.5–5.1)
RBC # BLD AUTO: 2.39 M/UL (ref 4–5.4)
SODIUM SERPL-SCNC: 141 MMOL/L (ref 136–145)
WBC # BLD AUTO: 13.17 K/UL (ref 3.9–12.7)

## 2023-11-17 PROCEDURE — A4216 STERILE WATER/SALINE, 10 ML: HCPCS | Mod: HCNC

## 2023-11-17 PROCEDURE — 25000003 PHARM REV CODE 250: Mod: HCNC

## 2023-11-17 PROCEDURE — 85025 COMPLETE CBC W/AUTO DIFF WBC: CPT | Mod: HCNC | Performed by: STUDENT IN AN ORGANIZED HEALTH CARE EDUCATION/TRAINING PROGRAM

## 2023-11-17 PROCEDURE — 11000001 HC ACUTE MED/SURG PRIVATE ROOM: Mod: HCNC

## 2023-11-17 PROCEDURE — 80048 BASIC METABOLIC PNL TOTAL CA: CPT | Mod: HCNC | Performed by: STUDENT IN AN ORGANIZED HEALTH CARE EDUCATION/TRAINING PROGRAM

## 2023-11-17 PROCEDURE — 25000003 PHARM REV CODE 250: Mod: HCNC | Performed by: STUDENT IN AN ORGANIZED HEALTH CARE EDUCATION/TRAINING PROGRAM

## 2023-11-17 PROCEDURE — 63600175 PHARM REV CODE 636 W HCPCS: Mod: HCNC | Performed by: INTERNAL MEDICINE

## 2023-11-17 PROCEDURE — 63600175 PHARM REV CODE 636 W HCPCS: Mod: HCNC | Performed by: STUDENT IN AN ORGANIZED HEALTH CARE EDUCATION/TRAINING PROGRAM

## 2023-11-17 PROCEDURE — 85014 HEMATOCRIT: CPT | Mod: HCNC | Performed by: HOSPITALIST

## 2023-11-17 PROCEDURE — 85018 HEMOGLOBIN: CPT | Mod: HCNC | Performed by: HOSPITALIST

## 2023-11-17 RX ORDER — DIPHENHYDRAMINE HYDROCHLORIDE 50 MG/ML
12.5 INJECTION INTRAMUSCULAR; INTRAVENOUS ONCE
Status: COMPLETED | OUTPATIENT
Start: 2023-11-17 | End: 2023-11-17

## 2023-11-17 RX ORDER — LORAZEPAM 2 MG/ML
0.5 INJECTION INTRAMUSCULAR ONCE
Status: COMPLETED | OUTPATIENT
Start: 2023-11-17 | End: 2023-11-17

## 2023-11-17 RX ADMIN — SODIUM CHLORIDE, PRESERVATIVE FREE 10 ML: 5 INJECTION INTRAVENOUS at 06:11

## 2023-11-17 RX ADMIN — ATORVASTATIN CALCIUM 10 MG: 10 TABLET, FILM COATED ORAL at 09:11

## 2023-11-17 RX ADMIN — MUPIROCIN: 20 OINTMENT TOPICAL at 09:11

## 2023-11-17 RX ADMIN — SODIUM CHLORIDE, PRESERVATIVE FREE 10 ML: 5 INJECTION INTRAVENOUS at 12:11

## 2023-11-17 RX ADMIN — MEMANTINE HYDROCHLORIDE 5 MG: 5 TABLET ORAL at 09:11

## 2023-11-17 RX ADMIN — DIPHENHYDRAMINE HYDROCHLORIDE 12.5 MG: 50 INJECTION INTRAMUSCULAR; INTRAVENOUS at 12:11

## 2023-11-17 RX ADMIN — LORAZEPAM 0.5 MG: 2 INJECTION INTRAMUSCULAR; INTRAVENOUS at 01:11

## 2023-11-17 NOTE — ASSESSMENT & PLAN NOTE
Superimposed on dementia   -likely exacerbated by administration of benzodiazepine overnight  -redirect

## 2023-11-17 NOTE — ASSESSMENT & PLAN NOTE
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 Dementia is mild. The patient does not have signs of behavioral disturbance. Home dementia medications andare Held or Continued: continued.. Continue non-pharmacologic interventions to prevent delirium (No VS between 11PM-5AM, activity during day, opening blinds, providing glasses/hearing aids, and up in chair during daytime). Will avoid narcotics and benzos unless absolutely necessary. PRN anti-psychotics are not prescribed to avoid self harm behaviors.  -remove restraints today; will avoid benzos

## 2023-11-17 NOTE — NURSING
"Pt lying comfortably in bed sleeping.  VSS. Bed in lowest position, call light in reach and bed alarm on.   Pt has been sleeping on and off this morning.Pt was given 0.5 Ativan IV at 0158 this morning for agitation.      Pt arouses to voice, and wakes intermittently saying she has to go to the bathroom. When pt is awake, she gets extremely agitated.  Pt was not like this yesterday when I cared for her.   Pt educated by this RN that she has a farias that is draining her bladder.  Pt doesn't seem to comprehend and insists on getting up to go to the bathroom.  Pt is redirectable.  Pt is confused when asked where she is and what brought her to the hospital.  Pt states, "I'm at my daughters house."   RN reoriented pt.  Pt offered food, but refuses. Pt has not had any more bloody stools. Pt rec'd 1 ut RBC's overnight. H/H 8.8/25.6 today at 11am.  No new orders at this time.  Pt to be trf to floor when a bed is available.  Will continue to monitor.    "

## 2023-11-17 NOTE — TELEPHONE ENCOUNTER
----- Message from Denisse Watson sent at 11/17/2023  4:08 PM CST -----  Regarding: HFU  Patient is being discharged from [facility] and is requiring a hospital follow up appointment with their Primary Care Provider in 7 days. Patient is established. I am unable to schedule an appointment in that time frame. Please schedule patient a sooner appointment and message me back so Discharge Nurse can advise patient prior to discharge.    DX:Lower GI bleed    Thank you, Sherrill  Physician Referral Specialist/Discharge

## 2023-11-17 NOTE — SUBJECTIVE & OBJECTIVE
Interval History: additional bloody BM overnight, given 1u PRBC. No further bleeding this am and appears to have responded appropriately. Repeat CBC for this afternoon. Agitated overnight and given ativan - would avoid any future benzos. Called and discussed with daughter over the phone.    Review of Systems  Objective:     Vital Signs (Most Recent):  Temp: 97.6 °F (36.4 °C) (11/17/23 0730)  Pulse: 75 (11/17/23 0800)  Resp: 19 (11/17/23 0800)  BP: 125/60 (11/17/23 0800)  SpO2: 99 % (11/17/23 0800) Vital Signs (24h Range):  Temp:  [97.6 °F (36.4 °C)-99.1 °F (37.3 °C)] 97.6 °F (36.4 °C)  Pulse:  [] 75  Resp:  [12-52] 19  SpO2:  [93 %-100 %] 99 %  BP: (103-169)/(43-82) 125/60     Weight: 51.5 kg (113 lb 8.6 oz)  Body mass index is 19.49 kg/m².    Intake/Output Summary (Last 24 hours) at 11/17/2023 0953  Last data filed at 11/17/2023 0649  Gross per 24 hour   Intake 1353.14 ml   Output 1200 ml   Net 153.14 ml         Physical Exam  Vitals and nursing note reviewed.   Constitutional:       General: She is not in acute distress.     Interventions: She is restrained.      Comments: Older frail female   HENT:      Mouth/Throat:      Mouth: Mucous membranes are dry.   Eyes:      Comments: Conjunctival pallor present   Cardiovascular:      Rate and Rhythm: Normal rate and regular rhythm.      Heart sounds: Normal heart sounds. No murmur heard.  Pulmonary:      Effort: Pulmonary effort is normal. No respiratory distress.      Breath sounds: Normal breath sounds. No wheezing.   Abdominal:      Palpations: Abdomen is soft.      Tenderness: There is abdominal tenderness (Right mid and lower quadrant tenderness).   Musculoskeletal:      Right lower leg: No edema.      Left lower leg: No edema.   Skin:     General: Skin is warm and dry.      Findings: No bruising.   Neurological:      General: No focal deficit present.      Mental Status: She is alert. She is disoriented.   Psychiatric:         Mood and Affect: Mood normal.              Significant Labs: All pertinent labs within the past 24 hours have been reviewed.    Significant Imaging: I have reviewed all pertinent imaging results/findings within the past 24 hours.

## 2023-11-17 NOTE — PLAN OF CARE
Problem: Adult Inpatient Plan of Care  Goal: Plan of Care Review  Outcome: Ongoing, Progressing  Goal: Absence of Hospital-Acquired Illness or Injury  Outcome: Ongoing, Progressing  Intervention: Identify and Manage Fall Risk  Flowsheets (Taken 11/17/2023 0604)  Safety Promotion/Fall Prevention:   bed alarm set   diversional activities provided   Fall Risk reviewed with patient/family   Fall Risk signage in place   high risk medications identified   lighting adjusted   medications reviewed   muscle strengthening facilitated   nonskid shoes/socks when out of bed   pulse ox   room near unit station   side rails raised x 3   supervised activity   instructed to call staff for mobility  Intervention: Prevent Skin Injury  Flowsheets (Taken 11/17/2023 0604)  Body Position:   weight shifting   position changed independently  Skin Protection:   adhesive use limited   incontinence pads utilized   skin sealant/moisture barrier applied   silicone foam dressing in place   pulse oximeter probe site changed   skin-to-device areas padded   skin-to-skin areas padded   transparent dressing maintained   tubing/devices free from skin contact  Intervention: Prevent and Manage VTE (Venous Thromboembolism) Risk  Flowsheets (Taken 11/17/2023 0604)  Activity Management:   Heel slide - L1   Arm raise - L1   Bridge - L1   Rolling - L1   Leg kicks - L2   Sitting at edge of bed - L2  VTE Prevention/Management:   remove, assess skin, and reapply sequential compression device   bleeding precations maintained   bleeding risk assessed   bleeding risk factor(s) identified, provider notified   dorsiflexion/plantar flexion performed   fluids promoted   ROM (active) performed  Range of Motion: active ROM (range of motion) encouraged  Intervention: Prevent Infection  Flowsheets (Taken 11/17/2023 0604)  Infection Prevention:   environmental surveillance performed   equipment surfaces disinfected   rest/sleep promoted   personal protective equipment  utilized   hand hygiene promoted   single patient room provided  Goal: Optimal Comfort and Wellbeing  Outcome: Ongoing, Progressing  Intervention: Monitor Pain and Promote Comfort  Flowsheets (Taken 11/17/2023 0604)  Pain Management Interventions:   pain management plan reviewed with patient/caregiver   care clustered   diversional activity provided   medication offered but refused   pillow support provided   position adjusted   quiet environment facilitated   relaxation techniques promoted  Intervention: Provide Person-Centered Care  Flowsheets (Taken 11/17/2023 0604)  Trust Relationship/Rapport:   care explained   choices provided   emotional support provided   empathic listening provided   thoughts/feelings acknowledged   reassurance provided   questions encouraged   questions answered     Problem: Adjustment to Illness (Gastrointestinal Bleeding)  Goal: Optimal Coping with Acute Illness  Outcome: Ongoing, Not Progressing  Intervention: Optimize Psychosocial Response  Flowsheets (Taken 11/17/2023 0604)  Supportive Measures:   active listening utilized   counseling provided   decision-making supported   goal-setting facilitated   positive reinforcement provided   relaxation techniques promoted   self-care encouraged   self-reflection promoted   self-responsibility promoted   verbalization of feelings encouraged     Problem: Bleeding (Gastrointestinal Bleeding)  Goal: Hemostasis  Intervention: Manage Gastrointestinal Bleeding  Flowsheets (Taken 11/17/2023 0604)  Bleeding Management: blood products administered  Stabilization Measures:   blood products administered   provider notified  Environmental Support:   calm environment promoted   distractions minimized   rest periods encouraged     Problem: Fall Injury Risk  Goal: Absence of Fall and Fall-Related Injury  Outcome: Ongoing, Not Progressing  Intervention: Identify and Manage Contributors  Flowsheets (Taken 11/17/2023 0604)  Self-Care Promotion:   safe use of  adaptive equipment encouraged   BADL personal objects within reach  Medication Review/Management:   medications reviewed   high-risk medications identified   provider consulted  Intervention: Promote Injury-Free Environment  Flowsheets (Taken 11/17/2023 0604)  Safety Promotion/Fall Prevention:   bed alarm set   diversional activities provided   Fall Risk reviewed with patient/family   Fall Risk signage in place   high risk medications identified   lighting adjusted   medications reviewed   muscle strengthening facilitated   nonskid shoes/socks when out of bed   pulse ox   room near unit station   side rails raised x 3   supervised activity   instructed to call staff for mobility     Problem: Skin Injury Risk Increased  Goal: Skin Health and Integrity  Outcome: Ongoing, Progressing  Intervention: Optimize Skin Protection  Flowsheets (Taken 11/17/2023 0604)  Pressure Reduction Techniques:   frequent weight shift encouraged   heels elevated off bed   pressure points protected   weight shift assistance provided  Pressure Reduction Devices:   specialty bed utilized   pressure-redistributing mattress utilized   positioning supports utilized   heel offloading device utilized  Skin Protection:   adhesive use limited   incontinence pads utilized   skin sealant/moisture barrier applied   silicone foam dressing in place   pulse oximeter probe site changed   skin-to-device areas padded   skin-to-skin areas padded   transparent dressing maintained   tubing/devices free from skin contact  Head of Bed (HOB) Positioning: HOB at 15 degrees     Problem: Restraint, Nonbehavioral (Nonviolent)  Goal: Absence of Harm or Injury  Outcome: Ongoing, Progressing  Intervention: Implement Least Restrictive Safety Strategies  Flowsheets (Taken 11/17/2023 0604)  Medical Device Protection:   tubing secured   IV pole/bag removed from visual field   torso covered  Less Restrictive Alternative:   surveillance provided   security enhancements  provided   safety enhancements provided   positive reinforcement provided   medication offered   counseling provided   coping techniques promoted   bed alarm in use   calming techniques promoted   appropriate expression promoted  Diversional Activities: television  De-Escalation Techniques:   verbally redirected   stimulation decreased   reoriented   quiet time facilitated   medication offered   medication administered   increased round frequency   diversional activity encouraged   appropriate behavior reinforced   1:1 observation initiated  Intervention: Protect Dignity, Rights, and Personal Wellbeing  Flowsheets (Taken 11/17/2023 0604)  Trust Relationship/Rapport:   care explained   choices provided   emotional support provided   empathic listening provided   thoughts/feelings acknowledged   reassurance provided   questions encouraged   questions answered  Intervention: Protect Skin and Joint Integrity  Flowsheets (Taken 11/17/2023 0604)  Body Position:   weight shifting   position changed independently  Range of Motion: active ROM (range of motion) encouraged  Intervention: Education  Flowsheets (Taken 11/17/2023 0604)  Discontinuation Criteria: Absence of behavior that required restraint  Criteria Explained: Yes  Patient's Response: NR  Patient / Family Notification: Patient  Patient / Family Teaching:   Need for restraint   Restraint monitoring   Attempted alternatives   DC criteria

## 2023-11-17 NOTE — PLAN OF CARE
Problem: Adult Inpatient Plan of Care  Goal: Plan of Care Review  Outcome: Ongoing, Progressing  Goal: Patient-Specific Goal (Individualized)  Outcome: Ongoing, Progressing  Goal: Absence of Hospital-Acquired Illness or Injury  Outcome: Ongoing, Progressing  Goal: Optimal Comfort and Wellbeing  Outcome: Ongoing, Progressing     Problem: Adjustment to Illness (Gastrointestinal Bleeding)  Goal: Optimal Coping with Acute Illness  Outcome: Ongoing, Progressing     Problem: Bleeding (Gastrointestinal Bleeding)  Goal: Hemostasis  Outcome: Ongoing, Progressing     Problem: Fall Injury Risk  Goal: Absence of Fall and Fall-Related Injury  Outcome: Ongoing, Progressing

## 2023-11-17 NOTE — ASSESSMENT & PLAN NOTE
Presented with bright red blood per rectum -suspected diverticular bleed  Vital signs stable   Hemoglobin dropped 2 g    CTA abdomen pelvis showed active bleed and ascending colon, significant stool burden, diverticulosis  S/p angiogram and coil embolization by IR on 11/16    Ceftriaxone/Flagyl discontinued 11/17  Stable GI pathway.  Maintain good PIV access   Type and screen   H/HQ 8, transfuse for hemoglobin less than 7 or hemodynamic instability or active bleeding    GI consulted, appreciate recs   -repeat hemoglobin today; suspect bleeding may have stopped at this point

## 2023-11-17 NOTE — NURSING
2100 pt has had approx. 4 liquid dark red Bms since shift change and no reported or noted UOP since in ICU, and pt reporting difficulty when attempting to go, external catheter checked and in place. Notified Dr. Gandhi and Dr. Guzman. Orders placed to transfuse 1u PRBC and place farias.    2300 pt found removing continuous monitoring and climbing out of bed, very large bloody bowel movement on the ground, pt very agitated and confused. Assisted pt back into bed and cleaned up. Dr. Guzman at bedside, orders for bilateral soft wrist restraints and benadryl placed.     0135 pt screaming and able to get hand out of restraint, now pulling at catheter and midline. Attempted to reorient pt and secure loose restraint with 2 nurses, pt began kicking us and trying to bite us while pulling at lines. Pt unable to be re-oriented or settled, agitation and confusion getting worse and pt continuously combative. Dr. Gandhi aware and placing orders.     0600 pt had no more bowel movements during shift, remained restless and agitated, kicking legs out of bed, confused and unable to be redirected. VSS. AM H/H 7.6/22.2.    Pt's daughter/HCPOA called and updated this morning, informed her that pt is still in ICU and of events overnight, she said she would be by later today and requested to be notified if pt does get transferred to a new room, will update oncoming nurse.     Bed alarm on, safety and comfort maintained.

## 2023-11-17 NOTE — PROGRESS NOTES
LaPost signed by daughter Christy Garcia who is HCPOA. Patient wishes to be a DNR. Code status changed in the chart to DNR. Primary RN aware. LaPost to be placed on patients file.     Anita Dunlap MD

## 2023-11-17 NOTE — CARE UPDATE
Nocturnist brief update     Pt H/H drop below 7/21.  Will require blood transfusion. Pt has been having ongoing intermittent blood bowel movement. Ordered for 1 unit of blood.  Called daughter TJ to obtain consent but she did not answer left her vmail that her mother will need some blood and signed emergency consent to proceed. Discussed with bedside Rns. Pt noted to be agitated/restless in the bed. Will continue to monitor.

## 2023-11-17 NOTE — PROGRESS NOTES
[facility] Medicine  Progress Note    Patient Name: Olamide Felipe  MRN: [id number]  Patient Class: IP- Inpatient   Admission Date: 11/16/2023  Length of Stay: 1 days  Attending Physician: Gary Mg,*  Primary Care Provider: Abiola Campbell MD        Subjective:     Principal Problem:Lower GI bleed        HPI:  89-year-old female with PMH of mild dementia, breast cancer, hypertension, hypothyroidism, recurrent falls, hyperlipidemia who presented via EMS from independent living facility for bright red blood per rectum.  Patient had a large bloody bowel movement this morning following which she called her daughter who called EMS.  EMS noted drops of blood from the rectum after the bowel movement.  Patient denies any lightheadedness or shortness of breath or chest pain.    While in the ER, vital signs stable.  Hemoglobin dropped about 2 g.  Type and screen was sent, no blood transfusion given.  CTA abdomen pelvis showed findings concerning for acute bleed within proximal aspect of ascending colon.  Large amount of retained stool with significant diverticulosis seen.  ER provider discussed case with IR attending -underwent angiogram and embolization.    Daughter EJ Harrison at bedside.  Assists with history.    Overview/Hospital Course:  No notes on file    Interval History: additional bloody BM overnight, given 1u PRBC. No further bleeding this am and appears to have responded appropriately. Repeat CBC for this afternoon. Agitated overnight and given ativan - would avoid any future benzos. Called and discussed with daughter over the phone.    Review of Systems  Objective:     Vital Signs (Most Recent):  Temp: 97.6 °F (36.4 °C) (11/17/23 0730)  Pulse: 75 (11/17/23 0800)  Resp: 19 (11/17/23 0800)  BP: 125/60 (11/17/23 0800)  SpO2: 99 % (11/17/23 0800) Vital Signs (24h Range):  Temp:  [97.6 °F (36.4 °C)-99.1 °F (37.3 °C)] 97.6 °F (36.4 °C)  Pulse:  [] 75  Resp:  [12-52]  19  SpO2:  [93 %-100 %] 99 %  BP: (103-169)/(43-82) 125/60     Weight: 51.5 kg (113 lb 8.6 oz)  Body mass index is 19.49 kg/m².    Intake/Output Summary (Last 24 hours) at 11/17/2023 0953  Last data filed at 11/17/2023 0649  Gross per 24 hour   Intake 1353.14 ml   Output 1200 ml   Net 153.14 ml         Physical Exam  Vitals and nursing note reviewed.   Constitutional:       General: She is not in acute distress.     Interventions: She is restrained.      Comments: Older frail female   HENT:      Mouth/Throat:      Mouth: Mucous membranes are dry.   Eyes:      Comments: Conjunctival pallor present   Cardiovascular:      Rate and Rhythm: Normal rate and regular rhythm.      Heart sounds: Normal heart sounds. No murmur heard.  Pulmonary:      Effort: Pulmonary effort is normal. No respiratory distress.      Breath sounds: Normal breath sounds. No wheezing.   Abdominal:      Palpations: Abdomen is soft.      Tenderness: There is abdominal tenderness (Right mid and lower quadrant tenderness).   Musculoskeletal:      Right lower leg: No edema.      Left lower leg: No edema.   Skin:     General: Skin is warm and dry.      Findings: No bruising.   Neurological:      General: No focal deficit present.      Mental Status: She is alert. She is disoriented.   Psychiatric:         Mood and Affect: Mood normal.             Significant Labs: All pertinent labs within the past 24 hours have been reviewed.    Significant Imaging: I have reviewed all pertinent imaging results/findings within the past 24 hours.    Assessment/Plan:      * Lower GI bleed  Presented with bright red blood per rectum -suspected diverticular bleed  Vital signs stable   Hemoglobin dropped 2 g    CTA abdomen pelvis showed active bleed and ascending colon, significant stool burden, diverticulosis  S/p angiogram and coil embolization by IR on 11/16    Ceftriaxone/Flagyl discontinued 11/17  Stable GI pathway.  Maintain good PIV access   Type and screen   H/HQ 8,  transfuse for hemoglobin less than 7 or hemodynamic instability or active bleeding    GI consulted, appreciate recs   -repeat hemoglobin today; suspect bleeding may have stopped at this point    Delirium  Superimposed on dementia   -likely exacerbated by administration of benzodiazepine overnight  -redirect    Goals of care, counseling/discussion  Advance Care Planning    Date: 11/17/2023    Code Status  In light of the patients advanced and life limiting illness,I engaged the the family in a voluntary conversation about the patient's preferences for care  at the very end of life. The patient wishes to have a natural, peaceful death.  Along those lines, the patient does not wish to have CPR or other invasive treatments performed when her heart and/or breathing stops. I communicated to the family that a DNR order would be placed in her medical record to reflect this preference.  I spent a total of 7 minutes engaging the patient in this advance care planning discussion.            Mild late onset Alzheimer's dementia without behavioral disturbance, psychotic disturbance, mood disturbance, or anxiety  Patient with dementia with likely etiology of alzheimer's dementia. Dementia is mild. The patient does not have signs of behavioral disturbance. Home dementia medications andare Held or Continued: continued.. Continue non-pharmacologic interventions to prevent delirium (No VS between 11PM-5AM, activity during day, opening blinds, providing glasses/hearing aids, and up in chair during daytime). Will avoid narcotics and benzos unless absolutely necessary. PRN anti-psychotics are not prescribed to avoid self harm behaviors.  -remove restraints today; will avoid benzos    History of breast cancer  -noted      Mood disorder  Continue SSRI      Hypothyroid  Continue Synthroid      Aortic atherosclerosis  Hold aspirin in setting of GI bleed   Continue statin        VTE Risk Mitigation (From admission, onward)           Ordered      IP VTE HIGH RISK PATIENT  Once         11/16/23 1256     Place sequential compression device  Until discontinued         11/16/23 1256                    Discharge Planning   TAMMIE:      Code Status: DNR   Is the patient medically ready for discharge?:     Reason for patient still in hospital (select all that apply): Patient trending condition and Treatment  Discharge Plan A: Home with family            Critical care time spent on the evaluation and treatment of severe organ dysfunction, review of pertinent labs and imaging studies, discussions with consulting providers and discussions with patient/family: 32 minutes.      Gary Mg MD  Department of Hospital Medicine   [facility] - Intensive Care

## 2023-11-17 NOTE — PROGRESS NOTES
"Pulm/CC Fellow Consult Note    Attending Physician: Gary Mg,*    Date of Admit: 2023  Hospital day: 1    Reason for Consult: Lower GI bleed    History of Present Illness:  Olamide Felipe is a 89 y.o.  female with a PMHx of breast cancer s/p lumpectomy, HTN, hypothyroidism, HLD who presented from senior living facility after having a large volume episode of BRBPR.  Her story differs from accounts told previously.      She denies any abdominal pain at the time.  The bleeding occurred last night as she was going to bed.  The patient reports being alarmed by the liquid bright red diarrhea and went outside her apartment for help and saw a young woman who she asked for help and then called the ambulance.  She denies any CP or dizziness, no n/v or SOB.  She did have L sided axillary pain which has since resolved.    The patient was sent to the ED and CTA abd pelvis showed concerns for acute bleed within the ascending colon and diverticulosis.  IR consulted for angiogram and embolization and is now s/p coil embolization.  Patient admitted to the ICU for monitoring post GIB.    Interval history:  Patient was agitated overngiht.  She was given ativan and benedryl as well as required restraints due to agitation.  Additionally she had required 1u blood after bloody BM.  Hemodynamically stable today.  Patient somnolent this AM.     Objective:   Last 24 Hour Vital Signs:  BP  Min: 103/55  Max: 169/67  Temp  Av.5 °F (36.9 °C)  Min: 97.6 °F (36.4 °C)  Max: 99.1 °F (37.3 °C)  Pulse  Av.1  Min: 72  Max: 127  Resp  Av  Min: 12  Max: 52  SpO2  Av.2 %  Min: 93 %  Max: 100 %  Height  Av' 4" (162.6 cm)  Min: 5' 4" (162.6 cm)  Max: 5' 4" (162.6 cm)  Weight  Av.5 kg (113 lb 8.6 oz)  Min: 51.5 kg (113 lb 8.6 oz)  Max: 51.5 kg (113 lb 8.6 oz)  Body mass index is 19.49 kg/m².  I & O (Last 24H):  Intake/Output Summary (Last 24 hours) at 2023 1310  Last data filed at 2023 " 0649  Gross per 24 hour   Intake 853.14 ml   Output 1200 ml   Net -346.86 ml         Physical Exam:  GEN: non-toxic appearing  HEENT: MMM, no scleral icterus, EOMI  NECK: Supple, midline trachea, no raised JVP or a-waves notable  CV: RRR, no MRG, pulses equal and symmetric 2+ at radial  PULM: CTAB  ABDOMEN: Soft, non-tender, non-distended, no rebound or guarding, purewick in place  SKIN: Warm, dry, intact, no rashes  MSK: No deformity, no clubbing, cyanosis, or lower extremity edema  NEURO: awake and following commands, at neurologic baseline  LINES: Intact, no extravasation or induration, no erythema to PIV or support devices       Assessment & Plan:       NEURO:  Alzheimer's disease  - Diagnosed 10/2023, previously though to have mild cognitive impairment.  - 18/30 on MOCA 2/2023  - Delirium precautions  - SSRI for mood disorder  - Avoid benzodiazepines     CV:  Hypertension  - On losartan at home  - Holding for hypotension    HLD  - On simvastatin outpatient, atorvastatin inpatient     PULM:  - Satting 99 on RA       GI/FEN  Lower GI bleed s/p coil embolization  - BRBPR with initial Hb 10, baseline 12  - CTA with concern for diverticular bleed  - Underwent IR embolization 11/16  - Post procedure H/H 7.8  - Transfuse for Hb<7  - Liquid diet  - Hemodynamically stable  - received 1 unit overnight  - Hb now 8.8     Fluids: Net even / net negative strategy  Electrolytes: replete PRN  Nutrition: FLD     RENAL:   CAM  - Creatinene 1.3, baseline 0.9 7/2023  - In setting of GIB, likely prerenal  -  Resolving     HEME/ONC:  Breast cancer s/p lumpectomy 2021  - Triple negative, undergoing surveillance  - MMG due, last Birads2  -- Transfusion threshold <7g/dl per TRICC     ENDOCRINE:  Hyperparathyroidism  Osteopenia  - Vitamin D supplemenation    Hypothyroidism  - Synthroid 25    -- Hypoglycemic precautions     INFECTIOUS DISEASE:  Diverticular bleed  - Receiving CTX and metronidazole for diverticulitis due to abd pain  -  Can likely deescalate as patient not complaining of abdominal pain, is afebrile    MSK/RHEUM:  -- PT/OT for early mobility      PSYCHOSOCIAL:  --avoid benzodiazepines    Feeding: FLD  Analgesia: NA  Sedation: NA  DVT prophylaxis: held in GIB  Anticoagulants       None           Head of Bed: 30 degrees to prevent VAP  Ulcer PPX: NA  Glucose: Hypoglycemic precautions  SBT/SAT: NA  Bowels: PEG  Indwelling Lines: PIV  Deescalation Abx: ctx, flagyl    Dispo: can step down to floor    Code: Code Status Discussion Note   Richar Goetz MD  [facility]  [facility]    Pt seen and examined with Pulmonary/Critical Care team and this note was reviewed and validated with the following additional comments: rebled early this AM.  Went back to IR. Transfused.  Now hemodynamically stable but very somnolent.  Arouses to sternal rub. We need to re-establish goals of care.    Critical Care time was spent validating the history and physical exam, reviewing the lab and imaging results, and discussing the care of the patient with the bedside nurse and the patient and/or surrogates. This critical care time did not overlap with that of any other provider or involve time for any procedures.  This patient has a high probability of sudden clinically significant deterioration which requires the highest level of physician preparedness to intervene urgently. I managed/supervised life or organ supporting interventions that required frequent physician assessments. I devoted my full attention in the ICU to the direct care of this patient for this period of time. Organ systems which are failing and require intensive, critical care support are: GI, cardiovascular, hematologic  Critical Care time: 30 minutes    Curt Espinoza MD  Phone [phone]

## 2023-11-18 PROBLEM — R53.1 WEAKNESS ACQUIRED IN ICU: Status: ACTIVE | Noted: 2023-11-18

## 2023-11-18 PROCEDURE — 25000003 PHARM REV CODE 250: Mod: HCNC | Performed by: STUDENT IN AN ORGANIZED HEALTH CARE EDUCATION/TRAINING PROGRAM

## 2023-11-18 PROCEDURE — A4216 STERILE WATER/SALINE, 10 ML: HCPCS | Mod: HCNC

## 2023-11-18 PROCEDURE — 11000001 HC ACUTE MED/SURG PRIVATE ROOM: Mod: HCNC

## 2023-11-18 PROCEDURE — 25000003 PHARM REV CODE 250: Mod: HCNC

## 2023-11-18 PROCEDURE — 25000003 PHARM REV CODE 250: Mod: HCNC | Performed by: HOSPITALIST

## 2023-11-18 RX ORDER — PANTOPRAZOLE SODIUM 40 MG/1
40 TABLET, DELAYED RELEASE ORAL DAILY
Status: DISCONTINUED | OUTPATIENT
Start: 2023-11-18 | End: 2023-11-20 | Stop reason: HOSPADM

## 2023-11-18 RX ADMIN — LEVOTHYROXINE SODIUM 25 MCG: 25 TABLET ORAL at 06:11

## 2023-11-18 RX ADMIN — SODIUM CHLORIDE, PRESERVATIVE FREE 10 ML: 5 INJECTION INTRAVENOUS at 02:11

## 2023-11-18 RX ADMIN — SODIUM CHLORIDE, PRESERVATIVE FREE 10 ML: 5 INJECTION INTRAVENOUS at 06:11

## 2023-11-18 RX ADMIN — MEMANTINE HYDROCHLORIDE 5 MG: 5 TABLET ORAL at 09:11

## 2023-11-18 RX ADMIN — SODIUM CHLORIDE, PRESERVATIVE FREE 10 ML: 5 INJECTION INTRAVENOUS at 07:11

## 2023-11-18 RX ADMIN — FLUOXETINE 20 MG: 20 CAPSULE ORAL at 09:11

## 2023-11-18 RX ADMIN — MUPIROCIN: 20 OINTMENT TOPICAL at 09:11

## 2023-11-18 RX ADMIN — PANTOPRAZOLE SODIUM 40 MG: 40 TABLET, DELAYED RELEASE ORAL at 10:11

## 2023-11-18 RX ADMIN — ATORVASTATIN CALCIUM 10 MG: 10 TABLET, FILM COATED ORAL at 09:11

## 2023-11-18 RX ADMIN — SODIUM CHLORIDE, PRESERVATIVE FREE 10 ML: 5 INJECTION INTRAVENOUS at 12:11

## 2023-11-18 NOTE — ASSESSMENT & PLAN NOTE
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 Dementia is mild. The patient does not have signs of behavioral disturbance. Home dementia medications andare Held or Continued: continued.. Continue non-pharmacologic interventions to prevent delirium (No VS between 11PM-5AM, activity during day, opening blinds, providing glasses/hearing aids, and up in chair during daytime). Will avoid narcotics and benzos unless absolutely necessary. PRN anti-psychotics are not prescribed to avoid self harm behaviors.  -avoid benzos, out of restraints since early am 11/17

## 2023-11-18 NOTE — ASSESSMENT & PLAN NOTE
Presented with bright red blood per rectum -suspected diverticular bleed  Vital signs stable   Hemoglobin dropped 2 g    CTA abdomen pelvis showed active bleed and ascending colon, significant stool burden, diverticulosis  S/p angiogram and coil embolization by IR on 11/16    Ceftriaxone/Flagyl discontinued 11/17  Stable GI pathway.  Maintain good PIV access   Type and screen   CBC daily, transfuse for hemoglobin less than 7 or hemodynamic instability or active bleeding    GI consulted, appreciate recs   -repeat hemoglobin with significant improvement and no further bleeding noted overnight

## 2023-11-18 NOTE — NURSING
Report given to Nurse Radha, on 4th floor, all questions answered. Recommendation for Garcia cath to remain in place thru the night 2/2 to patient could possible get OOB and fall. She agreed to the suggestion.

## 2023-11-18 NOTE — NURSING TRANSFER
Nursing Transfer Note      11/17/23   12:05 AM    Nurse giving handoff: Madiha  Nurse receiving handoff:Radha    Reason patient is being transferred: Patient car was down graded    Transfer To: room 466/ From:     Transfer via wheelchair    Transfer with cardiac monitoring    Transported by ICU Nurse    Transfer Vital Signs:  Blood Pressure: 128/63 (MAP) 90  Temperature 99.0  (ORALLY)  Respirations: 22  O2: 100%  Telemetry: BOX PLACED  Order for Tele Monitor? Yes    Additional Lines: Garcia Catheter    4eyes on Skin: yes    Medicines sent:  YES  Any special needs or follow-up needed: Maintain safety    Patient belongings transferred with patient: No, patient did not have any belongings. She was transferred with both top/bottom dentures in her mouth.    Chart send with patient: Yes    Notified: Will notify daughter in am    Patient reassessed at:  (date,11/17/23 @ 7625)    Upon arrival to floor: patient oriented to room, receiving nurse at bedside.

## 2023-11-18 NOTE — SUBJECTIVE & OBJECTIVE
Interval History:  No further bleeding.  Hemoglobin from overnight is up trending.  Patient not quite back at mental baseline but does appear to be improving daily.  Somewhat forgetful during discussions.  Had extended conversation with the patient and her daughter at bedside today; she is still experiencing some weakness and daughter is concerned about her ability to return home since she lives alone.  Will have PT/OT assess; may require skilled nursing placement at discharge.    Review of Systems  Objective:     Vital Signs (Most Recent):  Temp: 98.4 °F (36.9 °C) (11/18/23 1150)  Pulse: 90 (11/18/23 1150)  Resp: 19 (11/18/23 1150)  BP: (!) 149/62 (11/18/23 1150)  SpO2: 97 % (11/18/23 1150) Vital Signs (24h Range):  Temp:  [97.5 °F (36.4 °C)-99 °F (37.2 °C)] 98.4 °F (36.9 °C)  Pulse:  [] 90  Resp:  [18-43] 19  SpO2:  [97 %-100 %] 97 %  BP: (123-174)/(59-79) 149/62     Weight: 51.5 kg (113 lb 8.6 oz)  Body mass index is 19.49 kg/m².    Intake/Output Summary (Last 24 hours) at 11/18/2023 1408  Last data filed at 11/18/2023 1016  Gross per 24 hour   Intake --   Output 2400 ml   Net -2400 ml           Physical Exam  Vitals and nursing note reviewed.   Constitutional:       General: She is not in acute distress.     Interventions: She is restrained.      Comments: Older frail female   HENT:      Mouth/Throat:      Mouth: Mucous membranes are dry.   Eyes:      Comments: Conjunctival pallor present   Cardiovascular:      Rate and Rhythm: Normal rate and regular rhythm.      Heart sounds: Normal heart sounds. No murmur heard.  Pulmonary:      Effort: Pulmonary effort is normal. No respiratory distress.      Breath sounds: Normal breath sounds. No wheezing.   Abdominal:      Palpations: Abdomen is soft.      Tenderness: There is abdominal tenderness (Right mid and lower quadrant tenderness).   Musculoskeletal:      Right lower leg: No edema.      Left lower leg: No edema.   Skin:     General: Skin is warm and dry.       Findings: No bruising.   Neurological:      General: No focal deficit present.      Mental Status: She is alert. She is disoriented.   Psychiatric:         Mood and Affect: Mood normal.             Significant Labs: All pertinent labs within the past 24 hours have been reviewed.    Significant Imaging: I have reviewed all pertinent imaging results/findings within the past 24 hours.

## 2023-11-18 NOTE — ASSESSMENT & PLAN NOTE
Advance Care Planning     Date: 11/17/2023    Code Status  In light of the patients advanced and life limiting illness,I engaged the the family in a voluntary conversation about the patient's preferences for care  at the very end of life. The patient wishes to have a natural, peaceful death.  Along those lines, the patient does not wish to have CPR or other invasive treatments performed when her heart and/or breathing stops. I communicated to the family that a DNR order would be placed in her medical record to reflect this preference.  I spent a total of 7 minutes engaging the patient in this advance care planning discussion.

## 2023-11-18 NOTE — ASSESSMENT & PLAN NOTE
Superimposed on dementia   -likely exacerbated by administration of benzodiazepine overnight  -redirect  -delirium precautions

## 2023-11-18 NOTE — ASSESSMENT & PLAN NOTE
-PT/OT consultation   -may need equipment at time of discharge vs consideration of skilled nursing placement

## 2023-11-18 NOTE — PROGRESS NOTES
[facility] Medicine  Progress Note    Patient Name: Olamide Felipe  MRN: [id number]  Patient Class: IP- Inpatient   Admission Date: 11/16/2023  Length of Stay: 2 days  Attending Physician: Gary Mg,*  Primary Care Provider: Abiola Campbell MD        Subjective:     Principal Problem:Lower GI bleed        HPI:  89-year-old female with PMH of mild dementia, breast cancer, hypertension, hypothyroidism, recurrent falls, hyperlipidemia who presented via EMS from independent living facility for bright red blood per rectum.  Patient had a large bloody bowel movement this morning following which she called her daughter who called EMS.  EMS noted drops of blood from the rectum after the bowel movement.  Patient denies any lightheadedness or shortness of breath or chest pain.    While in the ER, vital signs stable.  Hemoglobin dropped about 2 g.  Type and screen was sent, no blood transfusion given.  CTA abdomen pelvis showed findings concerning for acute bleed within proximal aspect of ascending colon.  Large amount of retained stool with significant diverticulosis seen.  ER provider discussed case with IR attending -underwent angiogram and embolization.    Daughter EJ Harrison at bedside.  Assists with history.    Overview/Hospital Course:  No notes on file    Interval History:  No further bleeding.  Hemoglobin from overnight is up trending.  Patient not quite back at mental baseline but does appear to be improving daily.  Somewhat forgetful during discussions.  Had extended conversation with the patient and her daughter at bedside today; she is still experiencing some weakness and daughter is concerned about her ability to return home since she lives alone.  Will have PT/OT assess; may require skilled nursing placement at discharge.    Review of Systems  Objective:     Vital Signs (Most Recent):  Temp: 98.4 °F (36.9 °C) (11/18/23 1150)  Pulse: 90 (11/18/23 1150)  Resp: 19 (11/18/23  1150)  BP: (!) 149/62 (11/18/23 1150)  SpO2: 97 % (11/18/23 1150) Vital Signs (24h Range):  Temp:  [97.5 °F (36.4 °C)-99 °F (37.2 °C)] 98.4 °F (36.9 °C)  Pulse:  [] 90  Resp:  [18-43] 19  SpO2:  [97 %-100 %] 97 %  BP: (123-174)/(59-79) 149/62     Weight: 51.5 kg (113 lb 8.6 oz)  Body mass index is 19.49 kg/m².    Intake/Output Summary (Last 24 hours) at 11/18/2023 1408  Last data filed at 11/18/2023 1016  Gross per 24 hour   Intake --   Output 2400 ml   Net -2400 ml           Physical Exam  Vitals and nursing note reviewed.   Constitutional:       General: She is not in acute distress.     Interventions: She is restrained.      Comments: Older frail female   HENT:      Mouth/Throat:      Mouth: Mucous membranes are dry.   Eyes:      Comments: Conjunctival pallor present   Cardiovascular:      Rate and Rhythm: Normal rate and regular rhythm.      Heart sounds: Normal heart sounds. No murmur heard.  Pulmonary:      Effort: Pulmonary effort is normal. No respiratory distress.      Breath sounds: Normal breath sounds. No wheezing.   Abdominal:      Palpations: Abdomen is soft.      Tenderness: There is abdominal tenderness (Right mid and lower quadrant tenderness).   Musculoskeletal:      Right lower leg: No edema.      Left lower leg: No edema.   Skin:     General: Skin is warm and dry.      Findings: No bruising.   Neurological:      General: No focal deficit present.      Mental Status: She is alert. She is disoriented.   Psychiatric:         Mood and Affect: Mood normal.             Significant Labs: All pertinent labs within the past 24 hours have been reviewed.    Significant Imaging: I have reviewed all pertinent imaging results/findings within the past 24 hours.    Assessment/Plan:      * Lower GI bleed  Presented with bright red blood per rectum -suspected diverticular bleed  Vital signs stable   Hemoglobin dropped 2 g    CTA abdomen pelvis showed active bleed and ascending colon, significant stool  burden, diverticulosis  S/p angiogram and coil embolization by IR on 11/16    Ceftriaxone/Flagyl discontinued 11/17  Stable GI pathway.  Maintain good PIV access   Type and screen   CBC daily, transfuse for hemoglobin less than 7 or hemodynamic instability or active bleeding    GI consulted, appreciate recs   -repeat hemoglobin with significant improvement and no further bleeding noted overnight    Weakness acquired in ICU  -PT/OT consultation   -may need equipment at time of discharge vs consideration of skilled nursing placement      Delirium  Superimposed on dementia   -likely exacerbated by administration of benzodiazepine overnight  -redirect  -delirium precautions    Goals of care, counseling/discussion  Advance Care Planning    Date: 11/17/2023    Code Status  In light of the patients advanced and life limiting illness,I engaged the the family in a voluntary conversation about the patient's preferences for care  at the very end of life. The patient wishes to have a natural, peaceful death.  Along those lines, the patient does not wish to have CPR or other invasive treatments performed when her heart and/or breathing stops. I communicated to the family that a DNR order would be placed in her medical record to reflect this preference.  I spent a total of 7 minutes engaging the patient in this advance care planning discussion.           Mild late onset Alzheimer's dementia without behavioral disturbance, psychotic disturbance, mood disturbance, or anxiety  Patient with dementia with likely etiology of alzheimer's dementia. Dementia is mild. The patient does not have signs of behavioral disturbance. Home dementia medications andare Held or Continued: continued.. Continue non-pharmacologic interventions to prevent delirium (No VS between 11PM-5AM, activity during day, opening blinds, providing glasses/hearing aids, and up in chair during daytime). Will avoid narcotics and benzos unless absolutely necessary. PRN  anti-psychotics are not prescribed to avoid self harm behaviors.  -avoid benzos, out of restraints since early am 11/17    History of breast cancer  -noted      Mood disorder  Continue SSRI      Hypothyroid  Continue Synthroid      Aortic atherosclerosis  Hold aspirin in setting of GI bleed   Continue statin        VTE Risk Mitigation (From admission, onward)           Ordered     IP VTE HIGH RISK PATIENT  Once         11/16/23 1256     Place sequential compression device  Until discontinued         11/16/23 1256                    Discharge Planning   TAMMIE: 11/18/2023     Code Status: DNR   Is the patient medically ready for discharge?:     Reason for patient still in hospital (select all that apply): PT / OT recommendations and Pending disposition  Discharge Plan A: Home with family   Discharge Delays: None known at this time              Gary Mg MD  Department of Hospital Medicine   [place] - Telemetry

## 2023-11-18 NOTE — PLAN OF CARE
Psychiatric Follow Up Progress Note    Patient: Ofelia Bailey Date: 2022   : 1964         AGE: 57 year old MR#: [id number]     This visit was performed via live interactive two-way video with patient's verbal consent.   Clinician Location: Clinic.  Patient Location: Home.     Chief Complaint:  \"Doing well.\"    History of Present Illness:  Patient reports doing well, no significant psychiatric signs or symptoms.      Patient tries to stay Klonopin 1 mg q.h.s. though will return to 2 mg if 1 is not enough.      Patient continues with 2-3 drinks in the evenings on a daily basis.  She reports full and clearly appropriate knowledge regarding risk of mixing benzodiazepine and alcohol.    Recent PHQ 2/9 Score    PHQ 2:  Date Adult PHQ 2 Score   2019 3       PHQ 9:  Date Adult PHQ 9 Score Adult PHQ 9 Interpretation   2019 17 Moderately Severe Depression       Most Recent SREEDHAR 7 Score           Vital Signs:   Visit Vitals  LMP 2017 (Approximate)       Medications:  Current Outpatient Medications   Medication Sig   • clonazePAM (KlonoPIN) 1 MG tablet Take 1.5 tablets by mouth at bedtime. Indications: Feeling Anxious   • Wellbutrin  MG 24 hr tablet Take 1 tablet by mouth daily.   • venlafaxine XR (EFFEXOR XR) 75 MG 24 hr capsule Take 1 capsule by mouth daily. Indications: Generalized Anxiety Disorder   • QUEtiapine (SEROquel) 50 MG tablet Take 1 tablet by mouth at bedtime. Indications: Generalized Anxiety Disorder   • metoPROLOL succinate (TOPROL-XL) 25 MG 24 hr tablet TAKE 1 TABLET BY MOUTH DAILY   • montelukast (SINGULAIR) 10 MG tablet TAKE 1 TABLET BY MOUTH DAILY   • atorvastatin (LIPITOR) 40 MG tablet TAKE 1 TABLET BY MOUTH DAILY   • clobetasol (TEMOVATE) 0.05 % ointment Apply to arms and legs twice daily until improved.  Avoid face and groin area   • clindamycin (CLEOCIN T) 1 % topical solution Apply topically 2 times daily. As needed.   • fluticasone (Flonase) 50 MCG/ACT nasal spray  SW met with pt via [employer] to discuss discharge planning, pt gave SW permission to call pt daughter Christy. SW spoke with Christy to discuss discharge planning. Pt will dc with no HH or DME needs noted. SW requested pt f/u appts. Christy confirmed she will assist pt with transportation home at discharge.    SW requested GI f/u appt.    Future Appointments   Date Time Provider Department Center   11/27/2023  2:15 PM Jamshid Trujillo MD [facility]   12/1/2023 10:00 AM Gene Barnes MD [facility]   1/11/2024  9:30 AM [facility] [place]   1/18/2024  3:00 PM Abiola Campbell MD [facility]        11/18/23 1006   Final Note   Assessment Type Final Discharge Note   Anticipated Discharge Disposition Home   What phone number can be called within the next 1-3 days to see how you are doing after discharge? [phone]   Post-Acute Status   Discharge Delays None known at this time        Spray 1 spray in each nostril 2 times daily.   • azelastine (ASTELIN) 0.1 % nasal spray 1 spray each nostril in morning, 2 sprays each nostril in evenings   • famotidine (PEPCID) 20 MG tablet Take 1 tablet by mouth 2 times daily.   • lisinopril (ZESTRIL) 10 MG tablet Take 1 tablet by mouth daily.   • anastrozole (ARIMIDEX) 1 MG tablet Take 1 tablet by mouth daily.   • cetirizine (ZyrTEC) 10 MG tablet Take 1 tablet by mouth daily.   • mupirocin (BACTROBAN) 2 % ointment APPLY TO ARMS AND LEGS TWICE DAILY AFTER APPLYING BETAMETHASONE OINTMENT   • vitamin D, Cholecalciferol, 25 mcg (1,000 units) capsule Take 1,000 Units by mouth daily.   • hydroCORTisone (CORTIZONE) 2.5 % cream Apply to affected areas on the eyelid once daily for 7 days.   • fluticasone (FLONASE) 50 MCG/ACT nasal spray SHAKE LIQUID AND USE 1 SPRAY IN EACH NOSTRIL DAILY   • triamcinolone (ARISTOCORT) 0.1 % ointment Apply topically 2 times daily.   • fluticasone (CUTIVATE) 0.05 % cream Apply topically 2 times daily.   • clotrimazole-betamethasone (LOTRISONE) 1-0.05 % cream Apply 1 application topically 2 times daily.   • polyethylene glycol (MIRALAX) powder Take 17 g by mouth daily. Use two capfuls in 16 ounces of water every day (Patient taking differently: Take 17 g by mouth daily. Indications: uses prn Use two capfuls in 16 ounces of water every day)   • acetaminophen (TYLENOL) 325 MG tablet Take 650 mg by mouth every 4 hours as needed for Pain.   • diphenhydrAMINE (BENADRYL) 25 MG capsule Take 25 mg by mouth every 6 hours as needed.     No current facility-administered medications for this visit.       Allergies:  ALLERGIES:   Allergen Reactions   • Adhesive   (Environmental) RASH and Other (See Comments)     Possibly tegaderm covering dressing after bx   • Dander    • Grass    • Latex   (Environmental) RASH   • Mold   (Environmental)    • Penicillins Other (See Comments)     unknown, mom is allergic to penicillin   • Ragweed    • Neosporin  [Neomycin-Bacitracin-Polymyxin] RASH       Laboratory Tests or other studies:  labs reviewed    MENTAL STATUS EXAM:  Appearance:  Well-groomed, good eye contact, appears stated age.   Behavior:  Calmed, pleasant, interactive.   Cooperativity:  Cooperative, forthcoming, appears reliable.    Speech:  Normal rate, tone and volume.   Language:  No abnormality noted.    Mood:  Noted in History of Present Illness.  Affect:  Mood-congruent, stable, normal range.  Thought Process:  Linear, logical, goal-directed.    Thought Content:  No overt delusions or abnormality noted.    Perception:  No hallucinations, not responding to internal stimuli.   Consciousness:  Awake and alert.   Orientation:  Oriented to person, place and time.   Musculoskeletal: No abnormal or involuntary muscle movements observed.  Memory:  Good, able to demonstrate accurate historical recall for recent and more remote events and discussions.  Attention:  Good, no fluctuation or obvious deficit noted and able to follow the conversation.   Fund of Knowledge:  Consistent with education and experiences as evidenced by vocabulary.   Insight:  Good, based on appropriate recognition of impact of psychiatric symptoms and need for treatment.   Judgment:  Good, based on recent decisions.  Safety:  Noted in History of Present Illness.  Motivation to pursue treatment:  Good.     Diagnosis:   Bipolar affective disorder, currently depressed, mild (CMS/HCC)  (primary encounter diagnosis)  Generalized anxiety disorder    Assessment and Plan:   57-year-old white female overall improved and stable.  Nightly alcohol use continues.  1. Diagnosis, treatment options, common medication side effects, benefits and risks, etc. all reviewed with patient today.  2. We agreed to continue current medications save for reducing Klonopin from 2 mg q.h.s. to 1.5 mg q.h.s..  Patient aware we will reduce it to 1 mg next visit.  Patient also well aware of risk of mixing benzodiazepine and  alcohol.  3. Seroquel and lipid issue discussed.  Patient states her cholesterol was she started the hormone blocker for her history of breast cancer.  Patient aware if there is a concerns Seroquel contributing to high cholesterol we can always discontinue the patient feels it is an important medication to her care.  4. We reviewed Wellbutrin and the risk in patients with a history of anorexia.  Last time patient induced vomiting was more than 15 years ago and patient feels it is a very important medicine to her current care and is 1 of the only medicines that has ever helped her depression.  We agreed to continue, patient aware of small risk of increased seizure.    Follow up: 6 months      Bhargav Rangel MD

## 2023-11-19 LAB
BASOPHILS # BLD AUTO: 0.03 K/UL (ref 0–0.2)
BASOPHILS # BLD AUTO: 0.03 K/UL (ref 0–0.2)
BASOPHILS NFR BLD: 0.4 % (ref 0–1.9)
BASOPHILS NFR BLD: 0.4 % (ref 0–1.9)
DIFFERENTIAL METHOD: ABNORMAL
DIFFERENTIAL METHOD: ABNORMAL
EOSINOPHIL # BLD AUTO: 0.3 K/UL (ref 0–0.5)
EOSINOPHIL # BLD AUTO: 0.6 K/UL (ref 0–0.5)
EOSINOPHIL NFR BLD: 4.7 % (ref 0–8)
EOSINOPHIL NFR BLD: 8.5 % (ref 0–8)
ERYTHROCYTE [DISTWIDTH] IN BLOOD BY AUTOMATED COUNT: 13 % (ref 11.5–14.5)
ERYTHROCYTE [DISTWIDTH] IN BLOOD BY AUTOMATED COUNT: 13.1 % (ref 11.5–14.5)
HCT VFR BLD AUTO: 22 % (ref 37–48.5)
HCT VFR BLD AUTO: 25.9 % (ref 37–48.5)
HGB BLD-MCNC: 7.4 G/DL (ref 12–16)
HGB BLD-MCNC: 8.5 G/DL (ref 12–16)
IMM GRANULOCYTES # BLD AUTO: 0.02 K/UL (ref 0–0.04)
IMM GRANULOCYTES # BLD AUTO: 0.02 K/UL (ref 0–0.04)
IMM GRANULOCYTES NFR BLD AUTO: 0.3 % (ref 0–0.5)
IMM GRANULOCYTES NFR BLD AUTO: 0.3 % (ref 0–0.5)
LYMPHOCYTES # BLD AUTO: 1.9 K/UL (ref 1–4.8)
LYMPHOCYTES # BLD AUTO: 2.1 K/UL (ref 1–4.8)
LYMPHOCYTES NFR BLD: 26.8 % (ref 18–48)
LYMPHOCYTES NFR BLD: 31.9 % (ref 18–48)
MCH RBC QN AUTO: 31 PG (ref 27–31)
MCH RBC QN AUTO: 31.4 PG (ref 27–31)
MCHC RBC AUTO-ENTMCNC: 32.8 G/DL (ref 32–36)
MCHC RBC AUTO-ENTMCNC: 33.6 G/DL (ref 32–36)
MCV RBC AUTO: 93 FL (ref 82–98)
MCV RBC AUTO: 95 FL (ref 82–98)
MONOCYTES # BLD AUTO: 0.7 K/UL (ref 0.3–1)
MONOCYTES # BLD AUTO: 0.8 K/UL (ref 0.3–1)
MONOCYTES NFR BLD: 10.4 % (ref 4–15)
MONOCYTES NFR BLD: 11.9 % (ref 4–15)
NEUTROPHILS # BLD AUTO: 3.2 K/UL (ref 1.8–7.7)
NEUTROPHILS # BLD AUTO: 3.9 K/UL (ref 1.8–7.7)
NEUTROPHILS NFR BLD: 48.5 % (ref 38–73)
NEUTROPHILS NFR BLD: 55.9 % (ref 38–73)
NRBC BLD-RTO: 0 /100 WBC
NRBC BLD-RTO: 0 /100 WBC
PLATELET # BLD AUTO: 228 K/UL (ref 150–450)
PLATELET # BLD AUTO: 230 K/UL (ref 150–450)
PMV BLD AUTO: 10.4 FL (ref 9.2–12.9)
PMV BLD AUTO: 9.8 FL (ref 9.2–12.9)
RBC # BLD AUTO: 2.36 M/UL (ref 4–5.4)
RBC # BLD AUTO: 2.74 M/UL (ref 4–5.4)
WBC # BLD AUTO: 6.7 K/UL (ref 3.9–12.7)
WBC # BLD AUTO: 6.98 K/UL (ref 3.9–12.7)

## 2023-11-19 PROCEDURE — 97530 THERAPEUTIC ACTIVITIES: CPT | Mod: HCNC

## 2023-11-19 PROCEDURE — 25000003 PHARM REV CODE 250: Mod: HCNC

## 2023-11-19 PROCEDURE — 97116 GAIT TRAINING THERAPY: CPT | Mod: HCNC

## 2023-11-19 PROCEDURE — 97165 OT EVAL LOW COMPLEX 30 MIN: CPT | Mod: HCNC

## 2023-11-19 PROCEDURE — 25000003 PHARM REV CODE 250: Mod: HCNC | Performed by: HOSPITALIST

## 2023-11-19 PROCEDURE — 85025 COMPLETE CBC W/AUTO DIFF WBC: CPT | Mod: HCNC | Performed by: HOSPITALIST

## 2023-11-19 PROCEDURE — 11000001 HC ACUTE MED/SURG PRIVATE ROOM: Mod: HCNC

## 2023-11-19 PROCEDURE — 25000003 PHARM REV CODE 250: Mod: HCNC | Performed by: STUDENT IN AN ORGANIZED HEALTH CARE EDUCATION/TRAINING PROGRAM

## 2023-11-19 PROCEDURE — A4216 STERILE WATER/SALINE, 10 ML: HCPCS | Mod: HCNC

## 2023-11-19 PROCEDURE — 97161 PT EVAL LOW COMPLEX 20 MIN: CPT | Mod: HCNC

## 2023-11-19 PROCEDURE — 97535 SELF CARE MNGMENT TRAINING: CPT | Mod: HCNC

## 2023-11-19 PROCEDURE — 36415 COLL VENOUS BLD VENIPUNCTURE: CPT | Mod: HCNC | Performed by: HOSPITALIST

## 2023-11-19 RX ADMIN — FLUOXETINE 20 MG: 20 CAPSULE ORAL at 09:11

## 2023-11-19 RX ADMIN — LEVOTHYROXINE SODIUM 25 MCG: 25 TABLET ORAL at 06:11

## 2023-11-19 RX ADMIN — SODIUM CHLORIDE, PRESERVATIVE FREE 10 ML: 5 INJECTION INTRAVENOUS at 12:11

## 2023-11-19 RX ADMIN — ATORVASTATIN CALCIUM 10 MG: 10 TABLET, FILM COATED ORAL at 08:11

## 2023-11-19 RX ADMIN — SODIUM CHLORIDE, PRESERVATIVE FREE 10 ML: 5 INJECTION INTRAVENOUS at 07:11

## 2023-11-19 RX ADMIN — MUPIROCIN: 20 OINTMENT TOPICAL at 09:11

## 2023-11-19 RX ADMIN — PANTOPRAZOLE SODIUM 40 MG: 40 TABLET, DELAYED RELEASE ORAL at 09:11

## 2023-11-19 RX ADMIN — MEMANTINE HYDROCHLORIDE 5 MG: 5 TABLET ORAL at 08:11

## 2023-11-19 RX ADMIN — MEMANTINE HYDROCHLORIDE 5 MG: 5 TABLET ORAL at 09:11

## 2023-11-19 RX ADMIN — SODIUM CHLORIDE, PRESERVATIVE FREE 10 ML: 5 INJECTION INTRAVENOUS at 06:11

## 2023-11-19 RX ADMIN — MUPIROCIN: 20 OINTMENT TOPICAL at 08:11

## 2023-11-19 NOTE — PT/OT/SLP EVAL
Physical Therapy Evaluation    Patient Name:  Olamide Felipe   MRN:  [id number]    Recommendations:     Discharge Recommendations: Moderate Intensity Therapy   Discharge Equipment Recommendations: to be determined by next level of care   Barriers to discharge: None    Assessment:     Olamide Felipe is a 89 y.o. female admitted with a medical diagnosis of Lower GI bleed.  She presents with the following impairments/functional limitations: weakness, impaired endurance, impaired functional mobility, gait instability, impaired balance, decreased lower extremity function, pain Patient was able to ambulate around the nursing station and moved better with a rollling walker compared to HHA or single point cane at this date. After walking, we attempted to bring her to the restroom to practice using a walker in a confined space and she reported a fast change in status reporting increased lightheadedness and dizziness. We were able to get her to amb to the chair, but she was not able to control her descent and unable to scoot herself back into chair. Once she was able to sit up in the chair, she needed Mod to Max A to transition her back to the bed from the chair. Once supine, she presented with a cold sweat and NSG was called for better medical assessment. Patient also had an bowel movement as she was feeling faint without realizing.    Rehab Prognosis: Good; patient would benefit from acute skilled PT services to address these deficits and reach maximum level of function.    Recent Surgery: * No surgery found *      Plan:     During this hospitalization, patient to be seen 4 x/week to address the identified rehab impairments via gait training, therapeutic activities, neuromuscular re-education, therapeutic exercises and progress toward the following goals:    Plan of Care Expires:  12/19/23    Subjective     Chief Complaint: wants to be able to move around safely  Patient/Family Comments/goals: none  present  Pain/Comfort:  Pain Rating 1: 4/10  Location 1: flank    Patients cultural, spiritual, Taoist conflicts given the current situation: no    Living Environment:  Lives at [facility] where she can walks on the sidewalk to the grocery and was fairly indep- no steps at her home  Prior to admission, patients level of function was fairly indep.  Equipment used at home:  (owns a cane but does not like to use, uses a portable shopping cart as walker while shopping).  DME owned (not currently used): single point cane.  Upon discharge, patient will have assistance from facility and daughter.    Objective:     Communicated with nsg prior to session.  Patient found supine with bed alarm, telemetry  upon PT entry to room.    General Precautions: Standard, fall  Orthopedic Precautions:N/A   Braces: N/A  Respiratory Status: Room air    Exams:  Gross Motor Coordination:  WFL  Sensation: -       Intact  RLE ROM: WFL  RLE Strength: 4/5  LLE ROM: WFL  LLE Strength: 4/7     PT educated patient:  PT plan of care/role of PT  Safety with OOB mobility  Use of RW for household and community ambulation.   Energy conservation techniques   Discharge disposition    Pt  verbalized understanding     Functional Mobility:  Bed Mobility:  Supine to Sit: stand by assistance  Sit to Supine: moderate assistance  Transfers:  Sit to Stand:  contact guard assistance with hand-held assist  Gait: able to ambulate 100 ' in hallway with RW with better control. Attempted 15' with HHA and she presented with a lateral drift and did better with the rolling walker      AM-PAC 6 CLICK MOBILITY  Total Score:17       Treatment & Education:   PT educated patient:  PT plan of care/role of PT  Safety with OOB mobility  Use of RW for household and community ambulation.   Energy conservation techniques   Discharge disposition    Pt  verbalized understanding       Patient left supine with call button in reach, bed alarm off, and nsg notified.    GOALS:    Multidisciplinary Problems       Physical Therapy Goals          Problem: Physical Therapy    Goal Priority Disciplines Outcome Goal Variances Interventions   Physical Therapy Goal     PT, PT/OT Ongoing, Progressing     Description: Goals to be met by: 2023     Patient will increase functional independence with mobility by performin. Supine to sit with Modified [place]  2. Sit to supine with Modified [place]  3. Sit to stand transfer with Modified [place]  4. Gait  x 150 feet with Modified [place] using Rolling Walker.   5. Stand for 15 minutes with Modified [place] using Rolling Walker                         History:     Past Medical History:   Diagnosis Date    Hyperlipidemia     Hypothyroidism     Malignant neoplasm of upper-outer quadrant of right breast in female, estrogen receptor negative 2022    Osteoarthritis, knee     Vertigo        Past Surgical History:   Procedure Laterality Date    dentures      FRACTURE SURGERY Left     fracture left wrist    HYSTERECTOMY      INJECTION FOR SENTINEL NODE IDENTIFICATION Right 2022    Procedure: INJECTION, FOR SENTINEL NODE IDENTIFICATION-Right;  Surgeon: Marci Mcintosh MD;  Location: [facility];  Service: General;  Laterality: Right;    left wrist surgery      MASTECTOMY, PARTIAL Right 2022    Procedure: MASTECTOMY, PARTIAL-Right with radiological marker;  Surgeon: Marci Mcintosh MD;  Location: [facility];  Service: General;  Laterality: Right;  REQUEST SAID 2 HOUR CASE    SENTINEL LYMPH NODE BIOPSY Right 2022    Procedure: BIOPSY, LYMPH NODE, SENTINEL-Right;  Surgeon: Marci Mcintosh MD;  Location: [facility];  Service: General;  Laterality: Right;       Time Tracking:     PT Received On: 23  PT Start Time: 0930     PT Stop Time: 1005  PT Total Time (min): 35 min     Billable Minutes: Evaluation 10, Gait Training 10, and Therapeutic Activity 15      2023

## 2023-11-19 NOTE — PROGRESS NOTES
[facility] Medicine  Progress Note    Patient Name: Olamide Felipe  MRN: [id number]  Patient Class: IP- Inpatient   Admission Date: 11/16/2023  Length of Stay: 3 days  Attending Physician: Gary Mg,*  Primary Care Provider: Abiola Campbell MD        Subjective:     Principal Problem:Lower GI bleed        HPI:  89-year-old female with PMH of mild dementia, breast cancer, hypertension, hypothyroidism, recurrent falls, hyperlipidemia who presented via EMS from independent living facility for bright red blood per rectum.  Patient had a large bloody bowel movement this morning following which she called her daughter who called EMS.  EMS noted drops of blood from the rectum after the bowel movement.  Patient denies any lightheadedness or shortness of breath or chest pain.    While in the ER, vital signs stable.  Hemoglobin dropped about 2 g.  Type and screen was sent, no blood transfusion given.  CTA abdomen pelvis showed findings concerning for acute bleed within proximal aspect of ascending colon.  Large amount of retained stool with significant diverticulosis seen.  ER provider discussed case with IR attending -underwent angiogram and embolization.    Daughter Christy, EJ at bedside.  Assists with history.    Overview/Hospital Course:  No notes on file    Interval History:   Seen with PT / OT today.  No further bleeding.  Was initially able to ambulate with rolling walker but then became weak and needed assistance to get back to bed.  Will pursue SNF placement.    Review of Systems  Objective:     Vital Signs (Most Recent):  Temp: 98.1 °F (36.7 °C) (11/19/23 0836)  Pulse: 87 (11/19/23 0835)  Resp: 20 (11/19/23 0751)  BP: 133/77 (11/19/23 0835)  SpO2: 98 % (11/19/23 0835) Vital Signs (24h Range):  Temp:  [97.8 °F (36.6 °C)-98.9 °F (37.2 °C)] 98.1 °F (36.7 °C)  Pulse:  [70-97] 87  Resp:  [18-20] 20  SpO2:  [95 %-98 %] 98 %  BP: (125-149)/(58-77) 133/77     Weight: 51.5 kg (113 lb 8.6  oz)  Body mass index is 19.49 kg/m².    Intake/Output Summary (Last 24 hours) at 11/19/2023 1028  Last data filed at 11/18/2023 1826  Gross per 24 hour   Intake 1080 ml   Output --   Net 1080 ml           Physical Exam  Vitals and nursing note reviewed.   Constitutional:       General: She is not in acute distress.     Interventions: She is restrained.      Comments: Older frail female   HENT:      Mouth/Throat:      Mouth: Mucous membranes are dry.   Eyes:      Comments: Conjunctival pallor present   Cardiovascular:      Rate and Rhythm: Normal rate and regular rhythm.      Heart sounds: Normal heart sounds. No murmur heard.  Pulmonary:      Effort: Pulmonary effort is normal. No respiratory distress.      Breath sounds: Normal breath sounds. No wheezing.   Abdominal:      Palpations: Abdomen is soft.      Tenderness: There is no abdominal tenderness.   Musculoskeletal:      Right lower leg: No edema.      Left lower leg: No edema.   Skin:     General: Skin is warm and dry.      Findings: No bruising.   Neurological:      General: No focal deficit present.      Mental Status: She is alert and oriented to person, place, and time.   Psychiatric:         Mood and Affect: Mood normal.             Significant Labs: All pertinent labs within the past 24 hours have been reviewed.    Significant Imaging: I have reviewed all pertinent imaging results/findings within the past 24 hours.    Assessment/Plan:      * Lower GI bleed  Presented with bright red blood per rectum -suspected diverticular bleed  Vital signs stable   Hemoglobin dropped 2 g    CTA abdomen pelvis showed active bleed and ascending colon, significant stool burden, diverticulosis  S/p angiogram and coil embolization by IR on 11/16    Ceftriaxone/Flagyl discontinued 11/17  Stable GI pathway.  Maintain good PIV access   Type and screen   CBC daily, transfuse for hemoglobin less than 7 or hemodynamic instability or active bleeding    GI consulted, appreciate  recs   -repeat hemoglobin with significant improvement and no further bleeding noted overnight    Weakness acquired in ICU  -PT/OT consultation   -may need equipment at time of discharge vs consideration of skilled nursing placement; SNF most appropriate at this time      Delirium  Superimposed on dementia   -likely exacerbated by administration of benzodiazepine overnight  -redirect  -delirium precautions    Goals of care, counseling/discussion  Advance Care Planning    Date: 11/17/2023    Code Status  In light of the patients advanced and life limiting illness,I engaged the the family in a voluntary conversation about the patient's preferences for care  at the very end of life. The patient wishes to have a natural, peaceful death.  Along those lines, the patient does not wish to have CPR or other invasive treatments performed when her heart and/or breathing stops. I communicated to the family that a DNR order would be placed in her medical record to reflect this preference.  I spent a total of 7 minutes engaging the patient in this advance care planning discussion.           Mild late onset Alzheimer's dementia without behavioral disturbance, psychotic disturbance, mood disturbance, or anxiety  Patient with dementia with likely etiology of alzheimer's dementia. Dementia is mild. The patient does not have signs of behavioral disturbance. Home dementia medications andare Held or Continued: continued.. Continue non-pharmacologic interventions to prevent delirium (No VS between 11PM-5AM, activity during day, opening blinds, providing glasses/hearing aids, and up in chair during daytime). Will avoid narcotics and benzos unless absolutely necessary. PRN anti-psychotics are not prescribed to avoid self harm behaviors.  -avoid benzos, out of restraints since early am 11/17    History of breast cancer  -noted      Mood disorder  Continue SSRI      Hypothyroid  Continue Synthroid      Aortic atherosclerosis  Hold aspirin  in setting of GI bleed   Continue statin        VTE Risk Mitigation (From admission, onward)           Ordered     IP VTE HIGH RISK PATIENT  Once         11/16/23 1256     Place sequential compression device  Until discontinued         11/16/23 1256                    Discharge Planning   TAMIME: 11/18/2023     Code Status: DNR   Is the patient medically ready for discharge?:     Reason for patient still in hospital (select all that apply): Pending disposition  Discharge Plan A: Home with family   Discharge Delays: None known at this time              Gary Mg MD  Department of Hospital Medicine   [facility]

## 2023-11-19 NOTE — PT/OT/SLP EVAL
Occupational Therapy   Evaluation and Treatment    Name: Olamide Felipe  MRN: [id number]  Admitting Diagnosis: Lower GI bleed  Recent Surgery: * No surgery found *      Recommendations:     Discharge Recommendations: Moderate Intensity Therapy  Discharge Equipment Recommendations:  to be determined by next level of care, walker, rolling  Barriers to discharge:  Other (Comment) (fall risk; decline during evaluation; increased oversight needed)    Assessment:     Olamide Felipe is a 89 y.o. female with a medical diagnosis of Lower GI bleed.  She presents with ..Diagnoses of BRBPR (bright red blood per rectum) and Lower GI bleed were pertinent to this visit.  . Performance deficits affecting function: weakness, impaired endurance, impaired cognition, impaired self care skills, impaired functional mobility, gait instability, decreased coordination, decreased safety awareness, impaired balance, impaired cardiopulmonary response to activity.      OT  / PT coeval this date; patient with prehospitalization functioning of modified independence in ADL regimen / functional mobility without DME (endorses prior recommendation to use cane but patient dislikes 2/2 image associated to using device), as well as . Patient recently diagnosed with Alzheimer's but per chart review was still functional in her senior living independent living environment, enjoys walking for exercise, and rolling shopping cart when mobilizing in stores.     On evaluation this date patient initially was briefly CGA to SBA for short distance mobility, toileting/ continent episode in true bathroom and dressed self followed by having slight deviation in mobility during longer distance without device, increased stability with addition of BUE support on rolling walker. Patient with habit of talking with her hands while walking, resulting in mild loss of balance with recovery. However post hallway mobility, patient endorses  dizziness, observed to have  bowel incontinence, without change in alertness/mental status as well as moderate loss of balance and requiring max assist to return to bed but vitals WFL. Recommending moderate intensity therapy when medically stable 2/2 concerns of fall risk due to instability and needing increased oversight vs returning home alone.  Anticipated DME recommendation of rolling walker.    Rehab Prognosis: Good; patient would benefit from acute skilled OT services to address these deficits and reach maximum level of function.       Plan:     Patient to be seen 4 x/week to address the above listed problems via self-care/home management, therapeutic activities, therapeutic exercises  Plan of Care Expires: 12/17/23  Plan of Care Reviewed with: patient    Subjective     Chief Complaint: mild pain - points to left side under bra line; complaints of dizziness transiently post extended mobility around unit  Patient/Family Comments/goals: frequently answers questions reflecting her home environment (ie. Her senior living apartment)    Occupational Profile:  Living Environment: [facility] First floor apartment without steps to enter. Walk in shower with built in seat.  Previous level of function: Patient with prehospitalization functioning of modified independence in ADL regimen / functional mobility without DME (endorses prior recommendation to use cane but patient dislikes 2/2 image associated to using device), as well as . Patient recently diagnosed with Alzheimer's but per chart review was still functional in her senior living independent living environment, enjoys walking for exercise, and rolling shopping cart when mobilizing in stores.   Equipment Used at Home: other (see comments) (owns straight cane (does not prefer to use); built in seat in shower)  Assistance upon Discharge: limited; has a daughter, but patient ultimately resides alone    Pain/Comfort:  Pain Rating 1: other  (see comments) (3/10 pain in L flank area / under bra line L side)  Pain Addressed 1: Reposition, Cessation of Activity  Pain Rating Post-Intervention 1: other (see comments) (no further pain reports)    Patients cultural, spiritual, Oriental orthodox conflicts given the current situation:      Objective:     Communicated with: nursing prior to session.  Patient found HOB elevated with bed alarm, telemetry upon OT entry to room.    General Precautions: Standard, fall  Orthopedic Precautions: N/A  Braces: N/A  Respiratory Status: Room air    Occupational Performance:    Bed Mobility:    Patient completed Supine to Sit with stand by assistance  Patient completed Sit to Supine with mod/max assist post episode of dizziness, followed by able to reposition self in bed with CGA and visual /verbal directions    Functional Mobility/Transfers:  Patient completed Sit <> Stand Transfer with SBA  with  hand-held assist and rolling walker   Patient completed Toilet Transfer step transfer  technique with contact guard assistance with  no DME on first trial, SBA with visual / verbal cues with rolling walker.  Patient completed Bed <> Chair Transfer using Step Transfer technique with moderate assistance with rolling walker  Functional Mobility:   in room mobility trial one with CGA to close SBA short distance in / out of bathroom and inclusive to sink without DME  out of room mobility CGA/SBA without DME slight deviation in mobility - with rolling walker improved to SBA  In room mobility trial two, SBA in / out of bathroom (no second voiding) with rolling walker, min verbal cues followed by upon mobilizing to recliner, moderate LOB, reports of dizziness/lightheadedness, mod assist to assist to recliner; unable to stay up in recliner; max assist return to bed    Activities of Daily Living:  Grooming: supervision ; standing at sink; min verbal cues (unfamiliar environment set up)  Upper Body Dressing: stand by assistance    Lower Body  Dressing: contact guard assistance    Toileting: CGA in true bathroom first trial; no voiding second trial in bathroom; incontinent BM while simultaneously feeling dizzy / lightheaded      Cognitive/Visual Perceptual:  Cognitive/Psychosocial Skills:     -       Oriented to: A&O x3, mild sitation deficits; cannot recall exact birth year  -       Follows Commands/attention:follow multi directions except during dizziness spell, remains alert but decreased talkativeness  -       Memory: recently diagnosed with Alzheimer's; GDS not performed this date; impaired short term recall but functional cognition to participate in ADL intact  -       Mood/Affect/Coping skills/emotional control: cooperative   Visual/Perceptual: appears WFL , glasses on; formal assessment not performed    Physical Exam:  Dominant hand:    -       right  Upper Extremity Range of Motion:     -       Right Upper Extremity: WFL  -       Left Upper Extremity: WFL  Upper Extremity Strength:    -       Right Upper Extremity: WFL  -       Left Upper Extremity: WFL  Fine Motor Coordination:    -       Intact    AMPAC 6 Click ADL:  AMPAC Total Score: 19    Treatment & Education:  Patient educated on role of OT/ POC development.   Co-evaluation with physical therapy.  Occupational profile developed via interview and chart review.   Patient guided to transition eob for assessment.   Initiated ADL / functional mobility training as above with instruction on early activity, visually scanning environment, appropriate use of rolling walker to improve balance.    Patient with ADL / mobility as above, episode of dizziness/ lightheadedness noted, requiring mod/max assist to assist back to bed. Vitals : 114/53, 86bpm, 97% SpO2 on room air.  RN and pharmacist in room.  Secure chat to Dr. Mg regarding concern for patient to d/c to home alone 2/2 fall risk and episode of possible orthostatic episode with incontinence.      Patient left HOB elevated with all lines  intact, call button in reach, bed alarm on, nursing notified, and pharmacy/RN present    GOALS:   Multidisciplinary Problems       Occupational Therapy Goals          Problem: Occupational Therapy    Goal Priority Disciplines Outcome Interventions   Occupational Therapy Goal     OT, PT/OT Ongoing, Progressing    Description: Goals to be met by: 12/17/2023     Patient will increase functional independence with ADLs by performing:    LE Dressing with Set-up Assistance.  Grooming while standing at sink with Modified [place].  Toileting from toilet with modified independence for hygiene and clothing management.   Toilet transfer to toilet with Modified [place] with use of least restrictive device.  Functional mobility in / out of bathroom with supervision and use of least restrictive device without adverse change in vital signs.  Family or caregiver 100% verbalized reciprocation of dementia friendly recommendations (ex: simplify choices; decrease visual clutter; remove hazardous objects; maximize familiarity, etc) to support patient's wellbeing and highest level of occupational engagement and participation in least restrictive discharge environment                          History:     Past Medical History:   Diagnosis Date    Hyperlipidemia     Hypothyroidism     Malignant neoplasm of upper-outer quadrant of right breast in female, estrogen receptor negative 1/7/2022    Osteoarthritis, knee     Vertigo          Past Surgical History:   Procedure Laterality Date    dentures      FRACTURE SURGERY Left     fracture left wrist    HYSTERECTOMY      INJECTION FOR SENTINEL NODE IDENTIFICATION Right 1/7/2022    Procedure: INJECTION, FOR SENTINEL NODE IDENTIFICATION-Right;  Surgeon: Marci Mcintosh MD;  Location: [facility];  Service: General;  Laterality: Right;    left wrist surgery      MASTECTOMY, PARTIAL Right 1/7/2022    Procedure: MASTECTOMY, PARTIAL-Right with radiological marker;  Surgeon: Marci Mcintosh MD;   [facility];  Service: General;  Laterality: Right;  REQUEST SAID 2 HOUR CASE    SENTINEL LYMPH NODE BIOPSY Right 1/7/2022    Procedure: BIOPSY, LYMPH NODE, SENTINEL-Right;  Surgeon: Marci Mcintosh MD;  Location: [facility];  Service: General;  Laterality: Right;       Time Tracking:     OT Date of Treatment: 11/19/23  OT Start Time: 0930  OT Stop Time: 1005  OT Total Time (min): 35 min    Billable Minutes:Evaluation 10 min  Self Care/Home Management 15 min  Therapeutic Activity 10 min    11/19/2023

## 2023-11-19 NOTE — PLAN OF CARE
Problem: Physical Therapy  Goal: Physical Therapy Goal  Description: Goals to be met by: 2023     Patient will increase functional independence with mobility by performin. Supine to sit with Modified [place]  2. Sit to supine with Modified [place]  3. Sit to stand transfer with Modified [place]  4. Gait  x 150 feet with Modified [place] using Rolling Walker.   5. Stand for 15 minutes with Modified [place] using Rolling Walker    Outcome: Ongoing, Progressing     Patient was able to ambulate around the nursing station and moved better with a rollling walker compared to HHA or single point cane at this date. After walking, we attempted to bring her to the restroom to practice using a walker in a confined space and she reported a fast change in status reporting increased lightheadedness and dizziness. We were able to get her to amb to the chair, but she was not able to control her descent and unable to scoot herself back into chair. Once she was able to sit up in the chair, she needed Mod to Max A to transition her back to the bed from the chair. Once supine, she presented with a cold sweat and [facility] was called for better medical assessment.

## 2023-11-19 NOTE — PLAN OF CARE
Problem: Adult Inpatient Plan of Care  Goal: Plan of Care Review  Outcome: Ongoing, Progressing   CHart reviewed.

## 2023-11-19 NOTE — PLAN OF CARE
OT / PT coeval this date; patient with prehospitalization functioning of modified independence in ADL regimen / functional mobility without DME (endorses prior recommendation to use cane but patient dislikes 2/2 image associated to using device), as well as . Patient recently diagnosed with Alzheimer's but per chart review was still functional in her senior living independent living environment, enjoys walking for exercise, and rolling shopping cart when mobilizing in stores.    On evaluation this date patient initially was briefly CGA to SBA for short distance mobility, toileting/ continent episode in true bathroom and dressed self followed by having slight deviation in mobility during longer distance without device, increased stability with addition of BUE support on rolling walker. Patient with habit of talking with her hands while walking, resulting in mild loss of balance with recovery. However post hallway mobility, patient endorses  dizziness, observed to have bowel incontinence, without change in alertness/mental status as well as moderate loss of balance and requiring max assist to return to bed but vitals WFL. Recommending moderate intensity therapy when medically stable 2/2 concerns of fall risk due to instability and needing increased oversight vs returning home alone.  Anticipated DME recommendation of rolling walker.    Problem: Occupational Therapy  Goal: Occupational Therapy Goal  Description: Goals to be met by: 12/17/2023     Patient will increase functional independence with ADLs by performing:    LE Dressing with Set-up Assistance.  Grooming while standing at sink with Modified [place].  Toileting from toilet with modified independence for hygiene and clothing management.   Toilet transfer to toilet with Modified [place] with use of least restrictive device.  Functional mobility in / out of bathroom with supervision and use of least restrictive device without adverse change in vital  signs.  Family or caregiver 100% verbalized reciprocation of dementia friendly recommendations (ex: simplify choices; decrease visual clutter; remove hazardous objects; maximize familiarity, etc) to support patient's wellbeing and highest level of occupational engagement and participation in least restrictive discharge environment     Outcome: Ongoing, Progressing

## 2023-11-19 NOTE — SUBJECTIVE & OBJECTIVE
Interval History:   Seen with PT / OT today.  No further bleeding.  Was initially able to ambulate with rolling walker but then became weak and needed assistance to get back to bed.  Will pursue SNF placement.    Review of Systems  Objective:     Vital Signs (Most Recent):  Temp: 98.1 °F (36.7 °C) (11/19/23 0836)  Pulse: 87 (11/19/23 0835)  Resp: 20 (11/19/23 0751)  BP: 133/77 (11/19/23 0835)  SpO2: 98 % (11/19/23 0835) Vital Signs (24h Range):  Temp:  [97.8 °F (36.6 °C)-98.9 °F (37.2 °C)] 98.1 °F (36.7 °C)  Pulse:  [70-97] 87  Resp:  [18-20] 20  SpO2:  [95 %-98 %] 98 %  BP: (125-149)/(58-77) 133/77     Weight: 51.5 kg (113 lb 8.6 oz)  Body mass index is 19.49 kg/m².    Intake/Output Summary (Last 24 hours) at 11/19/2023 1028  Last data filed at 11/18/2023 1826  Gross per 24 hour   Intake 1080 ml   Output --   Net 1080 ml           Physical Exam  Vitals and nursing note reviewed.   Constitutional:       General: She is not in acute distress.     Interventions: She is restrained.      Comments: Older frail female   HENT:      Mouth/Throat:      Mouth: Mucous membranes are dry.   Eyes:      Comments: Conjunctival pallor present   Cardiovascular:      Rate and Rhythm: Normal rate and regular rhythm.      Heart sounds: Normal heart sounds. No murmur heard.  Pulmonary:      Effort: Pulmonary effort is normal. No respiratory distress.      Breath sounds: Normal breath sounds. No wheezing.   Abdominal:      Palpations: Abdomen is soft.      Tenderness: There is no abdominal tenderness.   Musculoskeletal:      Right lower leg: No edema.      Left lower leg: No edema.   Skin:     General: Skin is warm and dry.      Findings: No bruising.   Neurological:      General: No focal deficit present.      Mental Status: She is alert and oriented to person, place, and time.   Psychiatric:         Mood and Affect: Mood normal.             Significant Labs: All pertinent labs within the past 24 hours have been reviewed.    Significant  Imaging: I have reviewed all pertinent imaging results/findings within the past 24 hours.

## 2023-11-19 NOTE — ASSESSMENT & PLAN NOTE
-PT/OT consultation   -may need equipment at time of discharge vs consideration of skilled nursing placement; SNF most appropriate at this time

## 2023-11-19 NOTE — NURSING
Assisted patient to restroom. Patient voided. Upon wiping patient noticed dark blood. Assisted patient with hygiene and small amount of old blood from rectum not benitez blood. Patient stable and assisted back to bed. Continue to monitor.

## 2023-11-20 ENCOUNTER — HOSPITAL ENCOUNTER (INPATIENT)
Facility: HOSPITAL | Age: 88
LOS: 18 days | Discharge: HOME HEALTH | DRG: 378 | End: 2023-12-08
Attending: HOSPITALIST | Admitting: HOSPITALIST
Payer: MEDICARE

## 2023-11-20 VITALS
RESPIRATION RATE: 18 BRPM | HEART RATE: 86 BPM | DIASTOLIC BLOOD PRESSURE: 75 MMHG | TEMPERATURE: 98 F | BODY MASS INDEX: 20.55 KG/M2 | OXYGEN SATURATION: 91 % | SYSTOLIC BLOOD PRESSURE: 154 MMHG | HEIGHT: 64 IN | WEIGHT: 120.38 LBS

## 2023-11-20 DIAGNOSIS — G30.1 MILD LATE ONSET ALZHEIMER'S DEMENTIA WITHOUT BEHAVIORAL DISTURBANCE, PSYCHOTIC DISTURBANCE, MOOD DISTURBANCE, OR ANXIETY: Primary | ICD-10-CM

## 2023-11-20 DIAGNOSIS — K92.2 LOWER GASTROINTESTINAL HEMORRHAGE: ICD-10-CM

## 2023-11-20 DIAGNOSIS — F02.A0 MILD LATE ONSET ALZHEIMER'S DEMENTIA WITHOUT BEHAVIORAL DISTURBANCE, PSYCHOTIC DISTURBANCE, MOOD DISTURBANCE, OR ANXIETY: Primary | ICD-10-CM

## 2023-11-20 LAB
BASOPHILS # BLD AUTO: 0.04 K/UL (ref 0–0.2)
BASOPHILS NFR BLD: 0.6 % (ref 0–1.9)
BLD PROD TYP BPU: NORMAL
BLOOD UNIT EXPIRATION DATE: NORMAL
BLOOD UNIT TYPE CODE: 6200
BLOOD UNIT TYPE: NORMAL
CODING SYSTEM: NORMAL
CROSSMATCH INTERPRETATION: NORMAL
DIFFERENTIAL METHOD: ABNORMAL
DISPENSE STATUS: NORMAL
EOSINOPHIL # BLD AUTO: 0.4 K/UL (ref 0–0.5)
EOSINOPHIL NFR BLD: 6.4 % (ref 0–8)
ERYTHROCYTE [DISTWIDTH] IN BLOOD BY AUTOMATED COUNT: 13.1 % (ref 11.5–14.5)
HCT VFR BLD AUTO: 23.1 % (ref 37–48.5)
HGB BLD-MCNC: 7.5 G/DL (ref 12–16)
IMM GRANULOCYTES # BLD AUTO: 0.03 K/UL (ref 0–0.04)
IMM GRANULOCYTES NFR BLD AUTO: 0.5 % (ref 0–0.5)
LYMPHOCYTES # BLD AUTO: 2 K/UL (ref 1–4.8)
LYMPHOCYTES NFR BLD: 30.4 % (ref 18–48)
MCH RBC QN AUTO: 31.3 PG (ref 27–31)
MCHC RBC AUTO-ENTMCNC: 32.5 G/DL (ref 32–36)
MCV RBC AUTO: 96 FL (ref 82–98)
MONOCYTES # BLD AUTO: 0.7 K/UL (ref 0.3–1)
MONOCYTES NFR BLD: 11.2 % (ref 4–15)
NEUTROPHILS # BLD AUTO: 3.3 K/UL (ref 1.8–7.7)
NEUTROPHILS NFR BLD: 50.9 % (ref 38–73)
NRBC BLD-RTO: 0 /100 WBC
PLATELET # BLD AUTO: 252 K/UL (ref 150–450)
PMV BLD AUTO: 10.5 FL (ref 9.2–12.9)
RBC # BLD AUTO: 2.4 M/UL (ref 4–5.4)
SARS-COV-2 RDRP RESP QL NAA+PROBE: NEGATIVE
TRANS ERYTHROCYTES VOL PATIENT: NORMAL ML
WBC # BLD AUTO: 6.42 K/UL (ref 3.9–12.7)

## 2023-11-20 PROCEDURE — 97530 THERAPEUTIC ACTIVITIES: CPT | Mod: HCNC,CO

## 2023-11-20 PROCEDURE — A4216 STERILE WATER/SALINE, 10 ML: HCPCS | Mod: HCNC

## 2023-11-20 PROCEDURE — 36415 COLL VENOUS BLD VENIPUNCTURE: CPT | Mod: HCNC | Performed by: HOSPITALIST

## 2023-11-20 PROCEDURE — U0002 COVID-19 LAB TEST NON-CDC: HCPCS | Mod: HCNC | Performed by: HOSPITALIST

## 2023-11-20 PROCEDURE — 63600175 PHARM REV CODE 636 W HCPCS: Mod: HCNC | Performed by: HOSPITALIST

## 2023-11-20 PROCEDURE — 97110 THERAPEUTIC EXERCISES: CPT | Mod: HCNC,CQ

## 2023-11-20 PROCEDURE — 25000003 PHARM REV CODE 250: Mod: HCNC | Performed by: STUDENT IN AN ORGANIZED HEALTH CARE EDUCATION/TRAINING PROGRAM

## 2023-11-20 PROCEDURE — 85025 COMPLETE CBC W/AUTO DIFF WBC: CPT | Mod: HCNC | Performed by: HOSPITALIST

## 2023-11-20 PROCEDURE — 25000003 PHARM REV CODE 250: Mod: HCNC | Performed by: HOSPITALIST

## 2023-11-20 PROCEDURE — 97116 GAIT TRAINING THERAPY: CPT | Mod: HCNC,CQ

## 2023-11-20 PROCEDURE — 25000003 PHARM REV CODE 250: Mod: HCNC

## 2023-11-20 PROCEDURE — 97535 SELF CARE MNGMENT TRAINING: CPT | Mod: HCNC,CO

## 2023-11-20 PROCEDURE — 11000004 HC SNF PRIVATE: Mod: HCNC

## 2023-11-20 RX ORDER — LEVOTHYROXINE SODIUM 25 UG/1
25 TABLET ORAL
Status: DISCONTINUED | OUTPATIENT
Start: 2023-11-21 | End: 2023-12-08 | Stop reason: HOSPADM

## 2023-11-20 RX ORDER — FLUOXETINE HYDROCHLORIDE 20 MG/1
20 CAPSULE ORAL DAILY
Qty: 90 CAPSULE | Refills: 3 | Status: ON HOLD
Start: 2023-11-20 | End: 2023-11-30 | Stop reason: SDUPTHER

## 2023-11-20 RX ORDER — CETIRIZINE HYDROCHLORIDE 5 MG/1
10 TABLET ORAL DAILY
Status: DISCONTINUED | OUTPATIENT
Start: 2023-11-21 | End: 2023-12-08 | Stop reason: HOSPADM

## 2023-11-20 RX ORDER — ACETAMINOPHEN 325 MG/1
650 TABLET ORAL EVERY 6 HOURS PRN
Status: DISCONTINUED | OUTPATIENT
Start: 2023-11-20 | End: 2023-12-08 | Stop reason: HOSPADM

## 2023-11-20 RX ORDER — FLUOXETINE 10 MG/1
20 CAPSULE ORAL DAILY
Status: DISCONTINUED | OUTPATIENT
Start: 2023-11-21 | End: 2023-12-08 | Stop reason: HOSPADM

## 2023-11-20 RX ORDER — PANTOPRAZOLE SODIUM 40 MG/1
40 TABLET, DELAYED RELEASE ORAL DAILY
Status: DISCONTINUED | OUTPATIENT
Start: 2023-11-21 | End: 2023-12-08 | Stop reason: HOSPADM

## 2023-11-20 RX ORDER — TALC
6 POWDER (GRAM) TOPICAL NIGHTLY PRN
Status: DISCONTINUED | OUTPATIENT
Start: 2023-11-20 | End: 2023-11-22

## 2023-11-20 RX ORDER — B-COMPLEX WITH VITAMIN C
1 TABLET ORAL DAILY
Status: DISCONTINUED | OUTPATIENT
Start: 2023-11-21 | End: 2023-12-08 | Stop reason: HOSPADM

## 2023-11-20 RX ORDER — MEMANTINE HYDROCHLORIDE 5 MG/1
5 TABLET ORAL 2 TIMES DAILY
Status: DISCONTINUED | OUTPATIENT
Start: 2023-11-20 | End: 2023-12-08 | Stop reason: HOSPADM

## 2023-11-20 RX ORDER — AMOXICILLIN 250 MG
1 CAPSULE ORAL 2 TIMES DAILY
Status: DISCONTINUED | OUTPATIENT
Start: 2023-11-20 | End: 2023-12-08 | Stop reason: HOSPADM

## 2023-11-20 RX ORDER — LOSARTAN POTASSIUM 25 MG/1
25 TABLET ORAL DAILY PRN
Status: DISCONTINUED | OUTPATIENT
Start: 2023-11-20 | End: 2023-11-22

## 2023-11-20 RX ORDER — CALCIUM CARBONATE 200(500)MG
500 TABLET,CHEWABLE ORAL 2 TIMES DAILY PRN
Status: DISCONTINUED | OUTPATIENT
Start: 2023-11-20 | End: 2023-12-08 | Stop reason: HOSPADM

## 2023-11-20 RX ORDER — ATORVASTATIN CALCIUM 10 MG/1
10 TABLET, FILM COATED ORAL NIGHTLY
Status: DISCONTINUED | OUTPATIENT
Start: 2023-11-20 | End: 2023-12-08 | Stop reason: HOSPADM

## 2023-11-20 RX ORDER — MULTIVIT WITH MINERALS/HERBS
1 TABLET ORAL DAILY
Start: 2023-11-20

## 2023-11-20 RX ORDER — FERROUS SULFATE, DRIED 160(50) MG
1 TABLET, EXTENDED RELEASE ORAL 2 TIMES DAILY
Status: DISCONTINUED | OUTPATIENT
Start: 2023-11-20 | End: 2023-12-08 | Stop reason: HOSPADM

## 2023-11-20 RX ORDER — OMEPRAZOLE 20 MG/1
20 CAPSULE, DELAYED RELEASE ORAL DAILY
Qty: 90 CAPSULE | Refills: 0
Start: 2023-12-11

## 2023-11-20 RX ORDER — ACETAMINOPHEN 325 MG/1
650 TABLET ORAL EVERY 6 HOURS PRN
Status: CANCELLED | OUTPATIENT
Start: 2023-11-20

## 2023-11-20 RX ORDER — DIPHENHYDRAMINE HCL 25 MG
25 CAPSULE ORAL NIGHTLY PRN
Status: DISCONTINUED | OUTPATIENT
Start: 2023-11-20 | End: 2023-11-22

## 2023-11-20 RX ORDER — ACETAMINOPHEN 325 MG/1
325 TABLET ORAL EVERY 6 HOURS PRN
Status: DISCONTINUED | OUTPATIENT
Start: 2023-11-20 | End: 2023-12-08 | Stop reason: HOSPADM

## 2023-11-20 RX ADMIN — POLYETHYLENE GLYCOL (3350) 17 G: 17 POWDER, FOR SOLUTION ORAL at 08:11

## 2023-11-20 RX ADMIN — ATORVASTATIN CALCIUM 10 MG: 10 TABLET, FILM COATED ORAL at 08:11

## 2023-11-20 RX ADMIN — MEMANTINE HYDROCHLORIDE 5 MG: 5 TABLET ORAL at 08:11

## 2023-11-20 RX ADMIN — IRON SUCROSE 200 MG: 20 INJECTION, SOLUTION INTRAVENOUS at 09:11

## 2023-11-20 RX ADMIN — SODIUM CHLORIDE, PRESERVATIVE FREE 10 ML: 5 INJECTION INTRAVENOUS at 05:11

## 2023-11-20 RX ADMIN — PANTOPRAZOLE SODIUM 40 MG: 40 TABLET, DELAYED RELEASE ORAL at 08:11

## 2023-11-20 RX ADMIN — SODIUM CHLORIDE, PRESERVATIVE FREE 10 ML: 5 INJECTION INTRAVENOUS at 12:11

## 2023-11-20 RX ADMIN — Medication 1 TABLET: at 08:11

## 2023-11-20 RX ADMIN — FLUOXETINE 20 MG: 20 CAPSULE ORAL at 08:11

## 2023-11-20 RX ADMIN — Medication 6 MG: at 08:11

## 2023-11-20 RX ADMIN — SENNOSIDES AND DOCUSATE SODIUM 1 TABLET: 8.6; 5 TABLET ORAL at 08:11

## 2023-11-20 RX ADMIN — MUPIROCIN: 20 OINTMENT TOPICAL at 08:11

## 2023-11-20 RX ADMIN — LEVOTHYROXINE SODIUM 25 MCG: 25 TABLET ORAL at 05:11

## 2023-11-20 NOTE — PLAN OF CARE
VN note: VN completed AVS and attachments and notified bedside nurseBan. Will cont to be available and intervene prn.

## 2023-11-20 NOTE — HOSPITAL COURSE
"Ms. Felipe presented with severe acute blood loss anemia 2/2 diverticular hemorrhage. CTA obtained revealing active bleeding in ascending colon. IR cosnulted and performed angiogram with coiling on 11/16. Hb has stabilized since this time; ultimately did require transfusion of 1u PRBC. Clinical course complicated by delirium, which is now resolving, and ICU acquired weakness due to her critical illness. Assessed by PT/OT. Initially able to ambulate but became very weak and needed assistance to return to bed. Pursuing SNF placement.    BP (!) 191/79   Pulse 86   Temp 98 °F (36.7 °C)   Resp 18   Ht 5' 4" (1.626 m)   Wt 54.6 kg (120 lb 5.9 oz)   SpO2 (!) 91%   BMI 20.66 kg/m²   Physical Exam  Vitals and nursing note reviewed.   Constitutional:       General: She is not in acute distress.     Interventions: She is restrained.      Comments: Older frail female   HENT:      Mouth/Throat:      Mouth: Mucous membranes are dry.   Eyes:      Comments: Conjunctival pallor present   Cardiovascular:      Rate and Rhythm: Normal rate and regular rhythm.      Heart sounds: Normal heart sounds. No murmur heard.  Pulmonary:      Effort: Pulmonary effort is normal. No respiratory distress.      Breath sounds: Normal breath sounds. No wheezing.   Abdominal:      Palpations: Abdomen is soft.      Tenderness: There is no abdominal tenderness.   Musculoskeletal:      Right lower leg: No edema.      Left lower leg: No edema.   Skin:     General: Skin is warm and dry.      Findings: No bruising.   Neurological:      General: No focal deficit present.      Mental Status: She is alert and oriented to person, place, and time.   Psychiatric:         Mood and Affect: Mood normal.   "

## 2023-11-20 NOTE — NURSING
Nurses Note -- 4 Eyes      11/20/2023   5:39 PM      Skin assessed during: Admit      [] No Altered Skin Integrity Present    []Prevention Measures Documented      [x] Yes- Altered Skin Integrity Present or Discovered   [] LDA Added if Not in Epic (Describe Wound)   [x] New Altered Skin Integrity was Present on Admit and Documented in LDA   [] Wound Image Taken    Wound Care Consulted? Yes    Attending Nurse:   REBECCA Kirkpatrick    Second RN/Staff Member:   REBECCA Mitchell        Small skin tear to LUE with generalized bruising to both UE. Skin otherwise intact.

## 2023-11-20 NOTE — DISCHARGE SUMMARY
[facility] Medicine  Discharge Summary      Patient Name: Olamide Felipe  MRN: [id number]  LUZ MARINA: [id number]  Patient Class: IP- Inpatient  Admission Date: 11/16/2023  Hospital Length of Stay: 4 days  Discharge Date and Time:  11/20/2023 12:38 PM  Attending Physician: Gary Mg.,*   Discharging Provider: Gary Mg MD  Primary Care Provider: Abiola Campbell MD    Primary Care Team: Networked reference to record PCT     HPI:   89-year-old female with PMH of mild dementia, breast cancer, hypertension, hypothyroidism, recurrent falls, hyperlipidemia who presented via EMS from [facility] for bright red blood per rectum.  Patient had a large bloody bowel movement this morning following which she called her daughter who called EMS.  EMS noted drops of blood from the rectum after the bowel movement.  Patient denies any lightheadedness or shortness of breath or chest pain.    While in the ER, vital signs stable.  Hemoglobin dropped about 2 g.  Type and screen was sent, no blood transfusion given.  CTA abdomen pelvis showed findings concerning for acute bleed within proximal aspect of ascending colon.  Large amount of retained stool with significant diverticulosis seen.  ER provider discussed case with IR attending -underwent angiogram and embolization.    Daughter Christy, EJ at bedside.  Assists with history.    * No surgery found *      Hospital Course:   Ms. Felipe presented with severe acute blood loss anemia 2/2 diverticular hemorrhage. CTA obtained revealing active bleeding in ascending colon. IR cosnulted and performed angiogram with coiling on 11/16. Hb has stabilized since this time; ultimately did require transfusion of 1u PRBC. Clinical course complicated by delirium, which is now resolving, and ICU acquired weakness due to her critical illness. Assessed by PT/OT. Initially able to ambulate but became very weak and needed assistance to return to  "bed. Pursuing SNF placement.    BP (!) 191/79   Pulse 86   Temp 98 °F (36.7 °C)   Resp 18   Ht 5' 4" (1.626 m)   Wt 54.6 kg (120 lb 5.9 oz)   SpO2 (!) 91%   BMI 20.66 kg/m²   Physical Exam  Vitals and nursing note reviewed.   Constitutional:       General: She is not in acute distress.     Interventions: She is restrained.      Comments: Older frail female   HENT:      Mouth/Throat:      Mouth: Mucous membranes are dry.   Eyes:      Comments: Conjunctival pallor present   Cardiovascular:      Rate and Rhythm: Normal rate and regular rhythm.      Heart sounds: Normal heart sounds. No murmur heard.  Pulmonary:      Effort: Pulmonary effort is normal. No respiratory distress.      Breath sounds: Normal breath sounds. No wheezing.   Abdominal:      Palpations: Abdomen is soft.      Tenderness: There is no abdominal tenderness.   Musculoskeletal:      Right lower leg: No edema.      Left lower leg: No edema.   Skin:     General: Skin is warm and dry.      Findings: No bruising.   Neurological:      General: No focal deficit present.      Mental Status: She is alert and oriented to person, place, and time.   Psychiatric:         Mood and Affect: Mood normal.      Goals of Care Treatment Preferences:  Code Status: DNR    Living Will: Yes     What is most important right now is to focus on spending time at home, remaining as independent as possible.  Accordingly, we have decided that the best plan to meet the patient's goals includes continuing with treatment.      Consults:   Consults (From admission, onward)          Status Ordering Provider     IP consult to case management  Once        Provider:  (Not yet assigned)    Completed ZANDER CHAND     Inpatient consult to Midline team  Once        Provider:  (Not yet assigned)    Acknowledged NICKO KUMAR     Inpatient consult to Gastroenterology-[facility]  Once        Provider:  (Not yet assigned)    Completed DANISH CONTRERAS     Inpatient consult to " Interventional Radiology  Once        Provider:  Al Wiggins MD    Completed ELVIS PUCKETT            No new Assessment & Plan notes have been filed under this hospital service since the last note was generated.  Service: Hospital Medicine    Final Active Diagnoses:    Diagnosis Date Noted POA    PRINCIPAL PROBLEM:  Lower GI bleed [K92.2] 11/16/2023 Yes    Weakness acquired in ICU [R53.1] 11/18/2023 Yes    Delirium [R41.0] 11/17/2023 No    Goals of care, counseling/discussion [Z71.89] 11/16/2023 Not Applicable    History of breast cancer [Z85.3] 03/06/2023 Not Applicable    Mild late onset Alzheimer's dementia without behavioral disturbance, psychotic disturbance, mood disturbance, or anxiety [G30.1, F02.A0] 03/06/2023 Yes    Hypothyroid [E03.9] 01/25/2021 Yes    Mood disorder [F39] 01/25/2021 Yes    Aortic atherosclerosis [I70.0] 01/25/2021 Yes      Problems Resolved During this Admission:       Discharged Condition: good    Disposition: Home or Self Care    Follow Up:   Follow-up Information       [facility] Gastroenterology Follow up.    Specialty: Gastroenterology  Why: Scheduling Navigator will contact patient with upcoming follow-up appointment.  Contact information:  [address]  [address] [address] [phone]  Additional information:  Please park in [address] or D and use [address]. Take Medical Office Bldg elevators.                         Patient Instructions:      Ambulatory referral/consult to Gastroenterology   Standing Status: Future   Referral Priority: Routine Referral Type: Consultation   Referral Reason: Specialty Services Required   Requested Specialty: Gastroenterology   Number of Visits Requested: 1       Significant Diagnostic Studies: N/A    Pending Diagnostic Studies:       Procedure Component Value Units Date/Time    IR Embolization Arterial or Venous for Hemorrhage [[id number]] Resulted: 11/16/23 0936    Order Status: Sent Lab Status: In process  Updated: 11/16/23 1128           Medications:  Reconciled Home Medications:      Medication List        CONTINUE taking these medications      acetaminophen 325 MG tablet  Commonly known as: TYLENOL  Take 325 mg by mouth every 6 (six) hours as needed for Pain.     b complex vitamins tablet  Take 1 tablet by mouth once daily.     calcium-vitamin D 250 mg-2.5 mcg (100 unit) per tablet  Take 1 tablet by mouth 2 (two) times daily.     diphenhydrAMINE 25 mg capsule  Commonly known as: BENADRYL  Take 25 mg by mouth nightly as needed for Insomnia.     FLUAD QUAD 2023-24(65Y UP)(PF) 60 mcg (15 mcg x 4)/0.5 mL Syrg  Generic drug: flu vac 2023 [id number](PF)     FLUoxetine 20 MG capsule  Take 1 capsule (20 mg total) by mouth once daily.     levothyroxine 25 MCG tablet  Commonly known as: SYNTHROID  Take 1 tablet (25 mcg total) by mouth before breakfast.     loratadine 10 mg tablet  Commonly known as: CLARITIN  Take 1 tablet (10 mg total) by mouth once daily.     losartan 25 MG tablet  Commonly known as: COZAAR  Take 1 tablet (25 mg total) by mouth daily as needed (For Systolic Blood pressure higher than 150).     memantine 5 MG Tab  Commonly known as: NAMENDA  Take 1 tablet (5 mg total) by mouth 2 (two) times daily. Begin by taking one tablet daily for seven days, then increase to one tablet two times daily.     omeprazole 20 MG capsule  Commonly known as: PRILOSEC  Take 1 capsule (20 mg total) by mouth once daily.     simvastatin 20 MG tablet  Commonly known as: ZOCOR  TAKE 1 TABLET (20 MG TOTAL) BY MOUTH EVERY EVENING.     WOMEN'S MULTIVITAMIN ORAL  Take 1 tablet by mouth once daily.            STOP taking these medications      aspirin 81 MG Chew              Indwelling Lines/Drains at time of discharge:   Lines/Drains/Airways       None                   Time spent on the discharge of patient: 34 minutes         Gary Mg MD  Department of Hospital Medicine  [facility]

## 2023-11-20 NOTE — PROGRESS NOTES
Protocol: [facility] Effectiveness Study  Identifier: [id number]  IRB#: 2022.247  PI: Dr. Baldo Maynard, PhD  CO-I: Dr. Nighat Arreguin PsyD  Version Date: 12/05/2022  Pt Study ID: [id number]  Visit Month: 1   Care Plan documented on: 11/14/23 Please refer to note for more information.      Timeline:      Consent: Completed(10/20/23)  Baseline questionnaires: Completed(10/23/23)  6-month questionnaires: Planned(04/18/24)  12-month questionnaires: Planned(10/18/24)    Visit Note: received an incoming email from cg Christy Garcia, cg stated that the pt went to the hospital last Thursday and was admitted to [facility] due to excessive bleeding. Cg mentioned that the pt was in the ICU on Thursday and Friday of last week and then transferred to [facility] on Friday where she remains now. Cg mentioned that over the weekend last weekend the pt thought that everyone was out to get her and trying to kill her. As of yesterday the plan has been to discharge the pt when she is ready to a skilled nursing facility. Cg stated that they took the pt off of baby aspirin and gave her Ativan last Thursday to help calm the pt down. Cg mentioned that the pt was hard to handle and the medication kicked the pt's butt.  Cg stated that the pt was approved to be transferred to [facility] skilled nursing facility and they should be moving her over there later today.     Cg mentioned that she is working today and tomorrow so I asked if I could please call her later in the week when she was not working to catch up. I will plan to call the cg on Friday 11/24/23 to check in and make sure the cg knew she could call or e-mail me if she needed to talk before then.

## 2023-11-20 NOTE — NURSING
Pt arrived to floor for skilled services with admitting diagnosis of Gastrointestinal hemorrhage, unspe* via wheelchair. Pt required x1 assistance from wheelchair to bed. Pt is AAOx4, cont of B/B BS commode present at bedside. Pt is on a regular diet. Skin assessment done: Pt has skin tear to LUE and generalized bruising to both UE(detailed) wound care consult placed.. PT has glasses on, dental appliance in place and bilateral hearing aides in place.Family informed of arrival by patient. POC reviewed with patient. Pt denies pain at this time and is educated to use call light and not get OOB w/o assistance.

## 2023-11-20 NOTE — PLAN OF CARE
[facility]    FACILITY TRANSFER ORDERS      Patient Name: Olamide Felipe  YOB: 1933    PCP: Abiola Campbell MD   PCP Address: [address] [facility] [address] [place]  PCP Phone Number: [phone]  PCP Fax: [phone]    Encounter Date: 11/20/2023    Admit to: SNF    Vital Signs:  Routine    Diagnoses:   Active Hospital Problems    Diagnosis  POA    *Lower GI bleed [K92.2]  Yes    Weakness acquired in ICU [R53.1]  Yes    Delirium [R41.0]  No    Goals of care, counseling/discussion [Z71.89]  Not Applicable    History of breast cancer [Z85.3]  Not Applicable    Mild late onset Alzheimer's dementia without behavioral disturbance, psychotic disturbance, mood disturbance, or anxiety [G30.1, F02.A0]  Yes    Hypothyroid [E03.9]  Yes    Mood disorder [F39]  Yes    Aortic atherosclerosis [I70.0]  Yes      Resolved Hospital Problems   No resolved problems to display.       Allergies:Review of patient's allergies indicates:  No Known Allergies    Diet: regular diet    Activities: Activity as tolerated    Goals of Care Treatment Preferences:  Code Status: DNR    Living Will: Yes     What is most important right now is to focus on spending time at home, remaining as independent as possible.  Accordingly, we have decided that the best plan to meet the patient's goals includes continuing with treatment.      Nursing: fall precautions     Labs: n/a    CONSULTS:    Physical Therapy to evaluate and treat. , Occupational Therapy to evaluate and treat., and  to evaluate for community resources/long-range planning.    MISCELLANEOUS CARE:  Routine Skin for Bedridden Patients: Apply moisture barrier cream to all skin folds and wet areas in perineal area daily and after baths and all bowel movements.    WOUND CARE ORDERS  None    Medications: Review discharge medications with patient and family and provide education.      Current Discharge Medication List        CONTINUE these medications  which have NOT CHANGED    Details   FLUoxetine 20 MG capsule TAKE 1 CAPSULE EVERY DAY  Qty: 90 capsule, Refills: 3      levothyroxine (SYNTHROID) 25 MCG tablet Take 1 tablet (25 mcg total) by mouth before breakfast.  Qty: 90 tablet, Refills: 3      losartan (COZAAR) 25 MG tablet Take 1 tablet (25 mg total) by mouth daily as needed (For Systolic Blood pressure higher than 150).  Qty: 90 tablet, Refills: 1    Comments: .      omeprazole (PRILOSEC) 20 MG capsule TAKE 1 CAPSULE EVERY DAY  Qty: 90 capsule, Refills: 3      simvastatin (ZOCOR) 20 MG tablet TAKE 1 TABLET (20 MG TOTAL) BY MOUTH EVERY EVENING.  Qty: 90 tablet, Refills: 2      acetaminophen (TYLENOL) 325 MG tablet Take 325 mg by mouth every 6 (six) hours as needed for Pain.      b complex vitamins tablet Take 1 tablet by mouth once daily.      calcium-vitamin D 250-100 mg-unit per tablet Take 1 tablet by mouth 2 (two) times daily.      diphenhydrAMINE (BENADRYL) 25 mg capsule Take 25 mg by mouth nightly as needed for Insomnia.      FLUAD QUAD 2023-24,65Y UP,,PF, 60 mcg (15 mcg x 4)/0.5 mL Syrg       loratadine (CLARITIN) 10 mg tablet Take 1 tablet (10 mg total) by mouth once daily.  Qty: 90 tablet, Refills: 3      memantine (NAMENDA) 5 MG Tab Take 1 tablet (5 mg total) by mouth 2 (two) times daily. Begin by taking one tablet daily for seven days, then increase to one tablet two times daily.  Qty: 60 tablet, Refills: 11    Associated Diagnoses: MCI (mild cognitive impairment) with memory loss      mv-min/iron/folic/calcium/vitK (WOMEN'S MULTIVITAMIN ORAL) Take by mouth once daily.           STOP taking these medications       aspirin 81 MG Chew Comments:   Reason for Stopping:                  Immunizations Administered as of 11/20/2023       Name Date Dose VIS Date Route Exp Date    COVID-19, MRNA, LN-S, PF (Moderna) 11/29/2021 0.25 mL -- Intramuscular --    Site: Left arm     Lot: [id number]     COVID-19, MRNA, LN-S, PF (Moderna) 2/9/2021 0.5 mL -- Intramuscular  --    Site: Left deltoid     Lot: [id number]     COVID-19, MRNA, LN-S, PF (Moderna) 1/12/2021 0.5 mL -- Intramuscular --    Site: Left deltoid     Lot: [id number]             This patient has had both covid vaccinations    Some patients may experience side effects after vaccination.  These may include fever, headache, muscle or joint aches.  Most symptoms resolve with 24-48 hours and do not require urgent medical evaluation unless they persist for more than 72 hours or symptoms are concerning for an unrelated medical condition.          _________________________________  Gary Mg MD  11/20/2023

## 2023-11-20 NOTE — PT/OT/SLP PROGRESS
Physical Therapy Treatment    Patient Name:  Olamide Felipe   MRN:  [id number]    Recommendations:     Discharge Recommendations: Moderate Intensity Therapy  Discharge Equipment Recommendations: to be determined by next level of care  Barriers to discharge:  decreased stability at times with gait; fall risk    Assessment:     Olamide Felipe is a 89 y.o. female admitted with a medical diagnosis of Lower GI bleed.  She presents with the following impairments/functional limitations: weakness, impaired endurance, impaired self care skills, impaired functional mobility, gait instability, impaired balance, decreased lower extremity function, decreased safety awareness, impaired coordination, decreased coordination, impaired cognition Pt would continue to benefit from P.T. To address impairments listed above.  .    Rehab Prognosis: Fair; patient would benefit from acute skilled PT services to address these deficits and reach maximum level of function.    Recent Surgery: * No surgery found *      Plan:     During this hospitalization, patient to be seen 4 x/week to address the identified rehab impairments via gait training, therapeutic activities, neuromuscular re-education, therapeutic exercises and progress toward the following goals:    Plan of Care Expires:  12/19/23    Subjective     Patient/Family Comments/goals: Pt agreed to tx  Pain/Comfort:  Pain Rating 1: 0/10  Pain Rating Post-Intervention 1: 0/10      Objective:     Communicated with RN prior to session.  Patient found supine with telemetry, peripheral IV upon PT entry to room.     General Precautions: Standard, fall  Orthopedic Precautions: N/A  Braces: N/A  Respiratory Status: Room air     Functional Mobility:  Bed Mobility:     Rolling Right: stand by assistance  Scooting: stand by assistance  Supine to Sit: stand by assistance  Transfers:     Sit to Stand:  contact guard assistance with rolling walker and vc's for hand placement with sit and stand x 3  trials.  Gait: 85ft with RW, seated rest, then 60ft with RW both bouts with CGA with two bouts of Alla for Rw management for improved proximity to Rw for increased safety.  Pt ambulates with decreased step length and dez with mild trunk flexion.    Balance: sitting good-, standing fair, gait fair/fair-      AM-PAC 6 CLICK MOBILITY  Turning over in bed (including adjusting bedclothes, sheets and blankets)?: 3  Sitting down on and standing up from a chair with arms (e.g., wheelchair, bedside commode, etc.): 3  Moving from lying on back to sitting on the side of the bed?: 3  Moving to and from a bed to a chair (including a wheelchair)?: 3  Need to walk in hospital room?: 3  Climbing 3-5 steps with a railing?: 2  Basic Mobility Total Score: 17       Treatment & Education:  Pt sat EOB  and performed BLE therex: APs, LAQs, hip flexion 10 x 2 reps with vc's and occasional Alla for proper technique.  Gait training as above with pt returning to her room sitting EOB with RN present after last bout.  Transport had just arrived for pt's discharge.  Pt left with RN present.    Patient left sitting edge of bed with all lines intact, call button in reach, and RN present..    GOALS:   Multidisciplinary Problems       Physical Therapy Goals          Problem: Physical Therapy    Goal Priority Disciplines Outcome Goal Variances Interventions   Physical Therapy Goal     PT, PT/OT Ongoing, Progressing     Description: Goals to be met by: 2023     Patient will increase functional independence with mobility by performin. Supine to sit with Modified [place]  2. Sit to supine with Modified [place]  3. Sit to stand transfer with Modified [place]  4. Gait  x 150 feet with Modified [place] using Rolling Walker.   5. Stand for 15 minutes with Modified [place] using Rolling Walker                         Time Tracking:     PT Received On: 23  PT Start Time: 1540     PT Stop Time: 1603  PT Total  Time (min): 23 min     Billable Minutes: Gait Training 15 and Therapeutic Exercise 8    Treatment Type: Treatment  PT/PTA: PTA     Number of PTA visits since last PT visit: 1 11/20/2023

## 2023-11-20 NOTE — NURSING
Patient discharging to [facility], report called to [facility]. AVS placed in packet. Midline and telemetry removed, patient tolerated well. [facility] van arrived to transport her to [facility].

## 2023-11-20 NOTE — PLAN OF CARE
Problem: Occupational Therapy  Goal: Occupational Therapy Goal  Description: Goals to be met by: 12/17/2023     Patient will increase functional independence with ADLs by performing:    LE Dressing with Set-up Assistance.  Grooming while standing at sink with Modified [place].  Toileting from toilet with modified independence for hygiene and clothing management.   Toilet transfer to toilet with Modified [place] with use of least restrictive device.  Functional mobility in / out of bathroom with supervision and use of least restrictive device without adverse change in vital signs.  Family or caregiver 100% verbalized reciprocation of dementia friendly recommendations (ex: simplify choices; decrease visual clutter; remove hazardous objects; maximize familiarity, etc) to support patient's wellbeing and highest level of occupational engagement and participation in least restrictive discharge environment     Outcome: Ongoing, Progressing   Olamide Felipe is a 89 y.o. female with a medical diagnosis of Lower GI bleed.  Performance deficits affecting function are weakness, impaired endurance, impaired functional mobility, impaired self care skills, gait instability, impaired balance, decreased upper extremity function, decreased lower extremity function, decreased safety awareness, impaired fine motor.    Pt found in bed, agreeable to therapy.  Pt is progressing well towards goals. Continue OT services to address functional goals, progressing as able.

## 2023-11-20 NOTE — PLAN OF CARE
Pt will dc to [facility]. Laura with SNF is aware that orders in chart. Auth has been received per Laura. Pts daughter is aware that pt will dc today. SW awaiting report and room number at this time. Pts daughter verbally completed pts choice form. Pts daughter made aware of transport time.     Report:report to [phone]   Room:3rd floor  Transport packet: At nursing station.  Transportation: SW setup wheelchair transport for 4:00 pm. ETA pending.   Facility: [facility].     Christy Garcia (Daughter)  [phone]     SW will continue to follow pt throughout her transitions of care and assist with any dc needs.     Cleared from  . Bedside Nurse and VN notified.    CarePort Alert: YES response from [facility] re: Referral [id number] for patient in [facility] [id number] A: Yes, willing to accept patient     Future Appointments   Date Time Provider Department Center   11/27/2023  2:15 PM Jamshid Trujillo MD [facility]   12/1/2023 10:00 AM Gene Barnes MD [facility] [place]   1/11/2024  9:30 AM [facility], [facility] [place]   1/18/2024  3:00 PM Abiola Campbell MD [facility] [place]        11/20/23 1118   Final Note   Assessment Type Final Discharge Note   Anticipated Discharge Disposition SNF   Hospital Resources/Appts/Education Provided Appointments scheduled and added to AVS   Post-Acute Status   Discharge Delays None known at this time

## 2023-11-20 NOTE — PLAN OF CARE
SW contacted pts daughter to discuss dc planning. Pts daughter confirmed information on chart. Pt resides in home alone. Pt is independent in ADLs at baseline. Pt has cane at home for use. Pt has no HH services. Upon dc pts plan is to dc to [facility]. SW will continue to follow pt throughout her transitions of care and assist with any dc needs.     Christy Garcia (Daughter)  [phone]     Future Appointments   Date Time Provider Department Center   11/27/2023  2:15 PM [facility]   12/1/2023 10:00 AM Gene Barnes MD [facility] [place]   1/11/2024  9:30 AM [facility], [facility] [place]   1/18/2024  3:00 PM Abiola Campbell MD [facility] [place]      11/20/23 1107   Discharge Assessment   Assessment Type Discharge Planning Assessment   Confirmed/corrected address, phone number and insurance Yes   Confirmed Demographics Correct on Facesheet   Source of Information family   Reason For Admission Lower GI bleed   People in Home alone   Do you expect to return to your current living situation? Yes   Do you have help at home or someone to help you manage your care at home? Yes   Who are your caregiver(s) and their phone number(s)? Christy Garcia (Daughter) [phone]   Prior to hospitilization cognitive status: Unable to Assess   Current cognitive status: Unable to Assess   Home Layout Able to live on 1st floor   Equipment Currently Used at Home cane, straight   Patient currently being followed by outpatient case management? No   Do you currently have service(s) that help you manage your care at home? No   Do you take prescription medications? Yes   Do you have prescription coverage? Yes   Coverage [employer] MANAGED MEDICARE - [employer] TOTAL CARE ADVANTAGE   Do you have any problems affording any of your prescribed medications? No   Is the patient taking medications as prescribed? yes   Who is going to help you get home at discharge? Family or SW   How do you get to doctors appointments? family  or friend will provide   Are you on dialysis? No   Do you take coumadin? No   DME Needed Upon Discharge    (TBD)   Discharge Plan discussed with: Adult children   Transition of Care Barriers None   Discharge Plan A Skilled Nursing Facility   Discharge Plan B Home Health   Financial Resource Strain   How hard is it for you to pay for the very basics like food, housing, medical care, and heating? Not hard   Housing Stability   In the last 12 months, was there a time when you were not able to pay the mortgage or rent on time? N   In the last 12 months, was there a time when you did not have a steady place to sleep or slept in a shelter (including now)? N   Transportation Needs   In the past 12 months, has lack of transportation kept you from medical appointments or from getting medications? no   In the past 12 months, has lack of transportation kept you from meetings, work, or from getting things needed for daily living? No   Food Insecurity   Within the past 12 months, you worried that your food would run out before you got the money to buy more. Never true   Within the past 12 months, the food you bought just didn't last and you didn't have money to get more. Never true   Alcohol Use   Q1: How often do you have a drink containing alcohol? Monthly or l   Q2: How many drinks containing alcohol do you have on a typical day when you are drinking? 1 or 2   Q3: How often do you have six or more drinks on one occasion? Less than mo

## 2023-11-20 NOTE — PROGRESS NOTES
[facility]                    Pharmacy       Discharge Medication Education    Patient ACCEPTED medication education. Pharmacy has provided education on the name, indication, and possible side effects of the medication(s) prescribed, using teach-back method.     The following medications have also been discussed, during this admission.        Medication List        CONTINUE taking these medications      acetaminophen 325 MG tablet  Commonly known as: TYLENOL     b complex vitamins tablet  Take 1 tablet by mouth once daily.     calcium-vitamin D 250 mg-2.5 mcg (100 unit) per tablet     diphenhydrAMINE 25 mg capsule  Commonly known as: BENADRYL     FLUAD QUAD 2023-24(65Y UP)(PF) 60 mcg (15 mcg x 4)/0.5 mL Syrg  Generic drug: flu vac 2023 [id number]-[id number](PF)     FLUoxetine 20 MG capsule  Take 1 capsule (20 mg total) by mouth once daily.     levothyroxine 25 MCG tablet  Commonly known as: SYNTHROID  Take 1 tablet (25 mcg total) by mouth before breakfast.     loratadine 10 mg tablet  Commonly known as: CLARITIN  Take 1 tablet (10 mg total) by mouth once daily.     losartan 25 MG tablet  Commonly known as: COZAAR  Take 1 tablet (25 mg total) by mouth daily as needed (For Systolic Blood pressure higher than 150).     memantine 5 MG Tab  Commonly known as: NAMENDA  Take 1 tablet (5 mg total) by mouth 2 (two) times daily. Begin by taking one tablet daily for seven days, then increase to one tablet two times daily.     omeprazole 20 MG capsule  Commonly known as: PRILOSEC  Take 1 capsule (20 mg total) by mouth once daily.     simvastatin 20 MG tablet  Commonly known as: ZOCOR  TAKE 1 TABLET (20 MG TOTAL) BY MOUTH EVERY EVENING.     WOMEN'S MULTIVITAMIN ORAL            STOP taking these medications      aspirin 81 MG Chew               Where to Get Your Medications        Information about where to get these medications is not yet available    Ask your nurse or doctor about these medications  b complex  vitamins tablet  FLUoxetine 20 MG capsule  omeprazole 20 MG capsule          Thank you  Heladio Rae, PharmD  [phone]

## 2023-11-20 NOTE — PT/OT/SLP PROGRESS
Occupational Therapy   Treatment    Name: Olamide Felipe  MRN: [id number]  Admitting Diagnosis:  Lower GI bleed       Recommendations:     Discharge Recommendations: Moderate Intensity Therapy  Discharge Equipment Recommendations:  walker, rolling  Barriers to discharge:  Other (Comment) (Increased level of assistance)    Assessment:     Olamide Felipe is a 89 y.o. female with a medical diagnosis of Lower GI bleed.  Performance deficits affecting function are weakness, impaired endurance, impaired functional mobility, impaired self care skills, gait instability, impaired balance, decreased upper extremity function, decreased lower extremity function, decreased safety awareness, impaired fine motor.    Pt found in bed, agreeable to therapy.  Pt is progressing well towards goals. Continue OT services to address functional goals, progressing as able.      Rehab Prognosis:  Good; patient would benefit from acute skilled OT services to address these deficits and reach maximum level of function.       Plan:     Patient to be seen 4 x/week to address the above listed problems via therapeutic activities, therapeutic exercises, self-care/home management  Plan of Care Expires: 12/17/23  Plan of Care Reviewed with: patient    Subjective     Chief Complaint: No complaints.   Patient/Family Comments/goals: to return to PLOF  Pain/Comfort:  Pain Rating 1: 0/10  Pain Rating Post-Intervention 1: 0/10    Objective:     Communicated with: RN prior to session.  Patient found HOB elevated with peripheral IV, telemetry upon OT entry to room.    General Precautions: Standard, fall    Orthopedic Precautions:N/A  Braces: N/A  Respiratory Status: Room air     Occupational Performance:     Bed Mobility:    Patient completed Rolling/Turning to Right with supervision  Patient completed Supine to Sit with supervision  Patient completed Sit to Supine with supervision     Functional Mobility/Transfers:  Patient completed Sit <> Stand  Transfer with stand by assistance  with  rolling walker vcs for hand placement  Functional Mobility: Pt ambulated extended distances, in preparation for ambulatory level ADL/IADL, with CGA/SBA using RW. Fair pace, no LOB.     Activities of Daily Living:  Grooming: stand by assistance standing at sink with SBA/CGA for standing balance.       [facility] 6 Click ADL: 21    Patient left HOB elevated with all lines intact, call button in reach, and bed alarm on    GOALS:   Multidisciplinary Problems       Occupational Therapy Goals          Problem: Occupational Therapy    Goal Priority Disciplines Outcome Interventions   Occupational Therapy Goal     OT, PT/OT Ongoing, Progressing    Description: Goals to be met by: 12/17/2023     Patient will increase functional independence with ADLs by performing:    LE Dressing with Set-up Assistance.  Grooming while standing at sink with Modified [place].  Toileting from toilet with modified independence for hygiene and clothing management.   Toilet transfer to toilet with Modified [place] with use of least restrictive device.  Functional mobility in / out of bathroom with supervision and use of least restrictive device without adverse change in vital signs.  Family or caregiver 100% verbalized reciprocation of dementia friendly recommendations (ex: simplify choices; decrease visual clutter; remove hazardous objects; maximize familiarity, etc) to support patient's wellbeing and highest level of occupational engagement and participation in least restrictive discharge environment                          Time Tracking:     OT Date of Treatment: 11/20/23  OT Start Time: 1431  OT Stop Time: 1449  OT Total Time (min): 18 min    Billable Minutes:Self Care/Home Management 8  Therapeutic Activity 10               11/20/2023

## 2023-11-20 NOTE — PLAN OF CARE
SW met with pt at bedside during rounding with MD. SW sent SNF referrals via careport. SW will continue to follow pt throughout her transitions of care and assist with any dc needs.     SNF referrals sent via careport. [facility] reviewing pt at this time.     Future Appointments   Date Time Provider Department Center   11/27/2023  2:15 PM Jamshid Trujillo MD [facility]   12/1/2023 10:00 AM Gene Barnes MD [facility] [place]   1/11/2024  9:30 AM LAB, [facility] [place]   1/18/2024  3:00 PM Abiola Campbell MD [facility] [place]        11/20/23 0810   Rounds   Attendance Provider;;Assigned nurse   Discharge Plan A Skilled Nursing Facility   Why the patient remains in the hospital Requires continued medical care   Transition of Care Barriers None

## 2023-11-21 ENCOUNTER — PATIENT OUTREACH (OUTPATIENT)
Dept: ADMINISTRATIVE | Facility: CLINIC | Age: 88
End: 2023-11-21
Payer: MEDICARE

## 2023-11-21 PROBLEM — D62 ACUTE BLOOD LOSS ANEMIA: Status: ACTIVE | Noted: 2023-11-21

## 2023-11-21 PROCEDURE — 97110 THERAPEUTIC EXERCISES: CPT | Mod: HCNC

## 2023-11-21 PROCEDURE — 97165 OT EVAL LOW COMPLEX 30 MIN: CPT | Mod: HCNC

## 2023-11-21 PROCEDURE — 97535 SELF CARE MNGMENT TRAINING: CPT | Mod: HCNC

## 2023-11-21 PROCEDURE — 11000004 HC SNF PRIVATE: Mod: HCNC

## 2023-11-21 PROCEDURE — 94760 N-INVAS EAR/PLS OXIMETRY 1: CPT | Mod: HCNC

## 2023-11-21 PROCEDURE — 97163 PT EVAL HIGH COMPLEX 45 MIN: CPT | Mod: HCNC

## 2023-11-21 PROCEDURE — 97116 GAIT TRAINING THERAPY: CPT | Mod: HCNC

## 2023-11-21 PROCEDURE — 25000003 PHARM REV CODE 250: Mod: HCNC | Performed by: HOSPITALIST

## 2023-11-21 RX ORDER — ALUMINUM HYDROXIDE, MAGNESIUM HYDROXIDE, AND SIMETHICONE 2400; 240; 2400 MG/30ML; MG/30ML; MG/30ML
30 SUSPENSION ORAL EVERY 6 HOURS PRN
Status: DISCONTINUED | OUTPATIENT
Start: 2023-11-21 | End: 2023-12-08 | Stop reason: HOSPADM

## 2023-11-21 RX ADMIN — Medication 1 TABLET: at 10:11

## 2023-11-21 RX ADMIN — SENNOSIDES AND DOCUSATE SODIUM 1 TABLET: 8.6; 5 TABLET ORAL at 09:11

## 2023-11-21 RX ADMIN — CETIRIZINE HYDROCHLORIDE 10 MG: 5 TABLET, FILM COATED ORAL at 10:11

## 2023-11-21 RX ADMIN — THERA TABS 1 TABLET: TAB at 10:11

## 2023-11-21 RX ADMIN — LEVOTHYROXINE SODIUM 25 MCG: 25 TABLET ORAL at 05:11

## 2023-11-21 RX ADMIN — CALCIUM CARBONATE (ANTACID) CHEW TAB 500 MG 500 MG: 500 CHEW TAB at 12:11

## 2023-11-21 RX ADMIN — SENNOSIDES AND DOCUSATE SODIUM 1 TABLET: 8.6; 5 TABLET ORAL at 10:11

## 2023-11-21 RX ADMIN — PANTOPRAZOLE SODIUM 40 MG: 40 TABLET, DELAYED RELEASE ORAL at 10:11

## 2023-11-21 RX ADMIN — Medication 6 MG: at 09:11

## 2023-11-21 RX ADMIN — MEMANTINE HYDROCHLORIDE 5 MG: 5 TABLET ORAL at 10:11

## 2023-11-21 RX ADMIN — Medication 1 TABLET: at 09:11

## 2023-11-21 RX ADMIN — MEMANTINE HYDROCHLORIDE 5 MG: 5 TABLET ORAL at 09:11

## 2023-11-21 RX ADMIN — FLUOXETINE 20 MG: 10 CAPSULE ORAL at 10:11

## 2023-11-21 RX ADMIN — ATORVASTATIN CALCIUM 10 MG: 10 TABLET, FILM COATED ORAL at 09:11

## 2023-11-21 NOTE — PT/OT/SLP EVAL
Physical Therapy Evaluation    Patient Name:  Olamide Felipe   MRN:  [id number]  Admit Date: 11/20/2023  Recent Surgeries: N/A    General Precautions: Standard, fall   Orthopedic Precautions: N/A   Braces: N/A    Recommendations:     Discharge Recommendations:  (Home Health PT)   Level of Assistance Recommended: Intermittent assistance   Discharge Equipment Recommendations: walker, rolling   Barriers to discharge: None    Assessment:     Olamide Felipe is a 89 y.o. female admitted with a medical diagnosis of Acute lower GI bleeding .  Performance deficits affecting function  weakness, impaired endurance, impaired functional mobility, impaired self care skills, gait instability, impaired balance, decreased upper extremity function, decreased lower extremity function, impaired cardiopulmonary response to activity. Pt was (I) with no AD PTA including walking around her ILF complex. Pt with unsteady GT at this time and therefore needing a RW for GT for safety and to prevent falls. Pt will therefore benefit from skilled PT services during this hospital admit to continue to improve transfer ability and efficiency as well as continue to progress pt's ambulation distance and cardiopulmonary endurance to maximize pt's functional independence and return to PLOF.       Rehab Potential is good     Activity Tolerance: Good    Plan:     Patient to be seen 4 x/week to address the above listed problems via gait training, therapeutic activities, therapeutic exercises, neuromuscular re-education, wheelchair management/training     Plan of Care Expires: 12/21/23  Plan of Care Reviewed with: patient    Subjective     Chief Complaint: weakness  Patient/Family Comments/goals: gain (I)  Pain/Comfort:  Pain Rating 1: 0/10  Pain Rating Post-Intervention 1: 0/10    Living Environment:   Lives at [facility] where she can walks on the sidewalk to the grocery and was fairly indep- no steps at her home. Complex has WIS with shower chair.  Prior  to admission, patients level of function was fairly indep.  Equipment used at home:  (owns a cane but does not like to use, uses a portable shopping cart as walker while shopping).  DME owned (not currently used): single point cane.  Upon discharge, patient will have assistance from facility and daughter.    Patients cultural, spiritual, Gnosticist conflicts given the current situation: no    Objective:     Communicated with pt's nurse prior to session.  Patient found HOB elevated with    upon PT entry to room.    Exams:  Cognitive Exam:  Patient is oriented to Person, Place, Time, and Situation  Gross Motor Coordination:  WFL  Sensation:    -       Intact  Skin Integrity/Edema:      -       Skin integrity: Visible skin intact  RLE ROM: WFL  RLE Strength: WFL  LLE ROM: WFL  LLE Strength: WFL    Functional Mobility:   11/21/23 1213   Prior Functioning: Everyday Activities   Self Care Independent   Indoor Mobility (Ambulation) Independent   Stairs Unknown   Functional Cognition Independent   Prior Device Use None of the given options   Roll Left and Right   Did they attempt the activity? Yes   Was the activity done independently? Yes   Was adaptive equipment used? No   CARE Score - Roll Left and Right 6   Sit to Lying   Did they attempt the activity? Yes   Was the activity done independently? No   Assistance Needed Supervision   Was adaptive equipment used? No   CARE Score - Sit to Lying 4   Lying to Sitting on Side of Bed   Did they attempt the activity? Yes   Was the activity done independently? No   Assistance Needed Supervision   Was adaptive equipment used? No   CARE Score - Lying to Sitting on Side of Bed 4   Sit to Stand   Did they attempt the activity? Yes   Was the activity done independently? No   Assistance Needed Touching assistance  (with HHA and RW)   Was adaptive equipment used? Yes   CARE Score - Sit to Stand 4   Chair/Bed-to-Chair Transfer   Did they attempt the activity? Yes   Was the activity done  independently? No   Assistance Needed Touching assistance  (CGA with no AD, SPT)   Was adaptive equipment used? No   CARE Score - Chair/Bed-to-Chair Transfer 4   Chair/Bed-to-Chair Transfer Discharge Goal   Discharge Goal 5   Car Transfer   Reason if not Attempted Environmental limitations   CARE Score - Car Transfer 10   Walk 10 Feet   Did they attempt the activity? Yes   Was the activity done independently? No   Assistance Needed Touching assistance  (205ft with RW and CGA for safety as pt c/o of weakness and fatigue. ~80ft with HHA and Alla d.t pt with increase lateral instability.)   Was adaptive equipment used? Yes   CARE Score - Walk 10 Feet 4   Walk 50 Feet with Two Turns   Did they attempt the activity? Yes   Was the activity done independently? No   Assistance Needed Touching assistance   Was adaptive equipment used? Yes   CARE Score - Walk 50 Feet with Two Turns 4   Walk 150 Feet   Did they attempt the activity? Yes   Was the activity done independently? No   Assistance Needed Touching assistance   Was adaptive equipment used? Yes   CARE Score - Walk 150 Feet 4   Walking 10 Feet on Uneven Surfaces   Did they attempt the activity? Yes   Was the activity done independently? No   Assistance Needed Touching assistance  (CGA with RW)   Was adaptive equipment used? Yes   CARE Score - Walking 10 Feet on Uneven Surfaces 4   1 Step (Curb)   Did they attempt the activity? Yes   Was the activity done independently? No   Assistance Needed Touching assistance  (4inch curb step with RW and CGA and VCs for safety)   Was adaptive equipment used? Yes   CARE Score - 1 Step (Curb) 4   4 Steps   Did they attempt the activity? Yes   Was the activity done independently? No   Assistance Needed Touching assistance  (CGA using B rails)   Was adaptive equipment used? Yes   CARE Score - 4 Steps 4   12 Steps   Did they attempt the activity? Yes   Was the activity done independently? No   Assistance Needed Touching assistance  (CGA  using B rails)   Was adaptive equipment used? Yes   CARE Score - 12 Steps 4   Picking Up Object   Did they attempt the activity? Yes   Was the activity done independently? No   Assistance Needed Touching assistance  (CGA using RW)   Was adaptive equipment used? Yes   CARE Score - Picking Up Object 4   OTHER   Uses a Wheelchair/Scooter? Yes   Wheel 50 Feet with Two Turns   Did they attempt the activity? Yes   Was the activity done independently? No   Assistance Needed Supervision   Type of Wheelchair/Scooter Manual   CARE Score - Wheel 50 Feet with Two Turns 4   Wheel 150 Feet   Did they attempt the activity? Yes   Was the activity done independently? No   Assistance Needed Supervision   Type of Wheelchair/Scooter Manual   CARE Score - Wheel 150 Feet 4       Therapeutic Activities and Exercises: Nustep with resistance set at 5 for 15mins to help improve pt's overall MMT and cardiovascular endurance.  Additional GT completed.    Education:  Patient provided with daily orientation and goals of this PT session.  Patient educated to transfer to bedside chair/bedside commode/bathroom with RN/PCT present.   Patient educated on Fall risk and plan of care by explanation.   Patient Verbalized Understanding.  Time provided for therapeutic counseling and discussion of current health disposition. All questions answered to satisfaction, within scope of PT practice; voiced no other concerns. White board updated in patient's room, RN notified of session.     AM-PAC 6 CLICK MOBILITY  Total Score:18     Patient left up in chair with call button in reach.    GOALS:   Multidisciplinary Problems       Physical Therapy Goals          Problem: Physical Therapy    Goal Priority Disciplines Outcome Goal Variances Interventions   Physical Therapy Goal     PT, PT/OT Ongoing, Progressing     Description: Goals to be met by: 12/21/23     Patient will increase functional independence with mobility by performing:    . Supine to sit with  [place]  . Sit to supine with [place]  . Rolling to Left and Right with [place].  . Sit to stand transfer with Modified [place]  . Bed to chair transfer with Modified [place] using LRAD or no AD  . Gait  x 200 feet with Supervision using LRAD and/or no AD.   . Wheelchair propulsion x150 feet with Modified [place] using bilateral uppper extremities  . Ascend/descend 12 stair with bilateral Handrails Supervision using No Assistive Device.   . Ascend/Descend 4 inch curb step with Supervision using No Assistive Device and/or LRAD.                         History:     Past Medical History:   Diagnosis Date    Hyperlipidemia     Hypothyroidism     Malignant neoplasm of upper-outer quadrant of right breast in female, estrogen receptor negative 1/7/2022    Osteoarthritis, knee     Vertigo        Past Surgical History:   Procedure Laterality Date    dentures      FRACTURE SURGERY Left     fracture left wrist    HYSTERECTOMY      INJECTION FOR SENTINEL NODE IDENTIFICATION Right 1/7/2022    Procedure: INJECTION, FOR SENTINEL NODE IDENTIFICATION-Right;  Surgeon: Marci Mcintosh MD;  Location: [facility];  Service: General;  Laterality: Right;    left wrist surgery      MASTECTOMY, PARTIAL Right 1/7/2022    Procedure: MASTECTOMY, PARTIAL-Right with radiological marker;  Surgeon: Marci Mcintosh MD;  Location: [facility];  Service: General;  Laterality: Right;  REQUEST SAID 2 HOUR CASE    SENTINEL LYMPH NODE BIOPSY Right 1/7/2022    Procedure: BIOPSY, LYMPH NODE, SENTINEL-Right;  Surgeon: Marci Mcintosh MD;  Location: [facility];  Service: General;  Laterality: Right;       Time Tracking:     PT Received On: 11/21/23  PT Start Time: 0900     PT Stop Time: 0956  PT Total Time (min): 56 min     Billable Minutes: Evaluation 30, Gait Training 11, and Therapeutic Exercise 15      11/21/2023

## 2023-11-21 NOTE — PROGRESS NOTES
"                                                        [facility]                                  Skilled Nursing Facility                   Progress Note     Admit Date: 11/20/2023  TAMMIE   [id number]  Principal Problem:  Acute lower GI bleeding   HPI obtained from patient interview and chart review     Chief Complaint: Establish Care/ Initial Visit     HPI:   Mrs. Felipe is a 89-year-old female with PMHx of mild dementia, breast cancer, HTN, hypothyroidism, recurrent falls, HLD who presents to SNF following hospitalization for acute lower GI bleed.  Admission to SNF for secondary weakness and debility.    Patient originally presented to [facility] ED on 11/16 from independent living facility for bright red blood per rectum.  Per [facility] notes,  Patient had a large bloody bowel movement this morning following which she called her daughter who called EMS.  EMS noted drops of blood from the rectum after the bowel movement.  Patient denies any lightheadedness or shortness of breath or chest pain. While in the ER, vital signs stable.  Hemoglobin dropped about 2 g.  Type and screen was sent, no blood transfusion given.  CTA abdomen pelvis showed findings concerning for acute bleed within proximal aspect of ascending colon.  Large amount of retained stool with significant diverticulosis seen.  IR cosnulted and performed angiogram with coiling on 11/16. Hb has stabilized since this time; ultimately did require transfusion of 1u PRBC. Clinical course complicated by delirium, which is now resolving, and ICU acquired weakness due to her critical illness. Assessed by PT/OT. Initially able to ambulate but became very weak and needed assistance to return to bed. Pursuing SNF placement."    Today, patient remains very weak.  She is very motivated to work with therapy.  She reports some indigestion and is requesting Tums.  Currently, patient is oriented to her age and the place, she is disoriented to time.  " This is her mental baseline, no delirium noted at this time.    Patient will be treated at [facility] with PT and OT to improve functional status and ability to perform ADLs.     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    Past Surgical History: Patient has a past surgical history that includes dentures; left wrist surgery; Hysterectomy; Fracture surgery (Left); Mastectomy, partial (Right, 1/7/2022); Shushan lymph node biopsy (Right, 1/7/2022); and Injection for sentinel node identification (Right, 1/7/2022).    Social History: Patient reports that she quit smoking about 34 years ago. Her smoking use included cigarettes. She has never been exposed to tobacco smoke. She has never used smokeless tobacco. She reports current alcohol use of about 2.0 standard drinks of alcohol per week. She reports that she does not use drugs.    Family History: family history includes No Known Problems in her daughter, sister, and son.    Allergies: Patient has No Known Allergies.    ROS  Constitutional: Negative for fever   Eyes: Negative for blurred vision, double vision   Respiratory: Negative for cough, shortness of breath   Cardiovascular: Negative for chest pain, palpitations, and leg swelling.   Gastrointestinal: Negative for abdominal pain, constipation, diarrhea, nausea, vomiting.  +indigestion  Genitourinary: Negative for dysuria, frequency   Musculoskeletal:  + generalized weakness. Negative for back pain and myalgias.   Skin: Negative for itching and rash.   Neurological: Negative for dizziness, headaches.   Psychiatric/Behavioral: Negative for depression. The patient is not nervous/anxious.      24 hour Vital Sign Range   Temp:  [97.7 °F (36.5 °C)-98.3 °F (36.8 °C)]   Pulse:  []   Resp:  [16-18]   BP: (131-136)/(60)   SpO2:  " [93 %-96 %]     Current BMI: Body mass index is 20.66 kg/m².    PEx  Constitutional: Patient appears debilitated.  No distress noted  HENT:   Head: Normocephalic and atraumatic.   Eyes: Pupils are equal, round  Neck: Normal range of motion. Neck supple.   Cardiovascular: Normal rate, regular rhythm and normal heart sounds.    Pulmonary/Chest: Effort normal and breath sounds are clear  Abdominal: Soft. Bowel sounds are normal.   Musculoskeletal: Normal range of motion.   Neurological: Alert and oriented to person, place.  Disoriented to time.  Impaired higher level thinking.  Psychiatric: Normal mood and affect. Behavior is normal.   Skin: Skin is warm and dry.     No results for input(s): "GLUCOSE", "NA", "K", "CL", "CO2", "BUN", "CREATININE", "CALCIUM", "MG" in the last 24 hours.    No results for input(s): "WBC", "RBC", "HGB", "HCT", "PLT", "MCV", "MCH", "MCHC" in the last 24 hours.    No results for input(s): "POCTGLUCOSE" in the last 168 hours.     Assessment and Plan:    Lower GI bleed  - CTA abdomen pelvis showed active bleed and ascending colon, significant stool burden, diverticulosis  - GI consulted while inpatient  - S/p angiogram and coil embolization by IR on 11/16  - Ceftriaxone/Flagyl discontinued 11/17  - follow-up with GI after discharge from SNF    Acute blood loss anemia  - continue monitor twice weekly CBC, transfuse for hemoglobin < 7  - order for repeat CBC tomorrow    Mild late onset Alzheimer's dementia without behavioral disturbance, psychotic disturbance, mood disturbance, or anxiety  - Patient with dementia with likely etiology of alzheimer's dementia. Dementia is mild. The patient does not have signs of behavioral disturbance.   Delirium precautions:  - Avoid antihistamines, anticholinergics, hypnotics, and minimize opiates while controlling for pain as these medications may exacerbate delirium. Cues for day/night will assist with keeping patient calm and oriented - during daytime, please " keep adequate light in room (open windows, lights on) and please keep room dim at night-time to encourage normal sleep-wake cycles. Continuing to have nursing and family reorient the patient and encourage family to visit  - continue home memantine 5 mg BID    Advance Care Planning  - completed while inpatient on 11/17, patient/family wishes to be DNR     History of breast cancer  - noted     Mood disorder  - Continue home fluoxetine 20 mg daily     Hypothyroid  - Continue levothyroxine 25 mcg daily     Aortic atherosclerosis  - Hold aspirin in setting of GI bleed   - Continue atorvastatin 10 mg qHS.    Seasonal allergies  - continue cetirizine 10 mg daily, will discontinue patient shows any signs of delirium    GERD  - initiated PRN Tums and Mylanta continue Protonix 40 mg daily    Debility   - Continue with PT/OT for gait training and strengthening and restoration of ADL's   - Encourage mobility, OOB in chair, and early ambulation as appropriate  - Fall precautions   - Monitor for bowel and bladder dysfunction  - Monitor for and prevent skin breakdown and pressure ulcers  - Continue DVT prophylaxis with frequent ambulation, chemical DVT PPX contraindicated due to recent GI bleed       Anticipate disposition:  Home with home health    IP [facility] RISK OF UNPLANNED READMISSION Model: HIGH MODERATE LOW    Follow-up needed during SNF stay-    Follow-up needed after discharge from SNF: PCP     Future Appointments   Date Time Provider Department Center   11/27/2023  2:15 PM Jamshid Trujillo MD [facility]   12/1/2023 10:00 AM Gene Barnes MD [facility] [place]   1/11/2024  9:30 AM LAB, [facility] [place]   1/18/2024  3:00 PM Abiola Campbell MD [facility] [place]         I certify that SNF services are required to be given on an inpatient basis because Olamide Felipe needs for skilled nursing care and/or skilled rehabilitation are required on a daily basis and such services can only practically be  provided in a skilled nursing facility setting and are for an ongoing condition for which she received inpatient care in the hospital.     Total time of the visit 151 minutes   Description of non physical exam/non charting time: counseling patient on clinical conditions and therapies provided.  Extensive chart review completed including all consultation notes.  All pertinent laboratory and radiographical images reviewed.        Renay Du NP  Department of Hospital Medicine   [facility]     DOS: 11/21/2023       Patient note was created using MModal Dictation.  Any errors in syntax or even information may not have been identified and edited on initial review prior to signing this note.

## 2023-11-21 NOTE — CONSULTS
[facility] - Skilled Nursing  Adult Nutrition  Consult Note    SUMMARY   Recommendations  Continue regular diet, encourage PO intake, if weight loss patient agrees to boost daily RD following  Goals: PO intake to meet 85% of EEN/EPN with no weight loss by next RD follow up  Nutrition Goal Status: new  Communication of RD Recs: other (comment)    Assessment and Plan     No nutrition diagnosis at this time    Malnutrition Assessment    11/21/23     Skin (Micronutrient): bruised  Teeth (Micronutrient): dentures  Neck/Chest (Micronutrient): muscle wasting, subcutaneous fat loss  Musculoskeletal/Lower Extremities: muscle wasting, subcutaneous fat loss           Orbital Region (Subcutaneous Fat Loss): moderate depletion  Upper Arm Region (Subcutaneous Fat Loss): moderate depletion  Thoracic and Lumbar Region: moderate depletion   Synagogue Region (Muscle Loss): severe depletion  Clavicle Bone Region (Muscle Loss): moderate depletion  Clavicle and Acromion Bone Region (Muscle Loss): moderate depletion  Dorsal Hand (Muscle Loss): moderate depletion  Patellar Region (Muscle Loss): moderate depletion  Anterior Thigh Region (Muscle Loss): moderate depletion  Posterior Calf Region (Muscle Loss): mild depletion                 Reason for Assessment    Reason For Assessment: consult  Diagnosis:  (acute GI bleed)  Relevant Medical History: s/p coil embolization, mild Alzheimers, breast cancer s/p mastectomy, HLD, HTN, hypothyroidism, CAD, debiltiy, ICU delirium,  Interdisciplinary Rounds: did not attend    General Information Comments: Lives alone in [facility],  does not do much cooking and daughter is shopping for her, bringing her prepared meals from grocery store. No chewing or swallowing problems, dentures upper and lower. NFPE completed showing moderate fat and muscle losses.  There is reason to believe that patient was not eating adequately with slow weight loss noted over the last two years.     Nutrition Discharge  "Planning: DC on regular diet    Nutrition/Diet History    Patient Reported Diet/Restrictions/Preferences: general  Food Preferences: no spicy foods  Spiritual, Cultural Beliefs, Confucianist Practices, Values that Affect Care: no  Food Allergies: NKFA    Anthropometrics    Temp: 98.3 °F (36.8 °C)  Height Method: Stated  Height: 5' 4" (162.6 cm)  Height (inches): 64 in  Weight Method: Bed Scale  Weight: 54.6 kg (120 lb 5.9 oz)  Weight (lb): 120.37 lb  Ideal Body Weight (IBW), Female: 120 lb  % Ideal Body Weight, Female (lb): 100.31 %  BMI (Calculated): 20.7  BMI Grade: 18.5-24.9 - normal  Weight Loss: unintentional  Usual Body Weight (UBW), k.2 kg  Weight Change Amount:  (weight in  was 52.2 Kg, patient reports weight loss when initially living with her daughter s/s spicy foods she could not eat.)  % Usual Body Weight: 104.82  % Weight Change From Usual Weight: 4.6 %       Lab/Procedures/Meds    Pertinent Labs Reviewed: reviewed  Pertinent Labs Comments: Hg 7.5, Hct 23.1, GFR 48, Ca 7.9, BUN 40, Cl 112,  Pertinent Medications Reviewed: reviewed  Pertinent Medications Comments: statin, Vit B complex/Vit C, memantine, senna-docusate, MVI, Ca/Vit D3, pantoprazole, flluoxetine    Estimated/Assessed Needs    Weight Used For Calorie Calculations: 54.6 kg (120 lb 5.9 oz)  Energy Calorie Requirements (kcal): 1236  Energy Need Method: Marmora-St Jeor (x 1.3(PAL))  Protein Requirements: 66  Weight Used For Protein Calculations: 54.6 kg (120 lb 5.9 oz) (x 1.2g/kg)  Fluid Requirements (mL): 1236 or per MD  Estimated Fluid Requirement Method: RDA Method  RDA Method (mL): 1236       Nutrition Prescription Ordered  Current Diet Order: Regular  Nutrition Order Comments: PO %  Oral Nutrition Supplement: patient declines oral supplement at this time but will take if she loses weight here    Evaluation of Received Nutrient/Fluid Intake  I/O: no data  Energy Calories Required: meeting needs  Protein Required: meeting " needs  Fluid Required: meeting needs  Comments: LBM 11/21  Tolerance: tolerating  % Intake of Estimated Energy Needs: 75 - 100 %  % Meal Intake: 75 - 100 %    Nutrition Risk    Level of Risk/Frequency of Follow-up: low (one time per week)       Monitor and Evaluation    Food and Nutrient Intake: food and beverage intake  Food and Nutrient Adminstration: diet order  Physical Activity and Function: nutrition-related ADLs and IADLs  Anthropometric Measurements: weight change  Biochemical Data, Medical Tests and Procedures: gastrointestinal profile, electrolyte and renal panel  Nutrition-Focused Physical Findings: overall appearance       Nutrition Follow-Up    RD Follow-up?: Yes

## 2023-11-21 NOTE — PT/OT/SLP EVAL
"Occupational Therapy   Evaluation    Name: Olamide Felipe  MRN: [id number]  Admit Date: 11/20/2023  Recent Surgeries: n/a     General Precautions: Standard, fall  Orthopedic Precautions:N/A   Braces: N/A    Recommendations:     Discharge Recommendations: Low Intensity Therapy  Level of Assistance Recommended: 24 hours supervision  Discharge Equipment Recommendations:  walker, rolling  Barriers to discharge:  Other (Comment) (increased need for supervision while preforming functional tasks)    Assessment:     Olamide Felipe is a 89 y.o. female with a medical diagnosis of Acute lower GI bleeding. Pt tolerated session well and without incident and shows excellent motivation and potential to improve, but the pt continues to require assistance to perform self-care tasks and mobility. Pt strengths include Following multi-step tasks and Attention to task and PLOF, Family support, and Willingness to participate.  However, pt would continue to benefit from cont'd OT services in the SNF setting to improve safety and independence /c functional tasks and ADLs upon discharge. Performance deficits affecting function: impaired balance, gait instability, impaired cognition, impaired cardiopulmonary response to activity.      Rehab Potential is good    Activity Tolerance: Good    Plan:     Patient to be seen 5 x/week to address the above listed problems via self-care/home management, community/work re-entry, therapeutic activities, therapeutic exercises, therapeutic groups  Plan of Care Expires: 12/20/23  Plan of Care Reviewed with: patient    Subjective     Chief Complaint: Pt /c no complaints  Patient/Family Comments/goals: To return to [facility]    Occupational Profile:  Living Environment: Pt lives at [facility] in 1st floor apt /c 0 ROSE and WIS.    Previous level of function: Mod I for ADLs/IADLs/FM; Pt recommended use of cane by doctor who "told her she was old"- pt noncompliant 2/2 not wanting to "look " "89"   Roles and Routines: Pt walks around sr living facility daily and grocery shops with daughter. Pt does not drive.  Equipment Used at Home: cane, straight (does not use)  Assistance upon Discharge: Intermittent A from daughter and staff at facility    Pain/Comfort:  Pain Rating 1: 0/10  Pain Rating Post-Intervention 1: 0/10    Patients cultural, spiritual, Quaker conflicts given the current situation: no    Objective:     Communicated with: NSG prior to session.  Patient found up in chair with Other (comments) (no lines) upon OT entry to room. Pt alert and agreeable to therapy.       Occupational Performance:   Eating   Did they attempt the activity? Yes   Was the activity done independently? No   Assistance Needed Setup / clean-up  (set-up for retrievel)   Was adaptive equipment used? No   CARE Score - Eating 5   Oral Hygiene   Did they attempt the activity? Yes   Was the activity done independently? No   Assistance Needed Supervision  (SBA /c RW standing at sink)   Was adaptive equipment used? Yes   CARE Score - Oral Hygiene 4   Toileting Hygiene   Did they attempt the activity? Yes   Was the activity done independently? No   Assistance Needed Supervision  (SBA /c RW seated on standard toilet; SBA req for standing clothing mgmt)   Was adaptive equipment used? Yes   CARE Score - Toileting Hygiene 4   Toileting Hygiene Discharge Goal   Discharge Goal 4   Shower/Bathe Self   Did they attempt the activity? Yes   Was the activity done independently? No   Assistance Needed Supervision  (SPV seated on toilet)   Was adaptive equipment used? Yes   CARE Score - Shower/Bathe Self 4   Upper Body Dressing   Did they attempt the activity? Yes   Was the activity done independently? No   Assistance Needed Supervision  (SPV seated on toilet)   Was adaptive equipment used? No   CARE Score - Upper Body Dressing 4   Lower Body Dressing   Did they attempt the activity? Yes   Was the activity done independently? No   Assistance " Needed Supervision  (SBA /c RW seated at/standing in front of toilet. SBA /c RW req for standing clothing mgmnt)   Was adaptive equipment used? Yes   CARE Score - Lower Body Dressing 4   Putting On/Taking Off Footwear   Did they attempt the activity? Yes   Was the activity done independently? No   Assistance Needed Supervision  (SBA seated on toilet to dof/don socks and don tennis shoes)   Was adaptive equipment used? No   CARE Score - Putting On/Taking Off Footwear 4   Personal Hygiene   Did they attempt the activity? Yes   Was the activity done independently? No   Assistance Needed Supervision  (SBA /c RW standing at sink to wash hands and comb hair)   Was adaptive equipment used? Yes   CARE Score - Personal Hygiene 4   Sit to Stand   Did they attempt the activity? Yes   Was the activity done independently? No   Assistance Needed Supervision  (SBA /c RW)   CARE Score - Sit to Stand 4   Toilet Transfer   Did they attempt the activity? Yes   Was the activity done independently? No   Assistance Needed Supervision  (SBA /c RW to standard toilet seat)   Was adaptive equipment used? No   CARE Score - Toilet Transfer 4   Tub/Shower Transfer   Did they attempt the activity? No   Reason if not Attempted Activity not applicable  (Pt preformed bathing tasks seated on standard toilet)   CARE Score - Tub/Shower Transfer 9       Cognitive/Visual Perceptual:  Cognitive/Psychosocial Skills:  -       Oriented to: Person, Place, Time, and Situation   -       Follows Commands/attention:Follows multistep  commands  -       Communication: clear/fluent  -       Memory: No Deficits noted  -       Safety awareness/insight to disability: intact   -       Mood/Affect/Coping skills/emotional control: Appropriate to situation  Visual/Perceptual:  -Intact      Physical Exam:  Balance: -       Static/Dynamic sitting balance-Normal; Static standing balance- Good; Dynamic standing balance- Standing  Postural examination/scapula alignment: -        No postural abnormalities identified  Skin integrity: Visible skin intact  Edema:  None noted  Sensation: -       Intact  Motor Planning: -       WFL  Dominant hand: -       R  Upper Extremity Range of Motion:  -       Right Upper Extremity: WFL  -       Left Upper Extremity: WFL  Upper Extremity Strength: -       Right Upper Extremity: WFL  -       Left Upper Extremity: WFL   Strength: -       Right Upper Extremity: WFL  -       Left Upper Extremity: WFL  Fine Motor Coordination: -       Intact  Gross motor coordination:   WFL  Functional endurance- Good    AMPAC 6 Click ADL:  AMPAC Total Score: 18    Treatment & Education:  Pt educated on POC, role of OT, and safety. Pt performed tasks to improve safety and independence in functional tasks and ADLs as mentioned above.       Patient left up in chair with call button in reach and all needs met    GOALS:   Multidisciplinary Problems       Occupational Therapy Goals          Problem: Occupational Therapy    Goal Priority Disciplines Outcome Interventions   Occupational Therapy Goal     OT, PT/OT Ongoing, Progressing    Description: Goals to be met by: 12/20/2023     Patient will increase functional independence with ADLs by performing:      UE Dressing with Set-up Assistance.  LE Dressing with Supervision.  Oral Hygiene while standing at sink with Supervision and Assistive Devices as needed.  Personal Hygiene while standing at sink with Supervision and Assistive Devices as needed.  Toileting from toilet with Supervision and Assistive Devices as needed for hygiene and clothing management.   Bathing from  shower chair/bench with Supervision.  Toilet transfer to toilet with Supervision and Assistive Device as needed  Footwear /c Mod I   SC t/f /c SPV and Assistive Device as needed  Tolerate standing functional tasks for ~8 minutes /c SPV and Assistive device as needed.                         History:     Past Medical History:   Diagnosis Date    Hyperlipidemia      Hypothyroidism     Malignant neoplasm of upper-outer quadrant of right breast in female, estrogen receptor negative 1/7/2022    Osteoarthritis, knee     Vertigo          Past Surgical History:   Procedure Laterality Date    dentures      FRACTURE SURGERY Left     fracture left wrist    HYSTERECTOMY      INJECTION FOR SENTINEL NODE IDENTIFICATION Right 1/7/2022    Procedure: INJECTION, FOR SENTINEL NODE IDENTIFICATION-Right;  Surgeon: Marci Mcintosh MD;  Location: [facility];  Service: General;  Laterality: Right;    left wrist surgery      MASTECTOMY, PARTIAL Right 1/7/2022    Procedure: MASTECTOMY, PARTIAL-Right with radiological marker;  Surgeon: Marci Mcintosh MD;  Location: [facility];  Service: General;  Laterality: Right;  REQUEST SAID 2 HOUR CASE    SENTINEL LYMPH NODE BIOPSY Right 1/7/2022    Procedure: BIOPSY, LYMPH NODE, SENTINEL-Right;  Surgeon: Marci Mcintosh MD;  Location: [facility];  Service: General;  Laterality: Right;       Time Tracking:     OT Date of Treatment: 11/21/23  OT Start Time: 1055  OT Stop Time: 1120  OT Total Time (min): 25 min    Billable Minutes:Evaluation 15  Self Care/Home Management 10    11/21/2023

## 2023-11-21 NOTE — PLAN OF CARE
Problem: Physical Therapy  Goal: Physical Therapy Goal  Description: Goals to be met by: 12/21/23     Patient will increase functional independence with mobility by performing:    . Supine to sit with [place]  . Sit to supine with [place]  . Rolling to Left and Right with [place].  . Sit to stand transfer with Modified [place]  . Bed to chair transfer with Modified [place] using LRAD or no AD  . Gait  x 200 feet with Supervision using LRAD and/or no AD.   . Wheelchair propulsion x150 feet with Modified [place] using bilateral uppper extremities  . Ascend/descend 12 stair with bilateral Handrails Supervision using No Assistive Device.   . Ascend/Descend 4 inch curb step with Supervision using No Assistive Device and/or LRAD.    Outcome: Ongoing, Progressing

## 2023-11-21 NOTE — CONSULTS
[facility], [place] - Skilled Nursing  Wound Care    Patient Name:  Olamide Felipe   MRN:  [id number]  Date: 2023  Diagnosis: Acute lower GI bleeding    History:     Past Medical History:   Diagnosis Date    Hyperlipidemia     Hypothyroidism     Malignant neoplasm of upper-outer quadrant of right breast in female, estrogen receptor negative 2022    Osteoarthritis, knee     Vertigo        Social History     Socioeconomic History    Marital status:    Tobacco Use    Smoking status: Former     Current packs/day: 0.00     Types: Cigarettes     Quit date: 1989     Years since quittin.4     Passive exposure: Never    Smokeless tobacco: Never   Substance and Sexual Activity    Alcohol use: Yes     Alcohol/week: 2.0 standard drinks of alcohol     Types: 2 Glasses of wine per week     Comment: 1/3 glass of wine - 2 times weekly    Drug use: Never    Sexual activity: Not Currently     Social Determinants of Health     Financial Resource Strain: Low Risk  (2023)    Overall Financial Resource Strain (CARDIA)     Difficulty of Paying Living Expenses: Not hard at all   Food Insecurity: No Food Insecurity (2023)    Hunger Vital Sign     Worried About Running Out of Food in the Last Year: Never true     Ran Out of Food in the Last Year: Never true   Transportation Needs: No Transportation Needs (2023)    PRAPARE - Transportation     Lack of Transportation (Medical): No     Lack of Transportation (Non-Medical): No   Physical Activity: Insufficiently Active (2023)    Exercise Vital Sign     Days of Exercise per Week: 2 days     Minutes of Exercise per Session: 60 min   Stress: No Stress Concern Present (2019)    [employer] - Occupational Stress Questionnaire     Feeling of Stress : Only a little   Social Connections: Unknown (2023)    Social Connection and Isolation Panel [NHANES]     Frequency of Communication with Friends and Family: More than  three times a week     Frequency of Social Gatherings with Friends and Family: More than three times a week   Housing Stability: Low Risk  (11/20/2023)    Housing Stability Vital Sign     Unable to Pay for Housing in the Last Year: No     Number of Places Lived in the Last Year: 1     Unstable Housing in the Last Year: No       Precautions:     Allergies as of 11/20/2023    (No Known Allergies)       WO Assessment Details/Treatment   Patient seen for wound care consultation.   Reviewed chart for this encounter.   See Flow Sheet for findings.    Adalid: 17  Albumin: 3.2 (11/17)    Pt sitting in wheelchair, agreed to assessment.   Removed 4x4 Aquacel border, cleansed with Vashe and 4x4 gauze, air dried, applied Tegaderm. Pt has no cx of pain and no s/s of infection.     RECOMMENDATIONS:  Keep clean and dry  Leave Tegaderm in place q 7 days  Follow skin tear protocol if wound still present at that time.    Discussed POC with patient and primary nurse.   See EMR for orders & patient education.    Discussed nutrition and the role of protein in wound healing with the patient. Instructed patient to optimize protein for wound healing.    Nursing to continue care & monitoring.  Nursing to maintain pressure injury prevention interventions.    WC sign off.   Please re-consult if needed.   11/21/23 1030   WOCN Assessment   WOCN Total Time (mins) 30   Visit Date 11/21/23   Visit Time 1030   Consult Type New   WOCN Speciality Wound   Wound skin tear   Intervention assessed;changed;applied;chart review;orders   Teaching on-going;complication        Altered Skin Integrity 11/16/23 1901 Right lower;proximal;posterior Arm Skin Tear   Date First Assessed/Time First Assessed: 11/16/23 1901   Altered Skin Integrity Present on Admission - Did Patient arrive to the hospital with altered skin?: yes  Side: Right  Orientation: lower;proximal;posterior  Location: Arm  Primary Wound Type: S...   Wound Image    Dressing Appearance  Clean;Dry;Intact   Drainage Amount None   Drainage Characteristics/Odor No odor   Appearance Pink;Intact;Dry   Tissue loss description Partial thickness   Red (%), Wound Tissue Color 100 %   Periwound Area Intact;Dry   Wound Edges Irregular   Wound Length (cm) 0.3 cm   Wound Width (cm) 1.4 cm   Wound Depth (cm) 0.1 cm   Wound Volume (cm^3) 0.042 cm^3   Wound Surface Area (cm^2) 0.42 cm^2   Care Cleansed with:;Antimicrobial agent   Dressing Applied;Transparent film   Periwound Care Dry periwound area maintained   Dressing Change Due 11/28/23

## 2023-11-21 NOTE — PLAN OF CARE
Problem: Occupational Therapy  Goal: Occupational Therapy Goal  Description: Goals to be met by: 12/20/2023     Patient will increase functional independence with ADLs by performing:      UE Dressing with Set-up Assistance.  LE Dressing with Supervision.  Oral Hygiene while standing at sink with Supervision and Assistive Devices as needed.  Personal Hygiene while standing at sink with Supervision and Assistive Devices as needed.  Toileting from toilet with Supervision and Assistive Devices as needed for hygiene and clothing management.   Bathing from  shower chair/bench with Supervision.  Toilet transfer to toilet with Supervision and Assistive Device as needed  Footwear /c Mod I   SC t/f /c SPV and Assistive Device as needed  Tolerate standing functional tasks for ~8 minutes /c SPV and Assistive device as needed.    Outcome: Ongoing, Progressing    Pt evaluated and goals set based on performance at admission.

## 2023-11-21 NOTE — NURSING
Placed bed alarm on pt as she can ambulate independently stated by her and she does not want to keep calling to use the restroom. Educated pt on the safety of calling staff before getting OOB needs reinforcement but is overall very pleasant.

## 2023-11-21 NOTE — PLAN OF CARE
Problem: Adult Inpatient Plan of Care  Goal: Plan of Care Review  Outcome: Ongoing, Progressing  Flowsheets (Taken 11/20/2023 2033)  Plan of Care Reviewed With: patient  Goal: Patient-Specific Goal (Individualized)  Outcome: Ongoing, Progressing  Goal: Absence of Hospital-Acquired Illness or Injury  Outcome: Ongoing, Progressing  Goal: Optimal Comfort and Wellbeing  Outcome: Ongoing, Progressing  Goal: Readiness for Transition of Care  Outcome: Ongoing, Progressing     Problem: Fall Injury Risk  Goal: Absence of Fall and Fall-Related Injury  Outcome: Ongoing, Progressing  Intervention: Identify and Manage Contributors  Flowsheets (Taken 11/20/2023 2033)  Self-Care Promotion:   BADL personal objects within reach   BADL personal routines maintained   safe use of adaptive equipment encouraged  Medication Review/Management: medications reviewed  Intervention: Promote Injury-Free Environment  Flowsheets (Taken 11/20/2023 2033)  Safety Promotion/Fall Prevention:   assistive device/personal item within reach   bedside commode chair   bed alarm set   nonskid shoes/socks when out of bed   side rails raised x 2   Fall Risk reviewed with patient/family     Problem: Impaired Wound Healing  Goal: Optimal Wound Healing  Outcome: Ongoing, Progressing     Problem: Skin Injury Risk Increased  Goal: Skin Health and Integrity  Outcome: Ongoing, Progressing

## 2023-11-22 LAB
ABO + RH BLD: NORMAL
ANION GAP SERPL CALC-SCNC: 8 MMOL/L (ref 8–16)
BASOPHILS # BLD AUTO: 0.05 K/UL (ref 0–0.2)
BASOPHILS NFR BLD: 0.6 % (ref 0–1.9)
BLD GP AB SCN CELLS X3 SERPL QL: NORMAL
BLD PROD TYP BPU: NORMAL
BLOOD UNIT EXPIRATION DATE: NORMAL
BLOOD UNIT TYPE CODE: 6200
BLOOD UNIT TYPE: NORMAL
BUN SERPL-MCNC: 20 MG/DL (ref 8–23)
CALCIUM SERPL-MCNC: 9 MG/DL (ref 8.7–10.5)
CHLORIDE SERPL-SCNC: 107 MMOL/L (ref 95–110)
CO2 SERPL-SCNC: 26 MMOL/L (ref 23–29)
CODING SYSTEM: NORMAL
CREAT SERPL-MCNC: 0.9 MG/DL (ref 0.5–1.4)
CROSSMATCH INTERPRETATION: NORMAL
DIFFERENTIAL METHOD: ABNORMAL
DISPENSE STATUS: NORMAL
EOSINOPHIL # BLD AUTO: 0.5 K/UL (ref 0–0.5)
EOSINOPHIL NFR BLD: 6.2 % (ref 0–8)
ERYTHROCYTE [DISTWIDTH] IN BLOOD BY AUTOMATED COUNT: 13.7 % (ref 11.5–14.5)
EST. GFR  (NO RACE VARIABLE): >60 ML/MIN/1.73 M^2
GLUCOSE SERPL-MCNC: 91 MG/DL (ref 70–110)
HCT VFR BLD AUTO: 21.5 % (ref 37–48.5)
HGB BLD-MCNC: 7 G/DL (ref 12–16)
IMM GRANULOCYTES # BLD AUTO: 0.04 K/UL (ref 0–0.04)
IMM GRANULOCYTES NFR BLD AUTO: 0.5 % (ref 0–0.5)
LYMPHOCYTES # BLD AUTO: 2.1 K/UL (ref 1–4.8)
LYMPHOCYTES NFR BLD: 27.1 % (ref 18–48)
MCH RBC QN AUTO: 31.5 PG (ref 27–31)
MCHC RBC AUTO-ENTMCNC: 32.6 G/DL (ref 32–36)
MCV RBC AUTO: 97 FL (ref 82–98)
MONOCYTES # BLD AUTO: 1 K/UL (ref 0.3–1)
MONOCYTES NFR BLD: 12.9 % (ref 4–15)
NEUTROPHILS # BLD AUTO: 4.1 K/UL (ref 1.8–7.7)
NEUTROPHILS NFR BLD: 52.7 % (ref 38–73)
NRBC BLD-RTO: 0 /100 WBC
NUM UNITS TRANS PACKED RBC: NORMAL
PLATELET # BLD AUTO: 307 K/UL (ref 150–450)
PMV BLD AUTO: 10.7 FL (ref 9.2–12.9)
POTASSIUM SERPL-SCNC: 4.4 MMOL/L (ref 3.5–5.1)
RBC # BLD AUTO: 2.22 M/UL (ref 4–5.4)
SODIUM SERPL-SCNC: 141 MMOL/L (ref 136–145)
SPECIMEN OUTDATE: NORMAL
WBC # BLD AUTO: 7.78 K/UL (ref 3.9–12.7)

## 2023-11-22 PROCEDURE — 36415 COLL VENOUS BLD VENIPUNCTURE: CPT | Mod: HCNC | Performed by: NURSE PRACTITIONER

## 2023-11-22 PROCEDURE — 86850 RBC ANTIBODY SCREEN: CPT | Mod: HCNC | Performed by: NURSE PRACTITIONER

## 2023-11-22 PROCEDURE — 85025 COMPLETE CBC W/AUTO DIFF WBC: CPT | Mod: HCNC | Performed by: NURSE PRACTITIONER

## 2023-11-22 PROCEDURE — 97116 GAIT TRAINING THERAPY: CPT | Mod: HCNC

## 2023-11-22 PROCEDURE — 99900035 HC TECH TIME PER 15 MIN (STAT): Mod: HCNC

## 2023-11-22 PROCEDURE — 86920 COMPATIBILITY TEST SPIN: CPT | Mod: HCNC | Performed by: NURSE PRACTITIONER

## 2023-11-22 PROCEDURE — 11000004 HC SNF PRIVATE: Mod: HCNC

## 2023-11-22 PROCEDURE — 25000003 PHARM REV CODE 250: Mod: HCNC | Performed by: NURSE PRACTITIONER

## 2023-11-22 PROCEDURE — 94660 CPAP INITIATION&MGMT: CPT | Mod: HCNC

## 2023-11-22 PROCEDURE — 80048 BASIC METABOLIC PNL TOTAL CA: CPT | Mod: HCNC | Performed by: NURSE PRACTITIONER

## 2023-11-22 PROCEDURE — 25000003 PHARM REV CODE 250: Mod: HCNC | Performed by: HOSPITALIST

## 2023-11-22 PROCEDURE — 97110 THERAPEUTIC EXERCISES: CPT | Mod: HCNC

## 2023-11-22 PROCEDURE — P9016 RBC LEUKOCYTES REDUCED: HCPCS | Mod: HCNC | Performed by: NURSE PRACTITIONER

## 2023-11-22 RX ORDER — ASCORBIC ACID 500 MG
500 TABLET ORAL DAILY
Status: DISCONTINUED | OUTPATIENT
Start: 2023-11-22 | End: 2023-12-08 | Stop reason: HOSPADM

## 2023-11-22 RX ORDER — HYDROCODONE BITARTRATE AND ACETAMINOPHEN 500; 5 MG/1; MG/1
TABLET ORAL
Status: DISCONTINUED | OUTPATIENT
Start: 2023-11-22 | End: 2023-11-22

## 2023-11-22 RX ORDER — LANOLIN ALCOHOL/MO/W.PET/CERES
1 CREAM (GRAM) TOPICAL 2 TIMES DAILY
Status: DISCONTINUED | OUTPATIENT
Start: 2023-11-22 | End: 2023-12-08 | Stop reason: HOSPADM

## 2023-11-22 RX ORDER — HYDROCODONE BITARTRATE AND ACETAMINOPHEN 500; 5 MG/1; MG/1
TABLET ORAL
Status: DISCONTINUED | OUTPATIENT
Start: 2023-11-22 | End: 2023-12-08 | Stop reason: HOSPADM

## 2023-11-22 RX ORDER — TALC
12 POWDER (GRAM) TOPICAL NIGHTLY
Status: DISCONTINUED | OUTPATIENT
Start: 2023-11-22 | End: 2023-12-08 | Stop reason: HOSPADM

## 2023-11-22 RX ADMIN — ACETAMINOPHEN 650 MG: 325 TABLET ORAL at 01:11

## 2023-11-22 RX ADMIN — OXYCODONE HYDROCHLORIDE AND ACETAMINOPHEN 500 MG: 500 TABLET ORAL at 11:11

## 2023-11-22 RX ADMIN — CETIRIZINE HYDROCHLORIDE 10 MG: 5 TABLET, FILM COATED ORAL at 09:11

## 2023-11-22 RX ADMIN — Medication 1 TABLET: at 09:11

## 2023-11-22 RX ADMIN — ATORVASTATIN CALCIUM 10 MG: 10 TABLET, FILM COATED ORAL at 09:11

## 2023-11-22 RX ADMIN — PANTOPRAZOLE SODIUM 40 MG: 40 TABLET, DELAYED RELEASE ORAL at 09:11

## 2023-11-22 RX ADMIN — FLUOXETINE 20 MG: 10 CAPSULE ORAL at 09:11

## 2023-11-22 RX ADMIN — SENNOSIDES AND DOCUSATE SODIUM 1 TABLET: 8.6; 5 TABLET ORAL at 09:11

## 2023-11-22 RX ADMIN — Medication 12 MG: at 09:11

## 2023-11-22 RX ADMIN — MEMANTINE HYDROCHLORIDE 5 MG: 5 TABLET ORAL at 09:11

## 2023-11-22 RX ADMIN — LEVOTHYROXINE SODIUM 25 MCG: 25 TABLET ORAL at 06:11

## 2023-11-22 RX ADMIN — THERA TABS 1 TABLET: TAB at 09:11

## 2023-11-22 RX ADMIN — FERROUS SULFATE TAB 325 MG (65 MG ELEMENTAL FE) 1 EACH: 325 (65 FE) TAB at 09:11

## 2023-11-22 RX ADMIN — FERROUS SULFATE TAB 325 MG (65 MG ELEMENTAL FE) 1 EACH: 325 (65 FE) TAB at 11:11

## 2023-11-22 NOTE — PLAN OF CARE
Problem: Adult Inpatient Plan of Care  Goal: Plan of Care Review  Outcome: Ongoing, Progressing  Goal: Patient-Specific Goal (Individualized)  Outcome: Ongoing, Progressing     Problem: Fall Injury Risk  Goal: Absence of Fall and Fall-Related Injury  Outcome: Ongoing, Progressing     Problem: Impaired Wound Healing  Goal: Optimal Wound Healing  Outcome: Ongoing, Progressing     Problem: Skin Injury Risk Increased  Goal: Skin Health and Integrity  Outcome: Ongoing, Progressing

## 2023-11-22 NOTE — PT/OT/SLP PROGRESS
Occupational Therapy Not Seen      Patient Name:  Olamide Felipe   MRN:  [id number]    Patient not seen today secondary to Pt. Was having a moment NP stated to hold off till afternoon attempted in afternoon Pt.  Still Very anxious very emotional nurse stated to hold off on Pt. For now   Will follow-up POC.    11/22/2023

## 2023-11-22 NOTE — PLAN OF CARE
Problem: Adult Inpatient Plan of Care  Goal: Plan of Care Review  Outcome: Ongoing, Progressing  Flowsheets (Taken 11/21/2023 2109)  Plan of Care Reviewed With: patient  Goal: Patient-Specific Goal (Individualized)  Outcome: Ongoing, Progressing  Goal: Absence of Hospital-Acquired Illness or Injury  Outcome: Ongoing, Progressing  Goal: Optimal Comfort and Wellbeing  Outcome: Ongoing, Progressing  Goal: Readiness for Transition of Care  Outcome: Ongoing, Progressing     Problem: Fall Injury Risk  Goal: Absence of Fall and Fall-Related Injury  Outcome: Ongoing, Progressing  Intervention: Identify and Manage Contributors  Flowsheets (Taken 11/21/2023 2109)  Self-Care Promotion:   BADL personal objects within reach   BADL personal routines maintained   safe use of adaptive equipment encouraged  Medication Review/Management: medications reviewed  Intervention: Promote Injury-Free Environment  Flowsheets (Taken 11/21/2023 2109)  Safety Promotion/Fall Prevention:   assistive device/personal item within reach   instructed to call staff for mobility   supervised activity   side rails raised x 2   nonskid shoes/socks when out of bed   Fall Risk reviewed with patient/family   medications reviewed     Problem: Impaired Wound Healing  Goal: Optimal Wound Healing  Outcome: Ongoing, Progressing     Problem: Skin Injury Risk Increased  Goal: Skin Health and Integrity  Outcome: Ongoing, Progressing

## 2023-11-22 NOTE — CLINICAL REVIEW
Clinical Pharmacy Chart Review Note      Admit Date: 11/20/2023   LOS: 2 days       Olamide Felipe is a 89 y.o. female admitted to SNF for PT/OT after hospitalization for acute lower GI bleeding.    Active Hospital Problems    Diagnosis  POA    *Acute lower GI bleeding [K92.2]  Yes    Acute blood loss anemia [D62]  Yes    Weakness acquired in ICU [R53.1]  Yes    Delirium [R41.0]  Yes    Overactive bladder [N32.81]  Yes    Mild late onset Alzheimer's dementia without behavioral disturbance, psychotic disturbance, mood disturbance, or anxiety [G30.1, F02.A0]  Yes    History of breast cancer [Z85.3]  Not Applicable    Secondary hypertension [I15.9]  Yes    Hyperlipidemia [E78.5]  Yes    Hypothyroid [E03.9]  Yes      Resolved Hospital Problems   No resolved problems to display.     Review of patient's allergies indicates:  No Known Allergies  Patient Active Problem List    Diagnosis Date Noted    Acute blood loss anemia 11/21/2023    Weakness acquired in ICU 11/18/2023    Delirium 11/17/2023    Acute lower GI bleeding 11/16/2023    Goals of care, counseling/discussion 11/16/2023    Cerebral vascular disease 04/23/2023    Overactive bladder 04/17/2023    Closed nondisplaced fracture of acromial end of right clavicle with routine healing 04/17/2023    History of breast cancer 03/06/2023    Mild late onset Alzheimer's dementia without behavioral disturbance, psychotic disturbance, mood disturbance, or anxiety 03/06/2023    Recurrent falls 11/18/2022    Malignant neoplasm of upper-outer quadrant of right breast in female, estrogen receptor negative 01/07/2022    Secondary hypertension 12/17/2021    Aortic atherosclerosis 01/25/2021    Hypothyroid 01/25/2021    Mood disorder 01/25/2021    Hyperparathyroidism 01/25/2021    Hyperlipidemia 01/25/2021       Scheduled Meds:    atorvastatin  10 mg Oral QHS    B-complex with vitamin C  1 tablet Oral Daily    calcium-vitamin D3  1 tablet Oral BID    cetirizine  10 mg Oral Daily     FLUoxetine  20 mg Oral Daily    levothyroxine  25 mcg Oral Before breakfast    memantine  5 mg Oral BID    multivitamin  1 tablet Oral Daily    pantoprazole  40 mg Oral Daily    senna-docusate 8.6-50 mg  1 tablet Oral BID     Continuous Infusions:   PRN Meds: acetaminophen, acetaminophen, aluminum & magnesium hydroxide-simethicone, calcium carbonate, diphenhydrAMINE, losartan, melatonin    OBJECTIVE:     Vital Signs (Last 24H)  Temp:  [98.3 °F (36.8 °C)]   Pulse:  [95]   Resp:  [17]   BP: (123)/(56)   SpO2:  [95 %]     Laboratory:  CBC:   Recent Labs   Lab 11/19/23  2107 11/20/23  0306 11/22/23  0401   WBC 6.98 6.42 7.78   RBC 2.36* 2.40* 2.22*   HGB 7.4* 7.5* 7.0*   HCT 22.0* 23.1* 21.5*    252 307   MCV 93 96 97   MCH 31.4* 31.3* 31.5*   MCHC 33.6 32.5 32.6     BMP:   Recent Labs   Lab 11/16/23  0640 11/17/23  0333 11/22/23  0401    93 91    141 141   K 5.3* 4.8 4.4   * 112* 107   CO2 27 21* 26   BUN 31* 40* 20   CREATININE 1.3 1.1 0.9   CALCIUM 8.8 7.9* 9.0   MG 1.6  --   --      CMP:   Recent Labs   Lab 11/16/23  0640 11/17/23  0333 11/22/23  0401    93 91   CALCIUM 8.8 7.9* 9.0   ALBUMIN 3.2*  --   --    PROT 5.7*  --   --     141 141   K 5.3* 4.8 4.4   CO2 27 21* 26   * 112* 107   BUN 31* 40* 20   CREATININE 1.3 1.1 0.9   ALKPHOS 63  --   --    ALT 17  --   --    AST 19  --   --    BILITOT 0.6  --   --      LFTs:   Recent Labs   Lab 11/16/23  0640   ALT 17   AST 19   ALKPHOS 63   BILITOT 0.6   PROT 5.7*   ALBUMIN 3.2*     Lab Results   Component Value Date    TSH 2.705 07/11/2023         ASSESSMENT/PLAN:     I have reviewed the medications in compliance with CMS Regulation F756 of the [place]. Based on information gathered, the following items may need to be addressed:    **According to PMH and home medication list, patient takes the following medications at home. These medications are not currently ordered at SNF:  Omeprazole 20 mg daily (non-formulary, currently  taking pantoprazole)  Simvastatin 20 mg daily (non-formulary, currently taking atorvastatin)    **Patient is taking fluoxetine (home med) for mood disturbance, anxiety. A gradual dose reduction is not recommended at this time. Patient to follow-up with prescribing MD as outpatient.  Monitor: mental status for depression, suicide ideation, anxiety, serotonin syndrome, hyponatremia, dizziness, drowsiness, somnolence    Medications reviewed by PharmD, please re-consult if needed.      Nedra Mandujano, Pharm. D.  Clinical Pharmacist  [facility]-long term

## 2023-11-23 PROCEDURE — 11000004 HC SNF PRIVATE: Mod: HCNC

## 2023-11-23 PROCEDURE — 94660 CPAP INITIATION&MGMT: CPT | Mod: HCNC

## 2023-11-23 PROCEDURE — 25000003 PHARM REV CODE 250: Mod: HCNC | Performed by: HOSPITALIST

## 2023-11-23 PROCEDURE — 25000003 PHARM REV CODE 250: Mod: HCNC | Performed by: NURSE PRACTITIONER

## 2023-11-23 PROCEDURE — 99900035 HC TECH TIME PER 15 MIN (STAT): Mod: HCNC

## 2023-11-23 RX ADMIN — PANTOPRAZOLE SODIUM 40 MG: 40 TABLET, DELAYED RELEASE ORAL at 08:11

## 2023-11-23 RX ADMIN — THERA TABS 1 TABLET: TAB at 08:11

## 2023-11-23 RX ADMIN — CETIRIZINE HYDROCHLORIDE 10 MG: 5 TABLET, FILM COATED ORAL at 08:11

## 2023-11-23 RX ADMIN — SENNOSIDES AND DOCUSATE SODIUM 1 TABLET: 8.6; 5 TABLET ORAL at 09:11

## 2023-11-23 RX ADMIN — Medication 1 TABLET: at 08:11

## 2023-11-23 RX ADMIN — SENNOSIDES AND DOCUSATE SODIUM 1 TABLET: 8.6; 5 TABLET ORAL at 08:11

## 2023-11-23 RX ADMIN — FERROUS SULFATE TAB 325 MG (65 MG ELEMENTAL FE) 1 EACH: 325 (65 FE) TAB at 08:11

## 2023-11-23 RX ADMIN — MEMANTINE HYDROCHLORIDE 5 MG: 5 TABLET ORAL at 08:11

## 2023-11-23 RX ADMIN — FLUOXETINE 20 MG: 10 CAPSULE ORAL at 08:11

## 2023-11-23 RX ADMIN — Medication 12 MG: at 09:11

## 2023-11-23 RX ADMIN — FERROUS SULFATE TAB 325 MG (65 MG ELEMENTAL FE) 1 EACH: 325 (65 FE) TAB at 09:11

## 2023-11-23 RX ADMIN — LEVOTHYROXINE SODIUM 25 MCG: 25 TABLET ORAL at 06:11

## 2023-11-23 RX ADMIN — ATORVASTATIN CALCIUM 10 MG: 10 TABLET, FILM COATED ORAL at 09:11

## 2023-11-23 RX ADMIN — Medication 1 TABLET: at 09:11

## 2023-11-23 RX ADMIN — MEMANTINE HYDROCHLORIDE 5 MG: 5 TABLET ORAL at 09:11

## 2023-11-23 RX ADMIN — OXYCODONE HYDROCHLORIDE AND ACETAMINOPHEN 500 MG: 500 TABLET ORAL at 08:11

## 2023-11-23 NOTE — NURSING
Ms Felipe is is lying in bed with eyes closed. Safety measures maintained. Call light is within reach. Will continue to monitor.

## 2023-11-23 NOTE — PLAN OF CARE
Problem: Adult Inpatient Plan of Care  Goal: Plan of Care Review  Outcome: Ongoing, Progressing  Flowsheets (Taken 11/23/2023 1320)  Plan of Care Reviewed With: patient     Problem: Fall Injury Risk  Goal: Absence of Fall and Fall-Related Injury  Outcome: Ongoing, Progressing     Problem: Impaired Wound Healing  Goal: Optimal Wound Healing  Outcome: Ongoing, Progressing     Problem: Skin Injury Risk Increased  Goal: Skin Health and Integrity  Outcome: Ongoing, Progressing     Ms Felipe received contact guard assistance with using a rolling walker to ambulate to bathroom to attempt void. Pt was independent with all 3 phases of toileting. Safety measures maintained. Call light is within reach. Will continue with plan of care.

## 2023-11-24 PROBLEM — E83.39 HYPOPHOSPHATEMIA: Status: ACTIVE | Noted: 2023-11-24

## 2023-11-24 PROBLEM — R13.12 OROPHARYNGEAL DYSPHAGIA: Status: ACTIVE | Noted: 2023-11-24

## 2023-11-24 LAB
ANION GAP SERPL CALC-SCNC: 7 MMOL/L (ref 8–16)
BASOPHILS # BLD AUTO: 0.07 K/UL (ref 0–0.2)
BASOPHILS NFR BLD: 0.7 % (ref 0–1.9)
BUN SERPL-MCNC: 16 MG/DL (ref 8–23)
CALCIUM SERPL-MCNC: 8.6 MG/DL (ref 8.7–10.5)
CHLORIDE SERPL-SCNC: 110 MMOL/L (ref 95–110)
CO2 SERPL-SCNC: 25 MMOL/L (ref 23–29)
CREAT SERPL-MCNC: 0.8 MG/DL (ref 0.5–1.4)
DIFFERENTIAL METHOD: ABNORMAL
EOSINOPHIL # BLD AUTO: 0.5 K/UL (ref 0–0.5)
EOSINOPHIL NFR BLD: 5.7 % (ref 0–8)
ERYTHROCYTE [DISTWIDTH] IN BLOOD BY AUTOMATED COUNT: 15.4 % (ref 11.5–14.5)
EST. GFR  (NO RACE VARIABLE): >60 ML/MIN/1.73 M^2
GLUCOSE SERPL-MCNC: 85 MG/DL (ref 70–110)
HCT VFR BLD AUTO: 28.3 % (ref 37–48.5)
HGB BLD-MCNC: 9.3 G/DL (ref 12–16)
IMM GRANULOCYTES # BLD AUTO: 0.06 K/UL (ref 0–0.04)
IMM GRANULOCYTES NFR BLD AUTO: 0.6 % (ref 0–0.5)
LYMPHOCYTES # BLD AUTO: 2.1 K/UL (ref 1–4.8)
LYMPHOCYTES NFR BLD: 22.2 % (ref 18–48)
MAGNESIUM SERPL-MCNC: 1.7 MG/DL (ref 1.6–2.6)
MCH RBC QN AUTO: 31.4 PG (ref 27–31)
MCHC RBC AUTO-ENTMCNC: 32.9 G/DL (ref 32–36)
MCV RBC AUTO: 96 FL (ref 82–98)
MONOCYTES # BLD AUTO: 0.9 K/UL (ref 0.3–1)
MONOCYTES NFR BLD: 9.4 % (ref 4–15)
NEUTROPHILS # BLD AUTO: 5.8 K/UL (ref 1.8–7.7)
NEUTROPHILS NFR BLD: 61.4 % (ref 38–73)
NRBC BLD-RTO: 0 /100 WBC
PHOSPHATE SERPL-MCNC: 2.2 MG/DL (ref 2.7–4.5)
PLATELET # BLD AUTO: 337 K/UL (ref 150–450)
PMV BLD AUTO: 10.9 FL (ref 9.2–12.9)
POTASSIUM SERPL-SCNC: 4.2 MMOL/L (ref 3.5–5.1)
RBC # BLD AUTO: 2.96 M/UL (ref 4–5.4)
SODIUM SERPL-SCNC: 142 MMOL/L (ref 136–145)
WBC # BLD AUTO: 9.37 K/UL (ref 3.9–12.7)

## 2023-11-24 PROCEDURE — 80048 BASIC METABOLIC PNL TOTAL CA: CPT | Mod: HCNC | Performed by: HOSPITALIST

## 2023-11-24 PROCEDURE — 99306 1ST NF CARE HIGH MDM 50: CPT | Mod: AI,HCNC,, | Performed by: HOSPITALIST

## 2023-11-24 PROCEDURE — 85025 COMPLETE CBC W/AUTO DIFF WBC: CPT | Mod: HCNC | Performed by: NURSE PRACTITIONER

## 2023-11-24 PROCEDURE — 97110 THERAPEUTIC EXERCISES: CPT | Mod: HCNC,CQ

## 2023-11-24 PROCEDURE — 97110 THERAPEUTIC EXERCISES: CPT | Mod: HCNC

## 2023-11-24 PROCEDURE — 97530 THERAPEUTIC ACTIVITIES: CPT | Mod: HCNC,CQ

## 2023-11-24 PROCEDURE — 97535 SELF CARE MNGMENT TRAINING: CPT | Mod: HCNC

## 2023-11-24 PROCEDURE — 99306 PR NURSING FACILITY CARE, INIT, HIGH SEVERITY: ICD-10-PCS | Mod: AI,HCNC,, | Performed by: HOSPITALIST

## 2023-11-24 PROCEDURE — 97530 THERAPEUTIC ACTIVITIES: CPT | Mod: HCNC

## 2023-11-24 PROCEDURE — 84100 ASSAY OF PHOSPHORUS: CPT | Mod: HCNC | Performed by: HOSPITALIST

## 2023-11-24 PROCEDURE — 11000004 HC SNF PRIVATE: Mod: HCNC

## 2023-11-24 PROCEDURE — 97116 GAIT TRAINING THERAPY: CPT | Mod: HCNC,CQ

## 2023-11-24 PROCEDURE — 25000003 PHARM REV CODE 250: Mod: HCNC | Performed by: NURSE PRACTITIONER

## 2023-11-24 PROCEDURE — 25000003 PHARM REV CODE 250: Mod: HCNC | Performed by: HOSPITALIST

## 2023-11-24 PROCEDURE — 36415 COLL VENOUS BLD VENIPUNCTURE: CPT | Mod: HCNC | Performed by: HOSPITALIST

## 2023-11-24 PROCEDURE — 83735 ASSAY OF MAGNESIUM: CPT | Mod: HCNC | Performed by: HOSPITALIST

## 2023-11-24 RX ORDER — SODIUM,POTASSIUM PHOSPHATES 280-250MG
2 POWDER IN PACKET (EA) ORAL
Status: COMPLETED | OUTPATIENT
Start: 2023-11-24 | End: 2023-11-25

## 2023-11-24 RX ADMIN — SENNOSIDES AND DOCUSATE SODIUM 1 TABLET: 8.6; 5 TABLET ORAL at 08:11

## 2023-11-24 RX ADMIN — FLUOXETINE 20 MG: 10 CAPSULE ORAL at 08:11

## 2023-11-24 RX ADMIN — PANTOPRAZOLE SODIUM 40 MG: 40 TABLET, DELAYED RELEASE ORAL at 08:11

## 2023-11-24 RX ADMIN — THERA TABS 1 TABLET: TAB at 08:11

## 2023-11-24 RX ADMIN — OXYCODONE HYDROCHLORIDE AND ACETAMINOPHEN 500 MG: 500 TABLET ORAL at 08:11

## 2023-11-24 RX ADMIN — FERROUS SULFATE TAB 325 MG (65 MG ELEMENTAL FE) 1 EACH: 325 (65 FE) TAB at 08:11

## 2023-11-24 RX ADMIN — Medication 1 TABLET: at 08:11

## 2023-11-24 RX ADMIN — MEMANTINE HYDROCHLORIDE 5 MG: 5 TABLET ORAL at 08:11

## 2023-11-24 RX ADMIN — Medication 12 MG: at 08:11

## 2023-11-24 RX ADMIN — CETIRIZINE HYDROCHLORIDE 10 MG: 5 TABLET, FILM COATED ORAL at 08:11

## 2023-11-24 RX ADMIN — LEVOTHYROXINE SODIUM 25 MCG: 25 TABLET ORAL at 05:11

## 2023-11-24 RX ADMIN — POTASSIUM & SODIUM PHOSPHATES POWDER PACK 280-160-250 MG 2 PACKET: 280-160-250 PACK at 08:11

## 2023-11-24 RX ADMIN — ALUMINUM HYDROXIDE, MAGNESIUM HYDROXIDE, AND DIMETHICONE 30 ML: 400; 400; 40 SUSPENSION ORAL at 10:11

## 2023-11-24 RX ADMIN — POTASSIUM & SODIUM PHOSPHATES POWDER PACK 280-160-250 MG 2 PACKET: 280-160-250 PACK at 12:11

## 2023-11-24 RX ADMIN — ATORVASTATIN CALCIUM 10 MG: 10 TABLET, FILM COATED ORAL at 08:11

## 2023-11-24 RX ADMIN — POTASSIUM & SODIUM PHOSPHATES POWDER PACK 280-160-250 MG 2 PACKET: 280-160-250 PACK at 04:11

## 2023-11-24 NOTE — H&P
"Hospital Medicine  Skilled Nursing Facility   History and Physical Exam      Date of Service: 11/24/2023      Patient Name: Olamide Felipe  MRN: [id number]  Admission Date: 11/20/2023  Attending Physician: Lee Rahman MD  Primary Care Provider: Abiola Campbell MD  Code Status: DNR (Do Not Resuscitate)      Principal problem:  Acute lower GI bleeding      Chief Complaint:   Chief Complaint   Patient presents with    GI Bleeding     Admitted to OS for rehabilitation following hospital stay for acute lower GI bleeding.           HPI:   "Mrs. Felipe is a 89-year-old female with PMHx of mild dementia, breast cancer, HTN, hypothyroidism, recurrent falls, HLD who presents to SNF following hospitalization for acute lower GI bleed.  Admission to SNF for secondary weakness and debility.     Patient originally presented to [facility] ED on 11/16 from independent living facility for bright red blood per rectum.  Per [facility] notes,  Patient had a large bloody bowel movement this morning following which she called her daughter who called EMS.  EMS noted drops of blood from the rectum after the bowel movement.  Patient denies any lightheadedness or shortness of breath or chest pain. While in the ER, vital signs stable.  Hemoglobin dropped about 2 g.  Type and screen was sent, no blood transfusion given.  CTA abdomen pelvis showed findings concerning for acute bleed within proximal aspect of ascending colon.  Large amount of retained stool with significant diverticulosis seen.  IR cosnulted and performed angiogram with coiling on 11/16. Hb has stabilized since this time; ultimately did require transfusion of 1u PRBC. Clinical course complicated by delirium, which is now resolving, and ICU acquired weakness due to her critical illness. Assessed by PT/OT. Initially able to ambulate but became very weak and needed assistance to return to bed. Pursuing SNF placement."     Today, patient remains very weak.  She is very motivated " "to work with therapy.  She reports some indigestion and is requesting Tums.  Currently, patient is oriented to her age and the place, she is disoriented to time.  This is her mental baseline, no delirium noted at this time." Per Renay Du NP on 11/21/23.     She is doing well this afternoon. She had episode of choking while eating her breakfast in bed is a semi-recumbent position. She was able to cough up the food and is feeling fine. She has drank water without difficult since. She reports a good appetite and is having regular BMs. She denies noticing any blood in the toilet with her BMs. She also reports a pleuritic pain under her left breast when she takes a deep breath or coughs. She denies any significant productive cough or any shortness of breath. She worked with both PT and OT today and is doing well.     Patient admitted with skilled services with PT and OT to improve functional status and ability to perform ADLs.       Past Medical History:   Past Medical History:   Diagnosis Date    Hyperlipidemia     Hypothyroidism     Malignant neoplasm of upper-outer quadrant of right breast in female, estrogen receptor negative 1/7/2022    Osteoarthritis, knee     Vertigo        Past Surgical History:   Past Surgical History:   Procedure Laterality Date    dentures      FRACTURE SURGERY Left     fracture left wrist    HYSTERECTOMY      INJECTION FOR SENTINEL NODE IDENTIFICATION Right 1/7/2022    Procedure: INJECTION, FOR SENTINEL NODE IDENTIFICATION-Right;  Surgeon: Marci Mcintosh MD;  Location: [facility];  Service: General;  Laterality: Right;    left wrist surgery      MASTECTOMY, PARTIAL Right 1/7/2022    Procedure: MASTECTOMY, PARTIAL-Right with radiological marker;  Surgeon: Marci Mcintosh MD;  Location: [facility];  Service: General;  Laterality: Right;  REQUEST SAID 2 HOUR CASE    SENTINEL LYMPH NODE BIOPSY Right 1/7/2022    Procedure: BIOPSY, LYMPH NODE, SENTINEL-Right;  Surgeon: Marci Mcintosh MD;  Location: " [facility] OR;  Service: General;  Laterality: Right;       Social History:   Tobacco Use    Smoking status: Former     Current packs/day: 0.00     Types: Cigarettes     Quit date: 1989     Years since quittin.4     Passive exposure: Never    Smokeless tobacco: Never   Substance and Sexual Activity    Alcohol use: Yes     Alcohol/week: 2.0 standard drinks of alcohol     Types: 2 Glasses of wine per week     Comment: 1/3 glass of wine - 2 times weekly    Drug use: Never    Sexual activity: Not Currently       Family History:   Family History       Problem Relation (Age of Onset)    No Known Problems Daughter, Son, Sister            No current facility-administered medications on file prior to encounter.     Current Outpatient Medications on File Prior to Encounter   Medication Sig    acetaminophen (TYLENOL) 325 MG tablet Take 325 mg by mouth every 6 (six) hours as needed for Pain.    b complex vitamins tablet Take 1 tablet by mouth once daily.    calcium-vitamin D 250-100 mg-unit per tablet Take 1 tablet by mouth 2 (two) times daily.    diphenhydrAMINE (BENADRYL) 25 mg capsule Take 25 mg by mouth nightly as needed for Insomnia.    FLUAD QUAD ,65Y UP,,PF, 60 mcg (15 mcg x 4)/0.5 mL Syrg     FLUoxetine 20 MG capsule Take 1 capsule (20 mg total) by mouth once daily.    levothyroxine (SYNTHROID) 25 MCG tablet Take 1 tablet (25 mcg total) by mouth before breakfast.    loratadine (CLARITIN) 10 mg tablet Take 1 tablet (10 mg total) by mouth once daily.    losartan (COZAAR) 25 MG tablet Take 1 tablet (25 mg total) by mouth daily as needed (For Systolic Blood pressure higher than 150).    memantine (NAMENDA) 5 MG Tab Take 1 tablet (5 mg total) by mouth 2 (two) times daily. Begin by taking one tablet daily for seven days, then increase to one tablet two times daily.    mv-min/iron/folic/calcium/vitK (WOMEN'S MULTIVITAMIN ORAL) Take 1 tablet by mouth once daily.    omeprazole (PRILOSEC) 20 MG capsule Take 1 capsule  (20 mg total) by mouth once daily.    simvastatin (ZOCOR) 20 MG tablet TAKE 1 TABLET (20 MG TOTAL) BY MOUTH EVERY EVENING.       Allergies:   Review of patient's allergies indicates:  No Known Allergies    ROS:  Review of Systems   Constitutional:  Negative for appetite change and fever.   HENT:  Positive for trouble swallowing. Negative for sore throat.    Respiratory:  Negative for cough and shortness of breath.    Cardiovascular:  Positive for chest pain (pleuritic). Negative for palpitations.   Gastrointestinal:  Negative for abdominal pain, blood in stool, constipation, nausea and vomiting.   Genitourinary:  Negative for difficulty urinating and dysuria.   Musculoskeletal:  Negative for arthralgias and back pain.   Neurological:  Positive for weakness. Negative for dizziness and light-headedness.   Psychiatric/Behavioral:  Negative for sleep disturbance. The patient is not nervous/anxious.        Objective:  Temp:  [98 °F (36.7 °C)-98.3 °F (36.8 °C)]   Pulse:  [83-87]   Resp:  [16]   BP: (124-125)/(58)   SpO2:  [94 %]     Body mass index is 20.66 kg/m².      Physical Exam  Vitals and nursing note reviewed.   Constitutional:       General: She is not in acute distress.     Appearance: She is well-developed.   Cardiovascular:      Rate and Rhythm: Normal rate and regular rhythm.      Heart sounds: S1 normal and S2 normal. No murmur heard.  Pulmonary:      Effort: Pulmonary effort is normal. No respiratory distress.      Breath sounds: Normal breath sounds. No wheezing or rales.   Abdominal:      General: Bowel sounds are normal. There is no distension.      Palpations: Abdomen is soft.      Tenderness: There is no abdominal tenderness.   Musculoskeletal:         General: No tenderness.      Right lower leg: No edema.      Left lower leg: No edema.   Skin:     General: Skin is warm and dry.      Findings: Bruising present.   Neurological:      Mental Status: She is alert and oriented to person, place, and time.    Psychiatric:         Mood and Affect: Mood normal.         Behavior: Behavior normal. Behavior is cooperative.         Thought Content: Thought content normal.       Significant Labs:   TSH:   Recent Labs   Lab 07/11/23  0935   TSH 2.705     CBC:  Recent Labs   Lab 11/22/23  0401 11/24/23  0506   WBC 7.78 9.37   HGB 7.0* 9.3*   HCT 21.5* 28.3*    337   MCV 97 96     BMP:  Recent Labs   Lab 11/22/23  0401 11/24/23  0506   GLU 91 85    142   K 4.4 4.2    110   CO2 26 25   BUN 20 16   CREATININE 0.9 0.8   CALCIUM 9.0 8.6*   MG  --  1.7   PHOS  --  2.2*       Significant Imaging: I have reviewed all pertinent imaging results/findings completed during prior hospitalization.      Assessment and Plan:  Active Diagnoses:    Diagnosis Date Noted POA    PRINCIPAL PROBLEM:  Acute lower GI bleeding [K92.2] 11/16/2023 Yes    Acute blood loss anemia [D62] 11/21/2023 Yes    Weakness acquired in ICU [R53.1] 11/18/2023 Yes    Delirium [R41.0] 11/17/2023 Yes    Overactive bladder [N32.81] 04/17/2023 Yes    Mild late onset Alzheimer's dementia without behavioral disturbance, psychotic disturbance, mood disturbance, or anxiety [G30.1, F02.A0] 03/06/2023 Yes    History of breast cancer [Z85.3] 03/06/2023 Not Applicable    Secondary hypertension [I15.9] 12/17/2021 Yes    Hyperlipidemia [E78.5] 01/25/2021 Yes    Hypothyroid [E03.9] 01/25/2021 Yes      Problems Resolved During this Admission:       Lower GI bleed  - CTA abdomen pelvis showed active bleed and ascending colon, significant stool burden, diverticulosis  - GI consulted while inpatient  - S/p angiogram and coil embolization by IR on 11/16  - Ceftriaxone/Flagyl discontinued 11/17  - follow-up with GI clinic     Acute blood loss anemia  - continue monitor twice weekly CBC  - transfuse for hemoglobin < 7  - Hgb is stable/improved to 9.3 today.    Oropharyngeal Dysphagia  - Had episode of choking this AM while eating reclined in bed.   - Consult SLP for  evaluation.     Hypophosphatemia  - Will give neutra-phos 2 packets PO qAC and HS x 4 doses.   - Continue to monitor with routine labs on Mondays and Thursdays.      Mild late onset Alzheimer's dementia without behavioral disturbance, psychotic disturbance, mood disturbance, or anxiety  - Patient with dementia with likely etiology of Alzheimer's dementia. Dementia is mild. The patient does not have signs of behavioral disturbance.   Delirium precautions:  - Avoid antihistamines, anticholinergics, hypnotics, and minimize opiates while controlling for pain as these medications may exacerbate delirium. Cues for day/night will assist with keeping patient calm and oriented   - during daytime, please keep adequate light in room (open windows, lights on) and please keep room dim at night-time to encourage normal sleep-wake cycles. Continuing to have nursing and family reorient the patient and encourage family to visit  - continue home memantine 5 mg BID     Advance Care Planning  - Reviewed completed documentation done while inpatient on 11/17  - Patient/family wishes to be DNR     Mood disorder  - Continue home fluoxetine 20 mg daily     Hypothyroidism  Lab Results   Component Value Date    TSH 2.705 07/11/2023   - Continue levothyroxine 25 mcg daily     Aortic atherosclerosis  - Hold aspirin in setting of GI bleed   - Continue atorvastatin 10 mg qHS.     Seasonal allergies  - continue cetirizine 10 mg daily     GERD  - Continue Protonix 40 mg daily     Debility   - Continue with PT/OT for gait training and strengthening and restoration of ADL's   - Encourage mobility, OOB in chair, and early ambulation as appropriate  - Fall precautions   - Monitor for bowel and bladder dysfunction  - Monitor for and prevent skin breakdown and pressure ulcers  - Continue DVT prophylaxis with frequent ambulation, chemical DVT PPX contraindicated due to recent GI bleed       IP [facility] RISK OF UNPLANNED READMISSION Model:  Moderate      Anticipated Disposition:  Home with home health      Future Appointments   Date Time Provider Department Center   11/27/2023  2:15 PM [facility]   12/1/2023 10:00 AM Gene Barnes MD [facility] [place]   1/11/2024  9:30 AM [facility], [facility] [place]   1/18/2024  3:00 PM Abiola Campbell MD [facility] [place]       I certify that SNF services are required to be given on an inpatient basis because Olamide Felipe needs for skilled nursing care and/or skilled rehabilitation are required on a daily basis and such services can only practically be provided in a skilled nursing facility setting and are for an ongoing condition for which she received inpatient care in the hospital.       Lee Rahman MD  Department of Hospital Medicine   [facility]

## 2023-11-24 NOTE — PLAN OF CARE
Problem: Adult Inpatient Plan of Care  Goal: Plan of Care Review  Outcome: Ongoing, Progressing  Goal: Patient-Specific Goal (Individualized)  Outcome: Ongoing, Progressing  Goal: Absence of Hospital-Acquired Illness or Injury  Outcome: Ongoing, Progressing  Intervention: Identify and Manage Fall Risk  Flowsheets (Taken 11/24/2023 0251)  Safety Promotion/Fall Prevention:   assistive device/personal item within reach   supervised activity   Supervised toileting - stay within arms reach  Goal: Optimal Comfort and Wellbeing  Outcome: Ongoing, Progressing  Goal: Readiness for Transition of Care  Outcome: Ongoing, Progressing     Problem: Fall Injury Risk  Goal: Absence of Fall and Fall-Related Injury  Outcome: Ongoing, Progressing     Problem: Impaired Wound Healing  Goal: Optimal Wound Healing  Outcome: Ongoing, Progressing     Problem: Skin Injury Risk Increased  Goal: Skin Health and Integrity  Outcome: Ongoing, Progressing  Intervention: Optimize Skin Protection  Flowsheets (Taken 11/24/2023 0251)  Skin Protection: adhesive use limited  Head of Bed (HOB) Positioning: HOB at 20-30 degrees  Intervention: Promote and Optimize Oral Intake  Flowsheets (Taken 11/24/2023 0251)  Oral Nutrition Promotion: rest periods promoted

## 2023-11-24 NOTE — NURSING
POC reviewed with pt, pt verbalized understanding. Safety maintained throughout shift, bed locked and in lowest position, call light in reach, Side rails up X 2. Non skid sock on when OOB. Pt remained free of fall/trauma. Pt declined pain throughout shift. PIV to L Arm remains dry, intact, and able to flush. VS stable.  no reports of nausea and vomiting.   No signs of distress, rise and fall of chest noted, respirations  even and unlabored   Plan of care on going. No future concerns as of present.

## 2023-11-24 NOTE — PROGRESS NOTES
Protocol: [employer] Effectiveness Study  Identifier: [id number]  IRB#: 2022.247  PI: Dr. Baldo Maynard, PhD  CO-I: Dr. Nighat Arreguin PsyD  Version Date: 12/05/2022  Pt Study ID: [id number]  Visit Month: 1   Care Plan documented on: 11/14/23 Please refer to note for more information.      Timeline:      Consent: Completed(10/20/23)  Baseline questionnaires: Completed(10/23/23)  6-month questionnaires: Planned(04/18/24)  12-month questionnaires: Planned(10/18/24)           Visit Note: Spoke with cg, cg stated that the pt is currently in the [facility] and they are taking good care of the pt for now. When the pt is discharged she knows that she can no longer live alone or if she does want to go home she will need a live in nurse 24/7. Cg reported that the pt is weak but in good spirits.     Cg reported that the pt was frightened last week after needing a unit of blood, getting dizzy and feeling disoriented. Cg stated that this is the moment that the pt knew she could live alone.     Cg is interested in having the pt stay in assisted living one discharged; they live across the street from an assisted living center called [facility]. [facility] is an assisted living center owned by two nurses and they have a room ready for the pt when she is discharged. Cg mentioned that she is not sure if she wants the pt to go to assisted living but maybe not directly across the street from the cg's home.     Cg mentioned that the pt was supposed to go visit her son and his wife in [place] but they cancelled the trip and they will come to the area instead.     Technology is not the pt's friend and cg asked for resources in helping find the pt a phone that can call out and receive calls but nothing more. I will send the cg resources from the [facility].     Cg mentioned that since the pt has been in the hospital she has been getting the correct dose of medication at the right time and  they can tell a difference in the pt's mood and behavior.     I will plan to call next for our month 2 call on 12/14/23.

## 2023-11-24 NOTE — PT/OT/SLP PROGRESS
"Physical Therapy Treatment    Patient Name:  Olamide Felipe   MRN:  [id number]  Admit Date: 11/20/2023  Admitting Diagnosis: Acute lower GI bleeding  Recent Surgeries: N/A    General Precautions: Standard, fall  Orthopedic Precautions: N/A  Braces: N/A    Recommendations:     Discharge Recommendations:  (Home Health PT)  Level of Assistance Recommended at Discharge: Intermittent assistance   Discharge Equipment Recommendations: walker, rolling  Barriers to discharge: None    Assessment:     Olamide Felipe is a 89 y.o. female admitted with a medical diagnosis of Acute lower GI bleeding . Pt tolerated well, pt amb with Supervision. Pt asc/desc 4" curb and 12 stairs with SBA. Pt propelled LBE without issue and pt would continue to benefit from skilled PT services to improve overall functional mobility, strength and endurance.      Performance deficits affecting function: weakness, impaired endurance, impaired self care skills, impaired functional mobility, gait instability, impaired balance, decreased upper extremity function, decreased lower extremity function, impaired cardiopulmonary response to activity.    Rehab Potential is good    Activity Tolerance: Good    Plan:     Patient to be seen 4 x/week to address the above listed problems via gait training, therapeutic activities, therapeutic exercises, neuromuscular re-education, wheelchair management/training    Plan of Care Expires: 12/21/23  Plan of Care Reviewed with: patient    Subjective     Pt agreeable for PT.     Pain/Comfort:  Pain Rating 1: 0/10  Pain Rating Post-Intervention 1: 0/10    Patient's cultural, spiritual, Advent conflicts given the current situation:  no    Objective:      Patient found up in chair with PICC line upon handoff from OT    Therapeutic Activities and Exercises: LBE X 12 min For BLE strengthening, endurance and ROM. Mod res    Patient educated on role of therapy, goals of session, and benefits of out of bed mobility. " "  Instructed on use of call button and importance of calling nursing staff for assistance with mobility   Questions/concerns addressed within PTA scope of practice  Pt verbalized understanding.      Functional Mobility:  Bed Mobility:     Scooting: supervision  Sit to Supine: supervision, HOB elevated no rail  Transfers:     Sit to Stand:  supervision with rolling walker  Bed to Chair: supervision with  rolling walker  using  Step Transfer  Gait: pt amb ~200 ft + ~180 ft Supervision with RW.  Balance: pt putting on putting green x 10 reps with SBA/CGA and no AD. Pt made 2/10  4" curb: pt asc/desc curb step with SBA/S with RW. Demo and vc for proper technique  Stairs:  Pt ascended/descended 12 stair(s) with No Assistive Device with bilateral handrails with Supervision or Set-up Assistance and Stand-by Assistance. No rest breaks    AM-PAC 6 CLICK MOBILITY  18    Patient left HOB elevated with call button in reach.    GOALS:   Multidisciplinary Problems       Physical Therapy Goals          Problem: Physical Therapy    Goal Priority Disciplines Outcome Goal Variances Interventions   Physical Therapy Goal     PT, PT/OT Ongoing, Progressing     Description: Goals to be met by: 12/21/23     Patient will increase functional independence with mobility by performing:    . Supine to sit with [place]  . Sit to supine with [place]  . Rolling to Left and Right with [place].  . Sit to stand transfer with Modified [place]  . Bed to chair transfer with Modified [place] using LRAD or no AD  . Gait  x 200 feet with Supervision using LRAD and/or no AD.   . Wheelchair propulsion x150 feet with Modified [place] using bilateral uppper extremities  . Ascend/descend 12 stair with bilateral Handrails Supervision using No Assistive Device.   . Ascend/Descend 4 inch curb step with Supervision using No Assistive Device and/or LRAD.                         Time Tracking:     PT Received On: 11/24/23  PT Start Time: " 1454  PT Stop Time: 1540  PT Total Time (min): 46 min    Billable Minutes: Gait Training 20, Therapeutic Activity 12, and Therapeutic Exercise 12    Treatment Type: Treatment  PT/PTA: PTA     Number of PTA visits since last PT visit: 1 11/24/2023

## 2023-11-24 NOTE — PT/OT/SLP PROGRESS
"Occupational Therapy   Treatment    Name: Olamide Felipe  MRN: [id number]  Admit Date: 11/20/2023  Admitting Diagnosis:  Acute lower GI bleeding    General Precautions: Standard, fall   Orthopedic Precautions: N/A   Braces: N/A    Recommendations:     Discharge Recommendations:  Low Intensity Therapy  Level of Assistance Recommended at Discharge: 24 hours supervision for safety in performing ADL's and home management tasks  Discharge Equipment Recommendations: walker, rolling  Barriers to discharge:  Other (Comment) (increased need for supervision while preforming functional tasks)    Assessment:     Olamide Felipe is a 89 y.o. female with a medical diagnosis of Acute lower GI bleeding. Pt tolerated session well and without incident and shows excellent motivation and potential to improve, but the pt continues to require assistance to perform self-care tasks and mobility. Pt strengths include Functional cognition, Following multi-step tasks, and Attention to task and PLOF and Willingness to participate. Pt improved UBD, LBD, Footwear, Grooming/hygiene, Toileting, Toilet transfers, and Dynamic standing balance. However, pt would continue to benefit from cont'd OT services in the SNF setting to improve safety and independence /c functional tasks and ADLs upon discharge.    Performance deficits affecting function are impaired balance, gait instability, impaired cognition, impaired cardiopulmonary response to activity.     Rehab Potential is good    Activity tolerance:  Good    Plan:     Patient to be seen 5 x/week to address the above listed problems via self-care/home management, community/work re-entry, therapeutic activities, therapeutic exercises, therapeutic groups    Plan of Care Expires: 12/20/23  Plan of Care Reviewed with: patient    Subjective     Communicated with: YOGESH prior to session.     "I feel so good being up out of bed since I haven't been able to in two days!!" .    Pain/Comfort:  Pain Rating 1: " 0/10  Pain Rating Post-Intervention 1: 0/10    Patient's cultural, spiritual, Druze conflicts given the current situation:  no    Objective:     Patient found  in bathroom  with PICC line upon OT entry to room. Pt alert and agreeable to therapy.         Functional Mobility/Transfers:  Patient completed Sit <> Stand Transfer with stand by assistance  with  rolling walker   Patient completed Toilet Transfer Step Transfer technique with stand by assistance with  rolling walker  Functional Mobility: Pt able to ambulate around room for performance of ADLs /c SBA and RW    Activities of Daily Living:  Grooming: stand by assistance /c RW standing at sink to wash hands and brush hair  Upper Body Dressing: modified independence seated on toilet to don/doff shirt  Lower Body Dressing: stand by assistance seated at/standing /c RW by toilet to doff/don pants   Toileting: stand by assistance /c RW. SBA req for standing clothing mgmt. Pt able to perform seated perirectal care    [facility] 6 Click ADL: 18    OT Exercises:   ~Pt participated in activities to improve dynamic standing balance, bi-manual integration into functional tasks while standing, and functional reach. Pt asked to throw ball back and forth with third party, to throw ball at target, and to volley balloon back and forth with third party. Pt req seated rest breaks between each task. Pt able to complete all tasks /c CGA and RW in place in case of minor LOB (no LOB noted).    ~Pt participated in 12 minute UBE activity seated in WC at Mod-max resistance and 45-55 RPM to address endurance and strength of UE to improve performance of ADLs and other functional tasks.       Treatment & Education:  Pt educated on POC, role of OT, and safety. Pt performed tasks to improve safety and independence in functional tasks and ADLs as mentioned above.       Patient left up in chair with  HERNÁN Cartwright present and in rehabilitation gym for PT session    GOALS:   Multidisciplinary  Problems       Occupational Therapy Goals          Problem: Occupational Therapy    Goal Priority Disciplines Outcome Interventions   Occupational Therapy Goal     OT, PT/OT Ongoing, Progressing    Description: Goals to be met by: 12/20/2023     Patient will increase functional independence with ADLs by performing:      UE Dressing with Set-up Assistance- Met  LE Dressing with Supervision- Ongoing  Oral Hygiene while standing at sink with Supervision and Assistive Devices as needed.  Personal Hygiene while standing at sink with Supervision and Assistive Devices as needed- Ongoing  Toileting from toilet with Supervision and Assistive Devices as needed for hygiene and clothing management- Ongoing.   Bathing from  shower chair/bench with Supervision.  Toilet transfer to toilet with Supervision and Assistive Device as needed- Ongoing  Footwear /c Mod I- Met  SC t/f /c SPV and Assistive Device as needed  Tolerate standing functional tasks for ~8 minutes /c SPV and Assistive device as needed- Ongoing                         Time Tracking:     OT Date of Treatment: 11/24/23  OT Start Time: 1415    OT Stop Time: 1453  OT Total Time (min): 38 min    Billable Minutes:Self Care/Home Management 8  Therapeutic Activity 15  Therapeutic Exercise 15    11/24/2023

## 2023-11-24 NOTE — PLAN OF CARE
Problem: Occupational Therapy  Goal: Occupational Therapy Goal  Description: Goals to be met by: 12/20/2023     Patient will increase functional independence with ADLs by performing:      UE Dressing with Set-up Assistance- Met  LE Dressing with Supervision- Ongoing  Oral Hygiene while standing at sink with Supervision and Assistive Devices as needed.  Personal Hygiene while standing at sink with Supervision and Assistive Devices as needed- Ongoing  Toileting from toilet with Supervision and Assistive Devices as needed for hygiene and clothing management- Ongoing.   Bathing from  shower chair/bench with Supervision.  Toilet transfer to toilet with Supervision and Assistive Device as needed- Ongoing  Footwear /c Mod I- Met  SC t/f /c SPV and Assistive Device as needed  Tolerate standing functional tasks for ~8 minutes /c SPV and Assistive device as needed- Ongoing    Outcome: Ongoing, Progressing

## 2023-11-25 PROCEDURE — 97116 GAIT TRAINING THERAPY: CPT | Mod: HCNC,CQ

## 2023-11-25 PROCEDURE — 97110 THERAPEUTIC EXERCISES: CPT | Mod: HCNC,CQ

## 2023-11-25 PROCEDURE — 25000003 PHARM REV CODE 250: Mod: HCNC | Performed by: NURSE PRACTITIONER

## 2023-11-25 PROCEDURE — 11000004 HC SNF PRIVATE: Mod: HCNC

## 2023-11-25 PROCEDURE — 25000003 PHARM REV CODE 250: Mod: HCNC | Performed by: HOSPITALIST

## 2023-11-25 PROCEDURE — 97530 THERAPEUTIC ACTIVITIES: CPT | Mod: HCNC,CQ

## 2023-11-25 RX ADMIN — POTASSIUM & SODIUM PHOSPHATES POWDER PACK 280-160-250 MG 2 PACKET: 280-160-250 PACK at 05:11

## 2023-11-25 RX ADMIN — MEMANTINE HYDROCHLORIDE 5 MG: 5 TABLET ORAL at 09:11

## 2023-11-25 RX ADMIN — PANTOPRAZOLE SODIUM 40 MG: 40 TABLET, DELAYED RELEASE ORAL at 09:11

## 2023-11-25 RX ADMIN — Medication 12 MG: at 08:11

## 2023-11-25 RX ADMIN — Medication 1 TABLET: at 09:11

## 2023-11-25 RX ADMIN — FERROUS SULFATE TAB 325 MG (65 MG ELEMENTAL FE) 1 EACH: 325 (65 FE) TAB at 09:11

## 2023-11-25 RX ADMIN — ATORVASTATIN CALCIUM 10 MG: 10 TABLET, FILM COATED ORAL at 08:11

## 2023-11-25 RX ADMIN — THERA TABS 1 TABLET: TAB at 09:11

## 2023-11-25 RX ADMIN — FLUOXETINE 20 MG: 10 CAPSULE ORAL at 09:11

## 2023-11-25 RX ADMIN — FERROUS SULFATE TAB 325 MG (65 MG ELEMENTAL FE) 1 EACH: 325 (65 FE) TAB at 08:11

## 2023-11-25 RX ADMIN — SENNOSIDES AND DOCUSATE SODIUM 1 TABLET: 8.6; 5 TABLET ORAL at 09:11

## 2023-11-25 RX ADMIN — MEMANTINE HYDROCHLORIDE 5 MG: 5 TABLET ORAL at 08:11

## 2023-11-25 RX ADMIN — OXYCODONE HYDROCHLORIDE AND ACETAMINOPHEN 500 MG: 500 TABLET ORAL at 09:11

## 2023-11-25 RX ADMIN — SENNOSIDES AND DOCUSATE SODIUM 1 TABLET: 8.6; 5 TABLET ORAL at 08:11

## 2023-11-25 RX ADMIN — CETIRIZINE HYDROCHLORIDE 10 MG: 5 TABLET, FILM COATED ORAL at 09:11

## 2023-11-25 RX ADMIN — Medication 1 TABLET: at 08:11

## 2023-11-25 RX ADMIN — LEVOTHYROXINE SODIUM 25 MCG: 25 TABLET ORAL at 05:11

## 2023-11-25 NOTE — NURSING
Ms Felipe remains AAO; pt is sitting up in recliner. She received contact luz elena assistance with using a yan walker to ambulate to the bathroom. Pt was independent with all 3 phases of toileting. She voided an unmeasured amt of urine into toilet. Safety measures maintained. Pt assisted back into recliner and call light is within reach. Ms Felipe was instructed to continue to use the call button to signal staff for assistance. Pt verbalized understanding of information.

## 2023-11-25 NOTE — PLAN OF CARE
Problem: Adult Inpatient Plan of Care  Goal: Plan of Care Review  Outcome: Ongoing, Progressing  Flowsheets (Taken 11/25/2023 1152)  Plan of Care Reviewed With: patient     Problem: Fall Injury Risk  Goal: Absence of Fall and Fall-Related Injury  Outcome: Ongoing, Progressing     Problem: Impaired Wound Healing  Goal: Optimal Wound Healing  Outcome: Ongoing, Progressing     Problem: Skin Injury Risk Increased  Goal: Skin Health and Integrity  Outcome: Ongoing, Progressing

## 2023-11-25 NOTE — TREATMENT PLAN
Rehab Services' DME recommendations    Olamide Felipe  MRN: [id number]     [x] Home health PT, OT, and Aide      Dani Otto, PTA 11/25/2023  Yin Yin, PT 12/4/2023

## 2023-11-25 NOTE — PT/OT/SLP PROGRESS
"Physical Therapy Treatment    Patient Name:  Olamide Felipe   MRN:  [id number]  Admit Date: 11/20/2023  Admitting Diagnosis: Acute lower GI bleeding  Recent Surgeries: N/A    General Precautions: Standard, fall  Orthopedic Precautions: N/A  Braces: N/A    Recommendations:     Discharge Recommendations:  (Home Health PT)  Level of Assistance Recommended at Discharge: Intermittent assistance   Discharge Equipment Recommendations: walker, rolling  Barriers to discharge: None    Assessment:     Olamide Felipe is a 89 y.o. female admitted with a medical diagnosis of Acute lower GI bleeding . Pt tolerated well, pt amb with RW with supervision and with CGA/SBA with no AD. Pt asc/desc 4" curb and 12 stairs with Supervision and RW. Pt completed standing balance activity with SBA. Pt propelled LBE for ~15 min with m oderate resistance. Pt progressing well, pleasant and would continue to benefit from skilled PT services to improve overall functional mobility, strength and endurance.      Performance deficits affecting function: weakness, impaired endurance, impaired self care skills, impaired functional mobility, gait instability, impaired balance, decreased upper extremity function, decreased lower extremity function, impaired cardiopulmonary response to activity.    Rehab Potential is good    Activity Tolerance: Good    Plan:     Patient to be seen 4 x/week to address the above listed problems via gait training, therapeutic activities, therapeutic exercises, neuromuscular re-education, wheelchair management/training    Plan of Care Expires: 12/21/23  Plan of Care Reviewed with: patient    Subjective     Pt agreeable for PT.     Pain/Comfort:  Pain Rating 1: 0/10  Pain Rating Post-Intervention 1: 0/10    Patient's cultural, spiritual, Amish conflicts given the current situation:  no    Objective:      Patient found  in BSC  with PICC line upon PT entry to room.     Therapeutic Activities and Exercises: LBE X 15 min " "For BLE strengthening, endurance and ROM, no rest and mod resistance     Patient educated on role of therapy, goals of session, and benefits of out of bed mobility.   Instructed on use of call button and importance of calling nursing staff for assistance with mobility   Questions/concerns addressed within PTA scope of practice  Pt verbalized understanding    Functional Mobility:  Transfers:     Sit to Stand:  supervision with no AD and rolling walker  Bed to Chair: supervision with  rolling walker  using  Step Transfer  Gait: pt amb >300 ft with RW + 185 ft no AD CGA + ~30 ft no AD SBA. Quick seated rest breaks in between trials.  Balance: pt completed standing balance activity putting on putting green with no AD and SBA. Pt making 6/11 putts  Stairs:  Pt ascended/descended 12 stair(s) with No Assistive Device with bilateral handrails with Supervision or Set-up Assistance. Reciprocal gait pattern and no rest  4" curb: pt asc/desc 4" curb with Sup and RW    [facility] 6 CLICK MOBILITY  18    Patient left up in chair with  PT tech .    GOALS:   Multidisciplinary Problems       Physical Therapy Goals          Problem: Physical Therapy    Goal Priority Disciplines Outcome Goal Variances Interventions   Physical Therapy Goal     PT, PT/OT Ongoing, Progressing     Description: Goals to be met by: 12/21/23     Patient will increase functional independence with mobility by performing:    . Supine to sit with Hawkins  . Sit to supine with Hawkins  . Rolling to Left and Right with Hawkins.  . Sit to stand transfer with Modified [place]  . Bed to chair transfer with Modified [place] using LRAD or no AD  . Gait  x 200 feet with Supervision using LRAD and/or no AD.   . Wheelchair propulsion x150 feet with Modified Hawkins using bilateral uppper extremities  . Ascend/descend 12 stair with bilateral Handrails Supervision using No Assistive Device.   . Ascend/Descend 4 inch curb step with Supervision using " No Assistive Device and/or LRAD.                         Time Tracking:     PT Received On: 11/25/23  PT Start Time: 0820  PT Stop Time: 0900  PT Total Time (min): 40 min    Billable Minutes: Gait Training 15, Therapeutic Activity 10, and Therapeutic Exercise 15    Treatment Type: Treatment  PT/PTA: PTA     Number of PTA visits since last PT visit: 2     11/25/2023

## 2023-11-26 PROCEDURE — 25000003 PHARM REV CODE 250: Mod: HCNC | Performed by: NURSE PRACTITIONER

## 2023-11-26 PROCEDURE — 25000003 PHARM REV CODE 250: Mod: HCNC | Performed by: HOSPITALIST

## 2023-11-26 PROCEDURE — 11000004 HC SNF PRIVATE: Mod: HCNC

## 2023-11-26 PROCEDURE — 97530 THERAPEUTIC ACTIVITIES: CPT | Mod: HCNC,CO

## 2023-11-26 RX ADMIN — THERA TABS 1 TABLET: TAB at 08:11

## 2023-11-26 RX ADMIN — Medication 12 MG: at 08:11

## 2023-11-26 RX ADMIN — CETIRIZINE HYDROCHLORIDE 10 MG: 5 TABLET, FILM COATED ORAL at 06:11

## 2023-11-26 RX ADMIN — MEMANTINE HYDROCHLORIDE 5 MG: 5 TABLET ORAL at 08:11

## 2023-11-26 RX ADMIN — Medication 1 TABLET: at 08:11

## 2023-11-26 RX ADMIN — LEVOTHYROXINE SODIUM 25 MCG: 25 TABLET ORAL at 05:11

## 2023-11-26 RX ADMIN — FERROUS SULFATE TAB 325 MG (65 MG ELEMENTAL FE) 1 EACH: 325 (65 FE) TAB at 08:11

## 2023-11-26 RX ADMIN — SENNOSIDES AND DOCUSATE SODIUM 1 TABLET: 8.6; 5 TABLET ORAL at 08:11

## 2023-11-26 RX ADMIN — ATORVASTATIN CALCIUM 10 MG: 10 TABLET, FILM COATED ORAL at 08:11

## 2023-11-26 RX ADMIN — OXYCODONE HYDROCHLORIDE AND ACETAMINOPHEN 500 MG: 500 TABLET ORAL at 08:11

## 2023-11-26 RX ADMIN — FLUOXETINE 20 MG: 10 CAPSULE ORAL at 08:11

## 2023-11-26 RX ADMIN — PANTOPRAZOLE SODIUM 40 MG: 40 TABLET, DELAYED RELEASE ORAL at 08:11

## 2023-11-26 NOTE — NURSING
Ms Felipe remains AAO; pt is lying in bed, and she is watching TV. Pt offered assistance with  going to the bathroom to void. She stated that she did not have to use the bathroom at this time. This nurse instructed pt to use the call button to signal staff for assistance. She verbalized understanding of information. Call light is within reach.

## 2023-11-26 NOTE — PT/OT/SLP PROGRESS
Occupational Therapy   Treatment    Name: Olamide Felipe  MRN: [id number]  Admit Date: 11/20/2023  Admitting Diagnosis:  Acute lower GI bleeding    General Precautions: Standard, fall   Orthopedic Precautions: N/A   Braces: N/A    Recommendations:     Discharge Recommendations:  Low Intensity Therapy  Level of Assistance Recommended at Discharge: 24 hours supervision for safety in performing ADL's and home management tasks  Discharge Equipment Recommendations: walker, rolling  Barriers to discharge:  Other (Comment) (increased need for supervision while preforming functional tasks)    Assessment:     Olamide Felipe is a 89 y.o. female with a medical diagnosis of Acute lower GI bleeding.  She presents with  Performance deficits affecting function are impaired balance, gait instability, impaired cognition, impaired cardiopulmonary response to activity.     Pt. Was cooperative and participated well with session on this day. Pt  continues to demonstrate levels of physical deficits with  functional indep with daily management activities tasks, selfcare skills with balance,  functional mobility, UB strength and endurance. Pt. Will continue to benefit from continued OT to progress towards goals      Rehab Potential is fair    Activity tolerance:  Fair    Plan:     Patient to be seen 5 x/week to address the above listed problems via self-care/home management, community/work re-entry, therapeutic activities, therapeutic exercises, therapeutic groups    Plan of Care Expires: 12/20/23  Plan of Care Reviewed with: patient    Subjective     Communicated with: Nsg and Pt. prior to session. Boy this walking sure feels good    Pain/Comfort:  Pain Rating 1: 0/10  Pain Rating Post-Intervention 1: 0/10    Patient's cultural, spiritual, Rastafarian conflicts given the current situation:  no    Objective:     Patient found up in chair  PICC line upon OT entry to room.    Functional Mobility/Transfers:  Patient completed Sit <> Stand  Transfer with stand by assistance  with  rolling walker   Patient completed Toilet Transfer Step Transfer technique with stand by assistance with  rolling walker  Functional Mobility: Pt. With fxl mobility on this day 125 ft  each was from room to gym and another 180 in gym with SBA/CGA with RW for bal    Activities of Daily Living:  Grooming: supervision at sink level  Toileting: supervision with cleaning and clothing management       [facility] 6 Click ADL: 18    OT Exercises: UE Ergometer 10 min    Treatment & Education:  Pt. With 2# dowel activity with 2x20 reps with  shd flex, bicep curls and forward flex motion to increase BUE ROM and strength.    Pt. With therex performed to increase ROM, endurance selfcare task and fxl mobility for independence     Pt edu on role of OT, POC, safety when performing self care tasks , benefit of performing OOB activity, and safety when performing functional transfers and mobility management for preparation with goals to progress towards next level of care     Patient left up in chair with all lines intact and call button in reach    GOALS:   Multidisciplinary Problems       Occupational Therapy Goals          Problem: Occupational Therapy    Goal Priority Disciplines Outcome Interventions   Occupational Therapy Goal     OT, PT/OT Ongoing, Progressing    Description: Goals to be met by: 12/20/2023     Patient will increase functional independence with ADLs by performing:      UE Dressing with Set-up Assistance- Met  LE Dressing with Supervision- Ongoing  Oral Hygiene while standing at sink with Supervision and Assistive Devices as needed.  Personal Hygiene while standing at sink with Supervision and Assistive Devices as needed- Ongoing  Toileting from toilet with Supervision and Assistive Devices as needed for hygiene and clothing management- Ongoing.   Bathing from  shower chair/bench with Supervision.  Toilet transfer to toilet with Supervision and Assistive Device as needed-  Ongoing  Footwear /c Mod I- Met  SC t/f /c SPV and Assistive Device as needed  Tolerate standing functional tasks for ~8 minutes /c SPV and Assistive device as needed- Ongoing                         Time Tracking:     OT Date of Treatment: 11/26/23  OT Start Time: 1121    OT Stop Time: 1155  OT Total Time (min): 34 min    Billable Minutes:Therapeutic Activity 34    11/26/2023  A client care conference was performed between the LOTR and MCQUEEN, prior to treatment to discuss the patient's status, treatment plan and established goals.

## 2023-11-27 ENCOUNTER — OFFICE VISIT (OUTPATIENT)
Dept: GASTROENTEROLOGY | Facility: CLINIC | Age: 88
End: 2023-11-27
Payer: MEDICARE

## 2023-11-27 VITALS
HEIGHT: 64 IN | SYSTOLIC BLOOD PRESSURE: 158 MMHG | HEART RATE: 76 BPM | WEIGHT: 120 LBS | DIASTOLIC BLOOD PRESSURE: 63 MMHG | BODY MASS INDEX: 20.49 KG/M2

## 2023-11-27 DIAGNOSIS — K57.30 DIVERTICULOSIS LARGE INTESTINE W/O PERFORATION OR ABSCESS W/O BLEEDING: Primary | ICD-10-CM

## 2023-11-27 LAB
ANION GAP SERPL CALC-SCNC: 8 MMOL/L (ref 8–16)
BASOPHILS # BLD AUTO: 0.06 K/UL (ref 0–0.2)
BASOPHILS NFR BLD: 0.9 % (ref 0–1.9)
BUN SERPL-MCNC: 24 MG/DL (ref 8–23)
CALCIUM SERPL-MCNC: 9.6 MG/DL (ref 8.7–10.5)
CHLORIDE SERPL-SCNC: 109 MMOL/L (ref 95–110)
CO2 SERPL-SCNC: 26 MMOL/L (ref 23–29)
CREAT SERPL-MCNC: 1 MG/DL (ref 0.5–1.4)
DIFFERENTIAL METHOD: ABNORMAL
EOSINOPHIL # BLD AUTO: 0.3 K/UL (ref 0–0.5)
EOSINOPHIL NFR BLD: 5.3 % (ref 0–8)
ERYTHROCYTE [DISTWIDTH] IN BLOOD BY AUTOMATED COUNT: 14.9 % (ref 11.5–14.5)
EST. GFR  (NO RACE VARIABLE): 53.5 ML/MIN/1.73 M^2
GLUCOSE SERPL-MCNC: 80 MG/DL (ref 70–110)
HCT VFR BLD AUTO: 30.1 % (ref 37–48.5)
HGB BLD-MCNC: 9.4 G/DL (ref 12–16)
IMM GRANULOCYTES # BLD AUTO: 0.05 K/UL (ref 0–0.04)
IMM GRANULOCYTES NFR BLD AUTO: 0.8 % (ref 0–0.5)
LYMPHOCYTES # BLD AUTO: 1.6 K/UL (ref 1–4.8)
LYMPHOCYTES NFR BLD: 24.7 % (ref 18–48)
MAGNESIUM SERPL-MCNC: 2 MG/DL (ref 1.6–2.6)
MCH RBC QN AUTO: 31.1 PG (ref 27–31)
MCHC RBC AUTO-ENTMCNC: 31.2 G/DL (ref 32–36)
MCV RBC AUTO: 100 FL (ref 82–98)
MONOCYTES # BLD AUTO: 0.8 K/UL (ref 0.3–1)
MONOCYTES NFR BLD: 12 % (ref 4–15)
NEUTROPHILS # BLD AUTO: 3.6 K/UL (ref 1.8–7.7)
NEUTROPHILS NFR BLD: 56.3 % (ref 38–73)
NRBC BLD-RTO: 0 /100 WBC
PHOSPHATE SERPL-MCNC: 3.5 MG/DL (ref 2.7–4.5)
PLATELET # BLD AUTO: 401 K/UL (ref 150–450)
PMV BLD AUTO: 10.5 FL (ref 9.2–12.9)
POTASSIUM SERPL-SCNC: 5 MMOL/L (ref 3.5–5.1)
RBC # BLD AUTO: 3.02 M/UL (ref 4–5.4)
SODIUM SERPL-SCNC: 143 MMOL/L (ref 136–145)
WBC # BLD AUTO: 6.44 K/UL (ref 3.9–12.7)

## 2023-11-27 PROCEDURE — 3288F PR FALLS RISK ASSESSMENT DOCUMENTED: ICD-10-PCS | Mod: HCNC,CPTII,S$GLB, | Performed by: INTERNAL MEDICINE

## 2023-11-27 PROCEDURE — 1126F AMNT PAIN NOTED NONE PRSNT: CPT | Mod: HCNC,CPTII,S$GLB, | Performed by: INTERNAL MEDICINE

## 2023-11-27 PROCEDURE — 25000003 PHARM REV CODE 250: Mod: HCNC | Performed by: NURSE PRACTITIONER

## 2023-11-27 PROCEDURE — 1126F PR PAIN SEVERITY QUANTIFIED, NO PAIN PRESENT: ICD-10-PCS | Mod: HCNC,CPTII,S$GLB, | Performed by: INTERNAL MEDICINE

## 2023-11-27 PROCEDURE — 99999 PR PBB SHADOW E&M-EST. PATIENT-LVL III: ICD-10-PCS | Mod: PBBFAC,HCNC,, | Performed by: INTERNAL MEDICINE

## 2023-11-27 PROCEDURE — 99999 PR PBB SHADOW E&M-EST. PATIENT-LVL III: CPT | Mod: PBBFAC,HCNC,, | Performed by: INTERNAL MEDICINE

## 2023-11-27 PROCEDURE — 92610 EVALUATE SWALLOWING FUNCTION: CPT | Mod: HCNC

## 2023-11-27 PROCEDURE — 99213 OFFICE O/P EST LOW 20 MIN: CPT | Mod: HCNC,S$GLB,, | Performed by: INTERNAL MEDICINE

## 2023-11-27 PROCEDURE — 1101F PT FALLS ASSESS-DOCD LE1/YR: CPT | Mod: HCNC,CPTII,S$GLB, | Performed by: INTERNAL MEDICINE

## 2023-11-27 PROCEDURE — 1101F PR PT FALLS ASSESS DOC 0-1 FALLS W/OUT INJ PAST YR: ICD-10-PCS | Mod: HCNC,CPTII,S$GLB, | Performed by: INTERNAL MEDICINE

## 2023-11-27 PROCEDURE — 3288F FALL RISK ASSESSMENT DOCD: CPT | Mod: HCNC,CPTII,S$GLB, | Performed by: INTERNAL MEDICINE

## 2023-11-27 PROCEDURE — 11000004 HC SNF PRIVATE: Mod: HCNC

## 2023-11-27 PROCEDURE — 99213 PR OFFICE/OUTPT VISIT, EST, LEVL III, 20-29 MIN: ICD-10-PCS | Mod: HCNC,S$GLB,, | Performed by: INTERNAL MEDICINE

## 2023-11-27 PROCEDURE — 1111F DSCHRG MED/CURRENT MED MERGE: CPT | Mod: HCNC,CPTII,S$GLB, | Performed by: INTERNAL MEDICINE

## 2023-11-27 PROCEDURE — 83735 ASSAY OF MAGNESIUM: CPT | Mod: HCNC | Performed by: NURSE PRACTITIONER

## 2023-11-27 PROCEDURE — 25000003 PHARM REV CODE 250: Mod: HCNC | Performed by: HOSPITALIST

## 2023-11-27 PROCEDURE — 85025 COMPLETE CBC W/AUTO DIFF WBC: CPT | Mod: HCNC | Performed by: NURSE PRACTITIONER

## 2023-11-27 PROCEDURE — 1157F ADVNC CARE PLAN IN RCRD: CPT | Mod: HCNC,CPTII,S$GLB, | Performed by: INTERNAL MEDICINE

## 2023-11-27 PROCEDURE — 1111F PR DISCHARGE MEDS RECONCILED W/ CURRENT OUTPATIENT MED LIST: ICD-10-PCS | Mod: HCNC,CPTII,S$GLB, | Performed by: INTERNAL MEDICINE

## 2023-11-27 PROCEDURE — 80048 BASIC METABOLIC PNL TOTAL CA: CPT | Mod: HCNC | Performed by: NURSE PRACTITIONER

## 2023-11-27 PROCEDURE — 1159F PR MEDICATION LIST DOCUMENTED IN MEDICAL RECORD: ICD-10-PCS | Mod: HCNC,CPTII,S$GLB, | Performed by: INTERNAL MEDICINE

## 2023-11-27 PROCEDURE — 36415 COLL VENOUS BLD VENIPUNCTURE: CPT | Mod: HCNC | Performed by: NURSE PRACTITIONER

## 2023-11-27 PROCEDURE — 1157F PR ADVANCE CARE PLAN OR EQUIV PRESENT IN MEDICAL RECORD: ICD-10-PCS | Mod: HCNC,CPTII,S$GLB, | Performed by: INTERNAL MEDICINE

## 2023-11-27 PROCEDURE — 1159F MED LIST DOCD IN RCRD: CPT | Mod: HCNC,CPTII,S$GLB, | Performed by: INTERNAL MEDICINE

## 2023-11-27 PROCEDURE — 84100 ASSAY OF PHOSPHORUS: CPT | Mod: HCNC | Performed by: NURSE PRACTITIONER

## 2023-11-27 RX ADMIN — Medication 1 TABLET: at 08:11

## 2023-11-27 RX ADMIN — FERROUS SULFATE TAB 325 MG (65 MG ELEMENTAL FE) 1 EACH: 325 (65 FE) TAB at 08:11

## 2023-11-27 RX ADMIN — FERROUS SULFATE TAB 325 MG (65 MG ELEMENTAL FE) 1 EACH: 325 (65 FE) TAB at 09:11

## 2023-11-27 RX ADMIN — FLUOXETINE 20 MG: 10 CAPSULE ORAL at 08:11

## 2023-11-27 RX ADMIN — MEMANTINE HYDROCHLORIDE 5 MG: 5 TABLET ORAL at 08:11

## 2023-11-27 RX ADMIN — OXYCODONE HYDROCHLORIDE AND ACETAMINOPHEN 500 MG: 500 TABLET ORAL at 08:11

## 2023-11-27 RX ADMIN — Medication 12 MG: at 08:11

## 2023-11-27 RX ADMIN — LEVOTHYROXINE SODIUM 25 MCG: 25 TABLET ORAL at 05:11

## 2023-11-27 RX ADMIN — SENNOSIDES AND DOCUSATE SODIUM 1 TABLET: 8.6; 5 TABLET ORAL at 08:11

## 2023-11-27 RX ADMIN — ATORVASTATIN CALCIUM 10 MG: 10 TABLET, FILM COATED ORAL at 08:11

## 2023-11-27 RX ADMIN — THERA TABS 1 TABLET: TAB at 08:11

## 2023-11-27 RX ADMIN — CETIRIZINE HYDROCHLORIDE 10 MG: 5 TABLET, FILM COATED ORAL at 08:11

## 2023-11-27 RX ADMIN — PANTOPRAZOLE SODIUM 40 MG: 40 TABLET, DELAYED RELEASE ORAL at 08:11

## 2023-11-27 NOTE — NURSING
TO GASTROENTEROLOGY CLINIC BY [facility] ACCOMPANIED BY TRANSPORTER.AWAKE AND ALERT ORIENTED X4.NO COMPLAINT OF DISCOMFORT VERBALIZED.

## 2023-11-27 NOTE — PT/OT/SLP PROGRESS
Physical Therapy      Patient Name:  Olamide Felipe   MRN:  [id number]    Patient not seen today secondary to upon first AM attempt pt was in bed and reported feeling unwell, upon 2nd AM attempt in AM OT was treating pt. Upon 3rd attempt in PM pt away at appointment . Will follow-up next scheduled visit.

## 2023-11-27 NOTE — PROGRESS NOTES
"[facility]    CC: rectal bleeding    HPI 89 y.o. female with PMH significant for hyperparathyroidism, mild Alzheimer's, breast CA s/p partial mastectomy (no chemorad) who was recently hospitalized with suspected diverticular bleeding in the ascending colon identified on CTA s/p embolization.    Today, reports she has not had any overt GI bleeding since her IR procedure. Is having improvement in her fatigue and strength as well, however, is not yet at her baseline. Denies any NSAID use, n/v, abdominal pain, diarrhea. Reports intermittent episodes of constipation which are well managed/controlled by her providers at her assisted living facility.    Physical Examination  BP (!) 158/63 (BP Location: Right arm, Patient Position: Sitting, BP Method: Medium (Automatic))   Pulse 76   Ht 5' 4" (1.626 m)   Wt 54.4 kg (120 lb)   BMI 20.60 kg/m²   General appearance: alert, cooperative, no distress  Abdomen: soft, non-tender; bowel sounds normoactive; no organomegaly      Assessment:   89 y.o. female with PMH significant for hyperparathyroidism, mild Alzheimer's, breast CA s/p partial mastectomy (no chemorad) who was recently hospitalized with suspected diverticular bleeding in the ascending colon identified on CTA s/p embolization. Patient did not require endoscopic evaluation given successful response to embolization on 11/16. Hb has since been stable since this event. Has chronic, intermittent constipation she reports is well controlled by her assisted living facility provider    Plan:  -continue bowel regimen with assisted living provider  -continue daily PO iron supplementation to help with GARY  -no plans for endoscopy at this time    Jamshid Trujillo MD   [address] [place] [address], [address]  [place] [address] [phone]  "

## 2023-11-27 NOTE — NURSING
RETURNED TO ROOM FROM GASTRO CLINIC APPOINTMENT BY [facility] ACCOMPANIED BY TRANSPORTER.AWAKE AND ALERT ORIENTED X4..NO COMPLAINT OF DISCOMFORT VERBALIZED.SITTING IN WHEELCHAIR AT BEDSIDE EATING SUPPER.

## 2023-11-27 NOTE — PT/OT/SLP PROGRESS
Occupational Therapy      Patient Name:  Olamide Felipe   MRN:  [id number]    Patient not seen today secondary to Out of hospital appointment. Will follow-up at earliest availability.    11/27/2023

## 2023-11-27 NOTE — PLAN OF CARE
Problem: Adult Inpatient Plan of Care  Goal: Plan of Care Review  11/27/2023 1547 by Rekha Sims E, LPN  Outcome: Ongoing, Progressing  11/27/2023 1355 by Rekha Sims E, LPN  Outcome: Ongoing, Progressing  Goal: Patient-Specific Goal (Individualized)  11/27/2023 1547 by Rekha Sims E, LPN  Outcome: Ongoing, Progressing  11/27/2023 1355 by Rekha Sims E, LPN  Outcome: Ongoing, Progressing  Goal: Absence of Hospital-Acquired Illness or Injury  11/27/2023 1547 by Rekha Sims E, LPN  Outcome: Ongoing, Progressing  11/27/2023 1355 by Rekha Sims E, LPN  Outcome: Ongoing, Progressing  Goal: Optimal Comfort and Wellbeing  11/27/2023 1547 by Rekha Sims E, LPN  Outcome: Ongoing, Progressing  11/27/2023 1355 by Rekha Sims E, LPN  Outcome: Ongoing, Progressing  Goal: Readiness for Transition of Care  11/27/2023 1547 by Rekha Sims, LPN  Outcome: Ongoing, Progressing  11/27/2023 1355 by Rekha Sims, LPN  Outcome: Ongoing, Progressing     Problem: Adult Inpatient Plan of Care  Goal: Plan of Care Review  11/27/2023 1547 by Rekha Sims E, LPN  Outcome: Ongoing, Progressing  11/27/2023 1355 by Rekha Sims, LPN  Outcome: Ongoing, Progressing  Goal: Patient-Specific Goal (Individualized)  11/27/2023 1547 by Rekha Sims E, LPN  Outcome: Ongoing, Progressing  11/27/2023 1355 by Rekha Sims E, LPN  Outcome: Ongoing, Progressing  Goal: Absence of Hospital-Acquired Illness or Injury  11/27/2023 1547 by Rekha Sims E, LPN  Outcome: Ongoing, Progressing  11/27/2023 1355 by Rekha Sims E, LPN  Outcome: Ongoing, Progressing  Goal: Optimal Comfort and Wellbeing  11/27/2023 1547 by Rekha Sims E, LPN  Outcome: Ongoing, Progressing  11/27/2023 1355 by Rekha Sims E, LPN  Outcome: Ongoing, Progressing  Goal: Readiness for Transition of Care  11/27/2023 1547 by Rekha Sims LPN  Outcome: Ongoing, Progressing  11/27/2023 1355 by Rekha Sims LPN  Outcome: Ongoing,  Progressing     Problem: Adult Inpatient Plan of Care  Goal: Plan of Care Review  11/27/2023 1547 by Rekha Sims E, LPN  Outcome: Ongoing, Progressing  11/27/2023 1355 by Rekha Sims E, LPN  Outcome: Ongoing, Progressing  Goal: Patient-Specific Goal (Individualized)  11/27/2023 1547 by Rekha Sims E, LPN  Outcome: Ongoing, Progressing  11/27/2023 1355 by Rekha Sims E, LPN  Outcome: Ongoing, Progressing  Goal: Absence of Hospital-Acquired Illness or Injury  11/27/2023 1547 by Rekha Sims, LPN  Outcome: Ongoing, Progressing  11/27/2023 1355 by Rekha Sims E, LPN  Outcome: Ongoing, Progressing  Goal: Optimal Comfort and Wellbeing  11/27/2023 1547 by Rekha Sims E, LPN  Outcome: Ongoing, Progressing  11/27/2023 1355 by Rekha Sims E, LPN  Outcome: Ongoing, Progressing  Goal: Readiness for Transition of Care  11/27/2023 1547 by Rekha Sims, LPN  Outcome: Ongoing, Progressing  11/27/2023 1355 by Rekha Sims, LPN  Outcome: Ongoing, Progressing     Problem: Adult Inpatient Plan of Care  Goal: Plan of Care Review  11/27/2023 1547 by Rekha Sims E, LPN  Outcome: Ongoing, Progressing  11/27/2023 1355 by Rekha Sims, LPN  Outcome: Ongoing, Progressing  Goal: Patient-Specific Goal (Individualized)  11/27/2023 1547 by Rekha Sims, LPN  Outcome: Ongoing, Progressing  11/27/2023 1355 by Rekha Sims E, LPN  Outcome: Ongoing, Progressing  Goal: Absence of Hospital-Acquired Illness or Injury  11/27/2023 1547 by Rekha Sims E, LPN  Outcome: Ongoing, Progressing  11/27/2023 1355 by Rekha Sims, LPN  Outcome: Ongoing, Progressing  Goal: Optimal Comfort and Wellbeing  11/27/2023 1547 by Rekha Sims E, LPN  Outcome: Ongoing, Progressing  11/27/2023 1355 by Rekha Sims E, LPN  Outcome: Ongoing, Progressing  Goal: Readiness for Transition of Care  11/27/2023 1547 by Rekha Sims LPN  Outcome: Ongoing, Progressing  11/27/2023 1355 by Rekha Sims LPN  Outcome:  Ongoing, Progressing

## 2023-11-27 NOTE — PROGRESS NOTES
[facility]                                  Skilled Nursing Facility                   Progress Note     Admit Date: 11/20/2023  TAMMIE   [id number] A  Principal Problem:  Acute lower GI bleeding   HPI obtained from patient interview and chart review     Chief Complaint: Reevaluation of medical treatment and therapy: lab review    HPI:   Ms Olamide Felipe is a 89 year old female with past medical history of Hyperlipidemia, Hypothyroidism, Malignant neoplasm of upper-outer quadrant of right breast in female, estrogen receptor negative (1/7/2022), Osteoarthritis, knee, and Vertigo. She was admitted for severe acute blood loss anemia 2/2 diverticular hemorrhage. Underwent angiogram and embolization with IR. Patient will be treated at [facility] with PT and OT to improve functional status and ability to perform ADLs.     Interval History   Today she is sitting up in bed. No further bleeding. Vital signs stable. Labs reviewed hgb 9.4, stable. Pt amb >300 ft with RW + 185 ft no AD CGA + 30 ft no AD SBA.     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    Past Surgical History: Patient has a past surgical history that includes dentures; left wrist surgery; Hysterectomy; Fracture surgery (Left); Mastectomy, partial (Right, 1/7/2022); [place] lymph node biopsy (Right, 1/7/2022); and Injection for sentinel node identification (Right, 1/7/2022).    Social History: Patient reports that she quit smoking about 34 years ago. Her smoking use included cigarettes. She has never been exposed to tobacco smoke. She has never used smokeless tobacco. She reports current alcohol use of about 2.0 standard drinks of alcohol per week. She reports that she does not use  "drugs.    Family History: family history includes No Known Problems in her daughter, sister, and son.    Allergies: Patient has No Known Allergies.    ROS  Constitutional: Negative for fever   Respiratory: Negative for cough, shortness of breath   Cardiovascular: Negative for chest pain, palpitations, and leg swelling.   Gastrointestinal: Negative for abdominal pain, constipation, diarrhea, nausea, vomiting.   Musculoskeletal:  + generalized weakness. Negative for back pain and myalgias.   Skin: Negative for itching and rash.   Neurological: Negative for dizziness, headaches.   Psychiatric/Behavioral: Negative for depression. The patient is not nervous/anxious.      24 hour Vital Sign Range   Temp:  [98 °F (36.7 °C)-98.5 °F (36.9 °C)]   Pulse:  [84-87]   Resp:  [16-18]   BP: (131-142)/(60-65)   SpO2:  [94 %-96 %]     Current BMI: Body mass index is 20.66 kg/m².    PEx  Constitutional: Patient appears debilitated.  No distress noted  Cardiovascular: Normal rate, regular rhythm and normal heart sounds.    Pulmonary/Chest: Effort normal and breath sounds are clear  Abdominal: Soft. Bowel sounds are normal.   Musculoskeletal: Normal range of motion.   Neurological: Alert and oriented to person, place, and time.   Psychiatric: Normal mood and affect. Behavior is normal.   Skin: Skin is warm and dry.       Recent Labs   Lab 11/27/23  0334      K 5.0      CO2 26   BUN 24*   CREATININE 1.0   CALCIUM 9.6   MG 2.0       Recent Labs   Lab 11/27/23  0334   WBC 6.44   RBC 3.02*   HGB 9.4*   HCT 30.1*      *   MCH 31.1*   MCHC 31.2*       No results for input(s): "POCTGLUCOSE" in the last 168 hours.     Assessment and Plan:    Lower GI bleed   Diverticular bleed  Hgb 9.4, stable  CBC MTH  Transfuse for hemoglobin less than 7 or hemodynamic instability or active bleeding    Debility  Continue with PT/OT muscle strengthening, restoration of ADL's   Fall precautions   Monitor and prevent skin breakdown "   Holding DVT prophylaxis d/t GI bleed    Mild late onset Alzheimer's dementia without behavioral disturbance, psychotic disturbance, mood disturbance, or anxiety  Dementia is mild.   Avoid antihistamines, anticholinergics, hypnotics, and minimize opiates while controlling for pain as these medications may exacerbate delirium.   Cues for day and night to assist with keeping patient calm and oriented   Encourage normal sleep-wake cycles.   Continue to have nursing and family reorient the patient   Encourage family to visit    History of breast cancer  -noted     Mood disorder  Continue SSRI     Hypothyroid  Continue Synthroid     Aortic atherosclerosis  Hold aspirin in setting of GI bleed   Continue statin       Anticipate disposition:  Home with home health    IP [facility] RISK OF UNPLANNED READMISSION Model: HIGH MODERATE LOW    Follow-up needed during SNF stay-none    Follow-up needed after discharge from SNF: PCP     Future Appointments   Date Time Provider Department Center   12/1/2023 10:00 AM Gene Barnes MD [facility] [place]   1/11/2024  9:30 AM LAB, [facility] [place]   1/18/2024  3:00 PM Abiola Campbell MD [facility] [place]         I certify that SNF services are required to be given on an inpatient basis because Olamide Felipe needs for skilled nursing care and/or skilled rehabilitation are required on a daily basis and such services can only practically be provided in a skilled nursing facility setting and are for an ongoing condition for which she received inpatient care in the hospital.        Brianna Shepherd NP  Department of Hospital Medicine   [facility]     DOS: 11/27/2023       Patient note was created using MModal Dictation.  Any errors in syntax or even information may not have been identified and edited on initial review prior to signing this note.

## 2023-11-27 NOTE — PT/OT/SLP EVAL
Speech Language Pathology  Swallow Evaluation/Discharge    Olamide Felipe   MRN: [id number]   Admitting Diagnosis: Acute lower GI bleeding    Diet recommendations: Solid Diet Level: Soft & Bite Sized Diet - IDDSI Level 6  Liquid Diet Level: Thin liquids - IDDSI Level 0 1 bite/sip at a time, Alternating bites/sips, HOB to 90 degrees, Monitor for s/s of aspiration, Remain upright 30 minutes post meal, Small bites/sips, and Standard aspiration precautions  May consider advancing diet to regular solids if pt expressed dissatisfaction with soft and bite size diet.     SLP Treatment Date: 11/27/23  Speech Start Time: 0855     Speech Stop Time: 0907     Speech Total (min): 12 min       TREATMENT BILLABLE MINUTES:  Eval Swallow and Oral Function 12    Diagnosis: Acute lower GI bleeding      Past Medical History:   Diagnosis Date    Hyperlipidemia     Hypothyroidism     Malignant neoplasm of upper-outer quadrant of right breast in female, estrogen receptor negative 1/7/2022    Osteoarthritis, knee     Vertigo      Past Surgical History:   Procedure Laterality Date    dentures      FRACTURE SURGERY Left     fracture left wrist    HYSTERECTOMY      INJECTION FOR SENTINEL NODE IDENTIFICATION Right 1/7/2022    Procedure: INJECTION, FOR SENTINEL NODE IDENTIFICATION-Right;  Surgeon: Marci Mcintosh MD;  Location: [facility];  Service: General;  Laterality: Right;    left wrist surgery      MASTECTOMY, PARTIAL Right 1/7/2022    Procedure: MASTECTOMY, PARTIAL-Right with radiological marker;  Surgeon: Marci Mcintosh MD;  Location: [facility];  Service: General;  Laterality: Right;  REQUEST SAID 2 HOUR CASE    SENTINEL LYMPH NODE BIOPSY Right 1/7/2022    Procedure: BIOPSY, LYMPH NODE, SENTINEL-Right;  Surgeon: Marci Mcintosh MD;  Location: [facility];  Service: General;  Laterality: Right;       Has the patient been evaluated by SLP for swallowing? : Yes  Keep patient NPO?: No General Precautions: Standard, aspiration, fall, hearing impaired   "      HPI: 89-year-old female with PMH of mild dementia, breast cancer, hypertension, hypothyroidism, recurrent falls, hyperlipidemia who presented via EMS from independent living facility for bright red blood per rectum.  Patient had a large bloody bowel movement this morning following which she called her daughter who called EMS.  EMS noted drops of blood from the rectum after the bowel movement.  Patient denies any lightheadedness or shortness of breath or chest pain.     While in the ER, vital signs stable.  Hemoglobin dropped about 2 g.  Type and screen was sent, no blood transfusion given.  CTA abdomen pelvis showed findings concerning for acute bleed within proximal aspect of ascending colon.  Large amount of retained stool with significant diverticulosis seen.  ER provider discussed case with IR attending -underwent angiogram and embolization.       Prior diet: mechanical soft/thin liquids - pt placed on mechanical soft diet after reporting globus sensation with PO intake on 11/24    Subjective:  "The food has been wonderful."         Objective:        Oral Musculature Evaluation  Oral Musculature: WNL  Dentition: upper and lower dentures  Secretion Management: adequate  Mucosal Quality: adequate  Mandibular Strength and Mobility: WNL  Oral Labial Strength and Mobility: WNL  Lingual Strength and Mobility: WNL  Velar Elevation: WNL  Buccal Strength and Mobility: WNL  Voice Prior to PO Intake: dry, clear     Bedside Swallow Eval:   Consistencies Assessed: Thin liquids cup and straw sips (>4oz)  Puree tsp bites x 4  Solids 1/4 cracker x 2  Oral Phase: WFL  Pharyngeal Phase: no overt clinical signs/symptoms of aspiration  no overt clinical signs/symptoms of pharyngeal dysphagia  No c/o globus sensation on this date.     Additional Treatment:  Education provided to pt regarding role of SLP, purpose of swallowing assessment, possible diet advancement, and aspiration precautions.  Pt demonstrated understanding of " education provided, but will benefit from continued reinforcement.           Assessment:  Olamide Felipe is a 89 y.o. female with a medical diagnosis of Acute lower GI bleeding and presents with functional swallow abilities for soft and bite size and regular consistencies with thin liquids.  No further skilled SLP services warranted at this time. May consider advancing diet to regular solids if pt expressed dissatisfaction with soft and bite size diet.         Discharge recommendations: Therapy Intensity Recommendations at Discharge:  (likely no ST needs)       Goals:   Multidisciplinary Problems       SLP Goals       Not on file                     Plan:   Patient to be seen    Planned Interventions:    Plan of Care expires:    Plan of Care reviewed with: patient  SLP Follow-up?: No                11/27/2023

## 2023-11-27 NOTE — PLAN OF CARE
Problem: Adult Inpatient Plan of Care  Goal: Plan of Care Review  Outcome: Ongoing, Progressing  Goal: Patient-Specific Goal (Individualized)  Outcome: Ongoing, Progressing  Goal: Absence of Hospital-Acquired Illness or Injury  Outcome: Ongoing, Progressing  Goal: Optimal Comfort and Wellbeing  Outcome: Ongoing, Progressing  Goal: Readiness for Transition of Care  Outcome: Ongoing, Progressing     Problem: Adult Inpatient Plan of Care  Goal: Plan of Care Review  Outcome: Ongoing, Progressing  Goal: Patient-Specific Goal (Individualized)  Outcome: Ongoing, Progressing  Goal: Absence of Hospital-Acquired Illness or Injury  Outcome: Ongoing, Progressing  Goal: Optimal Comfort and Wellbeing  Outcome: Ongoing, Progressing  Goal: Readiness for Transition of Care  Outcome: Ongoing, Progressing     Problem: Adult Inpatient Plan of Care  Goal: Plan of Care Review  Outcome: Ongoing, Progressing  Goal: Patient-Specific Goal (Individualized)  Outcome: Ongoing, Progressing  Goal: Absence of Hospital-Acquired Illness or Injury  Outcome: Ongoing, Progressing  Goal: Optimal Comfort and Wellbeing  Outcome: Ongoing, Progressing  Goal: Readiness for Transition of Care  Outcome: Ongoing, Progressing     Problem: Adult Inpatient Plan of Care  Goal: Plan of Care Review  Outcome: Ongoing, Progressing  Goal: Patient-Specific Goal (Individualized)  Outcome: Ongoing, Progressing  Goal: Absence of Hospital-Acquired Illness or Injury  Outcome: Ongoing, Progressing  Goal: Optimal Comfort and Wellbeing  Outcome: Ongoing, Progressing  Goal: Readiness for Transition of Care  Outcome: Ongoing, Progressing     Problem: Adult Inpatient Plan of Care  Goal: Plan of Care Review  Outcome: Ongoing, Progressing  Goal: Patient-Specific Goal (Individualized)  Outcome: Ongoing, Progressing  Goal: Absence of Hospital-Acquired Illness or Injury  Outcome: Ongoing, Progressing  Goal: Optimal Comfort and Wellbeing  Outcome: Ongoing, Progressing  Goal: Readiness  for Transition of Care  Outcome: Ongoing, Progressing     Problem: Adult Inpatient Plan of Care  Goal: Plan of Care Review  Outcome: Ongoing, Progressing  Goal: Patient-Specific Goal (Individualized)  Outcome: Ongoing, Progressing  Goal: Absence of Hospital-Acquired Illness or Injury  Outcome: Ongoing, Progressing  Goal: Optimal Comfort and Wellbeing  Outcome: Ongoing, Progressing  Goal: Readiness for Transition of Care  Outcome: Ongoing, Progressing

## 2023-11-28 PROCEDURE — 25000003 PHARM REV CODE 250: Mod: HCNC | Performed by: HOSPITALIST

## 2023-11-28 PROCEDURE — 11000004 HC SNF PRIVATE: Mod: HCNC

## 2023-11-28 PROCEDURE — 97110 THERAPEUTIC EXERCISES: CPT | Mod: HCNC,CQ

## 2023-11-28 PROCEDURE — 97116 GAIT TRAINING THERAPY: CPT | Mod: HCNC,CQ

## 2023-11-28 PROCEDURE — 25000003 PHARM REV CODE 250: Mod: HCNC | Performed by: NURSE PRACTITIONER

## 2023-11-28 PROCEDURE — 97535 SELF CARE MNGMENT TRAINING: CPT | Mod: HCNC,CO

## 2023-11-28 RX ADMIN — FLUOXETINE 20 MG: 10 CAPSULE ORAL at 08:11

## 2023-11-28 RX ADMIN — SENNOSIDES AND DOCUSATE SODIUM 1 TABLET: 8.6; 5 TABLET ORAL at 08:11

## 2023-11-28 RX ADMIN — Medication 1 TABLET: at 08:11

## 2023-11-28 RX ADMIN — CETIRIZINE HYDROCHLORIDE 10 MG: 5 TABLET, FILM COATED ORAL at 08:11

## 2023-11-28 RX ADMIN — LEVOTHYROXINE SODIUM 25 MCG: 25 TABLET ORAL at 05:11

## 2023-11-28 RX ADMIN — MEMANTINE HYDROCHLORIDE 5 MG: 5 TABLET ORAL at 08:11

## 2023-11-28 RX ADMIN — Medication 12 MG: at 08:11

## 2023-11-28 RX ADMIN — FERROUS SULFATE TAB 325 MG (65 MG ELEMENTAL FE) 1 EACH: 325 (65 FE) TAB at 08:11

## 2023-11-28 RX ADMIN — PANTOPRAZOLE SODIUM 40 MG: 40 TABLET, DELAYED RELEASE ORAL at 08:11

## 2023-11-28 RX ADMIN — ATORVASTATIN CALCIUM 10 MG: 10 TABLET, FILM COATED ORAL at 08:11

## 2023-11-28 RX ADMIN — THERA TABS 1 TABLET: TAB at 08:11

## 2023-11-28 RX ADMIN — OXYCODONE HYDROCHLORIDE AND ACETAMINOPHEN 500 MG: 500 TABLET ORAL at 08:11

## 2023-11-28 NOTE — PT/OT/SLP PROGRESS
Occupational Therapy   Treatment    Name: Olamide Felipe  MRN: [id number]  Admit Date: 11/20/2023  Admitting Diagnosis:  Acute lower GI bleeding    General Precautions: Standard, fall   Orthopedic Precautions: N/A   Braces: N/A    Recommendations:     Discharge Recommendations:  Low Intensity Therapy  Level of Assistance Recommended at Discharge: 24 hours supervision for safety in performing ADL's and home management tasks  Discharge Equipment Recommendations: walker, rolling  Barriers to discharge:  Other (Comment) (increased need for supervision while preforming functional tasks)    Assessment:     Olamide Felipe is a 89 y.o. female with a medical diagnosis of Acute lower GI bleeding.  She presents with  Performance deficits affecting function are impaired balance, gait instability, impaired cognition, impaired cardiopulmonary response to activity.   Pt. Was cooperative and participated well with session on this day. Pt  continues to demonstrate levels of physical deficits with  functional indep with daily management activities tasks, selfcare skills with balance,  functional mobility, UB strength and endurance. Pt. Will continue to benefit from continued OT to progress towards goals      Rehab Potential is fair    Activity tolerance:  Fair    Plan:     Patient to be seen 5 x/week to address the above listed problems via self-care/home management, community/work re-entry, therapeutic activities, therapeutic exercises, therapeutic groups    Plan of Care Expires: 12/20/23  Plan of Care Reviewed with: patient    Subjective     Communicated with: Nsg and Pt. prior to session.  I am so excited my son is coming to see me today    Pain/Comfort:  Pain Rating 1: 0/10  Pain Rating Post-Intervention 1: 0/10    Patient's cultural, spiritual, Shinto conflicts given the current situation:  no    Objective:     Patient found  seated in bathroom nurse just assisted pt into bathroom   with PICC line upon OT entry to  room.      Functional Mobility/Transfers:  Patient completed Sit <> Stand Transfer with stand by assistance  with  rolling walker   Patient completed Toilet Transfer Step Transfer technique with stand by assistance with  rolling walker  Patient completed  Shower Transfer Step Transfer technique with stand by assistance with grab bars  Functional Mobility: Pt.with fxl mobility to and from inroom bath with SBA and fxl mobility around room    Activities of Daily Living:  Grooming: supervision standing a sink level  Bathing: stand by assistance seated in shower on shower stall   Upper Body Dressing: supervision to doff pull over and then to perez bra and button up shirt   Lower Body Dressing: supervision to doff/perez pants seated and to manage up/down hips instance with RW and Mod I   Toileting: supervision with cleaning and clothing management     [facility] 6 Click ADL: 18      Treatment & Education:  Pt edu on role of OT, POC, safety when performing self care tasks , benefit of performing OOB activity, and safety when performing functional transfers and mobility management for preparation with goals to progress towards next level of care 2    Patient left up in chair with all lines intact and call button in reach    GOALS:   Multidisciplinary Problems       Occupational Therapy Goals          Problem: Occupational Therapy    Goal Priority Disciplines Outcome Interventions   Occupational Therapy Goal     OT, PT/OT Ongoing, Progressing    Description: Goals to be met by: 12/20/2023     Patient will increase functional independence with ADLs by performing:      UE Dressing with Set-up Assistance- Met  LE Dressing with Supervision- Ongoing  Oral Hygiene while standing at sink with Supervision and Assistive Devices as needed.  Personal Hygiene while standing at sink with Supervision and Assistive Devices as needed- Ongoing  Toileting from toilet with Supervision and Assistive Devices as needed for hygiene and clothing  management- Ongoing.   Bathing from  shower chair/bench with Supervision.  Toilet transfer to toilet with Supervision and Assistive Device as needed- Ongoing  Footwear /c Mod I- Met  SC t/f /c SPV and Assistive Device as needed  Tolerate standing functional tasks for ~8 minutes /c SPV and Assistive device as needed- Ongoing                         Time Tracking:     OT Date of Treatment: 11/28/23  OT Start Time: 0819    OT Stop Time: 0900  OT Total Time (min): 41 min    Billable Minutes:Self Care/Home Management 41    11/28/2023

## 2023-11-28 NOTE — PLAN OF CARE
CHW served per [facility] to patient. Patient signed, a copy was given and faxed to [facility].

## 2023-11-28 NOTE — PT/OT/SLP PROGRESS
"Physical Therapy Treatment    Patient Name:  Olamide Felipe   MRN:  [id number]  Admit Date: 11/20/2023  Admitting Diagnosis: Acute lower GI bleeding  Recent Surgeries: N/A    General Precautions: Standard, aspiration, fall, hearing impaired  Orthopedic Precautions: N/A  Braces: N/A    Recommendations:     Discharge Recommendations:  (Home Health PT)  Level of Assistance Recommended at Discharge: Intermittent assistance   Discharge Equipment Recommendations: walker, rolling  Barriers to discharge: None    Assessment:     Olamide Felipe is a 89 y.o. female admitted with a medical diagnosis of Acute lower GI bleeding . Pt tolerated very well, upon entry to room pt became very excited for therapy. Pt able to ambulate with RW and with no AD for increased distance today. Pt completed standing TE on blue foam mat, propelled LBE for 10 mins and completed a standing balance activity with SBA. Pt asc/desc 4" curb and 12 stairs with Supervision. Pt pleasant, progressing well and would continue to benefit from skilled PT services to improve overall functional mobility, strength and endurance.      Performance deficits affecting function: weakness, impaired endurance, impaired self care skills, impaired functional mobility, gait instability, impaired balance, decreased upper extremity function, decreased lower extremity function, impaired cardiopulmonary response to activity.    Rehab Potential is excellent    Activity Tolerance: Excellent    Plan:     Patient to be seen 4 x/week to address the above listed problems via gait training, therapeutic activities, therapeutic exercises, neuromuscular re-education, wheelchair management/training    Plan of Care Expires: 12/21/23  Plan of Care Reviewed with: patient    Subjective     " I'm so glad you came! ".     Pain/Comfort:  Pain Rating 1: 0/10  Pain Rating Post-Intervention 1: 0/10    Patient's cultural, spiritual, Jewish conflicts given the current " "situation:  no    Objective:      Patient found  in BSC  with PICC line upon PT entry to room.     Therapeutic Activities and Exercises: Standing TE on Blue foam mat: hip ext, marches, hip abd/add, heel raises and squats x 15-20 reps for BLE strengthening/balance. Pt holding on to // bars with RUE only    LBE X 10 min For BLE strengthening, endurance and ROM   with mod res    Patient educated on role of therapy, goals of session, and benefits of out of bed mobility.   Instructed on use of call button and importance of calling nursing staff for assistance with mobility   Questions/concerns addressed within PTA scope of practice  Pt verbalized understanding    Functional Mobility:  Transfers:     Sit to Stand:  modified independence with no AD and rolling walker  Bed to Chair: supervision and stand by assistance with  no AD and rolling walker  using  Step Transfer. (RW=Sup, No AD= SBA)  Gait: pt amb ~130 ft with RW supervision +~300 ft with no AD and SBA +150 ft SBA with no AD. Pt became mildly fatigued on third trial. One rest break before final amb trial  Balance: pt completed dynamic balance activity, putting on putting green with SBA. x 12 reps  Stairs:  Pt ascended/descended 12 stair(s) and 4" curb step with No Assistive Device with bilateral handrails with Supervision or Set-up Assistance and Stand-by Assistance.     AM-PAC 6 CLICK MOBILITY  18    Patient left up in chair with  PT tech .    GOALS:   Multidisciplinary Problems       Physical Therapy Goals          Problem: Physical Therapy    Goal Priority Disciplines Outcome Goal Variances Interventions   Physical Therapy Goal     PT, PT/OT Ongoing, Progressing     Description: Goals to be met by: 12/21/23     Patient will increase functional independence with mobility by performing:    . Supine to sit with [place]yer  . Sit to supine with Sawyer  . Rolling to Left and Right with Sawyer.  . Sit to stand transfer with Modified [place]yer  . Bed to " chair transfer with Modified [place] using LRAD or no AD  . Gait  x 200 feet with Supervision using LRAD and/or no AD.   . Wheelchair propulsion x150 feet with Modified [place] using bilateral uppper extremities  . Ascend/descend 12 stair with bilateral Handrails Supervision using No Assistive Device.   . Ascend/Descend 4 inch curb step with Supervision using No Assistive Device and/or LRAD.                         Time Tracking:     PT Received On: 11/28/23  PT Start Time: 1304  PT Stop Time: 1344  PT Total Time (min): 40 min    Billable Minutes: Gait Training 23 and Therapeutic Exercise 17    Treatment Type: Treatment  PT/PTA: PTA     Number of PTA visits since last PT visit: 3     11/28/2023

## 2023-11-28 NOTE — PROGRESS NOTES
[facility]                                  Skilled Nursing Facility                   Progress Note     Admit Date: 11/20/2023  TAMMIE 12/5/2023  [id number] A  Principal Problem:  Acute lower GI bleeding   HPI obtained from patient interview and chart review     Chief Complaint: Re-evaluation of medical treatment and therapy status: Lab review, worsening anemia    HPI:   Mrs. Felipe is a 89-year-old female with PMHx of mild dementia, breast cancer, HTN, hypothyroidism, recurrent falls, HLD who presents to SNF following hospitalization for acute lower GI bleed.  Admission to SNF for secondary weakness and debility.    Interval history: All of today's labs reviewed- H&H decreased to 7.0/21 from 7.5/23.  Patient continues to report extreme weakness.  Decision made to transfer 1 unit RBC after discussion with patient who was also in agreement.  Phone conversation held with patient's daughter to also discuss blood transfusion she is also in agreement as this will likely help patient's energy level to be able to participate with therapy.  Patient is very motivated to participate with therapy and gain her strength back as she was very active prior to this hospitalization.  24 hr vital sign ranges reviewed and listed below.  Patient denies shortness of breath, abdominal discomfort, nausea, or vomiting.  Patient reports an adequate appetite.  Patient denies dysuria.  Patient reports having regular bowel movements.  Patient progessing with PT/OT. Continuing to follow and treat all acute and chronic conditions.      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    Past Surgical History: Patient has a past surgical history that includes dentures; left wrist  surgery; Hysterectomy; Fracture surgery (Left); Mastectomy, partial (Right, 1/7/2022); [place] lymph node biopsy (Right, 1/7/2022); and Injection for sentinel node identification (Right, 1/7/2022).    Social History: Patient reports that she quit smoking about 34 years ago. Her smoking use included cigarettes. She has never been exposed to tobacco smoke. She has never used smokeless tobacco. She reports current alcohol use of about 2.0 standard drinks of alcohol per week. She reports that she does not use drugs.    Family History: family history includes No Known Problems in her daughter, sister, and son.    Allergies: Patient has No Known Allergies.    ROS  Constitutional: Negative for fever   Eyes: Negative for blurred vision, double vision   Respiratory: Negative for cough, shortness of breath   Cardiovascular: Negative for chest pain, palpitations, and leg swelling.   Gastrointestinal: Negative for abdominal pain, constipation, diarrhea, nausea, vomiting.  +indigestion  Genitourinary: Negative for dysuria, frequency   Musculoskeletal:  + generalized weakness. Negative for back pain and myalgias.   Skin: Negative for itching and rash.   Neurological: Negative for dizziness, headaches.   Psychiatric/Behavioral: Negative for depression. The patient is not nervous/anxious.      24 hour Vital Sign Range   Temp:  [97.7 °F (36.5 °C)-98 °F (36.7 °C)]   Pulse:  [76-93]   Resp:  [16]   BP: (138-158)/(63-64)   SpO2:  [94 %-98 %]     Current BMI: Body mass index is 20.66 kg/m².    PEx  Constitutional: Patient appears debilitated.  No distress noted  HENT:   Head: Normocephalic and atraumatic.   Eyes: Pupils are equal, round  Neck: Normal range of motion. Neck supple.   Cardiovascular: Normal rate, regular rhythm and normal heart sounds.    Pulmonary/Chest: Effort normal and breath sounds are clear  Abdominal: Soft. Bowel sounds are normal.   Musculoskeletal: Normal range of motion.   Neurological: Alert and oriented to  "person, place.  Disoriented to time.  Impaired higher level thinking.  Psychiatric: Normal mood and affect. Behavior is normal.   Skin: Skin is warm and dry.     No results for input(s): "GLUCOSE", "NA", "K", "CL", "CO2", "BUN", "CREATININE", "CALCIUM", "MG" in the last 24 hours.    No results for input(s): "WBC", "RBC", "HGB", "HCT", "PLT", "MCV", "MCH", "MCHC" in the last 24 hours.    No results for input(s): "POCTGLUCOSE" in the last 168 hours.     Assessment and Plan:    Lower GI bleed  - CTA abdomen pelvis showed active bleed and ascending colon, significant stool burden, diverticulosis  - GI consulted while inpatient  - S/p angiogram and coil embolization by IR on 11/16  - Ceftriaxone/Flagyl discontinued 11/17  - follow-up with GI after discharge from SNF    Acute blood loss anemia  - continue monitor twice weekly CBC, transfuse for hemoglobin < 7  - worsening anemia, initiate order to transfuse 1 unit RBC, type and screen obtained, blood consent obtained from patient and phone conversation held with patient's daughter to provide care update regarding transfusion.  Patient's daughter also in agreement to blood transfusion.    Mild late onset Alzheimer's dementia without behavioral disturbance, psychotic disturbance, mood disturbance, or anxiety  - Patient with dementia with likely etiology of alzheimer's dementia. Dementia is mild. The patient does not have signs of behavioral disturbance.   Delirium precautions:  - Avoid antihistamines, anticholinergics, hypnotics, and minimize opiates while controlling for pain as these medications may exacerbate delirium. Cues for day/night will assist with keeping patient calm and oriented - during daytime, please keep adequate light in room (open windows, lights on) and please keep room dim at night-time to encourage normal sleep-wake cycles. Continuing to have nursing and family reorient the patient and encourage family to visit  - continue home memantine 5 mg " BID    Advance Care Planning  - completed while inpatient on 11/17, patient/family wishes to be DNR     History of breast cancer  - noted     Mood disorder  - Continue home fluoxetine 20 mg daily     Hypothyroid  - Continue levothyroxine 25 mcg daily     Aortic atherosclerosis  - Hold aspirin in setting of GI bleed   - Continue atorvastatin 10 mg qHS.    Seasonal allergies  - continue cetirizine 10 mg daily, will discontinue patient shows any signs of delirium    GERD  - initiated PRN Tums and Mylanta continue Protonix 40 mg daily    Debility   - Continue with PT/OT for gait training and strengthening and restoration of ADL's   - Encourage mobility, OOB in chair, and early ambulation as appropriate  - Fall precautions   - Monitor for bowel and bladder dysfunction  - Monitor for and prevent skin breakdown and pressure ulcers  - Continue DVT prophylaxis with frequent ambulation, chemical DVT PPX contraindicated due to recent GI bleed       Anticipate disposition:  Home with home health    IP [facility] RISK OF UNPLANNED READMISSION Model: HIGH MODERATE LOW    Follow-up needed during SNF stay-    Appointment not to send patient to-Internal Medicine (12/1)    Follow-up needed after discharge from SNF: PCP, GI, needs to be scheduled    Future Appointments   Date Time Provider Department Center   12/1/2023 10:00 AM Gene Barnes MD [facility] IM [place]   1/11/2024  9:30 AM LAB, [facility] LAB [place]   1/18/2024  3:00 PM Abiola Campbell MD [facility] [place]       I spent 48 minutes reviewing patient records, examining, and counseling the patient/ patient's family with greater than 50% of the time spent in direct patient care and coordination.      Renay Du NP  Department of Hospital Medicine   [facility] [[email or web address]]     DOS: 11/22/2023       Patient note was created using MModal Dictation.  Any errors in syntax or even information may not have been identified and edited on initial review prior to  signing this note.

## 2023-11-28 NOTE — PLAN OF CARE
Problem: Fall Injury Risk  Goal: Absence of Fall and Fall-Related Injury  Outcome: Ongoing, Progressing     Problem: Impaired Wound Healing  Goal: Optimal Wound Healing  Outcome: Ongoing, Progressing     Problem: Skin Injury Risk Increased  Goal: Skin Health and Integrity  Outcome: Ongoing, Progressing

## 2023-11-28 NOTE — PROGRESS NOTES
[facility]                                  Skilled Nursing Facility                   Progress Note     Admit Date: 11/20/2023  TAMMIE 12/5/2023  [id number] A  Principal Problem:  Acute lower GI bleeding   HPI obtained from patient interview and chart review     Chief Complaint:Re-evaluation of medical treatment and therapy status: Lab review    HPI:   Mrs. Felipe is a 89-year-old female with PMHx of mild dementia, breast cancer, HTN, hypothyroidism, recurrent falls, HLD who presents to SNF following hospitalization for acute lower GI bleed.  Admission to SNF for secondary weakness and debility.    Interval history: All of yesterday's labs reviewed.  H&H showing signs of improvement.  24 hr vital sign ranges reviewed and listed below.  Insurance has issued NOMNC for patient to discharge a few days earlier than SNF intended. Care coordination with SNF staff regarding discharge.  Patient denies shortness of breath, abdominal discomfort, nausea, or vomiting. Patient reports an adequate appetite.  Patient denies dysuria.  Patient reports having regular bowel movements.  Patient progressing with PT/OT-Gait: pt amb >300 ft with RW + 185 ft no AD CGA + ~30 ft no AD SBA. Quick seated rest breaks in between trials. Continuing to follow and treat all acute and chronic conditions.    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    Past Surgical History: Patient has a past surgical history that includes dentures; left wrist surgery; Hysterectomy; Fracture surgery (Left); Mastectomy, partial (Right, 1/7/2022); [place] lymph node biopsy (Right, 1/7/2022); and Injection for sentinel node identification (Right, 1/7/2022).    Social History: Patient reports that  "she quit smoking about 34 years ago. Her smoking use included cigarettes. She has never been exposed to tobacco smoke. She has never used smokeless tobacco. She reports current alcohol use of about 2.0 standard drinks of alcohol per week. She reports that she does not use drugs.    Family History: family history includes No Known Problems in her daughter, sister, and son.    Allergies: Patient has No Known Allergies.    ROS  Constitutional: Negative for fever   Eyes: Negative for blurred vision, double vision   Respiratory: Negative for cough, shortness of breath   Cardiovascular: Negative for chest pain, palpitations, and leg swelling.   Gastrointestinal: Negative for abdominal pain, constipation, diarrhea, nausea, vomiting.  +indigestion  Genitourinary: Negative for dysuria, frequency   Musculoskeletal:  + generalized weakness. Negative for back pain and myalgias.   Skin: Negative for itching and rash.   Neurological: Negative for dizziness, headaches.   Psychiatric/Behavioral: Negative for depression. The patient is not nervous/anxious.      24 hour Vital Sign Range   Temp:  [97.7 °F (36.5 °C)-98 °F (36.7 °C)]   Pulse:  [76-93]   Resp:  [16]   BP: (138-158)/(63-64)   SpO2:  [94 %-98 %]     Current BMI: Body mass index is 20.66 kg/m².    PEx  Constitutional: Patient appears debilitated.  No distress noted  HENT:   Head: Normocephalic and atraumatic.   Eyes: Pupils are equal, round  Neck: Normal range of motion. Neck supple.   Cardiovascular: Normal rate, regular rhythm and normal heart sounds.    Pulmonary/Chest: Effort normal and breath sounds are clear  Abdominal: Soft. Bowel sounds are normal.   Musculoskeletal: Normal range of motion.   Neurological: Alert and oriented to person, place.  Disoriented to time.  Impaired higher level thinking.  Psychiatric: Normal mood and affect. Behavior is normal.   Skin: Skin is warm and dry.     No results for input(s): "GLUCOSE", "NA", "K", "CL", "CO2", "BUN", "CREATININE", " ""CALCIUM", "MG" in the last 24 hours.    No results for input(s): "WBC", "RBC", "HGB", "HCT", "PLT", "MCV", "MCH", "MCHC" in the last 24 hours.    No results for input(s): "POCTGLUCOSE" in the last 168 hours.     Assessment and Plan:    Lower GI bleed  - CTA abdomen pelvis showed active bleed and ascending colon, significant stool burden, diverticulosis  - GI consulted while inpatient  - S/p angiogram and coil embolization by IR on 11/16  - Ceftriaxone/Flagyl discontinued 11/17  - follow-up with GI after discharge from SNF    Acute blood loss anemia  - continue monitor twice weekly CBC, transfuse for hemoglobin < 7  - 11/22 worsening anemia, initiate order to transfuse 1 unit RBC, type and screen obtained, blood consent obtained from patient and phone conversation held with patient's daughter to provide care update regarding transfusion.  Patient's daughter also in agreement to blood transfusion.  - 11/28 H&H improving.  Ambulatory referral placed for GI for hospital follow-up    Mild late onset Alzheimer's dementia without behavioral disturbance, psychotic disturbance, mood disturbance, or anxiety  - Patient with dementia with likely etiology of alzheimer's dementia. Dementia is mild. The patient does not have signs of behavioral disturbance.   Delirium precautions:  - Avoid antihistamines, anticholinergics, hypnotics, and minimize opiates while controlling for pain as these medications may exacerbate delirium. Cues for day/night will assist with keeping patient calm and oriented - during daytime, please keep adequate light in room (open windows, lights on) and please keep room dim at night-time to encourage normal sleep-wake cycles. Continuing to have nursing and family reorient the patient and encourage family to visit  - continue home memantine 5 mg BID    Advance Care Planning  - completed while inpatient on 11/17, patient/family wishes to be DNR     History of breast cancer  - noted     Mood disorder  - " Continue home fluoxetine 20 mg daily     Hypothyroid  - Continue levothyroxine 25 mcg daily     Aortic atherosclerosis  - Hold aspirin in setting of GI bleed   - Continue atorvastatin 10 mg qHS.    Seasonal allergies  - continue cetirizine 10 mg daily, will discontinue patient shows any signs of delirium    GERD  - initiated PRN Tums and Mylanta continue Protonix 40 mg daily    Debility   - Continue with PT/OT for gait training and strengthening and restoration of ADL's   - Encourage mobility, OOB in chair, and early ambulation as appropriate  - Fall precautions   - Monitor for bowel and bladder dysfunction  - Monitor for and prevent skin breakdown and pressure ulcers  - Continue DVT prophylaxis with frequent ambulation, chemical DVT PPX contraindicated due to recent GI bleed       Anticipate disposition:  Home with home health    IP [facility] RISK OF UNPLANNED READMISSION Model: MODERATE     Follow-up needed during SNF stay-    Appointment not to send patient to-Internal Medicine (12/1)    Follow-up needed after discharge from SNF: PCP, GI, needs to be scheduled    Future Appointments   Date Time Provider Department Center   12/1/2023 10:00 AM Gene Barnes MD [facility] IM [place]   1/11/2024  9:30 AM LAB, [facility] LAB [place]   1/18/2024  3:00 PM Abiola Campbell MD [facility] IM [place]       I spent 46 minutes reviewing patient records, examining, and counseling the patient/ patient's family with greater than 50% of the time spent in direct patient care and coordination.  Care coordination with staff regarding discharge      Renay Du NP  Department of Hospital Medicine   [facility]     DOS: 11/28/2023        Patient note was created using MModal Dictation.  Any errors in syntax or even information may not have been identified and edited on initial review prior to signing this note.

## 2023-11-28 NOTE — PLAN OF CARE
[facility] Skilled Nursing      HOME HEALTH ORDERS  FACE TO FACE ENCOUNTER    Patient Name: Olamide Felipe  YOB: 1933    PCP: Abiola Campbell MD   [address]P Address[address] [place] [address]  PCP Phone Number: [phone]  PCP Fax: [phone]    Encounter Date: 11/20/23    Admit to Home Health    Diagnoses:  Active Hospital Problems    Diagnosis  POA    *Acute lower GI bleeding [K92.2]  Yes    Hypophosphatemia [E83.39]  No    Oropharyngeal dysphagia [R13.12]  Yes    Acute blood loss anemia [D62]  Yes    Weakness acquired in ICU [R53.1]  Yes    Delirium [R41.0]  Yes    Overactive bladder [N32.81]  Yes    Mild late onset Alzheimer's dementia without behavioral disturbance, psychotic disturbance, mood disturbance, or anxiety [G30.1, F02.A0]  Yes    History of breast cancer [Z85.3]  Not Applicable    Secondary hypertension [I15.9]  Yes    Hyperlipidemia [E78.5]  Yes    Hypothyroid [E03.9]  Yes      Resolved Hospital Problems   No resolved problems to display.       Follow Up Appointments:  Future Appointments   Date Time Provider Department Center   12/1/2023 10:00 AM Gene Barnes MD [facility] IM [place]   1/11/2024  9:30 AM LAB, [facility] LAB [place]   1/18/2024  3:00 PM Abiola Campbell MD [facility] [place]       Allergies:Review of patient's allergies indicates:  No Known Allergies    Medications: Review discharge medications with patient and family and provide education.      I have seen and examined this patient within the last 30 days. My clinical findings that support the need for the home health skilled services and home bound status are the following:no   Weakness/numbness causing balance and gait disturbance due to GI bleed making it taxing to leave home.     Referrals/ Consults  Physical Therapy to evaluate and treat. Evaluate for home safety and equipment needs; Establish/upgrade home exercise program. Perform / instruct on therapeutic exercises, gait training, transfer  training, and Range of Motion.  Occupational Therapy to evaluate and treat. Evaluate home environment for safety and equipment needs. Perform/Instruct on transfers, ADL training, ROM, and therapeutic exercises.  Aide to provide assistance with personal care, ADLs, and vital signs.    Activities:   activity as tolerated    Nursing:   Agency to admit patient within 24 hours of hospital discharge unless specified on physician order or at patient request    SN to complete comprehensive assessment including routine vital signs. Instruct on disease process and s/s of complications to report to MD. Review/verify medication list sent home with the patient at time of discharge  and instruct patient/caregiver as needed. Frequency may be adjusted depending on start of care date.     Skilled nurse to perform up to 3 visits PRN for symptoms related to diagnosis    Notify MD if SBP > 160 or < 90; DBP > 90 or < 50; HR > 120 or < 50; Temp > 101; O2 < 88%    Ok to schedule additional visits based on staff availability and patient request on consecutive days within the home health episode.    Home Health Aide:  Nursing Three times weekly, Physical Therapy Three times weekly, Occupational Therapy Three times weekly, and Home Health Aide Three times weekly    Wound Care Orders  no    I certify that this patient is confined to her home and needs intermittent skilled nursing care, physical therapy, and occupational therapy.

## 2023-11-29 PROCEDURE — 11000004 HC SNF PRIVATE: Mod: HCNC

## 2023-11-29 PROCEDURE — 25000003 PHARM REV CODE 250: Mod: HCNC | Performed by: HOSPITALIST

## 2023-11-29 PROCEDURE — 97116 GAIT TRAINING THERAPY: CPT | Mod: HCNC,CQ

## 2023-11-29 PROCEDURE — 25000003 PHARM REV CODE 250: Mod: HCNC | Performed by: NURSE PRACTITIONER

## 2023-11-29 PROCEDURE — 97530 THERAPEUTIC ACTIVITIES: CPT | Mod: HCNC,CO

## 2023-11-29 RX ORDER — HYDROCORTISONE ACETATE PRAMOXINE HCL 1; 1 G/100G; G/100G
CREAM TOPICAL 2 TIMES DAILY
Status: DISCONTINUED | OUTPATIENT
Start: 2023-11-29 | End: 2023-11-29

## 2023-11-29 RX ADMIN — Medication 1 TABLET: at 08:11

## 2023-11-29 RX ADMIN — OXYCODONE HYDROCHLORIDE AND ACETAMINOPHEN 500 MG: 500 TABLET ORAL at 08:11

## 2023-11-29 RX ADMIN — FLUOXETINE 20 MG: 10 CAPSULE ORAL at 08:11

## 2023-11-29 RX ADMIN — SENNOSIDES AND DOCUSATE SODIUM 1 TABLET: 8.6; 5 TABLET ORAL at 08:11

## 2023-11-29 RX ADMIN — ATORVASTATIN CALCIUM 10 MG: 10 TABLET, FILM COATED ORAL at 08:11

## 2023-11-29 RX ADMIN — MEMANTINE HYDROCHLORIDE 5 MG: 5 TABLET ORAL at 08:11

## 2023-11-29 RX ADMIN — FERROUS SULFATE TAB 325 MG (65 MG ELEMENTAL FE) 1 EACH: 325 (65 FE) TAB at 08:11

## 2023-11-29 RX ADMIN — PANTOPRAZOLE SODIUM 40 MG: 40 TABLET, DELAYED RELEASE ORAL at 08:11

## 2023-11-29 RX ADMIN — CETIRIZINE HYDROCHLORIDE 10 MG: 5 TABLET, FILM COATED ORAL at 08:11

## 2023-11-29 RX ADMIN — LEVOTHYROXINE SODIUM 25 MCG: 25 TABLET ORAL at 06:11

## 2023-11-29 RX ADMIN — Medication 12 MG: at 08:11

## 2023-11-29 RX ADMIN — THERA TABS 1 TABLET: TAB at 08:11

## 2023-11-29 RX ADMIN — MINERAL OIL, PETROLATUM, PHENYLEPHRINE HCI 1 APPLICATOR: .25; 14; 74.9 OINTMENT TOPICAL at 08:11

## 2023-11-29 NOTE — PLAN OF CARE
Problem: Adult Inpatient Plan of Care  Goal: Plan of Care Review  Outcome: Ongoing, Progressing  Goal: Patient-Specific Goal (Individualized)  Outcome: Ongoing, Progressing  Goal: Absence of Hospital-Acquired Illness or Injury  Outcome: Ongoing, Progressing  Goal: Optimal Comfort and Wellbeing  Outcome: Ongoing, Progressing  Intervention: Provide Person-Centered Care  Flowsheets (Taken 11/28/2023 2050)  Trust Relationship/Rapport:   care explained   choices provided   questions encouraged   questions answered

## 2023-11-29 NOTE — PROGRESS NOTES
[facility]                                  Skilled Nursing Facility                   Progress Note     Admit Date: 11/20/2023  TAMMIE 12/1/2023  [id number] A  Principal Problem:  Acute lower GI bleeding   HPI obtained from patient interview and chart review     Chief Complaint:Re-evaluation of medical treatment and therapy status    HPI:   Mrs. Felipe is a 89-year-old female with PMHx of mild dementia, breast cancer, HTN, hypothyroidism, recurrent falls, HLD who presents to SNF following hospitalization for acute lower GI bleed.  Admission to SNF for secondary weakness and debility.    Interval history:  24 hr vital sign ranges reviewed and listed below.  Patient experiencing constipation today, Fleet enema given.  Patient denies shortness of breath, abdominal discomfort, nausea, or vomiting.  Patient reports an adequate appetite.  Patient denies dysuria..  Patient progressing with PT/OT- Gait: pt amb ~130 ft with RW supervision +~300 ft with no AD and SBA +150 ft SBA with no AD. Pt became mildly fatigued on third trial. One rest break before final amb trial. Continuing to follow and treat all acute and chronic conditions.    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    Past Surgical History: Patient has a past surgical history that includes dentures; left wrist surgery; Hysterectomy; Fracture surgery (Left); Mastectomy, partial (Right, 1/7/2022); [place] lymph node biopsy (Right, 1/7/2022); and Injection for sentinel node identification (Right, 1/7/2022).    Social History: Patient reports that she quit smoking about 34 years ago. Her smoking use included cigarettes. She has never been exposed to tobacco smoke. She has never used smokeless  "tobacco. She reports current alcohol use of about 2.0 standard drinks of alcohol per week. She reports that she does not use drugs.    Family History: family history includes No Known Problems in her daughter, sister, and son.    Allergies: Patient has No Known Allergies.    ROS  Constitutional: Negative for fever   Eyes: Negative for blurred vision, double vision   Respiratory: Negative for cough, shortness of breath   Cardiovascular: Negative for chest pain, palpitations, and leg swelling.   Gastrointestinal: Negative for abdominal pain, diarrhea, nausea, vomiting.  +indigestion, constipation  Genitourinary: Negative for dysuria, frequency   Musculoskeletal:  + generalized weakness. Negative for back pain and myalgias.   Skin: Negative for itching and rash.   Neurological: Negative for dizziness, headaches.   Psychiatric/Behavioral: Negative for depression. The patient is not nervous/anxious.      24 hour Vital Sign Range   Temp:  [98.3 °F (36.8 °C)-98.6 °F (37 °C)]   Pulse:  [71-82]   Resp:  [18]   BP: (111-138)/(55-69)   SpO2:  [94 %-95 %]     Current BMI: Body mass index is 20.66 kg/m².    PEx  Constitutional: Patient appears debilitated.  No distress noted  HENT:   Head: Normocephalic and atraumatic.   Eyes: Pupils are equal, round  Neck: Normal range of motion. Neck supple.   Cardiovascular: Normal rate, regular rhythm and normal heart sounds.    Pulmonary/Chest: Effort normal and breath sounds are clear  Abdominal: Soft. Bowel sounds are normal.   Musculoskeletal: Normal range of motion.   Neurological: Alert and oriented to person, place.  Disoriented to time.  Impaired higher level thinking.  Psychiatric: Normal mood and affect. Behavior is normal.   Skin: Skin is warm and dry.     No results for input(s): "GLUCOSE", "NA", "K", "CL", "CO2", "BUN", "CREATININE", "CALCIUM", "MG" in the last 24 hours.    No results for input(s): "WBC", "RBC", "HGB", "HCT", "PLT", "MCV", "MCH", "MCHC" in the last 24 " "hours.    No results for input(s): "POCTGLUCOSE" in the last 168 hours.     Assessment and Plan:    Lower GI bleed  - CTA abdomen pelvis showed active bleed and ascending colon, significant stool burden, diverticulosis  - GI consulted while inpatient  - S/p angiogram and coil embolization by IR on 11/16  - Ceftriaxone/Flagyl discontinued 11/17  - follow-up with GI after discharge from SNF    Acute blood loss anemia  - continue monitor twice weekly CBC, transfuse for hemoglobin < 7  - 11/22 worsening anemia, initiate order to transfuse 1 unit RBC, type and screen obtained, blood consent obtained from patient and phone conversation held with patient's daughter to provide care update regarding transfusion.  Patient's daughter also in agreement to blood transfusion.  - 11/28 H&H improving.  Ambulatory referral placed for GI for hospital follow-up    Constipation  - initiated Fleet enema x1, continue senna docusate 1 tab BID    Mild late onset Alzheimer's dementia without behavioral disturbance, psychotic disturbance, mood disturbance, or anxiety  - Patient with dementia with likely etiology of alzheimer's dementia. Dementia is mild. The patient does not have signs of behavioral disturbance.   Delirium precautions:  - Avoid antihistamines, anticholinergics, hypnotics, and minimize opiates while controlling for pain as these medications may exacerbate delirium. Cues for day/night will assist with keeping patient calm and oriented - during daytime, please keep adequate light in room (open windows, lights on) and please keep room dim at night-time to encourage normal sleep-wake cycles. Continuing to have nursing and family reorient the patient and encourage family to visit  - continue home memantine 5 mg BID    Advance Care Planning  - completed while inpatient on 11/17, patient/family wishes to be DNR     History of breast cancer  - noted     Mood disorder  - Continue home fluoxetine 20 mg daily     Hypothyroid  - Continue " levothyroxine 25 mcg daily     Aortic atherosclerosis  - Hold aspirin in setting of GI bleed   - Continue atorvastatin 10 mg qHS.    Seasonal allergies  - continue cetirizine 10 mg daily, will discontinue patient shows any signs of delirium    GERD  - initiated PRN Tums and Mylanta continue Protonix 40 mg daily    Debility   - Continue with PT/OT for gait training and strengthening and restoration of ADL's   - Encourage mobility, OOB in chair, and early ambulation as appropriate  - Fall precautions   - Monitor for bowel and bladder dysfunction  - Monitor for and prevent skin breakdown and pressure ulcers  - Continue DVT prophylaxis with frequent ambulation, chemical DVT PPX contraindicated due to recent GI bleed       Anticipate disposition:  Home with home health    IP [facility] RISK OF UNPLANNED READMISSION Model: MODERATE     Follow-up needed during SNF stay-    Appointment not to send patient to-Internal Medicine (12/1)    Follow-up needed after discharge from SNF: PCP, GI, needs to be scheduled    Future Appointments   Date Time Provider Department Center   12/1/2023 10:00 AM Gene Barnes MD [facility] MCKAYLA [place]   1/11/2024  9:30 AM LAB, NEMESIO KENH LAB [place]   1/18/2024  3:00 PM Abiola Campbell MD [facility] IM [place] Du NP  Department of Hospital Medicine   [facility] Sk[facility]     DOS: 11/29/2023        Patient note was created using MModal Dictation.  Any errors in syntax or even information may not have been identified and edited on initial review prior to signing this note.

## 2023-11-29 NOTE — PT/OT/SLP PROGRESS
"Physical Therapy Treatment    Patient Name:  Olamide Felipe   MRN:  [id number]  Admit Date: 11/20/2023  Admitting Diagnosis: Acute lower GI bleeding  Recent Surgeries: N/A    General Precautions: Standard, aspiration, fall, hearing impaired  Orthopedic Precautions: N/A  Braces: N/A    Recommendations:     Discharge Recommendations:  (Home Health PT)  Level of Assistance Recommended at Discharge: Intermittent assistance   Discharge Equipment Recommendations: walker, rolling  Barriers to discharge: None    Assessment:     Olamide Felipe is a 89 y.o. female admitted with a medical diagnosis of Acute lower GI bleeding . Pt tolerated Very well, pt very excited to attend therapy upon entry to room. Pt ambulated with SBA and no AD for majority of session. Pt asc/desc 4" curb with SBA and 12 stairs with Supervision. Pt completed ambulation over uneven mat with SBA No AD and completed ring  with SBA and no AD. Pt completed bed mobility with [place]. Pt completed standing TE on blue foam mat and able to complete dynamic balance activity with SBA. Pt is always a pleasure to work with, making great progress and would continue to benefit from skilled PT services to improve overall functional mobility, strength and endurance.      Performance deficits affecting function: weakness, impaired endurance, impaired self care skills, impaired functional mobility, gait instability, impaired balance, decreased upper extremity function, decreased lower extremity function, impaired cardiopulmonary response to activity.    Rehab Potential is excellent    Activity Tolerance: Excellent    Plan:     Patient to be seen 4 x/week to address the above listed problems via gait training, therapeutic activities, therapeutic exercises, neuromuscular re-education, wheelchair management/training    Plan of Care Expires: 12/21/23  Plan of Care Reviewed with: patient    Subjective     "Ou, yay! I am so glad you came!". " "    Pain/Comfort:  Pain Rating 1: 0/10  Pain Rating Post-Intervention 1: 0/10    Patient's cultural, spiritual, Mandaeism conflicts given the current situation:  no    Objective:       Patient found  in BSC  with PICC line upon PT entry to room.     Therapeutic Activities and Exercises: standing TE on blue foam mat: hip ext, marches (2 sec holds), heel raises, hip abd/add, squats x 15 reps for BLE strengthening. Pt holding on to outside of // bars with single UE.    Patient educated on role of therapy, goals of session, and benefits of out of bed mobility.   Instructed on use of call button and importance of calling nursing staff for assistance with mobility   Questions/concerns addressed within PTA scope of practice  Pt verbalized understanding    Functional Mobility:  Bed Mobility:     Rolling Left:  independence, No rail HOB flat  Rolling Right: independence, No rail HOB flat  Scooting: independence, toward EOB  Supine to Sit: independence, no rail HOB flat  Sit to Supine: independence, no rail HOB flat  Transfers:     Sit to Stand: Moderate independence with no AD  Bed to Chair: stand by assistance with  no AD  using  Step Transfer  Toilet Transfer: supervision with  no AD  using  Step Transfer, Sup for privacy   Gait: pt amb >300 ft SBA with no AD  Uneven mat: pt amb over uneven mat with SBA and no AD  : pt able to retrieve 5 rings from floor by bending over/ stooping with SBA and no AD  Balance: pt completed dynamic balance activity putting on putting green x 10 reps  Stairs:  Pt ascended/descended 12 stair(s) with No Assistive Device with bilateral handrails with Supervision or Set-up Assistance.   4" curb: pt asc/desc curb with SBA and no AD    AM-PAC 6 CLICK MOBILITY  18    Patient left  in BSC  with call button in reach.    GOALS:   Multidisciplinary Problems       Physical Therapy Goals          Problem: Physical Therapy    Goal Priority Disciplines Outcome Goal Variances Interventions   Physical " Therapy Goal     PT, PT/OT Ongoing, Progressing     Description: Goals to be met by: 12/21/23     Patient will increase functional independence with mobility by performing:    . Supine to sit with [place]  . Sit to supine with [place]  . Rolling to Left and Right with [place].  . Sit to stand transfer with Modified [place]  . Bed to chair transfer with Modified [place] using LRAD or no AD  . Gait  x 200 feet with Supervision using LRAD and/or no AD.   . Wheelchair propulsion x150 feet with Modified [place] using bilateral uppper extremities  . Ascend/descend 12 stair with bilateral Handrails Supervision using No Assistive Device.   . Ascend/Descend 4 inch curb step with Supervision using No Assistive Device and/or LRAD.                         Time Tracking:     PT Received On: 11/29/23  PT Start Time: 1453  PT Stop Time: 1522  PT Total Time (min): 29 min    Billable Minutes: Gait Training 29    Treatment Type: Treatment  PT/PTA: PTA     Number of PTA visits since last PT visit: 4     11/29/2023

## 2023-11-29 NOTE — PT/OT/SLP PROGRESS
Occupational Therapy   Treatment    Name: Olamide Felipe  MRN: [id number]  Admit Date: 11/20/2023  Admitting Diagnosis:  Acute lower GI bleeding    General Precautions: Standard, fall   Orthopedic Precautions: N/A   Braces: N/A    Recommendations:     Discharge Recommendations:  Low Intensity Therapy  Level of Assistance Recommended at Discharge: 24 hours supervision for safety in performing ADL's and home management tasks  Discharge Equipment Recommendations: walker, rolling  Barriers to discharge:  Other (Comment) (increased need for supervision while preforming functional tasks)    Assessment:     Olamide Felipe is a 89 y.o. female with a medical diagnosis of Acute lower GI bleeding.  She presents with  Performance deficits affecting function are impaired balance, gait instability, impaired cognition, impaired cardiopulmonary response to activity.     Pt. Was cooperative and participated well with session on this day. Pt  continues to demonstrate levels of physical deficits with  functional indep with daily management activities tasks, selfcare skills with balance,  functional mobility, UB strength and endurance. Pt. Will continue to benefit from continued OT to progress towards goals      Rehab Potential is good    Activity tolerance:  Good    Plan:     Patient to be seen 5 x/week to address the above listed problems via self-care/home management, community/work re-entry, therapeutic activities, therapeutic exercises, therapeutic groups    Plan of Care Expires: 12/20/23  Plan of Care Reviewed with: patient    Subjective     Communicated with: Nsg and Pt. prior to session.  I am so excited to see you    Pain/Comfort:  Pain Rating 1: 0/10  Pain Rating Post-Intervention 1: 0/10    Patient's cultural, spiritual, Amish conflicts given the current situation:  no    Objective:     Patient found up in chair with PICC line upon OT entry to room.    Functional Mobility/Transfers:  Patient completed Sit <> Stand  Transfer with stand by assistance  with  rolling walker   Functional Mobility: Pt. With fxl mobility approx 350 ft with SBA    Activities of Daily Living:  Not tested    [facility] 6 Click ADL: 18    OT Exercises: UE Ergometer x 10 min standing     Treatment & Education:  Pt. With standing act on this day with task. Pt. With CGA/SBA for balance aspects with task with  no AD Pt. With ball bouncing activity to increase standing balance and tolerance. Pt. Noted with no LOB     Pt. With therex performed to increase ROM, endurance selfcare task and fxl mobility for independence.     Pt edu on role of OT, POC, safety when performing self care tasks , benefit of performing OOB activity, and safety when performing functional transfers and mobility management for preparation with goals to progress towards next level of care     Patient left up in chair with all lines intact and call button in reach    GOALS:   Multidisciplinary Problems       Occupational Therapy Goals          Problem: Occupational Therapy    Goal Priority Disciplines Outcome Interventions   Occupational Therapy Goal     OT, PT/OT Ongoing, Progressing    Description: Goals to be met by: 12/20/2023     Patient will increase functional independence with ADLs by performing:      UE Dressing with Set-up Assistance- Met  LE Dressing with Supervision- Ongoing  Oral Hygiene while standing at sink with Supervision and Assistive Devices as needed.  Personal Hygiene while standing at sink with Supervision and Assistive Devices as needed- Ongoing  Toileting from toilet with Supervision and Assistive Devices as needed for hygiene and clothing management- Ongoing.   Bathing from  shower chair/bench with Supervision.  Toilet transfer to toilet with Supervision and Assistive Device as needed- Ongoing  Footwear /c Mod I- Met  SC t/f /c SPV and Assistive Device as needed  Tolerate standing functional tasks for ~8 minutes /c SPV and Assistive device as needed- Ongoing                          Time Tracking:     OT Date of Treatment: 11/29/23  OT Start Time: 1313    OT Stop Time: 1352  OT Total Time (min): 39 min    Billable Minutes:Therapeutic Activity 39    11/29/2023

## 2023-11-29 NOTE — PLAN OF CARE
Patient filed appeal with [employer].     Case ID: [id number]  Confirmation #: [id number]

## 2023-11-30 LAB
ANION GAP SERPL CALC-SCNC: 8 MMOL/L (ref 8–16)
BASOPHILS # BLD AUTO: 0.06 K/UL (ref 0–0.2)
BASOPHILS NFR BLD: 1 % (ref 0–1.9)
BUN SERPL-MCNC: 21 MG/DL (ref 8–23)
CALCIUM SERPL-MCNC: 8.9 MG/DL (ref 8.7–10.5)
CHLORIDE SERPL-SCNC: 109 MMOL/L (ref 95–110)
CO2 SERPL-SCNC: 24 MMOL/L (ref 23–29)
CREAT SERPL-MCNC: 0.9 MG/DL (ref 0.5–1.4)
DIFFERENTIAL METHOD: ABNORMAL
EOSINOPHIL # BLD AUTO: 0.4 K/UL (ref 0–0.5)
EOSINOPHIL NFR BLD: 6 % (ref 0–8)
ERYTHROCYTE [DISTWIDTH] IN BLOOD BY AUTOMATED COUNT: 14.1 % (ref 11.5–14.5)
EST. GFR  (NO RACE VARIABLE): >60 ML/MIN/1.73 M^2
GLUCOSE SERPL-MCNC: 75 MG/DL (ref 70–110)
HCT VFR BLD AUTO: 29.3 % (ref 37–48.5)
HGB BLD-MCNC: 9.3 G/DL (ref 12–16)
IMM GRANULOCYTES # BLD AUTO: 0.02 K/UL (ref 0–0.04)
IMM GRANULOCYTES NFR BLD AUTO: 0.3 % (ref 0–0.5)
LYMPHOCYTES # BLD AUTO: 1.6 K/UL (ref 1–4.8)
LYMPHOCYTES NFR BLD: 27.8 % (ref 18–48)
MAGNESIUM SERPL-MCNC: 1.8 MG/DL (ref 1.6–2.6)
MCH RBC QN AUTO: 31 PG (ref 27–31)
MCHC RBC AUTO-ENTMCNC: 31.7 G/DL (ref 32–36)
MCV RBC AUTO: 98 FL (ref 82–98)
MONOCYTES # BLD AUTO: 0.8 K/UL (ref 0.3–1)
MONOCYTES NFR BLD: 12.9 % (ref 4–15)
NEUTROPHILS # BLD AUTO: 3 K/UL (ref 1.8–7.7)
NEUTROPHILS NFR BLD: 52 % (ref 38–73)
NRBC BLD-RTO: 0 /100 WBC
PHOSPHATE SERPL-MCNC: 2.5 MG/DL (ref 2.7–4.5)
PLATELET # BLD AUTO: 352 K/UL (ref 150–450)
PMV BLD AUTO: 10.4 FL (ref 9.2–12.9)
POTASSIUM SERPL-SCNC: 4.5 MMOL/L (ref 3.5–5.1)
RBC # BLD AUTO: 3 M/UL (ref 4–5.4)
SODIUM SERPL-SCNC: 141 MMOL/L (ref 136–145)
WBC # BLD AUTO: 5.82 K/UL (ref 3.9–12.7)

## 2023-11-30 PROCEDURE — 11000004 HC SNF PRIVATE: Mod: HCNC

## 2023-11-30 PROCEDURE — 97116 GAIT TRAINING THERAPY: CPT | Mod: HCNC

## 2023-11-30 PROCEDURE — 84100 ASSAY OF PHOSPHORUS: CPT | Mod: HCNC | Performed by: NURSE PRACTITIONER

## 2023-11-30 PROCEDURE — 97535 SELF CARE MNGMENT TRAINING: CPT | Mod: HCNC,CO

## 2023-11-30 PROCEDURE — 83735 ASSAY OF MAGNESIUM: CPT | Mod: HCNC | Performed by: NURSE PRACTITIONER

## 2023-11-30 PROCEDURE — 85025 COMPLETE CBC W/AUTO DIFF WBC: CPT | Mod: HCNC | Performed by: NURSE PRACTITIONER

## 2023-11-30 PROCEDURE — 80048 BASIC METABOLIC PNL TOTAL CA: CPT | Mod: HCNC | Performed by: NURSE PRACTITIONER

## 2023-11-30 PROCEDURE — 25000003 PHARM REV CODE 250: Mod: HCNC | Performed by: NURSE PRACTITIONER

## 2023-11-30 PROCEDURE — 97110 THERAPEUTIC EXERCISES: CPT | Mod: HCNC

## 2023-11-30 PROCEDURE — 25000003 PHARM REV CODE 250: Mod: HCNC | Performed by: HOSPITALIST

## 2023-11-30 PROCEDURE — 36415 COLL VENOUS BLD VENIPUNCTURE: CPT | Mod: HCNC | Performed by: NURSE PRACTITIONER

## 2023-11-30 RX ADMIN — PANTOPRAZOLE SODIUM 40 MG: 40 TABLET, DELAYED RELEASE ORAL at 09:11

## 2023-11-30 RX ADMIN — MINERAL OIL, PETROLATUM, PHENYLEPHRINE HCI 1 APPLICATOR: .25; 14; 74.9 OINTMENT TOPICAL at 09:11

## 2023-11-30 RX ADMIN — Medication 1 TABLET: at 09:11

## 2023-11-30 RX ADMIN — FERROUS SULFATE TAB 325 MG (65 MG ELEMENTAL FE) 1 EACH: 325 (65 FE) TAB at 09:11

## 2023-11-30 RX ADMIN — OXYCODONE HYDROCHLORIDE AND ACETAMINOPHEN 500 MG: 500 TABLET ORAL at 09:11

## 2023-11-30 RX ADMIN — MEMANTINE HYDROCHLORIDE 5 MG: 5 TABLET ORAL at 09:11

## 2023-11-30 RX ADMIN — FLUOXETINE 20 MG: 10 CAPSULE ORAL at 09:11

## 2023-11-30 RX ADMIN — SENNOSIDES AND DOCUSATE SODIUM 1 TABLET: 8.6; 5 TABLET ORAL at 08:11

## 2023-11-30 RX ADMIN — DIBASIC SODIUM PHOSPHATE, MONOBASIC POTASSIUM PHOSPHATE AND MONOBASIC SODIUM PHOSPHATE 2 TABLET: 852; 155; 130 TABLET ORAL at 01:11

## 2023-11-30 RX ADMIN — ATORVASTATIN CALCIUM 10 MG: 10 TABLET, FILM COATED ORAL at 08:11

## 2023-11-30 RX ADMIN — LEVOTHYROXINE SODIUM 25 MCG: 25 TABLET ORAL at 05:11

## 2023-11-30 RX ADMIN — FERROUS SULFATE TAB 325 MG (65 MG ELEMENTAL FE) 1 EACH: 325 (65 FE) TAB at 08:11

## 2023-11-30 RX ADMIN — MEMANTINE HYDROCHLORIDE 5 MG: 5 TABLET ORAL at 08:11

## 2023-11-30 RX ADMIN — Medication 12 MG: at 08:11

## 2023-11-30 RX ADMIN — Medication 1 TABLET: at 08:11

## 2023-11-30 RX ADMIN — CETIRIZINE HYDROCHLORIDE 10 MG: 5 TABLET, FILM COATED ORAL at 09:11

## 2023-11-30 RX ADMIN — SENNOSIDES AND DOCUSATE SODIUM 1 TABLET: 8.6; 5 TABLET ORAL at 09:11

## 2023-11-30 RX ADMIN — THERA TABS 1 TABLET: TAB at 09:11

## 2023-11-30 NOTE — PT/OT/SLP PROGRESS
Occupational Therapy   Treatment    Name: Olamide Felipe  MRN: [id number]  Admit Date: 11/20/2023  Admitting Diagnosis:  Acute lower GI bleeding    General Precautions: Standard, fall   Orthopedic Precautions: N/A   Braces: N/A    Recommendations:     Discharge Recommendations:  Low Intensity Therapy  Level of Assistance Recommended at Discharge: 24 hours supervision for safety in performing ADL's and home management tasks  Discharge Equipment Recommendations: walker, rolling  Barriers to discharge:  Other (Comment) (increased need for supervision while preforming functional tasks)    Assessment:     Olamide Felipe is a 89 y.o. female with a medical diagnosis of Acute lower GI bleeding.  She presents with  Performance deficits affecting function are impaired balance, gait instability, impaired cognition, impaired cardiopulmonary response to activity.     Pt. Was cooperative and participated well with session on this day. Pt  continues to demonstrate levels of physical deficits with  functional indep with daily management activities tasks, selfcare skills with balance,  functional mobility, UB strength and endurance. Pt. Will continue to benefit from continued OT to progress towards goals      Rehab Potential is fair    Activity tolerance:  Fair    Plan:     Patient to be seen 5 x/week to address the above listed problems via self-care/home management, community/work re-entry, therapeutic activities, therapeutic exercises, therapeutic groups    Plan of Care Expires: 12/20/23  Plan of Care Reviewed with: patient    Subjective     Communicated with: Nsg and prior to session. I am doing well today.    Pain/Comfort:  Pain Rating 1: 0/10  Pain Rating Post-Intervention 1: 0/10    Patient's cultural, spiritual, Tenriism conflicts given the current situation:  no    Objective:     Patient found up in chair with PICC line upon OT entry to room.    Functional Mobility/Transfers:  Patient completed Sit <> Stand Transfer  with stand by assistance  with  rolling walker   Patient completed  Shower Transfer Step Transfer technique with stand by assistance with rolling walker  Functional Mobility: Pt. With fxl mobility to and from inroom bath with RW for bal and cues    Activities of Daily Living:  Grooming with supervision while standing at sink level  Bathing with stand by assistance from shower stall and use of grab bars  Upper Body Dressing with supervision to doff/perez pull over shirts  Lower Body Dressing with stand by assistance performed in stance to doff/perez pants, manage up/down hips with steadying with RW, and doff/perez shoes  Toileting with stand by assistance from standard toilet    [facility] 6 Click ADL: 18    Treatment & Education:  Pt edu on role of OT, POC, safety when performing self care tasks , benefit of performing OOB activity, and safety when performing functional transfers and mobility management for preparation with goals to progress towards next level of care     Patient left up in chair with all lines intact and call button in reach    GOALS:   Multidisciplinary Problems       Occupational Therapy Goals          Problem: Occupational Therapy    Goal Priority Disciplines Outcome Interventions   Occupational Therapy Goal     OT, PT/OT Ongoing, Progressing    Description: Goals to be met by: 12/20/2023     Patient will increase functional independence with ADLs by performing:      UE Dressing with Set-up Assistance- Met  LE Dressing with Supervision- Ongoing  Oral Hygiene while standing at sink with Supervision and Assistive Devices as needed.  Personal Hygiene while standing at sink with Supervision and Assistive Devices as needed- Ongoing  Toileting from toilet with Supervision and Assistive Devices as needed for hygiene and clothing management- Ongoing.   Bathing from  shower chair/bench with Supervision.  Toilet transfer to toilet with Supervision and Assistive Device as needed- Ongoing  Footwear /c Mod I-  Met  SC t/f /c SPV and Assistive Device as needed  Tolerate standing functional tasks for ~8 minutes /c SPV and Assistive device as needed- Ongoing                         Time Tracking:     OT Date of Treatment: 11/30/23  OT Start Time: 0734    OT Stop Time: 0804  OT Total Time (min): 30 min    Billable Minutes:Self Care/Home Management 30 11/30/2023

## 2023-11-30 NOTE — PLAN OF CARE
Problem: Physical Therapy  Goal: Physical Therapy Goal  Description: Goals to be met by: 12/21/23     Patient will increase functional independence with mobility by performing:    . Supine to sit with [place]-met  . Sit to supine with [place]-met  . Rolling to Left and Right with [place].-met  . Sit to stand transfer with Modified [place]-met  . Bed to chair transfer with Modified [place] using LRAD or no AD-met  . Gait  x 200 feet with Supervision using LRAD and/or no AD. -met  . Wheelchair propulsion x150 feet with Modified [place] using bilateral uppper extremities-met  . Ascend/descend 12 stair with bilateral Handrails Supervision using No Assistive Device. -met  . Ascend/Descend 4 inch curb step with Supervision using No Assistive Device and/or LRAD.-met    Outcome: Met

## 2023-11-30 NOTE — PROGRESS NOTES
[facility] - Skilled Nursing  Adult Nutrition  Progress Note    SUMMARY     Recommendation/Intervention:   1) Continue current regular diet with soft and bite sized diet texture, encourage PO intake   2) RD to monitor weight, labs, PO intake/tolerance, skin    Goals: PO intake to meet 85% of EEN/EPN with no weight loss by next RD follow-up  Nutrition Goal Status: progressing towards goal  Communication of RD Recs: other (comment) (POC)    Assessment and Plan    No acute nutrition diagnosis at this time    Malnutrition Assessment  11/21     Skin (Micronutrient): bruised  Teeth (Micronutrient): dentures  Neck/Chest (Micronutrient): muscle wasting, subcutaneous fat loss  Musculoskeletal/Lower Extremities: muscle wasting, subcutaneous fat loss           Orbital Region (Subcutaneous Fat Loss): moderate depletion  Upper Arm Region (Subcutaneous Fat Loss): moderate depletion  Thoracic and Lumbar Region: moderate depletion   [place] Region (Muscle Loss): severe depletion  Clavicle Bone Region (Muscle Loss): moderate depletion  Clavicle and Acromion Bone Region (Muscle Loss): moderate depletion  Dorsal Hand (Muscle Loss): moderate depletion  Patellar Region (Muscle Loss): moderate depletion  Anterior Thigh Region (Muscle Loss): moderate depletion  Posterior Calf Region (Muscle Loss): mild depletion     Reason for Assessment    Reason For Assessment: RD follow-up  Diagnosis: other (see comments) (acute GI bleed)  Relevant Medical History: s/p coil embolization, mild Alzheimers, breast cancer s/p mastectomy, HLD, HTN, hypothyroidism, CAD, debiltiy, ICU delirium  Interdisciplinary Rounds: did not attend  General Information Comments: Pt with adequate/good appetite, consuming % of recent meals. Diet texture changed to soft and bite sized on 11/27. No N/V/D- recent constipation resolved with enema.  Nutrition Discharge Planning: DC on regular diet    Nutrition/Diet History    Patient Reported Diet/Restrictions/Preferences:  "general  Food Preferences: no spicy foods  Spiritual, Cultural Beliefs, Rastafari Practices, Values that Affect Care: no  Food Allergies: NKFA    Anthropometrics    Temp: 98.2 °F (36.8 °C)  Height Method: Stated  Height: 5' 4" (162.6 cm)  Height (inches): 64 in  Weight Method: Bed Scale  Weight: 54.6 kg (120 lb 5.9 oz)  Weight (lb): 120.37 lb  Ideal Body Weight (IBW), Female: 120 lb  % Ideal Body Weight, Female (lb): 100.31 %  BMI (Calculated): 20.7  BMI Grade: 18.5-24.9 - normal  Weight Loss: unintentional  Usual Body Weight (UBW), k.2 kg  Weight Change Amount:  (weight in  was 52.2 Kg, patient reports weight loss when initially living with her daughter s/s spicy foods she could not eat.)  Pt's weight has remained stable since admit- 120 lbs    Lab/Procedures/Meds    Pertinent Labs Reviewed: reviewed  Pertinent Labs Comments:    Latest Reference Range & Units 23 03:30   Hemoglobin 12.0 - 16.0 g/dL 9.3 (L)   Hematocrit 37.0 - 48.5 % 29.3 (L)      Latest Reference Range & Units 23 03:30   BUN 8 - 23 mg/dL 21   Creatinine 0.5 - 1.4 mg/dL 0.9   eGFR >60 mL/min/1.73 m^2 >60.0   Renal labs improved since last RD visit    Pertinent Medications Reviewed: reviewed  Pertinent Medications Comments:    ascorbic acid (vitamin C)  500 mg Oral Daily    atorvastatin  10 mg Oral QHS    B-complex with vitamin C  1 tablet Oral Daily    calcium-vitamin D3  1 tablet Oral BID    cetirizine  10 mg Oral Daily    ferrous sulfate  1 tablet Oral BID    FLUoxetine  20 mg Oral Daily    levothyroxine  25 mcg Oral Before breakfast    melatonin  12 mg Oral Nightly    memantine  5 mg Oral BID    multivitamin  1 tablet Oral Daily    pantoprazole  40 mg Oral Daily    phenyleph-min oil-petrolatum  1 applicator Rectal TID    senna-docusate 8.6-50 mg  1 tablet Oral BID     Estimated/Assessed Needs    Weight Used For Calorie Calculations: 54.6 kg (120 lb 5.9 oz)  Energy Calorie Requirements (kcal): 1236  Energy Need Method: " Jessi Hernandez (x 1.3(PAL))  Protein Requirements: 66  Weight Used For Protein Calculations: 54.6 kg (120 lb 5.9 oz) (x 1.2g/kg)  Fluid Requirements (mL): 1236 or per MD  Estimated Fluid Requirement Method: RDA Method  RDA Method (mL): 1236     Nutrition Prescription Ordered    Current Diet Order: Regular diet, Soft and bite sized diet texture  Nutrition Order Comments: PO intake %  Oral Nutrition Supplement: Pt declines ONS at this time but states she will drink if she loses weight while here    Evaluation of Received Nutrient/Fluid Intake    I/O: no data  Energy Calories Required: meeting needs  Protein Required: meeting needs  Fluid Required: meeting needs  Comments: LBM yesterday 11/29  Tolerance: tolerating  % Intake of Estimated Energy Needs: Other: %  % Meal Intake: Other: %    Nutrition Risk    Level of Risk/Frequency of Follow-up: low (F/U once/week)     Monitor and Evaluation    Food and Nutrient Intake: food and beverage intake  Food and Nutrient Adminstration: diet order  Knowledge/Beliefs/Attitudes: food and nutrition knowledge/skill  Physical Activity and Function: nutrition-related ADLs and IADLs  Anthropometric Measurements: weight change, body mass index  Biochemical Data, Medical Tests and Procedures: electrolyte and renal panel, gastrointestinal profile, glucose/endocrine profile, inflammatory profile, lipid profile  Nutrition-Focused Physical Findings: overall appearance     Nutrition Follow-Up    RD Follow-up?: Yes

## 2023-11-30 NOTE — PLAN OF CARE
Problem: Adult Inpatient Plan of Care  Goal: Plan of Care Review  Outcome: Ongoing, Progressing  Goal: Patient-Specific Goal (Individualized)  Outcome: Ongoing, Progressing  Goal: Absence of Hospital-Acquired Illness or Injury  Outcome: Ongoing, Progressing  Goal: Optimal Comfort and Wellbeing  Outcome: Ongoing, Progressing  Goal: Readiness for Transition of Care  Outcome: Ongoing, Progressing     Problem: Fall Injury Risk  Goal: Absence of Fall and Fall-Related Injury  Outcome: Ongoing, Progressing     Problem: Impaired Wound Healing  Goal: Optimal Wound Healing  Outcome: Ongoing, Progressing     Problem: Skin Injury Risk Increased  Goal: Skin Health and Integrity  Outcome: Ongoing, Progressing   Pt admitted to Skilled Nursing for Gastrointestinal hemorrhage, unspe*. Patient is [id number]. Receiving daily PT/OT for strengthening, balance, gait training and ambulation. Pt currently requiring  standby by assistance  person assistance,for transfers and ambulation. Patient also requiring stand by assistance  person assistance with dressing and set up for grooming and eating. Daily skilled nursing interventions include pain management, medication management, surgical wound management and ADL assistance. Patient reports 0/10 pain this shift. Skin tear to left arm, approximated and open to air. Pt is on a Soft and bite sized  diet, tolerating well. Care plan reviewed and updated. No complaints at this time, will continue to update care and monitor.

## 2023-11-30 NOTE — PT/OT/SLP PROGRESS
Physical Therapy Treatment/ Discharge Summary    Patient Name:  Olamide Felipe   MRN:  [id number]  Admit Date: 11/20/2023  Admitting Diagnosis: Acute lower GI bleeding  Recent Surgeries: N/A    General Precautions: Standard, aspiration, fall, hearing impaired  Orthopedic Precautions: N/A  Braces: N/A    Recommendations:     Discharge Recommendations:  (Home Health PT)  Level of Assistance Recommended at Discharge:  pt is (I)  Discharge Equipment Recommendations: walker, rolling  Barriers to discharge: None    Assessment:     Olamide Felipe is a 89 y.o. female admitted with a medical diagnosis of Acute lower GI bleeding . Pt is appropriate for d/c home tomorrow.      Performance deficits affecting function: impaired endurance, impaired cardiopulmonary response to activity.    Rehab Potential is good    Activity Tolerance: Good    Plan:     Patient to be seen 4 x/week to address the above listed problems via gait training, therapeutic activities, therapeutic exercises, neuromuscular re-education, wheelchair management/training    Plan of Care Expires: 12/21/23  Plan of Care Reviewed with: patient    Subjective     Pt. Agreeable to work with PT.     Pain/Comfort:  Pain Rating 1: 0/10  Pain Rating Post-Intervention 1: 0/10    Patient's cultural, spiritual, Rastafari conflicts given the current situation:  no    Objective:     Communicated with pt's nurse prior to session.  Patient found ambulatory in room/cramer with   upon PT entry to room.     Therapeutic Activities and Exercises: Nustep with resistance set @ 5 for 15mins to help improve pt's overall cardiovascular endurance.    Functional Mobility:   11/30/23 1007   Roll Left and Right   Did they attempt the activity? Yes   Was the activity done independently? Yes   Was adaptive equipment used? No   CARE Score - Roll Left and Right 6   Sit to Lying   Did they attempt the activity? Yes   Was the activity done independently? Yes   Was adaptive equipment used? No    CARE Score - Sit to Lying 6   Lying to Sitting on Side of Bed   Did they attempt the activity? Yes   Was the activity done independently? Yes   Was adaptive equipment used? No   CARE Score - Lying to Sitting on Side of Bed 6   Sit to Stand   Did they attempt the activity? Yes   Was the activity done independently? Yes   Was adaptive equipment used? No   CARE Score - Sit to Stand 6   Chair/Bed-to-Chair Transfer   Did they attempt the activity? Yes   Was the activity done independently? Yes   Was adaptive equipment used? No   CARE Score - Chair/Bed-to-Chair Transfer 6   Toilet Transfer   Did they attempt the activity? Yes   Was the activity done independently? Yes   Was adaptive equipment used? No   CARE Score - Toilet Transfer 6   Car Transfer   Reason if not Attempted Environmental limitations   CARE Score - Car Transfer 10   Walk 10 Feet   Did they attempt the activity? Yes   Was the activity done independently? Yes   Was adaptive equipment used? No   CARE Score - Walk 10 Feet 6   Walk 50 Feet with Two Turns   Did they attempt the activity? Yes   Was the activity done independently? Yes   Was adaptive equipment used? No   CARE Score - Walk 50 Feet with Two Turns 6   Walk 150 Feet   Did they attempt the activity? Yes   Was the activity done independently? Yes- pt ambulated >300ft in the hallway with no AD and (I)   Was adaptive equipment used? No   CARE Score - Walk 150 Feet 6   Walking 10 Feet on Uneven Surfaces   Did they attempt the activity? Yes   Was the activity done independently? Yes   Was adaptive equipment used? No   CARE Score - Walking 10 Feet on Uneven Surfaces 6   1 Step (Curb)   Did they attempt the activity? Yes   Was the activity done independently? No   Assistance Needed Supervision   Was adaptive equipment used? No   CARE Score - 1 Step (Curb) 4   4 Steps   Did they attempt the activity? Yes   Was the activity done independently? Yes   Was adaptive equipment used? Yes  (B rails)   CARE Score - 4  Steps 6   12 Steps   Did they attempt the activity? Yes   Was the activity done independently? Yes   Was adaptive equipment used? Yes  (B rails)   CARE Score - 12 Steps 6   Picking Up Object   Did they attempt the activity? Yes   Was the activity done independently? No   Assistance Needed Supervision   Was adaptive equipment used? No   CARE Score - Picking Up Object 4   OTHER   Uses a Wheelchair/Scooter? Yes   Wheel 50 Feet with Two Turns   Did they attempt the activity? Yes   Was the activity done independently? Yes   Type of Wheelchair/Scooter Manual   CARE Score - Wheel 50 Feet with Two Turns 6   Wheel 150 Feet   Did they attempt the activity? Yes   Was the activity done independently? Yes   Type of Wheelchair/Scooter Manual   CARE Score - Wheel 150 Feet 6       AM-PAC 6 CLICK MOBILITY  24    Patient left up in chair with call button in reach.    GOALS:   Multidisciplinary Problems       Physical Therapy Goals       Not on file              Multidisciplinary Problems (Resolved)          Problem: Physical Therapy    Goal Priority Disciplines Outcome Goal Variances Interventions   Physical Therapy Goal   (Resolved)     PT, PT/OT Met     Description: Goals to be met by: 12/21/23     Patient will increase functional independence with mobility by performing:    . Supine to sit with [place]-met  . Sit to supine with [place]-met  . Rolling to Left and Right with [place].-met  . Sit to stand transfer with Modified [place]-met  . Bed to chair transfer with Modified [place] using LRAD or no AD-met  . Gait  x 200 feet with Supervision using LRAD and/or no AD. -met  . Wheelchair propulsion x150 feet with Modified [place] using bilateral uppper extremities-met  . Ascend/descend 12 stair with bilateral Handrails Supervision using No Assistive Device. -met  . Ascend/Descend 4 inch curb step with Supervision using No Assistive Device and/or LRAD.-met                         Time Tracking:     PT  Received On: 11/30/23  PT Start Time: 0954  PT Stop Time: 1023  PT Total Time (min): 29 min    Billable Minutes: Gait Training 14 and Therapeutic Exercise 15    Treatment Type: Treatment  PT/PTA: PT     Number of PTA visits since last PT visit: 0     11/30/2023

## 2023-12-01 PROCEDURE — 25000003 PHARM REV CODE 250: Mod: HCNC | Performed by: NURSE PRACTITIONER

## 2023-12-01 PROCEDURE — 97530 THERAPEUTIC ACTIVITIES: CPT | Mod: HCNC,CO

## 2023-12-01 PROCEDURE — 25000003 PHARM REV CODE 250: Mod: HCNC | Performed by: HOSPITALIST

## 2023-12-01 PROCEDURE — 11000004 HC SNF PRIVATE: Mod: HCNC

## 2023-12-01 RX ADMIN — Medication 1 TABLET: at 10:12

## 2023-12-01 RX ADMIN — FLUOXETINE 20 MG: 10 CAPSULE ORAL at 10:12

## 2023-12-01 RX ADMIN — CETIRIZINE HYDROCHLORIDE 10 MG: 5 TABLET, FILM COATED ORAL at 10:12

## 2023-12-01 RX ADMIN — ATORVASTATIN CALCIUM 10 MG: 10 TABLET, FILM COATED ORAL at 08:12

## 2023-12-01 RX ADMIN — SENNOSIDES AND DOCUSATE SODIUM 1 TABLET: 8.6; 5 TABLET ORAL at 08:12

## 2023-12-01 RX ADMIN — THERA TABS 1 TABLET: TAB at 10:12

## 2023-12-01 RX ADMIN — Medication 1 TABLET: at 08:12

## 2023-12-01 RX ADMIN — FERROUS SULFATE TAB 325 MG (65 MG ELEMENTAL FE) 1 EACH: 325 (65 FE) TAB at 08:12

## 2023-12-01 RX ADMIN — Medication 12 MG: at 08:12

## 2023-12-01 RX ADMIN — OXYCODONE HYDROCHLORIDE AND ACETAMINOPHEN 500 MG: 500 TABLET ORAL at 10:12

## 2023-12-01 RX ADMIN — PANTOPRAZOLE SODIUM 40 MG: 40 TABLET, DELAYED RELEASE ORAL at 10:12

## 2023-12-01 RX ADMIN — SENNOSIDES AND DOCUSATE SODIUM 1 TABLET: 8.6; 5 TABLET ORAL at 10:12

## 2023-12-01 RX ADMIN — MEMANTINE HYDROCHLORIDE 5 MG: 5 TABLET ORAL at 08:12

## 2023-12-01 RX ADMIN — FERROUS SULFATE TAB 325 MG (65 MG ELEMENTAL FE) 1 EACH: 325 (65 FE) TAB at 10:12

## 2023-12-01 RX ADMIN — MEMANTINE HYDROCHLORIDE 5 MG: 5 TABLET ORAL at 10:12

## 2023-12-01 RX ADMIN — LEVOTHYROXINE SODIUM 25 MCG: 25 TABLET ORAL at 05:12

## 2023-12-01 NOTE — PT/OT/SLP PROGRESS
Occupational Therapy   Treatment    Name: Olamide Felipe  MRN: [id number]  Admit Date: 11/20/2023  Admitting Diagnosis:  Acute lower GI bleeding    General Precautions: Standard, fall   Orthopedic Precautions: N/A   Braces: N/A    Recommendations:     Discharge Recommendations:  Low Intensity Therapy  Level of Assistance Recommended at Discharge: 24 hours supervision for safety in performing ADL's and home management tasks  Discharge Equipment Recommendations: walker, rolling  Barriers to discharge:  Other (Comment) (increased need for supervision while preforming functional tasks)    Assessment:     Olamide Felipe is a 89 y.o. female with a medical diagnosis of Acute lower GI bleeding.  She presents with. Performance deficits affecting function are impaired balance, gait instability, impaired cognition, impaired cardiopulmonary response to activity.     Pt. Was cooperative and participated well with session on this day. Pt  continues to demonstrate levels of physical deficits with  functional indep with daily management activities tasks, selfcare skills with balance,  functional mobility, UB strength and endurance. Pt. Will continue to benefit from continued OT to progress towards goals      Rehab Potential is fair    Activity tolerance:  Fair    Plan:     Patient to be seen 5 x/week to address the above listed problems via self-care/home management, community/work re-entry, therapeutic activities, therapeutic exercises, therapeutic groups    Plan of Care Expires: 12/20/23  Plan of Care Reviewed with: patient    Subjective     Communicated with: Nsg and Pt. prior to session.The told me I had another well here but I am ready to go home    Pain/Comfort:  Pain Rating 1: 0/10  Pain Rating Post-Intervention 1: 0/10    Patient's cultural, spiritual, Islam conflicts given the current situation:  no    Objective:     Patient found up in chair  PICC line upon OT entry to room.    Functional  Mobility/Transfers:  Patient completed Sit <> Stand Transfer with supervision  with  rolling walker   Patient completed Toilet Transfer Step Transfer technique with supervision with  no AD  Functional Mobility: Pt. With fxl mobility in room with no AD and fxl mobility in cramer with RW approx over 300ft    Activities of Daily Living:  Grooming: modified independence standing at sink level  Toileting: modified independence with cleaning and clothing management     [facility] 6 Click ADL: 18    OT Exercises: UE Ergometer 10 min    Treatment & Education:  Pt edu on role of OT, POC, safety when performing self care tasks , benefit of performing OOB activity, and safety when performing functional transfers and mobility management for preparation with goals to progress towards next level of care     Patient left up in chair with all lines intact and call button in reach    GOALS:   Multidisciplinary Problems       Occupational Therapy Goals          Problem: Occupational Therapy    Goal Priority Disciplines Outcome Interventions   Occupational Therapy Goal     OT, PT/OT Ongoing, Progressing    Description: Goals to be met by: 12/20/2023     Patient will increase functional independence with ADLs by performing:      UE Dressing with Set-up Assistance- Met  LE Dressing with Supervision- Ongoing  Oral Hygiene while standing at sink with Supervision and Assistive Devices as needed.  Personal Hygiene while standing at sink with Supervision and Assistive Devices as needed- Ongoing  Toileting from toilet with Supervision and Assistive Devices as needed for hygiene and clothing management- Ongoing.   Bathing from  shower chair/bench with Supervision.  Toilet transfer to toilet with Supervision and Assistive Device as needed- Ongoing  Footwear /c Mod I- Met  SC t/f /c SPV and Assistive Device as needed  Tolerate standing functional tasks for ~8 minutes /c SPV and Assistive device as needed- Ongoing                         Time Tracking:      OT Date of Treatment: 12/01/23  OT Start Time: 1345    OT Stop Time: 1415  OT Total Time (min): 30 min    Billable Minutes:Therapeutic Activity 30    12/1/2023

## 2023-12-01 NOTE — PROGRESS NOTES
[facility]                                  Skilled Nursing Facility                   Progress Note     Admit Date: 11/20/2023  TAMMIE 12/8/2023  [id number]  Principal Problem:  Acute lower GI bleeding   HPI obtained from patient interview and chart review     Chief Complaint:Re-evaluation of medical treatment and therapy status    HPI:   Mrs. Felipe is a 89-year-old female with PMHx of mild dementia, breast cancer, HTN, hypothyroidism, recurrent falls, HLD who presents to SNF following hospitalization for acute lower GI bleed.  Admission to SNF for secondary weakness and debility.    Interval history:    24 hr vital sign ranges reviewed and listed below.    Patient denies shortness of breath, abdominal discomfort, nausea, or vomiting.  Patient reports an adequate appetite.  Patient denies dysuria.  Patient reports having regular bowel movements.  Patient progressing with PT/OT-pt ambulated >300ft in the hallway with no AD and (I). Continuing to follow and treat all acute and chronic conditions.    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    Past Surgical History: Patient has a past surgical history that includes dentures; left wrist surgery; Hysterectomy; Fracture surgery (Left); Mastectomy, partial (Right, 1/7/2022); Hurley lymph node biopsy (Right, 1/7/2022); and Injection for sentinel node identification (Right, 1/7/2022).    Social History: Patient reports that she quit smoking about 34 years ago. Her smoking use included cigarettes. She has never been exposed to tobacco smoke. She has never used smokeless tobacco. She reports current alcohol use of about 2.0 standard drinks of alcohol per week. She reports that she does not use  "drugs.    Family History: family history includes No Known Problems in her daughter, sister, and son.    Allergies: Patient has No Known Allergies.    ROS  Constitutional: Negative for fever   Eyes: Negative for blurred vision, double vision   Respiratory: Negative for cough, shortness of breath   Cardiovascular: Negative for chest pain, palpitations, and leg swelling.   Gastrointestinal: Negative for abdominal pain, diarrhea, nausea, vomiting.  +indigestion, constipation  Genitourinary: Negative for dysuria, frequency   Musculoskeletal:  + generalized weakness. Negative for back pain and myalgias.   Skin: Negative for itching and rash.   Neurological: Negative for dizziness, headaches.   Psychiatric/Behavioral: Negative for depression. The patient is not nervous/anxious.      24 hour Vital Sign Range   Temp:  [98.2 °F (36.8 °C)-98.6 °F (37 °C)]   Pulse:  [68-73]   Resp:  [18]   BP: (129-155)/(57-68)   SpO2:  [97 %-98 %]     Current BMI: Body mass index is 20.66 kg/m².    PEx  Constitutional: Patient appears debilitated.  No distress noted  HENT:   Head: Normocephalic and atraumatic.   Eyes: Pupils are equal, round  Neck: Normal range of motion. Neck supple.   Cardiovascular: Normal rate, regular rhythm and normal heart sounds.    Pulmonary/Chest: Effort normal and breath sounds are clear  Abdominal: Soft. Bowel sounds are normal.   Musculoskeletal: Normal range of motion.   Neurological: Alert and oriented to person, place.  Disoriented to time.  Impaired higher level thinking.  Psychiatric: Normal mood and affect. Behavior is normal.   Skin: Skin is warm and dry.     No results for input(s): "GLUCOSE", "NA", "K", "CL", "CO2", "BUN", "CREATININE", "CALCIUM", "MG" in the last 24 hours.      No results for input(s): "WBC", "RBC", "HGB", "HCT", "PLT", "MCV", "MCH", "MCHC" in the last 24 hours.      No results for input(s): "POCTGLUCOSE" in the last 168 hours.     Assessment and Plan:    Lower GI bleed  - CTA abdomen " pelvis showed active bleed and ascending colon, significant stool burden, diverticulosis  - GI consulted while inpatient  - S/p angiogram and coil embolization by IR on 11/16  - Ceftriaxone/Flagyl discontinued 11/17  - follow-up with GI after discharge from SNF  - 12/1 ambulatory referral placed in [facility] for GI hospital follow-up appointment    Acute blood loss anemia  - continue monitor twice weekly CBC, transfuse for hemoglobin < 7  - 11/22 worsening anemia, initiate order to transfuse 1 unit RBC, type and screen obtained, blood consent obtained from patient and phone conversation held with patient's daughter to provide care update regarding transfusion.  Patient's daughter also in agreement to blood transfusion.  -  H&H improved.  Ambulatory referral placed for GI for hospital follow-up    Hemorrhoidal pain  - continue preparation H TID x2 days, continue senna docusate 1 tab BID    Mild late onset Alzheimer's dementia without behavioral disturbance, psychotic disturbance, mood disturbance, or anxiety  - Patient with dementia with likely etiology of alzheimer's dementia. Dementia is mild. The patient does not have signs of behavioral disturbance.   Delirium precautions:  - Avoid antihistamines, anticholinergics, hypnotics, and minimize opiates while controlling for pain as these medications may exacerbate delirium. Cues for day/night will assist with keeping patient calm and oriented - during daytime, please keep adequate light in room (open windows, lights on) and please keep room dim at night-time to encourage normal sleep-wake cycles. Continuing to have nursing and family reorient the patient and encourage family to visit  - stable, continue home memantine 5 mg BID    Advance Care Planning  - completed while inpatient on 11/17, patient/family wishes to be DNR     History of breast cancer  - noted     Mood disorder  - stable, Continue home fluoxetine 20 mg daily     Hypothyroid  - stable, Continue levothyroxine  25 mcg daily     Aortic atherosclerosis  - Hold aspirin in setting of GI bleed   - Continue atorvastatin 10 mg qHS.    Seasonal allergies  - continue cetirizine 10 mg daily, will discontinue patient shows any signs of delirium    GERD  - stable, PRN Tums and Mylanta continue Protonix 40 mg daily    Debility   - Continue with PT/OT for gait training and strengthening and restoration of ADL's   - Encourage mobility, OOB in chair, and early ambulation as appropriate  - Fall precautions   - Monitor for bowel and bladder dysfunction  - Monitor for and prevent skin breakdown and pressure ulcers  - Continue DVT prophylaxis with frequent ambulation, chemical DVT PPX contraindicated due to recent GI bleed       Anticipate disposition:  Home with home health    IP [facility] RISK OF UNPLANNED READMISSION Model: MODERATE     Follow-up needed during SNF stay-    Appointment not to send patient to-Internal Medicine (12/1)    Follow-up needed after discharge from SNF: PCP, GI, needs to be scheduled    Future Appointments   Date Time Provider Department [place]   1/11/2024  9:30 AM LAB, NEMESIO KENH LAB [place]   1/18/2024  3:00 PM Abiola Campbell MD [facility] Gaston Du NP  Department of Hospital Medicine   [facility]     DOS: 12/1/2023        Patient note was created using MModal Dictation.  Any errors in syntax or even information may not have been identified and edited on initial review prior to signing this note.

## 2023-12-01 NOTE — PLAN OF CARE
Patient won appeal with [employer]. Will await for [employer] to set nect D/C date. Anticipated for late next week.

## 2023-12-01 NOTE — PLAN OF CARE
Problem: Adult Inpatient Plan of Care  Goal: Plan of Care Review  Outcome: Ongoing, Progressing  Goal: Patient-Specific Goal (Individualized)  Outcome: Ongoing, Progressing  Goal: Absence of Hospital-Acquired Illness or Injury  Outcome: Ongoing, Progressing  Goal: Optimal Comfort and Wellbeing  Outcome: Ongoing, Progressing  Goal: Readiness for Transition of Care  Outcome: Ongoing, Progressing     Problem: Fall Injury Risk  Goal: Absence of Fall and Fall-Related Injury  Outcome: Ongoing, Progressing  Intervention: Identify and Manage Contributors  Flowsheets (Taken 12/1/2023 0318)  Self-Care Promotion:   BADL personal objects within reach   BADL personal routines maintained   independence encouraged  Medication Review/Management: medications reviewed  Intervention: Promote Injury-Free Environment  Flowsheets (Taken 12/1/2023 0318)  Safety Promotion/Fall Prevention:   assistive device/personal item within reach   medications reviewed   supervised activity   Supervised toileting - stay within arms reach     Problem: Impaired Wound Healing  Goal: Optimal Wound Healing  Outcome: Ongoing, Progressing     Problem: Skin Injury Risk Increased  Goal: Skin Health and Integrity  Outcome: Ongoing, Progressing

## 2023-12-01 NOTE — NURSING
POC reviewed with pt, pt verbalized understanding. Safety maintained throughout shift, bed locked and in lowest position, call light in reach, Side rails up X 2. Non skid sock on when OOB. Pt remained free of fall/trauma. Pt declined pain throughout shift. VS stable.  no reports of nausea and vomiting.   No signs of distress, rise and fall of chest noted, respirations  even and unlabored   Plan of care on going. No future concerns as of present.

## 2023-12-02 PROCEDURE — 97110 THERAPEUTIC EXERCISES: CPT | Mod: HCNC

## 2023-12-02 PROCEDURE — 97530 THERAPEUTIC ACTIVITIES: CPT | Mod: HCNC

## 2023-12-02 PROCEDURE — 97535 SELF CARE MNGMENT TRAINING: CPT | Mod: HCNC

## 2023-12-02 PROCEDURE — 25000003 PHARM REV CODE 250: Mod: HCNC | Performed by: HOSPITALIST

## 2023-12-02 PROCEDURE — 11000004 HC SNF PRIVATE: Mod: HCNC

## 2023-12-02 PROCEDURE — 25000003 PHARM REV CODE 250: Mod: HCNC | Performed by: NURSE PRACTITIONER

## 2023-12-02 RX ADMIN — SENNOSIDES AND DOCUSATE SODIUM 1 TABLET: 8.6; 5 TABLET ORAL at 09:12

## 2023-12-02 RX ADMIN — Medication 1 TABLET: at 09:12

## 2023-12-02 RX ADMIN — Medication 12 MG: at 09:12

## 2023-12-02 RX ADMIN — FERROUS SULFATE TAB 325 MG (65 MG ELEMENTAL FE) 1 EACH: 325 (65 FE) TAB at 09:12

## 2023-12-02 RX ADMIN — OXYCODONE HYDROCHLORIDE AND ACETAMINOPHEN 500 MG: 500 TABLET ORAL at 10:12

## 2023-12-02 RX ADMIN — THERA TABS 1 TABLET: TAB at 09:12

## 2023-12-02 RX ADMIN — CETIRIZINE HYDROCHLORIDE 10 MG: 5 TABLET, FILM COATED ORAL at 09:12

## 2023-12-02 RX ADMIN — ATORVASTATIN CALCIUM 10 MG: 10 TABLET, FILM COATED ORAL at 09:12

## 2023-12-02 RX ADMIN — FLUOXETINE 20 MG: 10 CAPSULE ORAL at 09:12

## 2023-12-02 RX ADMIN — LEVOTHYROXINE SODIUM 25 MCG: 25 TABLET ORAL at 05:12

## 2023-12-02 RX ADMIN — MEMANTINE HYDROCHLORIDE 5 MG: 5 TABLET ORAL at 09:12

## 2023-12-02 RX ADMIN — MEMANTINE HYDROCHLORIDE 5 MG: 5 TABLET ORAL at 10:12

## 2023-12-02 RX ADMIN — PANTOPRAZOLE SODIUM 40 MG: 40 TABLET, DELAYED RELEASE ORAL at 09:12

## 2023-12-02 NOTE — PLAN OF CARE
Problem: Adult Inpatient Plan of Care  Goal: Plan of Care Review  Outcome: Ongoing, Progressing  Goal: Patient-Specific Goal (Individualized)  Outcome: Ongoing, Progressing  Goal: Absence of Hospital-Acquired Illness or Injury  Outcome: Ongoing, Progressing  Goal: Optimal Comfort and Wellbeing  Outcome: Ongoing, Progressing  Goal: Readiness for Transition of Care  Outcome: Ongoing, Progressing     Problem: Fall Injury Risk  Goal: Absence of Fall and Fall-Related Injury  Outcome: Ongoing, Progressing     Problem: Impaired Wound Healing  Goal: Optimal Wound Healing  Outcome: Ongoing, Progressing  Intervention: Promote Wound Healing  Flowsheets (Taken 12/2/2023 0428)  Oral Nutrition Promotion: rest periods promoted  Sleep/Rest Enhancement: awakenings minimized  Activity Management: Rolling - L1  Pain Management Interventions: medication offered     Problem: Skin Injury Risk Increased  Goal: Skin Health and Integrity  Outcome: Ongoing, Progressing  Intervention: Optimize Skin Protection  Flowsheets (Taken 12/2/2023 4134)  Head of Bed (HOB) Positioning: HOB at 30-45 degrees

## 2023-12-02 NOTE — NURSING
POC reviewed with pt, pt verbalized understanding. Safety maintained throughout shift, bed locked and in lowest position, call light in reach, Side rails up X 2. Non skid sock on when OOB. Pt remained free of fall/trauma. Pt declined pain throughout shift. VS stable.  no reports of nausea and vomiting.   Skin intact.  No signs of distress, rise and fall of chest noted, respirations  even and unlabored   Plan of care on going. No future concerns as of present

## 2023-12-02 NOTE — PT/OT/SLP PROGRESS
"Occupational Therapy   Treatment    Name: Olamide Felipe  MRN: [id number]  Admit Date: 11/20/2023  Admitting Diagnosis:  Acute lower GI bleeding    General Precautions: Standard, aspiration, fall, hearing impaired   Orthopedic Precautions: N/A   Braces: N/A    Recommendations:     Discharge Recommendations:  Low Intensity Therapy  Level of Assistance Recommended at Discharge: 24 hours supervision for safety in performing ADL's and home management tasks  Discharge Equipment Recommendations: walker, rolling  Barriers to discharge:  Other (Comment) (increased need for supervision while preforming functional tasks)    Assessment:     Olamide Felipe is a 89 y.o. female with a medical diagnosis of Acute lower GI bleeding. Pt tolerated session well and without incident and shows excellent motivation and potential to improve, but the pt continues to require assistance to perform self-care tasks and mobility. Pt strengths include Following multi-step tasks and Attention to task and PLOF, Family support, and Willingness to participate. Pt improved Toileting, Toilet transfers, Dynamic standing balance, and Standing tolerance. However, pt would continue to benefit from cont'd OT services in the SNF setting to improve safety and independence /c functional tasks and ADLs upon discharge.  Performance deficits affecting function are impaired balance, gait instability, impaired cognition, impaired cardiopulmonary response to activity.     Rehab Potential is excellent    Activity tolerance:  Excellent    Plan:     Patient to be seen 5 x/week to address the above listed problems via self-care/home management, community/work re-entry, therapeutic activities, therapeutic exercises, therapeutic groups    Plan of Care Expires: 12/20/23  Plan of Care Reviewed with: patient    Subjective     Communicated with: YOGESH prior to session.    "You think I am doing good?" .    Pain/Comfort:  Pain Rating 1: 0/10  Pain Rating Post-Intervention " "1: 0/10    Patient's cultural, spiritual, Advent conflicts given the current situation:  no    Objective:     Patient found up in chair with PICC line upon OT entry to room. Pt alert and agreeable to therapy.         Functional Mobility/Transfers:  Patient completed Sit <> Stand Transfer with supervision  with  no assistive device   Patient completed toilet t/f step pivot /c SPV and no AD.  Functional Mobility: Pt able to ambulate >300 feet /c SPV and no AD. Pt tolerated standing throughout session with intermittent rest breaks between activities.     Activities of Daily Living:  Toileting: modified independence seated on toilet    [facility] 6 Click ADL: 18    OT Exercises:     ~Pt asked to perform dynamic standing balance task. Pt asked to dribble ball using BUE. Pt able to dribble ball 3x100 reps with SBA and no AD and short seated rest breaks between sets.     ~Pt performed 1x15 reps of the following exercises with 2 lb dumbbells while standing using BUE.     Chest presses, Shoulder presses, Biceps curls, Shoulder flexion, and Shoulder adduction    ~Pt participated in standing "clothes folding" activity to improve standing tolerance, bimanual integration without UE support for standing, and preformance of IADLs. Pt asked to fold and put hangers in various articles of clothing, towels, and wash rags, and then transport materials to clothes rack and table for hanging and organizing. Pt able to complete task /c SBA, no AD, and no rest breaks.     ~ Pt participated in item retrieval task. Objects of various sizes placed on ground and pt asked to pick them up. Pt able to retrieve all items off of the floor /c SBA and no AD.    Treatment & Education:  Pt educated on POC, role of OT, and safety. Pt performed tasks to improve safety and independence in functional tasks and ADLs as mentioned above.       Patient left up in chair with call button in reach and all needs met    GOALS:   Multidisciplinary Problems       " Occupational Therapy Goals          Problem: Occupational Therapy    Goal Priority Disciplines Outcome Interventions   Occupational Therapy Goal     OT, PT/OT Ongoing, Progressing    Description: Goals to be met by: 12/20/2023     Patient will increase functional independence with ADLs by performing:      UE Dressing with Set-up Assistance- Met  LE Dressing with Supervision- Ongoing  Oral Hygiene while standing at sink with Supervision and Assistive Devices as needed- Met  Personal Hygiene while standing at sink with Supervision and Assistive Devices as needed- Met  Toileting from toilet with Supervision and Assistive Devices as needed for hygiene and clothing management- Met  Bathing from  shower chair/bench with Supervision-Ongoing  Toilet transfer to toilet with Supervision and Assistive Device as needed- Ongoing  Footwear /c Mod I- Met  SC t/f /c SPV and Assistive Device as needed  Tolerate standing functional tasks for ~8 minutes /c SPV and Assistive device as needed- Met                         Time Tracking:     OT Date of Treatment: 12/02/23  OT Start Time: 1407    OT Stop Time: 1445  OT Total Time (min): 38 min    Billable Minutes:Self Care/Home Management 8  Therapeutic Activity 15  Therapeutic Exercise 15    12/2/2023

## 2023-12-02 NOTE — PLAN OF CARE
Problem: Adult Inpatient Plan of Care  Goal: Plan of Care Review  Outcome: Ongoing, Progressing  Flowsheets (Taken 12/2/2023 1115)  Plan of Care Reviewed With: patient     Problem: Adult Inpatient Plan of Care  Goal: Patient-Specific Goal (Individualized)  Flowsheets (Taken 12/2/2023 1115)  Individualized Care Needs: Continue PT/OT daily to regain strength to be able to return home with an optimal level of wellness.  Goal: Absence of Hospital-Acquired Illness or Injury  Intervention: Identify and Manage Fall Risk  Flowsheets (Taken 12/2/2023 1115)  Safety Promotion/Fall Prevention:   assistive device/personal item within reach   Fall Risk reviewed with patient/family   lighting adjusted   in recliner, wheels locked   instructed to call staff for mobility  Intervention: Prevent Skin Injury  Flowsheets (Taken 12/2/2023 1115)  Body Position:   weight shifting   position changed independently  Skin Protection: incontinence pads utilized  Intervention: Prevent and Manage VTE (Venous Thromboembolism) Risk  Flowsheets (Taken 12/2/2023 1115)  VTE Prevention/Management:   bleeding risk assessed   fluids promoted

## 2023-12-02 NOTE — PLAN OF CARE
Problem: Occupational Therapy  Goal: Occupational Therapy Goal  Description: Goals to be met by: 12/20/2023     Patient will increase functional independence with ADLs by performing:      UE Dressing with Set-up Assistance- Met  LE Dressing with Supervision- Ongoing  Oral Hygiene while standing at sink with Supervision and Assistive Devices as needed- Met  Personal Hygiene while standing at sink with Supervision and Assistive Devices as needed- Met  Toileting from toilet with Supervision and Assistive Devices as needed for hygiene and clothing management- Met  Bathing from  shower chair/bench with Supervision-Ongoing  Toilet transfer to toilet with Supervision and Assistive Device as needed- Met  Footwear /c Mod I- Met  SC t/f /c SPV and Assistive Device as needed  Tolerate standing functional tasks for ~8 minutes /c SPV and Assistive device as needed- Met    12/2/2023 1455 by Loren Rasmussen, BELINDA  Outcome: Ongoing, Progressing

## 2023-12-02 NOTE — PLAN OF CARE
Problem: Occupational Therapy  Goal: Occupational Therapy Goal  Description: Goals to be met by: 12/20/2023     Patient will increase functional independence with ADLs by performing:      UE Dressing with Set-up Assistance- Met  LE Dressing with Supervision- Ongoing  Oral Hygiene while standing at sink with Supervision and Assistive Devices as needed- Met  Personal Hygiene while standing at sink with Supervision and Assistive Devices as needed- Met  Toileting from toilet with Supervision and Assistive Devices as needed for hygiene and clothing management- Met  Bathing from  shower chair/bench with Supervision-Ongoing  Toilet transfer to toilet with Supervision and Assistive Device as needed- Ongoing  Footwear /c Mod I- Met  SC t/f /c SPV and Assistive Device as needed  Tolerate standing functional tasks for ~8 minutes /c SPV and Assistive device as needed- Met    Outcome: Ongoing, Progressing

## 2023-12-03 PROCEDURE — 25000003 PHARM REV CODE 250: Mod: HCNC | Performed by: NURSE PRACTITIONER

## 2023-12-03 PROCEDURE — 25000003 PHARM REV CODE 250: Mod: HCNC | Performed by: HOSPITALIST

## 2023-12-03 PROCEDURE — 11000004 HC SNF PRIVATE: Mod: HCNC

## 2023-12-03 RX ADMIN — PANTOPRAZOLE SODIUM 40 MG: 40 TABLET, DELAYED RELEASE ORAL at 08:12

## 2023-12-03 RX ADMIN — Medication 12 MG: at 09:12

## 2023-12-03 RX ADMIN — LEVOTHYROXINE SODIUM 25 MCG: 25 TABLET ORAL at 06:12

## 2023-12-03 RX ADMIN — SENNOSIDES AND DOCUSATE SODIUM 1 TABLET: 8.6; 5 TABLET ORAL at 08:12

## 2023-12-03 RX ADMIN — OXYCODONE HYDROCHLORIDE AND ACETAMINOPHEN 500 MG: 500 TABLET ORAL at 08:12

## 2023-12-03 RX ADMIN — Medication 1 TABLET: at 09:12

## 2023-12-03 RX ADMIN — SENNOSIDES AND DOCUSATE SODIUM 1 TABLET: 8.6; 5 TABLET ORAL at 09:12

## 2023-12-03 RX ADMIN — MEMANTINE HYDROCHLORIDE 5 MG: 5 TABLET ORAL at 08:12

## 2023-12-03 RX ADMIN — Medication 1 TABLET: at 08:12

## 2023-12-03 RX ADMIN — FERROUS SULFATE TAB 325 MG (65 MG ELEMENTAL FE) 1 EACH: 325 (65 FE) TAB at 08:12

## 2023-12-03 RX ADMIN — FLUOXETINE 20 MG: 10 CAPSULE ORAL at 08:12

## 2023-12-03 RX ADMIN — ATORVASTATIN CALCIUM 10 MG: 10 TABLET, FILM COATED ORAL at 09:12

## 2023-12-03 RX ADMIN — FERROUS SULFATE TAB 325 MG (65 MG ELEMENTAL FE) 1 EACH: 325 (65 FE) TAB at 09:12

## 2023-12-03 RX ADMIN — CETIRIZINE HYDROCHLORIDE 10 MG: 5 TABLET, FILM COATED ORAL at 08:12

## 2023-12-03 RX ADMIN — THERA TABS 1 TABLET: TAB at 08:12

## 2023-12-03 RX ADMIN — MEMANTINE HYDROCHLORIDE 5 MG: 5 TABLET ORAL at 09:12

## 2023-12-03 NOTE — PLAN OF CARE
Problem: Adult Inpatient Plan of Care  Goal: Plan of Care Review  Outcome: Ongoing, Progressing  Goal: Patient-Specific Goal (Individualized)  Outcome: Ongoing, Progressing  Goal: Absence of Hospital-Acquired Illness or Injury  Outcome: Ongoing, Progressing  Goal: Optimal Comfort and Wellbeing  Outcome: Ongoing, Progressing  Intervention: Provide Person-Centered Care  Flowsheets (Taken 12/2/2023 2135)  Trust Relationship/Rapport:   care explained   choices provided   questions encouraged   reassurance provided   empathic listening provided

## 2023-12-03 NOTE — PLAN OF CARE
Problem: Adult Inpatient Plan of Care  Goal: Plan of Care Review  Outcome: Ongoing, Progressing  Flowsheets (Taken 12/3/2023 1131)  Plan of Care Reviewed With: patient     Problem: Adult Inpatient Plan of Care  Goal: Patient-Specific Goal (Individualized)  Flowsheets (Taken 12/3/2023 1131)  Individualized Care Needs: Continue PT/OT daily to be able to return back home to an optimal level of wellness  Goal: Absence of Hospital-Acquired Illness or Injury  Intervention: Identify and Manage Fall Risk  Flowsheets (Taken 12/3/2023 1131)  Safety Promotion/Fall Prevention:   assistive device/personal item within reach   Fall Risk reviewed with patient/family   lighting adjusted   in recliner, wheels locked   instructed to call staff for mobility  Intervention: Prevent Skin Injury  Flowsheets (Taken 12/3/2023 1131)  Body Position: position changed independently  Skin Protection: protective footwear used  Intervention: Prevent and Manage VTE (Venous Thromboembolism) Risk  Flowsheets (Taken 12/3/2023 1131)  Activity Management: Up in chair - L3  VTE Prevention/Management: bleeding risk assessed

## 2023-12-04 ENCOUNTER — TELEPHONE (OUTPATIENT)
Dept: INTERNAL MEDICINE | Facility: CLINIC | Age: 88
End: 2023-12-04
Payer: MEDICARE

## 2023-12-04 ENCOUNTER — TELEPHONE (OUTPATIENT)
Dept: GASTROENTEROLOGY | Facility: CLINIC | Age: 88
End: 2023-12-04
Payer: MEDICARE

## 2023-12-04 LAB
ANION GAP SERPL CALC-SCNC: 8 MMOL/L (ref 8–16)
BASOPHILS # BLD AUTO: 0.07 K/UL (ref 0–0.2)
BASOPHILS NFR BLD: 1.4 % (ref 0–1.9)
BUN SERPL-MCNC: 23 MG/DL (ref 8–23)
CALCIUM SERPL-MCNC: 9.4 MG/DL (ref 8.7–10.5)
CHLORIDE SERPL-SCNC: 110 MMOL/L (ref 95–110)
CO2 SERPL-SCNC: 23 MMOL/L (ref 23–29)
CREAT SERPL-MCNC: 0.9 MG/DL (ref 0.5–1.4)
DIFFERENTIAL METHOD: ABNORMAL
EOSINOPHIL # BLD AUTO: 0.4 K/UL (ref 0–0.5)
EOSINOPHIL NFR BLD: 7.1 % (ref 0–8)
ERYTHROCYTE [DISTWIDTH] IN BLOOD BY AUTOMATED COUNT: 14 % (ref 11.5–14.5)
EST. GFR  (NO RACE VARIABLE): >60 ML/MIN/1.73 M^2
GLUCOSE SERPL-MCNC: 77 MG/DL (ref 70–110)
HCT VFR BLD AUTO: 31.2 % (ref 37–48.5)
HGB BLD-MCNC: 10 G/DL (ref 12–16)
IMM GRANULOCYTES # BLD AUTO: 0.01 K/UL (ref 0–0.04)
IMM GRANULOCYTES NFR BLD AUTO: 0.2 % (ref 0–0.5)
LYMPHOCYTES # BLD AUTO: 1.8 K/UL (ref 1–4.8)
LYMPHOCYTES NFR BLD: 35.9 % (ref 18–48)
MAGNESIUM SERPL-MCNC: 1.7 MG/DL (ref 1.6–2.6)
MCH RBC QN AUTO: 31.8 PG (ref 27–31)
MCHC RBC AUTO-ENTMCNC: 32.1 G/DL (ref 32–36)
MCV RBC AUTO: 99 FL (ref 82–98)
MONOCYTES # BLD AUTO: 0.7 K/UL (ref 0.3–1)
MONOCYTES NFR BLD: 13 % (ref 4–15)
NEUTROPHILS # BLD AUTO: 2.2 K/UL (ref 1.8–7.7)
NEUTROPHILS NFR BLD: 42.4 % (ref 38–73)
NRBC BLD-RTO: 0 /100 WBC
PHOSPHATE SERPL-MCNC: 3.1 MG/DL (ref 2.7–4.5)
PLATELET # BLD AUTO: 302 K/UL (ref 150–450)
PMV BLD AUTO: 10.6 FL (ref 9.2–12.9)
POTASSIUM SERPL-SCNC: 4.8 MMOL/L (ref 3.5–5.1)
RBC # BLD AUTO: 3.14 M/UL (ref 4–5.4)
SODIUM SERPL-SCNC: 141 MMOL/L (ref 136–145)
WBC # BLD AUTO: 5.07 K/UL (ref 3.9–12.7)

## 2023-12-04 PROCEDURE — 85025 COMPLETE CBC W/AUTO DIFF WBC: CPT | Mod: HCNC | Performed by: NURSE PRACTITIONER

## 2023-12-04 PROCEDURE — 25000003 PHARM REV CODE 250: Mod: HCNC | Performed by: NURSE PRACTITIONER

## 2023-12-04 PROCEDURE — 25000003 PHARM REV CODE 250: Mod: HCNC | Performed by: HOSPITALIST

## 2023-12-04 PROCEDURE — 97530 THERAPEUTIC ACTIVITIES: CPT | Mod: HCNC,CO

## 2023-12-04 PROCEDURE — 83735 ASSAY OF MAGNESIUM: CPT | Mod: HCNC | Performed by: NURSE PRACTITIONER

## 2023-12-04 PROCEDURE — 84100 ASSAY OF PHOSPHORUS: CPT | Mod: HCNC | Performed by: NURSE PRACTITIONER

## 2023-12-04 PROCEDURE — 36415 COLL VENOUS BLD VENIPUNCTURE: CPT | Mod: HCNC | Performed by: NURSE PRACTITIONER

## 2023-12-04 PROCEDURE — 11000004 HC SNF PRIVATE: Mod: HCNC

## 2023-12-04 PROCEDURE — 80048 BASIC METABOLIC PNL TOTAL CA: CPT | Mod: HCNC | Performed by: NURSE PRACTITIONER

## 2023-12-04 RX ADMIN — THERA TABS 1 TABLET: TAB at 08:12

## 2023-12-04 RX ADMIN — Medication 1 TABLET: at 09:12

## 2023-12-04 RX ADMIN — CETIRIZINE HYDROCHLORIDE 10 MG: 5 TABLET, FILM COATED ORAL at 08:12

## 2023-12-04 RX ADMIN — FLUOXETINE 20 MG: 10 CAPSULE ORAL at 08:12

## 2023-12-04 RX ADMIN — ATORVASTATIN CALCIUM 10 MG: 10 TABLET, FILM COATED ORAL at 09:12

## 2023-12-04 RX ADMIN — FERROUS SULFATE TAB 325 MG (65 MG ELEMENTAL FE) 1 EACH: 325 (65 FE) TAB at 08:12

## 2023-12-04 RX ADMIN — MEMANTINE HYDROCHLORIDE 5 MG: 5 TABLET ORAL at 09:12

## 2023-12-04 RX ADMIN — Medication 12 MG: at 09:12

## 2023-12-04 RX ADMIN — FERROUS SULFATE TAB 325 MG (65 MG ELEMENTAL FE) 1 EACH: 325 (65 FE) TAB at 09:12

## 2023-12-04 RX ADMIN — Medication 1 TABLET: at 08:12

## 2023-12-04 RX ADMIN — MEMANTINE HYDROCHLORIDE 5 MG: 5 TABLET ORAL at 08:12

## 2023-12-04 RX ADMIN — SENNOSIDES AND DOCUSATE SODIUM 1 TABLET: 8.6; 5 TABLET ORAL at 08:12

## 2023-12-04 RX ADMIN — SENNOSIDES AND DOCUSATE SODIUM 1 TABLET: 8.6; 5 TABLET ORAL at 09:12

## 2023-12-04 RX ADMIN — LEVOTHYROXINE SODIUM 25 MCG: 25 TABLET ORAL at 06:12

## 2023-12-04 RX ADMIN — PANTOPRAZOLE SODIUM 40 MG: 40 TABLET, DELAYED RELEASE ORAL at 08:12

## 2023-12-04 RX ADMIN — OXYCODONE HYDROCHLORIDE AND ACETAMINOPHEN 500 MG: 500 TABLET ORAL at 08:12

## 2023-12-04 NOTE — PT/OT/SLP PROGRESS
Occupational Therapy   Treatment    Name: Olamide Felipe  MRN: [id number]  Admit Date: 11/20/2023  Admitting Diagnosis:  Acute lower GI bleeding    General Precautions: Standard, aspiration, fall, hearing impaired   Orthopedic Precautions: N/A   Braces: N/A    Recommendations:     Discharge Recommendations:  Low Intensity Therapy  Level of Assistance Recommended at Discharge: 24 hours supervision for safety in performing ADL's and home management tasks  Discharge Equipment Recommendations: walker, rolling  Barriers to discharge:  Other (Comment) (increased need for supervision while preforming functional tasks)    Assessment:     Olamide Felipe is a 89 y.o. female with a medical diagnosis of Acute lower GI bleeding.  She presents with  Performance deficits affecting function are impaired balance, gait instability, impaired cognition, impaired cardiopulmonary response to activity.     Pt. Was cooperative and participated well with session on this day. Pt  continues to demonstrate levels of physical deficits with  functional indep with daily management activities tasks, selfcare skills with balance,  functional mobility, UB strength and endurance.     Pt. Will continue to benefit from continued OT to progress towards goals    Rehab Potential is good    Activity tolerance:  Good    Plan:     Patient to be seen 5 x/week to address the above listed problems via self-care/home management, community/work re-entry, therapeutic activities, therapeutic exercises, therapeutic groups    Plan of Care Expires: 12/20/23  Plan of Care Reviewed with: patient    Subjective     Communicated with: yuliana prior to session.  Boy this feels great to go outside for a little walk .    Pain/Comfort:  Pain Rating 1: 0/10  Pain Rating Post-Intervention 1: 0/10    Patient's cultural, spiritual, Taoist conflicts given the current situation:  no    Objective:     Patient found up in chair with PICC line upon OT entry to room.    Functional  Mobility/Transfers:  Patient completed Sit <> Stand Transfer with supervision  with  no assistive device   Functional Mobility: Pt. With fxl mobility with no AD and (close SBA with unleveled ground and terrain out side approx 375 ft    Activities of Daily Living:  Not tested Pt. Already dressed    [facility] 6 Click ADL: 18    OT Exercises: UE Ergometer 10 min    Treatment & Education:  Pt. With dynamic sanding activity on this day x 3:49 min/sec pt. With tap the ball with 3rd person for safety and endurance asepcrts and tolerance and balance.      Pt. With therex performed to increase ROM, endurance selfcare task and fxl mobility for independence     Pt edu on role of OT, POC, safety when performing self care tasks , benefit of performing OOB activity, and safety when performing functional transfers and mobility management for preparation with goals to progress towards next level of care     Patient left up in chair with all lines intact and call button in reach    GOALS:   Multidisciplinary Problems       Occupational Therapy Goals          Problem: Occupational Therapy    Goal Priority Disciplines Outcome Interventions   Occupational Therapy Goal     OT, PT/OT Ongoing, Progressing    Description: Goals to be met by: 12/20/2023     Patient will increase functional independence with ADLs by performing:      UE Dressing with Set-up Assistance- Met  LE Dressing with Supervision- Ongoing  Oral Hygiene while standing at sink with Supervision and Assistive Devices as needed- Met  Personal Hygiene while standing at sink with Supervision and Assistive Devices as needed- Met  Toileting from toilet with Supervision and Assistive Devices as needed for hygiene and clothing management- Met  Bathing from  shower chair/bench with Supervision-Ongoing  Toilet transfer to toilet with Supervision and Assistive Device as needed- Met  Footwear /c Mod I- Met  SC t/f /c SPV and Assistive Device as needed  Tolerate standing functional tasks  for ~8 minutes /c SPV and Assistive device as needed- Met                         Time Tracking:     OT Date of Treatment: 12/04/23  OT Start Time: 1011    OT Stop Time: 1044  OT Total Time (min): 33 min    Billable Minutes:Therapeutic Activity 33    12/4/2023

## 2023-12-04 NOTE — TELEPHONE ENCOUNTER
The patient was called. No answer, message left on voicemail to return call regarding hospital follow up.

## 2023-12-04 NOTE — TELEPHONE ENCOUNTER
----- Message from Jamal Tucker sent at 12/4/2023 12:46 PM CST -----  Contact: pt  [phone]  Per pt  henri contact pt for hosp f/u discharge today.    Please call and advise

## 2023-12-04 NOTE — TELEPHONE ENCOUNTER
Alrette with [facility] requesting to schedule hospital follow up.  Appt scheduled on 12/13/21 at 1045am.  Arlette repeated date and time correctly.

## 2023-12-04 NOTE — CARE UPDATE
Discharge Planning      Pt was confused, thought she was going home today. Spoke to Dtr. Christy Garcia will  her mom to go to her apartment to discharge on Friday, 12/8 @ 11 am via personal car.      DME:  NONE    HHA: [facility] will begin date of service on 12.12, Tuesday.    Pt and daughter now understand today is NOT discharge day but Friday is.

## 2023-12-04 NOTE — TELEPHONE ENCOUNTER
----- Message from Vero Mckeon sent at 12/4/2023 12:30 PM CST -----  [facility] with someone in provider office regarding the patient needs a hospital follow up appt she is being discharge on 12/08 and they wanted to get her scheduled she is asking for a return call please call unit at  [phone]

## 2023-12-04 NOTE — PLAN OF CARE
Problem: Fall Injury Risk  Goal: Absence of Fall and Fall-Related Injury  Outcome: Ongoing, Progressing  Intervention: Identify and Manage Contributors  Flowsheets (Taken 12/3/2023 2118)  Self-Care Promotion:   safe use of adaptive equipment encouraged   independence encouraged  Medication Review/Management:   medications reviewed   high-risk medications identified  Intervention: Promote Injury-Free Environment  Flowsheets (Taken 12/3/2023 2118)  Safety Promotion/Fall Prevention:   assistive device/personal item within reach   Fall Risk reviewed with patient/family   lighting adjusted   side rails raised x 2   instructed to call staff for mobility   nonskid shoes/socks when out of bed

## 2023-12-04 NOTE — PROGRESS NOTES
[facility]                                  Skilled Nursing Facility                   Progress Note     Admit Date: 11/20/2023  TAMMIE 12/8/2023  [id number]  Principal Problem:  Acute lower GI bleeding   HPI obtained from patient interview and chart review     Chief Complaint:Re-evaluation of medical treatment and therapy status: Lab review    HPI:   Mrs. Felipe is a 89-year-old female with PMHx of mild dementia, breast cancer, HTN, hypothyroidism, recurrent falls, HLD who presents to SNF following hospitalization for acute lower GI bleed.  Admission to SNF for secondary weakness and debility.    Interval history:   All of today's labs reviewed and are listed below.  24 hr vital sign ranges reviewed and listed below.  Patient denies shortness of breath, abdominal discomfort, nausea, or vomiting.  Patient reports an adequate appetite.  Patient denies dysuria.  Patient reports having regular bowel movements.  Patient progressing with OT- Functional Mobility: Pt able to ambulate >300 feet /c SPV and no AD. Pt tolerated standing throughout session with intermittent rest breaks between activities. Continuing to follow and treat all acute and chronic conditions.    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    Past Surgical History: Patient has a past surgical history that includes dentures; left wrist surgery; Hysterectomy; Fracture surgery (Left); Mastectomy, partial (Right, 1/7/2022); [place] lymph node biopsy (Right, 1/7/2022); and Injection for sentinel node identification (Right, 1/7/2022).    Social History: Patient reports that she quit smoking about 34 years ago. Her smoking use included cigarettes. She has never been exposed to tobacco  smoke. She has never used smokeless tobacco. She reports current alcohol use of about 2.0 standard drinks of alcohol per week. She reports that she does not use drugs.    Family History: family history includes No Known Problems in her daughter, sister, and son.    Allergies: Patient has No Known Allergies.    ROS  Constitutional: Negative for fever   Eyes: Negative for blurred vision, double vision   Respiratory: Negative for cough, shortness of breath   Cardiovascular: Negative for chest pain, palpitations, and leg swelling.   Gastrointestinal: Negative for abdominal pain, diarrhea, nausea, vomiting.  +indigestion, constipation  Genitourinary: Negative for dysuria, frequency   Musculoskeletal:  + generalized weakness. Negative for back pain and myalgias.   Skin: Negative for itching and rash.   Neurological: Negative for dizziness, headaches.   Psychiatric/Behavioral: Negative for depression. The patient is not nervous/anxious.      24 hour Vital Sign Range   Temp:  [98.3 °F (36.8 °C)-98.5 °F (36.9 °C)]   Pulse:  [76-77]   Resp:  [18]   BP: (110-140)/(56-69)   SpO2:  [98 %]     Current BMI: Body mass index is 20.66 kg/m².    PEx  Constitutional: Patient appears debilitated.  No distress noted  HENT:   Head: Normocephalic and atraumatic.   Eyes: Pupils are equal, round  Neck: Normal range of motion. Neck supple.   Cardiovascular: Normal rate, regular rhythm and normal heart sounds.    Pulmonary/Chest: Effort normal and breath sounds are clear  Abdominal: Soft. Bowel sounds are normal.   Musculoskeletal: Normal range of motion.   Neurological: Alert and oriented to person, place.  Disoriented to time.  Impaired higher level thinking.  Psychiatric: Normal mood and affect. Behavior is normal.   Skin: Skin is warm and dry.     Recent Labs   Lab 12/04/23  0338      K 4.8      CO2 23   BUN 23   CREATININE 0.9   CALCIUM 9.4   MG 1.7         Recent Labs   Lab 12/04/23 0338   WBC 5.07   RBC 3.14*   HGB 10.0*  "  HCT 31.2*      MCV 99*   MCH 31.8*   MCHC 32.1         No results for input(s): "POCTGLUCOSE" in the last 168 hours.     Assessment and Plan:    Lower GI bleed  - CTA abdomen pelvis showed active bleed and ascending colon, significant stool burden, diverticulosis  - GI consulted while inpatient  - S/p angiogram and coil embolization by IR on 11/16  - Ceftriaxone/Flagyl discontinued 11/17  - follow-up with GI after discharge from SNF  - 12/4 ambulatory referral placed in [facility] for GI hospital follow-up appointment- message sent to  to set up for appointment    Acute blood loss anemia  - continue monitor twice weekly CBC, transfuse for hemoglobin < 7  - 11/22 worsening anemia, initiate order to transfuse 1 unit RBC, type and screen obtained, blood consent obtained from patient and phone conversation held with patient's daughter to provide care update regarding transfusion.  Patient's daughter also in agreement to blood transfusion.  -  H&H improved.  Ambulatory referral placed for GI for hospital follow-up    Hemorrhoidal pain  - continue preparation H TID x2 days, continue senna docusate 1 tab BID    Mild late onset Alzheimer's dementia without behavioral disturbance, psychotic disturbance, mood disturbance, or anxiety  - Patient with dementia with likely etiology of alzheimer's dementia. Dementia is mild. The patient does not have signs of behavioral disturbance.   Delirium precautions:  - Avoid antihistamines, anticholinergics, hypnotics, and minimize opiates while controlling for pain as these medications may exacerbate delirium. Cues for day/night will assist with keeping patient calm and oriented - during daytime, please keep adequate light in room (open windows, lights on) and please keep room dim at night-time to encourage normal sleep-wake cycles. Continuing to have nursing and family reorient the patient and encourage family to visit  - stable, continue home memantine 5 mg BID    Advance " Care Planning  - completed while inpatient on 11/17, patient/family wishes to be DNR     History of breast cancer  - noted     Mood disorder  - stable, Continue home fluoxetine 20 mg daily     Hypothyroid  - stable, Continue levothyroxine 25 mcg daily     Aortic atherosclerosis  - Hold aspirin in setting of GI bleed   - Continue atorvastatin 10 mg qHS.    Seasonal allergies  - continue cetirizine 10 mg daily, will discontinue patient shows any signs of delirium    GERD  - stable, PRN Tums and Mylanta continue Protonix 40 mg daily    Debility   - Continue with OT for gait training and strengthening and restoration of ADL's - discharged from PT services  - Encourage mobility, OOB in chair, and early ambulation as appropriate  - Fall precautions   - Monitor for bowel and bladder dysfunction  - Monitor for and prevent skin breakdown and pressure ulcers  - Continue DVT prophylaxis with frequent ambulation, chemical DVT PPX contraindicated due to recent GI bleed       Anticipate disposition:  Home with home health    IP [facility] RISK OF UNPLANNED READMISSION Model: MODERATE     Follow-up needed during SNF stay-    Appointment not to send patient to-Internal Medicine (12/1)    Follow-up needed after discharge from SNF: PCP, GI, needs to be scheduled    Future Appointments   Date Time Provider Department Center   1/11/2024  9:30 AM LAB, [facility] [place]   1/18/2024  3:00 PM Abiola Campbell MD [facility] Gaston Du NP  Department of Hospital Medicine   [facility]     DOS: 12/4/2023        Patient note was created using MModal Dictation.  Any errors in syntax or even information may not have been identified and edited on initial review prior to signing this note.

## 2023-12-05 PROCEDURE — 11000004 HC SNF PRIVATE: Mod: HCNC

## 2023-12-05 PROCEDURE — 25000003 PHARM REV CODE 250: Mod: HCNC | Performed by: NURSE PRACTITIONER

## 2023-12-05 PROCEDURE — 94761 N-INVAS EAR/PLS OXIMETRY MLT: CPT | Mod: HCNC

## 2023-12-05 PROCEDURE — 25000003 PHARM REV CODE 250: Mod: HCNC | Performed by: HOSPITALIST

## 2023-12-05 PROCEDURE — 97530 THERAPEUTIC ACTIVITIES: CPT | Mod: HCNC,CO

## 2023-12-05 RX ADMIN — FERROUS SULFATE TAB 325 MG (65 MG ELEMENTAL FE) 1 EACH: 325 (65 FE) TAB at 09:12

## 2023-12-05 RX ADMIN — Medication 1 TABLET: at 08:12

## 2023-12-05 RX ADMIN — LEVOTHYROXINE SODIUM 25 MCG: 25 TABLET ORAL at 06:12

## 2023-12-05 RX ADMIN — SENNOSIDES AND DOCUSATE SODIUM 1 TABLET: 8.6; 5 TABLET ORAL at 08:12

## 2023-12-05 RX ADMIN — SENNOSIDES AND DOCUSATE SODIUM 1 TABLET: 8.6; 5 TABLET ORAL at 09:12

## 2023-12-05 RX ADMIN — ATORVASTATIN CALCIUM 10 MG: 10 TABLET, FILM COATED ORAL at 09:12

## 2023-12-05 RX ADMIN — FERROUS SULFATE TAB 325 MG (65 MG ELEMENTAL FE) 1 EACH: 325 (65 FE) TAB at 08:12

## 2023-12-05 RX ADMIN — CETIRIZINE HYDROCHLORIDE 10 MG: 5 TABLET, FILM COATED ORAL at 08:12

## 2023-12-05 RX ADMIN — Medication 1 TABLET: at 09:12

## 2023-12-05 RX ADMIN — Medication 12 MG: at 09:12

## 2023-12-05 RX ADMIN — THERA TABS 1 TABLET: TAB at 08:12

## 2023-12-05 RX ADMIN — PANTOPRAZOLE SODIUM 40 MG: 40 TABLET, DELAYED RELEASE ORAL at 08:12

## 2023-12-05 RX ADMIN — MEMANTINE HYDROCHLORIDE 5 MG: 5 TABLET ORAL at 08:12

## 2023-12-05 RX ADMIN — FLUOXETINE 20 MG: 10 CAPSULE ORAL at 08:12

## 2023-12-05 RX ADMIN — MEMANTINE HYDROCHLORIDE 5 MG: 5 TABLET ORAL at 09:12

## 2023-12-05 RX ADMIN — OXYCODONE HYDROCHLORIDE AND ACETAMINOPHEN 500 MG: 500 TABLET ORAL at 09:12

## 2023-12-05 NOTE — PT/OT/SLP PROGRESS
Occupational Therapy   Treatment    Name: Olamide Felipe  MRN: [id number]  Admit Date: 11/20/2023  Admitting Diagnosis:  Acute lower GI bleeding    General Precautions: Standard, aspiration, fall, hearing impaired   Orthopedic Precautions: N/A   Braces: N/A    Recommendations:     Discharge Recommendations:  Low Intensity Therapy  Level of Assistance Recommended at Discharge: 24 hours supervision for safety in performing ADL's and home management tasks  Discharge Equipment Recommendations: walker, rolling  Barriers to discharge:  Other (Comment) (increased need for supervision while preforming functional tasks)    Assessment:     Olamide Felipe is a 89 y.o. female with a medical diagnosis of Acute lower GI bleeding.  She presents with  Performance deficits affecting function are impaired balance, gait instability, impaired cognition, impaired cardiopulmonary response to activity.     Pt. Was cooperative and participated well with session on this day. Pt  continues to demonstrate levels of physical deficits with  functional indep with daily management activities tasks, selfcare skills with balance,  functional mobility, UB strength and endurance.     Pt. Will continue to benefit from continued OT to progress towards goals    Rehab Potential is good    Activity tolerance:  Good    Plan:     Patient to be seen 5 x/week to address the above listed problems via self-care/home management, community/work re-entry, therapeutic activities, therapeutic exercises, therapeutic groups    Plan of Care Expires: 12/20/23  Plan of Care Reviewed with: patient    Subjective     Communicated with: yuliana prior to session.  I can;t draw my twin sister is the artist    Pain/Comfort:  Pain Rating 1: 0/10  Pain Rating Post-Intervention 1: 0/10    Patient's cultural, spiritual, Denominational conflicts given the current situation:  no    Objective:     Patient found up in chair with PICC line upon OT entry to room.    Functional  Mobility/Transfers:  Patient completed Sit <> Stand Transfer with supervision  with  no assistive device   Functional Mobility: Pt. With fxl mobility with no AD and close SBA at times Pt. Walked approx 250ft    Activities of Daily Living:  Not tested Pt. Already dressed    [facility] 6 Click ADL: 18    OT Exercises: UE Ergometer 10 min    Treatment & Education:  Pt. With dynamic sanding activity on this day x 18:09 min/sec pt. With tap the ball with 3rd person for safety, also ball dribble x 100 reps and stationary mini golf inside and endurance aspects and tolerance and balance.      Pt. With therex performed to increase ROM, endurance selfcare task and fxl mobility for independence     Pt edu on role of OT, POC, safety when performing self care tasks , benefit of performing OOB activity, and safety when performing functional transfers and mobility management for preparation with goals to progress towards next level of care     Patient left up in chair with    present    GOALS:   Multidisciplinary Problems       Occupational Therapy Goals          Problem: Occupational Therapy    Goal Priority Disciplines Outcome Interventions   Occupational Therapy Goal     OT, PT/OT Ongoing, Progressing    Description: Goals to be met by: 12/20/2023     Patient will increase functional independence with ADLs by performing:      UE Dressing with Set-up Assistance- Met  LE Dressing with Supervision- Ongoing  Oral Hygiene while standing at sink with Supervision and Assistive Devices as needed- Met  Personal Hygiene while standing at sink with Supervision and Assistive Devices as needed- Met  Toileting from toilet with Supervision and Assistive Devices as needed for hygiene and clothing management- Met  Bathing from  shower chair/bench with Supervision-Ongoing  Toilet transfer to toilet with Supervision and Assistive Device as needed- Met  Footwear /c Mod I- Met  SC t/f /c SPV and Assistive Device as needed  Tolerate  standing functional tasks for ~8 minutes /c SPV and Assistive device as needed- Met                         Time Tracking:     OT Date of Treatment: 12/05/23  OT Start Time: 1346    OT Stop Time: 1430  OT Total Time (min): 44 min    Billable Minutes:Therapeutic Activity 44    12/5/2023

## 2023-12-05 NOTE — PROGRESS NOTES
[facility]                                  Skilled Nursing Facility                   Progress Note     Admit Date: 11/20/2023  TAMMIE 12/8/2023  [id number]  Principal Problem:  Acute lower GI bleeding   HPI obtained from patient interview and chart review     Chief Complaint:Re-evaluation of medical treatment and therapy status    HPI:   Mrs. Felipe is a 89-year-old female with PMHx of mild dementia, breast cancer, HTN, hypothyroidism, recurrent falls, HLD who presents to SNF following hospitalization for acute lower GI bleed.  Admission to SNF for secondary weakness and debility.    Interval history:   24 hr vital sign ranges reviewed and listed below.  Patient denies shortness of breath, abdominal discomfort, nausea, or vomiting.  Patient reports an adequate appetite.  Patient denies dysuria.  Patient reports having regular bowel movements.  Patient progressing with OT- Functional Mobility: Pt able to ambulate >300 feet /c SPV and no AD. Pt tolerated standing throughout session with intermittent rest breaks between activities. Continuing to follow and treat all acute and chronic conditions.    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    Past Surgical History: Patient has a past surgical history that includes dentures; left wrist surgery; Hysterectomy; Fracture surgery (Left); Mastectomy, partial (Right, 1/7/2022); [place] lymph node biopsy (Right, 1/7/2022); and Injection for sentinel node identification (Right, 1/7/2022).    Social History: Patient reports that she quit smoking about 34 years ago. Her smoking use included cigarettes. She has never been exposed to tobacco smoke. She has never used smokeless tobacco. She reports current  "alcohol use of about 2.0 standard drinks of alcohol per week. She reports that she does not use drugs.    Family History: family history includes No Known Problems in her daughter, sister, and son.    Allergies: Patient has No Known Allergies.    ROS  Constitutional: Negative for fever   Eyes: Negative for blurred vision, double vision   Respiratory: Negative for cough, shortness of breath   Cardiovascular: Negative for chest pain, palpitations, and leg swelling.   Gastrointestinal: Negative for abdominal pain, diarrhea, nausea, vomiting.  +indigestion, constipation  Genitourinary: Negative for dysuria, frequency   Musculoskeletal:  + generalized weakness. Negative for back pain and myalgias.   Skin: Negative for itching and rash.   Neurological: Negative for dizziness, headaches.   Psychiatric/Behavioral: Negative for depression. The patient is not nervous/anxious.      24 hour Vital Sign Range   Temp:  [97.7 °F (36.5 °C)-98.1 °F (36.7 °C)]   Pulse:  [79-96]   Resp:  [18]   BP: (136)/(61-62)   SpO2:  [95 %-97 %]     Current BMI: Body mass index is 20.66 kg/m².    PEx  Constitutional: Patient appears debilitated.  No distress noted  HENT:   Head: Normocephalic and atraumatic.   Eyes: Pupils are equal, round  Neck: Normal range of motion. Neck supple.   Cardiovascular: Normal rate, regular rhythm and normal heart sounds.    Pulmonary/Chest: Effort normal and breath sounds are clear  Abdominal: Soft. Bowel sounds are normal.   Musculoskeletal: Normal range of motion.   Neurological: Alert and oriented to person, place.  Disoriented to time.  Impaired higher level thinking.  Psychiatric: Normal mood and affect. Behavior is normal.   Skin: Skin is warm and dry.     No results for input(s): "GLUCOSE", "NA", "K", "CL", "CO2", "BUN", "CREATININE", "CALCIUM", "MG" in the last 24 hours.        No results for input(s): "WBC", "RBC", "HGB", "HCT", "PLT", "MCV", "MCH", "MCHC" in the last 24 hours.        No results for " "input(s): "POCTGLUCOSE" in the last 168 hours.     Assessment and Plan:    Lower GI bleed  - CTA abdomen pelvis showed active bleed and ascending colon, significant stool burden, diverticulosis  - GI consulted while inpatient  - S/p angiogram and coil embolization by IR on 11/16  - Ceftriaxone/Flagyl discontinued 11/17  - follow-up with GI after discharge from SNF  - 12/4 ambulatory referral placed in [facility] for GI hospital follow-up appointment- message sent to  to set up for appointment    Acute blood loss anemia  - continue monitor twice weekly CBC, transfuse for hemoglobin < 7  - 11/22 worsening anemia, initiate order to transfuse 1 unit RBC, type and screen obtained, blood consent obtained from patient and phone conversation held with patient's daughter to provide care update regarding transfusion.  Patient's daughter also in agreement to blood transfusion.  -  H&H improved.  Ambulatory referral placed for GI for hospital follow-up    Hemorrhoidal pain  - continue preparation H TID x2 days, continue senna docusate 1 tab BID    Mild late onset Alzheimer's dementia without behavioral disturbance, psychotic disturbance, mood disturbance, or anxiety  - Patient with dementia with likely etiology of alzheimer's dementia. Dementia is mild. The patient does not have signs of behavioral disturbance.   Delirium precautions:  - Avoid antihistamines, anticholinergics, hypnotics, and minimize opiates while controlling for pain as these medications may exacerbate delirium. Cues for day/night will assist with keeping patient calm and oriented - during daytime, please keep adequate light in room (open windows, lights on) and please keep room dim at night-time to encourage normal sleep-wake cycles. Continuing to have nursing and family reorient the patient and encourage family to visit  - stable, continue home memantine 5 mg BID    Advance Care Planning  - completed while inpatient on 11/17, patient/family wishes to be " DNR     History of breast cancer  - noted     Mood disorder  - stable, Continue home fluoxetine 20 mg daily     Hypothyroid  - stable, Continue levothyroxine 25 mcg daily     Aortic atherosclerosis  - Hold aspirin in setting of GI bleed   - Continue atorvastatin 10 mg qHS.    Seasonal allergies  - continue cetirizine 10 mg daily, will discontinue patient shows any signs of delirium    GERD  - stable, PRN Tums and Mylanta continue Protonix 40 mg daily    Debility   - Continue with OT for gait training and strengthening and restoration of ADL's - discharged from PT services  - Encourage mobility, OOB in chair, and early ambulation as appropriate  - Fall precautions   - Monitor for bowel and bladder dysfunction  - Monitor for and prevent skin breakdown and pressure ulcers  - Continue DVT prophylaxis with frequent ambulation, chemical DVT PPX contraindicated due to recent GI bleed       Anticipate disposition:  Home with home health    IP [facility] RISK OF UNPLANNED READMISSION Model: MODERATE     Follow-up needed during SNF stay-    Appointment not to send patient to-Internal Medicine (12/1)    Follow-up needed after discharge from SNF: PCP, GI, needs to be scheduled    Future Appointments   Date Time Provider Department Center   12/13/2023 10:30 AM Jamshid Trujillo MD [facility]   12/14/2023 10:00 AM Soo Elias NP [facility]   1/11/2024  9:30 AM LAB, [facility] [place]   1/18/2024  3:00 PM Abiola Campbell MD [facility]         Renay Du NP  Department of Hospital Medicine   [facility]- Skilled Nursing Facility     DOS: 12/5/2023        Patient note was created using MModal Dictation.  Any errors in syntax or even information may not have been identified and edited on initial review prior to signing this note.

## 2023-12-05 NOTE — PLAN OF CARE
Problem: Adult Inpatient Plan of Care  Goal: Plan of Care Review  Outcome: Ongoing, Progressing  Goal: Patient-Specific Goal (Individualized)  Outcome: Ongoing, Progressing  Goal: Absence of Hospital-Acquired Illness or Injury  Outcome: Ongoing, Progressing  Intervention: Prevent Infection  Flowsheets (Taken 12/4/2023 2129)  Infection Prevention:   single patient room provided   rest/sleep promoted   hand hygiene promoted  Goal: Optimal Comfort and Wellbeing  Outcome: Ongoing, Progressing  Goal: Readiness for Transition of Care  Outcome: Ongoing, Progressing

## 2023-12-06 PROCEDURE — 11000004 HC SNF PRIVATE: Mod: HCNC

## 2023-12-06 PROCEDURE — 97530 THERAPEUTIC ACTIVITIES: CPT | Mod: HCNC,CO

## 2023-12-06 PROCEDURE — 25000003 PHARM REV CODE 250: Mod: HCNC | Performed by: HOSPITALIST

## 2023-12-06 PROCEDURE — 25000003 PHARM REV CODE 250: Mod: HCNC | Performed by: NURSE PRACTITIONER

## 2023-12-06 RX ORDER — POLYETHYLENE GLYCOL 3350 17 G/17G
17 POWDER, FOR SOLUTION ORAL ONCE
Status: DISCONTINUED | OUTPATIENT
Start: 2023-12-06 | End: 2023-12-08 | Stop reason: HOSPADM

## 2023-12-06 RX ADMIN — MEMANTINE HYDROCHLORIDE 5 MG: 5 TABLET ORAL at 09:12

## 2023-12-06 RX ADMIN — PANTOPRAZOLE SODIUM 40 MG: 40 TABLET, DELAYED RELEASE ORAL at 09:12

## 2023-12-06 RX ADMIN — CETIRIZINE HYDROCHLORIDE 10 MG: 5 TABLET, FILM COATED ORAL at 09:12

## 2023-12-06 RX ADMIN — Medication 12 MG: at 08:12

## 2023-12-06 RX ADMIN — THERA TABS 1 TABLET: TAB at 09:12

## 2023-12-06 RX ADMIN — OXYCODONE HYDROCHLORIDE AND ACETAMINOPHEN 500 MG: 500 TABLET ORAL at 09:12

## 2023-12-06 RX ADMIN — FERROUS SULFATE TAB 325 MG (65 MG ELEMENTAL FE) 1 EACH: 325 (65 FE) TAB at 08:12

## 2023-12-06 RX ADMIN — SENNOSIDES AND DOCUSATE SODIUM 1 TABLET: 8.6; 5 TABLET ORAL at 09:12

## 2023-12-06 RX ADMIN — ATORVASTATIN CALCIUM 10 MG: 10 TABLET, FILM COATED ORAL at 08:12

## 2023-12-06 RX ADMIN — Medication 1 TABLET: at 09:12

## 2023-12-06 RX ADMIN — LEVOTHYROXINE SODIUM 25 MCG: 25 TABLET ORAL at 06:12

## 2023-12-06 RX ADMIN — SENNOSIDES AND DOCUSATE SODIUM 1 TABLET: 8.6; 5 TABLET ORAL at 08:12

## 2023-12-06 RX ADMIN — MEMANTINE HYDROCHLORIDE 5 MG: 5 TABLET ORAL at 08:12

## 2023-12-06 RX ADMIN — FERROUS SULFATE TAB 325 MG (65 MG ELEMENTAL FE) 1 EACH: 325 (65 FE) TAB at 09:12

## 2023-12-06 RX ADMIN — Medication 1 TABLET: at 08:12

## 2023-12-06 RX ADMIN — FLUOXETINE 20 MG: 10 CAPSULE ORAL at 09:12

## 2023-12-06 NOTE — PROGRESS NOTES
[facility]                                  Skilled Nursing Facility                   Progress Note     Admit Date: 11/20/2023  TAMMIE 12/8/2023  [id number] A  Principal Problem:  Acute lower GI bleeding   HPI obtained from patient interview and chart review     Chief Complaint:Re-evaluation of medical treatment and therapy status    HPI:   Mrs. Felipe is a 89-year-old female with PMHx of mild dementia, breast cancer, HTN, hypothyroidism, recurrent falls, HLD who presents to SNF following hospitalization for acute lower GI bleed.  Admission to SNF for secondary weakness and debility.    Interval history:   24 hr vital sign ranges reviewed and listed below.  Patient denies shortness of breath, abdominal discomfort, nausea, or vomiting.  Patient reports an adequate appetite.  Patient denies dysuria.  Last bowel movement was on 12/03.  Patient progressing with OT- FFunctional Mobility: Pt. With fxl mobility with no AD and close SBA at times Pt. Walked approx 250ft.  Continuing to follow and treat all acute and chronic conditions.  Cm confirmed with daughter that she is comfortable with current discharge plan of Friday and returning to her independent living apartment.    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    Past Surgical History: Patient has a past surgical history that includes dentures; left wrist surgery; Hysterectomy; Fracture surgery (Left); Mastectomy, partial (Right, 1/7/2022); [place] lymph node biopsy (Right, 1/7/2022); and Injection for sentinel node identification (Right, 1/7/2022).    Social History: Patient reports that she quit smoking about 34 years ago. Her smoking use included cigarettes. She has never been exposed to  "tobacco smoke. She has never used smokeless tobacco. She reports current alcohol use of about 2.0 standard drinks of alcohol per week. She reports that she does not use drugs.    Family History: family history includes No Known Problems in her daughter, sister, and son.    Allergies: Patient has No Known Allergies.    ROS  Constitutional: Negative for fever   Eyes: Negative for blurred vision, double vision   Respiratory: Negative for cough, shortness of breath   Cardiovascular: Negative for chest pain, palpitations, and leg swelling.   Gastrointestinal: Negative for abdominal pain, diarrhea, nausea, vomiting.  +indigestion, constipation  Genitourinary: Negative for dysuria, frequency   Musculoskeletal:  + generalized weakness. Negative for back pain and myalgias.   Skin: Negative for itching and rash.   Neurological: Negative for dizziness, headaches.   Psychiatric/Behavioral: Negative for depression. The patient is not nervous/anxious.      24 hour Vital Sign Range   Temp:  [97.8 °F (36.6 °C)-97.9 °F (36.6 °C)]   Pulse:  [72-82]   Resp:  [18]   BP: (114-126)/(55-60)   SpO2:  [94 %-98 %]     Current BMI: Body mass index is 20.66 kg/m².    PEx  Constitutional: Patient appears debilitated.  No distress noted  HENT:   Head: Normocephalic and atraumatic.   Eyes: Pupils are equal, round  Neck: Normal range of motion. Neck supple.   Cardiovascular: Normal rate, regular rhythm and normal heart sounds.    Pulmonary/Chest: Effort normal and breath sounds are clear  Abdominal: Soft. Bowel sounds are normal.   Musculoskeletal: Normal range of motion.   Neurological: Alert and oriented to person, place.  Disoriented to time.  Impaired higher level thinking.  Psychiatric: Normal mood and affect. Behavior is normal.   Skin: Skin is warm and dry.     No results for input(s): "GLUCOSE", "NA", "K", "CL", "CO2", "BUN", "CREATININE", "CALCIUM", "MG" in the last 24 hours.        No results for input(s): "WBC", "RBC", "HGB", "HCT", " ""PLT", "MCV", "MCH", "MCHC" in the last 24 hours.        No results for input(s): "POCTGLUCOSE" in the last 168 hours.     Assessment and Plan:    Lower GI bleed  - CTA abdomen pelvis showed active bleed and ascending colon, significant stool burden, diverticulosis  - GI consulted while inpatient  - S/p angiogram and coil embolization by IR on 11/16  - Ceftriaxone/Flagyl discontinued 11/17  - follow-up with GI after discharge from SNF  - 12/4 ambulatory referral placed in [facility] for GI hospital follow-up appointment- message sent to  to set up for appointment    Acute blood loss anemia  - continue monitor twice weekly CBC, transfuse for hemoglobin < 7  - 11/22 worsening anemia, initiate order to transfuse 1 unit RBC, type and screen obtained, blood consent obtained from patient and phone conversation held with patient's daughter to provide care update regarding transfusion.  Patient's daughter also in agreement to blood transfusion.  -  H&H improved.  Ambulatory referral placed for GI for hospital follow-up    Constipation  - initiated MiraLax x1 dose, continue senna docusate 1 tab BID    Mild late onset Alzheimer's dementia without behavioral disturbance, psychotic disturbance, mood disturbance, or anxiety  - Patient with dementia with likely etiology of alzheimer's dementia. Dementia is mild. The patient does not have signs of behavioral disturbance.   Delirium precautions:  - Avoid antihistamines, anticholinergics, hypnotics, and minimize opiates while controlling for pain as these medications may exacerbate delirium. Cues for day/night will assist with keeping patient calm and oriented - during daytime, please keep adequate light in room (open windows, lights on) and please keep room dim at night-time to encourage normal sleep-wake cycles. Continuing to have nursing and family reorient the patient and encourage family to visit  - stable, continue home memantine 5 mg BID    Advance Care Planning  - " completed while inpatient on 11/17, patient/family wishes to be DNR     History of breast cancer  - noted     Mood disorder  - stable, Continue home fluoxetine 20 mg daily     Hypothyroid  - stable, Continue levothyroxine 25 mcg daily     Aortic atherosclerosis  - Hold aspirin in setting of GI bleed   - Continue atorvastatin 10 mg qHS.    Seasonal allergies  - continue cetirizine 10 mg daily, will discontinue patient shows any signs of delirium    GERD  - stable, PRN Tums and Mylanta continue Protonix 40 mg daily    Debility   - Continue with OT for gait training and strengthening and restoration of ADL's - discharged from PT services  - Encourage mobility, OOB in chair, and early ambulation as appropriate  - Fall precautions   - Monitor for bowel and bladder dysfunction  - Monitor for and prevent skin breakdown and pressure ulcers  - Continue DVT prophylaxis with frequent ambulation, chemical DVT PPX contraindicated due to recent GI bleed       Anticipate disposition:  Home with home health    IP [facility] RISK OF UNPLANNED READMISSION Model: MODERATE     Follow-up needed during SNF stay-    Appointment not to send patient to-Internal Medicine (12/1)    Follow-up needed after discharge from SNF: PCP, GI, needs to be scheduled    Future Appointments   Date Time Provider Department Center   12/13/2023 10:30 AM Jamshid Trujillo MD [facility]   12/14/2023 10:00 AM Soo Elias NP [facility]   1/11/2024  9:30 AM LAB, [facility] [place]   1/18/2024  3:00 PM Abiola Campbell MD [facility] [place]         Renay Du NP  Department of Hospital Medicine   [facility] Sk[facility] Facility     DOS: 12/6/2023        Patient note was created using MModal Dictation.  Any errors in syntax or even information may not have been identified and edited on initial review prior to signing this note.

## 2023-12-06 NOTE — PLAN OF CARE
Interdisciplinary team, Siena Nichole, RN Charge Nurse, Giovana Crabtree, , Yin Yin, PT, Rehab, Giovana Alcaraz, Activities, and Cami Louis, Dietician, spoke to patient for care plan conference, weekly status update, and therapy progress update. Tentative discharge date set for 12/8/23.

## 2023-12-06 NOTE — CARE UPDATE
[facility] - Skilled Nursing  Initial Discharge Assessment     SW met with patient in room for Discharge Planning Assessment.     Preferred Name: Olamide Felipe  Primary Care Provider:  Abiola Campbell MD  Admission Diagnosis:  Lower GI bleed  Other/hx: Mild Dementia  Admission Date: 11/20/2023  Expected Discharge Date: undetermined-TENTATIVE/ANTICIPATED:      Discharge Barriers Identified: patient cognition was in question due to mild Dementia by family; pt lives in communal living situation where she has support/people around her who will check on her/family support also     Payor: [employer]  Type:      Extended Emergency Contact Information:   Primary Emergency Contact:  Christy Garcia  Mobile Phone: [phone]  Relation: daughter, lives in [place]  Preferred language: English   needed? no     Discharge Plan A: Communal Living, independent apartment  Discharge Plan B:      Preferred Pharmacy:  [facility]      Initial Discharge Assessment        Assessment Type Discharge Planning Assessment       Source of Information Patient/dtr      Communicated TAMMIE with patient/caregiver yes      Lives With Elderly apt complex      Do you expect to return to your current living situation?  yes      Do you have help at home or someone to help you manage your care at home?  friends      Prior to hospitalization cognitive status: Mild dementia BIMS 7     Current cognitive status: Mild dementia      Equipment Currently Used at Home cane      Readmission within 30 days? Yes,11/16      Patient currently being followed by outpatient case management? no      Do you currently have service(s) that help you manage your care at home? no      Do you take prescription medications?  yes      Do you have prescription coverage?  yes      Do you have any problems affording any of your prescribed medications? no      Who is going to help you get home at discharge? daughter      How do you get to doctors appointments? Daughter or  friends      Are you on dialysis? no      Do you take coumadin?  no                    DME Needed Upon Discharge  none      Discharge Plan discussed with:  Pt and dtr      Discharge Barriers Identified none       89 yo  female who lives in an elder apt complex with friends around her who visit often. Her daughter is supportive and lives in [place] close by.  Pt does have mild dementia and will need those supportive friends and family to check on her.  Pt is ambulating well safely on her own w/o devices at this time. Pt will have continued HH after discharge.  SS has been in contact with her daughter who has been responsive with staff and care of pt.

## 2023-12-06 NOTE — PT/OT/SLP PROGRESS
Occupational Therapy   Treatment    Name: Olamide Felipe  MRN: [id number]  Admit Date: 11/20/2023  Admitting Diagnosis:  Acute lower GI bleeding    General Precautions: Standard, aspiration, fall, hearing impaired   Orthopedic Precautions: N/A   Braces: N/A    Recommendations:     Discharge Recommendations:  Low Intensity Therapy  Level of Assistance Recommended at Discharge: 24 hours supervision for safety in performing ADL's and home management tasks  Discharge Equipment Recommendations: walker, rolling  Barriers to discharge:  Other (Comment) (increased need for supervision while preforming functional tasks)    Assessment:     Olamide Felipe is a 89 y.o. female with a medical diagnosis of Acute lower GI bleeding.  She presents with  Performance deficits affecting function are impaired balance, gait instability, impaired cognition, impaired cardiopulmonary response to activity.     Pt. Was cooperative and participated well with session on this day. Pt  continues to demonstrate levels of physical deficits with  functional indep with daily management activities tasks, selfcare skills with balance,  functional mobility, UB strength and endurance. Pt. Will continue to benefit from continued OT to progress towards goals      Rehab Potential is fair    Activity tolerance:  Fair    Plan:     Patient to be seen 5 x/week to address the above listed problems via self-care/home management, community/work re-entry, therapeutic activities, therapeutic exercises, therapeutic groups    Plan of Care Expires: 12/20/23  Plan of Care Reviewed with: patient    Subjective     Communicated with: Nsg and Pt. prior to session.  I am doing well today    Pain/Comfort:  Pain Rating 1: 0/10  Pain Rating Post-Intervention 1: 0/10    Patient's cultural, spiritual, Christianity conflicts given the current situation:  no    Objective:     Patient found up in chair with PICC line upon OT entry to room.    Functional  Mobility/Transfers:  Patient completed Sit <> Stand Transfer with supervision  with  no assistive device   Patient completed Toilet Transfer Step Transfer technique with supervision with  no AD  Functional Mobility: Pt with fxl mobility approx 200ft with (S) and no AD    Activities of Daily Living:  Grooming: independence standing at sink level  Toileting: modified independence with cleaning and clothing management     [facility] 6 Click ADL: 24    OT Exercises: UE Ergometer 10 min    Treatment & Education:  Pt. With standing act on this day with task. Pt. With  SBA for balance aspects with task with  No AD at raised counter Pt with visual perception task with discrimination of various shapes and sizes x 6 :12 min/sec with standing bal and min cues through out with weight shifting and use of BUE's incorporated and crossing mid line and facilitation with posture in prep for home management .     Pt. With 2# dowel activity with 2x20 reps with  shd flex, bicep curls horz adb/add and forward flex motion to increase BUE ROM and strength.    Pt. With therex performed to increase ROM, endurance selfcare task and fxl mobility for independence     Pt edu on role of OT, POC, safety when performing self care tasks , benefit of performing OOB activity, and safety when performing functional transfers and mobility management for preparation with goals to progress towards next level of care     Patient left up in chair with all lines intact and call button in reach    GOALS:   Multidisciplinary Problems       Occupational Therapy Goals          Problem: Occupational Therapy    Goal Priority Disciplines Outcome Interventions   Occupational Therapy Goal     OT, PT/OT Ongoing, Progressing    Description: Goals to be met by: 12/20/2023     Patient will increase functional independence with ADLs by performing:      UE Dressing with Set-up Assistance- Met  LE Dressing with Supervision- Ongoing  Oral Hygiene while standing at sink with  Supervision and Assistive Devices as needed- Met  Personal Hygiene while standing at sink with Supervision and Assistive Devices as needed- Met  Toileting from toilet with Supervision and Assistive Devices as needed for hygiene and clothing management- Met  Bathing from  shower chair/bench with Supervision-Ongoing  Toilet transfer to toilet with Supervision and Assistive Device as needed- Met  Footwear /c Mod I- Met  SC t/f /c SPV and Assistive Device as needed  Tolerate standing functional tasks for ~8 minutes /c SPV and Assistive device as needed- Met                         Time Tracking:     OT Date of Treatment: 12/06/23  OT Start Time: 1400    OT Stop Time: 1430  OT Total Time (min): 30 min    Billable Minutes:Therapeutic Activity 30    12/6/2023

## 2023-12-06 NOTE — CARE UPDATE
SS followed up with Dtr, Christy Garcia, in regards to her initial concern of her mother's cognitive status in returning to her apartment on Friday's discharge once a review with the am morning team occurred.     SS explained that ,within a communal living situation, pt's discharge is as safe as it is going to be at any time in the future, due to her cognition is not going to change.  Pt has people present around her in an independent living apartment that check on her and friends who visit.  Dtr was with patient yesterday for a visit , and stated that although pt has her moments of questionable cognition, dtr was 100% comfortable with her mom's discharge on Friday.  NOMNC was verbal with daughter on phone.

## 2023-12-06 NOTE — PLAN OF CARE
Problem: Adult Inpatient Plan of Care  Goal: Plan of Care Review  Outcome: Ongoing, Progressing  Goal: Patient-Specific Goal (Individualized)  Outcome: Ongoing, Progressing  Goal: Absence of Hospital-Acquired Illness or Injury  Outcome: Ongoing, Progressing  Goal: Optimal Comfort and Wellbeing  Outcome: Ongoing, Progressing  Intervention: Provide Person-Centered Care  Flowsheets (Taken 12/5/2023 2131)  Trust Relationship/Rapport:   care explained   choices provided   questions answered   questions encouraged   reassurance provided

## 2023-12-07 LAB
ANION GAP SERPL CALC-SCNC: 9 MMOL/L (ref 8–16)
BASOPHILS # BLD AUTO: 0.04 K/UL (ref 0–0.2)
BASOPHILS NFR BLD: 0.8 % (ref 0–1.9)
BUN SERPL-MCNC: 26 MG/DL (ref 8–23)
CALCIUM SERPL-MCNC: 9.1 MG/DL (ref 8.7–10.5)
CHLORIDE SERPL-SCNC: 111 MMOL/L (ref 95–110)
CO2 SERPL-SCNC: 22 MMOL/L (ref 23–29)
CREAT SERPL-MCNC: 0.9 MG/DL (ref 0.5–1.4)
DIFFERENTIAL METHOD: ABNORMAL
EOSINOPHIL # BLD AUTO: 0.3 K/UL (ref 0–0.5)
EOSINOPHIL NFR BLD: 6.4 % (ref 0–8)
ERYTHROCYTE [DISTWIDTH] IN BLOOD BY AUTOMATED COUNT: 14.2 % (ref 11.5–14.5)
EST. GFR  (NO RACE VARIABLE): >60 ML/MIN/1.73 M^2
GLUCOSE SERPL-MCNC: 76 MG/DL (ref 70–110)
HCT VFR BLD AUTO: 30.8 % (ref 37–48.5)
HGB BLD-MCNC: 10 G/DL (ref 12–16)
IMM GRANULOCYTES # BLD AUTO: 0.01 K/UL (ref 0–0.04)
IMM GRANULOCYTES NFR BLD AUTO: 0.2 % (ref 0–0.5)
LYMPHOCYTES # BLD AUTO: 1.6 K/UL (ref 1–4.8)
LYMPHOCYTES NFR BLD: 32.3 % (ref 18–48)
MAGNESIUM SERPL-MCNC: 1.7 MG/DL (ref 1.6–2.6)
MCH RBC QN AUTO: 32.2 PG (ref 27–31)
MCHC RBC AUTO-ENTMCNC: 32.5 G/DL (ref 32–36)
MCV RBC AUTO: 99 FL (ref 82–98)
MONOCYTES # BLD AUTO: 0.5 K/UL (ref 0.3–1)
MONOCYTES NFR BLD: 10.9 % (ref 4–15)
NEUTROPHILS # BLD AUTO: 2.4 K/UL (ref 1.8–7.7)
NEUTROPHILS NFR BLD: 49.4 % (ref 38–73)
NRBC BLD-RTO: 0 /100 WBC
PHOSPHATE SERPL-MCNC: 2.9 MG/DL (ref 2.7–4.5)
PLATELET # BLD AUTO: 249 K/UL (ref 150–450)
PMV BLD AUTO: 10.8 FL (ref 9.2–12.9)
POTASSIUM SERPL-SCNC: 4.4 MMOL/L (ref 3.5–5.1)
RBC # BLD AUTO: 3.11 M/UL (ref 4–5.4)
SODIUM SERPL-SCNC: 142 MMOL/L (ref 136–145)
WBC # BLD AUTO: 4.86 K/UL (ref 3.9–12.7)

## 2023-12-07 PROCEDURE — 11000004 HC SNF PRIVATE: Mod: HCNC

## 2023-12-07 PROCEDURE — 83735 ASSAY OF MAGNESIUM: CPT | Mod: HCNC | Performed by: NURSE PRACTITIONER

## 2023-12-07 PROCEDURE — 25000003 PHARM REV CODE 250: Mod: HCNC | Performed by: NURSE PRACTITIONER

## 2023-12-07 PROCEDURE — 97535 SELF CARE MNGMENT TRAINING: CPT | Mod: HCNC,CO

## 2023-12-07 PROCEDURE — 25000003 PHARM REV CODE 250: Mod: HCNC | Performed by: HOSPITALIST

## 2023-12-07 PROCEDURE — 84100 ASSAY OF PHOSPHORUS: CPT | Mod: HCNC | Performed by: NURSE PRACTITIONER

## 2023-12-07 PROCEDURE — 85025 COMPLETE CBC W/AUTO DIFF WBC: CPT | Mod: HCNC | Performed by: NURSE PRACTITIONER

## 2023-12-07 PROCEDURE — 80048 BASIC METABOLIC PNL TOTAL CA: CPT | Mod: HCNC | Performed by: NURSE PRACTITIONER

## 2023-12-07 PROCEDURE — 36415 COLL VENOUS BLD VENIPUNCTURE: CPT | Mod: HCNC | Performed by: NURSE PRACTITIONER

## 2023-12-07 RX ORDER — AMOXICILLIN 250 MG
1 CAPSULE ORAL DAILY
Qty: 90 TABLET | Refills: 0 | COMMUNITY
Start: 2023-12-07 | End: 2024-02-02

## 2023-12-07 RX ORDER — CHLORHEXIDINE GLUCONATE 4 %
12 LIQUID (ML) TOPICAL NIGHTLY
Refills: 0 | Status: CANCELLED | COMMUNITY
Start: 2023-12-07

## 2023-12-07 RX ORDER — POLYETHYLENE GLYCOL 3350 17 G/17G
17 POWDER, FOR SOLUTION ORAL 2 TIMES DAILY PRN
Refills: 0 | COMMUNITY
Start: 2023-12-07

## 2023-12-07 RX ORDER — ASCORBIC ACID 500 MG
500 TABLET ORAL DAILY
Status: CANCELLED | COMMUNITY
Start: 2023-12-07

## 2023-12-07 RX ORDER — FERROUS SULFATE 325(65) MG
325 TABLET, DELAYED RELEASE (ENTERIC COATED) ORAL 2 TIMES DAILY
Qty: 60 TABLET | Refills: 0 | Status: SHIPPED | OUTPATIENT
Start: 2023-12-11 | End: 2024-01-11 | Stop reason: SDUPTHER

## 2023-12-07 RX ORDER — FLUOXETINE HYDROCHLORIDE 20 MG/1
20 CAPSULE ORAL DAILY
Qty: 90 CAPSULE | Refills: 0 | Status: SHIPPED | OUTPATIENT
Start: 2023-12-11

## 2023-12-07 RX ADMIN — ATORVASTATIN CALCIUM 10 MG: 10 TABLET, FILM COATED ORAL at 09:12

## 2023-12-07 RX ADMIN — Medication 12 MG: at 09:12

## 2023-12-07 RX ADMIN — THERA TABS 1 TABLET: TAB at 09:12

## 2023-12-07 RX ADMIN — LEVOTHYROXINE SODIUM 25 MCG: 25 TABLET ORAL at 05:12

## 2023-12-07 RX ADMIN — CETIRIZINE HYDROCHLORIDE 10 MG: 5 TABLET, FILM COATED ORAL at 09:12

## 2023-12-07 RX ADMIN — SENNOSIDES AND DOCUSATE SODIUM 1 TABLET: 8.6; 5 TABLET ORAL at 08:12

## 2023-12-07 RX ADMIN — PANTOPRAZOLE SODIUM 40 MG: 40 TABLET, DELAYED RELEASE ORAL at 09:12

## 2023-12-07 RX ADMIN — Medication 1 TABLET: at 09:12

## 2023-12-07 RX ADMIN — FLUOXETINE 20 MG: 10 CAPSULE ORAL at 08:12

## 2023-12-07 RX ADMIN — Medication 1 TABLET: at 08:12

## 2023-12-07 RX ADMIN — SENNOSIDES AND DOCUSATE SODIUM 1 TABLET: 8.6; 5 TABLET ORAL at 09:12

## 2023-12-07 RX ADMIN — MEMANTINE HYDROCHLORIDE 5 MG: 5 TABLET ORAL at 09:12

## 2023-12-07 RX ADMIN — FERROUS SULFATE TAB 325 MG (65 MG ELEMENTAL FE) 1 EACH: 325 (65 FE) TAB at 09:12

## 2023-12-07 RX ADMIN — OXYCODONE HYDROCHLORIDE AND ACETAMINOPHEN 500 MG: 500 TABLET ORAL at 09:12

## 2023-12-07 NOTE — PROGRESS NOTES
[facility]  Adult Nutrition  Progress Note    SUMMARY   Recommendations  1) Continue current regular diet with soft and bite sized diet texture, encourage PO intake 2) RD to monitor weight, labs, PO intake/tolerance, skin  Goals: PO intake to meet 85% of EEN/EPN with no weight loss by next RD follow-up  Nutrition Goal Status: progressing towards goal  Communication of RD Recs: other (comment) (POC)    Assessment and Plan   No nutrition diagnosis at this time    Malnutrition Assessment  11/21     Skin (Micronutrient): bruised  Teeth (Micronutrient): dentures  Neck/Chest (Micronutrient): muscle wasting, subcutaneous fat loss  Musculoskeletal/Lower Extremities: muscle wasting, subcutaneous fat loss           Orbital Region (Subcutaneous Fat Loss): moderate depletion  Upper Arm Region (Subcutaneous Fat Loss): moderate depletion  Thoracic and Lumbar Region: moderate depletion   [place] Region (Muscle Loss): severe depletion  Clavicle Bone Region (Muscle Loss): moderate depletion  Clavicle and Acromion Bone Region (Muscle Loss): moderate depletion  Dorsal Hand (Muscle Loss): moderate depletion  Patellar Region (Muscle Loss): moderate depletion  Anterior Thigh Region (Muscle Loss): moderate depletion  Posterior Calf Region (Muscle Loss): mild depletion                 Reason for Assessment    Reason For Assessment: RD follow-up  Diagnosis: other (see comments) (acute GI bleed)  Relevant Medical History: s/p coil embolization, mild Alzheimers, breast cancer s/p mastectomy, HLD, HTN, hypothyroidism, CAD, debiltiy, ICU delirium  Interdisciplinary Rounds: attended  General Information Comments: Weight stable, dc in am  Nutrition Discharge Planning: DC on [facility] soft diet as tolerated    Nutrition/Diet History    Patient Reported Diet/Restrictions/Preferences: general  Food Preferences: no spicy foods  Spiritual, Cultural Beliefs, Buddhism Practices, Values that Affect Care: no  Food Allergies:  "    Anthropometrics    Temp: 97.7 °F (36.5 °C)  Height Method: Stated  Height: 5' 4" (162.6 cm)  Height (inches): 64 in  Weight Method: Bed Scale  Weight: 54.6 kg (120 lb 5.9 oz)  Weight (lb): 120.37 lb  Ideal Body Weight (IBW), Female: 120 lb  % Ideal Body Weight, Female (lb): 100.31 %  BMI (Calculated): 20.7  BMI Grade: 18.5-24.9 - normal  Weight Loss: unintentional  Usual Body Weight (UBW), k.2 kg  Weight Change Amount:  (weight in  was 52.2 Kg, patient reports weight loss when initially living with her daughter s/s spicy foods she could not eat.)  % Usual Body Weight: 104.82  % Weight Change From Usual Weight: 4.6 %       Lab/Procedures/Meds    Pertinent Labs Reviewed: reviewed  Pertinent Labs Comments: Hg 10.0, Hct 30.8, Cl 111, BUN 26  Pertinent Medications Reviewed: reviewed  Pertinent Medications Comments: statin, Vit B complex/Vit C, memantine, senna-docusate, MVI, Ca/Vit D3, pantoprazole, flluoxetine  Estimated/Assessed Needs    Weight Used For Calorie Calculations: 54.6 kg (120 lb 5.9 oz)  Energy Calorie Requirements (kcal): 1236  Energy Need Method: [place]-St Jeor (x 1.3(PAL))  Protein Requirements: 66  Weight Used For Protein Calculations: 54.6 kg (120 lb 5.9 oz) (x 1.2g/kg)  Fluid Requirements (mL): 1236 or per MD  Estimated Fluid Requirement Method: RDA Method  RDA Method (mL): 1236         Nutrition Prescription Ordered    Current Diet Order: Regular diet, Soft and bite sized diet texture  Nutrition Order Comments: PO %  Oral Nutrition Supplement: Pt declines ONS at this time but states she will drink if she loses weight while here    Evaluation of Received Nutrient/Fluid Intake    I/O: no data  Energy Calories Required: meeting needs  Protein Required: meeting needs  Fluid Required: meeting needs  Comments: LBM /  Tolerance: tolerating  % Intake of Estimated Energy Needs: 75 - 100 %  % Meal Intake: 75 - 100 %    Nutrition Risk    Level of Risk/Frequency of Follow-up: low (F/U " once/week)     Monitor and Evaluation    Food and Nutrient Intake: food and beverage intake  Food and Nutrient Adminstration: diet order  Knowledge/Beliefs/Attitudes: food and nutrition knowledge/skill  Physical Activity and Function: nutrition-related ADLs and IADLs  Anthropometric Measurements: weight change, body mass index  Biochemical Data, Medical Tests and Procedures: electrolyte and renal panel, gastrointestinal profile, glucose/endocrine profile, inflammatory profile, lipid profile  Nutrition-Focused Physical Findings: overall appearance     Nutrition Follow-Up    RD Follow-up?: Yes

## 2023-12-07 NOTE — CARE UPDATE
Discharge Planning: Reminder and update        Pt will be discharged to go to her apartment with daughter on Friday, 12/8 @ 11 am via personal car.       DME:  NONE     [facility]: [place] will begin date of service on 12.12, Tuesday.

## 2023-12-07 NOTE — PT/OT/SLP PROGRESS
Occupational Therapy   Treatment/Discharge Summary    Name: Olamide Felipe  MRN: [id number]  Admit Date: 11/20/2023  Admitting Diagnosis:  Acute lower GI bleeding    General Precautions: Standard, aspiration, fall, hearing impaired   Orthopedic Precautions: N/A   Braces: N/A    Recommendations:     Discharge Recommendations:  Low Intensity Therapy  Level of Assistance Recommended at Discharge: 24 hours supervision for safety in performing ADL's and home management tasks  Discharge Equipment Recommendations: walker, rolling  Barriers to discharge:  Other (Comment) (increased need for supervision while preforming functional tasks)    Assessment:     Olamide Felipe is a 89 y.o. female with a medical diagnosis of Acute lower GI bleeding.  She presents with Performance deficits affecting function are impaired balance, gait instability, impaired cognition, impaired cardiopulmonary response to activity.   Pt. Was cooperative and participated well with session on this day. Pt  continues to demonstrate levels of physical deficits with  functional indep with daily management activities tasks, selfcare skills with balance,  functional mobility, UB strength and endurance. Pt. Will continue to benefit from continued OT to progress towards goals      Pt. Was cooperative and participated well with session on this day. Pt  continues to demonstrate levels of physical deficits with  functional indep with daily management activities tasks, selfcare skills with balance,  functional mobility, UB strength and endurance. Pt. Will continue to benefit from continued OT to progress towards goals      Rehab Potential is good    Activity tolerance:  Good    Plan:     Patient to be seen 5 x/week to address the above listed problems via self-care/home management, community/work re-entry, therapeutic activities, therapeutic exercises, therapeutic groups    Plan of Care Expires: 12/20/23  Plan of Care Reviewed with: patient    Subjective      Communicated with: Nsg and Pt. prior to session. I am doing well today    Pain/Comfort:  Pain Rating 1: 0/10  Pain Rating Post-Intervention 1: 0/10    Patient's cultural, spiritual, Episcopal conflicts given the current situation:  no    Objective:     Patient found ambulatory in room/ retrieving clothing items with PICC line upon OT entry to room.    Functional Mobility/Transfers:  Patient completed Sit <> Stand Transfer with supervision  with  no assistive device   Patient completed Toilet Transfer Step Transfer technique with supervision with  no AD  Patient completed  Shower Transfer Step Transfer technique with supervision with grab bars  Functional Mobility: Pt. With fxl mobility around room with no AD and (S)    Activities of Daily Living:  Feeding:  independence with breakfast management   Grooming: independence standing sink side with grooming needs  Bathing: supervision seated in shower stall   Upper Body Dressing: independence to doff/perez pull over shirt and perez bra and shirt  Lower Body Dressing: supervision to doff/perez uw/ pants socks and tennis shoes  Toileting: independence with cleaning and clothing management     AMPAC 6 Click ADL: 24    Treatment & Education:    Patient left up in chair with all lines intact and call button in reach    GOALS:   Multidisciplinary Problems       Occupational Therapy Goals          Problem: Occupational Therapy    Goal Priority Disciplines Outcome Interventions   Occupational Therapy Goal     OT, PT/OT Ongoing, Progressing    Description: Goals to be met by: 12/20/2023     Patient will increase functional independence with ADLs by performing:      UE Dressing with Set-up Assistance- Met  LE Dressing with Supervision- MET  Oral Hygiene while standing at sink with Supervision and Assistive Devices as needed- Met  Personal Hygiene while standing at sink with Supervision and Assistive Devices as needed- Met  Toileting from toilet with Supervision and Assistive  Devices as needed for hygiene and clothing management- Met  Bathing from  shower chair/bench with Supervision-MET  Toilet transfer to toilet with Supervision and Assistive Device as needed- Met  Footwear /c Mod I- Met  SC t/f /c SPV and Assistive Device as needed-MET  Tolerate standing functional tasks for ~8 minutes /c SPV and Assistive device as needed- Met                         Time Tracking:     OT Date of Treatment: 12/07/23  OT Start Time: 0826    OT Stop Time: 0904  OT Total Time (min): 38 min    Billable Minutes:Self Care/Home Management 38    12/7/2023  A client care conference was performed between the LOTR and [facility]QUEEN, prior to treatment to discuss the patient's status, treatment plan and established goals.

## 2023-12-07 NOTE — PLAN OF CARE
Problem: Adult Inpatient Plan of Care  Goal: Plan of Care Review  Outcome: Ongoing, Progressing  Flowsheets (Taken 12/7/2023 6059)  Plan of Care Reviewed With: patient     Problem: Fall Injury Risk  Goal: Absence of Fall and Fall-Related Injury  Outcome: Ongoing, Progressing     Problem: Impaired Wound Healing  Goal: Optimal Wound Healing  Outcome: Ongoing, Progressing     Problem: Skin Injury Risk Increased  Goal: Skin Health and Integrity  Outcome: Ongoing, Progressing

## 2023-12-07 NOTE — DISCHARGE INSTRUCTIONS
Discharge Orders    [facility] on 12.12, they will contact you before they come out to your home..    Your hospital and skilled nursing follow up, physician appointments are THIS WEEK. Please see this discharge summary for instructions.

## 2023-12-08 VITALS
WEIGHT: 120.38 LBS | OXYGEN SATURATION: 99 % | DIASTOLIC BLOOD PRESSURE: 65 MMHG | SYSTOLIC BLOOD PRESSURE: 133 MMHG | TEMPERATURE: 98 F | HEIGHT: 64 IN | HEART RATE: 74 BPM | RESPIRATION RATE: 18 BRPM | BODY MASS INDEX: 20.55 KG/M2

## 2023-12-08 PROCEDURE — 25000003 PHARM REV CODE 250: Mod: HCNC | Performed by: NURSE PRACTITIONER

## 2023-12-08 PROCEDURE — 25000003 PHARM REV CODE 250: Mod: HCNC | Performed by: HOSPITALIST

## 2023-12-08 RX ADMIN — LEVOTHYROXINE SODIUM 25 MCG: 25 TABLET ORAL at 05:12

## 2023-12-08 RX ADMIN — CETIRIZINE HYDROCHLORIDE 10 MG: 5 TABLET, FILM COATED ORAL at 09:12

## 2023-12-08 RX ADMIN — THERA TABS 1 TABLET: TAB at 09:12

## 2023-12-08 RX ADMIN — MEMANTINE HYDROCHLORIDE 5 MG: 5 TABLET ORAL at 09:12

## 2023-12-08 RX ADMIN — PANTOPRAZOLE SODIUM 40 MG: 40 TABLET, DELAYED RELEASE ORAL at 09:12

## 2023-12-08 RX ADMIN — Medication 1 TABLET: at 09:12

## 2023-12-08 RX ADMIN — SENNOSIDES AND DOCUSATE SODIUM 1 TABLET: 8.6; 5 TABLET ORAL at 09:12

## 2023-12-08 RX ADMIN — FLUOXETINE 20 MG: 10 CAPSULE ORAL at 09:12

## 2023-12-08 RX ADMIN — OXYCODONE HYDROCHLORIDE AND ACETAMINOPHEN 500 MG: 500 TABLET ORAL at 09:12

## 2023-12-08 RX ADMIN — FERROUS SULFATE TAB 325 MG (65 MG ELEMENTAL FE) 1 EACH: 325 (65 FE) TAB at 09:12

## 2023-12-08 NOTE — HOSPITAL COURSE
Patient progressed well with PT and OT.  Patient was discharge from PT services last week.  Patient ambulating independently throughout the unit without difficulty.  Patient had no significant events during their stay at SNF. Home health was set up. DME was ordered if needed. Follow up appointment to be made by patient within one week. All prescriptions and discharge instructions were ordered to be given to the patient prior to discharge.     PEx  Constitutional: Patient appears debilitated.  No distress noted  HENT:   Head: Normocephalic and atraumatic.   Eyes: Pupils are equal, round  Neck: Normal range of motion. Neck supple.   Cardiovascular: Normal rate, regular rhythm and normal heart sounds.    Pulmonary/Chest: Effort normal and breath sounds are clear  Abdominal: Soft. Bowel sounds are normal.   Musculoskeletal: Normal range of motion.   Neurological: Alert and oriented to person, place.  Disoriented to time.  Impaired higher level thinking.  Psychiatric: Normal mood and affect. Behavior is normal.   Skin: Skin is warm and dry.

## 2023-12-08 NOTE — NURSING
Admission Diagnosis: Gastrointestinal hemorrhage, unspe*     POC reviewed with pt, pt verbalized understanding. Safety maintained throughout shift, bed locked and in lowest position, call light in reach. Pt remained free of fall/trauma. VSS, afebrile this shift.    Pt pain  Last Documentation = Comfort/Acceptable Pain Level: 0 (12/06/23 0840)    Last Documentation = Pain Rating (0-10): Activity: 0 (11/22/23 2000)    Last Documentation = Pain Rating (0-10): Rest: 0 (12/08/23 0900)    Skin assessment completed prior to discharge   Location: Right groin puncture   Description: Closed puncture site from previous surgery. Pink, dry, intact  Recommendation: Follow up with PCP      AVS reviewed with patient prior to discharge. Discharge instruction, follow up appointments and medication instructions given to pt, pt verbalized understanding. Patient IV lines have been removed prior to discharge. Rx sent to patients pharmacy.       * No surgery found *      OUTCOME: Condition has improved and patient is now ready for discharge.    DISPOSITION: Home-Health Care [facility] [place], [place]    FOLLOWUP: With primary care provider

## 2023-12-08 NOTE — PLAN OF CARE
Problem: Adult Inpatient Plan of Care  Goal: Plan of Care Review  Outcome: Ongoing, Progressing  Flowsheets (Taken 12/7/2023 2135)  Plan of Care Reviewed With: patient  Goal: Patient-Specific Goal (Individualized)  Outcome: Ongoing, Progressing  Goal: Absence of Hospital-Acquired Illness or Injury  Outcome: Ongoing, Progressing  Goal: Optimal Comfort and Wellbeing  Outcome: Ongoing, Progressing  Goal: Readiness for Transition of Care  Outcome: Ongoing, Progressing     Problem: Fall Injury Risk  Goal: Absence of Fall and Fall-Related Injury  Outcome: Ongoing, Progressing  Intervention: Identify and Manage Contributors  Flowsheets (Taken 12/7/2023 2135)  Self-Care Promotion:   independence encouraged   BADL personal objects within reach   BADL personal routines maintained   safe use of adaptive equipment encouraged  Medication Review/Management: medications reviewed  Intervention: Promote Injury-Free Environment  Flowsheets (Taken 12/7/2023 2135)  Safety Promotion/Fall Prevention:   assistive device/personal item within reach   medications reviewed   instructed to call staff for mobility   supervised activity   side rails raised x 2   Fall Risk reviewed with patient/family     Problem: Impaired Wound Healing  Goal: Optimal Wound Healing  Outcome: Ongoing, Progressing     Problem: Skin Injury Risk Increased  Goal: Skin Health and Integrity  Outcome: Ongoing, Progressing

## 2023-12-08 NOTE — DISCHARGE SUMMARY
"[facility] Medicine  Discharge Summary      Patient Name: Olamide Felipe  MRN: [id number]  LUZ MARINA: [id number]  Patient Class: IP- SNF  Admission Date: 11/20/2023  Hospital Length of Stay: 18 days  Discharge Date and Time: No discharge date for patient encounter.  Attending Physician: Lee Rahman MD   Discharging Provider: Singh Du NP  Primary Care Provider: Abiola Campbell MD    Primary Care Team: LTAC 8 SINGH DU     HPI:   Mrs. Felipe is a 89-year-old female with PMHx of mild dementia, breast cancer, HTN, hypothyroidism, recurrent falls, HLD who presents to SNF following hospitalization for acute lower GI bleed.  Admission to SNF for secondary weakness and debility.     [place] [facility] [place] from independent living facility for bright red blood per rectum.  Per [facility] [place],  Patient had a large bloody bowel movement this morning following which she called her daughter who called EMS.  EMS noted drops of blood from the rectum after the bowel movement.  Patient denies any lightheadedness or shortness of breath or chest pain. While in the ER, vital signs stable.  Hemoglobin dropped about 2 g.  Type and screen was sent, no blood transfusion given.  CTA abdomen pelvis showed findings concerning for acute bleed within proximal aspect of ascending colon.  Large amount of retained stool with significant diverticulosis seen.  IR cosnulted and performed angiogram with coiling on 11/16. Hb has stabilized since this time; ultimately did require transfusion of 1u PRBC. Clinical course complicated by delirium, which is now resolving, and ICU acquired weakness due to her critical illness. Assessed by PT/OT. Initially able to ambulate but became very weak and needed assistance to return to bed. Pursuing SNF placement."     Today, patient remains very weak.  She is very motivated to work with therapy.  She reports some indigestion and is requesting Tums. "  Currently, patient is oriented to her age and the place, she is disoriented to time.  This is her mental baseline, no delirium noted at this time.     Patient will be treated at [facility] with PT and OT to improve functional status and ability to perform ADLs.    Hospital Course:   Patient progressed well with PT and OT.  Patient was discharge from PT services last week.  Patient ambulating independently throughout the unit without difficulty.  Patient received 1 RBC transfusion on 11/22 for symptomatic anemia with hemoglobin of 7.0.  Recheck on 11/24 resulted with hemoglobin of 9.3 and hemoglobin has continued to increase with the aid of ferrous sulfate supplementation.  No further signs of GI bleed.  Patient was set up with a follow-up appointment with GI.  Home health was set up. DME was ordered if needed. Follow up appointment to be made by patient within one week. All prescriptions and discharge instructions were ordered to be given to the patient prior to discharge.     PEx  Constitutional: Patient appears debilitated.  No distress noted  HENT:   Head: Normocephalic and atraumatic.   Eyes: Pupils are equal, round  Neck: Normal range of motion. Neck supple.   Cardiovascular: Normal rate, regular rhythm and normal heart sounds.    Pulmonary/Chest: Effort normal and breath sounds are clear  Abdominal: Soft. Bowel sounds are normal.   Musculoskeletal: Normal range of motion.   Neurological: Alert and oriented to person, place.  Disoriented to time.  Impaired higher level thinking.  Psychiatric: Normal mood and affect. Behavior is normal.   Skin: Skin is warm and dry.      Goals of Care Treatment Preferences:  Code Status: DNR    Living Will: Yes     What is most important right now is to focus on spending time at home, remaining as independent as possible.  Accordingly, we have decided that the best plan to meet the patient's goals includes continuing with treatment.      Consults:   Consults (From admission,  "onward)          Status Ordering Provider     Inpatient consult to Registered Dietitian/Nutritionist  Once        Provider:  (Not yet assigned)    ADI Mina            No new Assessment & Plan notes have been filed under this hospital service since the last note was generated.  Service: Hospital Medicine    Final Active Diagnoses:    Diagnosis Date Noted POA    PRINCIPAL PROBLEM:  Acute lower GI bleeding [K92.2] 11/16/2023 Yes    Hypophosphatemia [E83.39] 11/24/2023 No    Oropharyngeal dysphagia [R13.12] 11/24/2023 Yes    Acute blood loss anemia [D62] 11/21/2023 Yes    Weakness acquired in ICU [R53.1] 11/18/2023 Yes    Delirium [R41.0] 11/17/2023 Yes    Overactive bladder [N32.81] 04/17/2023 Yes    Mild late onset Alzheimer's dementia without behavioral disturbance, psychotic disturbance, mood disturbance, or anxiety [G30.1, F02.A0] 03/06/2023 Yes    History of breast cancer [Z85.3] 03/06/2023 Not Applicable    Secondary hypertension [I15.9] 12/17/2021 Yes    Hyperlipidemia [E78.5] 01/25/2021 Yes    Hypothyroid [E03.9] 01/25/2021 Yes      Problems Resolved During this Admission:       Discharged Condition: good    [place] [facility]    [place] Up:    Patient Instructions:      WALKER FOR HOME USE     Order Specific Question Answer Comments   Type of Walker: Adult (5'4"-6'6")    With wheels? Yes    Height: 5' 4" (1.626 m)    Weight: 54.6 kg (120 lb 5.9 oz)    Length of need (1-99 months): 99    Does patient have medical equipment at home? cane, straight does not use   Please check all that apply: Patient's condition impairs ambulation.    Please check all that apply: Walker will be used for gait training.    Please check all that apply: Patient is unable to safely ambulate without equipment.      Ambulatory referral/consult to Gastroenterology   Standing Status: Future   Referral Priority: Routine Referral Type: Consultation   Referral Reason: Specialty Services Required   Requested " Specialty: Gastroenterology   Number of Visits Requested: 1     No driving until:   Order Comments: Cleared by PCP     Notify your health care provider if you experience any of the following:  temperature >100.4     Notify your health care provider if you experience any of the following:  persistent nausea and vomiting or diarrhea     Notify your health care provider if you experience any of the following:  severe uncontrolled pain     Notify your health care provider if you experience any of the following:  redness, tenderness, or signs of infection (pain, swelling, redness, odor or green/yellow discharge around incision site)     Notify your health care provider if you experience any of the following:  difficulty breathing or increased cough     Notify your health care provider if you experience any of the following:  severe persistent headache     Notify your health care provider if you experience any of the following:  worsening rash     Notify your health care provider if you experience any of the following:  persistent dizziness, light-headedness, or visual disturbances     Notify your health care provider if you experience any of the following:  increased confusion or weakness     Activity as tolerated       Significant Diagnostic Studies: N/A    Pending Diagnostic Studies:       None           Medications:  Reconciled Home Medications:      Medication List        START taking these medications      ferrous sulfate 325 (65 FE) MG EC tablet  Take 1 tablet (325 mg total) by mouth 2 (two) times daily.     polyethylene glycol 17 gram Pwpk  Commonly known as: GLYCOLAX  Take 17 g by mouth 2 (two) times daily as needed for Constipation.     senna-docusate 8.6-50 mg 8.6-50 mg per tablet  Commonly known as: PERICOLACE  Take 1 tablet by mouth once daily.            CONTINUE taking these medications      acetaminophen 325 MG tablet  Commonly known as: TYLENOL  Take 325 mg by mouth every 6 (six) hours as needed for  Pain.     b complex vitamins tablet  Take 1 tablet by mouth once daily.     calcium-vitamin D 250 mg-2.5 mcg (100 unit) per tablet  Take 1 tablet by mouth 2 (two) times daily.     FLUoxetine 20 MG capsule  Take 1 capsule (20 mg total) by mouth once daily.     levothyroxine 25 MCG tablet  Commonly known as: SYNTHROID  Take 1 tablet (25 mcg total) by mouth before breakfast.     loratadine 10 mg tablet  Commonly known as: CLARITIN  Take 1 tablet (10 mg total) by mouth once daily.     memantine 5 MG Tab  Commonly known as: NAMENDA  Take 1 tablet (5 mg total) by mouth 2 (two) times daily. Begin by taking one tablet daily for seven days, then increase to one tablet two times daily.     omeprazole 20 MG capsule  Commonly known as: PRILOSEC  Take 1 capsule (20 mg total) by mouth once daily.     simvastatin 20 MG tablet  Commonly known as: ZOCOR  TAKE 1 TABLET (20 MG TOTAL) BY MOUTH EVERY EVENING.     WOMEN'S MULTIVITAMIN ORAL  Take 1 tablet by mouth once daily.            STOP taking these medications      diphenhydrAMINE 25 mg capsule  Commonly known as: BENADRYL     FLUAD QUAD 2023-24(65Y UP)(PF) 60 mcg (15 mcg x 4)/0.5 mL Syrg  Generic drug: flu vac 2023 [id number](PF)     losartan 25 MG tablet  Commonly known as: COZAAR              Indwelling Lines/Drains at time of discharge:   Lines/Drains/Airways       None                   Time spent on the discharge of patient: 38 minutes         Renay Du NP  Department of [facility]  [facility] - Skilled Nursing

## 2023-12-13 ENCOUNTER — OFFICE VISIT (OUTPATIENT)
Dept: GASTROENTEROLOGY | Facility: CLINIC | Age: 88
End: 2023-12-13
Payer: MEDICARE

## 2023-12-13 VITALS
WEIGHT: 114.5 LBS | DIASTOLIC BLOOD PRESSURE: 73 MMHG | SYSTOLIC BLOOD PRESSURE: 154 MMHG | BODY MASS INDEX: 20.29 KG/M2 | HEART RATE: 73 BPM | HEIGHT: 63 IN

## 2023-12-13 DIAGNOSIS — K62.5 RECTAL BLEEDING: Primary | ICD-10-CM

## 2023-12-13 PROCEDURE — 1157F ADVNC CARE PLAN IN RCRD: CPT | Mod: HCNC,CPTII,S$GLB, | Performed by: INTERNAL MEDICINE

## 2023-12-13 PROCEDURE — 99212 PR OFFICE/OUTPT VISIT, EST, LEVL II, 10-19 MIN: ICD-10-PCS | Mod: HCNC,S$GLB,, | Performed by: INTERNAL MEDICINE

## 2023-12-13 PROCEDURE — 1111F DSCHRG MED/CURRENT MED MERGE: CPT | Mod: HCNC,CPTII,S$GLB, | Performed by: INTERNAL MEDICINE

## 2023-12-13 PROCEDURE — 99999 PR PBB SHADOW E&M-EST. PATIENT-LVL III: ICD-10-PCS | Mod: PBBFAC,HCNC,, | Performed by: INTERNAL MEDICINE

## 2023-12-13 PROCEDURE — 1111F PR DISCHARGE MEDS RECONCILED W/ CURRENT OUTPATIENT MED LIST: ICD-10-PCS | Mod: HCNC,CPTII,S$GLB, | Performed by: INTERNAL MEDICINE

## 2023-12-13 PROCEDURE — 1100F PR PT FALLS ASSESS DOC 2+ FALLS/FALL W/INJURY/YR: ICD-10-PCS | Mod: HCNC,CPTII,S$GLB, | Performed by: INTERNAL MEDICINE

## 2023-12-13 PROCEDURE — 99999 PR PBB SHADOW E&M-EST. PATIENT-LVL III: CPT | Mod: PBBFAC,HCNC,, | Performed by: INTERNAL MEDICINE

## 2023-12-13 PROCEDURE — 3288F PR FALLS RISK ASSESSMENT DOCUMENTED: ICD-10-PCS | Mod: HCNC,CPTII,S$GLB, | Performed by: INTERNAL MEDICINE

## 2023-12-13 PROCEDURE — 1126F PR PAIN SEVERITY QUANTIFIED, NO PAIN PRESENT: ICD-10-PCS | Mod: HCNC,CPTII,S$GLB, | Performed by: INTERNAL MEDICINE

## 2023-12-13 PROCEDURE — 1100F PTFALLS ASSESS-DOCD GE2>/YR: CPT | Mod: HCNC,CPTII,S$GLB, | Performed by: INTERNAL MEDICINE

## 2023-12-13 PROCEDURE — 99212 OFFICE O/P EST SF 10 MIN: CPT | Mod: HCNC,S$GLB,, | Performed by: INTERNAL MEDICINE

## 2023-12-13 PROCEDURE — 1159F PR MEDICATION LIST DOCUMENTED IN MEDICAL RECORD: ICD-10-PCS | Mod: HCNC,CPTII,S$GLB, | Performed by: INTERNAL MEDICINE

## 2023-12-13 PROCEDURE — 3288F FALL RISK ASSESSMENT DOCD: CPT | Mod: HCNC,CPTII,S$GLB, | Performed by: INTERNAL MEDICINE

## 2023-12-13 PROCEDURE — 1157F PR ADVANCE CARE PLAN OR EQUIV PRESENT IN MEDICAL RECORD: ICD-10-PCS | Mod: HCNC,CPTII,S$GLB, | Performed by: INTERNAL MEDICINE

## 2023-12-13 PROCEDURE — 1126F AMNT PAIN NOTED NONE PRSNT: CPT | Mod: HCNC,CPTII,S$GLB, | Performed by: INTERNAL MEDICINE

## 2023-12-13 PROCEDURE — 1159F MED LIST DOCD IN RCRD: CPT | Mod: HCNC,CPTII,S$GLB, | Performed by: INTERNAL MEDICINE

## 2023-12-13 NOTE — PROGRESS NOTES
"[facility]    CC: GI bleed     HPI 89 y.o. female here for hospital follow up for recent GI bleeding. Admitted to the hospital 11/2023  with rectal bleeding.  CTA at the time showed concerns for acute bleeding in the proximal ascending colon. Suspected diverticular bleed. IR performed angiogram with embolization. Bleeding resolved after embolization.     Today she states she is doing well from GI standpoint. No further episodes of bleeding. She thinks she may have had a few colonoscopies many years ago.       Past Medical History  HLD   Hypothyroidism   Vertigo       Physical Examination  BP (!) 154/73 (BP Location: Left arm, Patient Position: Sitting, BP Method: Medium (Automatic))   Pulse 73   Ht 5' 3" (1.6 m)   Wt 51.9 kg (114 lb 8.5 oz)   BMI 20.29 kg/m²   General appearance: alert, cooperative, no distress  Abdomen: soft, non-tender; bowel sounds normoactive; no organomegaly    Assessment:   89 y/o F here for follow up after recent episode of rectal bleeding suspected secondary to diverticular bleeding treated by IR with embolization. Patient doing well today. No further episodes of bleeding.     Plan:  No further evaluation indicated at this time   Return to clinic as needed       Diandra Sequeira MD   [facility]    Jamshid Trujillo MD   [facility]     [address], [address]   [place] [address] [phone]   "

## 2023-12-14 ENCOUNTER — OFFICE VISIT (OUTPATIENT)
Dept: INTERNAL MEDICINE | Facility: CLINIC | Age: 88
End: 2023-12-14
Payer: MEDICARE

## 2023-12-14 ENCOUNTER — OUTPATIENT CASE MANAGEMENT (OUTPATIENT)
Dept: NEUROLOGY | Facility: CLINIC | Age: 88
End: 2023-12-14
Payer: MEDICARE

## 2023-12-14 VITALS
BODY MASS INDEX: 19.69 KG/M2 | HEIGHT: 63 IN | OXYGEN SATURATION: 98 % | WEIGHT: 111.13 LBS | TEMPERATURE: 97 F | HEART RATE: 64 BPM | DIASTOLIC BLOOD PRESSURE: 62 MMHG | SYSTOLIC BLOOD PRESSURE: 148 MMHG

## 2023-12-14 DIAGNOSIS — R53.1 WEAKNESS ACQUIRED IN ICU: ICD-10-CM

## 2023-12-14 DIAGNOSIS — J43.2 CENTRILOBULAR EMPHYSEMA: ICD-10-CM

## 2023-12-14 DIAGNOSIS — K92.2 ACUTE LOWER GI BLEEDING: Primary | ICD-10-CM

## 2023-12-14 DIAGNOSIS — G30.1 MILD LATE ONSET ALZHEIMER'S DEMENTIA WITHOUT BEHAVIORAL DISTURBANCE, PSYCHOTIC DISTURBANCE, MOOD DISTURBANCE, OR ANXIETY: ICD-10-CM

## 2023-12-14 DIAGNOSIS — D62 ACUTE BLOOD LOSS ANEMIA: ICD-10-CM

## 2023-12-14 DIAGNOSIS — F02.A0 MILD LATE ONSET ALZHEIMER'S DEMENTIA WITHOUT BEHAVIORAL DISTURBANCE, PSYCHOTIC DISTURBANCE, MOOD DISTURBANCE, OR ANXIETY: ICD-10-CM

## 2023-12-14 PROCEDURE — 1160F PR REVIEW ALL MEDS BY PRESCRIBER/CLIN PHARMACIST DOCUMENTED: ICD-10-PCS | Mod: HCNC,CPTII,S$GLB, | Performed by: NURSE PRACTITIONER

## 2023-12-14 PROCEDURE — 1159F MED LIST DOCD IN RCRD: CPT | Mod: HCNC,CPTII,S$GLB, | Performed by: NURSE PRACTITIONER

## 2023-12-14 PROCEDURE — 99214 OFFICE O/P EST MOD 30 MIN: CPT | Mod: HCNC,S$GLB,, | Performed by: NURSE PRACTITIONER

## 2023-12-14 PROCEDURE — 1100F PR PT FALLS ASSESS DOC 2+ FALLS/FALL W/INJURY/YR: ICD-10-PCS | Mod: HCNC,CPTII,S$GLB, | Performed by: NURSE PRACTITIONER

## 2023-12-14 PROCEDURE — 1159F PR MEDICATION LIST DOCUMENTED IN MEDICAL RECORD: ICD-10-PCS | Mod: HCNC,CPTII,S$GLB, | Performed by: NURSE PRACTITIONER

## 2023-12-14 PROCEDURE — 1157F ADVNC CARE PLAN IN RCRD: CPT | Mod: HCNC,CPTII,S$GLB, | Performed by: NURSE PRACTITIONER

## 2023-12-14 PROCEDURE — 1100F PTFALLS ASSESS-DOCD GE2>/YR: CPT | Mod: HCNC,CPTII,S$GLB, | Performed by: NURSE PRACTITIONER

## 2023-12-14 PROCEDURE — 99999 PR PBB SHADOW E&M-EST. PATIENT-LVL IV: ICD-10-PCS | Mod: PBBFAC,HCNC,, | Performed by: NURSE PRACTITIONER

## 2023-12-14 PROCEDURE — 99999 PR PBB SHADOW E&M-EST. PATIENT-LVL IV: CPT | Mod: PBBFAC,HCNC,, | Performed by: NURSE PRACTITIONER

## 2023-12-14 PROCEDURE — 3288F FALL RISK ASSESSMENT DOCD: CPT | Mod: HCNC,CPTII,S$GLB, | Performed by: NURSE PRACTITIONER

## 2023-12-14 PROCEDURE — 1111F PR DISCHARGE MEDS RECONCILED W/ CURRENT OUTPATIENT MED LIST: ICD-10-PCS | Mod: HCNC,CPTII,S$GLB, | Performed by: NURSE PRACTITIONER

## 2023-12-14 PROCEDURE — 1157F PR ADVANCE CARE PLAN OR EQUIV PRESENT IN MEDICAL RECORD: ICD-10-PCS | Mod: HCNC,CPTII,S$GLB, | Performed by: NURSE PRACTITIONER

## 2023-12-14 PROCEDURE — 3288F PR FALLS RISK ASSESSMENT DOCUMENTED: ICD-10-PCS | Mod: HCNC,CPTII,S$GLB, | Performed by: NURSE PRACTITIONER

## 2023-12-14 PROCEDURE — 99214 PR OFFICE/OUTPT VISIT, EST, LEVL IV, 30-39 MIN: ICD-10-PCS | Mod: HCNC,S$GLB,, | Performed by: NURSE PRACTITIONER

## 2023-12-14 PROCEDURE — 1126F PR PAIN SEVERITY QUANTIFIED, NO PAIN PRESENT: ICD-10-PCS | Mod: HCNC,CPTII,S$GLB, | Performed by: NURSE PRACTITIONER

## 2023-12-14 PROCEDURE — 1111F DSCHRG MED/CURRENT MED MERGE: CPT | Mod: HCNC,CPTII,S$GLB, | Performed by: NURSE PRACTITIONER

## 2023-12-14 PROCEDURE — 1160F RVW MEDS BY RX/DR IN RCRD: CPT | Mod: HCNC,CPTII,S$GLB, | Performed by: NURSE PRACTITIONER

## 2023-12-14 PROCEDURE — 1126F AMNT PAIN NOTED NONE PRSNT: CPT | Mod: HCNC,CPTII,S$GLB, | Performed by: NURSE PRACTITIONER

## 2023-12-14 NOTE — PROGRESS NOTES
Subjective:       Patient ID: Olamide Felipe is a 89 y.o. female.    Chief Complaint: Follow-up      Patient is a 89 y.o. female who traditionally follows with Abiola Campbell MD presenting today for follow up after hospital admission.     Patient was admitted 11/16-11/20 for severe acute blood loss anemia 2/2 diverticular hemorrhage. IR performed angiogram with coiling on 11/16. Required transfusion of 1u PRBC. SNF placement was sought.   She was in SNF from 11/20-12/7 and underwent PT/OT. During admission in SNF she did received 1u PRBC and was started on iron supplementation.   She was discharged home with home health and PT/OT.   Follow up with GI yesterday was uneventful.     Patient and daughter note that she is home but having some difficulty transitioning back. Denies any rectal bleeding. Notes she still feels weak but is overall improved. Home medication will now be administered via pillpak from pharmacy in twice daily dosing.     Review of patient's allergies indicates:  No Known Allergies    Medication List with Changes/Refills   Current Medications    ACETAMINOPHEN (TYLENOL) 325 MG TABLET    Take 325 mg by mouth every 6 (six) hours as needed for Pain.    B COMPLEX VITAMINS TABLET    Take 1 tablet by mouth once daily.    CALCIUM-VITAMIN D 250-100 MG-UNIT PER TABLET    Take 1 tablet by mouth 2 (two) times daily.    FERROUS SULFATE 325 (65 FE) MG EC TABLET    Take 1 tablet (325 mg total) by mouth 2 (two) times daily.    FLUOXETINE 20 MG CAPSULE    Take 1 capsule (20 mg total) by mouth once daily.    LEVOTHYROXINE (SYNTHROID) 25 MCG TABLET    Take 1 tablet (25 mcg total) by mouth before breakfast.    LORATADINE (CLARITIN) 10 MG TABLET    Take 1 tablet (10 mg total) by mouth once daily.    MEMANTINE (NAMENDA) 5 MG TAB    Take 1 tablet (5 mg total) by mouth 2 (two) times daily. Begin by taking one tablet daily for seven days, then increase to one tablet two times daily.    MV-MIN/IRON/FOLIC/CALCIUM/VITK  "(WOMEN'S MULTIVITAMIN ORAL)    Take 1 tablet by mouth once daily.    OMEPRAZOLE (PRILOSEC) 20 MG CAPSULE    Take 1 capsule (20 mg total) by mouth once daily.    POLYETHYLENE GLYCOL (GLYCOLAX) 17 GRAM PWPK    Take 17 g by mouth 2 (two) times daily as needed for Constipation.    SENNA-DOCUSATE 8.6-50 MG (PERICOLACE) 8.6-50 MG PER TABLET    Take 1 tablet by mouth once daily.    SIMVASTATIN (ZOCOR) 20 MG TABLET    TAKE 1 TABLET (20 MG TOTAL) BY MOUTH EVERY EVENING.       Patient ambulates on her own without an assistive device.     Medical, social and surgical history has been reviewed with the patient.     Review of Systems   Constitutional:  Negative for chills and fever.   Respiratory:  Negative for cough and shortness of breath.    Cardiovascular:  Negative for chest pain.   Neurological:  Negative for dizziness and headaches.      Objective:   BP (!) 148/62 (BP Location: Right arm, Patient Position: Sitting, BP Method: Small (Manual))   Pulse 64   Temp 96.9 °F (36.1 °C) (Temporal)   Ht 5' 3" (1.6 m)   Wt 50.4 kg (111 lb 1.8 oz)   SpO2 98%   BMI 19.68 kg/m²       Physical Exam  Vitals reviewed.   Constitutional:       Appearance: Normal appearance.   HENT:      Head: Normocephalic and atraumatic.   Cardiovascular:      Rate and Rhythm: Normal rate and regular rhythm.      Heart sounds: Normal heart sounds. No murmur heard.  Pulmonary:      Effort: Pulmonary effort is normal.      Breath sounds: Normal breath sounds. No wheezing.   Skin:     General: Skin is warm and dry.   Neurological:      Mental Status: She is alert.         I reviewed and independently interpreted the labs and imaging below:  Last Labs:  Glucose   Date Value Ref Range Status   12/07/2023 76 70 - 110 mg/dL Final   12/04/2023 77 70 - 110 mg/dL Final     BUN   Date Value Ref Range Status   12/07/2023 26 (H) 8 - 23 mg/dL Final   12/04/2023 23 8 - 23 mg/dL Final     Creatinine   Date Value Ref Range Status   12/07/2023 0.9 0.5 - 1.4 mg/dL Final " "  12/04/2023 0.9 0.5 - 1.4 mg/dL Final     Cholesterol   Date Value Ref Range Status   07/11/2023 206 (H) 120 - 199 mg/dL Final     Comment:     The National Cholesterol Education Program (NCEP) has set the  following guidelines (reference ranges) for Cholesterol:  Optimal.....................<200 mg/dL  Borderline High.............200-239 mg/dL  High........................> or = 240 mg/dL     07/18/2022 198 120 - 199 mg/dL Final     Comment:     The National Cholesterol Education Program (NCEP) has set the  following guidelines (reference ranges) for Cholesterol:  Optimal.....................<200 mg/dL  Borderline High.............200-239 mg/dL  High........................> or = 240 mg/dL       No results found for: "HGBA1C"  Hemoglobin   Date Value Ref Range Status   12/07/2023 10.0 (L) 12.0 - 16.0 g/dL Final   12/04/2023 10.0 (L) 12.0 - 16.0 g/dL Final     Hematocrit   Date Value Ref Range Status   12/07/2023 30.8 (L) 37.0 - 48.5 % Final   12/04/2023 31.2 (L) 37.0 - 48.5 % Final       Assessment and Plan:     1. Acute lower GI bleeding  2. Acute blood loss anemia  3. Weakness acquired in ICU    Resolved - last H&H 10/30. Patient to have repeat labs in 1 month to monitor anemia. Will add iron studies.     4. Mild late onset Alzheimer's dementia without behavioral disturbance, psychotic disturbance, mood disturbance, or anxiety    Chronic, stable, continue current medication    5. Centrilobular emphysema    Initially noted on CT of cervical spine from 4/8/2021:    Biapical pleuroparenchymal scarring and centrilobular and paraseptal emphysema without pneumothorax.    "

## 2023-12-14 NOTE — PROGRESS NOTES
Protocol: The [facility] Effectiveness Study  Identifier: [id number]  IRB#: 2022.247  PI: Dr. Baldo Maynard, PhD  CO-I: Dr. Nighat Arreguin PsyD  Version Date: 12/05/2022  Pt Study ID: [id number]  Visit Month: 2   Care Plan documented on: 11/14/23 Please refer to note for more information.      Timeline:      Consent: Completed(10/20/23)  Baseline questionnaires: Completed(10/23/23)  6-month questionnaires: Planned(04/18/24)  12-month questionnaires: Planned(10/18/24)            Visit Note:   12/13/23 Received the following email from cg, Christy Jose.   I'm very concerned about mom since she was released from Skilled Nursing on Friday.  She's more disoriented than ever and can't retain anything.  This morning after a doctor's appointment, she couldn't remember how to get in her building and she's lived there for 4 years.    I think we're going to have to move her somewhere else soon and I was wondering if you have any tips on how to handle her long-term care insurance policy.  She can foot the bill for awhile, but since she has the policy, we need to use the benefits.    The pharmacy has pill-packed her meds and is shipping them out for delivery tomorrow.    BTW, I did get her a new phone and I highly recommend it.  It's called a Oyster0 and the phone and a year's worth of unlimited talk and text - no data! - was only $150.00.  It has photo dial, so I set it up while she was in the hospital with pictures of the 7 people she needs to call.  All she has to do if scroll to the picture and hit the green call button.  She's still struggling to use it, but it's new.  Just thought you might like the tip for other families.    12/14/23 Responded to the cg's email and called to schedule a time to talk right away. Spoke with caregiver Christy Garcia (Daughter) . Cg and I scheduled for me to give her a call to complete our care eco month 2 call, and make a plan for the pt moving forward on Monday 12/18/23 at  2:30 PM.     12/18/23 - Spoke to cg, Christy Garcia for our month 2 care eco call. The cg reported that the pt is continuously confused and living on her own will likely not happen for much longer. The cg is interested in having the pt move in to a facility called [facility]. The cg sent me a copy of the of the pt's benefits package for us both to review so we can figure out what the next steps are in order to get the pt in to this space sooner than later. Cg states that [facility] is like an assisted living home.     Cg reports that the pt is not able to describe things as well as she used to and is mixing up words more often.  Cg reports that the pt's personality is changing quickly and she is looking into [facility] this week and next just in case of an emergency. Cg reports that the pt is not able to remember if she had paid her bills or rent anymore. The cg takes care of all of this for the pt but the pt brings it up frequently.     Cg states that the pt called her last Friday in tears because she was confused and scared - pt mentioned to the cg that she can not remember how to do anything anymore. Cg reported that the pt attended her granddaughters 2nd birthday on Saturday morning and had a great time. Cg stated that the pt has been a lot nicer to the family recently. Cg reports that the pt plans on attending a [place] Dinner at her apartment complex this Thursday. The cg mentioned that no one will be able to accompany the pt to the dinner but someone in the building will make sure she gets to and from the dinner safely.     Cg reports that the pt medication has been packed and delivered to the pt's home - the pt is still getting used to to taking it but it is really convenient that the cg does not have to organize it anymore. The cg continues to remind the pt to take medication in the morning and evening.   Falls in the past month:0  UTIs in the past month:0  Hospital encounters in the past month (reported by  caregiver): 2  HE 1: 11/16/23 discharged 11/20/23 (4 nights in the hospital) Reason for admission: BRBPR (bright red blood per rectum), Lower GI Bleed. Discharged to skilled nursing facility.   HE 2: admitted to long term nursing 11/20/23-12/8/23  Mild late onset Alzheimer's dementia without behavioral disturbance, psychotic disturbance, mood disturbance, or anxiety  - Primary ICD-10-CM: G30.1, F02.A0  ICD-9-CM: 331.0, 294.10    Lower gastrointestinal hemorrhage         Next monthly visit scheduled for: 01/17/2024 Caregiver knows how to reach CTN, and is encouraged to do so, as needed before our next scheduled call.     Action items for CTN: None at this time .      ClinCard sent/loaded: no

## 2024-01-05 ENCOUNTER — OFFICE VISIT (OUTPATIENT)
Dept: URGENT CARE | Facility: CLINIC | Age: 89
End: 2024-01-05
Payer: MEDICARE

## 2024-01-05 ENCOUNTER — HOSPITAL ENCOUNTER (OUTPATIENT)
Dept: RADIOLOGY | Facility: HOSPITAL | Age: 89
Discharge: HOME | End: 2024-01-05
Attending: NURSE PRACTITIONER
Payer: MEDICARE

## 2024-01-05 VITALS
WEIGHT: 111.13 LBS | HEIGHT: 63 IN | SYSTOLIC BLOOD PRESSURE: 170 MMHG | RESPIRATION RATE: 18 BRPM | OXYGEN SATURATION: 97 % | HEART RATE: 75 BPM | TEMPERATURE: 98 F | DIASTOLIC BLOOD PRESSURE: 62 MMHG | BODY MASS INDEX: 19.69 KG/M2

## 2024-01-05 DIAGNOSIS — Z12.31 ENCOUNTER FOR SCREENING MAMMOGRAM FOR MALIGNANT NEOPLASM OF BREAST: ICD-10-CM

## 2024-01-05 DIAGNOSIS — I15.9 SECONDARY HYPERTENSION: Primary | ICD-10-CM

## 2024-01-05 DIAGNOSIS — R41.0 CONFUSION: ICD-10-CM

## 2024-01-05 LAB
BILIRUB UR QL STRIP: NEGATIVE
GLUCOSE UR QL STRIP: NEGATIVE
KETONES UR QL STRIP: NEGATIVE
LEUKOCYTE ESTERASE UR QL STRIP: NEGATIVE
PH, POC UA: 5.5 (ref 5–8)
POC BLOOD, URINE: NEGATIVE
POC NITRATES, URINE: NEGATIVE
PROT UR QL STRIP: NEGATIVE
SP GR UR STRIP: 1.02 (ref 1–1.03)
UROBILINOGEN UR STRIP-ACNC: NORMAL (ref 0.1–1.1)

## 2024-01-05 PROCEDURE — 77063 BREAST TOMOSYNTHESIS BI: CPT | Mod: 26,HCNC,, | Performed by: RADIOLOGY

## 2024-01-05 PROCEDURE — 77067 SCR MAMMO BI INCL CAD: CPT | Mod: TC,HCNC

## 2024-01-05 PROCEDURE — 77067 SCR MAMMO BI INCL CAD: CPT | Mod: 26,HCNC,, | Performed by: RADIOLOGY

## 2024-01-05 PROCEDURE — 99213 OFFICE O/P EST LOW 20 MIN: CPT | Mod: S$GLB,,, | Performed by: NURSE PRACTITIONER

## 2024-01-05 PROCEDURE — 81003 URINALYSIS AUTO W/O SCOPE: CPT | Mod: QW,S$GLB,, | Performed by: NURSE PRACTITIONER

## 2024-01-05 NOTE — PROGRESS NOTES
"Subjective:      Patient ID: Olamide Felipe is a 89 y.o. female.    Vitals:  height is 5' 3" (1.6 m) and weight is 50.4 kg (111 lb 1.8 oz). Her oral temperature is 98.1 °F (36.7 °C). Her blood pressure is 170/62 (abnormal) and her pulse is 75. Her respiration is 18 and oxygen saturation is 97%.     Chief Complaint: Dysuria    Pt coming into clinic stating everything feels different, she states that nothing hurts she just feels different and would like to get checked out. Daughter with pt and states during Mammogram today, Mother states she did not feel right. Daughter wanted Mom checked out for urinary tract infection. Daughter states Mom has Alzheimer's. Pt is awake and alert, ambulatory with out walker or cane, no symptoms of weakness.      Constitution: Negative for chills, sweating, fatigue and fever.   HENT:  Negative for ear pain, congestion and sore throat.    Neck: Negative for neck pain and neck stiffness.   Cardiovascular:  Negative for chest pain, leg swelling, palpitations and sob on exertion.   Eyes:  Negative for eye pain, eye redness and vision loss.   Respiratory:  Negative for cough, sputum production and shortness of breath.    Gastrointestinal:  Negative for abdominal pain, nausea, vomiting and diarrhea.   Genitourinary:  Negative for dysuria, frequency, urgency, flank pain and hematuria.   Musculoskeletal:  Negative for pain and trauma.   Skin:  Negative for color change and rash.   Neurological:  Negative for dizziness, headaches and disorientation.   Psychiatric/Behavioral:  Negative for disorientation.       Objective:     Physical Exam   Constitutional: She is oriented to person, place, and time. She appears well-developed. She is cooperative.  Non-toxic appearance. She does not appear ill. No distress.   HENT:   Head: Normocephalic and atraumatic.   Ears:   Right Ear: Hearing, tympanic membrane, external ear and ear canal normal.   Left Ear: Hearing, tympanic membrane, external ear and " ear canal normal.   Nose: Nose normal. No mucosal edema, rhinorrhea or nasal deformity. No epistaxis. Right sinus exhibits no maxillary sinus tenderness and no frontal sinus tenderness. Left sinus exhibits no maxillary sinus tenderness and no frontal sinus tenderness.   Mouth/Throat: Uvula is midline, oropharynx is clear and moist and mucous membranes are normal. No trismus in the jaw. Normal dentition. No uvula swelling. No oropharyngeal exudate, posterior oropharyngeal edema or posterior oropharyngeal erythema.   Eyes: Conjunctivae and lids are normal. No visual field deficit is present. No scleral icterus.   Neck: Trachea normal and phonation normal. Neck supple. No thyromegaly present. No thyroid tenderness present. No edema present. No erythema present. No neck rigidity present.   Cardiovascular: Normal rate, regular rhythm, S1 normal, S2 normal, normal heart sounds and normal pulses.   Pulmonary/Chest: Effort normal and breath sounds normal. No respiratory distress. She has no decreased breath sounds. She has no rhonchi.   Abdominal: Normal appearance and bowel sounds are normal. She exhibits no distension. Soft. flat abdomen There is no abdominal tenderness.   Musculoskeletal: Normal range of motion.         General: No deformity. Normal range of motion.      Right lower leg: No edema.      Left lower leg: No edema.   Neurological: no focal deficit. She is alert, oriented to person, place, and time and at baseline. She has intact cranial nerves (2-12). She displays no weakness, no atrophy, no tremor, facial symmetry and no dysarthria. No cranial nerve deficit. She exhibits normal muscle tone. Gait and coordination normal. Coordination normal. GCS eye subscore is 4. GCS verbal subscore is 5. GCS motor subscore is 6.      Comments: Pt is ambulatory with full range of motion without use of walker or cane.   Skin: Skin is warm, dry, intact, not diaphoretic and not pale. Capillary refill takes less than 2 seconds.    Psychiatric: Her speech is normal and behavior is normal. Mood, judgment and thought content normal.   Nursing note and vitals reviewed.      Results for orders placed or performed in visit on 01/05/24   POCT Urinalysis, Dipstick, Automated, W/O Scope   Result Value Ref Range    POC Blood, Urine Negative Negative, Positive Slide, Positive Tube    POC Bilirubin, Urine Negative Negative, Positive Slide, Positive Tube    POC Urobilinogen, Urine norm 0.1 - 1.1    POC Ketones, Urine Negative Negative, Positive Slide, Positive Tube    POC Protein, Urine Negative Negative, Positive Slide, Positive Tube    POC Nitrates, Urine Negative Negative, Positive Slide, Positive Tube    POC Glucose, Urine Negative Negative, Positive Slide, Positive Tube    pH, UA 5.5 5 - 8    POC Specific Gravity, Urine 1.025 1.003 - 1.029    POC Leukocytes, Urine Negative Negative, Positive Slide, Positive Tube       Assessment:     1. Secondary hypertension    2. Confusion         Blood Pressure 170/62, Daughter states systolic blood pressure usually at 120-130 mmHg. Pt has standing order at home to take Losartan for Systolic blood pressure greater than 150. Pt has not been taking and has not checked B/Pat home recently.  Pt and daughter informed of Normal Urinalysis.  Plan:       Secondary hypertension    Confusion  -     POCT Urinalysis, Dipstick, Automated, W/O Scope      Patient Instructions   Discharge orders for Secondary Hypertension and Confusion  Pt to take B/P daily, at home  B/p 170/62, retaken 180/80  No signs and symptoms of stroke  Pt has standing order to take Losartan 25mg 1 po by mouth for systolic blood  pressure greater than 150 at home every day.          Elevated Blood Pressure  Your blood pressure was elevated during your visit to the urgent care.  It was not so high that immediate care was needed but it is recommended that you monitor your blood pressure over the next week or two to make sure that it is not staying  elevated.  Please have your blood pressure taken 2-3 times daily at different times of the day.  Write all of those blood pressures down and record the time that they were taken.  Keep all that information and take it with you to see your Primary Care Physician.  If your blood pressure is consistently above 140/90 you will need to follow up with your PCP more quickly      See Print Out instructions for High Blood pressure and Altered mental status

## 2024-01-05 NOTE — PATIENT INSTRUCTIONS
Discharge orders for Secondary Hypertension and Confusion  Pt to take B/P daily, at home  B/p 170/62, retaken 180/80  No signs and symptoms of stroke  Pt to monitor Blood Pressure at home  Pt has standing order to take Losartan 25mg 1 po by mouth for systolic blood  pressure greater than 150 at home every day.           Elevated Blood Pressure  Your blood pressure was elevated during your visit to the urgent care.  It was not so high that immediate care was needed but it is recommended that you monitor your blood pressure over the next week or two to make sure that it is not staying elevated.  Please have your blood pressure taken 2-3 times daily at different times of the day.  Write all of those blood pressures down and record the time that they were taken.  Keep all that information and take it with you to see your Primary Care Physician.  If your blood pressure is consistently above 140/90 you will need to follow up with your PCP more quickly      See Print Out instructions for High Blood pressure and Altered mental status

## 2024-01-08 NOTE — PROGRESS NOTES
"Subjective:      Patient ID: Olamide Felipe is a 89 y.o. female.    Vitals:  height is 5' 3" (1.6 m) and weight is 50.4 kg (111 lb 1.8 oz). Her oral temperature is 98.1 °F (36.7 °C). Her blood pressure is 170/62 (abnormal) and her pulse is 75. Her respiration is 18 and oxygen saturation is 97%.     Chief Complaint: Dysuria    Pt coming into clinic stating everything feels different, she states that nothing hurts she just feels different and would like to get checked out. Daughter with pt and states during Mammogram today, Mother states she did not feel right. Daughter wanted Mom checked out for urinary tract infection. Daughter states Mom has Alzheimer's. Pt is awake and alert, ambulatory with out walker or cane, no symptoms of weakness.    Dysuria   Pertinent negatives include no chills, flank pain, frequency, hematuria, nausea, urgency, vomiting or rash.     Constitution: Negative for chills, sweating, fatigue and fever.   HENT:  Negative for ear pain, congestion and sore throat.    Neck: Negative for neck pain and neck stiffness.   Cardiovascular:  Negative for chest pain, leg swelling, palpitations and sob on exertion.   Eyes:  Negative for eye pain, eye redness and vision loss.   Respiratory:  Negative for cough, sputum production and shortness of breath.    Gastrointestinal:  Negative for abdominal pain, nausea, vomiting and diarrhea.   Genitourinary:  Positive for dysuria. Negative for frequency, urgency, flank pain and hematuria.   Musculoskeletal:  Negative for pain and trauma.   Skin:  Negative for color change and rash.   Neurological:  Negative for dizziness, headaches and disorientation.   Psychiatric/Behavioral:  Negative for disorientation.       Objective:     Physical Exam   Constitutional: She is oriented to person, place, and time. She appears well-developed. She is cooperative.  Non-toxic appearance. She does not appear ill. No distress.   HENT:   Head: Normocephalic and atraumatic.   Ears: "   Right Ear: Hearing, tympanic membrane, external ear and ear canal normal.   Left Ear: Hearing, tympanic membrane, external ear and ear canal normal.   Nose: Nose normal. No mucosal edema, rhinorrhea or nasal deformity. No epistaxis. Right sinus exhibits no maxillary sinus tenderness and no frontal sinus tenderness. Left sinus exhibits no maxillary sinus tenderness and no frontal sinus tenderness.   Mouth/Throat: Uvula is midline, oropharynx is clear and moist and mucous membranes are normal. No trismus in the jaw. Normal dentition. No uvula swelling. No oropharyngeal exudate, posterior oropharyngeal edema or posterior oropharyngeal erythema.   Eyes: Conjunctivae and lids are normal. No visual field deficit is present. No scleral icterus.   Neck: Trachea normal and phonation normal. Neck supple. No thyromegaly present. No thyroid tenderness present. No edema present. No erythema present. No neck rigidity present.   Cardiovascular: Normal rate, regular rhythm, S1 normal, S2 normal, normal heart sounds and normal pulses.   Pulmonary/Chest: Effort normal and breath sounds normal. No respiratory distress. She has no decreased breath sounds. She has no rhonchi.   Abdominal: Normal appearance and bowel sounds are normal. She exhibits no distension. Soft. flat abdomen There is no abdominal tenderness.   Musculoskeletal: Normal range of motion.         General: No deformity. Normal range of motion.      Right lower leg: No edema.      Left lower leg: No edema.   Neurological: no focal deficit. She is alert, oriented to person, place, and time and at baseline. She has intact cranial nerves (2-12). She displays no weakness, no atrophy, no tremor, facial symmetry and no dysarthria. No cranial nerve deficit. She exhibits normal muscle tone. Gait and coordination normal. Coordination normal. GCS eye subscore is 4. GCS verbal subscore is 5. GCS motor subscore is 6.      Comments: Pt is ambulatory with full range of motion  without use of walker or cane.   Skin: Skin is warm, dry, intact, not diaphoretic and not pale. Capillary refill takes less than 2 seconds.   Psychiatric: Her speech is normal and behavior is normal. Mood, judgment and thought content normal.   Nursing note and vitals reviewed.      Results for orders placed or performed in visit on 01/05/24   POCT Urinalysis, Dipstick, Automated, W/O Scope   Result Value Ref Range    POC Blood, Urine Negative Negative, Positive Slide, Positive Tube    POC Bilirubin, Urine Negative Negative, Positive Slide, Positive Tube    POC Urobilinogen, Urine norm 0.1 - 1.1    POC Ketones, Urine Negative Negative, Positive Slide, Positive Tube    POC Protein, Urine Negative Negative, Positive Slide, Positive Tube    POC Nitrates, Urine Negative Negative, Positive Slide, Positive Tube    POC Glucose, Urine Negative Negative, Positive Slide, Positive Tube    pH, UA 5.5 5 - 8    POC Specific Gravity, Urine 1.025 1.003 - 1.029    POC Leukocytes, Urine Negative Negative, Positive Slide, Positive Tube       Assessment:     1. Secondary hypertension    2. Confusion         Blood Pressure 170/62, Daughter states systolic blood pressure usually at 120-130 mmHg. Pt has standing order at home to take Losartan for Systolic blood pressure greater than 150. Pt has not been taking and has not checked B/Pat home recently.  Pt and daughter informed of Normal Urinalysis.  Plan:       Secondary hypertension    Confusion  -     POCT Urinalysis, Dipstick, Automated, W/O Scope      Patient Instructions   Discharge orders for Secondary Hypertension and Confusion  Pt to take B/P daily, at home  B/p 170/62, retaken 180/80  No signs and symptoms of stroke  Pt to monitor Blood Pressure at home  Pt has standing order to take Losartan 25mg 1 po by mouth for systolic blood  pressure greater than 150 at home every day.           Elevated Blood Pressure  Your blood pressure was elevated during your visit to the urgent care.   It was not so high that immediate care was needed but it is recommended that you monitor your blood pressure over the next week or two to make sure that it is not staying elevated.  Please have your blood pressure taken 2-3 times daily at different times of the day.  Write all of those blood pressures down and record the time that they were taken.  Keep all that information and take it with you to see your Primary Care Physician.  If your blood pressure is consistently above 140/90 you will need to follow up with your PCP more quickly      See Print Out instructions for High Blood pressure and Altered mental status

## 2024-01-09 ENCOUNTER — TELEPHONE (OUTPATIENT)
Dept: RADIOLOGY | Facility: HOSPITAL | Age: 89
End: 2024-01-09
Payer: MEDICARE

## 2024-01-09 NOTE — TELEPHONE ENCOUNTER
Spoke with patient and explained mammogram findings.Patient expressed understanding of results. Patient scheduled abnormal mammogram follow up appointment at [facility] on 1/16/2024.

## 2024-01-16 ENCOUNTER — HOSPITAL ENCOUNTER (OUTPATIENT)
Dept: RADIOLOGY | Facility: HOSPITAL | Age: 89
Discharge: HOME | End: 2024-01-16
Attending: NURSE PRACTITIONER
Payer: MEDICARE

## 2024-01-16 ENCOUNTER — OUTPATIENT CASE MANAGEMENT (OUTPATIENT)
Dept: NEUROLOGY | Facility: CLINIC | Age: 89
End: 2024-01-16
Payer: MEDICARE

## 2024-01-16 DIAGNOSIS — R92.8 ABNORMAL FINDING ON BREAST IMAGING: ICD-10-CM

## 2024-01-16 PROCEDURE — 77061 BREAST TOMOSYNTHESIS UNI: CPT | Mod: 26,HCNC,RT, | Performed by: RADIOLOGY

## 2024-01-16 PROCEDURE — 76642 ULTRASOUND BREAST LIMITED: CPT | Mod: TC,HCNC,RT

## 2024-01-16 PROCEDURE — 77065 DX MAMMO INCL CAD UNI: CPT | Mod: TC,HCNC,RT

## 2024-01-16 PROCEDURE — 76642 ULTRASOUND BREAST LIMITED: CPT | Mod: 26,HCNC,RT, | Performed by: RADIOLOGY

## 2024-01-16 PROCEDURE — 77065 DX MAMMO INCL CAD UNI: CPT | Mod: 26,HCNC,RT, | Performed by: RADIOLOGY

## 2024-01-16 NOTE — PROGRESS NOTES
Protocol: [employer] Effectiveness Study  Identifier: [id number]  IRB#: 2022.247  PI: Dr. Baldo Maynard, PhD  CO-I: Dr. Nighat Arreguin PsyD  Version Date: 12/05/2022  Pt Study ID: [id number]  Visit Month: 3  Care Plan documented on: 11/14/23 Please refer to note for more information.      Timeline:      Consent: Completed(10/20/23)  Baseline questionnaires: Completed(10/23/23)  6-month questionnaires: Planned(04/18/24)  12-month questionnaires: Planned(10/18/24)            Visit Note:     (date: 01/16/24) Reached out to cg, Christy Garcia in a first attempt to hold our month 3 care ecosystem program/study call. Was not able to reach the cg and left a detailed voicemail message requesting a call back at their earliest convenience. I will plan to call back on (date: 01/22/24) if I do not hear back before then.      Received an in coming call from cg, Christy Garcia at 5:35 PM. Cg left me a voicemail message and I returned her call as soon as I could on 1/17/24. Scheduled for us to talk and complete our month 3 care ecosystem program/study call on Monday 01/22/23 at 9 AM.       01/22/24 - Spoke with caregiver Christy Garcia (Daughter) for month 3 visit. Cg reported that earlier in January 2024 the pt was showing signs of severe disorientation and confusion. During the first week on January the cg put a date on the pt's calendar to remind her about her upcoming mama gram and the cg stated that the pt called every day accusing the cg of not wanting to take her to appointments and forgetting about her doctors appointment.  Cg stated that the pt called everyday leading up to the scheduled appointment on a Friday with these accusatory accusations. Cg brushed it off and of course showed up on Friday to  the pt for her appointment. Cg noticed first hand here that the pt was acting more confused that usual.  Cg mentioned that the pt had breast cancer in 2021 and did not remember it. While at the [facility]  the cg reports that the pt was cooperative but confused while at the appointment and running errands with the cg after.     Cg was concerned because he pt kept saying something is wrong and could not put her finger on it. So two Friday's ago they went to an urgent care to see a doctor. The NP took the the pt's pressure and it was abnormally vinicio, a few hours later took the pt's pressure again and it was still very high. NP stated that this is likely why the pt does not feel well and ' feels off'. Pt is now taking her blood pressure medication daily instead of as needed. Pt was tested for a UTI - came back negative.     Cg mentioned that the pt's short term memory has gotten worse.     Cg mentioned that she and the pt had a discussion about the pt's high blood pressure, and the importance of taking her medication at the right times in the day. Cg and pt decided that the pt was not able to do this independently anymore and it would be the best idea for the pt to move in to [facility]. Cg moved in on 01/10/24 and things are going great. The cg seems much more relaxed and feels like she can breathe again knowing that the pt is taken care of. [facility] hospital and their staff help with everything, medication management, meals, cleaning, laundry, they have crafts, and companionship in the other tenants everything is gong really well for the pt at [facility] so far. Cg mentioned that the pt's 89st birthday is in two weeks and they are planning a celebration at her new home with the residents and the pt's family. Cg mentioned that the pt is covered under long term care to stay at the [facility] for the next two years, after the next two years the pt's stay will be out of pocket pay. Cg mentioned that she will cross that bridge when they get there, for now the cg is going to focus on her family, her work and relaxing now that the pt is in such good hands the cg feels like she can do it all again.     Medication changes: Losartan  25 mg daily in the AM   Falls in the past month: 0  UTIs in the past month: 0  Hospital encounters in the past month (reported by caregiver):0    Next monthly visit scheduled for: 02/21/24. Caregiver knows how to reach [facility], and is encouraged to do so, as needed before our next scheduled call.     Action items for [facility]: None at this time.

## 2024-01-26 ENCOUNTER — EXTERNAL HOME HEALTH (OUTPATIENT)
Dept: HOME HEALTH SERVICES | Facility: HOSPITAL | Age: 89
End: 2024-01-26
Payer: MEDICARE

## 2024-01-26 ENCOUNTER — PATIENT MESSAGE (OUTPATIENT)
Dept: INTERNAL MEDICINE | Facility: CLINIC | Age: 89
End: 2024-01-26
Payer: MEDICARE

## 2024-02-02 ENCOUNTER — OFFICE VISIT (OUTPATIENT)
Dept: PRIMARY CARE CLINIC | Facility: CLINIC | Age: 89
End: 2024-02-02
Payer: MEDICARE

## 2024-02-02 ENCOUNTER — PATIENT MESSAGE (OUTPATIENT)
Dept: PRIMARY CARE CLINIC | Facility: CLINIC | Age: 89
End: 2024-02-02

## 2024-02-02 VITALS
HEART RATE: 82 BPM | DIASTOLIC BLOOD PRESSURE: 80 MMHG | RESPIRATION RATE: 16 BRPM | OXYGEN SATURATION: 97 % | HEIGHT: 63 IN | WEIGHT: 117.06 LBS | BODY MASS INDEX: 20.74 KG/M2 | SYSTOLIC BLOOD PRESSURE: 190 MMHG

## 2024-02-02 DIAGNOSIS — E21.3 HYPERPARATHYROIDISM: ICD-10-CM

## 2024-02-02 DIAGNOSIS — E03.9 HYPOTHYROIDISM, UNSPECIFIED TYPE: ICD-10-CM

## 2024-02-02 DIAGNOSIS — I15.9 SECONDARY HYPERTENSION: ICD-10-CM

## 2024-02-02 DIAGNOSIS — F02.A0 MILD LATE ONSET ALZHEIMER'S DEMENTIA WITHOUT BEHAVIORAL DISTURBANCE, PSYCHOTIC DISTURBANCE, MOOD DISTURBANCE, OR ANXIETY: ICD-10-CM

## 2024-02-02 DIAGNOSIS — F39 MOOD DISORDER: ICD-10-CM

## 2024-02-02 DIAGNOSIS — Z76.89 ENCOUNTER TO ESTABLISH CARE: Primary | ICD-10-CM

## 2024-02-02 DIAGNOSIS — J43.2 CENTRILOBULAR EMPHYSEMA: ICD-10-CM

## 2024-02-02 DIAGNOSIS — R29.6 RECURRENT FALLS: ICD-10-CM

## 2024-02-02 DIAGNOSIS — I70.0 AORTIC ATHEROSCLEROSIS: ICD-10-CM

## 2024-02-02 DIAGNOSIS — G30.1 MILD LATE ONSET ALZHEIMER'S DEMENTIA WITHOUT BEHAVIORAL DISTURBANCE, PSYCHOTIC DISTURBANCE, MOOD DISTURBANCE, OR ANXIETY: ICD-10-CM

## 2024-02-02 PROCEDURE — 3288F FALL RISK ASSESSMENT DOCD: CPT | Mod: HCNC,CPTII,S$GLB, | Performed by: STUDENT IN AN ORGANIZED HEALTH CARE EDUCATION/TRAINING PROGRAM

## 2024-02-02 PROCEDURE — 1159F MED LIST DOCD IN RCRD: CPT | Mod: HCNC,CPTII,S$GLB, | Performed by: STUDENT IN AN ORGANIZED HEALTH CARE EDUCATION/TRAINING PROGRAM

## 2024-02-02 PROCEDURE — 1160F RVW MEDS BY RX/DR IN RCRD: CPT | Mod: HCNC,CPTII,S$GLB, | Performed by: STUDENT IN AN ORGANIZED HEALTH CARE EDUCATION/TRAINING PROGRAM

## 2024-02-02 PROCEDURE — 1101F PT FALLS ASSESS-DOCD LE1/YR: CPT | Mod: HCNC,CPTII,S$GLB, | Performed by: STUDENT IN AN ORGANIZED HEALTH CARE EDUCATION/TRAINING PROGRAM

## 2024-02-02 PROCEDURE — 99214 OFFICE O/P EST MOD 30 MIN: CPT | Mod: HCNC,S$GLB,, | Performed by: STUDENT IN AN ORGANIZED HEALTH CARE EDUCATION/TRAINING PROGRAM

## 2024-02-02 PROCEDURE — 1126F AMNT PAIN NOTED NONE PRSNT: CPT | Mod: HCNC,CPTII,S$GLB, | Performed by: STUDENT IN AN ORGANIZED HEALTH CARE EDUCATION/TRAINING PROGRAM

## 2024-02-02 PROCEDURE — 1123F ACP DISCUSS/DSCN MKR DOCD: CPT | Mod: HCNC,CPTII,S$GLB, | Performed by: STUDENT IN AN ORGANIZED HEALTH CARE EDUCATION/TRAINING PROGRAM

## 2024-02-02 PROCEDURE — 99999 PR PBB SHADOW E&M-EST. PATIENT-LVL III: CPT | Mod: PBBFAC,HCNC,, | Performed by: STUDENT IN AN ORGANIZED HEALTH CARE EDUCATION/TRAINING PROGRAM

## 2024-02-02 RX ORDER — AMOXICILLIN 250 MG
1 CAPSULE ORAL DAILY PRN
Qty: 90 TABLET | Refills: 0
Start: 2024-02-02 | End: 2024-05-16 | Stop reason: SDUPTHER

## 2024-02-02 RX ORDER — LOSARTAN POTASSIUM 50 MG/1
50 TABLET ORAL DAILY
Qty: 90 TABLET | Refills: 1 | Status: SHIPPED | OUTPATIENT
Start: 2024-02-02 | End: 2024-03-11 | Stop reason: SDUPTHER

## 2024-02-02 RX ORDER — LOSARTAN POTASSIUM 25 MG/1
1 TABLET ORAL DAILY
COMMUNITY
Start: 2023-12-11 | End: 2024-02-02 | Stop reason: SDUPTHER

## 2024-02-02 RX ORDER — FERROUS SULFATE 325(65) MG
325 TABLET, DELAYED RELEASE (ENTERIC COATED) ORAL EVERY OTHER DAY
Qty: 45 TABLET | Refills: 1
Start: 2024-02-02 | End: 2024-05-16 | Stop reason: SDUPTHER

## 2024-02-02 NOTE — ASSESSMENT & PLAN NOTE
"[facility] Medicine  History & Physical    Patient Name: Alfa Christy III  MRN: [id number]  Admission Date: 3/23/2018  Attending Physician: Bailee Drummond MD   Primary Care Provider: Tyler Jasmine MD    [facility] Medicine Team: [facility] F Dorothy Parks PA-C     Patient information was obtained from patient, spouse/SO, past medical records and ER records.     Subjective:     Principal Problem:Aphasia    Chief Complaint:   Chief Complaint   Patient presents with    Aphasia     Wife reports pt with aphagia and memory loss since 930am.  Pt with hx cva.        HPI: Mr. 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 Wife at bedside to provide the history. She states this morning at ~8am patient was getting dressed and was c/o minor dizziness. She noted he had not taken his keppra from the previous night, in addition to all of his "night time medications." Pt was able to take all morning medications. At around 10am, pt was sitting down to "go over his finances and pay bills", she noted something was "off". She asked him his name, which he could provide for her but he could not remember the word for "shoes" when she asked him what he was wearing on his feet. She did not witness convulsions, jerking movements, LOC, tongue biting, or incontinence. Wife states patient was able to ambulate and continue to go about "normal" activities. Wife brought patient to the ED due to concern for aphasia. Of note, wife reports pt is non-compliant with hydralazine and ASA. Per wife, this was a similar presentation to his last admission for asphasia suspected to be secondary to seizures.     In the ED, HDS; noted to be hypertensive to 190s/90s. " Last tsh wnl. Continue current dose of levothyroxine. Will monitor    Stroke code was called; CTH and MRI without evidence of acute infarct. Vascular neurology assessed; low suspicion for acute CVA. Remainder of labs and imaging were unremarkable.    Past Medical History:   Diagnosis Date    Allergy     BMI 31.0-31.9,adult 11/25/2014    Cerebrovascular disease 7/25/2016    Chronic anticoagulation - pradaxa 11/10/2016    Chronic diastolic heart failure 11/20/2016    CKD (chronic kidney disease), stage III 9/23/2013    Controlled type 2 diabetes with neuropathy 12/16/2014    Diabetes mellitus due to underlying condition with stage 3 chronic kidney disease, without long-term current use of insulin 11/2/2016    Elevated alkaline phosphatase level 8/1/2012    Elevated PSA     negative prostate biopsy 4/11    Erectile dysfunction associated with type 2 diabetes mellitus     Gallstones 3/20/2013    Generalized tonic-clonic seizure 11/2016    HTN (hypertension), benign 8/1/2012    Hypercholesterolemia 8/1/2012    Microcytic anemia 11/27/2013    Paroxysmal atrial fibrillation 9/23/2013    Postsurgical hypothyroidism 11/27/2013    Right homonymous hemianopsia 2/5/2016    Sickle cell trait     Stroke 1/27/2016    Thyroid cancer     multifocal with six lesions, largest 5mm, treated with surgery and radioactive iodine    Visual field loss following stroke 1/28/2016       Past Surgical History:   Procedure Laterality Date    BREAST SURGERY      cyst removal    COLONOSCOPY      CYST REMOVAL      chest    PROSTATE BIOPSY  4/27/11    SKIN BIOPSY      THYROID SURGERY  8/26/09    VASCULAR SURGERY         Review of patient's allergies indicates:   Allergen Reactions    Cough syrup [guaifenesin] Other (See Comments)       No current facility-administered medications on file prior to encounter.      Current Outpatient Prescriptions on File Prior to Encounter   Medication Sig    acetaminophen (TYLENOL) 500 MG tablet Take 1 tablet (500 mg total) by mouth every 6 (six) hours as  "needed for Pain.    amLODIPine (NORVASC) 10 MG tablet Take 1 tablet (10 mg total) by mouth once daily.    aspirin 81 MG Chew Take 1 tablet (81 mg total) by mouth once daily.    atorvastatin (LIPITOR) 40 MG tablet Take 1 tablet (40 mg total) by mouth once daily.    BD INSULIN PEN NEEDLE UF MINI 31 gauge x 3/16" Ndle To use with insulin pens 4  times daily    carvedilol (COREG) 3.125 MG tablet Take 1 tablet (3.125 mg total) by mouth 2 (two) times daily.    dulaglutide (TRULICITY) 0.75 mg/0.5 mL PnIj Inject 0.5 mLs (0.75 mg total) into the skin every 7 days.    flash glucose scanning reader (FREESTYLE ANGELLA READER) Misc 1 each by Misc.(Non-Drug; Combo Route) route once daily.    flash glucose sensor (FREESTYLE ANGELLA SENSOR) Kit 3 each by Misc.(Non-Drug; Combo Route) route every 30 days.    folic acid (FOLVITE) 400 MCG tablet Take 400 mcg by mouth once daily.    furosemide (LASIX) 40 MG tablet Take 1 tablet (40 mg total) by mouth 2 (two) times daily.    GLUCAGON EMERGENCY KIT, HUMAN, 1 mg injection INJECT 1 MG INTO THE MUSCLE AS NEEDED    hydrALAZINE (APRESOLINE) 25 MG tablet Take 1 tablet (25 mg total) by mouth every 8 (eight) hours. (Patient taking differently: Take 25 mg by mouth every 12 (twelve) hours. )    insulin aspart (NOVOLOG) 100 unit/mL InPn pen Inject 7 units w/ meals plus scale 150-200+1, 201-250+2, 251-300+3, 301-350+4.    insulin detemir (LEVEMIR FLEXPEN) 100 unit/mL (3 mL) SubQ InPn pen Inject 14 Units into the skin every evening.    levetiracetam (KEPPRA) 750 MG Tab Take 2 tablets (1,500 mg total) by mouth 2 (two) times daily.    levothyroxine (SYNTHROID) 200 MCG tablet Take 1 tablet (200 mcg total) by mouth before breakfast.    losartan (COZAAR) 100 MG tablet Take 1 tablet (100 mg total) by mouth once daily.    magnesium 30 mg Tab Take 1 tablet by mouth twice a week.     multivitamin capsule Take 1 capsule by mouth every Mon, Wed, Fri.     ONETOUCH VERIO Strp USE TO TEST BLOOD " SUGAR THREE TIMES DAILY    sildenafil (VIAGRA) 100 MG tablet Take 1 tablet (100 mg total) by mouth as needed for Erectile Dysfunction.    vitamin D 1000 units Tab Take 1,000 Units by mouth every Mon, Wed, Fri.    apixaban 5 mg Tab Take 1 tablet (5 mg total) by mouth 2 (two) times daily.    [DISCONTINUED] metOLazone (ZAROXOLYN) 2.5 MG tablet Take 1 tablet (2.5 mg total) by mouth as needed. 30 minutes prior to morning lasix dose only for weight increase over 3lbs in a day or 5 in a week.     Family History     Problem Relation (Age of Onset)    Cancer Sister    Diabetes Father, Mother, Brother    Heart disease Father, Mother    Hypertension Father, Mother    Thyroid disease Maternal Aunt        Social History Main Topics    Smoking status: Never Smoker    Smokeless tobacco: Never Used    Alcohol use Yes      Comment: occasionally    Drug use: No    Sexual activity: Yes     Partners: Female      Comment:  with 3 kids     Review of Systems   Unable to perform ROS: Acuity of condition     Objective:     Vital Signs (Most Recent):  Temp: 98.2 °F (36.8 °C) (03/23/18 1248)  Pulse: 75 (03/23/18 1728)  Resp: 12 (03/23/18 1502)  BP: (!) 163/84 (03/23/18 1728)  SpO2: 98 % (03/23/18 1728) Vital Signs (24h Range):  Temp:  [98.2 °F (36.8 °C)] 98.2 °F (36.8 °C)  Pulse:  [58-81] 75  Resp:  [12-21] 12  SpO2:  [98 %-100 %] 98 %  BP: (156-196)/(71-99) 163/84        There is no height or weight on file to calculate BMI.    Physical Exam   Constitutional: He appears well-developed and well-nourished. No distress.   HENT:   Head: Normocephalic and atraumatic.   Eyes: Conjunctivae and EOM are normal. Right eye exhibits no discharge. Left eye exhibits no discharge. No scleral icterus.   Neck: Normal range of motion. Neck supple. No JVD present.   Cardiovascular: Normal rate, normal heart sounds, intact distal pulses and normal pulses.  An irregularly irregular rhythm present.   Pulmonary/Chest: Effort normal and breath  sounds normal. No respiratory distress. He has no wheezes.   Abdominal: Soft. Bowel sounds are normal. He exhibits no distension. There is no tenderness. There is no rebound and no guarding.   Musculoskeletal: He exhibits edema (+1 pitting to BLE). He exhibits no tenderness.   Lymphadenopathy:     He has no cervical adenopathy.   Neurological: He is alert.   Inconsistent exam findings; slow to speak; stated first name with some difficulty; able to produce multiple, more complex words and sentences; able to communicate and move all extremities   Skin: Skin is warm and dry. He is not diaphoretic. No erythema.   Psychiatric: His speech is delayed. He is slowed. Cognition and memory are impaired.         CRANIAL NERVES     CN III, IV, VI   Extraocular motions are normal.        Significant Labs:   A1C:   Recent Labs  Lab 09/26/17  0849 01/02/18  0815   HGBA1C 5.6 6.0*     CBC:   Recent Labs  Lab 03/23/18  1317   WBC 9.12   HGB 11.8*   HCT 32.9*        CMP:   Recent Labs  Lab 03/23/18  1317      K 4.8      CO2 27   *   BUN 17   CREATININE 1.8*   CALCIUM 8.9   PROT 7.0   ALBUMIN 3.4*   BILITOT 1.1*   ALKPHOS 112   AST 29   ALT 18   ANIONGAP 5*   EGFRNONAA 38.9*     Cardiac Markers: No results for input(s): CKMB, MYOGLOBIN, BNP, TROPISTAT in the last 48 hours.  Coagulation:   Recent Labs  Lab 03/23/18  1317   INR 1.0     Lactic Acid:   Recent Labs  Lab 03/23/18  1317   LACTATE 1.0     Lipid Panel:   Recent Labs  Lab 03/23/18  1317   CHOL 104*   HDL 42   LDLCALC 47.6*   TRIG 72   CHOLHDL 40.4     POCT Glucose:   Recent Labs  Lab 03/23/18  1310 03/23/18  1649   POCTGLUCOSE 148* 108     Troponin: No results for input(s): TROPONINI in the last 48 hours.  TSH:   Recent Labs  Lab 03/23/18  1317   TSH 1.806     Urine Studies:   Recent Labs  Lab 03/23/18  1406   COLORU Yellow   APPEARANCEUA Clear   PHUR 6.0   SPECGRAV 1.010   PROTEINUA 2+*   GLUCUA 1+*   KETONESU Negative   BILIRUBINUA Negative   OCCULTUA  "Negative   NITRITE Negative   UROBILINOGEN Negative   LEUKOCYTESUR Negative   RBCUA 0   WBCUA 1   BACTERIA Rare   HYALINECASTS 0       Significant Imaging: I have reviewed all pertinent imaging results/findings within the past 24 hours.    Assessment/Plan:     * Aphasia    ?etiology; seizures vs pseudoseizures vs psychogenic   - CTH and MRI negative for acute infarct  - HDS; hypertensive on admit; suspect 2/2 noncompliance  - Labs entirely unremarkable (TSH and lactate WNL)  - No s/s infection; afebrile without leukocytosis (CXR and UA without infection)  - Evaluated by [facility] neuro in the ED who have low suspicion for acute infarct; continue home stroke prevention regimen  - Inconsistent findings on exam with fluctuating aphasia  - Similar presentation for admission 10/2017 for aphasia, suspected to be 2/2 seizures (EEG did not capture seizure activity); of note, aphasia sx noted to fluctuate and in setting of financial stressors  - Wife reports missed Keppra PM dose; ?noncompliance issues  - Ordered EEG  - Ativan PRN  - Seizure precautions  - Neurology consulted and appreciate assistance        Complex partial seizures evolving to generalized tonic-clonic seizures    S/p CVA  - Similar presentation for admission 10/2017 for aphasia; suspect to be 2/2 seizures  - Wife reports missed Keppra PM dose; ?noncompliance issues  - Keppra level pending  - Ordered EEG  - Ativan PRN  - Seizure precautions  - Neurology consulted and appreciate assistance        History of CVA (cerebrovascular accident)    - See above  - CTH and MRI negative for acute infarct  - On appropriate stroke prevention regimen  - Wife endorses noncompliance with ASA and "some" antihypertensives  - Educated on importance of medication compliance        Essential hypertension    - Uncontrolled on admit  - Wife endorses pt noncompliant with some medications  - Resumed home regimen  - Will continue to monitor        Type 2 diabetes mellitus with " hyperglycemia    - Last A1c 6.0 in 1/2018  - A1c pending  - Cardiac/DM diet  - Low dose SSI given decreased PO intake  - Will monitor BGs and adjust PRN        CKD (chronic kidney disease), stage III    Anemia  - Cr 1.8 on admit; improved from baseline (~2.1)  - Hgb 11.8 on admit; consistent with baseline  - Follows with nephrology  - Trend daily        Chronic diastolic heart failure    - Stable and chronic  - No s/s exacerbation  - Follows with cardiology as outpatient  - Continue home BB, ARB, and diuretics  - Strict I/Os, daily weights        Paroxysmal atrial fibrillation    - Afib on admission; rate controlled  - Continue home BB and Eliquis  - Telemetry        Hypercholesterolemia    - Continue home ASA and lipitor        Postsurgical hypothyroidism    Hx of thyroid cancer s/p surgery and radiation in 2009  - TSH WNL  - Continue home synthroid           VTE Risk Mitigation         Ordered     apixaban tablet 5 mg  2 times daily     Route:  Oral        03/23/18 1728     IP VTE HIGH RISK PATIENT  Once      03/23/18 1920     Place DARIAN hose  Until discontinued      03/23/18 1920     Place sequential compression device  Until discontinued      03/23/18 1920     Reason for No Pharmacological VTE Prophylaxis  Once      03/23/18 1920             Dorothy Parks PA-C  Department of Hospital Medicine   [facility]

## 2024-02-02 NOTE — ASSESSMENT & PLAN NOTE
Reviewed PMHx, labs, medications, vaccinations and screenings. Pt is utd on screenings. Recommended rsv vaccine.

## 2024-02-02 NOTE — TELEPHONE ENCOUNTER
Spoke to patient daughter and they no longer need  the med list authorized they in the process of changing to the 65 [facility] .

## 2024-02-02 NOTE — PROGRESS NOTES
INTERNAL MEDICINE INITIAL VISIT NOTE      CHIEF COMPLAINT     Chief Complaint   Patient presents with    Establish Care       HPI     Olamide Felipe is a 89 y.o.  female who presents with hypothyroidism, aortic atherosclerosis, hyperparathyroidism, HLD, breast cancer (invasive ductal carcinoma of the right breast s/p lumpectomy 1/7/22, s/p postopertaive radiation (follows with breast center), OP, [facility] (sees neuro), mood disorder (takes fluoxetine for night terrors), hx of vertigo is here to establish care with me. .  -no falls, feeling good. Sleeping well. Appetite is good.   -BP elevated here. On losartan 25 mg daily. The home checks her BP and the nurse there said her BP is good there.   -does not need labs today. do labs with iron panel next visit  Advance Care Planning     Date: 02/02/2024    Power of   I initiated the process of voluntary advance care planning today and explained the importance of this process to the patient.  I introduced the concept of advance directives to the patient, as well. Then the patient received detailed information about the importance of designating a Health Care Power of  (HCPOA). She was also instructed to communicate with this person about their wishes for future healthcare, should she become sick and lose decision-making capacity. The patient has previously appointed a HCPOA. After our discussion, the patient has decided to complete a HCPOA and has appointed her daughter, health care agent: Christy Garcia & health care agent number: [phone] I spent a total time of 5 minutes discussing this issue with the patient.    -pt already has a living will and hCPOA in the chart.          Past Medical History:  Past Medical History:   Diagnosis Date    Hyperlipidemia     Hypothyroidism     Malignant neoplasm of upper-outer quadrant of right breast in female, estrogen receptor negative 1/7/2022    Osteoarthritis, knee     Vertigo        Past Surgical  History:  Past Surgical History:   Procedure Laterality Date    dentures      FRACTURE SURGERY Left     fracture left wrist    HYSTERECTOMY      INJECTION FOR SENTINEL NODE IDENTIFICATION Right 1/7/2022    Procedure: INJECTION, FOR SENTINEL NODE IDENTIFICATION-Right;  Surgeon: Marci Mcintosh MD;  Location: [facility];  Service: General;  Laterality: Right;    left wrist surgery      MASTECTOMY, PARTIAL Right 1/7/2022    Procedure: MASTECTOMY, PARTIAL-Right with radiological marker;  Surgeon: Marci Mcintosh MD;  Location: [facility] OR;  Service: General;  Laterality: Right;  REQUEST SAID 2 HOUR CASE    SENTINEL LYMPH NODE BIOPSY Right 1/7/2022    Procedure: BIOPSY, LYMPH NODE, SENTINEL-Right;  Surgeon: Marci Mcintosh MD;  Location: [facility];  Service: General;  Laterality: Right;       Allergies:  Review of patient's allergies indicates:  No Known Allergies    Home Medications:  Prior to Admission medications    Medication Sig Start Date End Date Taking? Authorizing Provider   acetaminophen (TYLENOL) 325 MG tablet Take 325 mg by mouth every 6 (six) hours as needed for Pain.   Yes Provider, Historical   b complex vitamins tablet Take 1 tablet by mouth once daily. 11/20/23  Yes Gary Mg MD   calcium-vitamin D 250-100 mg-unit per tablet Take 1 tablet by mouth 2 (two) times daily.   Yes Provider, Historical   ferrous sulfate 325 (65 FE) MG EC tablet Take 1 tablet (325 mg total) by mouth 2 (two) times daily. 1/11/24 2/10/24 Yes Catie Mcleod MD   FLUoxetine 20 MG capsule Take 1 capsule (20 mg total) by mouth once daily. 12/11/23  Yes Renay Du NP   levothyroxine (SYNTHROID) 25 MCG tablet Take 1 tablet (25 mcg total) by mouth before breakfast. 12/11/23  Yes Abiola Campbell MD   loratadine (CLARITIN) 10 mg tablet Take 1 tablet (10 mg total) by mouth once daily. 12/11/23  Yes Abiola Campbell MD   memantine (NAMENDA) 5 MG Tab Take 1 tablet (5 mg total) by mouth 2 (two) times daily. Begin by taking one  tablet daily for seven days, then increase to one tablet two times daily. 4/10/23 4/9/24 Yes Margot Gonzalez, KINZA   mv-min/iron/folic/calcium/vitK (WOMEN'S MULTIVITAMIN ORAL) Take 1 tablet by mouth once daily.   Yes Provider, Historical   omeprazole (PRILOSEC) 20 MG capsule Take 1 capsule (20 mg total) by mouth once daily. 23  Yes Gary Mg MD   polyethylene glycol (GLYCOLAX) 17 gram PwPk Take 17 g by mouth 2 (two) times daily as needed for Constipation. 23  Yes Renay Du NP   senna-docusate 8.6-50 mg (PERICOLACE) 8.6-50 mg per tablet Take 1 tablet by mouth once daily. 23  Yes Renay Du NP   simvastatin (ZOCOR) 20 MG tablet TAKE 1 TABLET (20 MG TOTAL) BY MOUTH EVERY EVENING. 23  Yes Soo Elias NP       Family History:  Family History   Problem Relation Age of Onset    No Known Problems Daughter     No Known Problems Son     No Known Problems Sister        Social History:  Social History     Tobacco Use    Smoking status: Former     Current packs/day: 0.00     Types: Cigarettes     Quit date: 1989     Years since quittin.6     Passive exposure: Never    Smokeless tobacco: Never   Substance Use Topics    Alcohol use: Yes     Alcohol/week: 2.0 standard drinks of alcohol     Types: 2 Glasses of wine per week     Comment: 1/3 glass of wine - 2 times weekly    Drug use: Never       Review of Systems:  Review of Systems   Constitutional:  Negative for chills and fever.   HENT:  Negative for rhinorrhea and sore throat.    Respiratory:  Negative for cough, chest tightness and shortness of breath.    Cardiovascular:  Negative for chest pain.   Gastrointestinal:  Negative for abdominal pain, blood in stool, constipation and diarrhea.   Genitourinary:  Negative for dysuria and hematuria.   Neurological:  Negative for dizziness and headaches.       Health Maintainence:   Tdap 3/2016  Flu 2023  Prevnar 2019  Pneumovax 2021  Zoster 2019  MMG  "1/2024  C-SCOPE -not needed anymore   DEXA 2/2023    PHYSICAL EXAM     BP (!) 190/80 (BP Location: Right arm, Patient Position: Sitting, BP Method: Small (Manual))   Pulse 82   Resp 16   Ht 5' 3" (1.6 m)   Wt 53.1 kg (117 lb 1 oz)   SpO2 97%   BMI 20.74 kg/m²     GEN - A+O to self and place but not time, NAD   HEENT - PERRL  Neck - No cervical LAD.   CV - RRR, no m/r or carotid bruits  Chest - CTAB, no wheezing or rhonchi  Abd - S+BS.   Ext - 2+ radial pulses. No LE edema.   MSK - Normal gait.   Skin - No rash.    LABS     Previous labs reviewed from 12/2023. Renal function wnl. Has anemia. Tsh wnl from 7/2023. Had lipid panel in 7/2023.     ASSESSMENT/PLAN   1. Encounter to establish care  Assessment & Plan:  Reviewed PMHx, labs, medications, vaccinations and screenings. Pt is utd on screenings. Recommended rsv vaccine.       2. Mood disorder  Assessment & Plan:  On fluoxetine. Doing well on it. Continue.       3. Mild late onset Alzheimer's dementia without behavioral disturbance, psychotic disturbance, mood disturbance, or anxiety  Assessment & Plan:  Follows with neuropysch. On memantine. No behavioral issues. Continue.       4. Aortic atherosclerosis  Assessment & Plan:  Noted on previous imaging. On statin, continue.       5. Hyperparathyroidism  Assessment & Plan:  With normal calcium levels. Pt does not meet criteria for treatment. Follows with endo. Continue.       6. Secondary hypertension  Assessment & Plan:  BP elevated even on recheck. Same in both arms. Will increase her losartan to 50 mg daily from 25 mg. They state its good at the home. Will get them to monitor it daily and bring a log in 4 weeks.       7. Hypothyroidism, unspecified type  Assessment & Plan:  Last tsh wnl. Continue current dose of levothyroxine. Will monitor       8. Recurrent falls  Assessment & Plan:  Last fall was April 2023. No falls since then.       9. Centrilobular emphysema  Assessment & Plan:  Noted on imaging. Not " symptomatic. Will monitor       Other orders  -     losartan (COZAAR) 50 MG tablet; Take 1 tablet (50 mg total) by mouth once daily.  Dispense: 90 tablet; Refill: 1  -     senna-docusate 8.6-50 mg (PERICOLACE) 8.6-50 mg per tablet; Take 1 tablet by mouth daily as needed for Constipation.  Dispense: 90 tablet; Refill: 0  -     ferrous sulfate 325 (65 FE) MG EC tablet; Take 1 tablet (325 mg total) by mouth every other day.  Dispense: 45 tablet; Refill: 1    I spent a total of 30 minutes on the day of the visit.  This includes face to face time and non-face to face time preparing to see the patient (eg, review of tests), obtaining and/or reviewing separately obtained history, documenting clinical information in the electronic or other health record, independently interpreting results and communicating results to the patient/family/caregiver, or care coordinator.     RTC in 4 weeks, sooner if needed and depending on labs.    Lashonda Pearce MD  Department of Internal Medicine - [facility]  9:06 AM

## 2024-02-02 NOTE — ASSESSMENT & PLAN NOTE
BP elevated even on recheck. Same in both arms. Will increase her losartan to 50 mg daily from 25 mg. They state its good at the home. Will get them to monitor it daily and bring a log in 4 weeks.

## 2024-02-02 NOTE — ASSESSMENT & PLAN NOTE
With normal calcium levels. Pt does not meet criteria for treatment. Follows with endo. Continue.

## 2024-02-20 ENCOUNTER — OUTPATIENT CASE MANAGEMENT (OUTPATIENT)
Dept: NEUROLOGY | Facility: CLINIC | Age: 89
End: 2024-02-20
Payer: MEDICARE

## 2024-02-20 ENCOUNTER — PATIENT MESSAGE (OUTPATIENT)
Dept: PRIMARY CARE CLINIC | Facility: CLINIC | Age: 89
End: 2024-02-20
Payer: MEDICARE

## 2024-02-20 NOTE — PROGRESS NOTES
Protocol: The [facility] Effectiveness Study  Identifier: [id number]  IRB#: 2022.247  PI: Dr. Baldo Maynard, PhD  CO-I: Dr. Nighat Arreguin PsyD  Version Date: 12/05/2022  Pt Study ID: [id number]  Visit Month: 4  Care Plan documented on: 11/14/23 Please refer to note for more information.      Timeline:      Consent: Completed(10/20/23)  Baseline questionnaires: Completed(10/23/23)  6-month questionnaires: Planned(04/18/24)  12-month questionnaires: Planned(10/18/24)              Visit Note:     (date: 02/20/24) Reached out to cg, Christy Jose in a first attempt to hold our month 4 care ecosystem program/study call. Was not able to reach the cg and left a detailed voicemail message requesting a call back at their earliest convenience. I will plan to call back on (date: 02/26/24) if I do not hear back before then.        Date: 02/22/24) Received an in coming call from caregiver Christy Garcia (Daughter) for month 4 visit. Cg reported that things are looking up and the pt is doing tremendously in her new living environment.     Cg mentioned that the pt will be evaluated by her insurance company tomorrow 2/23/24 for long term care coverage, and the pt and cg plan to see Dr. Pearce next Friday 3/1/24 for blood work and a check up.     Cg reported that one of the nurses at the facility suggested adding an anti depressant to the pt's medication regime. Cg plans to discuss this with the PCP during their upcoming appointment next week. Cg  states that the nurses and aids at the new facility have noticed the pt being more weepy and sadder than she was before. Cg states that the nurse reported that the pt spend an entire night crying last week because the pt thought that her twin sister had passed away. After this happened he pt did not eat for a day but the staff kept an eye on her and after a day she was fine and back her normal self.     Cg reports that the pt often calls herself stupid if the pt is not able to  remember how to do something.     Cg reports that she would not call the pt incontinent yet, but the pt has had a few accidents on the way to the bathroom in the last few weeks.     Falls in the past month: 1 - fell in the bathroom no injuries   UTIs in the past month: 0  Hospital encounters in the past month (reported by caregiver):  Medication Changes:    Next monthly visit scheduled for: 03/23/24 caregiver knows how to reach CTN, and is encouraged to do so, as needed before our next scheduled call.     Action items for CTN: None at this time.

## 2024-02-26 PROBLEM — K92.2 ACUTE LOWER GI BLEEDING: Status: RESOLVED | Noted: 2023-11-16 | Resolved: 2024-02-26

## 2024-03-01 ENCOUNTER — LAB VISIT (OUTPATIENT)
Dept: LAB | Facility: HOSPITAL | Age: 89
End: 2024-03-01
Attending: STUDENT IN AN ORGANIZED HEALTH CARE EDUCATION/TRAINING PROGRAM
Payer: MEDICARE

## 2024-03-01 ENCOUNTER — OFFICE VISIT (OUTPATIENT)
Dept: PRIMARY CARE CLINIC | Facility: CLINIC | Age: 89
End: 2024-03-01
Payer: MEDICARE

## 2024-03-01 VITALS
SYSTOLIC BLOOD PRESSURE: 166 MMHG | RESPIRATION RATE: 16 BRPM | DIASTOLIC BLOOD PRESSURE: 60 MMHG | HEART RATE: 82 BPM | HEIGHT: 63 IN | OXYGEN SATURATION: 96 % | BODY MASS INDEX: 21.05 KG/M2 | WEIGHT: 118.81 LBS

## 2024-03-01 DIAGNOSIS — E53.8 B12 DEFICIENCY: ICD-10-CM

## 2024-03-01 DIAGNOSIS — I15.9 SECONDARY HYPERTENSION: Primary | ICD-10-CM

## 2024-03-01 DIAGNOSIS — G30.1 MILD LATE ONSET ALZHEIMER'S DEMENTIA WITHOUT BEHAVIORAL DISTURBANCE, PSYCHOTIC DISTURBANCE, MOOD DISTURBANCE, OR ANXIETY: ICD-10-CM

## 2024-03-01 DIAGNOSIS — F02.A0 MILD LATE ONSET ALZHEIMER'S DEMENTIA WITHOUT BEHAVIORAL DISTURBANCE, PSYCHOTIC DISTURBANCE, MOOD DISTURBANCE, OR ANXIETY: ICD-10-CM

## 2024-03-01 DIAGNOSIS — F39 MOOD DISORDER: ICD-10-CM

## 2024-03-01 DIAGNOSIS — D64.9 ANEMIA, UNSPECIFIED TYPE: ICD-10-CM

## 2024-03-01 DIAGNOSIS — Z85.3 HISTORY OF BREAST CANCER: ICD-10-CM

## 2024-03-01 PROBLEM — Z76.89 ENCOUNTER TO ESTABLISH CARE: Status: RESOLVED | Noted: 2023-11-16 | Resolved: 2024-03-01

## 2024-03-01 PROBLEM — Z17.1 MALIGNANT NEOPLASM OF UPPER-OUTER QUADRANT OF RIGHT BREAST IN FEMALE, ESTROGEN RECEPTOR NEGATIVE: Status: RESOLVED | Noted: 2022-01-07 | Resolved: 2024-03-01

## 2024-03-01 PROBLEM — C50.411 MALIGNANT NEOPLASM OF UPPER-OUTER QUADRANT OF RIGHT BREAST IN FEMALE, ESTROGEN RECEPTOR NEGATIVE: Status: RESOLVED | Noted: 2022-01-07 | Resolved: 2024-03-01

## 2024-03-01 LAB
FERRITIN SERPL-MCNC: 207 NG/ML (ref 20–300)
IRON SERPL-MCNC: 101 UG/DL (ref 30–160)
SATURATED IRON: 32 % (ref 20–50)
TOTAL IRON BINDING CAPACITY: 315 UG/DL (ref 250–450)
TRANSFERRIN SERPL-MCNC: 213 MG/DL (ref 200–375)
VIT B12 SERPL-MCNC: 528 PG/ML (ref 210–950)

## 2024-03-01 PROCEDURE — 82607 VITAMIN B-12: CPT | Mod: HCNC | Performed by: STUDENT IN AN ORGANIZED HEALTH CARE EDUCATION/TRAINING PROGRAM

## 2024-03-01 PROCEDURE — 1126F AMNT PAIN NOTED NONE PRSNT: CPT | Mod: HCNC,CPTII,S$GLB, | Performed by: STUDENT IN AN ORGANIZED HEALTH CARE EDUCATION/TRAINING PROGRAM

## 2024-03-01 PROCEDURE — 83540 ASSAY OF IRON: CPT | Mod: HCNC | Performed by: STUDENT IN AN ORGANIZED HEALTH CARE EDUCATION/TRAINING PROGRAM

## 2024-03-01 PROCEDURE — 1101F PT FALLS ASSESS-DOCD LE1/YR: CPT | Mod: HCNC,CPTII,S$GLB, | Performed by: STUDENT IN AN ORGANIZED HEALTH CARE EDUCATION/TRAINING PROGRAM

## 2024-03-01 PROCEDURE — 1159F MED LIST DOCD IN RCRD: CPT | Mod: HCNC,CPTII,S$GLB, | Performed by: STUDENT IN AN ORGANIZED HEALTH CARE EDUCATION/TRAINING PROGRAM

## 2024-03-01 PROCEDURE — 1160F RVW MEDS BY RX/DR IN RCRD: CPT | Mod: HCNC,CPTII,S$GLB, | Performed by: STUDENT IN AN ORGANIZED HEALTH CARE EDUCATION/TRAINING PROGRAM

## 2024-03-01 PROCEDURE — 3288F FALL RISK ASSESSMENT DOCD: CPT | Mod: HCNC,CPTII,S$GLB, | Performed by: STUDENT IN AN ORGANIZED HEALTH CARE EDUCATION/TRAINING PROGRAM

## 2024-03-01 PROCEDURE — 82728 ASSAY OF FERRITIN: CPT | Mod: HCNC | Performed by: STUDENT IN AN ORGANIZED HEALTH CARE EDUCATION/TRAINING PROGRAM

## 2024-03-01 PROCEDURE — 99999 PR PBB SHADOW E&M-EST. PATIENT-LVL V: CPT | Mod: PBBFAC,HCNC,, | Performed by: STUDENT IN AN ORGANIZED HEALTH CARE EDUCATION/TRAINING PROGRAM

## 2024-03-01 PROCEDURE — 99214 OFFICE O/P EST MOD 30 MIN: CPT | Mod: HCNC,S$GLB,, | Performed by: STUDENT IN AN ORGANIZED HEALTH CARE EDUCATION/TRAINING PROGRAM

## 2024-03-01 PROCEDURE — 36415 COLL VENOUS BLD VENIPUNCTURE: CPT | Mod: HCNC,PO | Performed by: STUDENT IN AN ORGANIZED HEALTH CARE EDUCATION/TRAINING PROGRAM

## 2024-03-01 PROCEDURE — 1157F ADVNC CARE PLAN IN RCRD: CPT | Mod: HCNC,CPTII,S$GLB, | Performed by: STUDENT IN AN ORGANIZED HEALTH CARE EDUCATION/TRAINING PROGRAM

## 2024-03-01 NOTE — PROGRESS NOTES
Clinic Note  3/1/2024      Subjective:       Patient ID:  Olamide is a 89 y.o. female being seen for an established visit.    Chief Complaint: Follow-up    HPI  Olamide Felipe is a 89 y.o.  female who presents with hypothyroidism, aortic atherosclerosis, hyperparathyroidism, HLD, breast cancer (invasive ductal carcinoma of the right breast s/p lumpectomy 1/7/22, s/p postopertaive radiation (follows with breast center), OP, [facility] (sees neuro), mood disorder (takes fluoxetine for night terrors), hx of vertigo is here for a BP f/u visit.   -last visit I had increased her losartan to 50 mg from 25. They are checking it at the home but she does not know what they are. They tell her its fine.   -had fun at her bday party. no falls, feeling good.   -needs iron panel next visit and b12  -needs second shingles vaccine.   -does get sad and frustrated about not remembering. Already on fluoxetine for night terrors.     Review of Systems   Constitutional:  Negative for chills and fever.   HENT:  Negative for congestion.    Respiratory:  Negative for cough and shortness of breath.    Cardiovascular:  Negative for chest pain.   Gastrointestinal:  Negative for abdominal pain, blood in stool and constipation.   Genitourinary:  Negative for dysuria and frequency.   Neurological:  Negative for dizziness and headaches.       Medication List with Changes/Refills   Current Medications    ACETAMINOPHEN (TYLENOL) 325 MG TABLET    Take 325 mg by mouth every 6 (six) hours as needed for Pain.    B COMPLEX VITAMINS TABLET    Take 1 tablet by mouth once daily.    CALCIUM-VITAMIN D 250-100 MG-UNIT PER TABLET    Take 1 tablet by mouth 2 (two) times daily.    FERROUS SULFATE 325 (65 FE) MG EC TABLET    Take 1 tablet (325 mg total) by mouth every other day.    FLUOXETINE 20 MG CAPSULE    Take 1 capsule (20 mg total) by mouth once daily.    LEVOTHYROXINE (SYNTHROID) 25 MCG TABLET    Take 1 tablet (25 mcg total) by mouth before breakfast.     "LORATADINE (CLARITIN) 10 MG TABLET    Take 1 tablet (10 mg total) by mouth once daily.    LOSARTAN (COZAAR) 50 MG TABLET    Take 1 tablet (50 mg total) by mouth once daily.    MEMANTINE (NAMENDA) 5 MG TAB    Take 1 tablet (5 mg total) by mouth 2 (two) times daily. Begin by taking one tablet daily for seven days, then increase to one tablet two times daily.    MV-MIN/IRON/FOLIC/CALCIUM/VITK (WOMEN'S MULTIVITAMIN ORAL)    Take 1 tablet by mouth once daily.    OMEPRAZOLE (PRILOSEC) 20 MG CAPSULE    Take 1 capsule (20 mg total) by mouth once daily.    POLYETHYLENE GLYCOL (GLYCOLAX) 17 GRAM PWPK    Take 17 g by mouth 2 (two) times daily as needed for Constipation.    SENNA-DOCUSATE 8.6-50 MG (PERICOLACE) 8.6-50 MG PER TABLET    Take 1 tablet by mouth daily as needed for Constipation.    SIMVASTATIN (ZOCOR) 20 MG TABLET    TAKE 1 TABLET (20 MG TOTAL) BY MOUTH EVERY EVENING.           Objective:      BP (!) 166/60   Pulse 82   Resp 16   Ht 5' 3" (1.6 m)   Wt 53.9 kg (118 lb 13.3 oz)   SpO2 96%   BMI 21.05 kg/m²   Estimated body mass index is 21.05 kg/m² as calculated from the following:    Height as of this encounter: 5' 3" (1.6 m).    Weight as of this encounter: 53.9 kg (118 lb 13.3 oz).  Physical Exam  Constitutional:       Appearance: Normal appearance.   Eyes:      Conjunctiva/sclera: Conjunctivae normal.   Cardiovascular:      Rate and Rhythm: Normal rate and regular rhythm.      Heart sounds: Normal heart sounds.   Pulmonary:      Effort: Pulmonary effort is normal. No respiratory distress.      Breath sounds: Normal breath sounds.   Musculoskeletal:      Right lower leg: No edema.      Left lower leg: No edema.   Neurological:      Mental Status: She is alert. Mental status is at baseline.   Psychiatric:         Mood and Affect: Mood normal.         Behavior: Behavior normal.           Assessment and Plan:   1. Secondary hypertension  Assessment & Plan:  BP elevated even on recheck but suspect white coat HTN " since her BP at the home are not concerning to the nurse. They would have let the daughter know if they were running too high. Ms. Garcia will get the log and send a photo to me. Continue losartan 50 mg daily. Denies dizziness.      2. History of breast cancer  Overview:  Neoplasm of upper outer quadrant of right breast, estrogen receptor negative. invasive ductal carcinoma of the right breast s/p lumpectomy 1/7/22, s/p postop radiation. follows with breast center, continue.       3. Mild late onset Alzheimer's dementia without behavioral disturbance, psychotic disturbance, mood disturbance, or anxiety  -     Cancel: Ambulatory referral/consult to [facility]; Future; Expected date: 03/08/2024  -     Ambulatory referral/consult to [facility]; Future; Expected date: 03/08/2024    4. Mood disorder  Assessment & Plan:  Getting frustrated with her memory and also with a resident at the [facility] living place. On fluoxetine already. Will refer to behavioral health.     Orders:  -     Cancel: Ambulatory referral/consult to [facility]; Future; Expected date: 03/08/2024  -     Ambulatory referral/consult to [facility]; Future; Expected date: 03/08/2024    5. Anemia, unspecified type  -     FERRITIN; Future; Expected date: 03/01/2024  -     IRON AND TIBC; Future; Expected date: 03/01/2024  -     VITAMIN B12; Future; Expected date: 03/01/2024    6. B12 deficiency  -     VITAMIN B12; Future; Expected date: 03/01/2024           Follow Up:   Follow up in about 3 months (around 6/1/2024).      Lashonda Pearce

## 2024-03-01 NOTE — ASSESSMENT & PLAN NOTE
BP elevated even on recheck but suspect white coat HTN since her BP at the home are not concerning to the nurse. They would have let the daughter know if they were running too high. Ms. Garcia will get the log and send a photo to me. Continue losartan 50 mg daily. Denies dizziness.   warm and dry/color normal/normal/no rashes/no ulcers

## 2024-03-04 ENCOUNTER — TELEPHONE (OUTPATIENT)
Dept: PRIMARY CARE CLINIC | Facility: CLINIC | Age: 89
End: 2024-03-04
Payer: MEDICARE

## 2024-03-04 NOTE — TELEPHONE ENCOUNTER
----- Message from Lashonda Pearce MD sent at 3/4/2024  9:49 AM CST -----  Pls let daughter know that her iron levels and b12 are normal.

## 2024-03-07 ENCOUNTER — PATIENT MESSAGE (OUTPATIENT)
Dept: PRIMARY CARE CLINIC | Facility: CLINIC | Age: 89
End: 2024-03-07
Payer: MEDICARE

## 2024-03-11 RX ORDER — LOSARTAN POTASSIUM 25 MG/1
25 TABLET ORAL DAILY
Qty: 90 TABLET | Refills: 3 | Status: SHIPPED | OUTPATIENT
Start: 2024-03-11 | End: 2025-03-06

## 2024-03-12 NOTE — TELEPHONE ENCOUNTER
Refill Routing Note   Medication(s) are not appropriate for processing by [facility] for the following reason(s):        No active prescription written by provider: not yet signed by current PCP    [facility] action(s):  Defer      Medication Therapy Plan:         Appointments  past 12m or future 3m with PCP    Date Provider   Last Visit   3/1/2024 Lashonda Pearce MD   Next Visit   6/4/2024 Lashonda Pearce MD   ED visits in past 90 days: 0        Note composed:5:24 PM 03/12/2024            Colchicine Pregnancy And Lactation Text: This medication is Pregnancy Category C and isn't considered safe during pregnancy. It is excreted in breast milk.

## 2024-03-12 NOTE — TELEPHONE ENCOUNTER
No care due was identified.  [facility] Embedded Care Due Messages. Reference number: [id number].   3/12/2024 11:02:16 AM CDT

## 2024-03-12 NOTE — TELEPHONE ENCOUNTER
Refill Routing Note   Medication(s) are not appropriate for processing by [facility] for the following reason(s):        No active prescription written by provider:     [facility] action(s):  Defer      Medication Therapy Plan:         Appointments  past 12m or future 3m with PCP    Date Provider   Last Visit   3/1/2024 Lashonda Pearce MD   Next Visit   3/12/2024 Lashonda Pearce MD   ED visits in past 90 days: 0        Note composed:5:22 PM 03/12/2024

## 2024-03-13 RX ORDER — SIMVASTATIN 20 MG/1
20 TABLET, FILM COATED ORAL NIGHTLY
Qty: 90 TABLET | Refills: 1 | Status: SHIPPED | OUTPATIENT
Start: 2024-03-13

## 2024-03-13 RX ORDER — LORATADINE 10 MG/1
10 TABLET ORAL DAILY
Qty: 90 TABLET | Refills: 3 | Status: SHIPPED | OUTPATIENT
Start: 2024-03-13

## 2024-03-13 RX ORDER — LEVOTHYROXINE SODIUM 25 UG/1
25 TABLET ORAL
Qty: 90 TABLET | Refills: 1 | Status: SHIPPED | OUTPATIENT
Start: 2024-03-13

## 2024-03-14 ENCOUNTER — TELEPHONE (OUTPATIENT)
Dept: PRIMARY CARE CLINIC | Facility: CLINIC | Age: 89
End: 2024-03-14
Payer: MEDICARE

## 2024-03-14 NOTE — TELEPHONE ENCOUNTER
LCSW received referral from Dr. Pearce.  LCSW called patient and left voicemail requesting a return call to discuss referral.

## 2024-03-18 ENCOUNTER — OUTPATIENT CASE MANAGEMENT (OUTPATIENT)
Dept: NEUROLOGY | Facility: CLINIC | Age: 89
End: 2024-03-18
Payer: MEDICARE

## 2024-03-18 NOTE — PROGRESS NOTES
Protocol: The Care [facility] Effectiveness Study  Identifier: [id number]  IRB#: 2022.247  PI: Dr. Baldo Maynard, PhD  CO-I: Dr. Nighat Arreguin PsyD  Version Date: 12/05/2022  Pt Study ID: [id number]  Visit Month: 5  Care Plan documented on: 11/14/23 Please refer to note for more information.      Timeline:      Consent: Completed(10/20/23)  Baseline questionnaires: Completed(10/23/23)  6-month questionnaires: Planned(04/20/24)  12-month questionnaires: Planned(10/18/24)                 Visit Note:     3/18/24 received an in coming email from Christy Garcia, pt's cg stating that the pt had a hard weekend and the facility where the pt is staying has recommenced her to see a Geriatric Psychologist. I suggested the cg call her mom's Neurologist, Neuropsychologist, or PCP to talk about the benefits of a geriatric psychologist if the cg is not sure about whether or not to take the pt. I will plan to call the cg back on 3/22/24 for our month 5 care eco call.     Received a second email from the cg asking for the number for geriatric psychiatry sent the cg the following phone number: [phone]     3/21/24 - Spoke with caregiver Christy Garcia (Daughter) for month 5 visit. Cg reminded me that she trying to get in touch with a geriatric psychologist at [facility]. Cg mentioned that she reached out and has not heard back yet.cg stated that their is a NP  that works along side the geriatric psychologist that can go out and see the pt at her assisted living facility. I reminded the cg that I would be happy to reach out on behalf of the pt and cg if she does not hear from the doctor in another few days.     Cg stated that they saw the PCP recently and the PCP prescribed Seroquel 25mg 1x per day a night to help with the pt's paranoia, and insomnia. Cg mentioned that the pt is not sleeping at night.     Cg stated that she is concerned that seeing the pt in person is triggering to the pt. Cg mentioned that recently when she  goes to visit the pt has cried the entire time. Cg was wondering if cutting back on in person visits would be a bad idea. I told her that I was not sure but would ask one of the clinical members of the team their opinion. I spoke with Dr. Nighat Arreguin PsyD. Dr. Arreguin addressed that if in person visits were triggering tot he pt then finding other ways to communicate is just fine. For example, face time, phone calls, letter writing, etc. This way the pt does not have to disrupt her routine. Cg mentioned feeling guilty about this but is not sure if meeting with the pt in person regularly is a good idea. The clinical member of our staff agrees.     Falls in the past month: 0  UTIs in the past month: 0  Hospital encounters in the past month (reported by caregiver): 0  Medication Changes:  Seroquel 25 mg 1x per night     Next monthly visit scheduled for: 04/20/24. Caregiver knows how to reach CTN, and is encouraged to do so, as needed before our next scheduled call.     Action items for CTN: Sent cg an email with information from Dr. Arreguin.

## 2024-03-21 ENCOUNTER — PATIENT MESSAGE (OUTPATIENT)
Dept: PRIMARY CARE CLINIC | Facility: CLINIC | Age: 89
End: 2024-03-21
Payer: MEDICARE

## 2024-03-21 DIAGNOSIS — G30.1 MILD LATE ONSET ALZHEIMER'S DEMENTIA WITH MOOD DISTURBANCE: Primary | ICD-10-CM

## 2024-03-21 DIAGNOSIS — F02.A3 MILD LATE ONSET ALZHEIMER'S DEMENTIA WITH MOOD DISTURBANCE: Primary | ICD-10-CM

## 2024-03-21 DIAGNOSIS — F39 MOOD DISORDER: ICD-10-CM

## 2024-03-21 RX ORDER — QUETIAPINE FUMARATE 25 MG/1
25 TABLET, FILM COATED ORAL NIGHTLY
Qty: 30 TABLET | Refills: 5 | Status: SHIPPED | OUTPATIENT
Start: 2024-03-21 | End: 2024-05-01 | Stop reason: SDUPTHER

## 2024-03-21 NOTE — TELEPHONE ENCOUNTER
Spoke to patients daughter. Patient having trouble sleeping at night. Only cries when her daughter is with her but not other times. Doing fine during the day. Not being aggressive. With her dementia and with her behavior changes and lack of sleep, will try seroquel 25 mg at bedtime. Told daughter that this can make her drowsy. Her qtc was 452. Also would like her to see tori. Placed referral.

## 2024-03-22 ENCOUNTER — TELEPHONE (OUTPATIENT)
Dept: PRIMARY CARE CLINIC | Facility: CLINIC | Age: 89
End: 2024-03-22
Payer: MEDICARE

## 2024-03-22 ENCOUNTER — PATIENT MESSAGE (OUTPATIENT)
Dept: PRIMARY CARE CLINIC | Facility: CLINIC | Age: 89
End: 2024-03-22
Payer: MEDICARE

## 2024-03-22 NOTE — TELEPHONE ENCOUNTER
"LCSW contacted patient's daughter/Christy regarding MyChart message about referral.  LCSW explained that LCSW prefers to speak with patient to ensure they understand the role of LCSW and are in agreement to schedule intake. Daughter responded that patient would not understand why she is would be coming since she has "worsening dementia." After discussing purpose of individual therapy, daughter did not think patient would be appropriate due to lack of insight and cognitive impairment.  Patient is living in a care home for persons with dementia run by 2 nurses.  She has become increasingly agitated and upset by her housemates and staying up throughout the night making calls to her daughter at 3am that she is being kicked out.  Dtr reports Dr. Pearce has started a new medication and it was started last night to hopefully help with her sleep and delusions.  Dtr reports the staff said she was in good spirits this morning.  Patient is being followed by Neuro CM telephonically through daughter.  Daughter is having difficulty with patient's decline and what to do.  LCSW discussed safety and quality of life and dtr feels she has both of these where she is. LCSW offered support as needed.  Dr. Pearce updated.   "

## 2024-04-18 ENCOUNTER — OUTPATIENT CASE MANAGEMENT (OUTPATIENT)
Dept: NEUROLOGY | Facility: CLINIC | Age: 89
End: 2024-04-18
Payer: MEDICARE

## 2024-04-18 NOTE — PROGRESS NOTES
Protocol: The Care [facility] Effectiveness Study  Identifier: [id number]  IRB#: [id number]  PI: Dr. Baldo Maynard, PhD  CO-I: Dr. Nighat Arreguin PsyD  Version Date: 12/05/2022  Pt Study ID: [id number]  Visit Month: 6  Care Plan documented on: 11/14/23 Please refer to note for more information.      Timeline:      Consent: Completed(10/20/23)  Baseline questionnaires: Completed(10/23/23)  6-month questionnaires: Planned(04/20/24)  12-month questionnaires: Planned(10/18/24)      Visit Note:   Spoke with caregiver Christy Garcia (Daughter) for month 6 visit. Scheduled 6 month surveys for Friday 4/26/24 at 9 AM with Rubia Sims LCSW. Sent the cg a copy of the survey response scales to her email on file.     Cg stated that the pt has experienced a dramatic improvement in her mood after about one month consistently taking Seroquel 25 mg. Cg mentioned that the pt's memory is not getting any better but the pt's mood has done a total 180. Cg mentioned that the pt is now sleeping through the night, and is not crying as much or really at all when the cg visits.     Cg mentioned that the pt claimed that she did not eat for three days last week due to an upset stomach but the cg mentioned that the people who work at the facility said she ate but just not as much. Cg states that the pt is not remembering when and if she has eaten as well as in the past.     Cg mentioned that the pt had been complaining of a sore and hurt back. Cg mentioned that the pt thought someone stole her wedding rings and the pt tore her room apart looking for them. Cg also mentioned that the pt has been pushing the other residents wheel chairs in the house and the cg thinks that this is why the pt's back was hurting.     Cg states that the pt does better in a room on her own but it is more expensive and the cg is hesitant to spend the money on the more expensive room. Cg states that the pt is calmer and sleeps better when she does not have a roommate.  "    Cg stated that the pt has not been calling her in the middle of the night recently.     Cg mentioned that the pt has been enjoying puzzle books. Cg did mention that the pt does not always remember completing them and has made comments like, "I think someone is coming in here and doing my puzzles!"     Falls in the past month: 0  UTIs in the past month: 0  Hospital encounters in the past month (reported by caregiver): 0    Next monthly visit scheduled for: 05/18/24. Caregiver knows how to reach CTN, and is encouraged to do so, as needed before our next scheduled call.     Action items for CTN: None at this time.    "

## 2024-04-29 NOTE — PROGRESS NOTES
Care Ecosystem Follow-up Surveys:  ANIYAH Barkley MayYUKO completed 6 month with caregiver Christy (Daughter) on (date: 4/29/24).   Surveys documented in RedCap and Epic Flowsheets.      ClinCard sent/loaded: yes  (*Only applies to 6-month and 12-month visits)  Advised caregiver to use ClinCard within the first three months to avoid any fees or deductions. Requested caregiver save card to be reloaded once 12-month surveys are completed.   Caregiver expressed understanding.

## 2024-05-01 ENCOUNTER — PATIENT MESSAGE (OUTPATIENT)
Dept: PRIMARY CARE CLINIC | Facility: CLINIC | Age: 89
End: 2024-05-01
Payer: MEDICARE

## 2024-05-01 DIAGNOSIS — G31.84 MCI (MILD COGNITIVE IMPAIRMENT) WITH MEMORY LOSS: ICD-10-CM

## 2024-05-01 RX ORDER — QUETIAPINE FUMARATE 25 MG/1
25 TABLET, FILM COATED ORAL NIGHTLY
Qty: 90 TABLET | Refills: 3 | Status: SHIPPED | OUTPATIENT
Start: 2024-05-01 | End: 2025-04-26

## 2024-05-01 RX ORDER — MEMANTINE HYDROCHLORIDE 5 MG/1
5 TABLET ORAL 2 TIMES DAILY
Qty: 180 TABLET | Refills: 3 | Status: SHIPPED | OUTPATIENT
Start: 2024-05-01 | End: 2025-05-01

## 2024-05-01 RX ORDER — FLUOXETINE HYDROCHLORIDE 20 MG/1
20 CAPSULE ORAL DAILY
Qty: 90 CAPSULE | Refills: 0 | Status: SHIPPED | OUTPATIENT
Start: 2024-05-01

## 2024-05-07 ENCOUNTER — TELEPHONE (OUTPATIENT)
Dept: PRIMARY CARE CLINIC | Facility: CLINIC | Age: 89
End: 2024-05-07
Payer: MEDICARE

## 2024-05-16 ENCOUNTER — OUTPATIENT CASE MANAGEMENT (OUTPATIENT)
Dept: NEUROLOGY | Facility: CLINIC | Age: 89
End: 2024-05-16
Payer: MEDICARE

## 2024-05-16 RX ORDER — AMOXICILLIN 250 MG
1 CAPSULE ORAL DAILY PRN
Qty: 90 TABLET | Refills: 0 | Status: SHIPPED | OUTPATIENT
Start: 2024-05-16

## 2024-05-16 RX ORDER — FERROUS SULFATE 325(65) MG
325 TABLET, DELAYED RELEASE (ENTERIC COATED) ORAL EVERY OTHER DAY
Qty: 45 TABLET | Refills: 1 | Status: SHIPPED | OUTPATIENT
Start: 2024-05-16 | End: 2024-06-04 | Stop reason: ALTCHOICE

## 2024-05-16 NOTE — PROGRESS NOTES
Protocol: The Care [facility]cosystem Consortium Effectiveness Study  Identifier: [id number]  IRB#: 2022.247  PI: Dr. Baldo Maynard, PhD  CO-I: Dr. Nighat Arreguin PsyD  Version Date: 12/05/2022  Pt Study ID: [id number]  Visit Month: 7  Care Plan documented on: 11/14/23 Please refer to note for more information.      Timeline:      Consent: Completed(10/20/23)  Baseline questionnaires: Completed(10/23/23)  6-month questionnaires: Completed(04/29/24)  12-month questionnaires: Planned(10/18/24)        Visit Note:     (date: 05/16/24) Reached out to cg, Christy Garcia in a first attempt to hold our month 7 care ecosystem program/study call. Was not able to reach the cg and left a detailed voicemail message requesting a call back at their earliest convenience. I will plan to call back on (date: 05/22/24) if I do not hear back before then.      (Date: 05/22/24) Spoke with caregiver Christy Garcia (Daughter) for month 7 visit. Cg reports that she is concerned about the pt because she is back to feeling sad all of the time. Cg reports that the pt has been crying a lot. Pt started taking a new medication a few months ago that helped with mood but in the last two weeks the pt has been feeling sad again. Cg expressed that the pt has been taking her medication so she does not know why this is happening again. Cg states that she and the nurses at the pt's facility are monitoring th pt's mood daily. Cg does not think that the pt would cause harm to herself.     Cg reports that the pt had a fall in the bathroom and the pt's skin tore. Cg is unsure if the pt actually fell because the nurses were not aware of it and it was not reported by the facility.     Cg states that the pt has a new roommate who has many children. The children come to visit regularly and this might be causing the pt some sadness since her family does not come to visit everyday. Cg report having a hard time seeing the pt so upset and is unsure about what to do about it. Cg states  that she knows the pt is in a good place but sometimes thinks about whether or not she made a poor decision moving the pt out of their home. I tried to provide the cg with some support in regard to her decision. The pt is safe, clean and well cared for.     Cg stated that the nurses tested the pt for a UTI on Saturday this past weekend but the cg is unsure if it came back positive or not. So, to the cg's knowledge the pt has not had any recent UTI's.     Cg reports that the pt is frail but mobile. The cg states that the pt is able to gt around just fine and does not need a walker or a anand.     Falls in the past month: 1  UTIs in the past month: 0  Hospital encounters in the past month (reported by caregiver): 0  Medication changes: no longer taking iron     Next monthly visit scheduled for: 06/21/24. Caregiver knows how to reach CTN, and is encouraged to do so, as needed before our next scheduled call.     Action items for CTN: None at this time.

## 2024-06-04 ENCOUNTER — TELEPHONE (OUTPATIENT)
Dept: PRIMARY CARE CLINIC | Facility: CLINIC | Age: 89
End: 2024-06-04

## 2024-06-04 ENCOUNTER — OFFICE VISIT (OUTPATIENT)
Dept: PRIMARY CARE CLINIC | Facility: CLINIC | Age: 89
End: 2024-06-04
Payer: MEDICARE

## 2024-06-04 VITALS
WEIGHT: 117.06 LBS | BODY MASS INDEX: 20.74 KG/M2 | HEART RATE: 77 BPM | OXYGEN SATURATION: 99 % | HEIGHT: 63 IN | DIASTOLIC BLOOD PRESSURE: 62 MMHG | SYSTOLIC BLOOD PRESSURE: 130 MMHG

## 2024-06-04 DIAGNOSIS — G30.1 MILD LATE ONSET ALZHEIMER'S DEMENTIA WITHOUT BEHAVIORAL DISTURBANCE, PSYCHOTIC DISTURBANCE, MOOD DISTURBANCE, OR ANXIETY: ICD-10-CM

## 2024-06-04 DIAGNOSIS — D69.2 SENILE PURPURA: ICD-10-CM

## 2024-06-04 DIAGNOSIS — E78.5 HYPERLIPIDEMIA, UNSPECIFIED HYPERLIPIDEMIA TYPE: ICD-10-CM

## 2024-06-04 DIAGNOSIS — F02.A0 MILD LATE ONSET ALZHEIMER'S DEMENTIA WITHOUT BEHAVIORAL DISTURBANCE, PSYCHOTIC DISTURBANCE, MOOD DISTURBANCE, OR ANXIETY: ICD-10-CM

## 2024-06-04 DIAGNOSIS — I15.9 SECONDARY HYPERTENSION: Primary | ICD-10-CM

## 2024-06-04 DIAGNOSIS — J43.2 CENTRILOBULAR EMPHYSEMA: ICD-10-CM

## 2024-06-04 PROCEDURE — 1101F PT FALLS ASSESS-DOCD LE1/YR: CPT | Mod: HCNC,CPTII,S$GLB, | Performed by: STUDENT IN AN ORGANIZED HEALTH CARE EDUCATION/TRAINING PROGRAM

## 2024-06-04 PROCEDURE — 3288F FALL RISK ASSESSMENT DOCD: CPT | Mod: HCNC,CPTII,S$GLB, | Performed by: STUDENT IN AN ORGANIZED HEALTH CARE EDUCATION/TRAINING PROGRAM

## 2024-06-04 PROCEDURE — 1157F ADVNC CARE PLAN IN RCRD: CPT | Mod: HCNC,CPTII,S$GLB, | Performed by: STUDENT IN AN ORGANIZED HEALTH CARE EDUCATION/TRAINING PROGRAM

## 2024-06-04 PROCEDURE — 1159F MED LIST DOCD IN RCRD: CPT | Mod: HCNC,CPTII,S$GLB, | Performed by: STUDENT IN AN ORGANIZED HEALTH CARE EDUCATION/TRAINING PROGRAM

## 2024-06-04 PROCEDURE — 1126F AMNT PAIN NOTED NONE PRSNT: CPT | Mod: HCNC,CPTII,S$GLB, | Performed by: STUDENT IN AN ORGANIZED HEALTH CARE EDUCATION/TRAINING PROGRAM

## 2024-06-04 PROCEDURE — 99214 OFFICE O/P EST MOD 30 MIN: CPT | Mod: HCNC,S$GLB,, | Performed by: STUDENT IN AN ORGANIZED HEALTH CARE EDUCATION/TRAINING PROGRAM

## 2024-06-04 PROCEDURE — 99999 PR PBB SHADOW E&M-EST. PATIENT-LVL III: CPT | Mod: PBBFAC,HCNC,, | Performed by: STUDENT IN AN ORGANIZED HEALTH CARE EDUCATION/TRAINING PROGRAM

## 2024-06-04 NOTE — PROGRESS NOTES
Clinic Note  6/4/2024      Subjective:       Patient ID:  Olamide is a 89 y.o. female being seen for an established visit.    Chief Complaint: Hypertension    HPI  Olamide Felipe is a 89 y.o.  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   -BP is good today  -no falls, feeling good. No issues. Sleeping well. Eating well.   -weight is stable. Did not gain or lose weight.  -will order labs so we can get them done prior to her next visit.     Review of Systems   Constitutional:  Negative for chills and fever.   HENT:  Negative for congestion.    Respiratory:  Negative for cough and shortness of breath.    Cardiovascular:  Negative for chest pain.   Gastrointestinal:  Negative for abdominal pain, blood in stool, constipation and diarrhea.   Genitourinary:  Negative for dysuria and frequency.   Musculoskeletal:  Positive for back pain.   Neurological:  Negative for dizziness and headaches.       Medication List with Changes/Refills   Current Medications    ACETAMINOPHEN (TYLENOL) 325 MG TABLET    Take 325 mg by mouth every 6 (six) hours as needed for Pain.    B COMPLEX VITAMINS TABLET    Take 1 tablet by mouth once daily.    CALCIUM-VITAMIN D 250-100 MG-UNIT PER TABLET    Take 1 tablet by mouth 2 (two) times daily.    FLUOXETINE 20 MG CAPSULE    Take 1 capsule (20 mg total) by mouth once daily.    LEVOTHYROXINE (SYNTHROID) 25 MCG TABLET    Take 1 tablet (25 mcg total) by mouth before breakfast.    LORATADINE (CLARITIN) 10 MG TABLET    Take 1 tablet (10 mg total) by mouth once daily.    LOSARTAN (COZAAR) 25 MG TABLET    Take 1 tablet (25 mg total) by mouth once daily.    MEMANTINE (NAMENDA) 5 MG TAB    Take 1 tablet  "(5 mg total) by mouth 2 (two) times daily. Begin by taking one tablet daily for seven days, then increase to one tablet two times daily.    MV-MIN/IRON/FOLIC/CALCIUM/VITK (WOMEN'S MULTIVITAMIN ORAL)    Take 1 tablet by mouth once daily.    OMEPRAZOLE (PRILOSEC) 20 MG CAPSULE    Take 1 capsule (20 mg total) by mouth once daily.    POLYETHYLENE GLYCOL (GLYCOLAX) 17 GRAM PWPK    Take 17 g by mouth 2 (two) times daily as needed for Constipation.    QUETIAPINE (SEROQUEL) 25 MG TAB    Take 1 tablet (25 mg total) by mouth every evening.    SENNA-DOCUSATE 8.6-50 MG (PERICOLACE) 8.6-50 MG PER TABLET    Take 1 tablet by mouth daily as needed for Constipation.    SIMVASTATIN (ZOCOR) 20 MG TABLET    Take 1 tablet (20 mg total) by mouth every evening.   Discontinued Medications    FERROUS SULFATE 325 (65 FE) MG EC TABLET    Take 1 tablet (325 mg total) by mouth every other day.           Objective:      /62 (BP Location: Left arm, Patient Position: Sitting, BP Method: Medium (Manual))   Pulse 77   Ht 5' 3" (1.6 m)   Wt 53.1 kg (117 lb 1 oz)   SpO2 99%   BMI 20.74 kg/m²   Estimated body mass index is 20.74 kg/m² as calculated from the following:    Height as of this encounter: 5' 3" (1.6 m).    Weight as of this encounter: 53.1 kg (117 lb 1 oz).  Physical Exam  Constitutional:       Appearance: Normal appearance.   Eyes:      Conjunctiva/sclera: Conjunctivae normal.   Cardiovascular:      Rate and Rhythm: Normal rate and regular rhythm.      Heart sounds: Normal heart sounds.   Pulmonary:      Effort: Pulmonary effort is normal. No respiratory distress.      Breath sounds: Normal breath sounds.   Musculoskeletal:      Right lower leg: No edema.      Left lower leg: No edema.   Skin:     Findings: Bruising present.   Neurological:      Mental Status: She is alert. Mental status is at baseline.   Psychiatric:         Mood and Affect: Mood normal.         Behavior: Behavior normal.           Assessment and Plan:     1. " Secondary hypertension  Assessment & Plan:  BP is well controled today. On losartan 25 mg daily, continue. Will monitor     Orders:  -     CBC Without Differential; Future; Expected date: 06/04/2024  -     Comprehensive Metabolic Panel; Future; Expected date: 06/04/2024    2. Hyperlipidemia, unspecified hyperlipidemia type  Assessment & Plan:  On simvastatin, continue. Will monitor     Orders:  -     TSH; Future; Expected date: 06/04/2024  -     Lipid Panel; Future; Expected date: 06/04/2024    3. Centrilobular emphysema  Assessment & Plan:  Noted on imaging. Not symptomatic. Will monitor       4. 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 Continue. Continue namenda.       5. Senile purpura  Assessment & Plan:  Bruises easily, likely due to age. No signs of major bleeding. Continue.              Follow Up:   Follow up in about 4 months (around 10/4/2024).      Lashonda Pearce

## 2024-06-21 ENCOUNTER — OUTPATIENT CASE MANAGEMENT (OUTPATIENT)
Dept: NEUROLOGY | Facility: CLINIC | Age: 89
End: 2024-06-21
Payer: MEDICARE

## 2024-06-21 NOTE — PROGRESS NOTES
Protocol: [employer] Effectiveness Study  Identifier: [id number]  IRB#: 2022.247  PI: Dr. Baldo Maynard, PhD  CO-I: Dr. Nighat Arreguin PsyD  Version Date: 12/05/2022  Pt Study ID: [id number]  Visit Month: 8  Care Plan documented on: 11/14/23 Please refer to note for more information.      Timeline:      Consent: Completed(10/20/23)  Baseline questionnaires: Completed(10/23/23)  6-month questionnaires: Planned(04/20/24)  12-month questionnaires: Planned(10/18/24)             Visit Note:     (date: 06/21/24) Reached out to cg, Christy Garcia in a first attempt to hold our month 8 care ecosystem program/study call. Was not able to reach the cg and left a detailed voicemail message requesting a call back at their earliest convenience. I will plan to call back on (date: 06/27/24) if I do not hear back before then.      Spoke with caregiver Christy Garcia (Daughter) for month 8 visit.       Falls in the past month: pending  UTIs in the past month: pending  Hospital encounters in the past month (reported by caregiver): pending      Next monthly visit scheduled for: pending. Caregiver knows how to reach CTN, and is encouraged to do so, as needed before our next scheduled call.     Action items for CTN: pending

## 2024-07-08 ENCOUNTER — OFFICE VISIT (OUTPATIENT)
Dept: PRIMARY CARE CLINIC | Facility: CLINIC | Age: 89
End: 2024-07-08
Payer: MEDICARE

## 2024-07-08 VITALS
HEART RATE: 78 BPM | OXYGEN SATURATION: 97 % | SYSTOLIC BLOOD PRESSURE: 146 MMHG | WEIGHT: 118.38 LBS | BODY MASS INDEX: 20.97 KG/M2 | DIASTOLIC BLOOD PRESSURE: 76 MMHG

## 2024-07-08 DIAGNOSIS — Z00.00 ENCOUNTER FOR PREVENTIVE HEALTH EXAMINATION: Primary | ICD-10-CM

## 2024-07-08 DIAGNOSIS — F02.A0 MILD LATE ONSET ALZHEIMER'S DEMENTIA WITHOUT BEHAVIORAL DISTURBANCE, PSYCHOTIC DISTURBANCE, MOOD DISTURBANCE, OR ANXIETY: ICD-10-CM

## 2024-07-08 DIAGNOSIS — G30.1 MILD LATE ONSET ALZHEIMER'S DEMENTIA WITHOUT BEHAVIORAL DISTURBANCE, PSYCHOTIC DISTURBANCE, MOOD DISTURBANCE, OR ANXIETY: ICD-10-CM

## 2024-07-08 DIAGNOSIS — R26.9 ABNORMALITY OF GAIT AND MOBILITY: ICD-10-CM

## 2024-07-08 DIAGNOSIS — I15.9 SECONDARY HYPERTENSION: ICD-10-CM

## 2024-07-08 DIAGNOSIS — E21.3 HYPERPARATHYROIDISM: ICD-10-CM

## 2024-07-08 DIAGNOSIS — I70.0 AORTIC ATHEROSCLEROSIS: ICD-10-CM

## 2024-07-08 DIAGNOSIS — J43.2 CENTRILOBULAR EMPHYSEMA: ICD-10-CM

## 2024-07-08 DIAGNOSIS — D69.2 SENILE PURPURA: ICD-10-CM

## 2024-07-08 PROBLEM — R41.0 DELIRIUM: Status: RESOLVED | Noted: 2023-11-17 | Resolved: 2024-07-08

## 2024-07-08 PROBLEM — S42.034D CLOSED NONDISPLACED FRACTURE OF ACROMIAL END OF RIGHT CLAVICLE WITH ROUTINE HEALING: Status: RESOLVED | Noted: 2023-04-17 | Resolved: 2024-07-08

## 2024-07-08 PROBLEM — R53.1 WEAKNESS ACQUIRED IN ICU: Status: RESOLVED | Noted: 2023-11-18 | Resolved: 2024-07-08

## 2024-07-08 PROCEDURE — 1159F MED LIST DOCD IN RCRD: CPT | Mod: HCNC,CPTII,S$GLB, | Performed by: PHYSICIAN ASSISTANT

## 2024-07-08 PROCEDURE — 1123F ACP DISCUSS/DSCN MKR DOCD: CPT | Mod: HCNC,CPTII,S$GLB, | Performed by: PHYSICIAN ASSISTANT

## 2024-07-08 PROCEDURE — 99999 PR PBB SHADOW E&M-EST. PATIENT-LVL IV: CPT | Mod: PBBFAC,HCNC,, | Performed by: PHYSICIAN ASSISTANT

## 2024-07-08 PROCEDURE — 3288F FALL RISK ASSESSMENT DOCD: CPT | Mod: HCNC,CPTII,S$GLB, | Performed by: PHYSICIAN ASSISTANT

## 2024-07-08 PROCEDURE — 1170F FXNL STATUS ASSESSED: CPT | Mod: HCNC,CPTII,S$GLB, | Performed by: PHYSICIAN ASSISTANT

## 2024-07-08 PROCEDURE — 1101F PT FALLS ASSESS-DOCD LE1/YR: CPT | Mod: HCNC,CPTII,S$GLB, | Performed by: PHYSICIAN ASSISTANT

## 2024-07-08 PROCEDURE — G0439 PPPS, SUBSEQ VISIT: HCPCS | Mod: HCNC,S$GLB,, | Performed by: PHYSICIAN ASSISTANT

## 2024-07-08 PROCEDURE — 1160F RVW MEDS BY RX/DR IN RCRD: CPT | Mod: HCNC,CPTII,S$GLB, | Performed by: PHYSICIAN ASSISTANT

## 2024-07-08 PROCEDURE — 1126F AMNT PAIN NOTED NONE PRSNT: CPT | Mod: HCNC,CPTII,S$GLB, | Performed by: PHYSICIAN ASSISTANT

## 2024-07-08 NOTE — PROGRESS NOTES
Olamide Felipe presented for a follow-up Medicare AWV today. The following components were reviewed and updated:    Medical history  Family History  Social history  Allergies and Current Medications  Health Risk Assessment  Health Maintenance  Care Team    **See Completed Assessments for Annual Wellness visit with in the encounter summary    The following assessments were completed:  Depression Screening  Cognitive function Screening  Timed Get Up Test  Whisper Test      Opioid documentation:      Patient does not have a current opioid prescription.          Vitals:    07/08/24 0847   BP: (!) 146/76   BP Location: Left arm   Patient Position: Sitting   BP Method: Medium (Manual)   Pulse: 78   SpO2: 97%   Weight: 53.7 kg (118 lb 6.2 oz)     Body mass index is 20.97 kg/m².       Physical Exam  Constitutional:       Appearance: Normal appearance.   HENT:      Head: Normocephalic and atraumatic.   Musculoskeletal:         General: Normal range of motion.   Neurological:      Mental Status: She is alert. Mental status is at baseline.   Psychiatric:         Mood and Affect: Mood normal.           Diagnoses and health risks identified today and associated recommendations/orders:  1. Encounter for preventive health examination  Provided Olamide with a 5-10 year written screening schedule and personal prevention plan. Recommendations were developed using the USPSTF age appropriate recommendations. Education, counseling, and referrals were provided as needed.  After Visit Summary printed and given to patient which includes a list of additional screenings\tests needed.    2. Abnormality of gait and mobility  Continue monitored walks. Has cane if needed. No recent falls    3. Senile purpura  Assessment & Plan:  Bruises easily, likely due to age. No signs of major bleeding. Continue monitoring       4. Aortic atherosclerosis  Assessment & Plan:  Noted on previous imaging. On statin, continue.       5.  Hyperparathyroidism  Assessment & Plan:  With normal calcium levels. Pt does not meet criteria for treatment. Follows with endo. Continue.       6. 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 Continue. Continue namenda.       7. Centrilobular emphysema  Assessment & Plan:  Noted on imaging. Not symptomatic. Will monitor     8. HTN  Elevated today, daughter will get readings from assisted living. F/u next month with pcp          Amy Lado, PA-C [facility]

## 2024-07-08 NOTE — PATIENT INSTRUCTIONS
Counseling and Referral of Other Preventative  (Italic type indicates deductible and co-insurance are waived)    Patient Name: Olamide Felipe  Today's Date: 7/8/2024    Health Maintenance       Date Due Completion Date    RSV Vaccine (Age 60+ and Pregnant patients) (1 - 1-dose 60+ series) Never done ---    Shingles Vaccine (2 of 2) 09/13/2019 7/19/2019    COVID-19 Vaccine (3 - Moderna risk series) 12/27/2021 11/29/2021    Influenza Vaccine (1) 09/01/2024 9/21/2023    DEXA Scan 02/09/2025 2/9/2023    TETANUS VACCINE 03/01/2026 3/1/2016    Lipid Panel 07/11/2028 7/11/2023        No orders of the defined types were placed in this encounter.    The following information is provided to all patients.  This information is to help you find resources for any of the problems found today that may be affecting your health:                  Living healthy guide: www.[facility].[place].[facility]      Understanding Diabetes: www.diabetes.org      Eating healthy: www.cdc.gov/healthyweight      CDC home safety checklist: www.cdc.gov/steadi/patient.html      Agency on Aging: www.goea.[place].gov      Alcoholics anonymous (AA): www.aa.org      Physical Activity: www.indu.nih.gov/aw7jcjm      Tobacco use: www.quitwithusla.org

## 2024-07-25 RX ORDER — FLUOXETINE HYDROCHLORIDE 20 MG/1
20 CAPSULE ORAL DAILY
Qty: 90 CAPSULE | Refills: 0 | Status: SHIPPED | OUTPATIENT
Start: 2024-07-25

## 2024-07-30 ENCOUNTER — OUTPATIENT CASE MANAGEMENT (OUTPATIENT)
Dept: NEUROLOGY | Facility: CLINIC | Age: 89
End: 2024-07-30
Payer: MEDICARE

## 2024-07-30 NOTE — PROGRESS NOTES
Protocol: [employer] Effectiveness Study  Identifier: [id number]  IRB#: 2022.247  PI: Dr. Baldo Maynard, PhD  CO-I: Dr. Nighat Arreguin PsyD  Version Date: 12/05/2022  Pt Study ID: [id number]  Visit Month: 9  Care Plan documented on: 11/14/23 Please refer to note for more information.      Timeline:      Consent: Completed(10/20/23)  Baseline questionnaires: Completed(10/23/23)  6-month questionnaires: Completed(04/29/24)  12-month questionnaires: Planned(10/18/24)       Visit Note:     (date: 07/30/24) Reached out to cg, Christy Garcia in a first attempt to hold our month 9 care ecosystem program/study call. Was not able to reach the cg and left a detailed voicemail message requesting a call back at their earliest convenience. I will plan to call back on (date: 08/06/24) if I do not hear back before then.      Spoke with caregiver pending (Daughter) for month 9 visit.       Falls in the past month: pending  UTIs in the past month: pending  Hospital encounters in the past month (reported by caregiver): pending      Next monthly visit scheduled for: pending. Caregiver knows how to reach CTN, and is encouraged to do so, as needed before our next scheduled call.     Action items for CTN: pending

## 2024-08-08 ENCOUNTER — LAB VISIT (OUTPATIENT)
Dept: LAB | Facility: HOSPITAL | Age: 89
End: 2024-08-08
Attending: STUDENT IN AN ORGANIZED HEALTH CARE EDUCATION/TRAINING PROGRAM
Payer: MEDICARE

## 2024-08-08 ENCOUNTER — NURSE TRIAGE (OUTPATIENT)
Dept: ADMINISTRATIVE | Facility: CLINIC | Age: 89
End: 2024-08-08
Payer: MEDICARE

## 2024-08-08 DIAGNOSIS — R41.82 ALTERED MENTAL STATUS, UNSPECIFIED ALTERED MENTAL STATUS TYPE: ICD-10-CM

## 2024-08-08 DIAGNOSIS — R41.82 ALTERED MENTAL STATUS, UNSPECIFIED ALTERED MENTAL STATUS TYPE: Primary | ICD-10-CM

## 2024-08-08 LAB
BILIRUB UR QL STRIP: NEGATIVE
CLARITY UR REFRACT.AUTO: CLEAR
COLOR UR AUTO: YELLOW
GLUCOSE UR QL STRIP: NEGATIVE
HGB UR QL STRIP: NEGATIVE
KETONES UR QL STRIP: NEGATIVE
LEUKOCYTE ESTERASE UR QL STRIP: NEGATIVE
NITRITE UR QL STRIP: NEGATIVE
PH UR STRIP: 6 [PH] (ref 5–8)
PROT UR QL STRIP: NEGATIVE
SP GR UR STRIP: 1.01 (ref 1–1.03)
URN SPEC COLLECT METH UR: NORMAL

## 2024-08-08 PROCEDURE — 81003 URINALYSIS AUTO W/O SCOPE: CPT | Mod: HCNC | Performed by: STUDENT IN AN ORGANIZED HEALTH CARE EDUCATION/TRAINING PROGRAM

## 2024-08-09 RX ORDER — QUETIAPINE FUMARATE 25 MG/1
25 TABLET, FILM COATED ORAL 2 TIMES DAILY
Qty: 180 TABLET | Refills: 3 | Status: SHIPPED | OUTPATIENT
Start: 2024-08-09 | End: 2025-08-04

## 2024-08-12 ENCOUNTER — TELEPHONE (OUTPATIENT)
Dept: PRIMARY CARE CLINIC | Facility: CLINIC | Age: 89
End: 2024-08-12
Payer: MEDICARE

## 2024-08-12 NOTE — TELEPHONE ENCOUNTER
Call to daughter to follow-up with cognitive/mental status. Dtr reporting mothers behavior seems to be worse with delusions and paranoia.   She did instruct caregivers with new Seroquel schedule but is unsure if they have given doses. Reminder given to dtr patient has appointment for tomorrow 08/13/2024 at 4pm.

## 2024-08-13 ENCOUNTER — LAB VISIT (OUTPATIENT)
Dept: LAB | Facility: HOSPITAL | Age: 89
End: 2024-08-13
Attending: STUDENT IN AN ORGANIZED HEALTH CARE EDUCATION/TRAINING PROGRAM
Payer: MEDICARE

## 2024-08-13 ENCOUNTER — OFFICE VISIT (OUTPATIENT)
Dept: PRIMARY CARE CLINIC | Facility: CLINIC | Age: 89
End: 2024-08-13
Payer: MEDICARE

## 2024-08-13 VITALS
OXYGEN SATURATION: 95 % | WEIGHT: 116.88 LBS | HEIGHT: 63 IN | HEART RATE: 88 BPM | DIASTOLIC BLOOD PRESSURE: 60 MMHG | BODY MASS INDEX: 20.71 KG/M2 | SYSTOLIC BLOOD PRESSURE: 138 MMHG | RESPIRATION RATE: 16 BRPM

## 2024-08-13 DIAGNOSIS — I15.9 SECONDARY HYPERTENSION: Primary | ICD-10-CM

## 2024-08-13 DIAGNOSIS — G30.1 MILD LATE ONSET ALZHEIMER'S DEMENTIA WITHOUT BEHAVIORAL DISTURBANCE, PSYCHOTIC DISTURBANCE, MOOD DISTURBANCE, OR ANXIETY: ICD-10-CM

## 2024-08-13 DIAGNOSIS — E78.5 HYPERLIPIDEMIA, UNSPECIFIED HYPERLIPIDEMIA TYPE: ICD-10-CM

## 2024-08-13 DIAGNOSIS — I15.9 SECONDARY HYPERTENSION: ICD-10-CM

## 2024-08-13 DIAGNOSIS — E03.9 HYPOTHYROIDISM, UNSPECIFIED TYPE: ICD-10-CM

## 2024-08-13 DIAGNOSIS — F02.A0 MILD LATE ONSET ALZHEIMER'S DEMENTIA WITHOUT BEHAVIORAL DISTURBANCE, PSYCHOTIC DISTURBANCE, MOOD DISTURBANCE, OR ANXIETY: ICD-10-CM

## 2024-08-13 LAB
ALBUMIN SERPL BCP-MCNC: 3.8 G/DL (ref 3.5–5.2)
ALP SERPL-CCNC: 61 U/L (ref 55–135)
ALT SERPL W/O P-5'-P-CCNC: 16 U/L (ref 10–44)
ANION GAP SERPL CALC-SCNC: 8 MMOL/L (ref 8–16)
AST SERPL-CCNC: 24 U/L (ref 10–40)
BILIRUB SERPL-MCNC: 0.2 MG/DL (ref 0.1–1)
BUN SERPL-MCNC: 20 MG/DL (ref 10–30)
CALCIUM SERPL-MCNC: 9.6 MG/DL (ref 8.7–10.5)
CHLORIDE SERPL-SCNC: 109 MMOL/L (ref 95–110)
CHOLEST SERPL-MCNC: 162 MG/DL (ref 120–199)
CHOLEST/HDLC SERPL: 2.9 {RATIO} (ref 2–5)
CO2 SERPL-SCNC: 24 MMOL/L (ref 23–29)
CREAT SERPL-MCNC: 1.2 MG/DL (ref 0.5–1.4)
ERYTHROCYTE [DISTWIDTH] IN BLOOD BY AUTOMATED COUNT: 12 % (ref 11.5–14.5)
EST. GFR  (NO RACE VARIABLE): 42.7 ML/MIN/1.73 M^2
GLUCOSE SERPL-MCNC: 120 MG/DL (ref 70–110)
HCT VFR BLD AUTO: 38.5 % (ref 37–48.5)
HDLC SERPL-MCNC: 56 MG/DL (ref 40–75)
HDLC SERPL: 34.6 % (ref 20–50)
HGB BLD-MCNC: 12.1 G/DL (ref 12–16)
LDLC SERPL CALC-MCNC: 82.8 MG/DL (ref 63–159)
MCH RBC QN AUTO: 31.8 PG (ref 27–31)
MCHC RBC AUTO-ENTMCNC: 31.4 G/DL (ref 32–36)
MCV RBC AUTO: 101 FL (ref 82–98)
NONHDLC SERPL-MCNC: 106 MG/DL
PLATELET # BLD AUTO: 262 K/UL (ref 150–450)
PMV BLD AUTO: 11 FL (ref 9.2–12.9)
POTASSIUM SERPL-SCNC: 4.8 MMOL/L (ref 3.5–5.1)
PROT SERPL-MCNC: 6.3 G/DL (ref 6–8.4)
RBC # BLD AUTO: 3.81 M/UL (ref 4–5.4)
SODIUM SERPL-SCNC: 141 MMOL/L (ref 136–145)
TRIGL SERPL-MCNC: 116 MG/DL (ref 30–150)
TSH SERPL DL<=0.005 MIU/L-ACNC: 1.54 UIU/ML (ref 0.4–4)
WBC # BLD AUTO: 5.87 K/UL (ref 3.9–12.7)

## 2024-08-13 PROCEDURE — 80053 COMPREHEN METABOLIC PANEL: CPT | Mod: HCNC | Performed by: STUDENT IN AN ORGANIZED HEALTH CARE EDUCATION/TRAINING PROGRAM

## 2024-08-13 PROCEDURE — 80061 LIPID PANEL: CPT | Mod: HCNC | Performed by: STUDENT IN AN ORGANIZED HEALTH CARE EDUCATION/TRAINING PROGRAM

## 2024-08-13 PROCEDURE — 85027 COMPLETE CBC AUTOMATED: CPT | Mod: HCNC | Performed by: STUDENT IN AN ORGANIZED HEALTH CARE EDUCATION/TRAINING PROGRAM

## 2024-08-13 PROCEDURE — 99214 OFFICE O/P EST MOD 30 MIN: CPT | Mod: HCNC,S$GLB,, | Performed by: STUDENT IN AN ORGANIZED HEALTH CARE EDUCATION/TRAINING PROGRAM

## 2024-08-13 PROCEDURE — 36415 COLL VENOUS BLD VENIPUNCTURE: CPT | Mod: HCNC,PO | Performed by: STUDENT IN AN ORGANIZED HEALTH CARE EDUCATION/TRAINING PROGRAM

## 2024-08-13 PROCEDURE — 1157F ADVNC CARE PLAN IN RCRD: CPT | Mod: HCNC,CPTII,S$GLB, | Performed by: STUDENT IN AN ORGANIZED HEALTH CARE EDUCATION/TRAINING PROGRAM

## 2024-08-13 PROCEDURE — 1101F PT FALLS ASSESS-DOCD LE1/YR: CPT | Mod: HCNC,CPTII,S$GLB, | Performed by: STUDENT IN AN ORGANIZED HEALTH CARE EDUCATION/TRAINING PROGRAM

## 2024-08-13 PROCEDURE — 1160F RVW MEDS BY RX/DR IN RCRD: CPT | Mod: HCNC,CPTII,S$GLB, | Performed by: STUDENT IN AN ORGANIZED HEALTH CARE EDUCATION/TRAINING PROGRAM

## 2024-08-13 PROCEDURE — 1125F AMNT PAIN NOTED PAIN PRSNT: CPT | Mod: HCNC,CPTII,S$GLB, | Performed by: STUDENT IN AN ORGANIZED HEALTH CARE EDUCATION/TRAINING PROGRAM

## 2024-08-13 PROCEDURE — 99999 PR PBB SHADOW E&M-EST. PATIENT-LVL III: CPT | Mod: PBBFAC,HCNC,, | Performed by: STUDENT IN AN ORGANIZED HEALTH CARE EDUCATION/TRAINING PROGRAM

## 2024-08-13 PROCEDURE — 1159F MED LIST DOCD IN RCRD: CPT | Mod: HCNC,CPTII,S$GLB, | Performed by: STUDENT IN AN ORGANIZED HEALTH CARE EDUCATION/TRAINING PROGRAM

## 2024-08-13 PROCEDURE — 84443 ASSAY THYROID STIM HORMONE: CPT | Mod: HCNC | Performed by: STUDENT IN AN ORGANIZED HEALTH CARE EDUCATION/TRAINING PROGRAM

## 2024-08-13 PROCEDURE — 3288F FALL RISK ASSESSMENT DOCD: CPT | Mod: HCNC,CPTII,S$GLB, | Performed by: STUDENT IN AN ORGANIZED HEALTH CARE EDUCATION/TRAINING PROGRAM

## 2024-08-13 RX ORDER — LEVOTHYROXINE SODIUM 25 UG/1
25 TABLET ORAL
Qty: 90 TABLET | Refills: 3 | Status: SHIPPED | OUTPATIENT
Start: 2024-08-13

## 2024-08-13 RX ORDER — FLUOXETINE HYDROCHLORIDE 20 MG/1
20 CAPSULE ORAL DAILY
Qty: 90 CAPSULE | Refills: 3 | Status: SHIPPED | OUTPATIENT
Start: 2024-08-13

## 2024-08-13 RX ORDER — SIMVASTATIN 20 MG/1
20 TABLET, FILM COATED ORAL NIGHTLY
Qty: 90 TABLET | Refills: 3 | Status: SHIPPED | OUTPATIENT
Start: 2024-08-13

## 2024-08-13 NOTE — ASSESSMENT & PLAN NOTE
Last tsh wnl. Waiting on labs to result. Continue current dose of levothyroxine. Refilled today. Will monitor

## 2024-08-13 NOTE — ASSESSMENT & PLAN NOTE
Patient has been more agitated lately. I increased seroquel to 25 mg bid, she seems to be doing good right now in clinic. Will continue seroquel at current dose.

## 2024-08-13 NOTE — PROGRESS NOTES
Clinic Note  8/13/2024      Subjective:       Patient ID:  Olamide is a 89 y.o. female being seen for an established visit.    Chief Complaint: Follow-up    HPI  Olamide Felipe is a 89 y.o.  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 Did acp 2024  -no falls  -pt was more agitated lately per daughter. We tested her urine and it was negative for UTI. Currently does not seem to be agitated.   -weight is stable.  -did labs today. Results are not in yet    Review of Systems   Constitutional:  Negative for chills and fever.   HENT:  Negative for congestion.    Respiratory:  Negative for cough and shortness of breath.    Cardiovascular:  Negative for chest pain.   Gastrointestinal:  Negative for abdominal pain, blood in stool, constipation and diarrhea.   Genitourinary:  Negative for dysuria and frequency.   Neurological:  Negative for dizziness and headaches.       Medication List with Changes/Refills   Current Medications    ACETAMINOPHEN (TYLENOL) 325 MG TABLET    Take 325 mg by mouth every 6 (six) hours as needed for Pain.    B COMPLEX VITAMINS TABLET    Take 1 tablet by mouth once daily.    CALCIUM-VITAMIN D 250-100 MG-UNIT PER TABLET    Take 1 tablet by mouth 2 (two) times daily.    LORATADINE (CLARITIN) 10 MG TABLET    Take 1 tablet (10 mg total) by mouth once daily.    LOSARTAN (COZAAR) 25 MG TABLET    Take 1 tablet (25 mg total) by mouth once daily.    MEMANTINE (NAMENDA) 5 MG TAB    Take 1 tablet (5 mg total) by mouth 2 (two) times daily. Begin by taking one tablet daily for seven days, then increase to one tablet two times daily.    MV-MIN/IRON/FOLIC/CALCIUM/VITK (WOMEN'S MULTIVITAMIN ORAL)    Take 1  "tablet by mouth once daily.    OMEPRAZOLE (PRILOSEC) 20 MG CAPSULE    Take 1 capsule (20 mg total) by mouth once daily.    POLYETHYLENE GLYCOL (GLYCOLAX) 17 GRAM PWPK    Take 17 g by mouth 2 (two) times daily as needed for Constipation.    QUETIAPINE (SEROQUEL) 25 MG TAB    Take 1 tablet (25 mg total) by mouth 2 (two) times daily.    SENNA-DOCUSATE 8.6-50 MG (PERICOLACE) 8.6-50 MG PER TABLET    Take 1 tablet by mouth daily as needed for Constipation.   Changed and/or Refilled Medications    Modified Medication Previous Medication    FLUOXETINE 20 MG CAPSULE FLUoxetine 20 MG capsule       Take 1 capsule (20 mg total) by mouth once daily.    Take 1 capsule (20 mg total) by mouth once daily.    LEVOTHYROXINE (SYNTHROID) 25 MCG TABLET levothyroxine (SYNTHROID) 25 MCG tablet       Take 1 tablet (25 mcg total) by mouth before breakfast.    Take 1 tablet (25 mcg total) by mouth before breakfast.    SIMVASTATIN (ZOCOR) 20 MG TABLET simvastatin (ZOCOR) 20 MG tablet       Take 1 tablet (20 mg total) by mouth every evening.    Take 1 tablet (20 mg total) by mouth every evening.           Objective:      /60 (BP Location: Left arm, Patient Position: Sitting, BP Method: Small (Manual))   Pulse 88   Resp 16   Ht 5' 3" (1.6 m)   Wt 53 kg (116 lb 13.5 oz)   SpO2 95%   BMI 20.70 kg/m²   Estimated body mass index is 20.7 kg/m² as calculated from the following:    Height as of this encounter: 5' 3" (1.6 m).    Weight as of this encounter: 53 kg (116 lb 13.5 oz).  Physical Exam  Constitutional:       Appearance: Normal appearance.   Eyes:      Conjunctiva/sclera: Conjunctivae normal.   Cardiovascular:      Rate and Rhythm: Normal rate and regular rhythm.      Heart sounds: Normal heart sounds.   Pulmonary:      Effort: Pulmonary effort is normal. No respiratory distress.      Breath sounds: Normal breath sounds.   Musculoskeletal:      Right lower leg: No edema.      Left lower leg: No edema.   Skin:     Findings: Bruising " present.   Neurological:      Mental Status: She is alert. Mental status is at baseline.   Psychiatric:         Mood and Affect: Mood normal.         Behavior: Behavior normal.           Assessment and Plan:   1. Secondary hypertension  Assessment & Plan:  BP is well controled today. On losartan 25 mg daily, continue. Will monitor       2. Hypothyroidism, unspecified type  Assessment & Plan:  Last tsh wnl. Waiting on labs to result. Continue current dose of levothyroxine. Refilled today. Will monitor       3. 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 I increased seroquel to 25 mg bid, she seems to be doing good right now in clinic. Will continue seroquel at current dose.       Other orders  -     simvastatin (ZOCOR) 20 MG tablet; Take 1 tablet (20 mg total) by mouth every evening.  Dispense: 90 tablet; Refill: 3  -     levothyroxine (SYNTHROID) 25 MCG tablet; Take 1 tablet (25 mcg total) by mouth before breakfast.  Dispense: 90 tablet; Refill: 3  -     FLUoxetine 20 MG capsule; Take 1 capsule (20 mg total) by mouth once daily.  Dispense: 90 capsule; Refill: 3           Follow Up:   Follow up in about 3 months (around 11/13/2024).      Lashonda Pearce    I spent a total of 30 minutes on the day of the visit.  This includes face to face time and non-face to face time preparing to see the patient (eg, review of tests), obtaining and/or reviewing separately obtained history, documenting clinical information in the electronic or other health record, independently interpreting results and communicating results to the patient/family/caregiver, or care coordinator.

## 2024-08-14 ENCOUNTER — PATIENT MESSAGE (OUTPATIENT)
Dept: NEUROLOGY | Facility: CLINIC | Age: 89
End: 2024-08-14
Payer: MEDICARE

## 2024-08-14 ENCOUNTER — TELEPHONE (OUTPATIENT)
Dept: PRIMARY CARE CLINIC | Facility: CLINIC | Age: 89
End: 2024-08-14
Payer: MEDICARE

## 2024-08-14 ENCOUNTER — TELEPHONE (OUTPATIENT)
Dept: NEUROLOGY | Facility: CLINIC | Age: 89
End: 2024-08-14
Payer: MEDICARE

## 2024-08-14 NOTE — TELEPHONE ENCOUNTER
Called PT daughter. Informed PT daughter that labs are stable, but kidney function dropped slightly. Informed PT the  Stated, it Could be if she is not drinking enough water., Could be age related. Informed PT the DR. The  will keep an eye on it.

## 2024-08-14 NOTE — TELEPHONE ENCOUNTER
----- Message from Lashonda Pearce MD sent at 8/14/2024  1:19 PM CDT -----  Pls let daughter know that labs are stable but kidney function dropped slightly. Could be if she is not drinking enough water. Could be age related. We will keep an eye on it.

## 2024-08-16 ENCOUNTER — TELEPHONE (OUTPATIENT)
Dept: PRIMARY CARE CLINIC | Facility: CLINIC | Age: 89
End: 2024-08-16
Payer: MEDICARE

## 2024-08-22 RX ORDER — OMEPRAZOLE 20 MG/1
20 CAPSULE, DELAYED RELEASE ORAL DAILY
Qty: 90 CAPSULE | Refills: 0 | Status: SHIPPED | OUTPATIENT
Start: 2024-08-22

## 2024-08-28 NOTE — PROGRESS NOTES
NEUROPSYCHOLOGY   [facility]  Follow Up    Referring Provider: No ref. provider found   Medical Necessity: Ms. Olamide Felipe is an 89 y.o. female followed in [facility] for cognitive evaluation, supportive therapy, treatment planning, and treatment management in the setting of Alzheimer's disease  Billing: See billing table at the end of this note  Consent: The patient expressed an understanding of the purpose of the evaluation and consented to all procedures.  Providers: Nighat Arreguin PsyD (Neuropsychology); Miroslava Raines DNP (Neurology); Tasha Emerson MD (Palliative Medicine)     ASSESSMENT:   Ms. Felipe is a 89 y.o. White female with a history of Alzheimer's disease, seen today for follow up. Pt having worsening delusions, difficult interactions with roommate and staff at her care home. Has expressed wish to die to daughter, crying more often. She appears nervous in our appointment today and repeats herself. Has trouble discussing the expressions of passive SI today, unclear if evasiveness but would favor that she is cognitively unable.     Namenda possibly worsening depression. Discussion with Dr Raines and Dr Tijerina, can try reducing seroquel to see if that helps, but likely plan to take her off namenda. They will plan to increase zoloft two weeks later. Discussed distractions and strategies to manage mood lability, which at this point is likely more neurologic than psychiatric.     1. 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 Felipe's care.  If you have any questions, please contact me.    Nighat Arreguin PsyD  Licensed Clinical Neuropsychologist  [facility] - Department of Neurology          SUBJECTIVE   Ms. Felipe is a 89 y.o. White female with a history of  Alzheimer's disease, seen today for follow up. Last seen in clinic 10/19/23. At that time, pt and cg enrolled in [facility], following with Nirmala. Erratic sleep likely exacerbating factor, pt also with some AMS possibly related to meclizine. Also recommended increased oversight of meds at her facility, if possible. Requested sooner appt today, not doing well.   HPI: See intake assessment note on 10/19/23  Resides: assisted living in a home  Caregiver: daughter, Christy Garcia  Level of supervision: usually alone    INTERVAL HISTORY  Follows with Nirmala   Worsening delusions   Difficult interactions with roommate and staff at her facility  Residential care home  Interested in med adjustment    Doubled seroquel dose, SI seemed to worsen since then  UA at the time was clean    Cognition: Repetitious in intake. She appears nervous and fidgety.    Current Medications: seroquel, namenda     Sleep: No sleep problems, falls asleep easily and sleeps through the night     Appetite: Ok, not losing weight     Mood: Increasing lability, crying, told dtr she wanted to open the car door while driving so she could die. Passive SI. Has a little trouble thinking through this today, unclear if she is evasive vs cognitively unable. No history of attempts. Frequently comments on wanting to die. This seemed to increase around the time that they doubled her Seroquel dose, also seems a little more paranoid since then. They are not taking her for walks as often in her current home due to the heat.     Neuropsychiatric: She was trying to leave the house yesterday and walk to [facility]'s house, which is 2 miles away.     Physical: No falls, does have knee and back pain.       ADL:  Bathing/Grooming: Independent  Feeding: Independent  Dressing: Independent  Toileting: Independent    IADL:  Finances: Dependent  Medications: Dependent  Driving: Dependent  Household: Dependent        10/19/2023    11:11 AM   IADL   Ability to Use Telephone Operates  telephone on own initiative, looks up and dials numbers   Shopping Takes care of all shopping needs independently   Food Preparation Heats and serves prepared meals or prepares meals but does not maintain adequate diet   Housekeeping Maintains house alone with occasional assistance (heavy work)   Laundry Does personal laundry completely   Mode of Transportation Travel limited to taxi or automobile with assistance of another   Responsibility for Own Medications Is responsible for taking medication in correct dosages at correct time   Ability to Handle Finances Manages financial matters independently (budgets, writes checks, pays rent and bills, goes to bank). Collects and keeps track of income          Results for orders placed or performed during the hospital encounter of 04/21/23   CT Head Without Contrast    Narrative    EXAMINATION:  CT HEAD WITHOUT CONTRAST    CLINICAL HISTORY:  fall, hit head; Unspecified fall, subsequent encounter    TECHNIQUE:  Low dose axial images were obtained through the head.  Coronal and sagittal reformations were also performed. Contrast was not administered.    COMPARISON:  MRI of the brain dated 12/05/2022.    CT scan of the head dated 07/19/2022.    FINDINGS:  The subcutaneous tissues are unremarkable.  The bony calvarium is intact.  There is a mucosal changes within the right maxillary sinus.  The mastoid air cells are clear.  There are postoperative changes in the globes.    The craniocervical junction is intact.  There is partial empty appearance of the sella.  There is no evidence of intracranial hemorrhage.  The ventricles and sulci are prominent, consistent cerebral loss.  There are hypodensities within the periventricular and subcortical white matter.  There are old lacunar type infarctions within the bilateral subinsular cortex.  The gray-white differentiation is maintained.  There is no dense vessel sign.  There is no evidence of mass effect.      Impression    1.  No acute  intracranial process.    2.  Changes of chronic small vessel ischemic disease and cerebral volume loss.      Electronically signed by: Seth Mcclendon MD  Date:    04/21/2023  Time:    16:50   Results for orders placed or performed during the hospital encounter of 12/05/22   MRI Brain Without Contrast    Narrative    EXAMINATION:  MRI BRAIN WITHOUT CONTRAST    CLINICAL HISTORY:  Dizziness, non-specific; Dizziness and giddiness    TECHNIQUE:  Multiplanar multisequence MR imaging of the brain was performed without contrast.    COMPARISON:  MRI brain without contrast dated 12/23/2019    FINDINGS:  No abnormal focus of diffusion restriction.  Redemonstration of gyriform susceptibility along the sulci of the left frontal convexity, similar to 2019.  No extra-axial collection or midline shift.  Age related cerebral atrophy.  No hydrocephalus.  Scattered mild T2 FLAIR white matter hyperintense foci as can be seen with sequela of chronic microangiopathic change.  Major T2 intracranial vascular flow voids appear maintained.  Heterogeneous T2 opacity at the right maxillary sinus suggesting mucous retention cyst.  No infiltrative T1 marrow process.      Impression    No acute intracranial abnormality.    Age-related cerebral atrophy with mild microangiopathic change.    Area of gyriform susceptibility along the sulci of the left frontal convexity, most in keeping with cirrhosis and similar to 2019.      Electronically signed by: Alexis Kowalski  Date:    12/05/2022  Time:    14:52         Current Outpatient Medications:     acetaminophen (TYLENOL) 325 MG tablet, Take 325 mg by mouth every 6 (six) hours as needed for Pain., Disp: , Rfl:     b complex vitamins tablet, Take 1 tablet by mouth once daily., Disp: , Rfl:     calcium-vitamin D 250-100 mg-unit per tablet, Take 1 tablet by mouth 2 (two) times daily., Disp: , Rfl:     FLUoxetine 20 MG capsule, Take 1 capsule (20 mg total) by mouth once daily., Disp: 90 capsule, Rfl: 3     levothyroxine (SYNTHROID) 25 MCG tablet, Take 1 tablet (25 mcg total) by mouth before breakfast., Disp: 90 tablet, Rfl: 3    loratadine (CLARITIN) 10 mg tablet, Take 1 tablet (10 mg total) by mouth once daily., Disp: 90 tablet, Rfl: 3    losartan (COZAAR) 25 MG tablet, Take 1 tablet (25 mg total) by mouth once daily., Disp: 90 tablet, Rfl: 3    mv-min/iron/folic/calcium/vitK (WOMEN'S MULTIVITAMIN ORAL), Take 1 tablet by mouth once daily., Disp: , Rfl:     omeprazole (PRILOSEC) 20 MG capsule, Take 1 capsule (20 mg total) by mouth once daily., Disp: 90 capsule, Rfl: 0    polyethylene glycol (GLYCOLAX) 17 gram PwPk, Take 17 g by mouth 2 (two) times daily as needed for Constipation., Disp: , Rfl: 0    senna-docusate 8.6-50 mg (PERICOLACE) 8.6-50 mg per tablet, Take 1 tablet by mouth daily as needed for Constipation., Disp: 90 tablet, Rfl: 0    simvastatin (ZOCOR) 20 MG tablet, Take 1 tablet (20 mg total) by mouth every evening., Disp: 90 tablet, Rfl: 3    QUEtiapine (SEROQUEL) 25 MG Tab, Take 1/2 tablet by mouth every morning and 1 tablet by mouth every evening, Disp: 135 tablet, Rfl: 3    PMH:   Ms. Olamide Felipe  has a past medical history of Delirium (11/17/2023), Hyperlipidemia, Hypothyroidism, Malignant neoplasm of upper-outer quadrant of right breast in female, estrogen receptor negative (01/07/2022), Osteoarthritis, knee, Uterine cancer, and Vertigo.      OBJECTIVE:     MENTAL STATUS AND BEHAVIORAL OBSERVATIONS:  Appearance:  Casually dressed and Well groomed  Behavior:   alert, calm, cooperative, rapport easily established, and Appropriate interpersonal skills. Good interpretation of nonverbal cues.   Orientation:   Disoriented to date  Sensory:   Hearing and vision appeared adequate for testing purposes.  Gait:   Pt ambulates independently.   Psychomotor:  Within Normal Limits  Speech:  Fluent and spontaneous. Normal volume, rate, pitch, tone, and prosody.  Language:  Receptive and expressive language  appear intact. Comprehends conversational speech., No evidence of word-finding difficulties in conversational speech.  Mood:   euthymic  Affect:   mood-congruent  Thought Process: goal directed and linear  Thought Content: Denied current SI/HI. and No evidence of psychotic symptoms.  Memory:  limited historian  Attn/Concentration:  Grossly intact  Judgment/Insight: Limited            4/29/2024     3:20 PM 10/23/2023    10:44 AM 10/19/2023    11:12 AM   NPIQ RFS   WHO IS FILLING OUT FORM? Caregiver Caregiver Caregiver   Does this patient have false beliefs, such as thinking that others are stealing from him/her or planning to harm him/her in some way? Yes Yes No   Delusions Severity 2 2    Delusion Distress 4 4    Does this patient have hallucinations such as false visions or voices? Torres she/he seem to hear or see things that are not present? No No No   Is the patient resistive to help from others at times, or hard to handle? Yes Yes Yes   Agitation Agression Severity 3 3 1   Agitation/Agression Distress 4 6 3   Does the patient seem sad or say that he/she is depressed? No Yes No   Depression/Dysphoria Severity  2    Depression/Dysphoria Distress  4    Does the patient become upset when  from you? Does he/she have any other signs of nervousness such as shortness of breath, sighing, being unable tor elax, or feeling excessively tense? Yes No No   Anxiety Severity 2     Anxiety Distress 3     Does the patient appear to feel good or act excessively happy? No Yes Yes   Elation/Euphoria Severity  2 2   Elation/Euphoria Distress  3 1   Does this patient seem less interested in his/her usual activities or in the activities and plans of others? Yes Yes No   Apathy/Indifference Severity 3 2    Apathy/Indifference Distress 2 2    Does this patient seem to act cumpolsively, for example, talking to strangers as if she/he knows them, or saying things that may hurt people's feelings? No No No   Is the patient impatient  and cranky? Does he/she have difficulty coping with delays or waiting for planned activities? Yes No No   Irritability/Liability Severity 1     Irritability/Liability Distress 2     Does the patient engage in repetitive activities such as pacing around the house, handling buttons, wrapping string, or doing other things repeatedly? Yes No No   Motor Disturbance Severity 3     Motor Disturbance Distress 2     Does this patient awaken you during the night, rise too early in the morning, or take excessive naps during the day? No No No   Has the patient lost or gained weight, or had a change in the type of food he/she likes? Yes No No   Apetitie/Eating Severity 1     Apetite/Eating Distress 1     NPI Total Severity Score 15 11 3   NPI Total Distress Score 18 19 4             Billing/Services Summary          Neurobehavioral Status Exam Base Code (35386)  Total Units: 1 Add-On (73196)  Total Units: 1   Face-to-face Total Time: 60 min.    Report writing Total Time: 45 min         Professional Neuropsychological Testing Evaluation Services Base Code (30659)   Total Units: 0 Add-on (72221)  Total Units: 0   Referral review/initial test selection 0 min.    Intra-session Clinical Decision-Making 0 min.         Tech consult/test review/modification 0 min.         Patient behavior management 0 min.    Face-to-face Interpretive Feedback 0 min.    Record Review/Integration/Report Generation 0 min.     Total Time: 0 min.         Test Administration by Psychologist Base Code (74549)   Total Units: 0 Add-on (41095)  Total Units: 0   Testing 0 min.    Scoring 0 min.     Total Time: 0 min.         Test Administration by Technician  Technician Name:  Base Code (81559)   Total Units: 0 Add-on (96353)  Total Units: 0   Face-to-Face Testin min.    Scoring 0 min.     Total Time: 0 min.    DOS is the date of the evaluation unless specified

## 2024-08-29 ENCOUNTER — OFFICE VISIT (OUTPATIENT)
Dept: NEUROLOGY | Facility: CLINIC | Age: 89
End: 2024-08-29
Payer: MEDICARE

## 2024-08-29 VITALS
WEIGHT: 116.31 LBS | HEIGHT: 63 IN | SYSTOLIC BLOOD PRESSURE: 170 MMHG | BODY MASS INDEX: 20.61 KG/M2 | DIASTOLIC BLOOD PRESSURE: 69 MMHG | HEART RATE: 71 BPM

## 2024-08-29 DIAGNOSIS — G30.1 MILD LATE ONSET ALZHEIMER'S DEMENTIA WITH AGITATION: Primary | ICD-10-CM

## 2024-08-29 DIAGNOSIS — F02.A11 MILD LATE ONSET ALZHEIMER'S DEMENTIA WITH AGITATION: Primary | ICD-10-CM

## 2024-08-29 PROCEDURE — 99999 PR PBB SHADOW E&M-EST. PATIENT-LVL III: CPT | Mod: PBBFAC,HCNC,,

## 2024-08-29 PROCEDURE — 99499 UNLISTED E&M SERVICE: CPT | Mod: HCNC,S$GLB,, | Performed by: PSYCHIATRY & NEUROLOGY

## 2024-08-29 RX ORDER — QUETIAPINE FUMARATE 25 MG/1
TABLET, FILM COATED ORAL
Qty: 135 TABLET | Refills: 3 | Status: SHIPPED | OUTPATIENT
Start: 2024-08-29

## 2024-08-29 NOTE — PROGRESS NOTES
"Name: Olamide Felipe  MRN: [id number]   CSN: [id number]      Date: 8-29-24      Referring physician:  No referring provider defined for this encounter.    Subjective:        Chief Complaint: memory/behavior    History of Present Illness (HPI):    Olamide Felipe is a 89 y.o. right-handed female with  breast cancer  presents today for a follow-up evaluation of Alzheimer's Disease and is accompanied by daughter, Christy. Last seen in office, 10-19-23.      [facility] in [facility]  Having delusions. Not been nice to staff at facility. She lives in a group home setting with 9 other residents. Shares room at facility and accuses roommate of stealing from her    She and dtr interviewed together:    Lately, she has been unhappy.   Ms. Felipe says it is not that bad.       Christy says she wanted to open the car door and fall out and die this AM on the way here.   Ms. Felipe asks, "What do I have to live for? I'm 89 years old. Lokced into house and dont get out of it."   She says she would not hurt herself.   She has never tried to hurt herself.   Little more paranoid.      Dtr notes that it has been the past 2-3 weeks. They just doubled the seroquel because she was having some of this but it got worse after Seroquel increased. She was taking 25 mg qhs and now taking 25 mg BID.  They did check her urine before increasing and it was fine.      She does not like where she lives. She wants to move somewhere else.   She feels she can't do anyhing.   Used to take me for a walk when I first moved there. They dont this as much anymore. Christy says she can't go alone and it is too hot to walk outside. That is why they have not taken her lately.    When she lived in her apt, she walked to grocery or to get her haricut. She loves to walk.         She does not mind sharing a room.                  Mood: Depressed, Paranoid, Irritable, Agitated , and denies desire to harm herself (despite what she said on way to clinic " today)          Swallowing: No issues        Ambulation: No difficulty           Review of Systems    Past Medical History: The patient  has a past medical history of Delirium (11/17/2023), Hyperlipidemia, Hypothyroidism, Malignant neoplasm of upper-outer quadrant of right breast in female, estrogen receptor negative (01/07/2022), Osteoarthritis, knee, Uterine cancer, and Vertigo.    Social History: The patient  reports that she quit smoking about 35 years ago. Her smoking use included cigarettes. She has never been exposed to tobacco smoke. She has never used smokeless tobacco. She reports current alcohol use of about 2.0 standard drinks of alcohol per week. She reports that she does not use drugs.    Family History: Their family history includes No Known Problems in her daughter, sister, and son.    Allergies: Patient has no known allergies.     Meds:   Current Outpatient Medications on File Prior to Visit   Medication Sig Dispense Refill    acetaminophen (TYLENOL) 325 MG tablet Take 325 mg by mouth every 6 (six) hours as needed for Pain.      b complex vitamins tablet Take 1 tablet by mouth once daily.      calcium-vitamin D 250-100 mg-unit per tablet Take 1 tablet by mouth 2 (two) times daily.      FLUoxetine 20 MG capsule Take 1 capsule (20 mg total) by mouth once daily. 90 capsule 3    levothyroxine (SYNTHROID) 25 MCG tablet Take 1 tablet (25 mcg total) by mouth before breakfast. 90 tablet 3    loratadine (CLARITIN) 10 mg tablet Take 1 tablet (10 mg total) by mouth once daily. 90 tablet 3    losartan (COZAAR) 25 MG tablet Take 1 tablet (25 mg total) by mouth once daily. 90 tablet 3    memantine (NAMENDA) 5 MG Tab Take 1 tablet (5 mg total) by mouth 2 (two) times daily. Begin by taking one tablet daily for seven days, then increase to one tablet two times daily. 180 tablet 3    mv-min/iron/folic/calcium/vitK (WOMEN'S MULTIVITAMIN ORAL) Take 1 tablet by mouth once daily.      omeprazole (PRILOSEC) 20 MG capsule  "Take 1 capsule (20 mg total) by mouth once daily. 90 capsule 0    polyethylene glycol (GLYCOLAX) 17 gram PwPk Take 17 g by mouth 2 (two) times daily as needed for Constipation.  0    QUEtiapine (SEROQUEL) 25 MG Tab Take 1 tablet (25 mg total) by mouth 2 (two) times daily. 180 tablet 3    senna-docusate 8.6-50 mg (PERICOLACE) 8.6-50 mg per tablet Take 1 tablet by mouth daily as needed for Constipation. 90 tablet 0    simvastatin (ZOCOR) 20 MG tablet Take 1 tablet (20 mg total) by mouth every evening. 90 tablet 3     No current facility-administered medications on file prior to visit.       Objective:     Physical Exam:    Vitals:    08/29/24 1004   BP: (!) 170/69   BP Location: Right arm   Patient Position: Sitting   BP Method: Medium (Automatic)   Pulse: 71   Weight: 52.7 kg (116 lb 4.7 oz)   Height: 5' 3" (1.6 m)     Body mass index is 20.6 kg/m².    Constitutional  Well-developed, well-nourished, appears stated age   Cardiovascular  no LE edema bilaterally     Awake, alert, pleasant and cooperative.   RR equal and unlabored.   Face symmetric.   EOMI.  Hearing intact to conversation.   Spontaneous speech, repetitive at times and at times diff to follow in conversation.   No tremor.   No hypophonia.   No masked facies.  No bradykinesia.   Gait normal and steady.        Imaging:   Results for orders placed or performed during the hospital encounter of 04/21/23   CT Head Without Contrast    Narrative    EXAMINATION:  CT HEAD WITHOUT CONTRAST    CLINICAL HISTORY:  fall, hit head; Unspecified fall, subsequent encounter    TECHNIQUE:  Low dose axial images were obtained through the head.  Coronal and sagittal reformations were also performed. Contrast was not administered.    COMPARISON:  MRI of the brain dated 12/05/2022.    CT scan of the head dated 07/19/2022.    FINDINGS:  The subcutaneous tissues are unremarkable.  The bony calvarium is intact.  There is a mucosal changes within the right maxillary sinus.  The " mastoid air cells are clear.  There are postoperative changes in the globes.    The craniocervical junction is intact.  There is partial empty appearance of the sella.  There is no evidence of intracranial hemorrhage.  The ventricles and sulci are prominent, consistent cerebral loss.  There are hypodensities within the periventricular and subcortical white matter.  There are old lacunar type infarctions within the bilateral subinsular cortex.  The gray-white differentiation is maintained.  There is no dense vessel sign.  There is no evidence of mass effect.      Impression    1.  No acute intracranial process.    2.  Changes of chronic small vessel ischemic disease and cerebral volume loss.      Electronically signed by: Seth Mcclendon MD  Date:    04/21/2023  Time:    16:50   Results for orders placed or performed during the hospital encounter of 12/05/22   MRI Brain Without Contrast    Narrative    EXAMINATION:  MRI BRAIN WITHOUT CONTRAST    CLINICAL HISTORY:  Dizziness, non-specific; Dizziness and giddiness    TECHNIQUE:  Multiplanar multisequence MR imaging of the brain was performed without contrast.    COMPARISON:  MRI brain without contrast dated 12/23/2019    FINDINGS:  No abnormal focus of diffusion restriction.  Redemonstration of gyriform susceptibility along the sulci of the left frontal convexity, similar to 2019.  No extra-axial collection or midline shift.  Age related cerebral atrophy.  No hydrocephalus.  Scattered mild T2 FLAIR white matter hyperintense foci as can be seen with sequela of chronic microangiopathic change.  Major T2 intracranial vascular flow voids appear maintained.  Heterogeneous T2 opacity at the right maxillary sinus suggesting mucous retention cyst.  No infiltrative T1 marrow process.      Impression    No acute intracranial abnormality.    Age-related cerebral atrophy with mild microangiopathic change.    Area of gyriform susceptibility along the sulci of the left frontal  convexity, most in keeping with cirrhosis and similar to 2019.      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       ..      Miroslava Raines DNP, NP-C  Division of Movement and Memory Disorders  [facility]  [phone]

## 2024-08-29 NOTE — PROGRESS NOTES
Palliative and Geriatric Medicine Evaluation    Patient Name: Olamide Felipe     Date: 2024      Consult Requested By:  No ref. provider found    Primary Care Physician:  Lashonda Pearce MD    Reason for Consult: Advance care planning and symptom management in the setting of cognitive impairment       Assessment/Plan     Plan/Recommendations:  There are no diagnoses linked to this encounter.      Multi-Complexity:  -Frailty based on Clinical Frailty scale yes  -Weight loss: yes, BMI: There is no height or weight on file to calculate BMI.  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  - Planned to increase fluoxetine after assessing response to Seroquel adjustment.  - Considered combination of pharmacological and behavioral interventions to improve patient's quality of life and address depressive symptoms.  - Patient or caregiver to message through [employer] after 2 weeks to discuss potential fluoxetine adjustment.      Mobility  Olamide Felipe is  independent with ADL's and is not independent with IADL's  -Recent fall w/ clavicle fracture because she was carrying too many bags   -walks with a cane   -needs help w/ IADLs - Medications, cooking and driving  -lives in independent living facility   -Likes walking around the complex  - Consider using a rollator to facilitate safe walking and increase mobility.    Medications  -Medication reconciliation performed   -High risk medications identified Off Meclinzine now  -On Fluoxetine   -Recommend to avoid benzo's or antihistamines (such as Benadryl).        Palliative Care Encounter /  Advance Care Planning      Advance Care Planning     Advance Directives:   Living Will: Yes        Copy on chart: No    [place]POST: No    Medical Power of : No    Agent's Name:  Christy Garcia   Agent's Contact Number:  [phone]    Decision Making:  Patient answered questions  Goals of Care: Advance Care Planning  Date: 08/29/2024  I engaged the patient and family in a voluntary conversation about advance care planning and we specifically addressed what the goals of care would be moving forward.  We explored the patient's values and preferences for future care.  The patient endorses that what is most important right now is to focus on remaining as independent as possible. Patient expresses a strong desire for independence and the ability to continue activities like walking. Quality of life for the patient involves maintaining independence, engaging in activities she enjoys, and not feeling constrained by the care facility's routines.  Patient has LAPOST in place from 1/2024 stating DNR, SELECTIVE TX AND NO ARTIFICIAL NUTRITION BY TUBE   She also has HCPOA naming Christy Garcia as agent.   --------------    The patient endorses that what is most important right now is to focus on spending time at home and remaining as independent as possible  Accordingly, we have decided that the best plan to meet the patient's goals includes continuing with treatment    They shared that he has ACP documents at home and they will upload them or bring them in to the next appointment            min time was spent on advance care planning, goals of care discussion, emotional support, formulating and communicating prognosis and goals of care, exploring burden/benefit of various approaches of treatment.        Subjective:     Informant: patient and daughter     Chief Complaint: No chief complaint on file.      History of Present Illness:  Olamide Felipe is a 89 y.o. female presenting with cognitive impairment .      Referred to  "Geriatric and Palliative Care for evaluation and management of advance care planning, and additional support..     The patient, a 89-year-old woman, has been residing in an assisted living facility since January. She expresses significant dissatisfaction with her current living situation, feeling out of place among less mobile residents. The patient desires more independence and activities, particularly missing her ability to walk freely. She feels restricted by the facility's rules, such as not being allowed to walk alone outside due to safety concerns.    Over the past three weeks, the patient has experienced increased emotional distress, crying frequently and expressing a desire to leave the facility. She has made concerning statements, including wanting to "open the door and fall out and die" while in transit. The patient denies any intention to harm herself but is struggling with adjustment and mood issues.    The patient's daughter reports that prior to these recent three weeks, her mother had been more stable emotionally. A UTI was ruled out as a potential cause for the behavior change. The patient is currently on Seroquel, which was previously helping to stabilize her mood. She also takes fluoxetine for night terrors, which has been effective in preventing them.    The patient expresses frustration with the lack of stimulating activities at the facility, mentioning that she enjoys puzzle books but finds the constant television watching by other residents unstimulating. She misses her previous active lifestyle, including regular walks, and feels constrained by the facility's safety protocols.    The patient denies any recent falls or injuries.  Behavioral issues, paranoia, accusing roommate  Small, residential care home, Christy daughter just retired from [employer]  Mom wants her to visit more often Fluoxetine 20 and seroquel 25    Palliative Exam     Review of Symptoms      Symptom Assessment (ESAS 0-10 " Scale)  Pain:  0  Dyspnea:  0  Anxiety:  0  Nausea:  0  Depression:  0  Anorexia:  0  Fatigue:  0  Insomnia:  0  Restlessness:  0  Agitation:  0         Psychosocial/Cultural:   See Palliative Psychosocial Note: Yes  Lives in independent living facility.  and has 2 adult sons  **Primary  to Follow**  Palliative Care  Consult: Yes      Geriatric Evaluation     4Ms for Medical Decision-Making in Older Adults  Last Completed EAWV: 7/8/2024    MOBILITY:  Get Up and Go: No data recorded  Activites of Daily Living  Ambulation: Independent  Dressing: Independent  Transfers: Independent  Toileting: Continent of bowel; Incontinent of bladder  Toileting Assistance: Wears Briefs  Feeding: Independent  Cleaning home/Chores: Independent  Telephone use: Assistance Required  Shopping: Assistance Required  Paying bills: Assistance Required  Taking meds: Assistance Required      Whisper Test: Abnormal  Disability Status  Are you deaf or do you have serious difficulty hearing?: Y  Are you blind or do you have serious difficulty seeing, even when wearing glasses?: N  Because of a physical, mental, or emotional condition, do you have serious difficulty concentrating, remembering, or making decisions?: Y  Do you have serious difficulty walking or climbing stairs?: N  Do you have difficulty dressing or bathing?: Y  Because of a physical, mental, or emotional condition, do you have difficulty doing errands alone such as visiting a doctor's office or shopping?: Y      Nutrition Screening  Has food intake declined over the past three months due to loss of appetite, digestive problems, chewing or swallowing difficulties?: 2  Involuntary weight loss during the last 3 months?: 3  Mobility?: 2  Has the patient suffered psychological stress or acute disease in the past three months?: 2  Neuropsychological problems?: 2  Body Mass Index (BMI)? : 1  Screening Score: 12  Interpretation: Normal nutritional  status       MENTATION:   Depression Patient Health Questionnaire:  Over the last two weeks how often have you been bothered by little interest or pleasure in doing things: 0  Over the last two weeks how often have you been bothered by feeling down, depressed or hopeless: 0  PHQ-2 Total Score: 0  PHQ-9 Score: 2  PHQ-9 Interpretation: Minimal or None    Has Dementia Dx:     Clock Drawing Test: 0  Mini-Cog 3 Minute Recall: 0  Cognitive Function Screening 0      MEDICATIONS:  High Risk Medications:  Total Active Medications: 2  FLUoxetine - 20 MG  QUEtiapine Tab - 25 MG      Falls:   Fear of falling, balance/trouble walking? yes    Previous falls? yes    Mobility Device: straight cane    Vision:   Recent vision change? no   Baseline corrective lenses? no    Hearing:   Hearing loss? no   Hearing aids? no          Past Medical History:   Diagnosis Date    Delirium 11/17/2023    Hyperlipidemia     Hypothyroidism     Malignant neoplasm of upper-outer quadrant of right breast in female, estrogen receptor negative 01/07/2022    Osteoarthritis, knee     Uterine cancer     Vertigo      Past Surgical History:   Procedure Laterality Date    dentures      FRACTURE SURGERY Left     fracture left wrist    HYSTERECTOMY      INJECTION FOR SENTINEL NODE IDENTIFICATION Right 1/7/2022    Procedure: INJECTION, FOR SENTINEL NODE IDENTIFICATION-Right;  Surgeon: Marci Mcintosh MD;  Location: [facility];  Service: General;  Laterality: Right;    left wrist surgery      MASTECTOMY, PARTIAL Right 1/7/2022    Procedure: MASTECTOMY, PARTIAL-Right with radiological marker;  Surgeon: Marci Mcintosh MD;  Location: [facility];  Service: General;  Laterality: Right;  REQUEST SAID 2 HOUR CASE    SENTINEL LYMPH NODE BIOPSY Right 1/7/2022    Procedure: BIOPSY, LYMPH NODE, SENTINEL-Right;  Surgeon: Marci Mcintosh MD;  Location: [facility];  Service: General;  Laterality: Right;     Family History   Problem Relation Name Age of Onset    No Known Problems Daughter       No Known Problems Son      No Known Problems Sister twin      Review of patient's allergies indicates:  No Known Allergies      Medications:    Current Outpatient Medications:     acetaminophen (TYLENOL) 325 MG tablet, Take 325 mg by mouth every 6 (six) hours as needed for Pain., Disp: , Rfl:     b complex vitamins tablet, Take 1 tablet by mouth once daily., Disp: , Rfl:     calcium-vitamin D 250-100 mg-unit per tablet, Take 1 tablet by mouth 2 (two) times daily., Disp: , Rfl:     FLUoxetine 20 MG capsule, Take 1 capsule (20 mg total) by mouth once daily., Disp: 90 capsule, Rfl: 3    levothyroxine (SYNTHROID) 25 MCG tablet, Take 1 tablet (25 mcg total) by mouth before breakfast., Disp: 90 tablet, Rfl: 3    loratadine (CLARITIN) 10 mg tablet, Take 1 tablet (10 mg total) by mouth once daily., Disp: 90 tablet, Rfl: 3    losartan (COZAAR) 25 MG tablet, Take 1 tablet (25 mg total) by mouth once daily., Disp: 90 tablet, Rfl: 3    memantine (NAMENDA) 5 MG Tab, Take 1 tablet (5 mg total) by mouth 2 (two) times daily. Begin by taking one tablet daily for seven days, then increase to one tablet two times daily., Disp: 180 tablet, Rfl: 3    mv-min/iron/folic/calcium/vitK (WOMEN'S MULTIVITAMIN ORAL), Take 1 tablet by mouth once daily., Disp: , Rfl:     omeprazole (PRILOSEC) 20 MG capsule, Take 1 capsule (20 mg total) by mouth once daily., Disp: 90 capsule, Rfl: 0    polyethylene glycol (GLYCOLAX) 17 gram PwPk, Take 17 g by mouth 2 (two) times daily as needed for Constipation., Disp: , Rfl: 0    QUEtiapine (SEROQUEL) 25 MG Tab, Take 1 tablet (25 mg total) by mouth 2 (two) times daily., Disp: 180 tablet, Rfl: 3    senna-docusate 8.6-50 mg (PERICOLACE) 8.6-50 mg per tablet, Take 1 tablet by mouth daily as needed for Constipation., Disp: 90 tablet, Rfl: 0    simvastatin (ZOCOR) 20 MG tablet, Take 1 tablet (20 mg total) by mouth every evening., Disp: 90 tablet, Rfl: 3     database queried on 08/29/2024  by Tasha Emerson  . The results reviewed and considered with the clinical data in the decision whether or not to prescribe a controlled substance.   01/07/2022 01/07/2022   1  Hydrocodone-Acetamin 5-325 Mg 10.00  3  [facility]  [id number]   [facility] ([id number])           Objective:   Physical Exam:  Vitals:    Physical Exam  Constitutional:       General: She is not in acute distress.     Comments: Frail lady sitting in a chair    Pulmonary:      Effort: Respiratory distress present.   Neurological:      Mental Status: She is alert. Mental status is at baseline. She is disoriented.   Psychiatric:         Mood and Affect: Mood normal.         I spent a total of 31 minutes on the day of the visit. This includes face to face time in discussion of goals of care, symptom assessment, coordination of care and emotional support.  This also includes non-face to face time preparing to see the patient (eg, review of tests/imaging), obtaining and/or reviewing separately obtained history, documenting clinical information in the electronic or other health record, independently interpreting results and communicating results to the patient/family/caregiver, or care coordinator.     This note was generated with the assistance of ambient listening technology. Verbal consent was obtained by the patient and accompanying visitor(s) for the recording of patient appointment to facilitate this note. I attest to having reviewed and edited the generated note for accuracy, though some syntax or spelling errors may persist. Please contact the author of this note for any clarification.        Signature: Tasha Emerson MD

## 2024-08-29 NOTE — PROGRESS NOTES
"Name: Olamide Felipe  MRN: [id number]   CSN: [id number]      Date: ***      Referring physician:  No referring provider defined for this encounter.    Subjective:        The patient location is: ***    Visit type: {TELE AUDIOVISUAL:69570}    Each patient to whom he or she provides medical services by telemedicine is:  (1) informed of the relationship between the physician and patient and the respective role of any other health care provider with respect to management of the patient; and (2) notified that he or she may decline to receive medical services by telemedicine and may withdraw from such care at any time.    Enrolled in care eco  Having delusions and has not been nice to staff at facility  Shares room at facility and accuses roommate of stealing from her  Christy her dtr worked for [employer] x 30 years and just retired      Chief Complaint: ***    History of Present Illness (HPI):    Olamide Felipe is a 89 y.o. {Blank single:80638::"right-handed","left-handed","ambidextrous"} female with {PMHx:76779} presents today for {[facility] AMB MEDICARE AWV INITIAL/FOLLOWUP:73522::"***"} evaluation of {CDKDiagnoses:77101} and is {Pc accompanied by:5710}. Last seen in office, ***  . At that time,       Memory   Short term memory is {CDKSTM:08361}  Long term memory is {CDKLTM:37763}    Living Environment:  Dwelling- {CDKDwellin}  Lives {CDKLIVING SITUATION:89147}  Firearms- {CDKFIREARMS:32237}    iADLs  Driving:  {CDKDRIVIN}    Meal Prep/Planning:  {CDKMealPrep:13429}    Managing Finances:  {CDKFinances:70351}    Medication Administration:  {CDKMedication Adminstration:32893}    Managing Appointments:  {CDKManaging Appointments:29370}    Housekeeping/ Laundry:   {CDKHOUSEKEEPIN}    Technology: {Technology:44025}      ADLs  Bathing: {CDKBATHIN}    Dressing: {CDKDressin}    Toileting: {CDKToiletin}    Eating: {CDKEatin}        Mood: {Mood:00630}    Behavior: " {Behavior:17204}    Hallucinations: {Hallucinations:62007}    Swallowing: {Swallowin}    Tremor: {Tremor:23869}    Ambulation: {Ambulation:43274}    Sleep: {Sleep:32387}                Review of Systems    Past Medical History: The patient  has a past medical history of Delirium (2023), Hyperlipidemia, Hypothyroidism, Malignant neoplasm of upper-outer quadrant of right breast in female, estrogen receptor negative (2022), Osteoarthritis, knee, Uterine cancer, and Vertigo.    Social History: The patient  reports that she quit smoking about 35 years ago. Her smoking use included cigarettes. She has never been exposed to tobacco smoke. She has never used smokeless tobacco. She reports current alcohol use of about 2.0 standard drinks of alcohol per week. She reports that she does not use drugs.    Family History: Their family history includes No Known Problems in her daughter, sister, and son.    Allergies: Patient has no known allergies.     Meds:   Current Outpatient Medications on File Prior to Visit   Medication Sig Dispense Refill    acetaminophen (TYLENOL) 325 MG tablet Take 325 mg by mouth every 6 (six) hours as needed for Pain.      b complex vitamins tablet Take 1 tablet by mouth once daily.      calcium-vitamin D 250-100 mg-unit per tablet Take 1 tablet by mouth 2 (two) times daily.      FLUoxetine 20 MG capsule Take 1 capsule (20 mg total) by mouth once daily. 90 capsule 3    levothyroxine (SYNTHROID) 25 MCG tablet Take 1 tablet (25 mcg total) by mouth before breakfast. 90 tablet 3    loratadine (CLARITIN) 10 mg tablet Take 1 tablet (10 mg total) by mouth once daily. 90 tablet 3    losartan (COZAAR) 25 MG tablet Take 1 tablet (25 mg total) by mouth once daily. 90 tablet 3    memantine (NAMENDA) 5 MG Tab Take 1 tablet (5 mg total) by mouth 2 (two) times daily. Begin by taking one tablet daily for seven days, then increase to one tablet two times daily. 180 tablet 3     mv-min/iron/folic/calcium/vitK (WOMEN'S MULTIVITAMIN ORAL) Take 1 tablet by mouth once daily.      omeprazole (PRILOSEC) 20 MG capsule Take 1 capsule (20 mg total) by mouth once daily. 90 capsule 0    polyethylene glycol (GLYCOLAX) 17 gram PwPk Take 17 g by mouth 2 (two) times daily as needed for Constipation.  0    QUEtiapine (SEROQUEL) 25 MG Tab Take 1 tablet (25 mg total) by mouth 2 (two) times daily. 180 tablet 3    senna-docusate 8.6-50 mg (PERICOLACE) 8.6-50 mg per tablet Take 1 tablet by mouth daily as needed for Constipation. 90 tablet 0    simvastatin (ZOCOR) 20 MG tablet Take 1 tablet (20 mg total) by mouth every evening. 90 tablet 3     No current facility-administered medications on file prior to visit.       Objective:     Physical Exam:    There were no vitals filed for this visit.  There is no height or weight on file to calculate BMI.    Constitutional  Well-developed, well-nourished, appears stated age   Ophthalmoscopic  No papilledema with no hemorrhages or exudates bilaterally   Cardiovascular  Radial pulses 2+ and symmetric, no LE edema bilaterally     ***          Independent review of images: ***    Imaging:   Results for orders placed or performed during the hospital encounter of 04/21/23   CT Head Without Contrast    Narrative    EXAMINATION:  CT HEAD WITHOUT CONTRAST    CLINICAL HISTORY:  fall, hit head; Unspecified fall, subsequent encounter    TECHNIQUE:  Low dose axial images were obtained through the head.  Coronal and sagittal reformations were also performed. Contrast was not administered.    COMPARISON:  MRI of the brain dated 12/05/2022.    CT scan of the head dated 07/19/2022.    FINDINGS:  The subcutaneous tissues are unremarkable.  The bony calvarium is intact.  There is a mucosal changes within the right maxillary sinus.  The mastoid air cells are clear.  There are postoperative changes in the globes.    The craniocervical junction is intact.  There is partial empty appearance of  the sella.  There is no evidence of intracranial hemorrhage.  The ventricles and sulci are prominent, consistent cerebral loss.  There are hypodensities within the periventricular and subcortical white matter.  There are old lacunar type infarctions within the bilateral subinsular cortex.  The gray-white differentiation is maintained.  There is no dense vessel sign.  There is no evidence of mass effect.      Impression    1.  No acute intracranial process.    2.  Changes of chronic small vessel ischemic disease and cerebral volume loss.      Electronically signed by: Seth Mcclendon MD  Date:    04/21/2023  Time:    16:50   Results for orders placed or performed during the hospital encounter of 12/05/22   MRI Brain Without Contrast    Narrative    EXAMINATION:  MRI BRAIN WITHOUT CONTRAST    CLINICAL HISTORY:  Dizziness, non-specific; Dizziness and giddiness    TECHNIQUE:  Multiplanar multisequence MR imaging of the brain was performed without contrast.    COMPARISON:  MRI brain without contrast dated 12/23/2019    FINDINGS:  No abnormal focus of diffusion restriction.  Redemonstration of gyriform susceptibility along the sulci of the left frontal convexity, similar to 2019.  No extra-axial collection or midline shift.  Age related cerebral atrophy.  No hydrocephalus.  Scattered mild T2 FLAIR white matter hyperintense foci as can be seen with sequela of chronic microangiopathic change.  Major T2 intracranial vascular flow voids appear maintained.  Heterogeneous T2 opacity at the right maxillary sinus suggesting mucous retention cyst.  No infiltrative T1 marrow process.      Impression    No acute intracranial abnormality.    Age-related cerebral atrophy with mild microangiopathic change.    Area of gyriform susceptibility along the sulci of the left frontal convexity, most in keeping with cirrhosis and similar to 2019.      Electronically signed by: Alexis Kowalski  Date:    12/05/2022  Time:    14:52            Assessment and Plan     There are no diagnoses linked to this encounter.    Medical Decision Making:    No follow-ups on file.      .         ..Total time: *** minutes spent on the encounter, which includes face to face time and non-face to face time preparing to see the patient (eg, review of tests), Obtaining and/or reviewing separately obtained history, Documenting clinical information in the electronic or other health record, Independently interpreting results (not separately reported) and communicating results to the patient/family/caregiver, or Care coordination (not separately reported).       ..      Miroslava Raines, VIN, NP-C  Division of Movement and Memory Disorders  [facility]  [phone]

## 2024-08-30 ENCOUNTER — OUTPATIENT CASE MANAGEMENT (OUTPATIENT)
Dept: NEUROLOGY | Facility: CLINIC | Age: 89
End: 2024-08-30
Payer: MEDICARE

## 2024-08-30 NOTE — PROGRESS NOTES
Protocol: The [facility] Effectiveness Study  Identifier: [id number]  IRB#: 2022.247  PI: Dr. Baldo Maynard, PhD  CO-I: Dr. Nighat Arreguin PsyD  Version Date: 12/05/2022  Pt Study ID: [id number]  Visit Month: 9 PRN Email   Care Plan documented on: 11/14/23 Please refer to note for more information.      Timeline:      Consent: Completed(10/20/23)  Baseline questionnaires: Completed(10/23/23)  6-month questionnaires: Completed(04/29/24)  12-month questionnaires: Planned(10/18/24)      Visit Note:     (08/29/24) Met with cg and pt briefly in clinic. Pt is interested in participating in activities. Cg and CTN will make a plan to talk next week so we can figure out if the pt will be a good candidate for day programs, or if perhaps we can get her assisted living facility to hire some private therapists for the people who live there. CTN emailed cg to set up a time.     Cg responded and we planned for CTN to call back to complete our Month 10 call and make a plan for the pt so she is not as bored at her group home. CTN will call the cg on Tuesday 09/03/24 at 12 PM.     [facility] Music Therapist Jessica Rosas shared a contact for music therapy and stated that she will send an art therapy contact when she finds the correct information. (Music Therapy: Severiano Sinha [phone]      Spoke with caregiver Christy Garcia (Daughter) for month 10 visit.       Falls in the past month: pending  UTIs in the past month: pending  Hospital encounters in the past month (reported by caregiver): pending      Next monthly visit scheduled for: pending. Caregiver knows how to reach CTN, and is encouraged to do so, as needed before our next scheduled call.     Action items for CTN: pending

## 2024-09-03 ENCOUNTER — OUTPATIENT CASE MANAGEMENT (OUTPATIENT)
Dept: NEUROLOGY | Facility: CLINIC | Age: 89
End: 2024-09-03
Payer: MEDICARE

## 2024-09-03 NOTE — PROGRESS NOTES
Protocol: The [facility] Effectiveness Study  Identifier: [id number]  IRB#: 2022.247  PI: Dr. Baldo Maynard, PhD  CO-I: Dr. Nighat Arreguin PsyD  Version Date: 12/05/2022  Pt Study ID: [id number]  Visit Month: 10  Care Plan documented on: 11/14/23 Please refer to note for more information.      Timeline:      Consent: Completed(10/20/23)  Baseline questionnaires: Completed(10/23/23)  6-month questionnaires: Completed(04/29/24)  12-month questionnaires: Planned(10/18/24)          Visit Note:   Spoke with caregiver Christy Garcia (Daughter) for month 10 visit. Cg reports that the pt had an episode yesterday at her assisted living facility where she went outside to walk the driveway and tried to escape by climbing the fence. When the cg got to the facility to help the pt was packing her belongings to leave. Cg talked her in to staying and they unpacked her things after lunch. Cg states that this was unnerving to her as the pt can not live independently and the cg does not feel equipped to care for the pt at her home. Cg states that the pt remains safe and is at her assisted living facility.     Cg mentioned that the pt did have a change in medication recently and they are hopeful that in the next two weeks things will start to change with the pt's mood. Cg will connect with Dr. Tijerina in Palliative Care to discuss medication in just over a month.     Cg stated that the person in charge of the houses has a calendar of the activities that are offered to the pt. Cg states that the  mentioned over the weekend that they have a number of activities offered weekly but the pt often times refuses. I suggested asking the staff to possibly go in and get the pt in a one on one situation to help get her to the activities with some additional encouragement or possibly a treat if the pt decides to participate. Cg states that they have music therapy, arts and crafts, and bingo regularly.     Cg mentioned that the  pt's roommate is now in a hospital bed and requires significant assistance. Cg reports that the other pt's family is coming to visit her often and the cg thinks that the pt is getting the staff members and the family of this person confused and thinks that the staff members are neglecting the pt's needs.     Cg mentioned that the pt used to really enjoy watching golf but has not watched golf since moving in to this facility. Cg is going to try and get the pt access to a television in her bedroom for structured TV time.     Pt and cg are high needs, I will plan to touch base again in two weeks.     Falls in the past month: 0  UTIs in the past month: 0  Hospital encounters in the past month (reported by caregiver): 0  Medication Changes: stopped taking Namenda, Seroquel adjusted, now taking half of a dose in the AM and full dose in PM. 25mg tablet       Next monthly visit scheduled for: 10/03/24. Caregiver knows how to reach CTN, and is encouraged to do so, as needed before our next scheduled call.     Action items for CTN: Cg and Pt high needs, will check in every two weeks.

## 2024-09-17 RX ORDER — FLUOXETINE HYDROCHLORIDE 40 MG/1
40 CAPSULE ORAL DAILY
Qty: 30 CAPSULE | Refills: 0 | Status: SHIPPED | OUTPATIENT
Start: 2024-09-17 | End: 2024-09-18 | Stop reason: SDUPTHER

## 2024-09-17 NOTE — TELEPHONE ENCOUNTER
This nurse LVM for pt daughter, Christy, to confirm if pt daughter is ok with increasing pt's fluoxetine 20 mg to 40 mg as requested by KARLA Emerson MD. Messaged pt daughter through portal message as well.

## 2024-09-18 ENCOUNTER — TELEPHONE (OUTPATIENT)
Dept: PALLIATIVE MEDICINE | Facility: CLINIC | Age: 89
End: 2024-09-18
Payer: MEDICARE

## 2024-09-18 ENCOUNTER — PATIENT MESSAGE (OUTPATIENT)
Dept: PALLIATIVE MEDICINE | Facility: CLINIC | Age: 89
End: 2024-09-18
Payer: MEDICARE

## 2024-09-18 RX ORDER — FLUOXETINE HYDROCHLORIDE 40 MG/1
40 CAPSULE ORAL DAILY
Qty: 30 CAPSULE | Refills: 0 | Status: SHIPPED | OUTPATIENT
Start: 2024-09-18 | End: 2024-10-18

## 2024-09-18 NOTE — TELEPHONE ENCOUNTER
"Your fax has been successfully sent to [phone] at [phone] written script for fluoxetine 40 mg.  ------------------------------------------------------------    9/18/2024 4:04:40 PM Transmission Record          Sent to [phone] with remote ID "          Result: (4/3;0/0) Line broken (no loop current)          Page record: NONE SENT          Elapsed time: 00:03 on channel 19    9/18/2024 4:09:53 PM Transmission Record          Sent to [phone] with remote ID "[id number]"          Result: (0/339;0/0) Success          Page record: 1 - 3          Elapsed time: 01:03 on channel 59   "

## 2024-09-30 ENCOUNTER — OUTPATIENT CASE MANAGEMENT (OUTPATIENT)
Dept: NEUROLOGY | Facility: CLINIC | Age: 89
End: 2024-09-30
Payer: MEDICARE

## 2024-10-03 RX ORDER — AMOXICILLIN 250 MG
1 CAPSULE ORAL DAILY PRN
Qty: 90 TABLET | Refills: 0 | Status: CANCELLED | OUTPATIENT
Start: 2024-10-03

## 2024-10-04 NOTE — PROGRESS NOTES
Protocol: [facility] Effectiveness Study  Identifier: [id number]  IRB#: 2022.247  PI: Dr. Baldo Maynard, PhD  CO-I: Dr. Nighat Arreguin PsyD  Version Date: 12/05/2022  Pt Study ID: [id number]  Visit Month: 11 + Discharge Care Plan   Care Plan documented on: 11/14/23 Please refer to note for more information.      Timeline:      Consent: Completed(10/20/23)  Baseline questionnaires: Completed(10/23/23)  6-month questionnaires: Completed(04/29/24)  12-month questionnaires: Planned(10/18/24)                    Visit Note:     (date: 09/30/24) Reached out to cgChristy in a first attempt to hold our month 11 care ecosystem program/study call. Was not able to reach the cg and left a detailed voicemail message requesting a call back at their earliest convenience. I will plan to call back on (date: 10/04/24) if I do not hear back before then.        (date: 10/04/24) Reached out to Christy spence  in a second attempt to hold our month 11 care ecosystem program/study call, and complete the discharge care plan.  Was not able to reach the cg and left a detailed voicemail message requesting a call back at their earliest convenience. I will plan to call back on (date: 10/11/24) if I do not hear back before then. If I am not able to reach the cg by the end of the day on 10/11/24 I will tate month 11 as a missed visit.      (Date: 10/11/24) Reached out to bebe Garcia in a third attempt to hold our month 11 care ecosystem program/study call, complete the discharge care plan, and then schedule the 12 month questionnaires. CTN was not able to reach the cg and left a detailed voicemail message requesting a call back. CTN marked month 11 as a missed visit.     Falls in the past month: unknown.   UTIs in the past month: unknown.   Hospital encounters in the past month (reported by caregiver): unknown.   Medication Changes: unknown.     Next monthly visit scheduled for: 11/02/24 Caregiver knows how to reach CTN,  and is encouraged to do so, as needed before our next scheduled call.     Action items for CTN: unknown.

## 2024-10-28 ENCOUNTER — PATIENT MESSAGE (OUTPATIENT)
Dept: PRIMARY CARE CLINIC | Facility: CLINIC | Age: 89
End: 2024-10-28
Payer: MEDICARE

## 2024-10-28 RX ORDER — FLUOXETINE HYDROCHLORIDE 20 MG/1
20 CAPSULE ORAL DAILY
Qty: 90 CAPSULE | Refills: 0 | Status: CANCELLED | OUTPATIENT
Start: 2024-10-28

## 2024-10-29 ENCOUNTER — OUTPATIENT CASE MANAGEMENT (OUTPATIENT)
Dept: NEUROLOGY | Facility: CLINIC | Age: 89
End: 2024-10-29
Payer: MEDICARE

## 2024-10-29 RX ORDER — FLUOXETINE HYDROCHLORIDE 40 MG/1
40 CAPSULE ORAL DAILY
Qty: 90 CAPSULE | Refills: 3 | Status: SHIPPED | OUTPATIENT
Start: 2024-10-29

## 2024-11-13 ENCOUNTER — OFFICE VISIT (OUTPATIENT)
Dept: PRIMARY CARE CLINIC | Facility: CLINIC | Age: 89
End: 2024-11-13
Payer: MEDICARE

## 2024-11-13 VITALS
DIASTOLIC BLOOD PRESSURE: 64 MMHG | RESPIRATION RATE: 16 BRPM | BODY MASS INDEX: 20.79 KG/M2 | SYSTOLIC BLOOD PRESSURE: 132 MMHG | OXYGEN SATURATION: 94 % | HEIGHT: 63 IN | WEIGHT: 117.31 LBS | HEART RATE: 74 BPM

## 2024-11-13 DIAGNOSIS — E03.9 HYPOTHYROIDISM, UNSPECIFIED TYPE: ICD-10-CM

## 2024-11-13 DIAGNOSIS — I15.9 SECONDARY HYPERTENSION: Primary | ICD-10-CM

## 2024-11-13 DIAGNOSIS — Z23 NEED FOR INFLUENZA VACCINATION: ICD-10-CM

## 2024-11-13 DIAGNOSIS — G30.1 MILD LATE ONSET ALZHEIMER'S DEMENTIA WITH AGITATION: ICD-10-CM

## 2024-11-13 DIAGNOSIS — F02.A11 MILD LATE ONSET ALZHEIMER'S DEMENTIA WITH AGITATION: ICD-10-CM

## 2024-11-13 PROCEDURE — 99999 PR PBB SHADOW E&M-EST. PATIENT-LVL III: CPT | Mod: PBBFAC,HCNC,, | Performed by: STUDENT IN AN ORGANIZED HEALTH CARE EDUCATION/TRAINING PROGRAM

## 2024-11-13 NOTE — ASSESSMENT & PLAN NOTE
saw neuro aug 29th. On seroquel and fluoxetine. Wanted to see them back in October for med check but psych neuro never scheduled an appt

## 2024-11-13 NOTE — PROGRESS NOTES
Clinic Note  11/13/2024      Subjective:       Patient ID:  Olamide is a 89 y.o. female being seen for an established visit.    Chief Complaint: Follow-up    HPI  Olamide Felipe is a 89 y.o.  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 Did acp 2024  -no falls  -weight is stable.  -ordered labs for next time.   -flu shot today  -saw neuro aug 29th. Wanted to see them back in October for med check but psych neuro never scheduled an appt.     Review of Systems   Constitutional:  Negative for chills and fever.   HENT:  Negative for congestion.    Respiratory:  Negative for cough and shortness of breath.    Cardiovascular:  Negative for chest pain.   Gastrointestinal:  Negative for abdominal pain, blood in stool, constipation and diarrhea.   Genitourinary:  Negative for dysuria and frequency.   Neurological:  Negative for dizziness and headaches.   Psychiatric/Behavioral:  Positive for memory loss.        Medication List with Changes/Refills   Current Medications    ACETAMINOPHEN (TYLENOL) 325 MG TABLET    Take 325 mg by mouth every 6 (six) hours as needed for Pain.    B COMPLEX VITAMINS TABLET    Take 1 tablet by mouth once daily.    CALCIUM-VITAMIN D 250-100 MG-UNIT PER TABLET    Take 1 tablet by mouth 2 (two) times daily.    FLUOXETINE 40 MG CAPSULE    Take 1 capsule (40 mg total) by mouth once daily.    LEVOTHYROXINE (SYNTHROID) 25 MCG TABLET    Take 1 tablet (25 mcg total) by mouth before breakfast.    LORATADINE (CLARITIN) 10 MG TABLET    Take 1 tablet (10 mg total) by mouth once daily.    LOSARTAN (COZAAR) 25 MG TABLET    Take 1 tablet (25 mg total) by mouth once daily.    MV-MIN/IRON/FOLIC/CALCIUM/VITK  "(WOMEN'S MULTIVITAMIN ORAL)    Take 1 tablet by mouth once daily.    OMEPRAZOLE (PRILOSEC) 20 MG CAPSULE    Take 1 capsule (20 mg total) by mouth once daily.    POLYETHYLENE GLYCOL (GLYCOLAX) 17 GRAM PWPK    Take 17 g by mouth 2 (two) times daily as needed for Constipation.    QUETIAPINE (SEROQUEL) 25 MG TAB    Take 1/2 tablet by mouth every morning and 1 tablet by mouth every evening    SENNA-DOCUSATE 8.6-50 MG (PERICOLACE) 8.6-50 MG PER TABLET    Take 1 tablet by mouth daily as needed for Constipation.    SIMVASTATIN (ZOCOR) 20 MG TABLET    Take 1 tablet (20 mg total) by mouth every evening.           Objective:      /64 (BP Location: Right arm, Patient Position: Sitting)   Pulse 74   Resp 16   Ht 5' 3" (1.6 m)   Wt 53.2 kg (117 lb 4.6 oz)   SpO2 (!) 94%   BMI 20.78 kg/m²   Estimated body mass index is 20.78 kg/m² as calculated from the following:    Height as of this encounter: 5' 3" (1.6 m).    Weight as of this encounter: 53.2 kg (117 lb 4.6 oz).  Physical Exam  Constitutional:       Appearance: Normal appearance.   Eyes:      Conjunctiva/sclera: Conjunctivae normal.   Cardiovascular:      Rate and Rhythm: Normal rate and regular rhythm.      Heart sounds: Normal heart sounds.   Pulmonary:      Effort: Pulmonary effort is normal. No respiratory distress.      Breath sounds: Normal breath sounds.   Musculoskeletal:      Right lower leg: No edema.      Left lower leg: No edema.   Skin:     Findings: Bruising present.   Neurological:      Mental Status: She is alert. Mental status is at baseline.   Psychiatric:         Mood and Affect: Mood normal.         Behavior: Behavior normal.           Assessment and Plan:     1. Secondary hypertension  Assessment & Plan:  BP is well controled today. On losartan 25 mg daily, continue. Will monitor     Orders:  -     Basic Metabolic Panel; Future; Expected date: 11/13/2024  -     CBC Without Differential; Future; Expected date: 11/13/2024    2. Need for influenza " vaccination  -     influenza (adjuvanted) (Fluad) 45 mcg/0.5 mL IM vaccine (> or = 66 yo) 0.5 mL    3. 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 On seroquel and fluoxetine. Wanted to see them back in October for med check but psych neuro never scheduled an appt      4. Hypothyroidism, unspecified type  Assessment & Plan:  Last tsh wnl. Continue current dose of levothyroxine. Will monitor     Orders:  -     TSH; Future; Expected date: 11/13/2024            Follow Up:   Follow up in about 3 months (around 2/13/2025).      Lashonda Pearce

## 2025-01-01 ENCOUNTER — PATIENT MESSAGE (OUTPATIENT)
Dept: PRIMARY CARE CLINIC | Facility: CLINIC | Age: OVER 89
End: 2025-01-01
Payer: MEDICARE

## 2025-01-06 ENCOUNTER — PATIENT OUTREACH (OUTPATIENT)
Dept: NEUROLOGY | Facility: CLINIC | Age: OVER 89
End: 2025-01-06
Payer: MEDICARE

## 2025-01-06 NOTE — PROGRESS NOTES
[facility]:     Visit Note:   Spoke with caregiver Christy Garcia (Daughter). Cg stated that the pt has been in a much better mood recently and is feeling happier than ever. Cg mentioned that the assisted living facility where the pt lives has had many holidays functions and the pt is having a great time celebrating, decorating, and helping with preparing food. Cg stated that the pt mentioned that she would like to possibly get a job because she was having so much fun being active. CTN suggested possibly having the pt help out in the kitchen more often. Not only at holiday time.     Cg reports that the pt remains making up stories frequently but it is not affecting her behavior at this time. Cg states that the pt asks the same questions over and over.     Cg reports that they pt typically gets a mammogram in January and with her history of cancer the cg was unsure with what to do. Cg decided not to have the pt get a mammogram because the pt is so content and happy in her current space. The cg and the staff at the home fear that if she is removed from that space even if just for a few hours her mood will change. Cg loves Dr. Pearce and [facility].     Cg mentioned that the pt had a fall last week and she has a bruise on her right eye. Pt is not in pain and the nurses are keeping a close eye on her while the pt heals.     Cg reports that the pt might run out of money in 2 years or so and they are starting to consider assisted living that is fully covered by the pt's insurance if and when that time comes. CTN will provide the appropriate information to the cg when she requests it.     Falls in the past month: 1  UTIs in the past month: 0  Hospital encounters in the past month (reported by caregiver): 0      Next monthly visit scheduled for: 03/12/25. Caregiver knows how to reach CTN, and is encouraged to do so, as needed before our next scheduled call.     Action items for CTN: None at this time.

## 2025-01-28 ENCOUNTER — OFFICE VISIT (OUTPATIENT)
Dept: URGENT CARE | Facility: CLINIC | Age: OVER 89
End: 2025-01-28
Payer: MEDICARE

## 2025-01-28 ENCOUNTER — TELEPHONE (OUTPATIENT)
Dept: PRIMARY CARE CLINIC | Facility: CLINIC | Age: OVER 89
End: 2025-01-28
Payer: MEDICARE

## 2025-01-28 VITALS
DIASTOLIC BLOOD PRESSURE: 62 MMHG | OXYGEN SATURATION: 95 % | HEART RATE: 69 BPM | BODY MASS INDEX: 20.73 KG/M2 | HEIGHT: 63 IN | RESPIRATION RATE: 18 BRPM | TEMPERATURE: 97 F | WEIGHT: 117 LBS | SYSTOLIC BLOOD PRESSURE: 148 MMHG

## 2025-01-28 DIAGNOSIS — M25.561 PAIN AND SWELLING OF RIGHT KNEE: ICD-10-CM

## 2025-01-28 DIAGNOSIS — S60.022A CONTUSION OF LEFT INDEX FINGER WITHOUT DAMAGE TO NAIL, INITIAL ENCOUNTER: ICD-10-CM
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
## 2025-01-28 DIAGNOSIS — M25.461 PAIN AND SWELLING OF RIGHT KNEE: ICD-10-CM

## 2025-01-28 DIAGNOSIS — R29.6 MULTIPLE FALLS: ICD-10-CM

## 2025-01-28 DIAGNOSIS — R07.81 RIB PAIN ON RIGHT SIDE: ICD-10-CM

## 2025-01-28 LAB
OHS QRS DURATION: 84 MS
OHS QTC CALCULATION: 434 MS

## 2025-01-28 PROCEDURE — 73562 X-RAY EXAM OF KNEE 3: CPT | Mod: FY,RT,S$GLB, | Performed by: RADIOLOGY

## 2025-01-28 PROCEDURE — 93010 ELECTROCARDIOGRAM REPORT: CPT | Mod: S$GLB,,, | Performed by: INTERNAL MEDICINE

## 2025-01-28 PROCEDURE — 99214 OFFICE O/P EST MOD 30 MIN: CPT | Mod: S$GLB,,, | Performed by: NURSE PRACTITIONER

## 2025-01-28 PROCEDURE — 71101 X-RAY EXAM UNILAT RIBS/CHEST: CPT | Mod: FY,RT,S$GLB, | Performed by: RADIOLOGY

## 2025-01-28 PROCEDURE — 93005 ELECTROCARDIOGRAM TRACING: CPT | Mod: S$GLB,,, | Performed by: NURSE PRACTITIONER

## 2025-01-28 PROCEDURE — 73140 X-RAY EXAM OF FINGER(S): CPT | Mod: FY,LT,S$GLB, | Performed by: RADIOLOGY

## 2025-01-28 NOTE — PROGRESS NOTES
"Subjective:      Patient ID: Olamide Felipe is a 89 y.o. female.    Vitals:  height is 5' 3" (1.6 m) and weight is 53.1 kg (117 lb). Her tympanic temperature is 97.1 °F (36.2 °C). Her blood pressure is 148/62 (abnormal) and her pulse is 69. Her respiration is 18 and oxygen saturation is 95%.     Chief Complaint: Fall (/)    This is a 89 y.o. female with a chief complaint of Fall.   Sx started yesterday and are unchanged. Pt lives in nursing home and does not remember her fall but incident was not reported by housing staff between 5:30 and 7:30 am. Pt told her daughter yesterday that she fell multiple times on grass but cannot remember her falls today.     Sx include swelling on Rt knee with mild bruising, swelling and mild pain with weight on RT leg, bruising and pain and swelling on LT 2nd digit. Pt mentions having a tenderness in middle of chest to the touch and with deep breaths.   Provider note below:  This is a 89 y.o. female who presents today with a chief complaint of right knee pain swelling and left index finger swelling secondary to post fall yesterday, patient accompanied by daughter in the clinic, daughter reports she left her nursing home walked out from the front door and walked almost 1 mi on the busy [address] of [facility], daughter reports she was notified about her leaving the nursing home, daughter reports yesterday patient told her that she fell multiple times while she was walking out, none of the falls are witnessed, unsure if she hit her head or not, but daughter reports she was notified after the nursing home notified the  oz face and she was found 1 mi away from her nursing home, daughter reports that initially she was confused when she got back to the nursing home reported by staff but 1/2 hour later when daughter went to see her patient was at her baseline, patient with history of Alzheimer's and dementia, daughter reports this morning she complained about chest pain in the " middle but unsure if this is related to her fall or not, patient is not on blood thinners, denies any exertional chest pain, denies any radiating neck or arm pain, denies fever, body aches or chills, denies cough, wheezing or shortness of breath, denies nausea, vomiting, diarrhea or abdominal pain, denies chest pain or dizziness positional lightheadedness, denies sore throat or trouble swallowing, denies loss of taste or smell, or any other symptoms           Fall  The accident occurred 12 to 24 hours ago. The fall occurred while walking. She landed on Grass. The point of impact was the right knee. The pain is present in the right knee and left hand. The pain is at a severity of 5/10. The pain is mild. The symptoms are aggravated by standing and movement.       Musculoskeletal:  Positive for pain, joint pain and joint swelling.      Past Medical History:   Diagnosis Date    Delirium 11/17/2023    Hyperlipidemia     Hypothyroidism     Malignant neoplasm of upper-outer quadrant of right breast in female, estrogen receptor negative 01/07/2022    Osteoarthritis, knee     Uterine cancer     Vertigo        Objective:     Physical Exam   Constitutional: She is oriented to person, place, and time. She appears well-developed. She is cooperative.  Non-toxic appearance. She does not appear ill. No distress.   HENT:   Head: Normocephalic and atraumatic. Head is without abrasion, without contusion and without laceration.   Ears:   Right Ear: Hearing, tympanic membrane, external ear and ear canal normal. No hemotympanum.   Left Ear: Hearing, tympanic membrane, external ear and ear canal normal. No hemotympanum.   Nose: Nose normal. No mucosal edema, rhinorrhea or nasal deformity. No epistaxis. Right sinus exhibits no maxillary sinus tenderness and no frontal sinus tenderness. Left sinus exhibits no maxillary sinus tenderness and no frontal sinus tenderness.   Mouth/Throat: Uvula is midline, oropharynx is clear and moist and  mucous membranes are normal. No trismus in the jaw. Normal dentition. No uvula swelling. No oropharyngeal exudate, posterior oropharyngeal edema, posterior oropharyngeal erythema, tonsillar abscesses or cobblestoning.   Eyes: Conjunctivae, EOM and lids are normal. Pupils are equal, round, and reactive to light. Right eye exhibits no discharge. Left eye exhibits no discharge. No scleral icterus.   Neck: Trachea normal and phonation normal. Neck supple. No tracheal deviation present. No neck rigidity present. No spinous process tenderness present. No muscular tenderness present.   Cardiovascular: Normal rate, regular rhythm, normal heart sounds and normal pulses.      Comments: EKG NSR vent rate 67   Pulmonary/Chest: Effort normal and breath sounds normal. No respiratory distress.       Abdominal: Normal appearance and bowel sounds are normal. She exhibits no distension and no mass. Soft. There is no abdominal tenderness.   Musculoskeletal: Normal range of motion.         General: No deformity. Normal range of motion.      Right knee: She exhibits swelling. No tenderness found.      Left hand: She exhibits tenderness (to left index finger with bruising, range of motion intact of left index finger) and swelling.      Comments: No erythema noted, no open wound or lesion noted to right knee   Neurological: She is alert, oriented to person, place, and time and at baseline. She has normal strength. No cranial nerve deficit or sensory deficit. She exhibits normal muscle tone. She displays no seizure activity. Coordination normal. GCS eye subscore is 4. GCS verbal subscore is 5. GCS motor subscore is 6.   Skin: Skin is warm, dry, intact, not diaphoretic and not pale. Capillary refill takes less than 2 seconds. No abrasion, No burn, No bruising and No ecchymosis   Psychiatric: Her speech is normal and behavior is normal. Thought content normal.   Nursing note and vitals reviewed.    X-Ray Knee 3 View Right    Result Date:  1/28/2025  EXAMINATION: XR KNEE 3 VIEW RIGHT CLINICAL HISTORY: Unspecified fall, initial encounter TECHNIQUE: AP, lateral, and Merchant views of the right knee were performed. COMPARISON: None FINDINGS: No fracture or dislocation.  Mild tricompartmental osteoarthritis of the right knee.  There is chondrocalcinosis of the medial and lateral compartments of the right knee. Incidentally noted is medial compartment osteoarthritis of the left knee.     Degenerative changes of the knees without evidence of acute abnormality. Electronically signed by: Kody Martinez Date:    01/28/2025 Time:    12:58    X-Ray Finger 2 or More Views Left    Result Date: 1/28/2025  EXAMINATION: XR FINGER 2 OR MORE VIEWS LEFT CLINICAL HISTORY: Unspecified fall, initial encounter TECHNIQUE: Three views of the left 2nd digit COMPARISON: None FINDINGS: No fracture or dislocation.  There is osteoarthritis of the interphalangeal joints of the 2nd and 3rd digits.  There is 1st carpometacarpal osteoarthritis.     No acute fracture or dislocation. Electronically signed by: Kody Martinez Date:    01/28/2025 Time:    12:57    XR RIB RIGHT W/ PA CHEST    Result Date: 1/28/2025  EXAMINATION: XR RIBS MIN 3 VIEWS W/ PA CHEST RIGHT CLINICAL HISTORY: Unspecified fall, initial encounter TECHNIQUE: Four views of right-sided ribs; frontal view of the chest COMPARISON: 11/16/2023 FINDINGS: No radiographic evidence of right-sided rib fracture.  There is no acute confluent consolidation or pulmonary edema within the lungs.  No blunting of costophrenic angles.  Cardiac silhouette normal in size with calcification of the thoracic aorta. There is degenerative change of the spine.  Stable chronic fracture of the distal aspect of the right clavicle.     No radiographic evidence of right-sided rib fracture. Electronically signed by: Kody Martinez Date:    01/28/2025 Time:    12:56      Patient in no acute distress.  Vitals reassuring.  Discussed results/diagnosis/plan in depth  with patient in clinic. Strict precautions given to patient to monitor for worsening signs and symptoms. Advised to follow up with primary.All questions answered. Strict ER precautions given. If your symptoms worsens or fail to improve you should go to the Emergency Room. Discharge and follow-up instructions given verbally/printed. Discharge and follow-up instructions discussed with the patient who expressed understanding and willingness to comply with my recommendations.Patient voiced understanding and in agreement with current treatment plan.     Please be advised this text was dictated with [employer] software and may contain errors due to translation.   Assessment:     1. Fall, initial encounter    2. Multiple falls    3. Rib pain on right side    4. Pain and swelling of right knee    5. Contusion of left index finger without damage to nail, initial encounter        Plan:       Fall, initial encounter  -     X-Ray Knee 3 View Right; Future; Expected date: 01/28/2025  -     X-Ray Finger 2 or More Views Left; Future; Expected date: 01/28/2025  -     EKG 12-lead  -     XR RIB RIGHT W/ PA CHEST; Future; Expected date: 01/28/2025    Multiple falls    Rib pain on right side    Pain and swelling of right knee    Contusion of left index finger without damage to nail, initial encounter          Medical Decision Making:   History:   Old Medical Records: I decided to obtain old medical records.  Old Records Summarized: records from clinic visits and records from previous admission(s).  Clinical Tests:   Radiological Study: Ordered and Reviewed  Urgent Care Management:  Patient in no acute distress.  Vitals reassuring.  On exam, patient is nontoxic appearing and afebrile.  Lungs CTA.  Patient accompanied by daughter in clinic.  Patient with multiple fall yesterday after leaving her nursing home unwitnessed falls, s usnsure head injury or trauma,  daughter reports she is back to her normal self patient with history of  Alzheimer's with dementia.  Daughter reported that she complained about chest pain in the middle this morning unsure if it is related to her fall questionable, EKG obtained.  EKG normal sinus rhythm ventricular rate 67.  Patient complained about right-sided rib pain during the physical examination.  Rib x-ray along with right knee x-ray and left index finger x-ray obtained in clinic.  Negative for fracture discussed with daughter/patient in detail.  Splint applied to left index finger.  Tylenol advised for pain.  EKG results discussed with patient/daughter in depth, however discussed due to questionable chest pain unable to rule out any other etiology as she will need further evaluation with lab work in the emergency room, patient daughter deferred and declined further eval currently related to patient questionable chest pain.  Will monitor her closely.  I reiterated the importance of further evaluation with strict ER precautions.  over-the-counter medication discussed with patient at length.  Proper hydration advised.  I reiterated the importance of further evaluation if no improvement symptoms and follow-up with primary.               Patient Instructions   If your condition worsens or fails to improve we recommend that you receive another evaluation at the ER immediately or contact your PCP to discuss your concerns or return here. You must understand that you've received an urgent care treatment only and that you may be released before all your medical problems are known or treated. You the patient will arrange for followup care as instructed.    If you were prescribed antibiotics, please take them to completion.  If you were prescribed a narcotic medication, do not drive or operate heavy equipment or machinery while taking these medications.  Please follow up with your primary care doctor or specialist as needed.  If you  smoke, please stop smoking.

## 2025-01-28 NOTE — TELEPHONE ENCOUNTER
Appointment Request   Olamide Felipe   Sent:  10:55 AM   To: [facility] Clinical Support Staff      Olamide Felipe   MRN: [id number] : 2/3/1933   Pt Home: [phone]     Entered: [phone]        Message    Appointment Request From: Olamide Felipe      With Provider: Lashonda Pearce MD [[facility] - [facility]]      Preferred Date Range: 2025 - 2025      Preferred Times: Any Time      Reason for visit: Olamide escaped  & walked > a mile.  She said she fell several times in the grass.  Now she's complaining of chest pain & her knee is swollen.  Can you see her or should i take her to urgent care?      Comments:   Check her out after elopement     Primary Coverage(Effective 2019)    Payer Plan Group Number Group Name Effective From Effective To   HUMANA MANAGED MEDICARE HUMANA MEDICARE SELECT [employer]NER [id number]  2019      Patient Demographics    Address  [address] [place] [address] Brigitte ROCHE [facility] Phone  [phone] (Home) *Preferred*  [phone] (Mobile) E-mail Address  [email or web address]     # of No Show in Current Department:0     Future Appointments  2025 - 2026   Date Visit Type Department Provider    2/10/2025  2:00 PM NON FASTING LAB [facility], [facility]           2025  2:00 PM ESTABLISHED PATIENT [facility] - Lashonda Raymundo MD

## 2025-01-28 NOTE — TELEPHONE ENCOUNTER
Spoke with pt's daughter. Pt escaped from assisted living last night and walked about a mile down [address]. Pt claimed to have fallen in the grass where she hurt her knee. Pt's daughter stated her knee is currently swollen. Complaining of pain in her finger too.      Pt is also complaining of chest pain. No SOB. Pt's daughter stated she was complaining of stomach pain a few days ago. Pt's daughter is unsure if the pt is trying to describe that same pain or is truly having chest pain.     Advised pt to go to urgent care in [place] due to PCP having no available appointments today. Informed to keep the clinic updated.

## 2025-02-10 ENCOUNTER — PATIENT MESSAGE (OUTPATIENT)
Dept: PRIMARY CARE CLINIC | Facility: CLINIC | Age: OVER 89
End: 2025-02-10
Payer: MEDICARE

## 2025-02-10 ENCOUNTER — LAB VISIT (OUTPATIENT)
Dept: LAB | Facility: HOSPITAL | Age: OVER 89
End: 2025-02-10
Attending: STUDENT IN AN ORGANIZED HEALTH CARE EDUCATION/TRAINING PROGRAM
Payer: MEDICARE

## 2025-02-10 DIAGNOSIS — I15.9 SECONDARY HYPERTENSION: ICD-10-CM

## 2025-02-10 DIAGNOSIS — E03.9 HYPOTHYROIDISM, UNSPECIFIED TYPE: ICD-10-CM

## 2025-02-10 LAB
ANION GAP SERPL CALC-SCNC: 7 MMOL/L (ref 8–16)
BUN SERPL-MCNC: 15 MG/DL (ref 10–30)
CALCIUM SERPL-MCNC: 9.5 MG/DL (ref 8.7–10.5)
CHLORIDE SERPL-SCNC: 110 MMOL/L (ref 95–110)
CO2 SERPL-SCNC: 23 MMOL/L (ref 23–29)
CREAT SERPL-MCNC: 0.9 MG/DL (ref 0.5–1.4)
ERYTHROCYTE [DISTWIDTH] IN BLOOD BY AUTOMATED COUNT: 12.3 % (ref 11.5–14.5)
EST. GFR  (NO RACE VARIABLE): 60 ML/MIN/1.73 M^2
GLUCOSE SERPL-MCNC: 99 MG/DL (ref 70–110)
HCT VFR BLD AUTO: 37.6 % (ref 37–48.5)
HGB BLD-MCNC: 12.3 G/DL (ref 12–16)
MCH RBC QN AUTO: 32.1 PG (ref 27–31)
MCHC RBC AUTO-ENTMCNC: 32.7 G/DL (ref 32–36)
MCV RBC AUTO: 98 FL (ref 82–98)
PLATELET # BLD AUTO: 297 K/UL (ref 150–450)
PMV BLD AUTO: 10.8 FL (ref 9.2–12.9)
POTASSIUM SERPL-SCNC: 4.7 MMOL/L (ref 3.5–5.1)
RBC # BLD AUTO: 3.83 M/UL (ref 4–5.4)
SODIUM SERPL-SCNC: 140 MMOL/L (ref 136–145)
TSH SERPL DL<=0.005 MIU/L-ACNC: 2.39 UIU/ML (ref 0.4–4)
WBC # BLD AUTO: 5.73 K/UL (ref 3.9–12.7)

## 2025-02-10 PROCEDURE — 85027 COMPLETE CBC AUTOMATED: CPT | Mod: HCNC | Performed by: STUDENT IN AN ORGANIZED HEALTH CARE EDUCATION/TRAINING PROGRAM

## 2025-02-10 PROCEDURE — 84443 ASSAY THYROID STIM HORMONE: CPT | Mod: HCNC | Performed by: STUDENT IN AN ORGANIZED HEALTH CARE EDUCATION/TRAINING PROGRAM

## 2025-02-10 PROCEDURE — 36415 COLL VENOUS BLD VENIPUNCTURE: CPT | Mod: HCNC,PO | Performed by: STUDENT IN AN ORGANIZED HEALTH CARE EDUCATION/TRAINING PROGRAM

## 2025-02-10 PROCEDURE — 80048 BASIC METABOLIC PNL TOTAL CA: CPT | Mod: HCNC | Performed by: STUDENT IN AN ORGANIZED HEALTH CARE EDUCATION/TRAINING PROGRAM

## 2025-02-11 ENCOUNTER — TELEPHONE (OUTPATIENT)
Dept: PRIMARY CARE CLINIC | Facility: CLINIC | Age: OVER 89
End: 2025-02-11
Payer: MEDICARE

## 2025-02-11 NOTE — TELEPHONE ENCOUNTER
----- Message from Lashonda Pearce MD sent at 2/11/2025  8:58 AM CST -----  Pls let daughter know that patients labs look good.

## 2025-02-12 ENCOUNTER — TELEPHONE (OUTPATIENT)
Dept: PRIMARY CARE CLINIC | Facility: CLINIC | Age: OVER 89
End: 2025-02-12
Payer: MEDICARE

## 2025-02-12 NOTE — TELEPHONE ENCOUNTER
----- Message from Genoveva sent at 2/12/2025 12:53 PM CST -----  Contact: [phone] Christy/daughter  Patient is returning a phone call.    Who left a message for the patient: Gertrudis Coy    Does patient know what this is regarding:  results    Would you like a call back, or a response through your [facility] portal?:   call back to Christy    Comments:

## 2025-02-12 NOTE — TELEPHONE ENCOUNTER
----- Message from Genoveva sent at 2/12/2025 12:53 PM CST -----  Contact: [phone] Christy/daughter  Patient is returning a phone call.    Who left a message for the patient: Gertrudis Coy    Does patient know what this is regarding:  results    Would you like a call back, or a response through your [facility] portal?:   call back to Christy    Comments:

## 2025-02-18 ENCOUNTER — PATIENT MESSAGE (OUTPATIENT)
Dept: NEUROLOGY | Facility: CLINIC | Age: OVER 89
End: 2025-02-18
Payer: MEDICARE

## 2025-02-18 ENCOUNTER — OFFICE VISIT (OUTPATIENT)
Dept: PRIMARY CARE CLINIC | Facility: CLINIC | Age: OVER 89
End: 2025-02-18
Payer: MEDICARE

## 2025-02-18 ENCOUNTER — LAB VISIT (OUTPATIENT)
Dept: LAB | Facility: HOSPITAL | Age: OVER 89
End: 2025-02-18
Attending: STUDENT IN AN ORGANIZED HEALTH CARE EDUCATION/TRAINING PROGRAM
Payer: MEDICARE

## 2025-02-18 VITALS
BODY MASS INDEX: 20.08 KG/M2 | RESPIRATION RATE: 16 BRPM | HEIGHT: 63 IN | WEIGHT: 113.31 LBS | OXYGEN SATURATION: 97 % | DIASTOLIC BLOOD PRESSURE: 62 MMHG | HEART RATE: 78 BPM | SYSTOLIC BLOOD PRESSURE: 138 MMHG

## 2025-02-18 DIAGNOSIS — F02.A11 MILD LATE ONSET ALZHEIMER'S DEMENTIA WITH AGITATION: ICD-10-CM

## 2025-02-18 DIAGNOSIS — E03.9 HYPOTHYROIDISM, UNSPECIFIED TYPE: ICD-10-CM

## 2025-02-18 DIAGNOSIS — R41.0 CONFUSION: Primary | ICD-10-CM

## 2025-02-18 DIAGNOSIS — R41.0 CONFUSION: ICD-10-CM

## 2025-02-18 DIAGNOSIS — G30.1 MILD LATE ONSET ALZHEIMER'S DEMENTIA WITH AGITATION: ICD-10-CM

## 2025-02-18 DIAGNOSIS — J43.2 CENTRILOBULAR EMPHYSEMA: ICD-10-CM

## 2025-02-18 DIAGNOSIS — E78.5 HYPERLIPIDEMIA, UNSPECIFIED HYPERLIPIDEMIA TYPE: ICD-10-CM

## 2025-02-18 DIAGNOSIS — I15.9 SECONDARY HYPERTENSION: ICD-10-CM

## 2025-02-18 LAB
BILIRUB UR QL STRIP: NEGATIVE
CLARITY UR REFRACT.AUTO: CLEAR
COLOR UR AUTO: YELLOW
GLUCOSE UR QL STRIP: NEGATIVE
HGB UR QL STRIP: NEGATIVE
KETONES UR QL STRIP: NEGATIVE
LEUKOCYTE ESTERASE UR QL STRIP: NEGATIVE
NITRITE UR QL STRIP: NEGATIVE
PH UR STRIP: 6 [PH] (ref 5–8)
PROT UR QL STRIP: NEGATIVE
SP GR UR STRIP: 1.02 (ref 1–1.03)
URN SPEC COLLECT METH UR: NORMAL

## 2025-02-18 PROCEDURE — 81003 URINALYSIS AUTO W/O SCOPE: CPT | Mod: HCNC | Performed by: STUDENT IN AN ORGANIZED HEALTH CARE EDUCATION/TRAINING PROGRAM

## 2025-02-18 RX ORDER — LOSARTAN POTASSIUM 25 MG/1
25 TABLET ORAL DAILY
Qty: 90 TABLET | Refills: 3 | Status: SHIPPED | OUTPATIENT
Start: 2025-02-18 | End: 2026-02-13

## 2025-02-18 RX ORDER — QUETIAPINE FUMARATE 25 MG/1
TABLET, FILM COATED ORAL
Qty: 225 TABLET | Refills: 3 | Status: SHIPPED | OUTPATIENT
Start: 2025-02-18 | End: 2025-02-20 | Stop reason: SDUPTHER

## 2025-02-18 RX ORDER — QUETIAPINE FUMARATE 25 MG/1
TABLET, FILM COATED ORAL
Qty: 135 TABLET | Refills: 3 | Status: SHIPPED | OUTPATIENT
Start: 2025-02-18 | End: 2025-02-18 | Stop reason: SDUPTHER

## 2025-02-18 RX ORDER — QUETIAPINE FUMARATE 25 MG/1
TABLET, FILM COATED ORAL
Start: 2025-02-18 | End: 2025-02-18

## 2025-02-18 RX ORDER — QUETIAPINE FUMARATE 25 MG/1
TABLET, FILM COATED ORAL
Qty: 225 TABLET | Refills: 3 | Status: SHIPPED | OUTPATIENT
Start: 2025-02-18 | End: 2025-02-18

## 2025-02-18 NOTE — ASSESSMENT & PLAN NOTE
saw neuro aug 29th. Wanted to see them back in October for med check but psych neuro never scheduled an appt. Due to the recent confusion, asked daughter to make an appt.

## 2025-02-18 NOTE — ASSESSMENT & PLAN NOTE
Worse recently. Was wandering on the streets a few weeks ago. More confusion than usual. Will test her urine. Asked daughter to make an appt with neuropsych. Will increase seroquel to 25 mg in the morning from 12.5 and can increase 37.5 mg at bedtime.

## 2025-02-18 NOTE — PROGRESS NOTES
Clinic Note  2/18/2025      Subjective:       Patient ID:  Olamide is a 89 y.o. female being seen for an established visit.    Chief Complaint: Follow-up    HPI  Olamide Felipe is a 89 y.o.  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 Did acp 2024  -no falls  -lost 4 lbs but appetite is okay.  -recent labs look good. Will get a UA due to increased confusion.   -saw neuro aug 29th. Wanted to see them back in October for med check but psych neuro never scheduled an appt. Due to the recent confusion, asked daughter to make an appt.     Advance Care Planning     Date: 02/18/2025    Power of   I initiated the process of voluntary advance care planning today and explained the importance of this process to the patient.  I introduced the concept of advance directives to the patient, as well. Then the patient received detailed information about the importance of designating a Health Care Power of  (HCPOA). She was also instructed to communicate with this person about their wishes for future healthcare, should she become sick and lose decision-making capacity. The patient has previously appointed a HCPOA. After our discussion, the patient has decided to complete a HCPOA and has appointed her daughter, health care agent: Christy Garcia & health care agent number: [phone]. I encouraged her to communicate with this person about their wishes for future healthcare, should she become sick and lose decision-making capacity.      A total of 5 min was spent on advance care planning, goals of care discussion, emotional support, formulating and communicating prognosis and exploring burden/benefit of various  approaches of treatment. This discussion occurred on a fully voluntary basis with the verbal consent of the patient and/or family.        Review of Systems   Constitutional:  Negative for chills and fever.   HENT:  Negative for congestion.    Respiratory:  Negative for cough and shortness of breath.    Cardiovascular:  Negative for chest pain.   Gastrointestinal:  Negative for abdominal pain, blood in stool, constipation and diarrhea.   Genitourinary:  Negative for dysuria and frequency.   Neurological:  Negative for dizziness and headaches.   Psychiatric/Behavioral:  Positive for memory loss.        Medication List with Changes/Refills   Current Medications    ACETAMINOPHEN (TYLENOL) 325 MG TABLET    Take 325 mg by mouth every 6 (six) hours as needed for Pain.    B COMPLEX VITAMINS TABLET    Take 1 tablet by mouth once daily.    CALCIUM-VITAMIN D 250-100 MG-UNIT PER TABLET    Take 1 tablet by mouth 2 (two) times daily.    FLUOXETINE 40 MG CAPSULE    Take 1 capsule (40 mg total) by mouth once daily.    LEVOTHYROXINE (SYNTHROID) 25 MCG TABLET    Take 1 tablet (25 mcg total) by mouth before breakfast.    LORATADINE (CLARITIN) 10 MG TABLET    Take 1 tablet (10 mg total) by mouth once daily.    MV-MIN/IRON/FOLIC/CALCIUM/VITK (WOMEN'S MULTIVITAMIN ORAL)    Take 1 tablet by mouth once daily.    OMEPRAZOLE (PRILOSEC) 20 MG CAPSULE    Take 1 capsule (20 mg total) by mouth once daily.    POLYETHYLENE GLYCOL (GLYCOLAX) 17 GRAM PWPK    Take 17 g by mouth 2 (two) times daily as needed for Constipation.    SENNA-DOCUSATE 8.6-50 MG (PERICOLACE) 8.6-50 MG PER TABLET    Take 1 tablet by mouth daily as needed for Constipation.    SIMVASTATIN (ZOCOR) 20 MG TABLET    Take 1 tablet (20 mg total) by mouth every evening.   Changed and/or Refilled Medications    Modified Medication Previous Medication    LOSARTAN (COZAAR) 25 MG TABLET losartan (COZAAR) 25 MG tablet       Take 1 tablet (25 mg total) by mouth once daily.    Take 1 tablet  "(25 mg total) by mouth once daily.    QUETIAPINE (SEROQUEL) 25 MG TAB QUEtiapine (SEROQUEL) 25 MG Tab       Take 1 tablet (25 mg) by mouth every morning and 1.5 tablet (37.5 mg) by mouth every evening.    Take 1/2 tablet by mouth every morning and 1 tablet by mouth every evening           Objective:      /62 (BP Location: Left arm, Patient Position: Sitting)   Pulse 78   Resp 16   Ht 5' 3" (1.6 m)   Wt 51.4 kg (113 lb 5.1 oz)   SpO2 97%   BMI 20.07 kg/m²   Estimated body mass index is 20.07 kg/m² as calculated from the following:    Height as of this encounter: 5' 3" (1.6 m).    Weight as of this encounter: 51.4 kg (113 lb 5.1 oz).  Physical Exam  Constitutional:       Appearance: Normal appearance.   HENT:      Mouth/Throat:      Mouth: Mucous membranes are moist.      Pharynx: Oropharynx is clear.   Eyes:      Conjunctiva/sclera: Conjunctivae normal.   Neck:      Vascular: No carotid bruit.   Cardiovascular:      Rate and Rhythm: Normal rate and regular rhythm.      Heart sounds: Normal heart sounds.   Pulmonary:      Effort: Pulmonary effort is normal. No respiratory distress.      Breath sounds: Normal breath sounds.   Abdominal:      General: Bowel sounds are normal. There is no distension.      Palpations: Abdomen is soft.      Tenderness: There is no abdominal tenderness.   Musculoskeletal:      Right lower leg: No edema.      Left lower leg: No edema.   Lymphadenopathy:      Cervical: No cervical adenopathy.   Skin:     Findings: Bruising present.   Neurological:      Mental Status: She is alert. Mental status is at baseline.   Psychiatric:         Mood and Affect: Mood normal.         Behavior: Behavior normal.           Assessment and Plan:     1. Confusion  Assessment & Plan:  Worse recently. Was wandering on the streets a few weeks ago. More confusion than usual. Will test her urine. Asked daughter to make an appt with neuropsych. Will increase seroquel to 25 mg in the morning from 12.5 and " can increase 37.5 mg at bedtime.     Orders:  -     Cancel: Urinalysis, Reflex to Urine Culture Urine, Clean Catch; Future; Expected date: 02/18/2025  -     Urinalysis, Reflex to Urine Culture Urine, Clean Catch; Future; Expected date: 02/18/2025    2. 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 Wanted to see them back in October for med check but psych neuro never scheduled an appt. Due to the recent confusion, asked daughter to make an appt.     Orders:  -     Discontinue: QUEtiapine (SEROQUEL) 25 MG Tab; Take 1 tablet (25 mg) by mouth every morning and 1.5 tablet (37.5 mg) by mouth every evening.  Dispense: 135 tablet; Refill: 3  -     Discontinue: QUEtiapine (SEROQUEL) 25 MG Tab; Take 1 tablet (25 mg) by mouth every morning and 1.5 tablet (37.5 mg) by mouth every evening.  Dispense: 225 tablet; Refill: 3  -     QUEtiapine (SEROQUEL) 25 MG Tab; Take 1 tablet (25 mg) by mouth every morning and 1.5 tablet (37.5 mg) by mouth every evening.    3. Centrilobular emphysema  Assessment & Plan:  Noted on imaging. Not symptomatic. Will monitor       4. Secondary hypertension  Assessment & Plan:  BP is well controlled today. On losartan 25 mg daily, continue. Will monitor       5. Hyperlipidemia, unspecified hyperlipidemia type  Assessment & Plan:  On simvastatin, continue. Will monitor       6. Hypothyroidism, unspecified type  Assessment & Plan:  Last tsh wnl. Continue current dose of levothyroxine. Will monitor       Other orders  -     losartan (COZAAR) 25 MG tablet; Take 1 tablet (25 mg total) by mouth once daily.  Dispense: 90 tablet; Refill: 3           Follow Up:   Follow up in about 6 weeks (around 4/1/2025).      Lashonda Pearce

## 2025-02-19 ENCOUNTER — RESULTS FOLLOW-UP (OUTPATIENT)
Dept: PRIMARY CARE CLINIC | Facility: CLINIC | Age: OVER 89
End: 2025-02-19
Payer: MEDICARE

## 2025-02-19 NOTE — TELEPHONE ENCOUNTER
----- Message from Lashonda Pearce MD sent at 2/19/2025 10:40 AM CST -----  Let daughter know that there is no evidence of UTI  ----- Message -----  From: Bryan, [employer] [facility]  Sent: 2/18/2025   5:00 PM CST  To: Lashonda Pearce MD

## 2025-02-20 ENCOUNTER — TELEPHONE (OUTPATIENT)
Dept: NEUROLOGY | Facility: CLINIC | Age: OVER 89
End: 2025-02-20
Payer: MEDICARE

## 2025-02-20 DIAGNOSIS — F02.A11 MILD LATE ONSET ALZHEIMER'S DEMENTIA WITH AGITATION: ICD-10-CM

## 2025-02-20 DIAGNOSIS — G30.1 MILD LATE ONSET ALZHEIMER'S DEMENTIA WITH AGITATION: ICD-10-CM

## 2025-02-20 NOTE — PROGRESS NOTES
CTN reached out to cg, Christyisidro Garcia for our quarterly check in call.       Cg reports that the pt believed she was being kicked out of her assisted living facility and left the building and walked about a mile away. The staff reports that the pt was emotional when she get back. Cg states that the pt reports falling 2-3 times on this walk. Cg reports a sore knee, and finger. The next morning the cg states that the staff noted pt chest pain. The day after the pt took this walk the cg took the pt to an urgent care. Pt had xray's and nothing was broken. Did an EKG and reading was normal. They will continue to monitor the pt's behavior. Cg reports that the pt is experiencing confabulation. Cg reports since this event on 2/18/25 pt has been completely delusional.     Cg reports that the pt has increased her Seroquel dose.     CTN provided the cg with some education in how to respond to the pt when she is having a delusion.      CTN sent the cg a tile tracker for the pt to use.     CTN will plan to call the cg back in three months for a quarterly check in call.

## 2025-02-21 DIAGNOSIS — F02.A11 MILD LATE ONSET ALZHEIMER'S DEMENTIA WITH AGITATION: ICD-10-CM

## 2025-02-21 DIAGNOSIS — G30.1 MILD LATE ONSET ALZHEIMER'S DEMENTIA WITH AGITATION: ICD-10-CM

## 2025-02-21 RX ORDER — QUETIAPINE FUMARATE 25 MG/1
TABLET, FILM COATED ORAL
Qty: 225 TABLET | Refills: 3 | Status: SHIPPED | OUTPATIENT
Start: 2025-02-21 | End: 2025-02-21 | Stop reason: SDUPTHER

## 2025-02-21 RX ORDER — QUETIAPINE FUMARATE 25 MG/1
TABLET, FILM COATED ORAL
Qty: 225 TABLET | Refills: 3 | Status: SHIPPED | OUTPATIENT
Start: 2025-02-21

## 2025-02-28 RX ORDER — OMEPRAZOLE 20 MG/1
20 CAPSULE, DELAYED RELEASE ORAL DAILY
Qty: 90 CAPSULE | Refills: 0 | Status: SHIPPED | OUTPATIENT
Start: 2025-02-28

## 2025-03-07 ENCOUNTER — PATIENT MESSAGE (OUTPATIENT)
Dept: NEUROLOGY | Facility: CLINIC | Age: OVER 89
End: 2025-03-07
Payer: MEDICARE

## 2025-03-12 ENCOUNTER — TELEPHONE (OUTPATIENT)
Dept: NEUROLOGY | Facility: CLINIC | Age: OVER 89
End: 2025-03-12
Payer: MEDICARE

## 2025-03-12 ENCOUNTER — PATIENT MESSAGE (OUTPATIENT)
Dept: PALLIATIVE MEDICINE | Facility: CLINIC | Age: OVER 89
End: 2025-03-12
Payer: MEDICARE

## 2025-03-12 ENCOUNTER — TELEPHONE (OUTPATIENT)
Dept: PALLIATIVE MEDICINE | Facility: CLINIC | Age: OVER 89
End: 2025-03-12
Payer: MEDICARE

## 2025-03-12 RX ORDER — BREXPIPRAZOLE 0.25 MG/1
0.25 TABLET ORAL NIGHTLY
Qty: 30 TABLET | Refills: 1 | Status: SHIPPED | OUTPATIENT
Start: 2025-03-12 | End: 2025-03-13 | Stop reason: SDUPTHER

## 2025-03-12 NOTE — TELEPHONE ENCOUNTER
"Spoke with daughter Christy. She states the group home staff feels that she her behavior has worsened since the increase in seroquel, she is more restless,packing her bags, 1 episode of wandering and when they brought her outside to walk she attempted to run away.  Faxed changes in medication to [facility].    3/12/2025 3:02:48 PM Transmission Record   Sent to [phone] with remote ID "[id number]"   Result: (0/339;0/0) Success   Page record: 1 - 7   Elapsed time: 02:22 on channel 5      "

## 2025-03-12 NOTE — PROGRESS NOTES
CTN received an in coming email from cg, Christy Garcia stating that the staff at the pt's nursing facility fears that the pt may be harmful to herself or others. CTN stated that they should take the pt to the ER or call 911 for immediate assistance. CTN messaged Dr. Pearce, Dr. Tijerina and Dr. Arreguin for support. PCP's team will reach out to advise cg on medications and cg knows how to get in touch if she needs any further assistance or support.     CTN reached out to the cg via phone - cg stated that the palliative care nurse reached out and they spoke just a few moments ago.     Cg has CTN's contact information to discuss symptoms if anything else comes up.

## 2025-03-13 ENCOUNTER — TELEPHONE (OUTPATIENT)
Dept: PRIMARY CARE CLINIC | Facility: CLINIC | Age: OVER 89
End: 2025-03-13
Payer: MEDICARE

## 2025-03-13 ENCOUNTER — TELEPHONE (OUTPATIENT)
Dept: PALLIATIVE MEDICINE | Facility: CLINIC | Age: OVER 89
End: 2025-03-13
Payer: MEDICARE

## 2025-03-13 RX ORDER — BREXPIPRAZOLE 0.25 MG/1
0.25 TABLET ORAL NIGHTLY
Qty: 30 TABLET | Refills: 1 | Status: SHIPPED | OUTPATIENT
Start: 2025-03-13

## 2025-03-13 NOTE — TELEPHONE ENCOUNTER
----- Message from Bernice sent at 3/13/2025 11:27 AM CDT -----  Contact: [phone]  [facility] or new RX.Is this a refill or new RX:  RX name and strength (copy/paste from chart):  brexpiprazole (REXULTI) 0.25 mg TabIs this a 30 day or 90 day RX: Pharmacy name and phone # (copy/paste from chart):  [id number] [facility] [place] [id number]: [phone] Fax: [phone] doctors have asked that we provide their patients with the following 2 reminders -- prescription refills can take up to 72 hours, and a friendly reminder that in the future you can use your [facility] account to request refills: yes

## 2025-03-15 ENCOUNTER — TELEPHONE (OUTPATIENT)
Dept: NEUROLOGY | Facility: CLINIC | Age: OVER 89
End: 2025-03-15
Payer: MEDICARE

## 2025-03-19 NOTE — PROGRESS NOTES
Name: Olamide Felipe  MRN: [id number]   CSN: [id number]      Date: 3-20-25      Referring physician:  No referring provider defined for this encounter.    Subjective:       Chief Complaint: agitation    History of Present Illness (HPI):    Olamide Felipe is a 89 y.o. right-handed female with breast cancer presents today for a follow-up evaluation of late onset Alzheimer's dementia  and is accompanied by daughter, Christy. Last seen in office, 8-29-24  . At that time, memantine was stopped to see if it would lessen depression or aggression. Quetiapine was reduced from 25mg to 12.5 mg in AM and 25 mg was cont every evening.     Feb left care facility and brought back . Thought she was being kicked out of facility. Seemed to increase after quetiapine was increased. Dr. Tijerina switched her to Rexulti 3-13-25.     First dose was last Thrusday.   Eating well. Sleping well.   Goood moood per dtr.   Dtr got report from staff where she lives. They have seen this changes redne started and called shawanda bergerataic.     Costing $356 for 30 days.       Since last thursdayd no safety issues.         Living Environment:  Dwelling- Group Home      iADLs  Driving:  No longer drive.    Meal Prep/Planning:  Dependent    Managing Finances:  Dependent    Medication Administration:  Dependent    Managing Appointments:  Dependent            Mood: Irritable and Agitated -better with Rexulti (or better off seroquel)                  Review of Systems    Past Medical History: The patient  has a past medical history of Delirium (11/17/2023), Hyperlipidemia, Hypothyroidism, Malignant neoplasm of upper-outer quadrant of right breast in female, estrogen receptor negative (01/07/2022), Osteoarthritis, knee, Uterine cancer, and Vertigo.    Social History: The patient  reports that she quit smoking about 35 years ago. Her smoking use included cigarettes. She has never been exposed to tobacco smoke. She has never used smokeless tobacco. She  "reports current alcohol use of about 2.0 standard drinks of alcohol per week. She reports that she does not use drugs.    Family History: Their family history includes No Known Problems in her daughter, sister, and son.    Allergies: Patient has no known allergies.     Meds: Medications Ordered Prior to Encounter[1]    Objective:     Physical Exam:    Vitals:    03/20/25 1028   BP: (!) 153/70   BP Location: Right arm   Patient Position: Sitting   Pulse: 79   Weight: 51.7 kg (113 lb 15.7 oz)   Height: 5' 3" (1.6 m)     Body mass index is 20.19 kg/m².    Constitutional  Well-developed, well-nourished, appears stated age   Cardiovascular no LE edema bilaterally     .  * Specialized movement exam  No hypophonic speech.    No facial masking.   No cogwheel rigidity.     No bradykinesia.   No tremor with rest, posture, kinesis, or intention.    No other dystonia, chorea, athetosis, myoclonus, or tics.   No motor impersistence.   Normal-based gait.   No shortened stride length.   No abnormal arm swing.     No postural instability.          Imaging:   Results for orders placed or performed during the hospital encounter of 04/21/23   CT Head Without Contrast    Narrative    EXAMINATION:  CT HEAD WITHOUT CONTRAST    CLINICAL HISTORY:  fall, hit head; Unspecified fall, subsequent encounter    TECHNIQUE:  Low dose axial images were obtained through the head.  Coronal and sagittal reformations were also performed. Contrast was not administered.    COMPARISON:  MRI of the brain dated 12/05/2022.    CT scan of the head dated 07/19/2022.    FINDINGS:  The subcutaneous tissues are unremarkable.  The bony calvarium is intact.  There is a mucosal changes within the right maxillary sinus.  The mastoid air cells are clear.  There are postoperative changes in the globes.    The craniocervical junction is intact.  There is partial empty appearance of the sella.  There is no evidence of intracranial hemorrhage.  The ventricles and sulci are " prominent, consistent cerebral loss.  There are hypodensities within the periventricular and subcortical white matter.  There are old lacunar type infarctions within the bilateral subinsular cortex.  The gray-white differentiation is maintained.  There is no dense vessel sign.  There is no evidence of mass effect.      Impression    1.  No acute intracranial process.    2.  Changes of chronic small vessel ischemic disease and cerebral volume loss.      Electronically signed by: Seth Mcclendon MD  Date:    04/21/2023  Time:    16:50   Results for orders placed or performed during the hospital encounter of 12/05/22   MRI Brain Without Contrast    Narrative    EXAMINATION:  MRI BRAIN WITHOUT CONTRAST    CLINICAL HISTORY:  Dizziness, non-specific; Dizziness and giddiness    TECHNIQUE:  Multiplanar multisequence MR imaging of the brain was performed without contrast.    COMPARISON:  MRI brain without contrast dated 12/23/2019    FINDINGS:  No abnormal focus of diffusion restriction.  Redemonstration of gyriform susceptibility along the sulci of the left frontal convexity, similar to 2019.  No extra-axial collection or midline shift.  Age related cerebral atrophy.  No hydrocephalus.  Scattered mild T2 FLAIR white matter hyperintense foci as can be seen with sequela of chronic microangiopathic change.  Major T2 intracranial vascular flow voids appear maintained.  Heterogeneous T2 opacity at the right maxillary sinus suggesting mucous retention cyst.  No infiltrative T1 marrow process.      Impression    No acute intracranial abnormality.    Age-related cerebral atrophy with mild microangiopathic change.    Area of gyriform susceptibility along the sulci of the left frontal convexity, most in keeping with cirrhosis and similar to 2019.      Electronically signed by: Alexis Kowalski  Date:    12/05/2022  Time:    14:52           Assessment and Plan     Moderate late onset Alzheimer's dementia with other behavioral  disturbance        Medical Decision Making:    No abnormal movements, tremor or pdism.       [id number]    Follow up 3 mos         ..Total time: 21 minutes spent on the encounter, which includes face to face time and non-face to face time preparing to see the patient (eg, review of tests), Obtaining and/or reviewing separately obtained history, Documenting clinical information in the electronic or other health record, Independently interpreting results (not separately reported) and communicating results to the patient/family/caregiver, or Care coordination (not separately reported).       ..      Miroslava Raines, VIN, NP-C  Division of Movement and Memory Disorders  [facility]  [phone]             [1]   Current Outpatient Medications on File Prior to Visit   Medication Sig Dispense Refill    acetaminophen (TYLENOL) 325 MG tablet Take 325 mg by mouth every 6 (six) hours as needed for Pain.      b complex vitamins tablet Take 1 tablet by mouth once daily.      calcium-vitamin D 250-100 mg-unit per tablet Take 1 tablet by mouth 2 (two) times daily.      FLUoxetine 40 MG capsule Take 1 capsule (40 mg total) by mouth once daily. 90 capsule 3    levothyroxine (SYNTHROID) 25 MCG tablet Take 1 tablet (25 mcg total) by mouth before breakfast. 90 tablet 3    loratadine (CLARITIN) 10 mg tablet Take 1 tablet (10 mg total) by mouth once daily. 90 tablet 3    losartan (COZAAR) 25 MG tablet Take 1 tablet (25 mg total) by mouth once daily. (Patient taking differently: Take 25 mg by mouth every morning. Check BP) 90 tablet 3    mv-min/iron/folic/calcium/vitK (WOMEN'S MULTIVITAMIN ORAL) Take 1 tablet by mouth once daily.      omeprazole (PRILOSEC) 20 MG capsule Take 1 capsule (20 mg total) by mouth once daily. 90 capsule 0    polyethylene glycol (GLYCOLAX) 17 gram PwPk Take 17 g by mouth 2 (two) times daily as needed for Constipation. (Patient taking differently: Take 17 g by mouth 2 (two) times daily as needed  for Constipation. Mix in 8 oz of water)  0    senna-docusate 8.6-50 mg (PERICOLACE) 8.6-50 mg per tablet Take 1 tablet by mouth daily as needed for Constipation. 90 tablet 0    simvastatin (ZOCOR) 20 MG tablet Take 1 tablet (20 mg total) by mouth every evening. 90 tablet 3     No current facility-administered medications on file prior to visit.

## 2025-03-20 ENCOUNTER — OFFICE VISIT (OUTPATIENT)
Facility: CLINIC | Age: OVER 89
End: 2025-03-20
Payer: MEDICARE

## 2025-03-20 VITALS
WEIGHT: 114 LBS | BODY MASS INDEX: 20.2 KG/M2 | HEART RATE: 79 BPM | DIASTOLIC BLOOD PRESSURE: 70 MMHG | HEIGHT: 63 IN | SYSTOLIC BLOOD PRESSURE: 153 MMHG

## 2025-03-20 DIAGNOSIS — G30.1 MODERATE LATE ONSET ALZHEIMER'S DEMENTIA WITH OTHER BEHAVIORAL DISTURBANCE: Primary | ICD-10-CM

## 2025-03-20 DIAGNOSIS — F02.B18 MODERATE LATE ONSET ALZHEIMER'S DEMENTIA WITH OTHER BEHAVIORAL DISTURBANCE: Primary | ICD-10-CM

## 2025-03-20 PROCEDURE — 99215 OFFICE O/P EST HI 40 MIN: CPT | Mod: HCNC,S$GLB,, | Performed by: STUDENT IN AN ORGANIZED HEALTH CARE EDUCATION/TRAINING PROGRAM

## 2025-03-20 PROCEDURE — 99999 PR PBB SHADOW E&M-EST. PATIENT-LVL III: CPT | Mod: PBBFAC,HCNC,,

## 2025-03-20 NOTE — PROGRESS NOTES
NEUROPSYCHOLOGY   [facility]  Follow Up    Referring Provider: No ref. provider found   Medical Necessity: Ms. Olamide Felipe is an 89 y.o. female followed in [facility] for cognitive evaluation, supportive therapy, treatment planning, and treatment management in the setting of dementia  Billing: See billing table at the end of this note  Consent: The patient expressed an understanding of the purpose of the evaluation and consented to all procedures.  Providers: Nighat Arreguin PsyD (Neuropsychology); Miroslava Raines DNP (Neurology); Tasha Emerson MD (Palliative Medicine)     ASSESSMENT:   Ms. Felipe is a 89 y.o. White female with a history of Alzheimer's dementia, seen today for follow up.    Pt with recent exacerbation of behavioral symptoms coinciding with increase in Seroquel dose. Has since been switched to Rexulti with notable improvement, per staff at her group home. Baseline movement exam with Dr Raines today was normal, no evidence of any parkinsonism.     1. Mild late onset Alzheimer's dementia with agitation            PLAN:     RTC 3 mo  Caregiver following with Nirmala SHETTY for caregiver support        Thank you for allowing me to participate in Ms. Felipe's care.  If you have any questions, please contact me.    Nighat Arreguin PsyD  Licensed Clinical Neuropsychologist  [facility] - Department of Neurology          SUBJECTIVE   Ms. Felipe is a 89 y.o. White female with a history of Alzheimer's disease, seen today for follow up. Last seen in clinic 8/29/24. [employer] graduate, follows with Nirmala SHETTY.   HPI: See intake assessment note on 10/19/23  Resides: assisted living group home  Caregiver: daughterChristy  Level of supervision: usually alone    INTERVAL HISTORY  Here for follow up, pt with recent behavioral exacerbation after seroquel dose was increased. Dr. Tijerina changed meds to Rexulti, seems to be doing ok, per the staff at the home there was a dramatic  "improvement since starting this med last Thursday. Paying about $356/mo.     Dr Raines will get baseline movement exam today given risk for drug induced pdism, looks good today.     Sleep: ok    Appetite: Lost four pounds     Mood: In good spirits today. Improved mood, per staff at her home    Neuropsychiatric: Better in the last week but prior, it was "crisis mode," they were considering calling 911. She tried to break a glass window, she ran away from staff while on a walk.     Physical: at least one fall.       ADL:  Bathing/Grooming: Independent  Feeding: Independent  Dressing: Independent  Toileting: Independent    IADL:  Finances: Dependent  Medications: Dependent  Driving: Dependent  Household: Dependent        10/19/2023    11:11 AM   IADL   Ability to Use Telephone Operates telephone on own initiative, looks up and dials numbers   Shopping Takes care of all shopping needs independently   Food Preparation Heats and serves prepared meals or prepares meals but does not maintain adequate diet   Housekeeping Maintains house alone with occasional assistance (heavy work)   Laundry Does personal laundry completely   Mode of Transportation Travel limited to taxi or automobile with assistance of another   Responsibility for Own Medications Is responsible for taking medication in correct dosages at correct time   Ability to Handle Finances Manages financial matters independently (budgets, writes checks, pays rent and bills, goes to bank). Collects and keeps track of income       Results for orders placed or performed during the hospital encounter of 04/21/23   CT Head Without Contrast    Narrative    EXAMINATION:  CT HEAD WITHOUT CONTRAST    CLINICAL HISTORY:  fall, hit head; Unspecified fall, subsequent encounter    TECHNIQUE:  Low dose axial images were obtained through the head.  Coronal and sagittal reformations were also performed. Contrast was not administered.    COMPARISON:  MRI of the brain dated " 12/05/2022.    CT scan of the head dated 07/19/2022.    FINDINGS:  The subcutaneous tissues are unremarkable.  The bony calvarium is intact.  There is a mucosal changes within the right maxillary sinus.  The mastoid air cells are clear.  There are postoperative changes in the globes.    The craniocervical junction is intact.  There is partial empty appearance of the sella.  There is no evidence of intracranial hemorrhage.  The ventricles and sulci are prominent, consistent cerebral loss.  There are hypodensities within the periventricular and subcortical white matter.  There are old lacunar type infarctions within the bilateral subinsular cortex.  The gray-white differentiation is maintained.  There is no dense vessel sign.  There is no evidence of mass effect.      Impression    1.  No acute intracranial process.    2.  Changes of chronic small vessel ischemic disease and cerebral volume loss.      Electronically signed by: Seth Mcclendon MD  Date:    04/21/2023  Time:    16:50   Results for orders placed or performed during the hospital encounter of 12/05/22   MRI Brain Without Contrast    Narrative    EXAMINATION:  MRI BRAIN WITHOUT CONTRAST    CLINICAL HISTORY:  Dizziness, non-specific; Dizziness and giddiness    TECHNIQUE:  Multiplanar multisequence MR imaging of the brain was performed without contrast.    COMPARISON:  MRI brain without contrast dated 12/23/2019    FINDINGS:  No abnormal focus of diffusion restriction.  Redemonstration of gyriform susceptibility along the sulci of the left frontal convexity, similar to 2019.  No extra-axial collection or midline shift.  Age related cerebral atrophy.  No hydrocephalus.  Scattered mild T2 FLAIR white matter hyperintense foci as can be seen with sequela of chronic microangiopathic change.  Major T2 intracranial vascular flow voids appear maintained.  Heterogeneous T2 opacity at the right maxillary sinus suggesting mucous retention cyst.  No infiltrative T1 marrow  "process.      Impression    No acute intracranial abnormality.    Age-related cerebral atrophy with mild microangiopathic change.    Area of gyriform susceptibility along the sulci of the left frontal convexity, most in keeping with cirrhosis and similar to 2019.      Electronically signed by: Alexis Kowalski  Date:    12/05/2022  Time:    14:52       Current Medications[1]    Lab Results   Component Value Date    YFTQFRWN69 528 03/01/2024    RPR Non-reactive 02/20/2023    FOLATE 17.5 02/20/2023    TSH 2.393 02/10/2025     No results found for: "PTAU", "ADEVLPHOSTAU", "CJLQ622", "ADEVLTOTAL", "[id number]", "NEUROLGTCHN", "APGTPE"    PMH:   Ms. Olamide Felipe  has a past medical history of Delirium (11/17/2023), Hyperlipidemia, Hypothyroidism, Malignant neoplasm of upper-outer quadrant of right breast in female, estrogen receptor negative (01/07/2022), Osteoarthritis, knee, Uterine cancer, and Vertigo.      OBJECTIVE:     MENTAL STATUS AND BEHAVIORAL OBSERVATIONS:  Appearance:  Casually dressed and Well groomed  Behavior:   alert, calm, cooperative, rapport easily established, and Appropriate interpersonal skills. Good interpretation of nonverbal cues.   Orientation:   Disoriented   Sensory:   Pt is hard of hearing.  Gait:   Pt ambulates independently.   Psychomotor:  Within Normal Limits  Speech:  Fluent and spontaneous. Normal volume, rate, pitch, tone, and prosody.  Language:  Receptive and expressive language appear intact. Comprehends conversational speech., No evidence of word-finding difficulties in conversational speech.  Mood:   euthymic  Affect:   mood-congruent  Thought Process: goal directed and linear  Thought Content: Denied current SI/HI. and No evidence of psychotic symptoms.  Memory:  recent and remote appear grossly intact  Attn/Concentration:  Grossly intact  Judgment/Insight: Impaired        11/7/2024     9:02 AM 4/29/2024     3:20 PM 10/23/2023    10:44 AM   NPIQ RFS   WHO IS FILLING OUT " FORM? Caregiver Caregiver Caregiver   Does this patient have false beliefs, such as thinking that others are stealing from him/her or planning to harm him/her in some way? Yes Yes Yes   Delusions Severity 2 2 2   Delusion Distress 4 4 4   Does this patient have hallucinations such as false visions or voices? Torres she/he seem to hear or see things that are not present? Yes No No   Hallucination Severity 2     Hallucination Distress 3     Is the patient resistive to help from others at times, or hard to handle? Yes Yes Yes   Agitation Agression Severity 1 3 3   Agitation/Agression Distress 2 4 6   Does the patient seem sad or say that he/she is depressed? Yes No Yes   Depression/Dysphoria Severity 2  2   Depression/Dysphoria Distress 4  4   Does the patient become upset when  from you? Does he/she have any other signs of nervousness such as shortness of breath, sighing, being unable tor elax, or feeling excessively tense? Yes Yes No   Anxiety Severity 1 2    Anxiety Distress 1 3    Does the patient appear to feel good or act excessively happy? No No Yes   Elation/Euphoria Severity   2   Elation/Euphoria Distress   3   Does this patient seem less interested in his/her usual activities or in the activities and plans of others? Yes Yes Yes   Apathy/Indifference Severity 2 3 2   Apathy/Indifference Distress 2 2 2   Does this patient seem to act cumpolsively, for example, talking to strangers as if she/he knows them, or saying things that may hurt people's feelings? Yes No No   Dis-inhibition Severity 3     Dis-inhibition Distress 3     Is the patient impatient and cranky? Does he/she have difficulty coping with delays or waiting for planned activities? Yes Yes No   Irritability/Liability Severity 1 1    Irritability/Liability Distress 1 2    Does the patient engage in repetitive activities such as pacing around the house, handling buttons, wrapping string, or doing other things repeatedly? Yes Yes No   Motor  Disturbance Severity 2 3    Motor Disturbance Distress 1 2    Does this patient awaken you during the night, rise too early in the morning, or take excessive naps during the day? No No No   Has the patient lost or gained weight, or had a change in the type of food he/she likes? No Yes No   Apetitie/Eating Severity  1    Apetite/Eating Distress  1    NPI Total Severity Score 16 15 11   NPI Total Distress Score 21 18 19           Billing/Services Summary          Neurobehavioral Status Exam Base Code (51146)  Total Units: 1 Add-On (41682)  Total Units: 0   Face-to-face Total Time: 33 min.    Report writing Total Time: 15 min         Professional Neuropsychological Testing Evaluation Services Base Code (10792)   Total Units: 0 Add-on (83866)  Total Units: 0   Referral review/initial test selection 0 min.    Intra-session Clinical Decision-Making 0 min.         Tech consult/test review/modification 0 min.         Patient behavior management 0 min.    Face-to-face Interpretive Feedback 0 min.    Record Review/Integration/Report Generation 0 min.     Total Time: 0 min.         Test Administration by Psychologist Base Code (60037)   Total Units: 0 Add-on (89978)  Total Units: 0   Testing 0 min.    Scoring 00 min.     Total Time: 0 min.         Test Administration by Technician  Technician Name:  Base Code (24470)   Total Units: 0 Add-on (30133)  Total Units: 0   Face-to-Face Testin min.    Scoring 0 min.     Total Time: 0 min.    DOS is the date of the evaluation unless specified           [1]   Current Outpatient Medications:     acetaminophen (TYLENOL) 325 MG tablet, Take 325 mg by mouth every 6 (six) hours as needed for Pain., Disp: , Rfl:     b complex vitamins tablet, Take 1 tablet by mouth once daily., Disp: , Rfl:     brexpiprazole (REXULTI) 0.25 mg Tab, Take 1 tablet (0.25 mg total) by mouth every evening., Disp: 30 tablet, Rfl: 1    calcium-vitamin D 250-100 mg-unit per tablet, Take 1 tablet by mouth 2 (two)  times daily., Disp: , Rfl:     FLUoxetine 40 MG capsule, Take 1 capsule (40 mg total) by mouth once daily., Disp: 90 capsule, Rfl: 3    levothyroxine (SYNTHROID) 25 MCG tablet, Take 1 tablet (25 mcg total) by mouth before breakfast., Disp: 90 tablet, Rfl: 3    loratadine (CLARITIN) 10 mg tablet, Take 1 tablet (10 mg total) by mouth once daily., Disp: 90 tablet, Rfl: 3    losartan (COZAAR) 25 MG tablet, Take 1 tablet (25 mg total) by mouth once daily., Disp: 90 tablet, Rfl: 3    mv-min/iron/folic/calcium/vitK (WOMEN'S MULTIVITAMIN ORAL), Take 1 tablet by mouth once daily., Disp: , Rfl:     omeprazole (PRILOSEC) 20 MG capsule, Take 1 capsule (20 mg total) by mouth once daily., Disp: 90 capsule, Rfl: 0    polyethylene glycol (GLYCOLAX) 17 gram PwPk, Take 17 g by mouth 2 (two) times daily as needed for Constipation., Disp: , Rfl: 0    senna-docusate 8.6-50 mg (PERICOLACE) 8.6-50 mg per tablet, Take 1 tablet by mouth daily as needed for Constipation., Disp: 90 tablet, Rfl: 0    simvastatin (ZOCOR) 20 MG tablet, Take 1 tablet (20 mg total) by mouth every evening., Disp: 90 tablet, Rfl: 3

## 2025-03-20 NOTE — PROGRESS NOTES
Palliative and Geriatric Medicine Evaluation  85+ clinic Follow up    Patient Name: Olamide Felipe     Date: 03/20/2025      Consult Requested By:  No ref. 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      Mobility  At Risk for falls  Olamide Felipe is  not independent with ADL's and is not independent with IADL's  - fall w/ clavicle fracture because she was carrying too many bags in 2023  -Last fall about 3 weeks ago when walking outside  -walking independently, walking every day in garage and needs supervision if walking outside  -Now in [facility]        Medications  -Medication reconciliation performed   -High risk medications identified   -On Fluoxetine and Rexulti  -Recommend to avoid benzo's or antihistamines (such as Benadryl).      Multi-Complexity:  -Frailty based on Clinical Frailty scale yes  -Weight loss: yes, BMI: Body mass index is 20.19 kg/m².  -Comorbidities listed:  Hypothyroidism, HLD, HTN  Breast cancer s/p Lumpectomy 2022      Palliative Care Encounter / Advance Care Planning      Advance Care Planning     Advance Directives:    Living Will: Yes        Copy on chart: No    [place]POST: No    Medical Power of : No    Agent's Name:  Christy Garcia   Agent's Contact Number:  [phone]    Decision Making:  Patient answered questions  Goals of Care: Advance Care Planning    Date: 03/20/2025  I engaged the patient and daughter in a voluntary conversation about advance care planning and we specifically addressed what the goals of care would be moving forward.  We explored the patient's values and preferences for future care.  The patient endorses that what is most important right now is to focus on remaining as independent as possible and being able to walk and comfort and QoL. Patient states that's he wants to continue walking as she enjoys that significantly. Daughter hopes for her to be able to maintain a good QoL and to maintain a the good mood that she currently has.  Accordingly, we have decided that the best plan to meet the patient's goals includes continuing with treatment and palliative care.         Date: 08/29/2024  I engaged the patient and family in a voluntary conversation about advance care planning and we specifically addressed what the goals of care would be moving forward.  We explored the patient's values and preferences for future care.  The patient endorses that what is most important right now is to focus on remaining as independent as possible. Patient expresses a strong desire for independence and the ability to continue activities like walking. Quality of life for the patient involves maintaining independence, engaging in activities she enjoys, and not feeling constrained by the care facility's routines. Patient has LAPOST in place from 1/2024 stating DNR, SELECTIVE TX AND NO ARTIFICIAL NUTRITION BY TUBE  She also has HCPOA naming Christy Garcia as agent.     They shared that he has ACP documents at home and they will upload them or bring them in to the next appointment              Subjective:     Informant: patient and  "daughter     Chief Complaint: No chief complaint on file.      History of Present Illness:  Olamide Felipe is a 89 y.o. female presenting with cognitive impairment .      Referred to Geriatric and Palliative Care for evaluation and management of advance care planning, and additional support..     Living in [facility], was having disrptive behaviors, was found wandering trying to go see her twin sister (lives in [place]), she had been agitated and having delusions of going in an RV, or people picking her up to go see a show.     Rexulti .25mg started Thursday  and has helped behaviors, now improved mood, no delusions. Serowuel was stopped. [facility] staff reporting good mood, sleep and appetite     4lb weight loss, now eating better, sleeping well    Walks down the driveway, maintaining her ability of walk is important to her     Daughter stated that most important is maintain her qol and mood     Walking independently, last fall 3 weeks ago when she left the [facility]      -----------  The patient, a 89-year-old woman, has been residing in an assisted living facility since January. She expresses significant dissatisfaction with her current living situation, feeling out of place among less mobile residents. The patient desires more independence and activities, particularly missing her ability to walk freely. She feels restricted by the facility's rules, such as not being allowed to walk alone outside due to safety concerns.    Over the past three weeks, the patient has experienced increased emotional distress, crying frequently and expressing a desire to leave the facility. She has made concerning statements, including wanting to "open the door and fall out and die" while in transit. The patient denies any intention to harm herself but is struggling with adjustment and mood issues.    The patient's daughter reports that prior to these recent three weeks, her mother had been more stable emotionally. A UTI was ruled out as a " potential cause for the behavior change. The patient is currently on Seroquel, which was previously helping to stabilize her mood. She also takes fluoxetine for night terrors, which has been effective in preventing them.    The patient expresses frustration with the lack of stimulating activities at the facility, mentioning that she enjoys puzzle books but finds the constant television watching by other residents unstimulating. She misses her previous active lifestyle, including regular walks, and feels constrained by the facility's safety protocols.    The patient denies any recent falls or injuries.  Behavioral issues, paranoia, accusing roommate  Small, residential care home, Christy daughter just retired from [employer]  Mom wants her to visit more often Fluoxetine 20 and seroquel 25    Palliative Exam     Review of Symptoms      Symptom Assessment (ESAS 0-10 Scale)  Pain:  0  Dyspnea:  0  Anxiety:  0  Nausea:  0  Depression:  0  Anorexia:  0  Fatigue:  0  Insomnia:  0  Restlessness:  0  Agitation:  0     CAM / Delirium:  Negative      Living Arrangements:  Lives in assisted living    Psychosocial/Cultural:   See Palliative Psychosocial Note: Yes  Lives in [facility],  and has a daughter and 2 adult sons  **Primary  to Follow**  Palliative Care  Consult: Yes      4Ms for Medical Decision-Making in Older Adults    Last Completed EAWV: 7/8/2024    MEDICATIONS:  High Risk Medications:  Total Active Medications: 1  FLUoxetine - 40 MG    MOBILITY:  Activities of Daily Living:      3/20/2025     2:25 PM   Activities of Daily Living   Ambulation Independent   Dressing Independent   Transfers Independent   Feeding Independent   Cleaning home/Chores Assistance Required   Telephone use Assistance Required   Shopping Assistance Required   Paying bills Assistance Required   Taking meds Assistance Required   If required, who assists the patient with ADLs? [facility]     Fall Risk:      3/20/2025    10:30  AM 2/18/2025     8:20 AM 11/13/2024     3:20 PM   Fall Risk Assessment - Outpatient   Mobility Status Ambulatory Ambulatory Ambulatory   Number of falls 1 0 0   Identified as fall risk False False False     Disability Status:      3/20/2025     2:25 PM   Disability Status   Are you deaf or do you have serious difficulty hearing? N   Are you blind or do you have serious difficulty seeing, even when wearing glasses? N   Because of a physical, mental, or emotional condition, do you have serious difficulty concentrating, remembering, or making decisions? Y   Do you have serious difficulty walking or climbing stairs? Y   Do you have difficulty dressing or bathing? Y   Because of a physical, mental, or emotional condition, do you have difficulty doing errands alone such as visiting a doctor's office or shopping? Y     Nutrition Screening:      3/20/2025     2:26 PM   Nutrition Screening   Has food intake declined over the past three months due to loss of appetite, digestive problems, chewing or swallowing difficulties? No decrease in food intake   Involuntary weight loss during the last 3 months? Weight loss between 1 and 3 kg (2.2 and 6.6 pounds)   Mobility? Able to get out of bed/chair, but does not go out   Has the patient suffered psychological stress or acute disease in the past three months? No   Neuropsychological problems? Mild dementia   Body Mass Index (BMI)?  BMI 19 to less than 21   Screening Score 9   Interpretation At risk of malnutrition    Screening Score: 0-7 Malnourished, 8-11 At Risk, 12-14 Normal  Get Up and Go:      7/8/2024     9:18 AM 3/6/2023     2:53 PM 1/25/2021     9:08 AM   Get Up and Go   Trial 1 8 seconds -- 10 seconds     Whisper Test:      7/8/2024     9:21 AM   Whisper Test   Whisper Test Abnormal           MENTATION:   Has Dementia Dx: Yes    Has Anxiety Dx: No    Depression Patient Health Questionnaire:      2/18/2025     8:17 AM   Depression Patient Health Questionnaire   Over the last  two weeks how often have you been bothered by little interest or pleasure in doing things Not at all   Over the last two weeks how often have you been bothered by feeling down, depressed or hopeless Not at all   PHQ-2 Total Score 0              Review of patient's allergies indicates:  No Known Allergies      Medications:    Current Outpatient Medications:     acetaminophen (TYLENOL) 325 MG tablet, Take 325 mg by mouth every 6 (six) hours as needed for Pain., Disp: , Rfl:     b complex vitamins tablet, Take 1 tablet by mouth once daily., Disp: , Rfl:     brexpiprazole (REXULTI) 0.25 mg Tab, Take 1 tablet (0.25 mg total) by mouth every evening., Disp: 30 tablet, Rfl: 1    calcium-vitamin D 250-100 mg-unit per tablet, Take 1 tablet by mouth 2 (two) times daily., Disp: , Rfl:     FLUoxetine 40 MG capsule, Take 1 capsule (40 mg total) by mouth once daily., Disp: 90 capsule, Rfl: 3    levothyroxine (SYNTHROID) 25 MCG tablet, Take 1 tablet (25 mcg total) by mouth before breakfast., Disp: 90 tablet, Rfl: 3    loratadine (CLARITIN) 10 mg tablet, Take 1 tablet (10 mg total) by mouth once daily., Disp: 90 tablet, Rfl: 3    losartan (COZAAR) 25 MG tablet, Take 1 tablet (25 mg total) by mouth once daily., Disp: 90 tablet, Rfl: 3    mv-min/iron/folic/calcium/vitK (WOMEN'S MULTIVITAMIN ORAL), Take 1 tablet by mouth once daily., Disp: , Rfl:     omeprazole (PRILOSEC) 20 MG capsule, Take 1 capsule (20 mg total) by mouth once daily., Disp: 90 capsule, Rfl: 0    polyethylene glycol (GLYCOLAX) 17 gram PwPk, Take 17 g by mouth 2 (two) times daily as needed for Constipation., Disp: , Rfl: 0    senna-docusate 8.6-50 mg (PERICOLACE) 8.6-50 mg per tablet, Take 1 tablet by mouth daily as needed for Constipation., Disp: 90 tablet, Rfl: 0    simvastatin (ZOCOR) 20 MG tablet, Take 1 tablet (20 mg total) by mouth every evening., Disp: 90 tablet, Rfl: 3     database queried on 03/20/2025  by Tasha Emesron . The results reviewed and  considered with the clinical data in the decision whether or not to prescribe a controlled substance.   01/07/2022 01/07/2022   1  Hydrocodone-Acetamin 5-325 Mg 10.00  3  [facility]  [id number]   [facility] ([id number])           Objective:   Physical Exam:  Vitals: Pulse: 79 (03/20/25 1028)  BP: (!) 153/70 (03/20/25 1028)  Physical Exam  Constitutional:       General: She is not in acute distress.     Comments: Frail    HENT:      Mouth/Throat:      Mouth: Mucous membranes are moist.   Eyes:      Extraocular Movements: Extraocular movements intact.   Pulmonary:      Effort: Pulmonary effort is normal. No respiratory distress.   Neurological:      Mental Status: She is alert. Mental status is at baseline. She is disoriented.      Gait: Gait is intact.   Psychiatric:         Mood and Affect: Mood normal.         Behavior: Behavior normal.         I spent a total of 42 minutes on the day of the visit. This includes face to face time in discussion of goals of care, symptom assessment, coordination of care and emotional support.  This also includes non-face to face time preparing to see the patient (eg, review of tests/imaging), obtaining and/or reviewing separately obtained history, documenting clinical information in the electronic or other health record, independently interpreting results and communicating results to the patient/family/caregiver, or care coordinator.         Signature: Tasha Emerson MD

## 2025-03-21 ENCOUNTER — PATIENT MESSAGE (OUTPATIENT)
Dept: PALLIATIVE MEDICINE | Facility: CLINIC | Age: OVER 89
End: 2025-03-21
Payer: MEDICARE

## 2025-03-25 ENCOUNTER — PATIENT MESSAGE (OUTPATIENT)
Dept: PALLIATIVE MEDICINE | Facility: CLINIC | Age: OVER 89
End: 2025-03-25
Payer: MEDICARE

## 2025-03-25 ENCOUNTER — TELEPHONE (OUTPATIENT)
Dept: NEUROLOGY | Facility: CLINIC | Age: OVER 89
End: 2025-03-25
Payer: MEDICARE

## 2025-03-25 NOTE — PROGRESS NOTES
CTN reached out to the cg due to a message sent to Dr. Tijerina about severe symptoms. Cg reports that the pt was doing really well and had the opportunity to see her son Hakeem who she had not seen since last July. After visiting with her son the cg reports that the pt is asking belligerent and defiant.     Cg reports that the pt would like a job. CTN suggested that the cg ask the staff to let the pt help them do some shredding, or random things around the office to be helpful.     Cg reports that the pt started Rexulti 0.25 about two weeks ago. CTN tried to help the cg understand that it takes at least 2 weeks for this medication to start to work and to try to be patient for it to work. Cg reports that the staff at the pt's 24/7 care center keep asking the cg to call to support the pt during difficult times and it is agitating to the pt. CTN suggested to the cg that the Pt needs purpose and is very bored. CTN suggested getting ahead of the issue and asking the staff at the facility to be helpful and creative in giving the pt opportunities to be helpful whether it is in the kitchen, office, or otherwise with small tasks as well as regular walks to get fresh air.     CTN will plan to check in with the cg again in two weeks.

## 2025-03-27 ENCOUNTER — HOSPITAL ENCOUNTER (OUTPATIENT)
Facility: HOSPITAL | Age: OVER 89
Discharge: HOME | End: 2025-03-29
Attending: EMERGENCY MEDICINE | Admitting: EMERGENCY MEDICINE
Payer: MEDICARE

## 2025-03-27 ENCOUNTER — PATIENT OUTREACH (OUTPATIENT)
Dept: NEUROLOGY | Facility: CLINIC | Age: OVER 89
End: 2025-03-27
Payer: MEDICARE

## 2025-03-27 DIAGNOSIS — N17.9 AKI (ACUTE KIDNEY INJURY): ICD-10-CM

## 2025-03-27 DIAGNOSIS — R07.9 CHEST PAIN: ICD-10-CM

## 2025-03-27 LAB
ABSOLUTE EOSINOPHIL (OHS): 0 K/UL
ABSOLUTE MONOCYTE (OHS): 0.92 K/UL (ref 0.3–1)
ABSOLUTE NEUTROPHIL COUNT (OHS): 5.61 K/UL (ref 1.8–7.7)
ALBUMIN SERPL BCP-MCNC: 4.1 G/DL (ref 3.5–5.2)
ALP SERPL-CCNC: 67 UNIT/L (ref 40–150)
ALT SERPL W/O P-5'-P-CCNC: 16 UNIT/L (ref 10–44)
ANION GAP (OHS): 13 MMOL/L (ref 8–16)
AST SERPL-CCNC: 22 UNIT/L (ref 11–45)
BASOPHILS # BLD AUTO: 0.04 K/UL
BASOPHILS NFR BLD AUTO: 0.5 %
BILIRUB SERPL-MCNC: 0.5 MG/DL (ref 0.1–1)
BILIRUB UR QL STRIP.AUTO: NEGATIVE
BUN SERPL-MCNC: 38 MG/DL (ref 10–30)
CALCIUM SERPL-MCNC: 9.3 MG/DL (ref 8.7–10.5)
CHLORIDE SERPL-SCNC: 109 MMOL/L (ref 95–110)
CLARITY UR: ABNORMAL
CO2 SERPL-SCNC: 21 MMOL/L (ref 23–29)
COLOR UR AUTO: YELLOW
CREAT SERPL-MCNC: 1.7 MG/DL (ref 0.5–1.4)
ERYTHROCYTE [DISTWIDTH] IN BLOOD BY AUTOMATED COUNT: 12.5 % (ref 11.5–14.5)
GFR SERPLBLD CREATININE-BSD FMLA CKD-EPI: 28 ML/MIN/1.73/M2
GLUCOSE SERPL-MCNC: 95 MG/DL (ref 70–110)
GLUCOSE UR QL STRIP: NEGATIVE
HCT VFR BLD AUTO: 33.6 % (ref 37–48.5)
HGB BLD-MCNC: 11.2 GM/DL (ref 12–16)
HGB UR QL STRIP: ABNORMAL
HOLD SPECIMEN: NORMAL
IMM GRANULOCYTES # BLD AUTO: 0.03 K/UL (ref 0–0.04)
IMM GRANULOCYTES NFR BLD AUTO: 0.4 % (ref 0–0.5)
KETONES UR QL STRIP: NEGATIVE
LEUKOCYTE ESTERASE UR QL STRIP: NEGATIVE
LYMPHOCYTES # BLD AUTO: 1.82 K/UL (ref 1–4.8)
MAGNESIUM SERPL-MCNC: 1.7 MG/DL (ref 1.6–2.6)
MCH RBC QN AUTO: 32 PG (ref 27–50)
MCHC RBC AUTO-ENTMCNC: 33.3 G/DL (ref 32–36)
MCV RBC AUTO: 96 FL (ref 82–98)
NITRITE UR QL STRIP: NEGATIVE
NUCLEATED RBC (/100WBC) (OHS): 0 /100 WBC
PH UR STRIP: 6 [PH]
PLATELET # BLD AUTO: 252 K/UL (ref 150–450)
PMV BLD AUTO: 9.9 FL (ref 9.2–12.9)
POTASSIUM SERPL-SCNC: 4.7 MMOL/L (ref 3.5–5.1)
PROT SERPL-MCNC: 6.9 GM/DL (ref 6–8.4)
PROT UR QL STRIP: ABNORMAL
RBC # BLD AUTO: 3.5 M/UL (ref 4–5.4)
RELATIVE EOSINOPHIL (OHS): 0 %
RELATIVE LYMPHOCYTE (OHS): 21.6 % (ref 18–48)
RELATIVE MONOCYTE (OHS): 10.9 % (ref 4–15)
RELATIVE NEUTROPHIL (OHS): 66.6 % (ref 38–73)
SODIUM SERPL-SCNC: 143 MMOL/L (ref 136–145)
SP GR UR STRIP: 1.02
UROBILINOGEN UR STRIP-ACNC: ABNORMAL EU/DL
WBC # BLD AUTO: 8.42 K/UL (ref 3.9–12.7)

## 2025-03-27 PROCEDURE — 25000003 PHARM REV CODE 250: Mod: HCNC | Performed by: STUDENT IN AN ORGANIZED HEALTH CARE EDUCATION/TRAINING PROGRAM

## 2025-03-27 PROCEDURE — G0378 HOSPITAL OBSERVATION PER HR: HCPCS | Mod: HCNC

## 2025-03-27 PROCEDURE — 85025 COMPLETE CBC W/AUTO DIFF WBC: CPT | Mod: HCNC | Performed by: EMERGENCY MEDICINE

## 2025-03-27 PROCEDURE — 96372 THER/PROPH/DIAG INJ SC/IM: CPT | Performed by: STUDENT IN AN ORGANIZED HEALTH CARE EDUCATION/TRAINING PROGRAM

## 2025-03-27 PROCEDURE — 63600175 PHARM REV CODE 636 W HCPCS: Mod: HCNC | Performed by: EMERGENCY MEDICINE

## 2025-03-27 PROCEDURE — 82565 ASSAY OF CREATININE: CPT | Mod: HCNC | Performed by: EMERGENCY MEDICINE

## 2025-03-27 PROCEDURE — 99285 EMERGENCY DEPT VISIT HI MDM: CPT | Mod: 25,HCNC

## 2025-03-27 PROCEDURE — 81003 URINALYSIS AUTO W/O SCOPE: CPT | Mod: HCNC | Performed by: EMERGENCY MEDICINE

## 2025-03-27 PROCEDURE — 63600175 PHARM REV CODE 636 W HCPCS: Mod: HCNC | Performed by: STUDENT IN AN ORGANIZED HEALTH CARE EDUCATION/TRAINING PROGRAM

## 2025-03-27 PROCEDURE — 96360 HYDRATION IV INFUSION INIT: CPT | Mod: HCNC

## 2025-03-27 PROCEDURE — 83735 ASSAY OF MAGNESIUM: CPT | Mod: HCNC | Performed by: EMERGENCY MEDICINE

## 2025-03-27 RX ORDER — HALOPERIDOL LACTATE 5 MG/ML
5 INJECTION, SOLUTION INTRAMUSCULAR EVERY 6 HOURS PRN
Status: DISCONTINUED | OUTPATIENT
Start: 2025-03-27 | End: 2025-03-27

## 2025-03-27 RX ORDER — LEVOTHYROXINE SODIUM 25 UG/1
25 TABLET ORAL
Status: DISCONTINUED | OUTPATIENT
Start: 2025-03-28 | End: 2025-03-29 | Stop reason: HOSPADM

## 2025-03-27 RX ORDER — SODIUM CHLORIDE 0.9 % (FLUSH) 0.9 %
10 SYRINGE (ML) INJECTION EVERY 12 HOURS PRN
Status: DISCONTINUED | OUTPATIENT
Start: 2025-03-27 | End: 2025-03-29 | Stop reason: HOSPADM

## 2025-03-27 RX ORDER — QUETIAPINE FUMARATE 25 MG/1
25 TABLET, FILM COATED ORAL NIGHTLY
Status: DISCONTINUED | OUTPATIENT
Start: 2025-03-27 | End: 2025-03-28

## 2025-03-27 RX ORDER — PANTOPRAZOLE SODIUM 40 MG/1
40 TABLET, DELAYED RELEASE ORAL DAILY
Status: DISCONTINUED | OUTPATIENT
Start: 2025-03-27 | End: 2025-03-29 | Stop reason: HOSPADM

## 2025-03-27 RX ORDER — ENOXAPARIN SODIUM 100 MG/ML
30 INJECTION SUBCUTANEOUS EVERY 24 HOURS
Status: DISCONTINUED | OUTPATIENT
Start: 2025-03-27 | End: 2025-03-29 | Stop reason: HOSPADM

## 2025-03-27 RX ORDER — ENOXAPARIN SODIUM 100 MG/ML
40 INJECTION SUBCUTANEOUS EVERY 24 HOURS
Status: DISCONTINUED | OUTPATIENT
Start: 2025-03-27 | End: 2025-03-27 | Stop reason: DRUGHIGH

## 2025-03-27 RX ORDER — QUETIAPINE FUMARATE 25 MG/1
25 TABLET, FILM COATED ORAL NIGHTLY
Status: DISCONTINUED | OUTPATIENT
Start: 2025-03-27 | End: 2025-03-27

## 2025-03-27 RX ORDER — ATORVASTATIN CALCIUM 20 MG/1
20 TABLET, FILM COATED ORAL NIGHTLY
Status: DISCONTINUED | OUTPATIENT
Start: 2025-03-27 | End: 2025-03-29 | Stop reason: HOSPADM

## 2025-03-27 RX ORDER — FLUOXETINE HYDROCHLORIDE 20 MG/1
40 CAPSULE ORAL DAILY
Status: DISCONTINUED | OUTPATIENT
Start: 2025-03-27 | End: 2025-03-29 | Stop reason: HOSPADM

## 2025-03-27 RX ORDER — IBUPROFEN 200 MG
24 TABLET ORAL
Status: DISCONTINUED | OUTPATIENT
Start: 2025-03-27 | End: 2025-03-29 | Stop reason: HOSPADM

## 2025-03-27 RX ORDER — HALOPERIDOL LACTATE 5 MG/ML
5 INJECTION, SOLUTION INTRAMUSCULAR EVERY 6 HOURS PRN
Status: DISCONTINUED | OUTPATIENT
Start: 2025-03-27 | End: 2025-03-29 | Stop reason: HOSPADM

## 2025-03-27 RX ORDER — NALOXONE HCL 0.4 MG/ML
0.02 VIAL (ML) INJECTION
Status: DISCONTINUED | OUTPATIENT
Start: 2025-03-27 | End: 2025-03-29 | Stop reason: HOSPADM

## 2025-03-27 RX ORDER — IBUPROFEN 200 MG
16 TABLET ORAL
Status: DISCONTINUED | OUTPATIENT
Start: 2025-03-27 | End: 2025-03-29 | Stop reason: HOSPADM

## 2025-03-27 RX ORDER — GLUCAGON 1 MG
1 KIT INJECTION
Status: DISCONTINUED | OUTPATIENT
Start: 2025-03-27 | End: 2025-03-29 | Stop reason: HOSPADM

## 2025-03-27 RX ADMIN — ATORVASTATIN CALCIUM 20 MG: 20 TABLET, FILM COATED ORAL at 09:03

## 2025-03-27 RX ADMIN — HALOPERIDOL LACTATE 5 MG: 5 INJECTION, SOLUTION INTRAMUSCULAR at 03:03

## 2025-03-27 RX ADMIN — FLUOXETINE HYDROCHLORIDE 40 MG: 20 CAPSULE ORAL at 03:03

## 2025-03-27 RX ADMIN — PANTOPRAZOLE SODIUM 40 MG: 40 TABLET, DELAYED RELEASE ORAL at 03:03

## 2025-03-27 RX ADMIN — SODIUM CHLORIDE, POTASSIUM CHLORIDE, SODIUM LACTATE AND CALCIUM CHLORIDE 1000 ML: 600; 310; 30; 20 INJECTION, SOLUTION INTRAVENOUS at 12:03

## 2025-03-27 RX ADMIN — QUETIAPINE FUMARATE 25 MG: 25 TABLET ORAL at 09:03

## 2025-03-27 RX ADMIN — ENOXAPARIN SODIUM 30 MG: 30 INJECTION SUBCUTANEOUS at 03:03

## 2025-03-27 NOTE — ED NOTES
"Continues to get out of stretcher. Up to nurses station. States, "I am sorry, I am Christy Garcia's daughter, Where is the library?" Patient redirected to room  "

## 2025-03-27 NOTE — ASSESSMENT & PLAN NOTE
CAM is likely due to pre-renal azotemia due to intravascular volume depletion. Baseline creatinine is around 1. Most recent creatinine and eGFR are listed below.  Recent Labs     03/27/25  1047   CREATININE 1.7*   EGFRNORACEVR 28*      Plan  - CAM is stable  - Avoid nephrotoxins and renally dose meds for GFR listed above  - Monitor urine output, serial BMP, and adjust therapy as needed  - encourage oral hydration

## 2025-03-27 NOTE — PROGRESS NOTES
Pharmacist Renal Dose Adjustment Note    Olamide Felipe is a 89 y.o. female being treated with the medication Enoxaparin    Patient Data:    Vital Signs (Most Recent):  Temp: 98.1 °F (36.7 °C) (03/27/25 1018)  Pulse: 91 (03/27/25 1022)  BP: (!) 186/79 (03/27/25 1022)  SpO2: 98 % (03/27/25 1022) Vital Signs (72h Range):  Temp:  [98.1 °F (36.7 °C)]   Pulse:  [91]   BP: (186)/(79)   SpO2:  [98 %]      Recent Labs   Lab 03/27/25  1047   CREATININE 1.7*     Serum creatinine: 1.7 mg/dL (H) 03/27/25 1047  Estimated creatinine clearance: 17.5 mL/min (A)    Medication:Enoxaparin dose: 40 mg frequency Q24H will be changed to medication:Enoxaparin dose:30 mg frequency:Q24H    Pharmacist's Name: Paty Loza  Pharmacist's Extension: [phone]

## 2025-03-27 NOTE — SUBJECTIVE & OBJECTIVE
Past Medical History:   Diagnosis Date    Delirium 11/17/2023    Hyperlipidemia     Hypothyroidism     Malignant neoplasm of upper-outer quadrant of right breast in female, estrogen receptor negative 01/07/2022    Osteoarthritis, knee     Uterine cancer     Vertigo        Past Surgical History:   Procedure Laterality Date    dentures      FRACTURE SURGERY Left     fracture left wrist    HYSTERECTOMY      INJECTION FOR SENTINEL NODE IDENTIFICATION Right 1/7/2022    Procedure: INJECTION, FOR SENTINEL NODE IDENTIFICATION-Right;  Surgeon: Marci Mcintosh MD;  Location: [facility];  Service: General;  Laterality: Right;    left wrist surgery      MASTECTOMY, PARTIAL Right 1/7/2022    Procedure: MASTECTOMY, PARTIAL-Right with radiological marker;  Surgeon: Marci Mcintosh MD;  Location: [facility];  Service: General;  Laterality: Right;  REQUEST SAID 2 HOUR CASE    SENTINEL LYMPH NODE BIOPSY Right 1/7/2022    Procedure: BIOPSY, LYMPH NODE, SENTINEL-Right;  Surgeon: Marci Mcintosh MD;  Location: [facility];  Service: General;  Laterality: Right;       Review of patient's allergies indicates:  No Known Allergies    No current facility-administered medications on file prior to encounter.     Current Outpatient Medications on File Prior to Encounter   Medication Sig    acetaminophen (TYLENOL) 325 MG tablet Take 325 mg by mouth every 6 (six) hours as needed for Pain.    b complex vitamins tablet Take 1 tablet by mouth once daily.    brexpiprazole (REXULTI) 0.25 mg Tab Take 1 tablet (0.25 mg total) by mouth every evening.    calcium-vitamin D 250-100 mg-unit per tablet Take 1 tablet by mouth 2 (two) times daily.    FLUoxetine 40 MG capsule Take 1 capsule (40 mg total) by mouth once daily.    levothyroxine (SYNTHROID) 25 MCG tablet Take 1 tablet (25 mcg total) by mouth before breakfast.    loratadine (CLARITIN) 10 mg tablet Take 1 tablet (10 mg total) by mouth once daily.    losartan (COZAAR) 25 MG tablet Take 1 tablet (25 mg total) by  mouth once daily. (Patient taking differently: Take 25 mg by mouth every morning. Check BP)    mv-min/iron/folic/calcium/vitK (WOMEN'S MULTIVITAMIN ORAL) Take 1 tablet by mouth once daily.    omeprazole (PRILOSEC) 20 MG capsule Take 1 capsule (20 mg total) by mouth once daily.    polyethylene glycol (GLYCOLAX) 17 gram PwPk Take 17 g by mouth 2 (two) times daily as needed for Constipation. (Patient taking differently: Take 17 g by mouth 2 (two) times daily as needed for Constipation. Mix in 8 oz of water)    senna-docusate 8.6-50 mg (PERICOLACE) 8.6-50 mg per tablet Take 1 tablet by mouth daily as needed for Constipation.    simvastatin (ZOCOR) 20 MG tablet Take 1 tablet (20 mg total) by mouth every evening.     Family History       Problem Relation (Age of Onset)    No Known Problems Daughter, Son, Sister          Tobacco Use    Smoking status: Former     Current packs/day: 0.00     Types: Cigarettes     Quit date: 1989     Years since quittin.8     Passive exposure: Never    Smokeless tobacco: Never   Substance and Sexual Activity    Alcohol use: Yes     Alcohol/week: 2.0 standard drinks of alcohol     Types: 2 Glasses of wine per week     Comment: 1/3 glass of wine - 2 times weekly    Drug use: Never    Sexual activity: Not Currently     Review of Systems   Unable to perform ROS: Dementia     Objective:     Vital Signs (Most Recent):  Temp: 98.1 °F (36.7 °C) (25 1018)  Pulse: 91 (25 1022)  BP: (!) 186/79 (25 1022)  SpO2: 98 % (25 1022) Vital Signs (24h Range):  Temp:  [98.1 °F (36.7 °C)] 98.1 °F (36.7 °C)  Pulse:  [91] 91  SpO2:  [98 %] 98 %  BP: (186)/(79) 186/79     Weight: 54.4 kg (120 lb) (on liscense)  Body mass index is 21.26 kg/m².     Physical Exam  Cardiovascular:      Rate and Rhythm: Normal rate.   Pulmonary:      Effort: Pulmonary effort is normal. No respiratory distress.      Breath sounds: Normal breath sounds.   Abdominal:      General: Abdomen is flat.       Palpations: Abdomen is soft.   Skin:     General: Skin is warm and dry.   Neurological:      Mental Status: She is alert. She is disoriented.   Psychiatric:      Comments: Disoriented and confused                Significant Labs: All pertinent labs within the past 24 hours have been reviewed.  CBC:   Recent Labs   Lab 03/27/25  1047   WBC 8.42   HGB 11.2*   HCT 33.6*        CMP:   Recent Labs   Lab 03/27/25  1047      K 4.7      CO2 21*   BUN 38*   CREATININE 1.7*   CALCIUM 9.3   ALBUMIN 4.1   BILITOT 0.5   ALKPHOS 67   AST 22   ALT 16   ANIONGAP 13       Significant Imaging: I have reviewed all pertinent imaging results/findings within the past 24 hours.

## 2025-03-27 NOTE — H&P
[facility] Medicine  History & Physical    Patient Name: Olamide Felipe  MRN: [id number]  Patient Class: OP- Observation  Admission Date: 3/27/2025  Attending Physician: Ozzy Sotomayor MD   Primary Care Provider: Lashonda Pearce MD         Patient information was obtained from ER records.     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 On arrival to ED, alert and cooperative. Recent med change from Seroquel to Rexulti. Also reports recent onset of urinary incontinence.        HPI: 89-year-old female with past medical history including Alzheimer's dementia with behavioral disturbances, currently on Rexulti brought in by EMS from assisted living facility due to intermittent episodes of agitated aggressive behavior. EMS reports that the facility called because patient was having outburst where she had tried to break another resident's glasses or threatening to leave. The facility did not have any concerns about falls or trauma. In the ED, patient is confused and talking about random topics, talking about books from the library and kids in a party ..  I called the patient's daughter and Christy Garcia who has the power of  the patient, she mentioned that since the patient was diagnosed in November 20, 2023 she had a progressive decline in her dementia, and behavioral disturbances, she has been followed up by neuropsych, initially she was started on Seroquel twice a day and the dose was increased but she did not do well, so it was stopped in about 3 weeks ago she was started on Rexulti, we will significant improvement behavior for 2 weeks, she knew her family and she was able to go out with her son who visited  the  town, she was eating well and sleeping well at night, but last week she started to decline again with the episodes of aggressive behavior, her daughter went to check on her at the assisted living center and found that she packed her clothes and when asked her to take her somewhere else.  The daughter mentioned that about a month ago while patient was still living at her home, she left the house and was wondering the street and she was found few blocks away from the house by the police.  While in the ED patient removed her IV lines and is refusing to place a new one, she is trying to get out of the bed.    Past Medical History:   Diagnosis Date    Delirium 11/17/2023    Hyperlipidemia     Hypothyroidism     Malignant neoplasm of upper-outer quadrant of right breast in female, estrogen receptor negative 01/07/2022    Osteoarthritis, knee     Uterine cancer     Vertigo        Past Surgical History:   Procedure Laterality Date    dentures      FRACTURE SURGERY Left     fracture left wrist    HYSTERECTOMY      INJECTION FOR SENTINEL NODE IDENTIFICATION Right 1/7/2022    Procedure: INJECTION, FOR SENTINEL NODE IDENTIFICATION-Right;  Surgeon: Marci Mcintosh MD;  Location: [facility];  Service: General;  Laterality: Right;    left wrist surgery      MASTECTOMY, PARTIAL Right 1/7/2022    Procedure: MASTECTOMY, PARTIAL-Right with radiological marker;  Surgeon: Marci Mcintosh MD;  Location: [facility] OR;  Service: General;  Laterality: Right;  REQUEST SAID 2 HOUR CASE    SENTINEL LYMPH NODE BIOPSY Right 1/7/2022    Procedure: BIOPSY, LYMPH NODE, SENTINEL-Right;  Surgeon: Marci Mcintosh MD;  Location: [facility];  Service: General;  Laterality: Right;       Review of patient's allergies indicates:  No Known Allergies    No current facility-administered medications on file prior to encounter.     Current Outpatient Medications on File Prior to Encounter   Medication Sig    acetaminophen (TYLENOL) 325 MG tablet Take 325 mg by mouth every 6  (six) hours as needed for Pain.    b complex vitamins tablet Take 1 tablet by mouth once daily.    brexpiprazole (REXULTI) 0.25 mg Tab Take 1 tablet (0.25 mg total) by mouth every evening.    calcium-vitamin D 250-100 mg-unit per tablet Take 1 tablet by mouth 2 (two) times daily.    FLUoxetine 40 MG capsule Take 1 capsule (40 mg total) by mouth once daily.    levothyroxine (SYNTHROID) 25 MCG tablet Take 1 tablet (25 mcg total) by mouth before breakfast.    loratadine (CLARITIN) 10 mg tablet Take 1 tablet (10 mg total) by mouth once daily.    losartan (COZAAR) 25 MG tablet Take 1 tablet (25 mg total) by mouth once daily. (Patient taking differently: Take 25 mg by mouth every morning. Check BP)    mv-min/iron/folic/calcium/vitK (WOMEN'S MULTIVITAMIN ORAL) Take 1 tablet by mouth once daily.    omeprazole (PRILOSEC) 20 MG capsule Take 1 capsule (20 mg total) by mouth once daily.    polyethylene glycol (GLYCOLAX) 17 gram PwPk Take 17 g by mouth 2 (two) times daily as needed for Constipation. (Patient taking differently: Take 17 g by mouth 2 (two) times daily as needed for Constipation. Mix in 8 oz of water)    senna-docusate 8.6-50 mg (PERICOLACE) 8.6-50 mg per tablet Take 1 tablet by mouth daily as needed for Constipation.    simvastatin (ZOCOR) 20 MG tablet Take 1 tablet (20 mg total) by mouth every evening.     Family History       Problem Relation (Age of Onset)    No Known Problems Daughter, Son, Sister          Tobacco Use    Smoking status: Former     Current packs/day: 0.00     Types: Cigarettes     Quit date: 1989     Years since quittin.8     Passive exposure: Never    Smokeless tobacco: Never   Substance and Sexual Activity    Alcohol use: Yes     Alcohol/week: 2.0 standard drinks of alcohol     Types: 2 Glasses of wine per week     Comment: 1/3 glass of wine - 2 times weekly    Drug use: Never    Sexual activity: Not Currently     Review of Systems   Unable to perform ROS: Dementia     Objective:      Vital Signs (Most Recent):  Temp: 98.1 °F (36.7 °C) (03/27/25 1018)  Pulse: 91 (03/27/25 1022)  BP: (!) 186/79 (03/27/25 1022)  SpO2: 98 % (03/27/25 1022) Vital Signs (24h Range):  Temp:  [98.1 °F (36.7 °C)] 98.1 °F (36.7 °C)  Pulse:  [91] 91  SpO2:  [98 %] 98 %  BP: (186)/(79) 186/79     Weight: 54.4 kg (120 lb) (on liscense)  Body mass index is 21.26 kg/m².     Physical Exam  Cardiovascular:      Rate and Rhythm: Normal rate.   Pulmonary:      Effort: Pulmonary effort is normal. No respiratory distress.      Breath sounds: Normal breath sounds.   Abdominal:      General: Abdomen is flat.      Palpations: Abdomen is soft.   Skin:     General: Skin is warm and dry.   Neurological:      Mental Status: She is alert. She is disoriented.   Psychiatric:      Comments: Disoriented and confused                Significant Labs: All pertinent labs within the past 24 hours have been reviewed.  CBC:   Recent Labs   Lab 03/27/25  1047   WBC 8.42   HGB 11.2*   HCT 33.6*        CMP:   Recent Labs   Lab 03/27/25  1047      K 4.7      CO2 21*   BUN 38*   CREATININE 1.7*   CALCIUM 9.3   ALBUMIN 4.1   BILITOT 0.5   ALKPHOS 67   AST 22   ALT 16   ANIONGAP 13       Significant Imaging: I have reviewed all pertinent imaging results/findings within the past 24 hours.    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 Dementia is severe. The patient does have signs of behavioral disturbance. Home dementia medications are Held or Continued:  She is not on any . Continue non-pharmacologic interventions to prevent delirium (No VS between 11PM-5AM, activity during day, opening blinds, providing glasses/hearing aids, and up in chair during daytime). Will avoid narcotics and benzos unless absolutely necessary. PRN anti-psychotics are prescribed to avoid self harm behaviors.    Haldol p.r.n. for non redirectable  agitation  Psych evaluation  Seroquel at night  Patient is elopement risk and will need one-to-one monitoring  Hypothyroid  Continue levothyroxine    Hyperlipidemia  Continue statin    Secondary hypertension    Monitor blood pressure  Hold losartan for CAM  If needed can be started on nifedipine  CAM (acute kidney injury)  CAM is likely due to pre-renal azotemia due to intravascular volume depletion. Baseline creatinine is  around 1 . Most recent creatinine and eGFR are listed below.  Recent Labs     03/27/25  1047   CREATININE 1.7*   EGFRNORACEVR 28*      Plan  - CAM is stable  - Avoid nephrotoxins and renally dose meds for GFR listed above  - Monitor urine output, serial BMP, and adjust therapy as needed  - encourage oral hydration    VTE Risk Mitigation (From admission, onward)           Ordered     enoxaparin injection 30 mg  Every 24 hours         03/27/25 1402     IP VTE HIGH RISK PATIENT  Once         03/27/25 1351     Place sequential compression device  Until discontinued         03/27/25 1351                       On 03/27/2025, patient should be placed in hospital observation services under my care.        Pharmacist Renal Dose Adjustment Note    Olamide Felipe is a 89 y.o. female being treated with the medication Enoxaparin    Patient Data:    Vital Signs (Most Recent):  Temp: 98.1 °F (36.7 °C) (03/27/25 1018)  Pulse: 91 (03/27/25 1022)  BP: (!) 186/79 (03/27/25 1022)  SpO2: 98 % (03/27/25 1022) Vital Signs (72h Range):  Temp:  [98.1 °F (36.7 °C)]   Pulse:  [91]   BP: (186)/(79)   SpO2:  [98 %]      Recent Labs   Lab 03/27/25  1047   CREATININE 1.7*     Serum creatinine: 1.7 mg/dL (H) 03/27/25 1047  Estimated creatinine clearance: 17.5 mL/min (A)    Medication:Enoxaparin dose: 40 mg frequency Q24H will be changed to medication:Enoxaparin dose:30 mg frequency:Q24H    Pharmacist's Name: Paty Loza  Pharmacist's Extension: [id number]      Ozzy Sotomayor MD  Department of [facility] Medicine  [facility]  Emergency Dept

## 2025-03-27 NOTE — ED NOTES
Patient noted to be getting out of stretcher multiple times, pulling at Iv site and patient redirected to not pull out IV. Secured with COBAN and moved to ED 10.

## 2025-03-27 NOTE — ED NOTES
Patient pulling IV and trying to leave room. Patient is confused. Redirected patient to place, time and situation. Given turkey sandwich and apple juice and expressed thanks.

## 2025-03-27 NOTE — PROGRESS NOTES
CTN Received an in coming call from the cg, Christy Garcia. Cg stated that the facility called her late last night to let her know that the pt was experiencing another episode where she tried to leave the [facility] with an extension cord tired to her hips where she was planning to tie her bags to the cord. When found, cg states that pt tried to put extension cord around her neck. Cg reports that the staff at the facility want to have the pt's medication re evaluated and have called 911 to go to the ER.     CTN suggested calling the facility to see which ER they were heading to and to meet them there. CTN assured the cg that she could call if she needed anything.     CTN also suggested when things settle down to have a conversation with the staff about boundaries.     Received a second in coming call from Cg, Christy Garcia. Cg reports that the pt is going to go to [facility] and the cg will not go to the hospital with the pt at this time. One of the staff members at the facility will accompany the pt at this time.

## 2025-03-27 NOTE — ASSESSMENT & PLAN NOTE
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 Dementia is severe. The patient does have signs of behavioral disturbance. Home dementia medications are Held or Continued:  She is not on any. Continue non-pharmacologic interventions to prevent delirium (No VS between 11PM-5AM, activity during day, opening blinds, providing glasses/hearing aids, and up in chair during daytime). Will avoid narcotics and benzos unless absolutely necessary. PRN anti-psychotics are prescribed to avoid self harm behaviors.    Haldol p.r.n. for non redirectable agitation  Psych evaluation  Seroquel at night  Patient is elopement risk and will need one-to-one monitoring

## 2025-03-27 NOTE — PHARMACY MED REC
"      [facility]           Pharmacy  Admission Medication History     The home medication history was taken by Maggi Mendez.      Medication history obtained from Medications listed below were obtained from: Medical records and SNF/Rehab/LTAC . Medications verified by [facility] med [facility].    Based on information gathered for medication list, you may go to "Admission" then "Reconcile Home Medications" tabs to review and/or act upon those items.     The home medication list has been updated by the Pharmacy department.   Please read ALL comments highlighted in yellow.   Please address this information as you see fit.    Feel free to contact us if you have any questions or require assistance.        No current facility-administered medications on file prior to encounter.     Current Outpatient Medications on File Prior to Encounter   Medication Sig Dispense Refill    acetaminophen (TYLENOL) 325 MG tablet Take 325 mg by mouth every 6 (six) hours as needed for Pain.      b complex vitamins tablet Take 1 tablet by mouth once daily.      brexpiprazole (REXULTI) 0.25 mg Tab Take 1 tablet (0.25 mg total) by mouth every evening. 30 tablet 1    calcium-vitamin D 250-100 mg-unit per tablet Take 1 tablet by mouth 2 (two) times daily.      FLUoxetine 40 MG capsule Take 1 capsule (40 mg total) by mouth once daily. 90 capsule 3    levothyroxine (SYNTHROID) 25 MCG tablet Take 1 tablet (25 mcg total) by mouth before breakfast. 90 tablet 3    loratadine (CLARITIN) 10 mg tablet Take 1 tablet (10 mg total) by mouth once daily. 90 tablet 3    losartan (COZAAR) 25 MG tablet Take 1 tablet (25 mg total) by mouth once daily. (Patient taking differently: Take 25 mg by mouth every morning. Check BP) 90 tablet 3    mv-min/iron/folic/calcium/vitK (WOMEN'S MULTIVITAMIN ORAL) Take 1 tablet by mouth once daily.      omeprazole (PRILOSEC) 20 MG capsule Take 1 capsule (20 mg total) by mouth once daily. 90 capsule 0 "    polyethylene glycol (GLYCOLAX) 17 gram PwPk Take 17 g by mouth 2 (two) times daily as needed for Constipation. (Patient taking differently: Take 17 g by mouth 2 (two) times daily as needed for Constipation. Mix in 8 oz of water)  0    senna-docusate 8.6-50 mg (PERICOLACE) 8.6-50 mg per tablet Take 1 tablet by mouth daily as needed for Constipation. 90 tablet 0    simvastatin (ZOCOR) 20 MG tablet Take 1 tablet (20 mg total) by mouth every evening. 90 tablet 3       Please address this information as you see fit.  Feel free to contact us if you have any questions or require assistance.    Maggi Mendez  [phone]            .

## 2025-03-27 NOTE — ED NOTES
Patient continues to get out of stretcher walking out of room, walking around unit. Multiple staff are redirecting.

## 2025-03-27 NOTE — ED NOTES
"Patient out of room and walked up to nurses station with drainage to left arm and states, "Oh yeah, I did this, I know it's upsetting that I did this." Patient pulled IV out of arm. Brought back to room, site cleaned and SC to MD to notify.  "

## 2025-03-27 NOTE — ED NOTES
Received call from facility, Nurse Elida to check on patient. Elida reports this patient broke a door last night, broke glass, and undid everything out of her closet. Notified that patient will be admitted for observation.

## 2025-03-27 NOTE — HPI
89-year-old female with past medical history including Alzheimer's dementia with behavioral disturbances, currently on Rexulti brought in by EMS from assisted living facility due to intermittent episodes of agitated aggressive behavior. EMS reports that the facility called because patient was having outburst where she had tried to break another resident's glasses or threatening to leave. The facility did not have any concerns about falls or trauma. In the ED, patient is confused and talking about random topics, talking about books from the library and kids in a party ..  I called the patient's daughter and Christy Garcia who has the power of  the patient, she mentioned that since the patient was diagnosed in November 20, 2023 she had a progressive decline in her dementia, and behavioral disturbances, she has been followed up by neuropsych, initially she was started on Seroquel twice a day and the dose was increased but she did not do well, so it was stopped in about 3 weeks ago she was started on Rexulti, we will significant improvement behavior for 2 weeks, she knew her family and she was able to go out with her son who visited  the town, she was eating well and sleeping well at night, but last week she started to decline again with the episodes of aggressive behavior, her daughter went to check on her at the assisted living center and found that she packed her clothes and when asked her to take her somewhere else.  The daughter mentioned that about a month ago while patient was still living at her home, she left the house and was wondering the street and she was found few blocks away from the house by the police.  While in the ED patient removed her IV lines and is refusing to place a new one, she is trying to get out of the bed.

## 2025-03-27 NOTE — ED PROVIDER NOTES
Encounter Date: 3/27/2025       History     Chief Complaint   Patient presents with    Agitation     Assisted living reports that patient has been acting out over the past week - throwing glasses, stacking up her clothes and threatening to leave. On arrival to ED, alert and cooperative. Recent med change from Seroquel to Rexulti. Also reports recent onset of urinary incontinence.     89-year-old female with past medical history including Alzheimer's dementia currently on Rexulti brought in by EMS from assisted living facility due to intermittent episodes of agitated aggressive behavior. EMS reports that the facility called because patient was having outburst where she had tried to break another resident's glasses or threatening to leave.  The facility did not have any concerns about falls or trauma.  In the ED, patient has no complaints.     The history is provided by the patient and the EMS personnel.     Review of patient's allergies indicates:  No Known Allergies  Past Medical History:   Diagnosis Date    Delirium 11/17/2023    Hyperlipidemia     Hypothyroidism     Malignant neoplasm of upper-outer quadrant of right breast in female, estrogen receptor negative 01/07/2022    Osteoarthritis, knee     Uterine cancer     Vertigo      Past Surgical History:   Procedure Laterality Date    dentures      FRACTURE SURGERY Left     fracture left wrist    HYSTERECTOMY      INJECTION FOR SENTINEL NODE IDENTIFICATION Right 1/7/2022    Procedure: INJECTION, FOR SENTINEL NODE IDENTIFICATION-Right;  Surgeon: Marci Mcintosh MD;  Location: [facility] OR;  Service: General;  Laterality: Right;    left wrist surgery      MASTECTOMY, PARTIAL Right 1/7/2022    Procedure: MASTECTOMY, PARTIAL-Right with radiological marker;  Surgeon: Marci Mcintosh MD;  Location: [facility] OR;  Service: General;  Laterality: Right;  REQUEST SAID 2 HOUR CASE    SENTINEL LYMPH NODE BIOPSY Right 1/7/2022    Procedure: BIOPSY, LYMPH NODE, SENTINEL-Right;  Surgeon: Marci  KARLA Mcintosh MD;  Location: [facility] OR;  Service: General;  Laterality: Right;     Family History   Problem Relation Name Age of Onset    No Known Problems Daughter      No Known Problems Son      No Known Problems Sister twin      Social History[1]  Review of Systems    Physical Exam     Initial Vitals   BP Pulse Resp Temp SpO2   03/27/25 1022 03/27/25 1022 -- 03/27/25 1018 03/27/25 1022   (!) 186/79 91  98.1 °F (36.7 °C) 98 %      MAP       --                Physical Exam    Nursing note and vitals reviewed.  Constitutional: She appears well-developed. She is not diaphoretic. No distress.   HENT:   Head: Normocephalic and atraumatic.   Eyes: EOM are normal. Pupils are equal, round, and reactive to light.   Neck: Neck supple.   Normal range of motion.  Cardiovascular:  Normal rate.           Pulmonary/Chest: No respiratory distress.   Abdominal: Abdomen is soft. She exhibits no distension. There is no abdominal tenderness.   Musculoskeletal:         General: Normal range of motion.      Cervical back: Normal range of motion and neck supple.     Neurological: She is alert.   Oriented to self, hx Alzheimer's dementia.    Skin: Skin is warm and dry.   Psychiatric: She has a normal mood and affect.         ED Course   Procedures  Labs Reviewed   COMPREHENSIVE METABOLIC PANEL - Abnormal       Result Value    Sodium 143      Potassium 4.7      Chloride 109      CO2 21 (*)     Glucose 95      BUN 38 (*)     Creatinine 1.7 (*)     Calcium 9.3      Protein Total 6.9      Albumin 4.1      Bilirubin Total 0.5      ALP 67      AST 22      ALT 16      Anion Gap 13      eGFR 28 (*)    URINALYSIS, REFLEX TO URINE CULTURE - Abnormal    Color, UA Yellow      Appearance, UA Hazy (*)     pH, UA 6.0      Spec Grav UA 1.020      Protein, UA Trace (*)     Glucose, UA Negative      Ketones, UA Negative      Bilirubin, UA Negative      Blood, UA Trace (*)     Nitrites, UA Negative      Urobilinogen, UA 2.0-3.0 (*)     Leukocyte Esterase, UA  Negative     CBC WITH DIFFERENTIAL - Abnormal    WBC 8.42      RBC 3.50 (*)     HGB 11.2 (*)     HCT 33.6 (*)     MCV 96      MCH 32.0      MCHC 33.3      RDW 12.5      Platelet Count 252      MPV 9.9      Nucleated RBC 0      Neut % 66.6      Lymph % 21.6      Mono % 10.9      Eos % 0.0      Basophil % 0.5      Imm Grans % 0.4      Neut # 5.61      Lymph # 1.82      Mono # 0.92      Eos # 0.00      Baso # 0.04      Imm Grans # 0.03     MAGNESIUM - Normal    Magnesium  1.7     CBC W/ AUTO DIFFERENTIAL    Narrative:     The following orders were created for panel order CBC auto differential.  Procedure                               Abnormality         Status                     ---------                               -----------         ------                     CBC with Differential[[id number]]       Abnormal            Final result                 Please view results for these tests on the individual orders.   EXTRA TUBES    Narrative:     The following orders were created for panel order EXTRA TUBES.  Procedure                               Abnormality         Status                     ---------                               -----------         ------                     Light Green Top Hold[[id number]]                            In process                   Please view results for these tests on the individual orders.   LIGHT GREEN TOP HOLD          Imaging Results    None          Medications   sodium chloride 0.9% flush 10 mL (has no administration in time range)   naloxone 0.4 mg/mL injection 0.02 mg (has no administration in time range)   glucose chewable tablet 16 g (has no administration in time range)   glucose chewable tablet 24 g (has no administration in time range)   dextrose 50% injection 12.5 g (has no administration in time range)   dextrose 50% injection 25 g (has no administration in time range)   glucagon (human recombinant) injection 1 mg (has no administration in time range)   enoxaparin  injection 30 mg (has no administration in time range)   lactated ringers bolus 1,000 mL (1,000 mLs Intravenous New Bag 3/27/25 1241)     Medical Decision Making  89-year-old female past medical history as above brought in by EMS due to intermittent episodes of agitated and aggressive behavior.  Differential includes but isn't limited to dementia, UTI, electrolyte abnormality.  Patient found to have an CAM, almost who times her baseline.  Ordered IV fluids discussed with Dr. Sotomayor and admitted for further workup and management of CAM.    Amount and/or Complexity of Data Reviewed  Independent Historian: EMS     Details: As documented above  External Data Reviewed: notes.     Details: Seen 3/20/25 by neurology   Labs: ordered. Decision-making details documented in ED Course.    Risk  OTC drugs.  Prescription drug management.  Decision regarding hospitalization.               ED Course as of 25 1407   Thu Mar 27, 2025   1111 WBC: 8.42  Normal  [AT]   1112 Hemoglobin(!): 11.2  Mild anemia [AT]   1117 Urinalysis, Reflex to Urine Culture(!)  Normal, no infection [AT]   1230 Creatinine(!): 1.7  CAM, prior 0.9. IVF ordered. [AT]      ED Course User Index  [AT] Pili Jacinto MD                           Clinical Impression:  Final diagnoses:  [N17.9] CAM (acute kidney injury)          ED Disposition Condition    Observation                     [1]   Social History  Tobacco Use    Smoking status: Former     Current packs/day: 0.00     Types: Cigarettes     Quit date: 1989     Years since quittin.8     Passive exposure: Never    Smokeless tobacco: Never   Substance Use Topics    Alcohol use: Yes     Alcohol/week: 2.0 standard drinks of alcohol     Types: 2 Glasses of wine per week     Comment: 1/3 glass of wine - 2 times weekly    Drug use: Never        Pili Jacinto MD  25 1407

## 2025-03-28 LAB
ANION GAP (OHS): 9 MMOL/L (ref 8–16)
BUN SERPL-MCNC: 30 MG/DL (ref 10–30)
CALCIUM SERPL-MCNC: 8.8 MG/DL (ref 8.7–10.5)
CHLORIDE SERPL-SCNC: 112 MMOL/L (ref 95–110)
CO2 SERPL-SCNC: 18 MMOL/L (ref 23–29)
CREAT SERPL-MCNC: 1 MG/DL (ref 0.5–1.4)
GFR SERPLBLD CREATININE-BSD FMLA CKD-EPI: 53 ML/MIN/1.73/M2
GLUCOSE SERPL-MCNC: 142 MG/DL (ref 70–110)
MAGNESIUM SERPL-MCNC: 1.5 MG/DL (ref 1.6–2.6)
POTASSIUM SERPL-SCNC: 4.7 MMOL/L (ref 3.5–5.1)
SODIUM SERPL-SCNC: 139 MMOL/L (ref 136–145)

## 2025-03-28 PROCEDURE — 36415 COLL VENOUS BLD VENIPUNCTURE: CPT | Mod: HCNC | Performed by: STUDENT IN AN ORGANIZED HEALTH CARE EDUCATION/TRAINING PROGRAM

## 2025-03-28 PROCEDURE — 83735 ASSAY OF MAGNESIUM: CPT | Mod: HCNC | Performed by: STUDENT IN AN ORGANIZED HEALTH CARE EDUCATION/TRAINING PROGRAM

## 2025-03-28 PROCEDURE — G0378 HOSPITAL OBSERVATION PER HR: HCPCS | Mod: HCNC

## 2025-03-28 PROCEDURE — 25000003 PHARM REV CODE 250: Mod: HCNC | Performed by: STUDENT IN AN ORGANIZED HEALTH CARE EDUCATION/TRAINING PROGRAM

## 2025-03-28 PROCEDURE — 25000003 PHARM REV CODE 250: Mod: HCNC | Performed by: PSYCHIATRY & NEUROLOGY

## 2025-03-28 PROCEDURE — 80048 BASIC METABOLIC PNL TOTAL CA: CPT | Mod: HCNC | Performed by: STUDENT IN AN ORGANIZED HEALTH CARE EDUCATION/TRAINING PROGRAM

## 2025-03-28 RX ORDER — DONEPEZIL HYDROCHLORIDE 5 MG/1
5 TABLET, FILM COATED ORAL NIGHTLY
Status: DISCONTINUED | OUTPATIENT
Start: 2025-03-28 | End: 2025-03-29 | Stop reason: HOSPADM

## 2025-03-28 RX ORDER — QUETIAPINE FUMARATE 25 MG/1
25 TABLET, FILM COATED ORAL NIGHTLY PRN
Status: DISCONTINUED | OUTPATIENT
Start: 2025-03-28 | End: 2025-03-29 | Stop reason: HOSPADM

## 2025-03-28 RX ADMIN — DONEPEZIL HYDROCHLORIDE 5 MG: 5 TABLET ORAL at 09:03

## 2025-03-28 RX ADMIN — FLUOXETINE HYDROCHLORIDE 40 MG: 20 CAPSULE ORAL at 09:03

## 2025-03-28 RX ADMIN — ATORVASTATIN CALCIUM 20 MG: 20 TABLET, FILM COATED ORAL at 09:03

## 2025-03-28 RX ADMIN — PANTOPRAZOLE SODIUM 40 MG: 40 TABLET, DELAYED RELEASE ORAL at 09:03

## 2025-03-28 NOTE — PROGRESS NOTES
"[facility]           Pharmacy  Admission Medication History     The home medication history was taken by Chidi Christopher PharmD.      Medication history obtained from Medications listed below were obtained from: Patient/family    Based on information gathered for medication list, you may go to "Admission" then "Reconcile Home Medications" tabs to review and/or act upon those items.     The home medication list has been updated by the Pharmacy department.   Please read ALL comments highlighted in yellow.   Please address this information as you see fit.    Feel free to contact us if you have any questions or require assistance.    The medications listed below were removed from the home medication list.  Please reorder if appropriate:    Home medications have been reviewed     Potential issues to be addressed PRIOR TO DISCHARGE      No current facility-administered medications on file prior to encounter.     Current Outpatient Medications on File Prior to Encounter   Medication Sig Dispense Refill    acetaminophen (TYLENOL) 325 MG tablet Take 325 mg by mouth every 6 (six) hours as needed for Pain.      b complex vitamins tablet Take 1 tablet by mouth once daily.      brexpiprazole (REXULTI) 0.25 mg Tab Take 1 tablet (0.25 mg total) by mouth every evening. 30 tablet 1    calcium-vitamin D 250-100 mg-unit per tablet Take 1 tablet by mouth 2 (two) times daily.      FLUoxetine 40 MG capsule Take 1 capsule (40 mg total) by mouth once daily. 90 capsule 3    levothyroxine (SYNTHROID) 25 MCG tablet Take 1 tablet (25 mcg total) by mouth before breakfast. 90 tablet 3    loratadine (CLARITIN) 10 mg tablet Take 1 tablet (10 mg total) by mouth once daily. 90 tablet 3    losartan (COZAAR) 25 MG tablet Take 1 tablet (25 mg total) by mouth once daily. (Patient taking differently: Take 25 mg by mouth every morning. Check BP) 90 tablet 3    mv-min/iron/folic/calcium/vitK (WOMEN'S MULTIVITAMIN ORAL) Take 1 tablet by " mouth once daily.      omeprazole (PRILOSEC) 20 MG capsule Take 1 capsule (20 mg total) by mouth once daily. 90 capsule 0    polyethylene glycol (GLYCOLAX) 17 gram PwPk Take 17 g by mouth 2 (two) times daily as needed for Constipation. (Patient taking differently: Take 17 g by mouth 2 (two) times daily as needed for Constipation. Mix in 8 oz of water)  0    senna-docusate 8.6-50 mg (PERICOLACE) 8.6-50 mg per tablet Take 1 tablet by mouth daily as needed for Constipation. 90 tablet 0    simvastatin (ZOCOR) 20 MG tablet Take 1 tablet (20 mg total) by mouth every evening. 90 tablet 3       Please address this information as you see fit.  Feel free to contact us if you have any questions or require assistance.    Chidi Christopher, PharmD  [phone]

## 2025-03-28 NOTE — PROGRESS NOTES
[facility] Medicine  Progress Note    Patient Name: Olamide Felipe  MRN: [id number]  Patient Class: OP- Observation   Admission Date: 3/27/2025  Length of Stay: 0 days  Attending Physician: Ozzy Sotomayor MD  Primary Care Provider: Lashonda Pearce MD        Subjective     Principal Problem:Mild late onset Alzheimer's dementia with agitation        HPI:  89-year-old female with past medical history including Alzheimer's dementia with behavioral disturbances, currently on Rexulti brought in by EMS from assisted living facility due to intermittent episodes of agitated aggressive behavior. EMS reports that the facility called because patient was having outburst where she had tried to break another resident's glasses or threatening to leave. The facility did not have any concerns about falls or trauma. In the ED, patient is confused and talking about random topics, talking about books from the library and kids in a party ..  I called the patient's daughter and Christy Garcia who has the power of  the patient, she mentioned that since the patient was diagnosed in November 20, 2023 she had a progressive decline in her dementia, and behavioral disturbances, she has been followed up by neuropsych, initially she was started on Seroquel twice a day and the dose was increased but she did not do well, so it was stopped in about 3 weeks ago she was started on Rexulti, we will significant improvement behavior for 2 weeks, she knew her family and she was able to go out with her son who visited  the town, she was eating well and sleeping well at night, but last week she started to decline again with the episodes of aggressive behavior, her daughter went to check on her at the assisted living center and found that she packed her clothes and when asked her to take her somewhere else.  The daughter mentioned that about a month ago while patient was still living at her home, she left the house and was  wondering the street and she was found few blocks away from the house by the police.  While in the ED patient removed her IV lines and is refusing to place a new one, she is trying to get out of the bed.    Overview/Hospital Course:  No notes on file    Interval History:     I have seen the patient's today  Patient is pleasantly confused this morning, she knows she is in the hospital    I discussed with Dr. Smith from psych, medications adjustment is done    I discussed with , plan to discharge her back to assisted facility possibly tomorrow    Review of Systems   Unable to perform ROS: Dementia     Objective:     Vital Signs (Most Recent):  Temp: 98.1 °F (36.7 °C) (03/28/25 1203)  Pulse: 76 (03/28/25 1203)  Resp: 18 (03/28/25 1203)  BP: 139/64 (03/28/25 1203)  SpO2: 98 % (03/28/25 1203) Vital Signs (24h Range):  Temp:  [97.3 °F (36.3 °C)-98.7 °F (37.1 °C)] 98.1 °F (36.7 °C)  Pulse:  [64-81] 76  Resp:  [18] 18  SpO2:  [93 %-98 %] 98 %  BP: (118-174)/(56-74) 139/64     Weight: 54.4 kg (120 lb)  Body mass index is 21.26 kg/m².    Intake/Output Summary (Last 24 hours) at 3/28/2025 1428  Last data filed at 3/28/2025 0730  Gross per 24 hour   Intake 360 ml   Output --   Net 360 ml         Physical Exam  Constitutional:       Appearance: Normal appearance.   Cardiovascular:      Rate and Rhythm: Normal rate and regular rhythm.   Pulmonary:      Effort: Pulmonary effort is normal. No respiratory distress.      Breath sounds: Normal breath sounds.   Abdominal:      Palpations: Abdomen is soft.   Skin:     General: Skin is warm and dry.   Neurological:      General: No focal deficit present.      Mental Status: She is alert.               Significant Labs: All pertinent labs within the past 24 hours have been reviewed.  BMP:   Recent Labs   Lab 03/27/25  1047      K 4.7      CO2 21*   BUN 38*   CREATININE 1.7*   CALCIUM 9.3   MG 1.7     CBC:   Recent Labs   Lab 03/27/25  1047   WBC 8.42   HGB 11.2*    HCT 33.6*          Significant Imaging: I have reviewed all pertinent imaging results/findings within the past 24 hours.      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 Dementia is severe. The patient does have signs of behavioral disturbance. Home dementia medications are Held or Continued:  She is not on any . Continue non-pharmacologic interventions to prevent delirium (No VS between 11PM-5AM, activity during day, opening blinds, providing glasses/hearing aids, and up in chair during daytime). Will avoid narcotics and benzos unless absolutely necessary. PRN anti-psychotics are prescribed to avoid self harm behaviors.    Haldol p.r.n. for non redirectable agitation  Psych evaluated the patient and medications were adjusted   Seroquel at night PRN  Patient is elopement risk and will need one-to-one monitoring  Hypothyroid  Continue levothyroxine    Hyperlipidemia  Continue statin    Secondary hypertension    Monitor blood pressure  Hold losartan for CAM  If needed can be started on nifedipine  Repeat BMP  CAM (acute kidney injury)  CAM is likely due to pre-renal azotemia due to intravascular volume depletion. Baseline creatinine is  around 1 . Most recent creatinine and eGFR are listed below.  Recent Labs     03/27/25  1047   CREATININE 1.7*   EGFRNORACEVR 28*      Plan  - CAM is stable,still waiting for repeat BMP  - Avoid nephrotoxins and renally dose meds for GFR listed above  - Monitor urine output, serial BMP, and adjust therapy as needed  - encourage oral hydration  VTE Risk Mitigation (From admission, onward)           Ordered     enoxaparin injection 30 mg  Every 24 hours         03/27/25 1402     IP VTE HIGH RISK PATIENT  Once         03/27/25 1351     Place sequential compression device  Until discontinued         03/27/25 1351                    Discharge Planning   TAMMIE:      Code Status: DNR    Medical Readiness for Discharge Date:                            Ozzy Sotomayor MD  Department of Hospital Medicine   [facility]

## 2025-03-28 NOTE — PLAN OF CARE
Problem: Adult Inpatient Plan of Care  Goal: Plan of Care Review  3/27/2025 2241 by Nedra Wagner, RN  Care plan initiated

## 2025-03-28 NOTE — PLAN OF CARE
"   03/28/25 1030   Rounds   Attendance Provider;Nurse    Discharge Plan A Assisted Living   Why the patient remains in the hospital Requires continued medical care       1030  Dr Smith (psych) at the bedside when CM participated in SIBR with Dr Sotomayor. No family present. CM will return to complete the discharge planning assessment. Pt was admitted with mild late onset alzheimer's dementia with agitation.     1200  Pt "pleasantly confused" with a sitter at the bedside when CM rounded. Pt oriented to person only. All discharge planning assessment information was obtained from the pt's daughter, Christy Garcia ([phone]), via phone.    Christy stated that the pt has been a resident at [facility] ([address] [place] [address]) since January 2024 & would like the pt to return, that the pt is independent of all ADLs, & that she will provide transportation at time of discharge.     CM was informed by Christy that the pt has a previously scheduled appointment with Dr Lashonda Pearce (PCP) on 4/1/2025 at 1120 & is seen by Dr Emerson (neuro-psych). Information added to the pt's discharge paperwork.    CM updated patient's whiteboard with CM name & contact information.     1155  CM was informed by Michaela ([phone]) w/[facility] that the pt will be accepted back following discharge & CM was informed by Elida Odonnell ([phone]) that a discharge summary will need to be faxed to (f [phone]), at time of discharge. Message sent to the weekend CMs informing of above.     1210  WILFRID informed Dr Sotomayor of above. MD stated that Dr Smith recommended psych med adjustments & that the pt will likely discharge over the weekend.     1515  Voicemail message left for Dr Emerson's  requesting a hospfu appt. Daughter will be notified of appt date & time. Information added to the pt's discharge paperwork.       Will continue to follow.     "

## 2025-03-28 NOTE — CONSULTS
PSYCHIATRY INPATIENT CONSULT NOTE      3/28/2025 8:30 AM   Olamide Felipe   2/3/1933   [id number]           DATE OF ADMISSION: 3/27/2025 10:19 AM    S[facility]    CURRENT LEGAL STATUS: Patient does not currently meet PEC criteria due to not currently being an imminent threat to self/others and not being gravely disabled 2/2 mental illness at this time.     HISTORY    Per Initial History from Primary Team:  Patient presents with    Agitation       Assisted living reports that patient has been acting out over the past week - throwing glasses, stacking up her clothes and threatening to leave. On arrival to ED, alert and cooperative. Recent med change from Seroquel to Rexulti. Also reports recent onset of urinary incontinence.   89-year-old female with past medical history including Alzheimer's dementia with behavioral disturbances, currently on Rexulti brought in by EMS from assisted living facility due to intermittent episodes of agitated aggressive behavior. EMS reports that the facility called because patient was having outburst where she had tried to break another resident's glasses or threatening to leave. The facility did not have any concerns about falls or trauma. In the ED, patient is confused and talking about random topics, talking about books from the library and kids in a party ..  I called the patient's daughter and Christy Garcia who has the power of  the patient, she mentioned that since the patient was diagnosed in November 20, 2023 she had a progressive decline in her dementia, and behavioral disturbances, she has been followed up by neuropsych, initially she was started on Seroquel twice a day and the dose was increased but she did not do well, so it was stopped in about 3 weeks ago she was started on Rexulti, we will significant improvement behavior for 2 weeks, she knew her family and she was able to go out with her son who visited  the town, she was eating well and sleeping well at  night, but last week she started to decline again with the episodes of aggressive behavior, her daughter went to check on her at the assisted living center and found that she packed her clothes and when asked her to take her somewhere else.  The daughter mentioned that about a month ago while patient was still living at her home, she left the house and was wondering the street and she was found few blocks away from the house by the police.  While in the ED patient removed her IV lines and is refusing to place a new one, she is trying to get out of the bed.      Chief Complaint / Reason for Psychiatry Consult: 89 yo F with alzhemioer dementia with behavioral disturbances, recent changes in medications, she is aggressive and non redirectable, please evaluate, thanks       HPI:   Olamide Felipe is a 89 y.o. female with a past medical history as noted above/below and a past psychiatric history of Alzheimer's Dementia with Behavioral Disturbance, Hospital Delirium, Depression, and Anxiety, currently being treated by her inpatient primary team for a principle problem of CAM and agitation in dementia.  Psychiatry was originally consulted as noted above.  The patient was seen and examined.  The chart was reviewed.  On examination today, the patient was alert and oriented to person and type of place ; disoriented to name of place, city, state, month, year, and situation.  She was CAM-ICU negative for delirium, but she did exhibit intermittent attention / concentration difficulties.  She was pleasantly confused throughout the assessment attempt.  She denies any current or recent active or passive SI / HI.  She denies any current or recent AH, VH, TH, delusions, paranoia, or DIGFAST (pily) s/s.  She denies feeling depressed or anxious.  She denies any issues with sleep or appetite.  The validity of her psychiatric ROS responses are limited due to the severity of her dementia.  She has limited insight into her dementia.   She was unable to name any of her current or prior psychiatric medications.  She denies any current medical/physical complaints at this time.  NAD was observed during the examination.  Of note, she did require PRN Haldol yesterday afternoon for non-redirectable agitation in dementia.  Psychotherapy was implemented today as noted below with a focus on improving orientation, confusion, and behavioral modification in dementia.  See detailed psych ROS below.  See collateral below.      Collateral:    I spoke at length today with the patient's daughter / POA (Christy Garcia) on the number listed in the chart ([phone]).  She stated that the patient has been under the care of Dr. Tijerina, Dr. Arreguin, and Miroslava Raines NP within the geriatric & neuropsychology departments at [facility] for the treatment of Alzheimer's Dementia.  She voiced mixed results with Seroquel over the years, but ultimately does feel that it proves some benefit for agitation / sleep at times.  She also states that the patient has never trialed Aricept and has never increased Rexulti beyond 0.25 mg PO daily.  She is open to titrating Rexulti and beginning Aricept for the patient's Alzheimer's Dementia with Behavioral Disturbance.      Psychiatric Review Of Systems - Currently, the patient is endorsing and/or denying the following:  (patient's endorsements are BOLDED below; if not BOLDED, then patient denied) (limited validity due to patient's Dementia / AMS):    Denies Symptoms of Depression: diminished mood, low motivation, loss of interest/anhedonia, irritability, diminished energy, change in sleep, change in appetite, diminished concentration or cognition (chronic) or indecisiveness, PMA/R, excessive guilt or hopelessness or worthlessness, suicidal ideations    Denies issues with Sleep: initiation, maintenance, early morning awakening with inability to return to sleep    Denies Suicidal/Homicidal ideations: active/passive ideations, organized  plans, future intentions    Denies Symptoms of psychosis: hallucinations, delusions, disorganized thinking, disorganized behavior or abnormal motor behavior, or negative symptoms (diminshed emotional expression, avolition, anhedonia, alogia, asociality)     Denies Symptoms of pily or hypomania: elevated, expansive, or irritable mood with increased energy or activity; with inflated self-esteem or grandiosity, decreased need for sleep, increased rate of speech, FOI or racing thoughts, distractibility, increased goal directed activity or PMA, risky/disinhibited behavior    Denies Symptoms of Anxiety: excessive anxiety/worry/fear, more days than not, about numerous issues, difficult to control, with restlessness, fatigue, poor concentration, irritability, muscle tension, sleep disturbance; causes functionally impairing distress     Denies Symptoms of Panic Disorder: recurrent panic attacks, precipitated or un-precipitated, source of worry and/or behavioral changes secondary; with or without agoraphobia    Denies Symptoms of PTSD: h/o trauma; re-experiencing/intrusive symptoms, avoidant behavior, negative alterations in cognition or mood, or hyperarousal symptoms; with or without dissociative symptoms     Denies Symptoms of OCD: obsessions or compulsions     Denies Symptoms of Eating Disorders: anorexia, bulimia or binging    Denies Substance Use: intoxication, withdrawal, tolerance, used in larger amounts or duration than intended, unsuccessful attempts to limit or quit, increased time engaging in or seeking out, cravings or strong desire to use, failure to fulfill obligations, negative consequences in social/interpersonal/occupational,/recreational areas, use in dangerous situations, medical or psychological consequences       [facility] Toolkit ASQ Suicide Screening Tool:  In the past few weeks, have you wished you were dead? Denies   In the past few weeks, have you felt that you or your family would be better off if you  were dead? Denies  In the past week, have you been having thoughts about killing yourself? Denies  Have you ever tried to kill yourself? Denies  Are you having thoughts of killing yourself right now? Denies       PSYCHOTHERAPY ADD-ON [id number]   30 (16-37*) minutes    Time: 21 minutes  Participants: Met with patient    Therapeutic Intervention Type: behavior modifying psychotherapy, supportive psychotherapy  Why chosen therapy is appropriate versus another modality: relevant to diagnosis, patient responds to this modality, evidence based practice    Target symptoms: disorientation, confusion, and behavioral disturbances in dementia  Primary focus: improving orientation, confusion, and behavioral modification in dementia  Psychotherapeutic techniques: supportive and psychodynamic techniques; re-orientation; psycho-education; reality / insight orientation; behavioral modification; CBT; problem solving techniques and managing life & medical stressors    Outcome monitoring methods: self-report, observation    Patient's response to intervention:  The patient's response to intervention is accepting / limited.      Progress toward goals:  The patient's progress toward goals is fair / limited.        ROS (limited validity due to patient's Dementia / AMS):  General ROS: negative for - chills, fatigue, fever or night sweats  Ophthalmic ROS: negative for - blurry vision, double vision or eye pain  ENT ROS: negative for - sinus pain, headaches, sore throat or visual changes  Allergy and Immunology ROS: negative for - hives, itchy/watery eyes or nasal congestion  Hematological and Lymphatic ROS: negative for - bleeding problems, bruising, jaundice or pallor  Endocrine ROS: negative for - galactorrhea, hot flashes, mood swings, palpitations or temperature intolerance  Respiratory ROS: negative for - cough, hemoptysis, shortness of breath, tachypnea or wheezing  Cardiovascular ROS: negative for - chest pain, dyspnea on exertion, loss  of consciousness, palpitations, rapid heart rate or shortness of breath  Gastrointestinal ROS: negative for - appetite loss, nausea, abdominal pain, blood in stools, change in bowel habits, constipation or diarrhea  Genito-Urinary ROS: negative for - incontinence, nocturia or pelvic pain  Musculoskeletal ROS: negative for - joint stiffness, joint swelling, joint pain or muscle pain   Neurological ROS: negative for - dizziness, numbness/tingling or seizures; positive for behavioral changes, confusion, and memory loss  Dermatological ROS: negative for dry skin, hair changes, pruritus or rash  Psychiatric ROS: see detailed psychiatric ROS above in history section       Past Psychiatric History:  Previous Diagnoses: Alzheimer's Dementia with Behavioral Disturbance, Hospital Delirium, Depression, and Anxiety  Previous Medication Trials: yes (most recently taking Prozac 40 mg PO daily and Rexulti 0.25 mg PO daily)  Previous Psychiatric Hospitalizations: denies  Previous Suicide Attempts: denies   History of Violence: hx of agitation in dementia   Outpatient Psychiatrist: sees neuropsychology department at [facility]     Social History:  Marital Status: single  Children: patient's adult daughter (Christy Garcia) is her POA   Employment Status/Info: retired  Special Ed: denies  Housing Status: lives at an assisted living facility   History of phys/sexual abuse: denies  Access to gun: denies     Family Psychiatric History: denies     Substance Abuse History (with a focus on the past 12 months):  Recreational Drugs: denies  Use of Alcohol: denies  Rehab History: denies   Tobacco Use: denies   Tobacco Use History[1]  Use of Caffeine: denies  Use of OTC: denies  Legal consequences of chemical use: denies    Legal History:  Past Charges/Incarcerations: denies  Pending charges: denies      Psychosocial Stressors: health / dementia    Functioning Relationships: good support system in her daughter / POA and Assisted Living Facility    Strengths AND Liabilities: Strength: Patient accepts guidance/feedback, Strength: Patient has positive support network., Liability: Patient has neurocognitive impairment.      PAST MEDICAL & SURGICAL HISTORY   Past Medical History:   Diagnosis Date    Delirium 11/17/2023    Hyperlipidemia     Hypothyroidism     Malignant neoplasm of upper-outer quadrant of right breast in female, estrogen receptor negative 01/07/2022    Osteoarthritis, knee     Uterine cancer     Vertigo      Past Surgical History:   Procedure Laterality Date    dentures      FRACTURE SURGERY Left     fracture left wrist    HYSTERECTOMY      INJECTION FOR SENTINEL NODE IDENTIFICATION Right 1/7/2022    Procedure: INJECTION, FOR SENTINEL NODE IDENTIFICATION-Right;  Surgeon: Marci Mcintosh MD;  Location: [facility];  Service: General;  Laterality: Right;    left wrist surgery      MASTECTOMY, PARTIAL Right 1/7/2022    Procedure: MASTECTOMY, PARTIAL-Right with radiological marker;  Surgeon: Marci Mcintosh MD;  Location: [facility];  Service: General;  Laterality: Right;  REQUEST SAID 2 HOUR CASE    SENTINEL LYMPH NODE BIOPSY Right 1/7/2022    Procedure: BIOPSY, LYMPH NODE, SENTINEL-Right;  Surgeon: Marci Mcintosh MD;  Location: [facility];  Service: General;  Laterality: Right;       NEUROLOGIC HISTORY  Seizures: denies    Head trauma with LOC: denies   CVA: denies     FAMILY HISTORY   Family History   Problem Relation Name Age of Onset    No Known Problems Daughter      No Known Problems Son      No Known Problems Sister twin        ALLERGIES   Review of patient's allergies indicates:  No Known Allergies    CURRENT MEDICATION REGIMEN   Home Meds:   Prior to Admission medications    Medication Sig Start Date End Date Taking? Authorizing Provider   acetaminophen (TYLENOL) 325 MG tablet Take 325 mg by mouth every 6 (six) hours as needed for Pain.   Yes Provider, Historical   b complex vitamins tablet Take 1 tablet by mouth once daily. 11/20/23  Yes Mg,  Gary REDMOND MD   brexpiprazole (REXULTI) 0.25 mg Tab Take 1 tablet (0.25 mg total) by mouth every evening. 3/13/25  Yes Tasha Emerson MD   calcium-vitamin D 250-100 mg-unit per tablet Take 1 tablet by mouth 2 (two) times daily.   Yes Provider, Historical   FLUoxetine 40 MG capsule Take 1 capsule (40 mg total) by mouth once daily. 10/29/24  Yes Lashonda Pearce MD   levothyroxine (SYNTHROID) 25 MCG tablet Take 1 tablet (25 mcg total) by mouth before breakfast. 8/13/24  Yes Lashonda Pearce MD   loratadine (CLARITIN) 10 mg tablet Take 1 tablet (10 mg total) by mouth once daily. 3/13/24  Yes Lashonda Pearce MD   losartan (COZAAR) 25 MG tablet Take 1 tablet (25 mg total) by mouth once daily.  Patient taking differently: Take 25 mg by mouth every morning. Check BP 2/18/25 2/13/26 Yes Lashonda Pearce MD   mv-min/iron/folic/calcium/vitK (WOMEN'S MULTIVITAMIN ORAL) Take 1 tablet by mouth once daily.   Yes Provider, Historical   omeprazole (PRILOSEC) 20 MG capsule Take 1 capsule (20 mg total) by mouth once daily. 2/28/25  Yes Lashonda Pearce MD   polyethylene glycol (GLYCOLAX) 17 gram PwPk Take 17 g by mouth 2 (two) times daily as needed for Constipation.  Patient taking differently: Take 17 g by mouth 2 (two) times daily as needed for Constipation. Mix in 8 oz of water 12/7/23  Yes Renay Du NP   senna-docusate 8.6-50 mg (PERICOLACE) 8.6-50 mg per tablet Take 1 tablet by mouth daily as needed for Constipation. 5/16/24  Yes Lashonda Pearce MD   simvastatin (ZOCOR) 20 MG tablet Take 1 tablet (20 mg total) by mouth every evening. 8/13/24  Yes Lashonda Pearce MD       OTC Meds: denies     Scheduled Meds:    atorvastatin  20 mg Oral QHS    enoxparin  30 mg Subcutaneous Q24H (prophylaxis, 1700)    FLUoxetine  40 mg Oral Daily    levothyroxine  25 mcg Oral Before breakfast    pantoprazole  40 mg Oral Daily      PRN Meds:   Current Facility-Administered Medications:     dextrose 50%,  12.5 g, Intravenous, PRN    dextrose 50%, 25 g, Intravenous, PRN    glucagon (human recombinant), 1 mg, Intramuscular, PRN    glucose, 16 g, Oral, PRN    glucose, 24 g, Oral, PRN    haloperidol lactate, 5 mg, Intramuscular, Q6H PRN    naloxone, 0.02 mg, Intravenous, PRN    QUEtiapine, 25 mg, Oral, Nightly PRN    sodium chloride 0.9%, 10 mL, Intravenous, Q12H PRN   Psychotherapeutics (From admission, onward)      Start     Stop Route Frequency Ordered    03/28/25 1245  QUEtiapine tablet 25 mg         -- Oral Nightly PRN 03/28/25 1141    03/27/25 1600  FLUoxetine capsule 40 mg         -- Oral Daily 03/27/25 1455    03/27/25 1555  haloperidol lactate injection 5 mg         -- IM Every 6 hours PRN 03/27/25 1548            LABORATORY DATA   Recent Results (from the past 72 hours)   Comprehensive metabolic panel    Collection Time: 03/27/25 10:47 AM   Result Value Ref Range    Sodium 143 136 - 145 mmol/L    Potassium 4.7 3.5 - 5.1 mmol/L    Chloride 109 95 - 110 mmol/L    CO2 21 (L) 23 - 29 mmol/L    Glucose 95 70 - 110 mg/dL    BUN 38 (H) 10 - 30 mg/dL    Creatinine 1.7 (H) 0.5 - 1.4 mg/dL    Calcium 9.3 8.7 - 10.5 mg/dL    Protein Total 6.9 6.0 - 8.4 gm/dL    Albumin 4.1 3.5 - 5.2 g/dL    Bilirubin Total 0.5 0.1 - 1.0 mg/dL    ALP 67 40 - 150 unit/L    AST 22 11 - 45 unit/L    ALT 16 10 - 44 unit/L    Anion Gap 13 8 - 16 mmol/L    eGFR 28 (L) >60 mL/min/1.73/m2   Magnesium    Collection Time: 03/27/25 10:47 AM   Result Value Ref Range    Magnesium  1.7 1.6 - 2.6 mg/dL   Urinalysis, Reflex to Urine Culture    Collection Time: 03/27/25 10:47 AM    Specimen: Urine   Result Value Ref Range    Color, UA Yellow Straw, Gertrudis, Yellow, Light-Orange    Appearance, UA Hazy (A) Clear    pH, UA 6.0 5.0 - 8.0    Spec Grav UA 1.020 1.005 - 1.030    Protein, UA Trace (A) Negative    Glucose, UA Negative Negative    Ketones, UA Negative Negative    Bilirubin, UA Negative Negative    Blood, UA Trace (A) Negative    Nitrites, UA Negative  "Negative    Urobilinogen, UA 2.0-3.0 (A) <2.0 EU/dL    Leukocyte Esterase, UA Negative Negative   CBC with Differential    Collection Time: 03/27/25 10:47 AM   Result Value Ref Range    WBC 8.42 3.90 - 12.70 K/uL    RBC 3.50 (L) 4.00 - 5.40 M/uL    HGB 11.2 (L) 12.0 - 16.0 gm/dL    HCT 33.6 (L) 37.0 - 48.5 %    MCV 96 82 - 98 fL    MCH 32.0 27.0 - 50.0 pg    MCHC 33.3 32.0 - 36.0 g/dL    RDW 12.5 11.5 - 14.5 %    Platelet Count 252 150 - 450 K/uL    MPV 9.9 9.2 - 12.9 fL    Nucleated RBC 0 <=0 /100 WBC    Neut % 66.6 38 - 73 %    Lymph % 21.6 18 - 48 %    Mono % 10.9 4 - 15 %    Eos % 0.0 <=8 %    Basophil % 0.5 <=1.9 %    Imm Grans % 0.4 0.0 - 0.5 %    Neut # 5.61 1.8 - 7.7 K/uL    Lymph # 1.82 1 - 4.8 K/uL    Mono # 0.92 0.3 - 1 K/uL    Eos # 0.00 <=0.5 K/uL    Baso # 0.04 <=0.2 K/uL    Imm Grans # 0.03 0.00 - 0.04 K/uL   Light Green Top Hold    Collection Time: 03/27/25  1:47 PM   Result Value Ref Range    Extra Tube Hold for add-ons.       No results found for: "PHENYTOIN", "PHENOBARB", "VALPROATE", "CBMZ"      EXAMINATION    VITALS   Vitals:    03/28/25 0354 03/28/25 0356 03/28/25 0449 03/28/25 0743   BP: 130/64 130/64 (!) 138/58 (!) 158/70   BP Location: Left arm Left arm Right arm Right arm   Patient Position: Lying Lying Lying Lying   Pulse: 81 81 68 64   Resp: 18 18 18 18   Temp: 98.2 °F (36.8 °C) 98.2 °F (36.8 °C) 98.7 °F (37.1 °C) 97.3 °F (36.3 °C)   TempSrc: Oral Oral Oral Oral   SpO2: 96% 96% (!) 93% 95%   Weight: 54.4 kg (120 lb) 54.4 kg (120 lb)     Height: 5' 3" (1.6 m)           CONSTITUTIONAL  General Appearance: NAD, unremarkable, age appropriate, normal weight, lying in bed, pleasantly confused     MUSCULOSKELETAL  Muscle Strength and Tone: WNL    Abnormal Involuntary Movements: none observed   Gait and Station: Attempted but unable to assess due to medical acuity     PSYCHIATRIC   Behavior/Cooperation: friendly and cooperative, eye contact normal, pleasantly confused   Speech: normal tone, " "normal rate, normal pitch, normal volume  Language: grossly intact, able to name, able to repeat with spontaneous speech  Mood: "good"  Affect:  congruent with mood and full / reactive   Associations: intermittent RIKKI  Thought Process: Linear with intermittent confusion   Thought Content: denies SI, HI, AH, VH, TH, delusions, or paranoia (no RIS observed)   Sensorium: Awake  Alert and Oriented: to person and type of place ; disoriented to name of place, city, state, month, year, and situation  Memory: 3/3 immediate, 0/3 at 5 minutes    Recent: Limited / Intact ; able to report intermittent recent events   Remote: Impaired / Limited ; Named 0/4 past presidents   Attention/concentration: Limited / Intact.  Able to spell [id number] but NOT [id number].   Similarities: Limited / Intact (difference between apple and orange?)  Abstract reasoning: Limited / Intact  Fund of Knowledge: Named 0/4 past presidents   Insight: Chronically Limited  Judgment: Chronically Limited    CAM ICU Delirium Assessment - NEGATIVE    Is the patient aware of the biomedical complications associated with substance abuse and mental illness?   Limited due to Dementia         MEDICAL DECISION MAKING    ASSESSMENT        Alzheimer's Dementia with Behavioral Disturbance   Encounter for Assessment of Healthcare Decision-Making Capacity   Unspecified Depressive Disorder   Unspecified Anxiety Disorder        RECOMMENDATIONS       - With reasonable medical certainty, based on a present-state examination and information from the patient's adult daughter / POA (Christy Garcia), the patient does not currently meet PEC criteria due to not being an imminent threat to self/others and not being gravely disabled 2/2 mental illness at this time.       - With reasonable medical certainty, based on a present state examination, the patient currently DOES NOT appear to have medical decision-making capacity as made evident by her inability to cognitively utilize information " provided to her to express a choice that is stable over time, to understand the relevant information pertaining her diagnoses and recommended treatments, to appreciate the consequences of the decision (risks vs benefits) regarding accepting vs refusing treatment, or to manipulate all of the data in a logical fashion.     - Continue Prozac 40 mg PO daily for mood / anxiety / behavior (discussed risks/benefits/alt vs no treatment with patient and her POA).    - Begin Aricept 5 mg PO QHS for dementia (discussed risks/benefits/alt vs no treatment with patient and her POA).    - Can continue to use Seroquel at 25 mg PO QHS PRN for insomnia / sundowning / behavioral disturbances in dementia (shifting this to a PRN med as opposed to a scheduled med) (discussed risks/benefits/alt vs no treatment with patient and her POA).    - Would increase outpatient Rexulti to 0.5 mg PO daily for agitation / behavioral disturbances in dementia (made need further titration over time as an outpatient) (discussed risks/benefits/alt vs no treatment with patient and her POA).     - Psychotherapy was performed with patient as noted above with a focus on improving orientation, confusion, and behavioral modification in dementia.    Implement the below DELIRIUM BEHAVIOR MANAGEMENT:  - Minimize use of restraints; if physical restraints necessary, please utilize medical/chemical prns for agitation (can use Zyprexa 2.5 mg PO/IM q8 hours PRN).  - Keep shades open and room lit during day and room dim at night in order to promote healthy circadian rhythms.  - Encourage family at bedside.  - Keep whiteboard in patient's room current with the date and name of the members of patient's team for easy patient self re-orientation.  - Avoid benzodiazepines, antihistamines, anticholinergics, hypnotics, and minimize opiates while controlling for pain as these medications may exacerbate delirium.     - Patient's most recent resulted labs, imaging, and EKG were  reviewed today ; TSH wnl ; Urinalysis unremarkable for infection ; awaiting repeat BMP to assess for improvement in CAM ; continue to monitor EKG for any QTc prolongation if PRN neuroleptics are being needed      - Please have Hospital CM/SW assist patient with ASAP outpatient neurology / mental health f/u for s/p discharge from this facility (patient is under the care of Dr. Tijerina, Dr. Arreguin, and Miroslava Raines NP within the geriatric & neuropsychology departments at [facility] for the treatment of Alzheimer's Dementia).       - Patient and her POA were instructed to call 911 and/or [phone] and return to the nearest ED if the patient begins feeling suicidal, homicidal, or gravely disabled (for s/p discharge from this facility).       - Thank you for this consult ; will continue to follow patient while at [facility]         Total time spent with patient and/or managing/coordinating patient's care today (excluding the time spent on psychotherapy): 78 minutes   Time spent on psychotherapy today (as noted above): 21 minutes   Total time for encounter today including psychotherapy: 99 minutes      More than 50% of the time was spent counseling/coordinating care.     Consulting clinician was informed of the encounter and consult note.       STAFF:  Kody Smith MD  [facility]   3/28/2025         [1]   Social History  Tobacco Use   Smoking Status Former    Current packs/day: 0.00    Types: Cigarettes    Quit date: 1989    Years since quittin.8    Passive exposure: Never   Smokeless Tobacco Never

## 2025-03-28 NOTE — SUBJECTIVE & OBJECTIVE
Interval History:     I have seen the patient's today  Patient is pleasantly confused this morning, she knows she is in the hospital    I discussed with Dr. Smith from psych, medications adjustment is done    I discussed with , plan to discharge her back to assisted facility possibly tomorrow    Review of Systems   Unable to perform ROS: Dementia     Objective:     Vital Signs (Most Recent):  Temp: 98.1 °F (36.7 °C) (03/28/25 1203)  Pulse: 76 (03/28/25 1203)  Resp: 18 (03/28/25 1203)  BP: 139/64 (03/28/25 1203)  SpO2: 98 % (03/28/25 1203) Vital Signs (24h Range):  Temp:  [97.3 °F (36.3 °C)-98.7 °F (37.1 °C)] 98.1 °F (36.7 °C)  Pulse:  [64-81] 76  Resp:  [18] 18  SpO2:  [93 %-98 %] 98 %  BP: (118-174)/(56-74) 139/64     Weight: 54.4 kg (120 lb)  Body mass index is 21.26 kg/m².    Intake/Output Summary (Last 24 hours) at 3/28/2025 1428  Last data filed at 3/28/2025 0730  Gross per 24 hour   Intake 360 ml   Output --   Net 360 ml         Physical Exam  Constitutional:       Appearance: Normal appearance.   Cardiovascular:      Rate and Rhythm: Normal rate and regular rhythm.   Pulmonary:      Effort: Pulmonary effort is normal. No respiratory distress.      Breath sounds: Normal breath sounds.   Abdominal:      Palpations: Abdomen is soft.   Skin:     General: Skin is warm and dry.   Neurological:      General: No focal deficit present.      Mental Status: She is alert.               Significant Labs: All pertinent labs within the past 24 hours have been reviewed.  BMP:   Recent Labs   Lab 03/27/25  1047      K 4.7      CO2 21*   BUN 38*   CREATININE 1.7*   CALCIUM 9.3   MG 1.7     CBC:   Recent Labs   Lab 03/27/25  1047   WBC 8.42   HGB 11.2*   HCT 33.6*          Significant Imaging: I have reviewed all pertinent imaging results/findings within the past 24 hours.

## 2025-03-28 NOTE — PLAN OF CARE
MOON Message     Medicare Outpatient and Observation Notification regarding financial responsibility Explained to patient/caregiver; Signed/date by patient/caregiver   Date MATSON was signed 3/28/2025   Time MATSON was signed 0923

## 2025-03-28 NOTE — ASSESSMENT & PLAN NOTE
CAM is likely due to pre-renal azotemia due to intravascular volume depletion. Baseline creatinine is around 1. Most recent creatinine and eGFR are listed below.  Recent Labs     03/27/25  1047   CREATININE 1.7*   EGFRNORACEVR 28*      Plan  - CAM is stable,still waiting for repeat BMP  - Avoid nephrotoxins and renally dose meds for GFR listed above  - Monitor urine output, serial BMP, and adjust therapy as needed  - encourage oral hydration

## 2025-03-28 NOTE — ASSESSMENT & PLAN NOTE
Monitor blood pressure  Hold losartan for CAM  If needed can be started on nifedipine  Repeat BMP

## 2025-03-28 NOTE — PROGRESS NOTES
03/27/25 2243   Admission   Initial VN Admission Questions Complete     Pt confused and with Alzheimer's per chart review. Unable to answer questions. No family present currently per bedside nurse. Admission questions completed via chart review

## 2025-03-28 NOTE — ASSESSMENT & PLAN NOTE
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 Dementia is severe. The patient does have signs of behavioral disturbance. Home dementia medications are Held or Continued:  She is not on any. Continue non-pharmacologic interventions to prevent delirium (No VS between 11PM-5AM, activity during day, opening blinds, providing glasses/hearing aids, and up in chair during daytime). Will avoid narcotics and benzos unless absolutely necessary. PRN anti-psychotics are prescribed to avoid self harm behaviors.    Haldol p.r.n. for non redirectable agitation  Psych evaluated the patient and medications were adjusted   Seroquel at night PRN  Patient is elopement risk and will need one-to-one monitoring

## 2025-03-29 VITALS
HEART RATE: 77 BPM | HEIGHT: 63 IN | OXYGEN SATURATION: 95 % | SYSTOLIC BLOOD PRESSURE: 140 MMHG | RESPIRATION RATE: 17 BRPM | WEIGHT: 120 LBS | BODY MASS INDEX: 21.26 KG/M2 | DIASTOLIC BLOOD PRESSURE: 59 MMHG | TEMPERATURE: 99 F

## 2025-03-29 PROCEDURE — 25000003 PHARM REV CODE 250: Mod: HCNC | Performed by: STUDENT IN AN ORGANIZED HEALTH CARE EDUCATION/TRAINING PROGRAM

## 2025-03-29 PROCEDURE — G0378 HOSPITAL OBSERVATION PER HR: HCPCS | Mod: HCNC

## 2025-03-29 RX ORDER — DONEPEZIL HYDROCHLORIDE 5 MG/1
5 TABLET, FILM COATED ORAL NIGHTLY
Qty: 30 TABLET | Refills: 11 | Status: SHIPPED | OUTPATIENT
Start: 2025-03-29 | End: 2025-03-29

## 2025-03-29 RX ORDER — DONEPEZIL HYDROCHLORIDE 5 MG/1
5 TABLET, FILM COATED ORAL NIGHTLY
Qty: 30 TABLET | Refills: 0 | Status: SHIPPED | OUTPATIENT
Start: 2025-03-29 | End: 2025-04-28

## 2025-03-29 RX ORDER — LANOLIN ALCOHOL/MO/W.PET/CERES
800 CREAM (GRAM) TOPICAL ONCE
Status: COMPLETED | OUTPATIENT
Start: 2025-03-29 | End: 2025-03-29

## 2025-03-29 RX ORDER — BREXPIPRAZOLE 0.25 MG/1
0.5 TABLET ORAL NIGHTLY
Qty: 30 TABLET | Refills: 1 | Status: SHIPPED | OUTPATIENT
Start: 2025-03-29 | End: 2025-04-03

## 2025-03-29 RX ORDER — QUETIAPINE FUMARATE 25 MG/1
25 TABLET, FILM COATED ORAL NIGHTLY PRN
Qty: 30 TABLET | Refills: 0 | Status: SHIPPED | OUTPATIENT
Start: 2025-03-29 | End: 2025-04-28

## 2025-03-29 RX ADMIN — MAGNESIUM OXIDE TAB 400 MG (241.3 MG ELEMENTAL MG) 800 MG: 400 (241.3 MG) TAB at 09:03

## 2025-03-29 RX ADMIN — PANTOPRAZOLE SODIUM 40 MG: 40 TABLET, DELAYED RELEASE ORAL at 09:03

## 2025-03-29 RX ADMIN — FLUOXETINE HYDROCHLORIDE 40 MG: 20 CAPSULE ORAL at 09:03

## 2025-03-29 RX ADMIN — LEVOTHYROXINE SODIUM 25 MCG: 25 TABLET ORAL at 06:03

## 2025-03-29 NOTE — PROGRESS NOTES
The carol spoke to Vidhya [phone] at [facility] and notified her the pt will d/c today. She states she was notified yesterday by staff that the pt would be returning today or tomorrow. The carol informed her the d/c summary will be faxed to her at [phone] once the dr writes it. She thanked the sw and states she will be looking for the d/c summary.     11:29am The carol faxed the pt's d/c summary to [facility] at [phone].

## 2025-03-29 NOTE — ASSESSMENT & PLAN NOTE
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 Dementia is severe. The patient does have signs of behavioral disturbance. Home dementia medications are Held or Continued:  She is not on any. Continue non-pharmacologic interventions to prevent delirium (No VS between 11PM-5AM, activity during day, opening blinds, providing glasses/hearing aids, and up in chair during daytime). Will avoid narcotics and benzos unless absolutely necessary. PRN anti-psychotics are prescribed to avoid self harm behaviors.    Haldol p.r.n. for non redirectable agitation  Psych evaluated the patient and medications were adjusted accordingly  Seroquel at night PRN  Patient is elopement risk and will need one-to-one monitoring

## 2025-03-29 NOTE — ASSESSMENT & PLAN NOTE
CAM is likely due to pre-renal azotemia due to intravascular volume depletion. Baseline creatinine is around 1. Most recent creatinine and eGFR are listed below.  Recent Labs     03/27/25  1047 03/28/25  1507   CREATININE 1.7* 1.0   EGFRNORACEVR 28* 53*      Plan  Resolved  Encourage oral hydration

## 2025-03-29 NOTE — NURSING
VN reviewed discharge instructions with pt's daughter. Using teach back method.  AVS printed and handed to pt by bedside nurse.  Reviewed follow-up appointments, medications, diet, and importance of medication compliance.  Reviewed home care instructions, treatment plan, self-management, and when to seek medical attention.  Allowed time for questions.  All questions answered.  Patient's daughter  verbalized complete understanding of discharge instructions and voices no concerns.     Discharge instructions complete.    Transport/wheelchair requested.  Bedside nurse notified.
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
## 2025-03-29 NOTE — HOSPITAL COURSE
Patient was admitted for agitation, she has hx of dementia with behavioral disturbances and being followed up by neuropsych as outpatient with recent medications adjustment. Pt was evaluated by psych here and meds adjustment was made. She has been pleasant and cooperative with no aggressive bahviors.  Her daughter was updated and patient will be discharged back to the assisted living with outpatient follow up with neuropsych.

## 2025-03-29 NOTE — PROGRESS NOTES
The pt's dtr transported her home at d/c. The pt's dtr had no further questions or Case Management needs. The pt was cleared to d/c home.     Future Appointments   Date Time Provider Department Center   4/1/2025 11:20 AM Lashonda Pearce MD [facility] - Telemetry  Discharge Final Note    Primary Care Provider: Lashonda Pearce MD    Expected Discharge Date: 3/29/2025    Final Discharge Note (most recent)       Final Note - 03/28/25 1200          Final Note    Assessment Type Discharge Planning Assessment                     Important Message from Medicare             Contact Info       Lashonda Pearce MD   Specialty: Internal Medicine   Relationship: PCP - General    [address] [place] [address]   Phone: [phone]       Next Steps: Follow up on 4/1/2025    Instructions: at 11:20 AM; previously scheduled PCP appointment    Tasha Emerson MD   Specialty: Internal Medicine, Geriatric Medicine, Palliative Medicine   Relationship: Consulting Physician    [facility] [facility] [address]   Phone: [phone]       Next Steps: Follow up    Instructions: Pt's daughter will be notified of a neuro-psych hospital follow up appointment.

## 2025-03-31 ENCOUNTER — PATIENT OUTREACH (OUTPATIENT)
Dept: ADMINISTRATIVE | Facility: CLINIC | Age: OVER 89
End: 2025-03-31
Payer: MEDICARE

## 2025-04-01 ENCOUNTER — OFFICE VISIT (OUTPATIENT)
Dept: PRIMARY CARE CLINIC | Facility: CLINIC | Age: OVER 89
End: 2025-04-01
Payer: MEDICARE

## 2025-04-01 VITALS
HEIGHT: 63 IN | SYSTOLIC BLOOD PRESSURE: 116 MMHG | OXYGEN SATURATION: 98 % | DIASTOLIC BLOOD PRESSURE: 68 MMHG | WEIGHT: 108.69 LBS | BODY MASS INDEX: 19.26 KG/M2 | HEART RATE: 77 BPM

## 2025-04-01 DIAGNOSIS — N17.9 AKI (ACUTE KIDNEY INJURY): ICD-10-CM

## 2025-04-01 DIAGNOSIS — I15.9 SECONDARY HYPERTENSION: ICD-10-CM

## 2025-04-01 DIAGNOSIS — G30.1 MILD LATE ONSET ALZHEIMER'S DEMENTIA WITH AGITATION: Primary | ICD-10-CM

## 2025-04-01 DIAGNOSIS — F02.A11 MILD LATE ONSET ALZHEIMER'S DEMENTIA WITH AGITATION: Primary | ICD-10-CM

## 2025-04-01 PROCEDURE — 99999 PR PBB SHADOW E&M-EST. PATIENT-LVL III: CPT | Mod: PBBFAC,HCNC,, | Performed by: STUDENT IN AN ORGANIZED HEALTH CARE EDUCATION/TRAINING PROGRAM
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
## 2025-04-01 NOTE — ASSESSMENT & PLAN NOTE
On rexulti daily. Seroquel nightly prn. Also started on aricept in the hospital. Has appt with dr. Tijerina on April 7th. They thought this aggressive behavior was due to dehydration. Pt is not good about drinking water.

## 2025-04-01 NOTE — PROGRESS NOTES
Clinic Note  4/1/2025      Subjective:       Patient ID:  Olamide is a 89 y.o. female being seen for an established visit.    Chief Complaint: Hospital Follow Up    HPI  Olamide Felipe is a 89 y.o.  female who presents with hypothyroidism, aortic atherosclerosis, hyperparathyroidism, HLD, breast cancer (invasive ductal carcinoma of the right breast s/p lumpectomy 1/7/22, s/p postopertaive radiation (follows with breast center), OP, [facility] (sees neuro), mood disorder (takes fluoxetine for night terrors), hx of vertigo is here for a hospital f/u visit. Did acp 2025  -no falls  -went to the ER for agitated aggressive behavior, more confusion. Dr brock started rexulti and she doubled it. Seroquel is only as prn at night. Aricept was added in the recent hospitalization. Has appt with dr. Brock coming up. In the hospital did well, no aggressive behavior. Better since discharge. They said it was due to dehydration.   -sleeping well since discharge.   -will do bmp next visit.     Review of Systems   Constitutional:  Negative for chills and fever.   HENT:  Negative for congestion and hearing loss.    Respiratory:  Negative for cough and shortness of breath.    Cardiovascular:  Negative for chest pain and palpitations.   Gastrointestinal:  Negative for abdominal pain, blood in stool, constipation, diarrhea and vomiting.   Genitourinary:  Negative for dysuria and hematuria.   Neurological:  Negative for dizziness and headaches.   Psychiatric/Behavioral:  Positive for memory loss.        Medication List with Changes/Refills   Current Medications    ACETAMINOPHEN (TYLENOL) 325 MG TABLET    Take 325 mg by mouth every 6 (six) hours as needed for Pain.    B COMPLEX VITAMINS TABLET    Take 1 tablet by mouth once daily.    BREXPIPRAZOLE (REXULTI) 0.25 MG TAB    Take 2 tablets (0.5 mg total) by mouth every evening.    CALCIUM-VITAMIN D 250-100 MG-UNIT PER TABLET    Take 1 tablet by mouth 2 (two) times daily.    DONEPEZIL  "(ARICEPT) 5 MG TABLET    Take 1 tablet (5 mg total) by mouth every evening.    FLUOXETINE 40 MG CAPSULE    Take 1 capsule (40 mg total) by mouth once daily.    LEVOTHYROXINE (SYNTHROID) 25 MCG TABLET    Take 1 tablet (25 mcg total) by mouth before breakfast.    LORATADINE (CLARITIN) 10 MG TABLET    Take 1 tablet (10 mg total) by mouth once daily.    LOSARTAN (COZAAR) 25 MG TABLET    Take 1 tablet (25 mg total) by mouth once daily.    MV-MIN/IRON/FOLIC/CALCIUM/VITK (WOMEN'S MULTIVITAMIN ORAL)    Take 1 tablet by mouth once daily.    OMEPRAZOLE (PRILOSEC) 20 MG CAPSULE    Take 1 capsule (20 mg total) by mouth once daily.    POLYETHYLENE GLYCOL (GLYCOLAX) 17 GRAM PWPK    Take 17 g by mouth 2 (two) times daily as needed for Constipation.    QUETIAPINE (SEROQUEL) 25 MG TAB    Take 1 tablet (25 mg total) by mouth nightly as needed (agitation).    SENNA-DOCUSATE 8.6-50 MG (PERICOLACE) 8.6-50 MG PER TABLET    Take 1 tablet by mouth daily as needed for Constipation.    SIMVASTATIN (ZOCOR) 20 MG TABLET    Take 1 tablet (20 mg total) by mouth every evening.           Objective:      /68 (BP Location: Left arm, Patient Position: Sitting)   Pulse 77   Ht 5' 3" (1.6 m)   Wt 49.3 kg (108 lb 11 oz)   SpO2 98%   BMI 19.25 kg/m²   Estimated body mass index is 19.25 kg/m² as calculated from the following:    Height as of this encounter: 5' 3" (1.6 m).    Weight as of this encounter: 49.3 kg (108 lb 11 oz).  Physical Exam  Constitutional:       Appearance: Normal appearance.   HENT:      Mouth/Throat:      Mouth: Mucous membranes are moist.      Pharynx: Oropharynx is clear.   Eyes:      Conjunctiva/sclera: Conjunctivae normal.   Neck:      Vascular: No carotid bruit.   Cardiovascular:      Rate and Rhythm: Normal rate and regular rhythm.      Heart sounds: Normal heart sounds.   Pulmonary:      Effort: Pulmonary effort is normal. No respiratory distress.      Breath sounds: Normal breath sounds.   Abdominal:      General: " Bowel sounds are normal. There is no distension.      Palpations: Abdomen is soft.      Tenderness: There is no abdominal tenderness.   Musculoskeletal:      Right lower leg: No edema.      Left lower leg: No edema.   Lymphadenopathy:      Cervical: No cervical adenopathy.   Skin:     Findings: Bruising present.   Neurological:      Mental Status: She is alert. Mental status is at baseline.   Psychiatric:         Mood and Affect: Mood normal.         Behavior: Behavior normal.           Assessment and Plan:     1. 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 Seroquel nightly prn. Also started on aricept in the hospital. Has appt with dr. Tijerina on April 7th. They thought this aggressive behavior was due to dehydration. Pt is not good about drinking water.       2. CAM (acute kidney injury)  Assessment & Plan:  Improved after IV hydration. Her GFR is usually >60. Will monitor       3. Secondary hypertension  Assessment & Plan:  BP is well controlled today. On losartan 25 mg daily, continue. Will monitor                 Follow Up:   Follow up in about 2 months (around 6/1/2025).      Lashonda Pearce

## 2025-04-02 ENCOUNTER — TELEPHONE (OUTPATIENT)
Dept: PALLIATIVE MEDICINE | Facility: CLINIC | Age: OVER 89
End: 2025-04-02
Payer: MEDICARE

## 2025-04-03 RX ORDER — BREXPIPRAZOLE 0.5 MG/1
1 TABLET ORAL NIGHTLY
Qty: 30 TABLET | Refills: 1 | Status: SHIPPED | OUTPATIENT
Start: 2025-04-03

## 2025-04-07 ENCOUNTER — OFFICE VISIT (OUTPATIENT)
Dept: PALLIATIVE MEDICINE | Facility: CLINIC | Age: OVER 89
End: 2025-04-07
Payer: MEDICARE

## 2025-04-07 VITALS
HEART RATE: 70 BPM | SYSTOLIC BLOOD PRESSURE: 155 MMHG | DIASTOLIC BLOOD PRESSURE: 70 MMHG | BODY MASS INDEX: 19.45 KG/M2 | WEIGHT: 109.81 LBS

## 2025-04-07 DIAGNOSIS — F39 MOOD DISORDER: ICD-10-CM

## 2025-04-07 DIAGNOSIS — F02.A11 MILD LATE ONSET ALZHEIMER'S DEMENTIA WITH AGITATION: Primary | ICD-10-CM

## 2025-04-07 DIAGNOSIS — G30.1 MILD LATE ONSET ALZHEIMER'S DEMENTIA WITH AGITATION: Primary | ICD-10-CM

## 2025-04-07 PROCEDURE — 99999 PR PBB SHADOW E&M-EST. PATIENT-LVL III: CPT | Mod: PBBFAC,HCNC,, | Performed by: STUDENT IN AN ORGANIZED HEALTH CARE EDUCATION/TRAINING PROGRAM

## 2025-04-07 RX ORDER — BREXPIPRAZOLE 1 MG/1
1 TABLET ORAL NIGHTLY
Qty: 30 TABLET | Refills: 1 | Status: SHIPPED | OUTPATIENT
Start: 2025-04-07 | End: 2025-04-07

## 2025-04-07 RX ORDER — BREXPIPRAZOLE 1 MG/1
1 TABLET ORAL NIGHTLY
Qty: 30 TABLET | Refills: 1 | Status: SHIPPED | OUTPATIENT
Start: 2025-04-07

## 2025-04-07 NOTE — PROGRESS NOTES
Palliative and Geriatric Medicine Evaluation  85+ clinic Follow up    Patient Name: Olamide Felipe     Date: 04/11/2025      Consult Requested By:  No ref. provider found    Primary Care Physician:  Lashonda Pearce MD    Reason for Consult: Advance care planning and symptom management in the setting of cognitive impairment     Providers:  Nighat Arreguin PsyD (Neuropsychology); Miroslava Raines DNP (Neurology); Tasha Emerson MD (Palliative Medicine)    Assessment/Plan     Plan/Recommendations:  Olamide was seen today for agitation, restlessness and insomnia.    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    Mood disorder  -     brexpiprazole (REXULTI) 1 mg Tab; Take 1 tablet (1 mg total) by mouth every evening.    Mentation: Cognition and Mood   Moderate late onset Alzheimer's dementia with agitation   -Cognitive impairment since 2019   -Lives in [facility]  -Continue Fluoxetine 40mg daily  - Increased Rexulti from 0.5 mg to 1 mg daily to address ongoing agitation and confusion, particularly at night.  - Explained that antipsychotics like Rexulti are used to manage behavioral complications of dementia, not as a treatment for the underlying condition.  - Discussed black box warning for antipsychotics in dementia patients, including increased risk of death and cardiac side effects.  - Continued Seroquel at current dose for nighttime use as needed for agitation.  - Continued Aricept at current dose.  - Considered adding melatonin for sleep issues but decided to focus on one medication change at a time.  - Evaluated current medications including Aricept, Seroquel, and fluoxetine for management of dementia symptoms.  - Assessed functional status and safety in current living environment.  - Discussed potential need for memory care placement if behavioral  "issues cannot be adequately controlled in current living situation.  - Patient to take notes from provided book as part of "secretarial work" assignment.    F    Mobility  At Risk for falls  Olamide Felipe is  not independent with ADL's and is not independent with IADL's  - fall w/ clavicle fracture because she was carrying too many bags in 2023  -Last fall about 3 weeks ago when walking outside  -walking independently, walking every day in garage and needs supervision if walking outside  -Now in shelter      Medications  -Medication reconciliation performed   -High risk medications identified   -On Fluoxetine and Rexulti  -Recommend to avoid benzo's or antihistamines (such as Benadryl).      Multi-Complexity:  -Frailty based on Clinical Frailty scale yes  -Weight loss: yes, BMI: Body mass index is 20.19 kg/m².  -Comorbidities listed:  Hypothyroidism, HLD, HTN  Breast cancer s/p Lumpectomy 2022      Palliative Care Encounter / Advance Care Planning      Advance Care Planning     Advance Directives:   Living Will: Yes        Copy on chart: Yes    [place]: Yes    Medical Power of : Yes    Agent's Name:  Christy Garcia   Agent's Contact Number:  [phone]    Decision Making:  Patient answered questions  Goals of Care: Advance Care Planning    Date: 03/20/2025  I engaged the patient and daughter in a voluntary conversation about advance care planning and we specifically addressed what the goals of care would be moving forward.  We explored the patient's values and preferences for future care.  The patient endorses that what is most important right now is to focus on remaining as independent as possible and being able to walk and comfort and QoL. Patient states that's he wants to continue walking as she enjoys that significantly. Daughter hopes for her to be able to maintain a good QoL and to maintain a the good mood that she currently has.  Accordingly, we have decided that the best plan to meet the patient's " goals includes continuing with treatment and palliative care.         Date: 08/29/2024  I engaged the patient and family in a voluntary conversation about advance care planning and we specifically addressed what the goals of care would be moving forward.  We explored the patient's values and preferences for future care.  The patient endorses that what is most important right now is to focus on remaining as independent as possible. Patient expresses a strong desire for independence and the ability to continue activities like walking. Quality of life for the patient involves maintaining independence, engaging in activities she enjoys, and not feeling constrained by the care facility's routines. Patient has LAPOST in place from 1/2024 stating DNR, SELECTIVE TX AND NO ARTIFICIAL NUTRITION BY TUBE  She also has HCPOA naming Christy Garcia as agent.     They shared that he has ACP documents at home and they will upload them or bring them in to the next appointment            - Follow up for virtual visit in 1-2 months.  - Daughter to message doctor at the beginning of next month to send Rexulti prescription to [facility].      Subjective:     Informant: patient and daughter     Chief Complaint:   Chief Complaint   Patient presents with    Agitation    restlessness    Insomnia       History of Present Illness:  Olamide Felipe is a 89 y.o. female presenting with cognitive impairment .  Referred to Geriatric and Palliative Care for evaluation and management of advance care planning, and additional support..     Patient, is being seen for management of dementia symptoms, including confusion, agitation, and sleep disturbances.   She was discharged from the hospital approximately 10 days ago, with medication adjustments. Patient's daughter reports nighttime challenges, with increased confusion and agitation. Last Thursday to Friday, the patient did not sleep for over 24 hours and has been wandering at night, though her  "appetite remains good.   Her mood has been generally stable without significant outbursts. The care facility has been using Seroquel as needed to manage agitation before bedtime, which has been helpful. Patient expresses frustration with her current living situation, feeling treated like a child, and desires to work. She can dress herself but requires assistance with showering and some daily activities. Patient participates in activities at the care facility, including music therapy, art therapy, and bingo.     Recent medication changes include an increase in Rexulti dosage from 0.25mg to 0.5mg, introduction of Aricept, and adjustment of Seroquel for nighttime use, implemented upon hospital discharge. Patient denies any side effects such as sedation from her current medications.    Patient is able to dress herself, but staff lay out her clothes for her  Needs assistance with showering; has an attendant with her at the house for this  Can use the bathroom independently  Able to feed herself; meals are cooked for her  Staff do her laundry and change her bed  Able to walk independently  Decorates for holidays and helps with meal preparation at the facility  Experiences confusion, especially at night  Has episodes of agitation and sleeplessness  Participates in activities at the facility such as bingo and music therapy  Enjoys reading and doing word puzzle books    Patient currently resides in a group home setting with other residents. She expresses a strong desire for independence and purpose, stating "I feel like I'm not doing anything which I am not really. That's the only thing that really drives me.". Patient's quality of life appears to be closely tied to feeling useful and having activities to engage in.    --------------    Living in [facility], was having disrptive behaviors, was found wandering trying to go see her twin sister (lives in [place]), she had been agitated and having delusions of going in an RV, " "or people picking her up to go see a show.   Rexulti .25mg started Thursday  and has helped behaviors, now improved mood, no delusions. Serowuel was stopped. jail staff reporting good mood, sleep and appetite   4lb weight loss, now eating better, sleeping well  Walks down the driveway, maintaining her ability of walk is important to her   Daughter stated that most important is maintain her qol and mood   Walking independently, last fall 3 weeks ago when she left the jail      -----------  The patient, a 89-year-old woman, has been residing in an assisted living facility since January. She expresses significant dissatisfaction with her current living situation, feeling out of place among less mobile residents. The patient desires more independence and activities, particularly missing her ability to walk freely. She feels restricted by the facility's rules, such as not being allowed to walk alone outside due to safety concerns.  Over the past three weeks, the patient has experienced increased emotional distress, crying frequently and expressing a desire to leave the facility. She has made concerning statements, including wanting to "open the door and fall out and die" while in transit. The patient denies any intention to harm herself but is struggling with adjustment and mood issues.  The patient's daughter reports that prior to these recent three weeks, her mother had been more stable emotionally. A UTI was ruled out as a potential cause for the behavior change. The patient is currently on Seroquel, which was previously helping to stabilize her mood. She also takes fluoxetine for night terrors, which has been effective in preventing them.  The patient expresses frustration with the lack of stimulating activities at the facility, mentioning that she enjoys puzzle books but finds the constant television watching by other residents unstimulating. She misses her previous active lifestyle, including regular walks, and feels " constrained by the facility's safety protocols.  The patient denies any recent falls or injuries.  Behavioral issues, paranoia, accusing roommate  Small, residential care home, Christy daughter just retired from [employer]  Mom wants her to visit more often Fluoxetine 20 and seroquel 25    Palliative Exam     Review of Symptoms      Symptom Assessment (ESAS 0-10 Scale)  Unable to complete assessment due to Dementia     CAM / Delirium:  Negative      Pain Assessment in Advanced Demential Scale (PAINAD)   Breathing - Independent of vocalization:  0  Negative vocalization:  0  Facial expression:  0  Body language:  0  Consolability:  0  Total:  0    Living Arrangements:  Lives in assisted living    Psychosocial/Cultural:   See Palliative Psychosocial Note: Yes  Lives in [facility],  and has a daughter and 2 adult sons. Patient is a former . 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   **Primary  to Follow**  Palliative Care  Consult: Yes      4Ms for Medical Decision-Making in Older Adults    Last Completed EAWV: 7/8/2024    MEDICATIONS:  High Risk Medications:  Total Active Medications: 2  FLUoxetine - 40 MG  QUEtiapine Tab - 25 MG    MOBILITY:  Activities of Daily Living:      3/20/2025     2:25 PM   Activities of Daily Living   Ambulation Independent   Dressing Independent   Transfers Independent   Feeding Independent   Cleaning home/Chores Assistance Required   Telephone use Assistance Required   Shopping Assistance Required   Paying bills Assistance Required   Taking meds Assistance Required   If required, who assists the patient with ADLs? [facility]     Fall Risk:      4/7/2025     3:00 PM 4/1/2025    11:20 AM 3/20/2025    10:30 AM   Fall Risk Assessment - Outpatient   Mobility Status Ambulatory w/ assistance Ambulatory Ambulatory   Number of falls 1 with injury 0 1   Identified as fall risk True False False      Disability Status:      3/20/2025     2:25 PM   Disability Status   Are you deaf or do you have serious difficulty hearing? N   Are you blind or do you have serious difficulty seeing, even when wearing glasses? N   Because of a physical, mental, or emotional condition, do you have serious difficulty concentrating, remembering, or making decisions? Y   Do you have serious difficulty walking or climbing stairs? Y   Do you have difficulty dressing or bathing? Y   Because of a physical, mental, or emotional condition, do you have difficulty doing errands alone such as visiting a doctor's office or shopping? Y     Nutrition Screening:      3/20/2025     2:26 PM   Nutrition Screening   Has food intake declined over the past three months due to loss of appetite, digestive problems, chewing or swallowing difficulties? No decrease in food intake   Involuntary weight loss during the last 3 months? Weight loss between 1 and 3 kg (2.2 and 6.6 pounds)   Mobility? Able to get out of bed/chair, but does not go out   Has the patient suffered psychological stress or acute disease in the past three months? No   Neuropsychological problems? Mild dementia   Body Mass Index (BMI)?  BMI 19 to less than 21   Screening Score 9   Interpretation At risk of malnutrition    Screening Score: 0-7 Malnourished, 8-11 At Risk, 12-14 Normal  Get Up and Go:      7/8/2024     9:18 AM 3/6/2023     2:53 PM 1/25/2021     9:08 AM   Get Up and Go   Trial 1 8 seconds -- 10 seconds     Whisper Test:      7/8/2024     9:21 AM   Whisper Test   Whisper Test Abnormal           MENTATION:   Has Dementia Dx: Yes    Has Anxiety Dx: No    Depression Patient Health Questionnaire:      2/18/2025     8:17 AM   Depression Patient Health Questionnaire   Over the last two weeks how often have you been bothered by little interest or pleasure in doing things Not at all   Over the last two weeks how often have you been bothered by feeling down, depressed or hopeless Not  at all   PHQ-2 Total Score 0              Review of patient's allergies indicates:  No Known Allergies      Medications:    Current Outpatient Medications:     acetaminophen (TYLENOL) 325 MG tablet, Take 325 mg by mouth every 6 (six) hours as needed for Pain., Disp: , Rfl:     b complex vitamins tablet, Take 1 tablet by mouth once daily., Disp: , Rfl:     calcium-vitamin D 250-100 mg-unit per tablet, Take 1 tablet by mouth 2 (two) times daily., Disp: , Rfl:     donepeziL (ARICEPT) 5 MG tablet, Take 1 tablet (5 mg total) by mouth every evening., Disp: 30 tablet, Rfl: 0    FLUoxetine 40 MG capsule, Take 1 capsule (40 mg total) by mouth once daily., Disp: 90 capsule, Rfl: 3    levothyroxine (SYNTHROID) 25 MCG tablet, Take 1 tablet (25 mcg total) by mouth before breakfast., Disp: 90 tablet, Rfl: 3    loratadine (CLARITIN) 10 mg tablet, Take 1 tablet (10 mg total) by mouth once daily., Disp: 90 tablet, Rfl: 3    losartan (COZAAR) 25 MG tablet, Take 1 tablet (25 mg total) by mouth once daily. (Patient taking differently: Take 25 mg by mouth every morning. Check BP), Disp: 90 tablet, Rfl: 3    mv-min/iron/folic/calcium/vitK (WOMEN'S MULTIVITAMIN ORAL), Take 1 tablet by mouth once daily., Disp: , Rfl:     omeprazole (PRILOSEC) 20 MG capsule, Take 1 capsule (20 mg total) by mouth once daily., Disp: 90 capsule, Rfl: 0    polyethylene glycol (GLYCOLAX) 17 gram PwPk, Take 17 g by mouth 2 (two) times daily as needed for Constipation. (Patient taking differently: Take 17 g by mouth 2 (two) times daily as needed for Constipation. Mix in 8 oz of water), Disp: , Rfl: 0    QUEtiapine (SEROQUEL) 25 MG Tab, Take 1 tablet (25 mg total) by mouth nightly as needed (agitation)., Disp: 30 tablet, Rfl: 0    senna-docusate 8.6-50 mg (PERICOLACE) 8.6-50 mg per tablet, Take 1 tablet by mouth daily as needed for Constipation., Disp: 90 tablet, Rfl: 0    simvastatin (ZOCOR) 20 MG tablet, Take 1 tablet (20 mg total) by mouth every evening., Disp:  90 tablet, Rfl: 3    brexpiprazole (REXULTI) 1 mg Tab, Take 1 tablet (1 mg total) by mouth every evening., Disp: 30 tablet, Rfl: 1     database queried on 04/11/2025  by Tasha Emerson . The results reviewed and considered with the clinical data in the decision whether or not to prescribe a controlled substance.   No medications on file       Objective:   Physical Exam:  Vitals: Pulse: 70 (04/07/25 1449)  BP: (!) 155/70 (04/07/25 1449)  SpO2: (P) 97 % (04/07/25 1449)    LABS:  Creatinine 3/28/25 1.0     HGB 3/27/25 11.2 Low      EKG:   [facility] QTC Calculation 1/28/25 434     IMAGING:    Physical Exam  Constitutional:       General: She is not in acute distress.     Comments: Frail    HENT:      Mouth/Throat:      Mouth: Mucous membranes are moist.   Eyes:      Extraocular Movements: Extraocular movements intact.   Pulmonary:      Effort: Pulmonary effort is normal. No respiratory distress.   Neurological:      Mental Status: She is alert. Mental status is at baseline. She is disoriented.      Gait: Gait is intact.   Psychiatric:         Mood and Affect: Mood normal.         Behavior: Behavior normal.         I spent a total of 48 minutes on the day of the visit. This includes face to face time in discussion of goals of care, symptom assessment, coordination of care and emotional support.  This also includes non-face to face time preparing to see the patient (eg, review of tests/imaging), obtaining and/or reviewing separately obtained history, documenting clinical information in the electronic or other health record, independently interpreting results and communicating results to the patient/family/caregiver, or care coordinator.         Signature: Tasha Emerson MD    In collaboration w/ Marco A Hanson NP

## 2025-04-23 RX ORDER — DONEPEZIL HYDROCHLORIDE 5 MG/1
5 TABLET, FILM COATED ORAL NIGHTLY
Qty: 30 TABLET | Refills: 0 | Status: CANCELLED | OUTPATIENT
Start: 2025-04-23 | End: 2025-05-23

## 2025-05-01 RX ORDER — DONEPEZIL HYDROCHLORIDE 5 MG/1
5 TABLET, FILM COATED ORAL NIGHTLY
Qty: 90 TABLET | Refills: 0 | Status: SHIPPED | OUTPATIENT
Start: 2025-05-01 | End: 2025-07-30

## 2025-05-08 ENCOUNTER — PATIENT OUTREACH (OUTPATIENT)
Dept: NEUROLOGY | Facility: CLINIC | Age: OVER 89
End: 2025-05-08
Payer: MEDICARE

## 2025-05-08 NOTE — PROGRESS NOTES
CTN reached out to cg, Christy Garcia for our [facility] [employer] Quarterly Check In call. CTN left a detailed voicemail message requesting a call back at the cg's convenience to schedule. CTN will try again on 5/14/25.

## 2025-05-14 ENCOUNTER — PATIENT OUTREACH (OUTPATIENT)
Dept: NEUROLOGY | Facility: CLINIC | Age: OVER 89
End: 2025-05-14
Payer: MEDICARE

## 2025-05-16 ENCOUNTER — PATIENT OUTREACH (OUTPATIENT)
Dept: NEUROLOGY | Facility: CLINIC | Age: OVER 89
End: 2025-05-16
Payer: MEDICARE

## 2025-05-16 NOTE — PROGRESS NOTES
ANIYAH reached out to the cg for our [facility] Call. Cg reports that the pt is more feeble. and sometimes can remember who the cg is and other times she can not.     Cg reports that the pt has not tried to wander or escape the [facility] recently. Cg states that the pt can never find where her things are and thinks that everything is missing. Cg states that the pt is now stating regularly that she has no money, and is accusing people in the house of stealing her money. Cg gave the pt a purse for Mothers day and put in $10 to see if someone would steal it. No one stole it, and that proved to the cg that it is indeed the pt's memory and not someone actually stealing.     Cg reports that one moment the pt is ok and another she is paranoid and anxious.     Cg reports that the pt is still eating, not angry or trying to escape and is generally pleasant, and that is compliant with taking her medication. Cg reports that the medication nurse is very good at getting the pt to take her medicine regularly.    Cg seems relaxed and content.     Cg reports that the pt will be spending more money than her social security will cover this month and they had to deal with that but it was fine and everything ended up working out..     Cg might need suggestions of where people have found success in memory care units.     Cg reports that the pt is content and in a good place.

## 2025-05-21 ENCOUNTER — PATIENT MESSAGE (OUTPATIENT)
Dept: PRIMARY CARE CLINIC | Facility: CLINIC | Age: OVER 89
End: 2025-05-21
Payer: MEDICARE

## 2025-05-21 RX ORDER — OMEPRAZOLE 20 MG/1
20 CAPSULE, DELAYED RELEASE ORAL DAILY
Qty: 90 CAPSULE | Refills: 3 | Status: SHIPPED | OUTPATIENT
Start: 2025-05-21

## 2025-05-21 RX ORDER — OMEPRAZOLE 20 MG/1
20 CAPSULE, DELAYED RELEASE ORAL DAILY
Qty: 90 CAPSULE | Refills: 3 | Status: SHIPPED | OUTPATIENT
Start: 2025-05-21 | End: 2025-05-21 | Stop reason: SDUPTHER

## 2025-06-09 ENCOUNTER — PATIENT MESSAGE (OUTPATIENT)
Dept: PALLIATIVE MEDICINE | Facility: CLINIC | Age: OVER 89
End: 2025-06-09
Payer: MEDICARE

## 2025-06-09 DIAGNOSIS — F02.A11 MILD LATE ONSET ALZHEIMER'S DEMENTIA WITH AGITATION: ICD-10-CM

## 2025-06-09 DIAGNOSIS — G30.1 MILD LATE ONSET ALZHEIMER'S DEMENTIA WITH AGITATION: ICD-10-CM

## 2025-06-09 DIAGNOSIS — F39 MOOD DISORDER: ICD-10-CM

## 2025-06-09 RX ORDER — BREXPIPRAZOLE 1 MG/1
1 TABLET ORAL NIGHTLY
Qty: 30 TABLET | Refills: 1 | Status: SHIPPED | OUTPATIENT
Start: 2025-06-09

## 2025-06-30 ENCOUNTER — PATIENT OUTREACH (OUTPATIENT)
Dept: NEUROLOGY | Facility: CLINIC | Age: OVER 89
End: 2025-06-30
Payer: MEDICARE

## 2025-06-30 NOTE — PROGRESS NOTES
CTN received an in coming email from the cg and CTN messaged her back available dates and times in the next few weeks to schedule our [facility] Call. CTN and cg will connect on Friday 07/11/25 at 2 PM.

## 2025-06-30 NOTE — ASSESSMENT & PLAN NOTE
Advance Care Planning     Date: 11/17/2023    Code Status  In light of the patients advanced and life limiting illness,I engaged the the family in a voluntary conversation about the patient's preferences for care  at the very end of life. The patient wishes to have a natural, peaceful death.  Along those lines, the patient does not wish to have CPR or other invasive treatments performed when her heart and/or breathing stops. I communicated to the family that a DNR order would be placed in her medical record to reflect this preference.  I spent a total of 7 minutes engaging the patient in this advance care planning discussion.           pmd

## 2025-07-02 ENCOUNTER — TELEPHONE (OUTPATIENT)
Dept: PALLIATIVE MEDICINE | Facility: CLINIC | Age: OVER 89
End: 2025-07-02
Payer: MEDICARE

## 2025-07-03 ENCOUNTER — OFFICE VISIT (OUTPATIENT)
Dept: PRIMARY CARE CLINIC | Facility: CLINIC | Age: OVER 89
End: 2025-07-03
Payer: MEDICARE

## 2025-07-03 VITALS
OXYGEN SATURATION: 96 % | DIASTOLIC BLOOD PRESSURE: 68 MMHG | WEIGHT: 109.38 LBS | HEART RATE: 62 BPM | SYSTOLIC BLOOD PRESSURE: 122 MMHG | BODY MASS INDEX: 19.37 KG/M2

## 2025-07-03 DIAGNOSIS — F02.A11 MILD LATE ONSET ALZHEIMER'S DEMENTIA WITH AGITATION: Primary | ICD-10-CM

## 2025-07-03 DIAGNOSIS — I15.9 SECONDARY HYPERTENSION: ICD-10-CM

## 2025-07-03 DIAGNOSIS — E03.9 HYPOTHYROIDISM, UNSPECIFIED TYPE: ICD-10-CM

## 2025-07-03 DIAGNOSIS — G30.1 MILD LATE ONSET ALZHEIMER'S DEMENTIA WITH AGITATION: Primary | ICD-10-CM

## 2025-07-03 PROCEDURE — 99999 PR PBB SHADOW E&M-EST. PATIENT-LVL III: CPT | Mod: PBBFAC,HCNC,, | Performed by: STUDENT IN AN ORGANIZED HEALTH CARE EDUCATION/TRAINING PROGRAM

## 2025-07-03 NOTE — PROGRESS NOTES
Clinic Note  7/3/2025      Subjective:       Patient ID:  Olamide is a 89 y.o. female being seen for an established visit.    Chief Complaint: No chief complaint on file.    HPI  Olamide Felipe is a 89 y.o.  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 Did acp 2025  -weight is stable. She is eating well. No trouble sleeping, she is sleeping thru the night per the nursing home.  -no falls  -do not need labs today.     Review of Systems   Constitutional:  Negative for chills and fever.   HENT:  Negative for congestion and hearing loss.    Respiratory:  Negative for cough and shortness of breath.    Cardiovascular:  Negative for chest pain.   Gastrointestinal:  Negative for abdominal pain, blood in stool, constipation and diarrhea.   Genitourinary:  Negative for dysuria and hematuria.   Neurological:  Negative for dizziness and headaches.   Psychiatric/Behavioral:  Positive for memory loss.        Medication List with Changes/Refills   Current Medications    ACETAMINOPHEN (TYLENOL) 325 MG TABLET    Take 325 mg by mouth every 6 (six) hours as needed for Pain.    B COMPLEX VITAMINS TABLET    Take 1 tablet by mouth once daily.    BREXPIPRAZOLE (REXULTI) 1 MG TAB    Take 1 tablet (1 mg total) by mouth every evening.    CALCIUM-VITAMIN D 250-100 MG-UNIT PER TABLET    Take 1 tablet by mouth 2 (two) times daily.    DONEPEZIL (ARICEPT) 5 MG TABLET    Take 1 tablet (5 mg total) by mouth every evening.    FLUOXETINE 40 MG CAPSULE    Take 1 capsule (40 mg total) by mouth once daily.    LEVOTHYROXINE (SYNTHROID) 25 MCG TABLET    Take 1 tablet (25 mcg total) by mouth before breakfast.    LORATADINE (CLARITIN) 10 MG TABLET     "Take 1 tablet (10 mg total) by mouth once daily.    LOSARTAN (COZAAR) 25 MG TABLET    Take 1 tablet (25 mg total) by mouth once daily.    MV-MIN/IRON/FOLIC/CALCIUM/VITK (WOMEN'S MULTIVITAMIN ORAL)    Take 1 tablet by mouth once daily.    OMEPRAZOLE (PRILOSEC) 20 MG CAPSULE    Take 1 capsule (20 mg total) by mouth once daily.    POLYETHYLENE GLYCOL (GLYCOLAX) 17 GRAM PWPK    Take 17 g by mouth 2 (two) times daily as needed for Constipation.    QUETIAPINE (SEROQUEL) 25 MG TAB    Take 1 tablet (25 mg total) by mouth nightly as needed (agitation).    SENNA-DOCUSATE 8.6-50 MG (PERICOLACE) 8.6-50 MG PER TABLET    Take 1 tablet by mouth daily as needed for Constipation.    SIMVASTATIN (ZOCOR) 20 MG TABLET    Take 1 tablet (20 mg total) by mouth every evening.           Objective:      /68 (BP Location: Left arm, Patient Position: Sitting)   Pulse 62   Wt 49.6 kg (109 lb 5.6 oz)   SpO2 96%   BMI 19.37 kg/m²   Estimated body mass index is 19.37 kg/m² as calculated from the following:    Height as of 4/1/25: 5' 3" (1.6 m).    Weight as of this encounter: 49.6 kg (109 lb 5.6 oz).  Physical Exam  Constitutional:       Appearance: Normal appearance.   Eyes:      Conjunctiva/sclera: Conjunctivae normal.   Cardiovascular:      Rate and Rhythm: Normal rate and regular rhythm.      Heart sounds: Normal heart sounds.   Pulmonary:      Effort: Pulmonary effort is normal. No respiratory distress.      Breath sounds: Normal breath sounds.   Abdominal:      General: Bowel sounds are normal.   Musculoskeletal:      Right lower leg: No edema.      Left lower leg: No edema.   Skin:     Findings: Bruising present.   Neurological:      Mental Status: She is alert. Mental status is at baseline.   Psychiatric:         Mood and Affect: Mood normal.         Behavior: Behavior normal.     4Ms for Medical Decision-Making in Older Adults    Last Completed EAWV: 7/8/2024    MEDICATIONS:  High Risk Medications:  Total Active Medications: " 1  FLUoxetine - 40 MG    MOBILITY:  Activities of Daily Living:      3/20/2025     2:25 PM   Activities of Daily Living   Ambulation Independent   Dressing Independent   Transfers Independent   Feeding Independent   Cleaning home/Chores Assistance Required   Telephone use Assistance Required   Shopping Assistance Required   Paying bills Assistance Required   Taking meds Assistance Required   If required, who assists the patient with ADLs? TERRENCE     Fall Risk:      7/3/2025    10:20 AM 4/7/2025     3:00 PM 4/1/2025    11:20 AM   Fall Risk Assessment - Outpatient   Mobility Status Ambulatory w/ assistance Ambulatory w/ assistance Ambulatory   Number of falls 1 with injury 1 with injury 0   Identified as fall risk True True False     Disability Status:      3/20/2025     2:25 PM   Disability Status   Are you deaf or do you have serious difficulty hearing? N   Are you blind or do you have serious difficulty seeing, even when wearing glasses? N   Because of a physical, mental, or emotional condition, do you have serious difficulty concentrating, remembering, or making decisions? Y   Do you have serious difficulty walking or climbing stairs? Y   Do you have difficulty dressing or bathing? Y   Because of a physical, mental, or emotional condition, do you have difficulty doing errands alone such as visiting a doctor's office or shopping? Y     Nutrition Screening:      3/20/2025     2:26 PM   Nutrition Screening   Has food intake declined over the past three months due to loss of appetite, digestive problems, chewing or swallowing difficulties? No decrease in food intake   Involuntary weight loss during the last 3 months? Weight loss between 1 and 3 kg (2.2 and 6.6 pounds)   Mobility? Able to get out of bed/chair, but does not go out   Has the patient suffered psychological stress or acute disease in the past three months? No   Neuropsychological problems? Mild dementia   Body Mass Index (BMI)?  BMI 19 to less than 21    Screening Score 9   Interpretation At risk of malnutrition    Screening Score: 0-7 Malnourished, 8-11 At Risk, 12-14 Normal  Get Up and Go:      2024     9:18 AM 3/6/2023     2:53 PM 2021     9:08 AM   Get Up and Go   Trial 1 8 seconds -- 10 seconds     Whisper Test:      2024     9:21 AM   Whisper Test   Whisper Test Abnormal           MENTATION:   Has Dementia Dx: Yes  Has Anxiety Dx: No    Depression Patient Health Questionnaire:      2025     8:17 AM   Depression Patient Health Questionnaire   Over the last two weeks how often have you been bothered by little interest or pleasure in doing things Not at all   Over the last two weeks how often have you been bothered by feeling down, depressed or hopeless Not at all   PHQ-2 Total Score 0     Cognitive Function Screenin/8/2024     9:23 AM   Cognitive Function Screening   Clock Drawing Test 0    Mini-Cog 3 Minute Recall 0   Cognitive Function Screening 0       Data saved with a previous flowsheet row definition     Cognitive Function Screening Total - Less than 4 = Abnormal,  Greater than or equal to 4 = Normal        WHAT MATTERS MOST:  Advance Care Planning   ACP Status:   Patient has had an ACP conversation  Living Will: Yes  Power of : Yes  POLST: Yes    What is most important right now is to focus on staying independent    Accordingly, we have decided that the best plan to meet the patient's goals includes - continue with current treatment      What matters most to patient today is: seeing her daughter.                  Assessment and Plan:     1. 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 Seroquel nightly prn and aricept nightly. Following with dr. Tijerina, continue. No issues since last hospitalization. Continue current regimen.       2. Secondary hypertension  Assessment & Plan:  BP is well controlled today. On losartan 25 mg daily, continue. Will monitor       3.  Hypothyroidism, unspecified type  Assessment & Plan:  Last tsh wnl. Continue current dose of levothyroxine. Will monitor              Follow Up:   Follow up in about 3 months (around 10/3/2025).      Lashonda Pearce

## 2025-07-03 NOTE — ASSESSMENT & PLAN NOTE
On rexulti daily. Seroquel nightly prn and aricept nightly. Following with dr. Tijerina, continue. No issues since last hospitalization. Continue current regimen.

## 2025-07-07 ENCOUNTER — PATIENT MESSAGE (OUTPATIENT)
Dept: PALLIATIVE MEDICINE | Facility: CLINIC | Age: OVER 89
End: 2025-07-07

## 2025-07-07 ENCOUNTER — OFFICE VISIT (OUTPATIENT)
Dept: PALLIATIVE MEDICINE | Facility: CLINIC | Age: OVER 89
End: 2025-07-07
Payer: MEDICARE

## 2025-07-07 DIAGNOSIS — Z51.5 PALLIATIVE CARE ENCOUNTER: ICD-10-CM

## 2025-07-07 DIAGNOSIS — G30.1 MILD LATE ONSET ALZHEIMER'S DEMENTIA WITH AGITATION: Primary | ICD-10-CM

## 2025-07-07 DIAGNOSIS — F02.A11 MILD LATE ONSET ALZHEIMER'S DEMENTIA WITH AGITATION: Primary | ICD-10-CM

## 2025-07-07 PROCEDURE — 1123F ACP DISCUSS/DSCN MKR DOCD: CPT | Mod: CPTII,HCNC,95, | Performed by: STUDENT IN AN ORGANIZED HEALTH CARE EDUCATION/TRAINING PROGRAM

## 2025-07-07 PROCEDURE — 98006 SYNCH AUDIO-VIDEO EST MOD 30: CPT | Mod: HCNC,95,, | Performed by: STUDENT IN AN ORGANIZED HEALTH CARE EDUCATION/TRAINING PROGRAM

## 2025-07-07 PROCEDURE — 1159F MED LIST DOCD IN RCRD: CPT | Mod: CPTII,HCNC,95, | Performed by: STUDENT IN AN ORGANIZED HEALTH CARE EDUCATION/TRAINING PROGRAM

## 2025-07-07 PROCEDURE — 3288F FALL RISK ASSESSMENT DOCD: CPT | Mod: CPTII,HCNC,95, | Performed by: STUDENT IN AN ORGANIZED HEALTH CARE EDUCATION/TRAINING PROGRAM

## 2025-07-07 PROCEDURE — 1101F PT FALLS ASSESS-DOCD LE1/YR: CPT | Mod: CPTII,HCNC,95, | Performed by: STUDENT IN AN ORGANIZED HEALTH CARE EDUCATION/TRAINING PROGRAM

## 2025-07-07 NOTE — PROGRESS NOTES
Palliative and Geriatric Medicine Evaluation   Follow up    Patient Name: Olamide Felipe     Date: 07/07/2025      Consult Requested By:  No ref. provider found    Primary Care Physician:  Lashonda Pearce MD    Reason for Consult: Advance care planning and symptom management in the setting of cognitive impairment       The patient location is: [place]    The chief complaint leading to consultation is: dementia    Visit type: audiovisual    Face to Face time with patient: 15min  30 minutes of total time spent on the encounter, which includes face to face time and non-face to face time preparing to see the patient (eg, review of tests), Obtaining and/or reviewing separately obtained history, Documenting clinical information in the electronic or other health record, Independently interpreting results (not separately reported) and communicating results to the patient/family/caregiver, or Care coordination (not separately reported).     Each patient to whom he or she provides medical services by telemedicine is:  (1) informed of the relationship between the physician and patient and the respective role of any other health care provider with respect to management of the patient; and (2) notified that he or she may decline to receive medical services by telemedicine and may withdraw from such care at any time.      Assessment/Plan     Plan/Recommendations:  Olamide was seen today for agitation, restlessness and insomnia.    Diagnoses and all orders for this visit:    Mentation: Cognition and Mood   Moderate late onset Alzheimer's dementia with agitation   -Cognitive impairment since 2019   -Lives in [facility]  -Continue Fluoxetine 40mg daily  - Continue Rexulti 1 mg daily to address ongoing agitation and confusion, particularly at night.  - Continued Seroquel at current dose for nighttime use as needed for agitation.  - Continued Aricept at current dose.      Mobility  At Risk for falls  Olamide Barnes  Matteo is  not independent with ADL's and is not independent with IADL's  - fall w/ clavicle fracture because she was carrying too many bags in 2023  -walking independently, walking every day in garage and needs supervision if walking outside  -lives in [place]  - Recommend lidocaine patches as an option for back pain if needed      Medications  -Medication reconciliation performed   -High risk medications identified   -On Fluoxetine and Rexulti      Multi-Complexity:  -Frailty based on Clinical Frailty scale yes  -Weight loss: yes, BMI: Body mass index is 20.19 kg/m².  -Comorbidities listed:  Hypothyroidism, HLD, HTN  Breast cancer s/p Lumpectomy 2022  - Patient appears stable with good spirits, normal BP, and stable weight.      FOLLOW-UP:  - Follow up in 6 months, unless there is a need to reach out sooner.  - Contact the office if there are any changes or questions.      Palliative Care Encounter / Advance Care Planning      Advance Care Planning     Advance Directives:   Living Will: Yes        Copy on chart: Yes    [place]: Yes    Medical Power of : Yes    Agent's Name:  Christy Garcia   Agent's Contact Number:  [phone]    Decision Making:  Patient answered questions  Goals of Care: Advance Care Planning    Date: 03/20/2025  I engaged the patient and daughter in a voluntary conversation about advance care planning and we specifically addressed what the goals of care would be moving forward.  We explored the patient's values and preferences for future care.  The patient endorses that what is most important right now is to focus on remaining as independent as possible and being able to walk and comfort and QoL. Patient states that's he wants to continue walking as she enjoys that significantly. Daughter hopes for her to be able to maintain a good QoL and to maintain a the good mood that she currently has.  Accordingly, we have decided that the best plan to meet the patient's goals includes continuing with  treatment and palliative care.         Date: 08/29/2024  I engaged the patient and family in a voluntary conversation about advance care planning and we specifically addressed what the goals of care would be moving forward.  We explored the patient's values and preferences for future care.  The patient endorses that what is most important right now is to focus on remaining as independent as possible. Patient expresses a strong desire for independence and the ability to continue activities like walking. Quality of life for the patient involves maintaining independence, engaging in activities she enjoys, and not feeling constrained by the care facility's routines. Patient has LAPOST in place from 1/2024 stating DNR, SELECTIVE TX AND NO ARTIFICIAL NUTRITION BY TUBE  She also has HCPOA naming Christy Garcia as agent.     They shared that he has ACP documents at home and they will upload them or bring them in to the next appointment            - Follow up for virtual visit in 1-2 months.  - Daughter to message doctor at the beginning of next month to send Rexulti prescription to [facility].      Subjective:     Informant: patient and daughter     Chief Complaint:   Chief Complaint   Patient presents with    Follow-up       History of Present Illness:  Olamide Felipe is a 89 y.o. female presenting with dementia, lives in [facility]  Referred to Geriatric and Palliative Care for evaluation and management of advance care planning, and additional support..     Patient has been eating and sleeping well with no recent falls. Her weight was stable and blood pressure normal when seen by her doctor last week, though the systolic pressure is sometimes high during visits. Her spirits have been good, and she recently enjoyed a visit from her grandson, great-grandson, and son-in-law on July 4th, including a four-generation family picture. Patient feels comfortable in her current living situation, describing it as home.  "She mentions having some recent lower back trouble but attributes it to being too busy. She has not felt bad since being in her current location and has been in good spirits lately. She denies needing additional pain management for her back at this time.    Eating and sleeping well  No recent falls  Weight stable as of last week's doctor visit  Blood pressure normal during daily checks at current residence  Mobility limited to a small area, likely for safety reasons due to uneven outdoor surfaces  Able to walk independently within the designated safe area (flat driveway)    Patient currently resides in a care facility, which she refers to as her "home now." Her daughter visits regularly and is involved in her care. Patient expresses contentment with her current living situation, appreciating the activities and care provided.     Quality of life for the patient revolves around family visits, comfortable living conditions, and maintaining good spirits.    Please refer to prior notes/assessments for more information: 10/19/23; 8/29/24; 3/20/25; 4/7/25    Palliative Exam     Review of Symptoms      Symptom Assessment (ESAS 0-10 Scale)  Unable to complete assessment due to Dementia     CAM / Delirium:  Negative      Pain Assessment in Advanced Demential Scale (PAINAD)   Breathing - Independent of vocalization:  0  Negative vocalization:  0  Facial expression:  0  Body language:  0  Consolability:  0  Total:  0    Living Arrangements:  Lives in assisted living    Psychosocial/Cultural:   See Palliative Psychosocial Note: Yes  Lives in [facility],  and has a daughter and 2 adult sons. Patient is a former . She worked in several large [employer] in [place] and [place], and also served as a  for her father.   **Primary  to Follow**  Palliative Care  Consult: Yes      4Ms for Medical Decision-Making in Older Adults    Last Completed EAWV: 7/8/2024    MEDICATIONS:  High Risk " Medications:  Total Active Medications: 1  FLUoxetine - 40 MG    MOBILITY:  Activities of Daily Living:      3/20/2025     2:25 PM   Activities of Daily Living   Ambulation Independent   Dressing Independent   Transfers Independent   Feeding Independent   Cleaning home/Chores Assistance Required   Telephone use Assistance Required   Shopping Assistance Required   Paying bills Assistance Required   Taking meds Assistance Required   If required, who assists the patient with ADLs? [place]     Fall Risk:      7/7/2025     3:00 PM 7/3/2025    10:20 AM 4/7/2025     3:00 PM   Fall Risk Assessment - Outpatient   Mobility Status Ambulatory Ambulatory w/ assistance Ambulatory w/ assistance   Number of falls 0 1 with injury 1 with injury   Identified as fall risk False True True     Disability Status:      3/20/2025     2:25 PM   Disability Status   Are you deaf or do you have serious difficulty hearing? N   Are you blind or do you have serious difficulty seeing, even when wearing glasses? N   Because of a physical, mental, or emotional condition, do you have serious difficulty concentrating, remembering, or making decisions? Y   Do you have serious difficulty walking or climbing stairs? Y   Do you have difficulty dressing or bathing? Y   Because of a physical, mental, or emotional condition, do you have difficulty doing errands alone such as visiting a doctor's office or shopping? Y     Nutrition Screening:      3/20/2025     2:26 PM   Nutrition Screening   Has food intake declined over the past three months due to loss of appetite, digestive problems, chewing or swallowing difficulties? No decrease in food intake   Involuntary weight loss during the last 3 months? Weight loss between 1 and 3 kg (2.2 and 6.6 pounds)   Mobility? Able to get out of bed/chair, but does not go out   Has the patient suffered psychological stress or acute disease in the past three months? No   Neuropsychological problems? Mild dementia   Body Mass  Index (BMI)?  BMI 19 to less than 21   Screening Score 9   Interpretation At risk of malnutrition    Screening Score: 0-7 Malnourished, 8-11 At Risk, 12-14 Normal  Get Up and Go:      7/8/2024     9:18 AM 3/6/2023     2:53 PM 1/25/2021     9:08 AM   Get Up and Go   Trial 1 8 seconds -- 10 seconds     Whisper Test:      7/8/2024     9:21 AM   Whisper Test   Whisper Test Abnormal           MENTATION:   Has Dementia Dx: Yes    Has Anxiety Dx: No    Depression Patient Health Questionnaire:      2/18/2025     8:17 AM   Depression Patient Health Questionnaire   Over the last two weeks how often have you been bothered by little interest or pleasure in doing things Not at all   Over the last two weeks how often have you been bothered by feeling down, depressed or hopeless Not at all   PHQ-2 Total Score 0              Review of patient's allergies indicates:  No Known Allergies      Medications:    Current Outpatient Medications:     acetaminophen (TYLENOL) 325 MG tablet, Take 325 mg by mouth every 6 (six) hours as needed for Pain., Disp: , Rfl:     b complex vitamins tablet, Take 1 tablet by mouth once daily., Disp: , Rfl:     brexpiprazole (REXULTI) 1 mg Tab, Take 1 tablet (1 mg total) by mouth every evening., Disp: 30 tablet, Rfl: 1    calcium-vitamin D 250-100 mg-unit per tablet, Take 1 tablet by mouth 2 (two) times daily., Disp: , Rfl:     donepeziL (ARICEPT) 5 MG tablet, Take 1 tablet (5 mg total) by mouth every evening., Disp: 90 tablet, Rfl: 0    FLUoxetine 40 MG capsule, Take 1 capsule (40 mg total) by mouth once daily., Disp: 90 capsule, Rfl: 3    levothyroxine (SYNTHROID) 25 MCG tablet, Take 1 tablet (25 mcg total) by mouth before breakfast., Disp: 90 tablet, Rfl: 3    loratadine (CLARITIN) 10 mg tablet, Take 1 tablet (10 mg total) by mouth once daily., Disp: 90 tablet, Rfl: 3    losartan (COZAAR) 25 MG tablet, Take 1 tablet (25 mg total) by mouth once daily. (Patient taking differently: Take 25 mg by mouth every  morning. Check BP), Disp: 90 tablet, Rfl: 3    mv-min/iron/folic/calcium/vitK (WOMEN'S MULTIVITAMIN ORAL), Take 1 tablet by mouth once daily., Disp: , Rfl:     omeprazole (PRILOSEC) 20 MG capsule, Take 1 capsule (20 mg total) by mouth once daily., Disp: 90 capsule, Rfl: 3    polyethylene glycol (GLYCOLAX) 17 gram PwPk, Take 17 g by mouth 2 (two) times daily as needed for Constipation. (Patient taking differently: Take 17 g by mouth 2 (two) times daily as needed for Constipation. Mix in 8 oz of water), Disp: , Rfl: 0    senna-docusate 8.6-50 mg (PERICOLACE) 8.6-50 mg per tablet, Take 1 tablet by mouth daily as needed for Constipation., Disp: 90 tablet, Rfl: 0    simvastatin (ZOCOR) 20 MG tablet, Take 1 tablet (20 mg total) by mouth every evening., Disp: 90 tablet, Rfl: 3    QUEtiapine (SEROQUEL) 25 MG Tab, Take 1 tablet (25 mg total) by mouth nightly as needed (agitation)., Disp: 30 tablet, Rfl: 0     database queried on 07/07/2025  by Tasha Emerson . The results reviewed and considered with the clinical data in the decision whether or not to prescribe a controlled substance.   No medications on file       Objective:   Physical Exam:  Vitals:      LABS:  Creatinine 3/28/25 1.0     HGB 3/27/25 11.2 Low      EKG:   [facility] QTC Calculation 1/28/25 434     IMAGING:    Physical Exam  Constitutional:       General: She is not in acute distress.     Appearance: She is not toxic-appearing.      Comments: Frail    HENT:      Head: Normocephalic.      Right Ear: External ear normal.      Left Ear: External ear normal.      Mouth/Throat:      Mouth: Mucous membranes are moist.   Eyes:      General: No scleral icterus.     Extraocular Movements: Extraocular movements intact.   Pulmonary:      Effort: Pulmonary effort is normal. No respiratory distress.   Skin:     Coloration: Skin is not pale.   Neurological:      Mental Status: She is alert. Mental status is at baseline. She is disoriented.      Gait: Gait is intact.    Psychiatric:         Mood and Affect: Mood normal.         Behavior: Behavior normal.         Signature: Tasha Emerson MD

## 2025-07-11 ENCOUNTER — PATIENT OUTREACH (OUTPATIENT)
Dept: NEUROLOGY | Facility: CLINIC | Age: OVER 89
End: 2025-07-11
Payer: MEDICARE

## 2025-07-11 NOTE — PROGRESS NOTES
CTN reached out to cg, Christy Garcia. Cg reports that the pt's medication regime is perfect right now and everything is going well. Cg reports that the pt is calmer and she is doing well at the [facility] right now.     Cg states that the pt is sleeping well, eating well and has not had any recent falls. Cg wanted Dr. Tijerina to know that the pt is having delusions and they are feeling very very real to the pt. Cg states that the pt is convinced that her father is in town and her sister is living down the street. Cg states that the pt is recalling people from the past. CTN provided some dementia education in regard to delusions.     This year for the recent holiday weekend they all went to see the pt and they had a great time. Cg reports that the pt is no longer able remember people or place people in her life but is still glad to see them when they come to visit or call.     Cg reports that the staff at her house reports that she is no longer doing any activities but will watch TV with the other residents.     Cg states that the pt and one of the other residents have been taking a walk on the drive way together a few times a day.     Cg reports that he and her brother and a good team and they have not had any issues in their mothers care.     CTN will call the cg in September to schedule our next [employer] Call.   Cg reports that she now has no expectations and is able to understand that her mother can not remember and she is no longer over whelmed by her mothers behaviors and routines.

## 2025-07-22 RX ORDER — DONEPEZIL HYDROCHLORIDE 5 MG/1
5 TABLET, FILM COATED ORAL NIGHTLY
Qty: 90 TABLET | Refills: 0 | Status: SHIPPED | OUTPATIENT
Start: 2025-07-22 | End: 2025-10-22

## 2025-09-02 DIAGNOSIS — F02.A11 MILD LATE ONSET ALZHEIMER'S DEMENTIA WITH AGITATION: ICD-10-CM

## 2025-09-02 DIAGNOSIS — F39 MOOD DISORDER: ICD-10-CM

## 2025-09-02 DIAGNOSIS — G30.1 MILD LATE ONSET ALZHEIMER'S DEMENTIA WITH AGITATION: ICD-10-CM

## 2025-09-03 RX ORDER — BREXPIPRAZOLE 1 MG/1
1 TABLET ORAL NIGHTLY
Qty: 30 TABLET | Refills: 1 | Status: SHIPPED | OUTPATIENT
Start: 2025-09-03

## 2025-09-03 RX ORDER — SIMVASTATIN 20 MG/1
20 TABLET, FILM COATED ORAL NIGHTLY
Qty: 90 TABLET | Refills: 3 | Status: SHIPPED | OUTPATIENT
Start: 2025-09-03

## 2025-09-03 RX ORDER — LEVOTHYROXINE SODIUM 25 UG/1
25 TABLET ORAL
Qty: 90 TABLET | Refills: 3 | Status: SHIPPED | OUTPATIENT
Start: 2025-09-03

## (undated) DEVICE — Device

## (undated) DEVICE — CONTAINER SPECIMEN OR STER 4OZ

## (undated) DEVICE — GAUZE SPONGE BULKEE 6X6.75IN

## (undated) DEVICE — SEE MEDLINE ITEM 157131

## (undated) DEVICE — ELECTRODE REM PLYHSV RETURN 9

## (undated) DEVICE — MARKER SKIN STND TIP BLUE BARR

## (undated) DEVICE — SPONGE SUPER KERLIX 6X6.75IN

## (undated) DEVICE — GOWN SMART IMP BREATHABLE XXLG

## (undated) DEVICE — ADHESIVE DERMABOND ADVANCED

## (undated) DEVICE — TOWEL OR DISP STRL BLUE 4/PK

## (undated) DEVICE — NDL HYPO REG 25G X 1 1/2

## (undated) DEVICE — ELECTRODE BLADE INSULATED 1 IN

## (undated) DEVICE — SUT 2/0 30IN SILK BLK BRAI

## (undated) DEVICE — GLOVE BIOGEL SKINSENSE PI 6.5

## (undated) DEVICE — DRAPE STERI INSTRUMENT 1018

## (undated) DEVICE — UNDERGLOVES BIOGEL PI SZ 7 LF

## (undated) DEVICE — SUT VICRYL 3-0 27 SH

## (undated) DEVICE — MARGIN MARKER STANDARD 6 COLOR

## (undated) DEVICE — SUT VICRYL PLUS 4-0 PS2 27

## (undated) DEVICE — SYR 10CC LUER LOCK

## (undated) DEVICE — BRA CLASSIC COMFORM BLK SZ 36

## (undated) DEVICE — APPLICATOR CHLORAPREP ORN 26ML